# Patient Record
Sex: FEMALE | Race: WHITE | NOT HISPANIC OR LATINO | Employment: FULL TIME | URBAN - METROPOLITAN AREA
[De-identification: names, ages, dates, MRNs, and addresses within clinical notes are randomized per-mention and may not be internally consistent; named-entity substitution may affect disease eponyms.]

---

## 2017-01-03 ENCOUNTER — OFFICE VISIT (OUTPATIENT)
Dept: INTERNAL MEDICINE | Facility: CLINIC | Age: 42
End: 2017-01-03
Payer: COMMERCIAL

## 2017-01-03 VITALS
WEIGHT: 212.6 LBS | OXYGEN SATURATION: 97 % | BODY MASS INDEX: 38.88 KG/M2 | SYSTOLIC BLOOD PRESSURE: 146 MMHG | TEMPERATURE: 98.6 F | DIASTOLIC BLOOD PRESSURE: 98 MMHG | HEART RATE: 97 BPM

## 2017-01-03 DIAGNOSIS — I10 BENIGN ESSENTIAL HYPERTENSION: ICD-10-CM

## 2017-01-03 DIAGNOSIS — L29.9 PRURITIC DISORDER: ICD-10-CM

## 2017-01-03 DIAGNOSIS — G89.29 CHRONIC NECK PAIN: ICD-10-CM

## 2017-01-03 DIAGNOSIS — Z12.31 ENCOUNTER FOR SCREENING MAMMOGRAM FOR BREAST CANCER: ICD-10-CM

## 2017-01-03 DIAGNOSIS — Z00.00 ENCOUNTER FOR ROUTINE ADULT HEALTH EXAMINATION WITHOUT ABNORMAL FINDINGS: Primary | ICD-10-CM

## 2017-01-03 DIAGNOSIS — M54.2 CHRONIC NECK PAIN: ICD-10-CM

## 2017-01-03 DIAGNOSIS — R73.9 HYPERGLYCEMIA: ICD-10-CM

## 2017-01-03 DIAGNOSIS — N91.2 AMENORRHEA: ICD-10-CM

## 2017-01-03 LAB
ALBUMIN SERPL-MCNC: 4.1 G/DL (ref 3.4–5)
ALP SERPL-CCNC: 58 U/L (ref 40–150)
ALT SERPL W P-5'-P-CCNC: 43 U/L (ref 0–50)
ANION GAP SERPL CALCULATED.3IONS-SCNC: 8 MMOL/L (ref 3–14)
AST SERPL W P-5'-P-CCNC: 29 U/L (ref 0–45)
BILIRUB SERPL-MCNC: 0.4 MG/DL (ref 0.2–1.3)
BUN SERPL-MCNC: 8 MG/DL (ref 7–30)
CALCIUM SERPL-MCNC: 9.5 MG/DL (ref 8.5–10.1)
CHLORIDE SERPL-SCNC: 105 MMOL/L (ref 94–109)
CO2 SERPL-SCNC: 28 MMOL/L (ref 20–32)
CREAT SERPL-MCNC: 0.68 MG/DL (ref 0.52–1.04)
GFR SERPL CREATININE-BSD FRML MDRD: ABNORMAL ML/MIN/1.7M2
GLUCOSE SERPL-MCNC: 166 MG/DL (ref 70–99)
POTASSIUM SERPL-SCNC: 3.9 MMOL/L (ref 3.4–5.3)
PROT SERPL-MCNC: 8.1 G/DL (ref 6.8–8.8)
SODIUM SERPL-SCNC: 141 MMOL/L (ref 133–144)
TSH SERPL DL<=0.005 MIU/L-ACNC: 1.9 MU/L (ref 0.4–4)

## 2017-01-03 PROCEDURE — 80053 COMPREHEN METABOLIC PANEL: CPT | Performed by: INTERNAL MEDICINE

## 2017-01-03 PROCEDURE — 99000 SPECIMEN HANDLING OFFICE-LAB: CPT | Performed by: INTERNAL MEDICINE

## 2017-01-03 PROCEDURE — 82306 VITAMIN D 25 HYDROXY: CPT | Mod: 90 | Performed by: INTERNAL MEDICINE

## 2017-01-03 PROCEDURE — 99396 PREV VISIT EST AGE 40-64: CPT | Performed by: INTERNAL MEDICINE

## 2017-01-03 PROCEDURE — 99213 OFFICE O/P EST LOW 20 MIN: CPT | Mod: 25 | Performed by: INTERNAL MEDICINE

## 2017-01-03 PROCEDURE — 84443 ASSAY THYROID STIM HORMONE: CPT | Performed by: INTERNAL MEDICINE

## 2017-01-03 PROCEDURE — 36415 COLL VENOUS BLD VENIPUNCTURE: CPT | Performed by: INTERNAL MEDICINE

## 2017-01-03 RX ORDER — LISINOPRIL/HYDROCHLOROTHIAZIDE 10-12.5 MG
1 TABLET ORAL DAILY
Qty: 30 TABLET | Refills: 2 | Status: SHIPPED | OUTPATIENT
Start: 2017-01-03 | End: 2017-03-06

## 2017-01-03 NOTE — PROGRESS NOTES
SUBJECTIVE:     CC: Kezia Nava is an 41 year old woman who presents for preventive health visit.     Physical  Annual:     Getting at least 3 servings of Calcium per day::  Yes    Bi-annual eye exam::  Yes    Dental care twice a year::  Yes    Sleep apnea or symptoms of sleep apnea::  Sleep apnea    Frequency of exercise::  None    Taking medications regularly::  Yes    Medication side effects::  None    Additional concerns today::  YES      1. C/o pruritus- gets some hive like rash AFTER itching but not before  -she is worried this may be related to some sort of kidney or liver disease.      Today's PHQ-2 Score:   PHQ-2 ( 1999 [employer]) 1/3/2017   Q1: Little interest or pleasure in doing things 0   Q2: Feeling down, depressed or hopeless 0   PHQ-2 Score 0   Little interest or pleasure in doing things Several days   Feeling down, depressed or hopeless Several days   PHQ-2 Score 2       Abuse: Current or Past(Physical, Sexual or Emotional)- No  Do you feel safe in your environment - Yes    Social History   Substance Use Topics     Smoking status: Never Smoker      Smokeless tobacco: Never Used     Alcohol Use: 0.0 oz/week     0 Standard drinks or equivalent per week      Comment: socially     The patient does not drink >3 drinks per day nor >7 drinks per week.    Recent Labs   Lab Test  12/08/10   0711 12/08/10   CHOL  187  187   HDL  41*  41   LDL  117  117   TRIG  145  145   CHOLHDLRATIO  4.6   --        Reviewed orders with patient.  Reviewed health maintenance and updated orders accordingly - Yes    Mammo Decision Support:  Patient under age 50, mutual decision reflected in health maintenance.      Pertinent mammograms are reviewed under the imaging tab.  History of abnormal Pap smear: NO - age 30-65 PAP every 5 years with negative HPV co-testing recommended  All Histories reviewed and updated in Epic.      ROS:  C: NEGATIVE for fever, chills, change in weight  I: NEGATIVE for worrisome rashes, moles or  lesions  E: NEGATIVE for vision changes or irritation  ENT: NEGATIVE for ear, mouth and throat problems  R: NEGATIVE for significant cough or SOB  B: NEGATIVE for masses, tenderness or discharge  CV: NEGATIVE for chest pain, palpitations or peripheral edema  GI: NEGATIVE for nausea, abdominal pain, heartburn, or change in bowel habits   female: menses: amenorrhea since october  M: NEGATIVE for significant arthralgias or myalgia  N: NEGATIVE for weakness, dizziness or paresthesias  P: NEGATIVE for changes in mood or affect    Problem list, Medication list, Allergies, and Medical/Social/Surgical histories reviewed in [facility] and updated as appropriate.  OBJECTIVE:     LMP 10/23/2016 (Exact Date)  EXAM:  GENERAL: alert, no distress and over weight  EYES: Eyes grossly normal to inspection, PERRL and conjunctivae and sclerae normal  HENT: ear canals and TM's normal, nose and mouth without ulcers or lesions  NECK: no adenopathy, no asymmetry, masses, or scars and thyroid normal to palpation  RESP: lungs clear to auscultation - no rales, rhonchi or wheezes  CV: regular rate and rhythm, normal S1 S2, no S3 or S4, no murmur, click or rub, no peripheral edema and peripheral pulses strong  ABDOMEN: soft, nontender, no hepatosplenomegaly, no masses and bowel sounds normal  MS: no gross musculoskeletal defects noted, no edema  SKIN: no suspicious lesions or rashes  NEURO: Normal strength and tone, mentation intact and speech normal  PSYCH: mentation appears normal, affect normal/bright  LYMPH: no cervical, supraclavicular, axillary, or inguinal adenopathy    Results for orders placed or performed in visit on 01/03/17   TSH with free T4 reflex   Result Value Ref Range    TSH 1.90 0.40 - 4.00 mU/L   Comprehensive metabolic panel (BMP + Alb, Alk Phos, ALT, AST, Total. Bili, TP)   Result Value Ref Range    Sodium 141 133 - 144 mmol/L    Potassium 3.9 3.4 - 5.3 mmol/L    Chloride 105 94 - 109 mmol/L    Carbon Dioxide 28 20 - 32 mmol/L  "   Anion Gap 8 3 - 14 mmol/L    Glucose 166 (H) 70 - 99 mg/dL    Urea Nitrogen 8 7 - 30 mg/dL    Creatinine 0.68 0.52 - 1.04 mg/dL    GFR Estimate >90  Non  GFR Calc   >60 mL/min/1.7m2    GFR Estimate If Black >90   GFR Calc   >60 mL/min/1.7m2    Calcium 9.5 8.5 - 10.1 mg/dL    Bilirubin Total 0.4 0.2 - 1.3 mg/dL    Albumin 4.1 3.4 - 5.0 g/dL    Protein Total 8.1 6.8 - 8.8 g/dL    Alkaline Phosphatase 58 40 - 150 U/L    ALT 43 0 - 50 U/L    AST 29 0 - 45 U/L   Vitamin D Deficiency   Result Value Ref Range    Vitamin D Deficiency screening 25 20 - 75 ug/L      ASSESSMENT/PLAN:     1. Encounter for routine adult health examination without abnormal findings  Hyperglycemia noted- i don't think this was fasting. Will repeat when returns in 1 mo for BP checkout  otherwise focus on weight loss, exercise,   BP control    2. Benign essential hypertension  Start rx- f/u 1 mo   - Comprehensive metabolic panel (BMP + Alb, Alk Phos, ALT, AST, Total. Bili, TP)  - lisinopril-hydrochlorothiazide (PRINZIDE/ZESTORETIC) 10-12.5 MG per tablet; Take 1 tablet by mouth daily  Dispense: 30 tablet; Refill: 2  - OFFICE/OUTPT VISIT,EST,LEVL III    3. Pruritic disorder  No evidence of any liver disease.   - TSH with free T4 reflex  - Comprehensive metabolic panel (BMP + Alb, Alk Phos, ALT, AST, Total. Bili, TP)    4. Chronic neck pain  - Vitamin D Deficiency    5. Amenorrhea  rec'd see gyn if menses doesn't return in next 1-2 mo   - TSH with free T4 reflex    6. Encounter for screening mammogram for breast cancer  - MA Screening Digital Bilateral; Future    COUNSELING:  Reviewed preventive health counseling, as reflected in patient instructions         reports that she has never smoked. She has never used smokeless tobacco.    Estimated body mass index is 36.57 kg/(m^2) as calculated from the following:    Height as of 8/10/16: 5' 2.01\" (1.575 m).    Weight as of 8/10/16: 200 lb (90.719 kg).   Weight management " plan: Discussed healthy diet and exercise guidelines and patient will follow up in 1 month in clinic to re-evaluate.    Counseling Resources:  ATP IV Guidelines  Pooled Cohorts Equation Calculator  Breast Cancer Risk Calculator  FRAX Risk Assessment  ICSI Preventive Guidelines  Dietary Guidelines for Americans, 2010  USDA's MyPlate  ASA Prophylaxis  Lung CA Screening    Kan Serna MD  [facility]    Answers for HPI/ROS submitted by the patient on 1/3/2017   PHQ-2 Depressed: Several days, Several days  PHQ-2 Score: 2

## 2017-01-03 NOTE — MR AVS SNAPSHOT
After Visit Summary   1/3/2017    Kezia Nava    MRN: [id number]           Patient Information     Date Of Birth          1975        Visit Information        Provider Department      1/3/2017 11:00 AM Kan Serna MD [facility]        Today's Diagnoses     Encounter for routine adult health examination without abnormal findings    -  1     Benign essential hypertension         Pruritic disorder         Chronic neck pain         Amenorrhea            Follow-ups after your visit        Your next 10 appointments already scheduled     Aug 14, 2017  2:00 PM   Return Sleep Patient with Bennett Ezra Goltz, PA-C   [facility] ([facility])    [address] [address] [phone]              Who to contact     If you have questions or need follow up information about today's clinic visit or your schedule please contact [facility] directly at [phone].  Normal or non-critical lab and imaging results will be communicated to you by MyChart, letter or phone within 4 business days after the clinic has received the results. If you do not hear from us within 7 days, please contact the clinic through [employer] or phone. If you have a critical or abnormal lab result, we will notify you by phone as soon as possible.  Submit refill requests through [employer] or call your pharmacy and they will forward the refill request to us. Please allow 3 business days for your refill to be completed.          Additional Information About Your Visit        MyChart Information     [employer] gives you secure access to your electronic health record. If you see a primary care provider, you can also send messages to your care team and make appointments. If you have questions, please call your primary care clinic.  If you do not have a primary care provider, please call [phone] and they will assist  you.        Care EveryWhere ID     This is your Care EveryWhere ID. This could be used by other organizations to access your [facility] medical records  [id number]        Your Vitals Were     Pulse Temperature Pulse Oximetry Last Period          97 98.6  F (37  C) (Oral) 97% 10/23/2016 (Exact Date)         Blood Pressure from Last 3 Encounters:   01/03/17 146/98   12/13/16 147/101   08/10/16 131/84    Weight from Last 3 Encounters:   01/03/17 212 lb 9.6 oz (96.435 kg)   08/10/16 200 lb (90.719 kg)   07/01/16 204 lb (92.534 kg)              We Performed the Following     Comprehensive metabolic panel (BMP + Alb, Alk Phos, ALT, AST, Total. Bili, TP)     TSH with free T4 reflex     Vitamin D Deficiency          Today's Medication Changes          These changes are accurate as of: 1/3/17 11:59 AM.  If you have any questions, ask your nurse or doctor.               Start taking these medicines.        Dose/Directions    lisinopril-hydrochlorothiazide 10-12.5 MG per tablet   Commonly known as:  PRINZIDE/ZESTORETIC   Used for:  Benign essential hypertension   Started by:  Kan Serna MD        Dose:  1 tablet   Take 1 tablet by mouth daily   Quantity:  30 tablet   Refills:  2            Where to get your medicines      These medications were sent to [facility] [address] [address]     Phone:  [phone]    - lisinopril-hydrochlorothiazide 10-12.5 MG per tablet             Primary Care Provider Office Phone # Fax #    Kan Serna [phone]       [facility] [address] [facility] [address]        Thank you!     [facility] you [facility] your [facility]. Our goal is always to provide you with excellent care. Hearing back from our patients is one way we can continue to improve our services. Please take a few minutes to complete the written survey that you may receive in  the mail after your visit with us. Thank you!             Your Updated Medication List - Protect others around you: Learn how to safely use, store and throw away your medicines at www.disposemymeds.org.          This list is accurate as of: 1/3/17 11:59 AM.  Always use your most recent med list.                   Brand Name Dispense Instructions for use    fluticasone 50 MCG/ACT spray    FLONASE     Spray 2 sprays into both nostrils daily       gabapentin 300 MG capsule    NEURONTIN    540 capsule    Take 2 capsules (600 mg) by mouth 3 times daily       IBU PO      Take 600 mg by mouth as needed       lisinopril-hydrochlorothiazide 10-12.5 MG per tablet    PRINZIDE/ZESTORETIC    30 tablet    Take 1 tablet by mouth daily       order for DME      AIRSENSE 10 10-15 CM/H20 NASAL AIRFIT N10 FOR HER       TYLENOL PO      Take 500 mg by mouth 2 times daily As needed for pain       zolpidem 5 MG tablet    AMBIEN    30 tablet    Take 1 tablet (5 mg) by mouth nightly as needed for sleep

## 2017-01-03 NOTE — NURSING NOTE
"Chief Complaint   Patient presents with     Physical       Initial /98 mmHg  Pulse 97  Temp(Src) 98.6  F (37  C) (Oral)  Wt 212 lb 9.6 oz (96.435 kg)  SpO2 97%  LMP 10/23/2016 (Exact Date) Estimated body mass index is 38.88 kg/(m^2) as calculated from the following:    Height as of 8/10/16: 5' 2.01\" (1.575 m).    Weight as of this encounter: 212 lb 9.6 oz (96.435 kg).  BP completed using cuff size: large    "

## 2017-01-04 ENCOUNTER — DOCUMENTATION ONLY (OUTPATIENT)
Dept: SLEEP MEDICINE | Facility: CLINIC | Age: 42
End: 2017-01-04

## 2017-01-04 PROBLEM — I10 BENIGN ESSENTIAL HYPERTENSION: Status: ACTIVE | Noted: 2017-01-04

## 2017-01-04 LAB — DEPRECATED CALCIDIOL+CALCIFEROL SERPL-MC: 25 UG/L (ref 20–75)

## 2017-01-04 NOTE — PROGRESS NOTES
6 month STM    Message left for patient to return call    Device settings:    EPAP Min Auto CPAP: 10.0 (The minimum allowable pressure in cmH2O)    EPAP Max Auto CPAP: 15.0 (The maximum allowable pressure in cmH2O)    Target(95% of Target) 14 day average (Resmed): 12.3cm H20    Objective measures: 14 day rolling measures      Compliance   (Goal >70%)  --% compliance greater than four hours rolling average 14 days: [id number] %      Leak   (Goal < 24 lpm) --95% OF Leak in litres Rolling Average 14 Days: 1.11 lpm      AHI  (Goal < 5)  --AHI Rolling Average 14 Day: 1.06      Usage  (Goal >240)  --Time mask on face 14 day average: 378 min      Assessment:Pt meeting objective benchmarks.  Patient meeting subjective benchmarks.   Action plan: pt to follow up per provider request (1-2 yrs)

## 2017-02-06 ENCOUNTER — OFFICE VISIT (OUTPATIENT)
Dept: INTERNAL MEDICINE | Facility: CLINIC | Age: 42
End: 2017-02-06
Payer: COMMERCIAL

## 2017-02-06 VITALS
OXYGEN SATURATION: 98 % | WEIGHT: 211.4 LBS | HEIGHT: 62 IN | BODY MASS INDEX: 38.9 KG/M2 | DIASTOLIC BLOOD PRESSURE: 94 MMHG | TEMPERATURE: 98.3 F | SYSTOLIC BLOOD PRESSURE: 130 MMHG | HEART RATE: 89 BPM

## 2017-02-06 DIAGNOSIS — E11.65 TYPE 2 DIABETES MELLITUS WITH HYPERGLYCEMIA, WITHOUT LONG-TERM CURRENT USE OF INSULIN (H): ICD-10-CM

## 2017-02-06 DIAGNOSIS — I10 BENIGN ESSENTIAL HYPERTENSION: Primary | ICD-10-CM

## 2017-02-06 DIAGNOSIS — A08.4 VIRAL GASTROENTERITIS: ICD-10-CM

## 2017-02-06 DIAGNOSIS — R73.9 HYPERGLYCEMIA: ICD-10-CM

## 2017-02-06 LAB
ANION GAP SERPL CALCULATED.3IONS-SCNC: 8 MMOL/L (ref 3–14)
BUN SERPL-MCNC: 9 MG/DL (ref 7–30)
CALCIUM SERPL-MCNC: 9.4 MG/DL (ref 8.5–10.1)
CHLORIDE SERPL-SCNC: 102 MMOL/L (ref 94–109)
CO2 SERPL-SCNC: 28 MMOL/L (ref 20–32)
CREAT SERPL-MCNC: 0.67 MG/DL (ref 0.52–1.04)
GFR SERPL CREATININE-BSD FRML MDRD: ABNORMAL ML/MIN/1.7M2
GLUCOSE SERPL-MCNC: 136 MG/DL (ref 70–99)
HBA1C MFR BLD: 7.8 % (ref 4.3–6)
POTASSIUM SERPL-SCNC: 3.4 MMOL/L (ref 3.4–5.3)
SODIUM SERPL-SCNC: 138 MMOL/L (ref 133–144)

## 2017-02-06 PROCEDURE — 83036 HEMOGLOBIN GLYCOSYLATED A1C: CPT | Performed by: INTERNAL MEDICINE

## 2017-02-06 PROCEDURE — 99214 OFFICE O/P EST MOD 30 MIN: CPT | Performed by: INTERNAL MEDICINE

## 2017-02-06 PROCEDURE — 80048 BASIC METABOLIC PNL TOTAL CA: CPT | Performed by: INTERNAL MEDICINE

## 2017-02-06 PROCEDURE — 36415 COLL VENOUS BLD VENIPUNCTURE: CPT | Performed by: INTERNAL MEDICINE

## 2017-02-06 RX ORDER — ONDANSETRON 4 MG/1
4-8 TABLET, ORALLY DISINTEGRATING ORAL EVERY 8 HOURS PRN
Qty: 12 TABLET | Refills: 1 | Status: SHIPPED | OUTPATIENT
Start: 2017-02-06 | End: 2020-02-18

## 2017-02-06 NOTE — NURSING NOTE
"Chief Complaint   Patient presents with     Hypertension       Initial /98 mmHg  Pulse 89  Temp(Src) 98.3  F (36.8  C) (Oral)  Ht 5' 2\" (1.575 m)  Wt 211 lb 6.4 oz (95.89 kg)  BMI 38.66 kg/m2  SpO2 98%  LMP 02/06/2017 Estimated body mass index is 38.66 kg/(m^2) as calculated from the following:    Height as of this encounter: 5' 2\" (1.575 m).    Weight as of this encounter: 211 lb 6.4 oz (95.89 kg).  Medication Reconciliation: complete    "

## 2017-02-06 NOTE — PROGRESS NOTES
"  SUBJECTIVE:                                                    Kezia Nava is a 41 year old female who presents to clinic today for the following health issues:    Viral \"stomach flu\"  Reports 1-2 d hx of nausea, diarrhea- loose, non-bloody, low grade fever, chills and feeling \"pretty rotten\"    Hypertension Follow-up  - started on meds last month   -most home/work # in the <130 range     Outpatient blood pressures are being checked at home.  Results are 110 - 150 / .    Low Salt Diet: low salt       Amount of exercise or physical activity: None    Problems taking medications regularly: No    Medication side effects: none    Diet: regular (no restrictions)    Problem list and histories reviewed & adjusted, as indicated.  Additional history: as documented    Problem list, Medication list, Allergies, and Medical/Social/Surgical histories reviewed in EPIC and updated as appropriate.    ROS:  Constitutional, HEENT, cardiovascular, pulmonary, gi and gu systems are negative, except as otherwise noted.    OBJECTIVE:                                                    /94 mmHg  Pulse 89  Temp(Src) 98.3  F (36.8  C) (Oral)  Ht 5' 2\" (1.575 m)  Wt 211 lb 6.4 oz (95.89 kg)  BMI 38.66 kg/m2  SpO2 98%  LMP 02/06/2017  Body mass index is 38.66 kg/(m^2).  GENERAL APPEARANCE: alert, no distress, over weight and fatigued  HENT: nose and mouth without ulcers or lesions  NECK: no adenopathy, no asymmetry, masses, or scars and thyroid normal to palpation  RESP: lungs clear to auscultation - no rales, rhonchi or wheezes  CV: regular rates and rhythm, normal S1 S2, no S3 or S4 and no murmur, click or rub  ABDOMEN: soft, nontender, without hepatosplenomegaly or masses and bowel sounds normal    Diagnostic test results:  Results for orders placed or performed in visit on 02/06/17   Hemoglobin A1c   Result Value Ref Range    Hemoglobin A1C 7.8 (H) 4.3 - 6.0 %   Basic metabolic panel   Result Value Ref Range    Sodium " 138 133 - 144 mmol/L    Potassium 3.4 3.4 - 5.3 mmol/L    Chloride 102 94 - 109 mmol/L    Carbon Dioxide 28 20 - 32 mmol/L    Anion Gap 8 3 - 14 mmol/L    Glucose 136 (H) 70 - 99 mg/dL    Urea Nitrogen 9 7 - 30 mg/dL    Creatinine 0.67 0.52 - 1.04 mg/dL    GFR Estimate >90  Non  GFR Calc   >60 mL/min/1.7m2    GFR Estimate If Black >90   GFR Calc   >60 mL/min/1.7m2    Calcium 9.4 8.5 - 10.1 mg/dL         ASSESSMENT/PLAN:                                                    1. Viral gastroenteritis  - ondansetron (ZOFRAN ODT) 4 MG ODT tab; Take 1-2 tablets (4-8 mg) by mouth every 8 hours as needed for nausea  Dispense: 12 tablet; Refill: 1    2.  Benign essential hypertension  Not quite at goal- but not best time to test thi- recheck when feeling better  - Basic metabolic panel    3. Hyperglycemia  Given the a1c -this indicates new onset type 2 diabetes   Will have her return and discuss starting metformin amongst other options  - Hemoglobin A1c  - Basic metabolic panel    Follow up with Provider -2-4 wks     Kan Serna MD  [facility]

## 2017-02-06 NOTE — MR AVS SNAPSHOT
After Visit Summary   2/6/2017    Kezia Nava    MRN: [id number]           Patient Information     Date Of Birth          1975        Visit Information        Provider Department      2/6/2017 10:20 AM Kan Serna MD [facility]        Today's Diagnoses     Benign essential hypertension    -  1     Hyperglycemia         Viral gastroenteritis            Follow-ups after your visit        Your next 10 appointments already scheduled     Aug 14, 2017  2:00 PM   Return Sleep Patient with Bennett Ezra Goltz, PA-C   [facility] ([facility])    [address] [place] [address] [phone]              Who to contact     If you have questions or need follow up information about today's clinic visit or your schedule please contact [facility] directly at [phone].  Normal or non-critical lab and imaging results will be communicated to you by MyChart, letter or phone within 4 business days after the clinic has received the results. If you do not hear from us within 7 days, please contact the clinic through MyChart or phone. If you have a critical or abnormal lab result, we will notify you by phone as soon as possible.  Submit refill requests through [employer] or call your pharmacy and they will forward the refill request to us. Please allow 3 business days for your refill to be completed.          Additional Information About Your Visit        MyChart Information     [employer] gives you secure access to your electronic health record. If you see a primary care provider, you can also send messages to your care team and make appointments. If you have questions, please call your primary care clinic.  If you do not have a primary care provider, please call [phone] and they will assist you.        Care EveryWhere ID     This is your Care EveryWhere ID. This could be used by other  "organizations to access your [facility] medical records  [id number]        Your Vitals Were     Pulse Temperature Height BMI (Body Mass Index) Pulse Oximetry Last Period    89 98.3  F (36.8  C) (Oral) 5' 2\" (1.575 m) 38.66 kg/m2 98% 02/06/2017       Blood Pressure from Last 3 Encounters:   02/06/17 130/98   01/03/17 146/98   12/13/16 147/101    Weight from Last 3 Encounters:   02/06/17 211 lb 6.4 oz (95.89 kg)   01/03/17 212 lb 9.6 oz (96.435 kg)   08/10/16 200 lb (90.719 kg)              We Performed the Following     Basic metabolic panel     Hemoglobin A1c        Primary Care Provider Office Phone # Fax #    Kan Serna [phone]       [facility] [address] [facility] [address]        Thank you!     Thank you for choosing [facility]  for your care. Our goal is always to provide you with excellent care. Hearing back from our patients is one way we can continue to improve our services. Please take a few minutes to complete the written survey that you may receive in the mail after your visit with us. Thank you!             Your Updated Medication List - Protect others around you: Learn how to safely use, store and throw away your medicines at www.disposemymeds.org.          This list is accurate as of: 2/6/17 10:51 AM.  Always use your most recent med list.                   Brand Name Dispense Instructions for use    fluticasone 50 MCG/ACT spray    FLONASE     Spray 2 sprays into both nostrils daily       gabapentin 300 MG capsule    NEURONTIN    540 capsule    Take 2 capsules (600 mg) by mouth 3 times daily       IBU PO      Take 600 mg by mouth as needed       lisinopril-hydrochlorothiazide 10-12.5 MG per tablet    PRINZIDE/ZESTORETIC    30 tablet    Take 1 tablet by mouth daily       order for DME      AIRSENSE 10 10-15 CM/H20 NASAL AIRFIT N10 FOR HER       TYLENOL PO      Take 500 mg by mouth 2 times daily As needed for pain       zolpidem " 5 MG tablet    AMBIEN    30 tablet    Take 1 tablet (5 mg) by mouth nightly as needed for sleep

## 2017-02-13 ENCOUNTER — HOSPITAL ENCOUNTER (OUTPATIENT)
Dept: MAMMOGRAPHY | Facility: CLINIC | Age: 42
Discharge: HOME | End: 2017-02-13
Attending: INTERNAL MEDICINE | Admitting: INTERNAL MEDICINE
Payer: COMMERCIAL

## 2017-02-13 DIAGNOSIS — Z12.31 VISIT FOR SCREENING MAMMOGRAM: ICD-10-CM

## 2017-02-13 PROCEDURE — G0202 SCR MAMMO BI INCL CAD: HCPCS

## 2017-03-06 ENCOUNTER — TELEPHONE (OUTPATIENT)
Dept: INTERNAL MEDICINE | Facility: CLINIC | Age: 42
End: 2017-03-06

## 2017-03-06 ENCOUNTER — OFFICE VISIT (OUTPATIENT)
Dept: INTERNAL MEDICINE | Facility: CLINIC | Age: 42
End: 2017-03-06
Payer: COMMERCIAL

## 2017-03-06 VITALS
SYSTOLIC BLOOD PRESSURE: 116 MMHG | TEMPERATURE: 98.3 F | DIASTOLIC BLOOD PRESSURE: 78 MMHG | WEIGHT: 208 LBS | OXYGEN SATURATION: 98 % | BODY MASS INDEX: 38.04 KG/M2 | HEART RATE: 82 BPM

## 2017-03-06 DIAGNOSIS — I10 BENIGN ESSENTIAL HYPERTENSION: ICD-10-CM

## 2017-03-06 DIAGNOSIS — Z13.6 CARDIOVASCULAR SCREENING; LDL GOAL LESS THAN 100: ICD-10-CM

## 2017-03-06 DIAGNOSIS — E11.65 TYPE 2 DIABETES MELLITUS WITH HYPERGLYCEMIA, WITHOUT LONG-TERM CURRENT USE OF INSULIN (H): Primary | ICD-10-CM

## 2017-03-06 DIAGNOSIS — Z80.3 FAMILY HISTORY OF MALIGNANT NEOPLASM OF BREAST: ICD-10-CM

## 2017-03-06 DIAGNOSIS — Z23 NEED FOR TDAP VACCINATION: ICD-10-CM

## 2017-03-06 LAB
CHOLEST SERPL-MCNC: 184 MG/DL
CREAT UR-MCNC: 184 MG/DL
HDLC SERPL-MCNC: 37 MG/DL
LDLC SERPL CALC-MCNC: 109 MG/DL
MICROALBUMIN UR-MCNC: 60 MG/L
MICROALBUMIN/CREAT UR: 32.55 MG/G CR (ref 0–25)
NONHDLC SERPL-MCNC: 147 MG/DL
TRIGL SERPL-MCNC: 188 MG/DL

## 2017-03-06 PROCEDURE — 36415 COLL VENOUS BLD VENIPUNCTURE: CPT | Performed by: INTERNAL MEDICINE

## 2017-03-06 PROCEDURE — 82043 UR ALBUMIN QUANTITATIVE: CPT | Performed by: INTERNAL MEDICINE

## 2017-03-06 PROCEDURE — 90471 IMMUNIZATION ADMIN: CPT | Performed by: INTERNAL MEDICINE

## 2017-03-06 PROCEDURE — 99214 OFFICE O/P EST MOD 30 MIN: CPT | Mod: 25 | Performed by: INTERNAL MEDICINE

## 2017-03-06 PROCEDURE — 90715 TDAP VACCINE 7 YRS/> IM: CPT | Performed by: INTERNAL MEDICINE

## 2017-03-06 PROCEDURE — 80061 LIPID PANEL: CPT | Performed by: INTERNAL MEDICINE

## 2017-03-06 RX ORDER — LISINOPRIL/HYDROCHLOROTHIAZIDE 10-12.5 MG
1 TABLET ORAL DAILY
Qty: 90 TABLET | Refills: 1 | Status: SHIPPED | OUTPATIENT
Start: 2017-03-06 | End: 2017-06-13 | Stop reason: SINTOL

## 2017-03-06 RX ORDER — METFORMIN HCL 500 MG
500 TABLET, EXTENDED RELEASE 24 HR ORAL
Qty: 30 TABLET | Refills: 2 | Status: SHIPPED | OUTPATIENT
Start: 2017-03-06 | End: 2017-06-05

## 2017-03-06 NOTE — MR AVS SNAPSHOT
After Visit Summary   3/6/2017    Kezia Nava    MRN: [id number]           Patient Information     Date Of Birth          1975        Visit Information        Provider Department      3/6/2017 8:00 AM Kan Serna MD [facility]        Today's Diagnoses     Type 2 diabetes mellitus with hyperglycemia, without long-term current use of insulin (H)    -  1    Benign essential hypertension        Need for Tdap vaccination        Family history of malignant neoplasm of breast           Follow-ups after your visit        Additional Services     DIABETES EDUCATOR REFERRAL       DIABETES SELF MANAGEMENT TRAINING (DSMT)      Your provider has referred you to Diabetes Education: [facility]: Diabetes Education - All [facility] [phone]   [email or web address]    Type of training and number of hours: New Diagnosis: Initial group DSMT - 10 hours.      Medicare covers: 10 hours of initial DSMT in 12 month period from the time of first visit, plus 2 hours of follow-up DSMT annually, and additional hours as requested for insulin training.    Diabetes Type: Type 2 - Diet Control             Diabetes Co-Morbidities: obesity               A1C Goal:  <7.0       A1C is: Lab Results       Component                Value               Date                       A1C                      7.8                 02/06/2017              If an urgent visit is needed or A1C is above 12, Care Team to call the Diabetes Education Team at [phone] or send an In Basket message to the Diabetes Education Pool (P DIAB ED-PATIENT CARE).    Diabetes Education Topics: Comprehensive Knowledge Assessment and Instruction    Special Educational Needs Requiring Individual DSMT: None       MEDICAL NUTRITION THERAPY (MNT) for Diabetes    Medical Nutrition Therapy with a Registered Dietitian can be provided in coordination with Diabetes Self-Management  Training to assist in achieving optimal diabetes management.    MNT Type and Hours: New diagnosis: Initial MNT - 3 hours                       Medicare will cover: 3 hours initial MNT in 12 month period after first visit, plus 2 hours of follow-up MNT annually    Please be aware that coverage of these services is subject to the terms and limitations of your health insurance plan.  Call member services at your health plan to determine Diabetes Self-Management Training benefits and ask which blood glucose monitor brands are covered by your plan.      Please bring the following with you to your appointment:    (1)  List of current medications   (2)  List of Blood Glucose Monitor brands that are covered by your insurance plan  (3)  Blood Glucose Monitor and log book  (4)   Food records for the 3 days prior to your visit    The Certified Diabetes Educator may make diabetes medication adjustments per the CDE Protocol and Collaborative Practice Agreement.            ONC/HEME ADULT REFERRAL       Your provider has referred you to: [facility]: [facility] [phone]   [email or web address]/    Please be aware that coverage of these services is subject to the terms and limitations of your health insurance plan.  Call member services at your health plan with any benefit or coverage questions.      Please bring the following with you to your appointment:    (1) Any X-Rays, CTs or MRIs which have been performed.  Contact the facility where they were done to arrange for  prior to your scheduled appointment.   (2) List of current medications  (3) This referral request   (4) Any documents/labs given to you for this referral                  Your next 10 appointments already scheduled     Aug 14, 2017  2:00 PM CDT   Return Sleep Patient with Bennett Ezra Goltz, PA-C [place]  [facility] [place] [facility])    [address] [id number] [phone]              Future tests that were ordered for you today     Open Future Orders        Priority Expected Expires Ordered    Lipid panel reflex to direct LDL Routine  3/6/2018 3/6/2017    Albumin Random Urine Quantitative Routine  3/6/2018 3/6/2017            Who to contact     If you have questions or need follow up information about today's clinic visit or your schedule please contact [facility] directly at [phone].  Normal or non-critical lab and imaging results will be communicated to you by Roll[employer], letter or phone within 4 business days after the clinic has received the results. If you do not hear from us within 7 days, please contact the clinic through [employer] or phone. If you have a critical or abnormal lab result, we will notify you by phone as soon as possible.  Submit refill requests through [employer] or call your pharmacy and they will forward the refill request to us. Please allow 3 business days for your refill to be completed.          Additional Information About Your Visit        MyChart Information     [employer] gives you secure access to your electronic health record. If you see a primary care provider, you can also send messages to your care team and make appointments. If you have questions, please call your primary care clinic.  If you do not have a primary care provider, please call [phone] and they will assist you.        Care EveryWhere ID     This is your Care EveryWhere ID. This could be used by other organizations to access your [facility] medical records  [id number]        Your Vitals Were     Pulse Temperature Last Period Pulse Oximetry Breastfeeding? BMI (Body Mass Index)    82 98.3  F (36.8  C) (Oral) 02/06/2017 98% No 38.04 kg/m2       Blood Pressure from Last 3 Encounters:   03/06/17 116/78   02/06/17 (!) 130/94   01/03/17 (!)  146/98    Weight from Last 3 Encounters:   03/06/17 208 lb (94.3 kg)   02/06/17 211 lb 6.4 oz (95.9 kg)   01/03/17 212 lb 9.6 oz (96.4 kg)              We Performed the Following     DIABETES EDUCATOR REFERRAL     ONC/HEME ADULT REFERRAL     TDAP (ADACEL AGES 11-64)          Today's Medication Changes          These changes are accurate as of: 3/6/17  9:02 AM.  If you have any questions, ask your nurse or doctor.               Start taking these medicines.        Dose/Directions    metFORMIN 500 MG 24 hr tablet   Commonly known as:  GLUCOPHAGE-XR   Used for:  Type 2 diabetes mellitus with hyperglycemia, without long-term current use of insulin (H)   Started by:  Kan Serna MD        Dose:  500 mg   Take 1 tablet (500 mg) by mouth daily (with dinner)   Quantity:  30 tablet   Refills:  2         These medicines have changed or have updated prescriptions.        Dose/Directions    gabapentin 300 MG capsule   Commonly known as:  NEURONTIN   This may have changed:  how much to take   Used for:  Status post cervical spinal fusion        Dose:  600 mg   Take 2 capsules (600 mg) by mouth 3 times daily   Quantity:  540 capsule   Refills:  2            Where to get your medicines      These medications were sent to [facility] - [place] [address]  [address] [facility] [address]    Hours:  Mon-Fri 8:30am-5:00pm Toll Free [phone] Phone:  [phone]     lisinopril-hydrochlorothiazide 10-12.5 MG per tablet         These medications were sent to [address] [address]     Phone:  [phone]     metFORMIN 500 MG 24 hr tablet                Primary Care Provider Office Phone # Fax #    Kan Serna [phone]       [facility] [address] [facility] [address]        Thank you!     Thank you for choosing [facility]  [facility]  for your care. Our goal is always to provide you with excellent care. Hearing back from our patients is one way we can continue to improve our services. Please take a few minutes to complete the written survey that you may receive in the mail after your visit with us. Thank you!             Your Updated Medication List - Protect others around you: Learn how to safely use, store and throw away your medicines at www.disposemymeds.org.          This list is accurate as of: 3/6/17  9:02 AM.  Always use your most recent med list.                   Brand Name Dispense Instructions for use    fluticasone 50 MCG/ACT spray    FLONASE     Spray 2 sprays into both nostrils daily       gabapentin 300 MG capsule    NEURONTIN    540 capsule    Take 2 capsules (600 mg) by mouth 3 times daily       IBU PO      Take 600 mg by mouth as needed       lisinopril-hydrochlorothiazide 10-12.5 MG per tablet    PRINZIDE/ZESTORETIC    90 tablet    Take 1 tablet by mouth daily       metFORMIN 500 MG 24 hr tablet    GLUCOPHAGE-XR    30 tablet    Take 1 tablet (500 mg) by mouth daily (with dinner)       ondansetron 4 MG ODT tab    ZOFRAN ODT    12 tablet    Take 1-2 tablets (4-8 mg) by mouth every 8 hours as needed for nausea       order for DME      AIRSENSE 10 10-15 CM/H20 NASAL AIRFIT N10 FOR HER       TYLENOL PO      Take 500 mg by mouth 2 times daily As needed for pain       zolpidem 5 MG tablet    AMBIEN    30 tablet    Take 1 tablet (5 mg) by mouth nightly as needed for sleep

## 2017-03-06 NOTE — PROGRESS NOTES
SUBJECTIVE:                                                    Kezia Nava is a 41 year old female who presents to clinic today for the following health issues:    New onset diabetes  Lab Results   Component Value Date    A1C 7.8 02/06/2017    A1C 5.9 12/12/2015    A1C 5.7 01/19/2011        Hypertension Follow-up  BP Readings from Last 3 Encounters:   03/06/17 116/78   02/06/17 (!) 130/94   01/03/17 (!) 146/98        Outpatient blood pressures are being checked at home.  Results are 120/80.    Low Salt Diet: no added salt       Sister with recent dx of breast cancer  She states sister palpated LN ion axilla. This was after normal mammo. she has dense breasts like pt and now she is concerned that  mammo may be inadequate for her.       Amount of exercise or physical activity: active job, works in ER    Problems taking medications regularly: No    Medication side effects: gabapentin was causing fatigue, depression and tingling sensation  Diet: low salt    Problem list and histories reviewed & adjusted, as indicated.  Additional history: as documented    Labs reviewed in EPIC    Reviewed and updated as needed this visit by clinical staff  Tobacco  Allergies  Meds  Soc Hx      Reviewed and updated as needed this visit by Provider  Tobacco  Soc Hx        ROS:  Constitutional, HEENT, cardiovascular, pulmonary, gi and gu systems are negative, except as otherwise noted.    OBJECTIVE:                                                    /78 (BP Location: Right arm, Patient Position: Chair, Cuff Size: Adult Large)  Pulse 82  Temp 98.3  F (36.8  C) (Oral)  Wt 208 lb (94.3 kg)  LMP 02/06/2017  SpO2 98%  Breastfeeding? No  BMI 38.04 kg/m2  Body mass index is 38.04 kg/(m^2).  GENERAL APPEARANCE: alert, no distress and over weight  HENT: nose and mouth without ulcers or lesions  NECK: no adenopathy, no asymmetry, masses, or scars and thyroid normal to palpation  RESP: lungs clear to auscultation - no rales, rhonchi  or wheezes  CV: regular rates and rhythm, normal S1 S2, no S3 or S4 and no murmur, click or rub  MS: extremities normal- no gross deformities noted  SKIN: no suspicious lesions or rashes    Diagnostic test results:  Results for orders placed or performed in visit on 03/06/17 (from the past 24 hour(s))   Lipid panel reflex to direct LDL   Result Value Ref Range    Cholesterol 184 <200 mg/dL    Triglycerides 188 (H) <150 mg/dL    HDL Cholesterol 37 (L) >49 mg/dL    LDL Cholesterol Calculated 109 (H) <100 mg/dL    Non HDL Cholesterol 147 (H) <130 mg/dL   Albumin Random Urine Quantitative   Result Value Ref Range    Creatinine Urine 184 mg/dL    Albumin Urine mg/L 60 mg/L    Albumin Urine mg/g Cr 32.55 (H) 0 - 25 mg/g Cr        ASSESSMENT/PLAN:                                                    1. Type 2 diabetes mellitus with hyperglycemia, without long-term current use of insulin (H)  - start metformin  - given obesity- weight loss should have + impact  - start statin - 10 yr cv risk<7.5%-> mod int statin  - Lipid panel reflex to direct LDL; Future  - Albumin Random Urine Quantitative; Future  - DIABETES EDUCATOR REFERRAL  - metFORMIN (GLUCOPHAGE-XR) 500 MG 24 hr tablet; Take 1 tablet (500 mg) by mouth daily (with dinner)  Dispense: 30 tablet; Refill: 2  - Lipid panel reflex to direct LDL  - Albumin Random Urine Quantitative  - blood glucose monitoring (ONE TOUCH ULTRA) test strip; Use to test blood sugars 2x times daily  Dispense: 100 strip; Refill: 11  - blood glucose monitoring (ONE TOUCH ULTRA 2) meter device kit; Use to test blood sugars 2x times daily  Dispense: 1 kit; Refill: 0  - blood glucose monitoring (ONE TOUCH DELICA) lancets; Use to test blood sugars 2x times daily  Dispense: 1 Box; Refill: 11    2. Benign essential hypertension  Continue meds  - lisinopril-hydrochlorothiazide (PRINZIDE/ZESTORETIC) 10-12.5 MG per tablet; Take 1 tablet by mouth daily  Dispense: 90 tablet; Refill: 1    3. Need for Tdap  vaccination  - TDAP (ADACEL AGES 11-64)    4. Family history of malignant neoplasm of breast  - ONC/HEME ADULT REFERRAL  - CANCER RISK MGMT/CANCER GENETIC COUNSELING REFERRAL      CDE f/u. F/u 3 mo with me    Kan Serna MD  [facility]

## 2017-03-06 NOTE — TELEPHONE ENCOUNTER
Kezia Velasco picked up her metformin and stated she was supposed to have an order for a new glucometer, strips, and lancets sent down as well.  I don't see it in her active med list.  I gave her a Glucocard Vital meter for free, but she will need strips and lancets to go with it.    Can you send a new order for the followin. Blood glucose meter (just in case her insurance doesn't cover the Glucocard strips)  2. Test strips  3. Lancets    Thank you,    Otf Queen, PharmD  [facility]  [phone]

## 2017-03-07 RX ORDER — SIMVASTATIN 40 MG
40 TABLET ORAL AT BEDTIME
Qty: 90 TABLET | Refills: 3 | Status: SHIPPED | OUTPATIENT
Start: 2017-03-07 | End: 2017-08-15

## 2017-03-07 RX ORDER — BLOOD-GLUCOSE METER
EACH MISCELLANEOUS
Qty: 1 KIT | Refills: 0 | Status: SHIPPED | OUTPATIENT
Start: 2017-03-07 | End: 2017-06-13

## 2017-04-06 NOTE — TELEPHONE ENCOUNTER
New orders were filled at [facility] 3/31/17 for the meter, strips, and lancets.    Otf Queen, PharmD  [facility]  [phone]

## 2017-04-13 ENCOUNTER — ALLIED HEALTH/NURSE VISIT (OUTPATIENT)
Dept: EDUCATION SERVICES | Facility: CLINIC | Age: 42
End: 2017-04-13
Payer: COMMERCIAL

## 2017-04-13 DIAGNOSIS — E11.65 TYPE 2 DIABETES MELLITUS WITH HYPERGLYCEMIA, WITHOUT LONG-TERM CURRENT USE OF INSULIN (H): Primary | ICD-10-CM

## 2017-04-13 PROCEDURE — G0108 DIAB MANAGE TRN  PER INDIV: HCPCS

## 2017-04-13 RX ORDER — LANCETS
EACH MISCELLANEOUS
Qty: 1 BOX | Status: SHIPPED | OUTPATIENT
Start: 2017-04-13 | End: 2018-06-18

## 2017-04-13 NOTE — PROGRESS NOTES
Diabetes Self Management Training: Initial Assessment Visit for Newly Diagnosed Patients (Complete AADE Goals Flowsheet)    Kezia Nava presents today for education related to Type 2 diabetes.    She is accompanied by self    Patient's diabetes management related comments/concerns: how to control it    Patient's emotional response to diabetes: expresses readiness to learn and concern for health and well-being    Patient would like this visit to be focused around the following diabetes-related behaviors and goals: Assistance with making lifestyle changes and Healthy Eating    ASSESSMENT:  Patient Problem List and Family Medical History reviewed for relevant medical history, current medical status, and diabetes risk factors.    Kezia's mom was recently diagnosed, so she was somewhat familiar with diabetes since she attended that appointment with her mom. Has been trying to lose weight with eating a very low carb diet.     Current Diabetes Management per Patient:  Taking diabetes medications?   yes:     Diabetes Medication(s)     Biguanides Sig    metFORMIN (GLUCOPHAGE-XR) 500 MG 24 hr tablet Take 1 tablet (500 mg) by mouth daily (with dinner)          *Abbreviated insulin dose documentation key: Insulin Trade Name (mquesfspt-xbqzr-guhric-bedtime) - i.e. Humalog 5-5-5-0 (Humalog 5 units at breakfast, 5 units at lunch, and 5 units at dinner).    Past Diabetes Education: Newly diagnosed    Patient glucose self monitoring as follows: BG meter taught today.     Patient's most recent   Lab Results   Component Value Date    A1C 7.8 02/06/2017    is not meeting goal of <7.0    Nutrition:  Patient eats 2-3 meals per day and has a low intake of carbohydrates  Tries to have something every 3 hours     Wakes at 5pm (works nights)  Breakfast - (1st meal) -- protein bar OR hard boiled eggs -- water  snack - some veggies (tomatoes, cauliflower)  Lunch 2-3am- hit or miss -- chicken breast OR salad with chicken   Dinner - 6-7am  "(3rd meal) - cheese sticks OR hard boiled eggs     Beverages: water, sometimes caffeine free diet soda    Cultural/Anabaptism diet restrictions: No     Biggest Challenge to Healthy Eating: portion control and knowing what to eat    Physical Activity:    Not  Much right now    Diabetes Risk Factors:  family history and overweight/obesity    Diabetes Complications:  Not discussed today.    Vitals:  There were no vitals taken for this visit.  Estimated body mass index is 38.04 kg/(m^2) as calculated from the following:    Height as of 2/6/17: 1.575 m (5' 2\").    Weight as of 3/6/17: 94.3 kg (208 lb).   Last 3 BP:   BP Readings from Last 3 Encounters:   03/06/17 116/78   02/06/17 (!) 130/94   01/03/17 (!) 146/98       History   Smoking Status     Never Smoker   Smokeless Tobacco     Never Used       Labs:  Lab Results   Component Value Date    A1C 7.8 02/06/2017     Lab Results   Component Value Date     02/06/2017     Lab Results   Component Value Date     03/06/2017     HDL Cholesterol   Date Value Ref Range Status   03/06/2017 37 (L) >49 mg/dL Final   ]  GFR Estimate   Date Value Ref Range Status   02/06/2017 >90  Non  GFR Calc   >60 mL/min/1.7m2 Final     GFR Estimate If Black   Date Value Ref Range Status   02/06/2017 >90   GFR Calc   >60 mL/min/1.7m2 Final     Lab Results   Component Value Date    CR 0.67 02/06/2017     No results found for: MICROALBUMIN    Socio/Economic Considerations:    Support system: family and friend(s)    Health Beliefs and Attitudes:   Patient Activation Measure Survey Score:  JOSE Score (Last Two) 10/17/2013 1/3/2017   JOSE Raw Score 49 33   Activation Score 82.8 65.8   JOSE Level 4 3       Stage of Change: PREPARATION (Decided to change - considering how)      Diabetes knowledge and skills assessment:     Patient is knowledgeable in diabetes management concepts related to: Healthy Eating, Being Active, Monitoring and Taking Medication    Patient " needs further education on the following diabetes management concepts: Problem Solving and Reducing Risks    Barriers to Learning Assessment: No Barriers identified    Based on learning assessment above, most appropriate setting for further diabetes education would be: Group class or Individual setting.    INTERVENTION:   Education provided today on:  AADE Self-Care Behaviors:  Healthy Eating: carbohydrate counting, consistency in amount, composition, and timing of food intake, weight reduction, plate planning method and label reading  Being Active: relationship to blood glucose  Monitoring: purpose, proper technique, log and interpret results, individual blood glucose targets, frequency of monitoring, use of glucose control solution and proper sharps disposal  Taking Medication: side effects of prescribed medications and when to take medications  Healthy Coping: benefits of making appropriate lifestyle changes  Patient was instructed on Accu-Chek Karlee Connect meter and was able to provide an accurate return demonstration.     Opportunities for ongoing education and support in diabetes-self management were discussed.    Pt verbalized understanding of concepts discussed and recommendations provided today.       Education Materials Provided:  [facility] Understanding Diabetes Booklet, BG Log Sheet and My Plate Planner    PLAN:  Meal Plan Recommendation: eat 3 meals a day, use plate planning method and Aim for 2-3 carb servings at meals and 0-1 carb servings at snacks  Exercise / activity plan: 30 minutes activity daily  Check blood sugars fasting    FOLLOW-UP:  Education topics to cover at the next diabetes education visit(s): complication prevention, heart health  Follow-up with PCP recommended.  Chart routed to referring provider.    Ongoing plan for education and support: Written resources (magazines, books, etc.) and Follow-up visit with diabetes educator in 3-6 months    Karina lBankenship RD LD CDE  Time Spent: 60  minutes  Encounter Type: Individual    Any diabetes medication dose changes were made via the CDE Protocol and Collaborative Practice Agreement with the patient's referring provider. A copy of this encounter was shared with the provider.

## 2017-04-13 NOTE — MR AVS SNAPSHOT
After Visit Summary   4/13/2017    Kezia Nava    MRN: [id number]           Patient Information     Date Of Birth          1975        Visit Information        Provider Department      4/13/2017 9:30 AM [facility] DIABETIC ED RESOURCE [facility]        Today's Diagnoses     Type 2 diabetes mellitus with hyperglycemia, without long-term current use of insulin (H)    -  1       Follow-ups after your visit        Your next 10 appointments already scheduled     Aug 14, 2017  2:00 PM CDT   Return Sleep Patient with Bennett Ezra Goltz, PA-C   [facility] ([facility])    [address] [address] [phone]              Who to contact     If you have questions or need follow up information about today's clinic visit or your schedule please contact [facility] directly at [phone].  Normal or non-critical lab and imaging results will be communicated to you by MyChart, letter or phone within 4 business days after the clinic has received the results. If you do not hear from us within 7 days, please contact the clinic through [employer] or phone. If you have a critical or abnormal lab result, we will notify you by phone as soon as possible.  Submit refill requests through [employer] or call your pharmacy and they will forward the refill request to us. Please allow 3 business days for your refill to be completed.          Additional Information About Your Visit        MyChart Information     [employer] gives you secure access to your electronic health record. If you see a primary care provider, you can also send messages to your care team and make appointments. If you have questions, please call your primary care clinic.  If you do not have a primary care provider, please call [phone] and they will assist you.        Care EveryWhere ID     This is your Care EveryWhere ID. This could be used by other  organizations to access your [place] medical records  [id number]         Blood Pressure from Last 3 Encounters:   03/06/17 116/78   02/06/17 (!) 130/94   01/03/17 (!) 146/98    Weight from Last 3 Encounters:   03/06/17 94.3 kg (208 lb)   02/06/17 95.9 kg (211 lb 6.4 oz)   01/03/17 96.4 kg (212 lb 9.6 oz)              We Performed the Following     DIABETES EDUCATION - Individual  []          Today's Medication Changes          These changes are accurate as of: 4/13/17 11:10 AM.  If you have any questions, ask your nurse or doctor.               These medicines have changed or have updated prescriptions.        Dose/Directions    * blood glucose monitoring lancets   This may have changed:  Another medication with the same name was added. Make sure you understand how and when to take each.   Used for:  Type 2 diabetes mellitus with hyperglycemia, without long-term current use of insulin (H)        Use to test blood sugars 2x times daily   Quantity:  1 Box   Refills:  11       * blood glucose monitoring lancets   This may have changed:  You were already taking a medication with the same name, and this prescription was added. Make sure you understand how and when to take each.   Used for:  Type 2 diabetes mellitus with hyperglycemia, without long-term current use of insulin (H)        Use to test blood sugar 1 times daily or as directed.   Quantity:  1 Box   Refills:  prn       * blood glucose monitoring test strip   Commonly known as:  ONE TOUCH ULTRA   This may have changed:  Another medication with the same name was added. Make sure you understand how and when to take each.   Used for:  Type 2 diabetes mellitus with hyperglycemia, without long-term current use of insulin (H)        Use to test blood sugars 2x times daily   Quantity:  100 strip   Refills:  11       * blood glucose monitoring test strip   Commonly known as:  ACCU-CHEK ALMA PLUS   This may have changed:  You were already taking a medication  with the same name, and this prescription was added. Make sure you understand how and when to take each.   Used for:  Type 2 diabetes mellitus with hyperglycemia, without long-term current use of insulin (H)        Use to test blood sugar 1 times daily or as directed.   Quantity:  100 each   Refills:  1       * blood glucose monitoring test strip   Commonly known as:  ACCU-CHEK ALMA PLUS   This may have changed:  You were already taking a medication with the same name, and this prescription was added. Make sure you understand how and when to take each.   Used for:  Type 2 diabetes mellitus with hyperglycemia, without long-term current use of insulin (H)        Use to test blood sugar 1 times daily or as directed.   Quantity:  100 strip   Refills:  1       gabapentin 300 MG capsule   Commonly known as:  NEURONTIN   This may have changed:  how much to take   Used for:  Status post cervical spinal fusion        Dose:  600 mg   Take 2 capsules (600 mg) by mouth 3 times daily   Quantity:  540 capsule   Refills:  2       * Notice:  This list has 5 medication(s) that are the same as other medications prescribed for you. Read the directions carefully, and ask your doctor or other care provider to review them with you.         Where to get your medicines      These medications were sent to [facility] - [place], [place] - [address] [place] [address]  [address] [facility], [facility] [address]    Hours:  Mon-Fri 8:30am-5:00pm Toll Free [phone] Phone:  [phone]     blood glucose monitoring lancets    blood glucose monitoring test strip         Some of these will need a paper prescription and others can be bought over the counter.  Ask your nurse if you have questions.     Bring a paper prescription for each of these medications     blood glucose monitoring test strip                Primary Care Provider Office Phone # Fax #    Kan Serna [phone]       [facility]  CLINIC [address] [address]        Thank you!     Thank you for choosing [facility]  for your care. Our goal is always to provide you with excellent care. Hearing back from our patients is one way we can continue to improve our services. Please take a few minutes to complete the written survey that you may receive in the mail after your visit with us. Thank you!             Your Updated Medication List - Protect others around you: Learn how to safely use, store and throw away your medicines at www.disposemymeds.org.          This list is accurate as of: 4/13/17 11:10 AM.  Always use your most recent med list.                   Brand Name Dispense Instructions for use    * blood glucose monitoring lancets     1 Box    Use to test blood sugars 2x times daily       * blood glucose monitoring lancets     1 Box    Use to test blood sugar 1 times daily or as directed.       blood glucose monitoring meter device kit     1 kit    Use to test blood sugars 2x times daily       * blood glucose monitoring test strip    ONE TOUCH ULTRA    100 strip    Use to test blood sugars 2x times daily       * blood glucose monitoring test strip    ACCU-CHEK ALMA PLUS    100 each    Use to test blood sugar 1 times daily or as directed.       * blood glucose monitoring test strip    ACCU-CHEK ALMA PLUS    100 strip    Use to test blood sugar 1 times daily or as directed.       fluticasone 50 MCG/ACT spray    FLONASE     Spray 2 sprays into both nostrils daily       gabapentin 300 MG capsule    NEURONTIN    540 capsule    Take 2 capsules (600 mg) by mouth 3 times daily       IBU PO      Take 600 mg by mouth as needed       lisinopril-hydrochlorothiazide 10-12.5 MG per tablet    PRINZIDE/ZESTORETIC    90 tablet    Take 1 tablet by mouth daily       metFORMIN 500 MG 24 hr tablet    GLUCOPHAGE-XR    30 tablet    Take 1 tablet (500 mg) by mouth daily (with dinner)       ondansetron 4 MG ODT tab    ZOFRAN ODT     12 tablet    Take 1-2 tablets (4-8 mg) by mouth every 8 hours as needed for nausea       order for DME      AIRSENSE 10 10-15 CM/H20 NASAL AIRFIT N10 FOR HER       simvastatin 40 MG tablet    ZOCOR    90 tablet    Take 1 tablet (40 mg) by mouth At Bedtime       TYLENOL PO      Take 500 mg by mouth 2 times daily As needed for pain       zolpidem 5 MG tablet    AMBIEN    30 tablet    Take 1 tablet (5 mg) by mouth nightly as needed for sleep       * Notice:  This list has 5 medication(s) that are the same as other medications prescribed for you. Read the directions carefully, and ask your doctor or other care provider to review them with you.

## 2017-05-04 ENCOUNTER — OFFICE VISIT (OUTPATIENT)
Dept: URGENT CARE | Facility: URGENT CARE | Age: 42
End: 2017-05-04
Payer: COMMERCIAL

## 2017-05-04 ENCOUNTER — RADIANT APPOINTMENT (OUTPATIENT)
Dept: GENERAL RADIOLOGY | Facility: CLINIC | Age: 42
End: 2017-05-04
Attending: FAMILY MEDICINE
Payer: COMMERCIAL

## 2017-05-04 VITALS
DIASTOLIC BLOOD PRESSURE: 82 MMHG | HEART RATE: 98 BPM | BODY MASS INDEX: 35.96 KG/M2 | SYSTOLIC BLOOD PRESSURE: 130 MMHG | WEIGHT: 196.6 LBS | RESPIRATION RATE: 18 BRPM | TEMPERATURE: 99.2 F | OXYGEN SATURATION: 98 %

## 2017-05-04 DIAGNOSIS — R07.9 CHEST PAIN, UNSPECIFIED TYPE: Primary | ICD-10-CM

## 2017-05-04 DIAGNOSIS — R07.9 CHEST PAIN, UNSPECIFIED TYPE: ICD-10-CM

## 2017-05-04 DIAGNOSIS — R05.9 COUGH: ICD-10-CM

## 2017-05-04 DIAGNOSIS — E11.65 TYPE 2 DIABETES MELLITUS WITH HYPERGLYCEMIA, WITHOUT LONG-TERM CURRENT USE OF INSULIN (H): ICD-10-CM

## 2017-05-04 DIAGNOSIS — J20.9 ACUTE BRONCHITIS WITH SYMPTOMS > 10 DAYS: ICD-10-CM

## 2017-05-04 PROCEDURE — 71020 XR CHEST 2 VW: CPT

## 2017-05-04 PROCEDURE — 99215 OFFICE O/P EST HI 40 MIN: CPT | Performed by: FAMILY MEDICINE

## 2017-05-04 PROCEDURE — 93000 ELECTROCARDIOGRAM COMPLETE: CPT | Performed by: FAMILY MEDICINE

## 2017-05-04 RX ORDER — PREDNISONE 20 MG/1
20 TABLET ORAL 2 TIMES DAILY
Qty: 10 TABLET | Refills: 0 | Status: SHIPPED | OUTPATIENT
Start: 2017-05-04 | End: 2017-06-13

## 2017-05-04 RX ORDER — ALBUTEROL SULFATE 90 UG/1
2 AEROSOL, METERED RESPIRATORY (INHALATION) 4 TIMES DAILY PRN
Qty: 1 INHALER | Refills: 0 | Status: SHIPPED | OUTPATIENT
Start: 2017-05-04 | End: 2017-06-13

## 2017-05-04 RX ORDER — MULTIPLE VITAMINS W/ MINERALS TAB 9MG-400MCG
1 TAB ORAL DAILY
COMMUNITY

## 2017-05-04 RX ORDER — AZITHROMYCIN 250 MG/1
TABLET, FILM COATED ORAL
Qty: 6 TABLET | Refills: 0 | Status: SHIPPED | OUTPATIENT
Start: 2017-05-04 | End: 2017-05-09

## 2017-05-04 NOTE — NURSING NOTE
"Chief Complaint   Patient presents with     Cough     x 2months     Chest Pain     x last night        Initial /82 (BP Location: Right arm, Patient Position: Chair, Cuff Size: Adult Regular)  Pulse 98  Temp 99.2  F (37.3  C) (Oral)  Resp 18  Wt 196 lb 9.6 oz (89.2 kg)  SpO2 98%  BMI 35.96 kg/m2 Estimated body mass index is 35.96 kg/(m^2) as calculated from the following:    Height as of 2/6/17: 5' 2\" (1.575 m).    Weight as of this encounter: 196 lb 9.6 oz (89.2 kg).  Medication Reconciliation: complete    "

## 2017-05-04 NOTE — PROGRESS NOTES
SUBJECTIVE: Kezia Nava is a 41 year old female presenting with a chief complaint of cough  and Chest Pain.  Onset of symptoms was 10  day(s) ago.  Course of illness is worsening.    Severity moderate  Current and Associated symptoms: cough   Treatment measures tried include OTC meds.  Predisposing factors include None.    Past Medical History:   Diagnosis Date     Diffuse cystic mastopathy      Elevated BP 12/15/2016     Mesenteric adenitis 4/14     Nephrolithiasis     s/p lithotripsy     Thyroiditis, acute 12/2/2014    transient hypothyroidism- resolved     Type 2 diabetes mellitus with hyperglycemia, without long-term current use of insulin (H) 2/6/2017       Past Surgical History:   Procedure Laterality Date     ARTHROSCOPY ANKLE, OPEN REPAIR TENDON, COMBINED  11/8/2012    Procedure: COMBINED ARTHROSCOPY ANKLE, OPEN REPAIR TENDON;  Ankle Arthroscopy Left Ankle, Open Ligament Repair Left Ankle, Peroneal Tendon Repair Left Ankle ;  Surgeon: Otf Ramos DPM;  Location: [facility] OR     C APPENDECTOMY  1984     COMBINED CYSTOSCOPY, INSERT STENT URETER(S)  8/6/2013    Procedure: COMBINED CYSTOSCOPY, INSERT STENT URETER(S);  cystoscopy, right retrograde,RIGHT STENT PLACEMENT.;  Surgeon: Kan Porter MD;  Location:  OR     CYSTOSCOPY, RETROGRADES, INSERT STENT URETER(S), COMBINED  8/6/2013    Procedure: COMBINED CYSTOSCOPY, RETROGRADES, INSERT STENT URETER(S);  cystoscopy, right retrograde, insert stent right ureter;  Surgeon: Kan Porter MD;  Location:  OR     DENTAL SURGERY      TOOTH#7 - DENTAL IMPLANT     DISCECTOMY, FUSION CERVICAL ANTERIOR ONE LEVEL, COMBINED N/A 12/13/2015    Procedure: COMBINED DISCECTOMY, FUSION CERVICAL ANTERIOR ONE LEVEL;  Surgeon: Seven Umaña MD;  Location:  OR     EXTRACORPOREAL SHOCK WAVE LITHOTRIPSY (ESWL)  8/19/2013    Procedure: EXTRACORPOREAL SHOCK WAVE LITHOTRIPSY (ESWL);   RIGHT EXTRACORPOREAL SHOCK WAVE LITHOTRIPSY;  Surgeon:  Otf Tobias MD;  Location: [facility] OR      REDUCTION OF LARGE BREAST  2001     SALPINGO OOPHORECTOMY,R/L/DERREK  1995    Right       Family History   Problem Relation Age of Onset     C.A.D. Mother      stents     Breast Cancer Maternal Aunt      may have been fibrocystic     Breast Cancer Maternal Aunt      may have been fibrocystic     CEREBROVASCULAR DISEASE Paternal Grandfather      CANCER Paternal Grandmother      stomach     Hypertension Father      Family History Negative Sister      2 bio healthy     Family History Negative Brother      healthy     Hypertension Mother      Cancer - colorectal Maternal Grandmother      KIDNEY DISEASE Mother        Social History   Substance Use Topics     Smoking status: Never Smoker     Smokeless tobacco: Never Used     Alcohol use 0.0 oz/week     0 Standard drinks or equivalent per week      Comment: socially     Review Of Systems  Skin: negative  Eyes: negative  Ears/Nose/Throat: negative  Respiratory: No shortness of breath, dyspnea on exertion, cough, or hemoptysis  Cardiovascular: as above  Gastrointestinal: negative  Genitourinary: negative  Musculoskeletal: negative  Neurologic: negative  Psychiatric: negative  Hematologic/Lymphatic/Immunologic: negative  Endocrine: negative      OBJECTIVE:  /82 (BP Location: Right arm, Patient Position: Chair, Cuff Size: Adult Regular)  Pulse 98  Temp 99.2  F (37.3  C) (Oral)  Resp 18  Wt 196 lb 9.6 oz (89.2 kg)  SpO2 98%  BMI 35.96 kg/m2   GENERAL APPEARANCE: healthy, alert and no distress  EYES: EOMI,  PERRL, conjunctiva clear  HENT: ear canals and TM's normal.  Nose and mouth without ulcers, erythema or lesions  NECK: supple, nontender, no lymphadenopathy  RESP: lungs clear to auscultation - no rales, rhonchi or wheezes  CV: regular rates and rhythm, normal S1 S2, no murmur noted  SKIN: no suspicious lesions or rashes    Xray without acute findings, read by Gino Vasques D.O.         EKG Interpretation:       Interpreted by Gino Vasques    Symptoms at time of EKG: None   Rhythm: Normal sinus   Rate: Normal  Axis: Normal  Ectopy: None  Conduction: Normal  ST Segments/ T Waves: No ST-T wave changes and No acute ischemic changes  Q Waves: None  Comparison to prior: No old EKG available    Clinical Impression: normal EKG        ICD-10-CM    1. Chest pain, unspecified type R07.9 EKG 12-lead complete w/read - Clinics     XR Chest 2 Views   2. Cough R05 XR Chest 2 Views     predniSONE (DELTASONE) 20 MG tablet     albuterol (ALBUTEROL) 108 (90 BASE) MCG/ACT Inhaler   3. Type 2 diabetes mellitus with hyperglycemia, without long-term current use of insulin (H) E11.65 EKG 12-lead complete w/read - Clinics   4. Acute bronchitis with symptoms > 10 days J20.9 azithromycin (ZITHROMAX) 250 MG tablet     Fluids/Rest, f/u if worse/not any better

## 2017-05-04 NOTE — MR AVS SNAPSHOT
After Visit Summary   5/4/2017    Kezia Nava    MRN: [id number]           Patient Information     Date Of Birth          1975        Visit Information        Provider Department      5/4/2017 11:40 AM Gino Vasques DO [facility]        Today's Diagnoses     Chest pain, unspecified type    -  1    Cough        Type 2 diabetes mellitus with hyperglycemia, without long-term current use of insulin (H)        Acute bronchitis with symptoms > 10 days           Follow-ups after your visit        Your next 10 appointments already scheduled     Aug 14, 2017  2:00 PM CDT   Return Sleep Patient with Bennett Ezra Goltz, PA-C   [facility] [place] ([facility])    [address] [place] [address] [phone]              Who to contact     If you have questions or need follow up information about today's clinic visit or your schedule please contact [facility] directly at [phone].  Normal or non-critical lab and imaging results will be communicated to you by [employer], letter or phone within 4 business days after the clinic has received the results. If you do not hear from us within 7 days, please contact the clinic through [employer] or phone. If you have a critical or abnormal lab result, we will notify you by phone as soon as possible.  Submit refill requests through [employer] or call your pharmacy and they will forward the refill request to us. Please allow 3 business days for your refill to be completed.          Additional Information About Your Visit        [employer] Information     [employer] gives you secure access to your electronic health record. If you see a primary care provider, you can also send messages to your care team and make appointments. If you have questions, please call your primary care clinic.  If you do not have a primary care provider, please call [phone] and they  will assist you.        Care EveryWhere ID     This is your Care EveryWhere ID. This could be used by other organizations to access your [place] medical records  [id number]        Your Vitals Were     Pulse Temperature Respirations Pulse Oximetry BMI (Body Mass Index)       98 99.2  F (37.3  C) (Oral) 18 98% 35.96 kg/m2        Blood Pressure from Last 3 Encounters:   05/04/17 130/82   03/06/17 116/78   02/06/17 (!) 130/94    Weight from Last 3 Encounters:   05/04/17 196 lb 9.6 oz (89.2 kg)   03/06/17 208 lb (94.3 kg)   02/06/17 211 lb 6.4 oz (95.9 kg)              We Performed the Following     EKG 12-lead complete w/read - Clinics          Today's Medication Changes          These changes are accurate as of: 5/4/17 12:34 PM.  If you have any questions, ask your nurse or doctor.               Start taking these medicines.        Dose/Directions    albuterol 108 (90 BASE) MCG/ACT Inhaler   Commonly known as:  albuterol   Used for:  Cough   Started by:  Gino Vasques DO        Dose:  2 puff   Inhale 2 puffs into the lungs 4 times daily as needed for shortness of breath / dyspnea or wheezing   Quantity:  1 Inhaler   Refills:  0       azithromycin 250 MG tablet   Commonly known as:  ZITHROMAX   Used for:  Acute bronchitis with symptoms > 10 days   Started by:  Gino Vasques DO        Two tablets first day, then one tablet daily for four days.   Quantity:  6 tablet   Refills:  0       predniSONE 20 MG tablet   Commonly known as:  DELTASONE   Used for:  Cough   Started by:  Gino Vasques DO        Dose:  20 mg   Take 1 tablet (20 mg) by mouth 2 times daily   Quantity:  10 tablet   Refills:  0            Where to get your medicines      These medications were sent to [facility] [address] [address]     Phone:  [phone]     albuterol 108 (90 BASE) MCG/ACT Inhaler    azithromycin 250 MG tablet    predniSONE 20 MG tablet                 Primary Care Provider Office Phone # Fax #    Kan Serna [phone]       [facility] [address] [facility] [address]        Thank you!     Thank you for choosing [facility]  for your care. Our goal is always to provide you with excellent care. Hearing back from our patients is one way we can continue to improve our services. Please take a few minutes to complete the written survey that you may receive in the mail after your visit with us. Thank you!             Your Updated Medication List - Protect others around you: Learn how to safely use, store and throw away your medicines at www.disposemymeds.org.          This list is accurate as of: 5/4/17 12:34 PM.  Always use your most recent med list.                   Brand Name Dispense Instructions for use    albuterol 108 (90 BASE) MCG/ACT Inhaler    albuterol    1 Inhaler    Inhale 2 puffs into the lungs 4 times daily as needed for shortness of breath / dyspnea or wheezing       azithromycin 250 MG tablet    ZITHROMAX    6 tablet    Two tablets first day, then one tablet daily for four days.       * blood glucose monitoring lancets     1 Box    Use to test blood sugars 2x times daily       * blood glucose monitoring lancets     1 Box    Use to test blood sugar 1 times daily or as directed.       blood glucose monitoring meter device kit     1 kit    Use to test blood sugars 2x times daily       * blood glucose monitoring test strip    ONE TOUCH ULTRA    100 strip    Use to test blood sugars 2x times daily       * blood glucose monitoring test strip    ACCU-CHEK ALMA PLUS    100 each    Use to test blood sugar 1 times daily or as directed.       * blood glucose monitoring test strip    ACCU-CHEK ALMA PLUS    100 strip    Use to test blood sugar 1 times daily or as directed.       fluticasone 50 MCG/ACT spray    FLONASE     Spray 2 sprays into both nostrils daily Reported on 5/4/2017        gabapentin 300 MG capsule    NEURONTIN    540 capsule    Take 2 capsules (600 mg) by mouth 3 times daily       IBU PO      Take 600 mg by mouth as needed       lisinopril-hydrochlorothiazide 10-12.5 MG per tablet    PRINZIDE/ZESTORETIC    90 tablet    Take 1 tablet by mouth daily       metFORMIN 500 MG 24 hr tablet    GLUCOPHAGE-XR    30 tablet    Take 1 tablet (500 mg) by mouth daily (with dinner)       Multi-vitamin Tabs tablet      Take 1 tablet by mouth daily       ondansetron 4 MG ODT tab    ZOFRAN ODT    12 tablet    Take 1-2 tablets (4-8 mg) by mouth every 8 hours as needed for nausea       order for DME      AIRSENSE 10 10-15 CM/H20 NASAL AIRFIT N10 FOR HER       predniSONE 20 MG tablet    DELTASONE    10 tablet    Take 1 tablet (20 mg) by mouth 2 times daily       simvastatin 40 MG tablet    ZOCOR    90 tablet    Take 1 tablet (40 mg) by mouth At Bedtime       TYLENOL PO      Take 500 mg by mouth 2 times daily As needed for pain       VITAMIN D (CHOLECALCIFEROL) PO      Take by mouth daily       zolpidem 5 MG tablet    AMBIEN    30 tablet    Take 1 tablet (5 mg) by mouth nightly as needed for sleep       * Notice:  This list has 5 medication(s) that are the same as other medications prescribed for you. Read the directions carefully, and ask your doctor or other care provider to review them with you.

## 2017-05-30 ENCOUNTER — MYC MEDICAL ADVICE (OUTPATIENT)
Dept: OTHER | Age: 42
End: 2017-05-30

## 2017-05-30 DIAGNOSIS — G47.26 SHIFT WORK SLEEP DISORDER: Primary | ICD-10-CM

## 2017-05-31 ENCOUNTER — MYC MEDICAL ADVICE (OUTPATIENT)
Dept: INTERNAL MEDICINE | Facility: CLINIC | Age: 42
End: 2017-05-31

## 2017-05-31 NOTE — TELEPHONE ENCOUNTER
From: Delmi Romero, RRT  To: Kezia Nava  Sent: 5/30/2017 2:40 PM CDT  Subject: refil of rea Mast,    Do you have enough Ambien to get to Thursday? Erik is out of town. If not I can send to another provider to sign. Please let me know.    Thank you,  Delmi

## 2017-06-01 RX ORDER — ZOLPIDEM TARTRATE 5 MG/1
5 TABLET ORAL
Qty: 30 TABLET | Refills: 2 | COMMUNITY
Start: 2017-06-01 | End: 2017-10-05

## 2017-06-05 ENCOUNTER — MYC REFILL (OUTPATIENT)
Dept: INTERNAL MEDICINE | Facility: CLINIC | Age: 42
End: 2017-06-05

## 2017-06-05 DIAGNOSIS — E11.65 TYPE 2 DIABETES MELLITUS WITH HYPERGLYCEMIA, WITHOUT LONG-TERM CURRENT USE OF INSULIN (H): ICD-10-CM

## 2017-06-05 RX ORDER — METFORMIN HCL 500 MG
500 TABLET, EXTENDED RELEASE 24 HR ORAL
Qty: 30 TABLET | Refills: 0 | Status: SHIPPED | OUTPATIENT
Start: 2017-06-05 | End: 2017-06-13

## 2017-06-05 NOTE — TELEPHONE ENCOUNTER
metformin         Last Written Prescription Date: 3/6/17  Last Fill Quantity: 30, # refills: 2  Last Office Visit with [facility], [facility] or  Health prescribing provider:  3/6/17   Next 5 appointments (look out 90 days)     Jul 03, 2017  2:00 PM CDT   [facility] with Britta Mendez MD   [facility] ([facility])    [address] [place] [address] [phone]                   BP Readings from Last 3 Encounters:   05/04/17 130/82   03/06/17 116/78   02/06/17 (!) 130/94     Lab Results   Component Value Date    MICROL 60 03/06/2017     Lab Results   Component Value Date    UMALCR 32.55 03/06/2017     Creatinine   Date Value Ref Range Status   02/06/2017 0.67 0.52 - 1.04 mg/dL Final   ]  GFR Estimate   Date Value Ref Range Status   02/06/2017 >90  Non  GFR Calc   >60 mL/min/1.7m2 Final   01/03/2017 >90  Non  GFR Calc   >60 mL/min/1.7m2 Final   12/13/2015 89 >60 mL/min/1.7m2 Final     Comment:     Non  GFR Calc     GFR Estimate If Black   Date Value Ref Range Status   02/06/2017 >90   GFR Calc   >60 mL/min/1.7m2 Final   01/03/2017 >90   GFR Calc   >60 mL/min/1.7m2 Final   12/13/2015 >90   GFR Calc   >60 mL/min/1.7m2 Final     Lab Results   Component Value Date    CHOL 184 03/06/2017     Lab Results   Component Value Date    HDL 37 03/06/2017     Lab Results   Component Value Date     03/06/2017     Lab Results   Component Value Date    TRIG 188 03/06/2017     Lab Results   Component Value Date    CHOLHDLRATIO 4.6 12/08/2010     Lab Results   Component Value Date    AST 29 01/03/2017     Lab Results   Component Value Date    ALT 43 01/03/2017     Lab Results   Component Value Date    A1C 7.8 02/06/2017    A1C 5.9 12/12/2015    A1C 5.7 01/19/2011     Potassium   Date Value Ref Range Status   02/06/2017 3.4 3.4 - 5.3 mmol/L Final

## 2017-06-05 NOTE — TELEPHONE ENCOUNTER
Message from MyChart:  Original authorizing provider: MD Kezia Camargo would like a refill of the following medications:  metFORMIN (GLUCOPHAGE-XR) 500 MG 24 hr tablet [Kan Serna MD]    Preferred pharmacy: [facility] - [place], [place] - [address]    Comment:  Can you write for a three month supply at a time please?

## 2017-06-13 ENCOUNTER — OFFICE VISIT (OUTPATIENT)
Dept: INTERNAL MEDICINE | Facility: CLINIC | Age: 42
End: 2017-06-13
Payer: COMMERCIAL

## 2017-06-13 VITALS
HEART RATE: 75 BPM | WEIGHT: 197.5 LBS | SYSTOLIC BLOOD PRESSURE: 128 MMHG | HEIGHT: 62 IN | TEMPERATURE: 98.8 F | OXYGEN SATURATION: 99 % | DIASTOLIC BLOOD PRESSURE: 90 MMHG | BODY MASS INDEX: 36.34 KG/M2

## 2017-06-13 DIAGNOSIS — I10 BENIGN ESSENTIAL HYPERTENSION: ICD-10-CM

## 2017-06-13 DIAGNOSIS — R05.8 COUGH DUE TO ACE INHIBITOR: Primary | ICD-10-CM

## 2017-06-13 DIAGNOSIS — T46.4X5A COUGH DUE TO ACE INHIBITOR: Primary | ICD-10-CM

## 2017-06-13 DIAGNOSIS — E11.65 TYPE 2 DIABETES MELLITUS WITH HYPERGLYCEMIA, WITHOUT LONG-TERM CURRENT USE OF INSULIN (H): ICD-10-CM

## 2017-06-13 PROCEDURE — 99214 OFFICE O/P EST MOD 30 MIN: CPT | Performed by: INTERNAL MEDICINE

## 2017-06-13 RX ORDER — LOSARTAN POTASSIUM AND HYDROCHLOROTHIAZIDE 12.5; 5 MG/1; MG/1
1 TABLET ORAL DAILY
Qty: 30 TABLET | Refills: 2 | Status: SHIPPED | OUTPATIENT
Start: 2017-06-13 | End: 2017-09-07

## 2017-06-13 RX ORDER — METFORMIN HCL 500 MG
500 TABLET, EXTENDED RELEASE 24 HR ORAL
Qty: 90 TABLET | Refills: 0 | Status: SHIPPED | OUTPATIENT
Start: 2017-06-13 | End: 2017-09-07

## 2017-06-13 NOTE — PROGRESS NOTES
"  SUBJECTIVE:                                                    Kezia Nava is a 41 year old female who presents to clinic today for the following health issues:    Diabetes Follow-up  Lab Results   Component Value Date    A1C 7.8 02/06/2017    A1C 5.9 12/12/2015    A1C 5.7 01/19/2011     Patient is checking blood sugars: once daily.  Results are as follows:         Once a day-average 109-112    Diabetic concerns: None     Symptoms of hypoglycemia (low blood sugar): none     Paresthesias (numbness or burning in feet) or sores: No     Date of last diabetic eye exam: 02/2017       Amount of exercise or physical activity: None    Problems taking medications regularly: No    Medication side effects: nausea and diarrhea from metformin    Diet: low carbohydrate      HTN  Cough with Lisinopril  BP Readings from Last 3 Encounters:   06/13/17 128/90   05/04/17 130/82   03/06/17 116/78        Taking Medication as prescribed: Yes    Side Effects:  Coughing, itchy throat for about 4 months qd    Medication Helping Symptoms:  yes     Problem list and histories reviewed & adjusted, as indicated.  Additional history: as documented    Labs reviewed in EPIC    Reviewed and updated as needed this visit by clinical staff  Allergies       Reviewed and updated as needed this visit by Provider         ROS:  Constitutional, HEENT, cardiovascular, pulmonary, gi and gu systems are negative, except as otherwise noted.    OBJECTIVE:                                                    /90  Pulse 75  Temp 98.8  F (37.1  C) (Oral)  Ht 5' 2\" (1.575 m)  Wt 197 lb 8 oz (89.6 kg)  LMP 06/06/2017  SpO2 99%  BMI 36.12 kg/m2  Body mass index is 36.12 kg/(m^2).  GENERAL APPEARANCE: alert, no distress and over weight  HENT: nose and mouth without ulcers or lesions  NECK: no adenopathy, no asymmetry, masses, or scars and thyroid normal to palpation  RESP: lungs clear to auscultation - no rales, rhonchi or wheezes  CV: regular rates and " rhythm, normal S1 S2, no S3 or S4 and no murmur, click or rub  SKIN: no suspicious lesions or rashes    Diagnostic test results:  none      ASSESSMENT/PLAN:                                                    1. Cough due to ACE inhibitor  Switch to ARB    2. Benign essential hypertension  Recheck here in 2 mo or so  - losartan-hydrochlorothiazide (HYZAAR) 50-12.5 MG per tablet; Take 1 tablet by mouth daily  Dispense: 30 tablet; Refill: 2    3. Type 2 diabetes mellitus with hyperglycemia, without long-term current use of insulin (H)  Doing very well based on #. Recheck A1c   Not taking statin despite our recommendations- will discuss again at f/u , encouraged to take.   - metFORMIN (GLUCOPHAGE-XR) 500 MG 24 hr tablet; Take 1 tablet (500 mg) by mouth daily (with dinner)  Dispense: 90 tablet; Refill: 0    Follow up with Provider - a1c later this month/early july     Kan Serna MD  [facility]

## 2017-06-13 NOTE — NURSING NOTE
"Chief Complaint   Patient presents with     Recheck Medication     Diabetes       Initial /90  Pulse 75  Temp 98.8  F (37.1  C) (Oral)  Ht 5' 2\" (1.575 m)  Wt 197 lb 8 oz (89.6 kg)  LMP 06/06/2017  SpO2 99%  BMI 36.12 kg/m2 Estimated body mass index is 36.12 kg/(m^2) as calculated from the following:    Height as of this encounter: 5' 2\" (1.575 m).    Weight as of this encounter: 197 lb 8 oz (89.6 kg).  Medication Reconciliation: complete   Chrissy Cummings MA   "

## 2017-06-13 NOTE — MR AVS SNAPSHOT
After Visit Summary   6/13/2017    Kezia Nava    MRN: [id number]           Patient Information     Date Of Birth          1975        Visit Information        Provider Department      6/13/2017 9:20 AM Kan Serna MD [facility]        Today's Diagnoses     Cough due to ACE inhibitor    -  1    Benign essential hypertension        Type 2 diabetes mellitus with hyperglycemia, without long-term current use of insulin (H)           Follow-ups after your visit        Your next 10 appointments already scheduled     Aug 14, 2017  2:00 PM CDT   Return Sleep Patient with Bennett Ezra Goltz, PA-C   [facility] ([facility])    [address] [address] [phone]              Who to contact     If you have questions or need follow up information about today's clinic visit or your schedule please contact [facility] directly at [phone].  Normal or non-critical lab and imaging results will be communicated to you by Soapbox Mobilehart, letter or phone within 4 business days after the clinic has received the results. If you do not hear from us within 7 days, please contact the clinic through Sarsyst or phone. If you have a critical or abnormal lab result, we will notify you by phone as soon as possible.  Submit refill requests through M-Files or call your pharmacy and they will forward the refill request to us. Please allow 3 business days for your refill to be completed.          Additional Information About Your Visit        Soapbox Mobilehart Information     M-Files gives you secure access to your electronic health record. If you see a primary care provider, you can also send messages to your care team and make appointments. If you have questions, please call your primary care clinic.  If you do not have a primary care provider, please call [phone] and they will assist you.        Care  "EveryWhere ID     This is your Care EveryWhere ID. This could be used by other organizations to access your [facility] medical records  [id number]        Your Vitals Were     Pulse Temperature Height Last Period Pulse Oximetry BMI (Body Mass Index)    75 98.8  F (37.1  C) (Oral) 5' 2\" (1.575 m) 06/06/2017 99% 36.12 kg/m2       Blood Pressure from Last 3 Encounters:   06/13/17 128/90   05/04/17 130/82   03/06/17 116/78    Weight from Last 3 Encounters:   06/13/17 197 lb 8 oz (89.6 kg)   05/04/17 196 lb 9.6 oz (89.2 kg)   03/06/17 208 lb (94.3 kg)              Today, you had the following     No orders found for display         Today's Medication Changes          These changes are accurate as of: 6/13/17 10:14 AM.  If you have any questions, ask your nurse or doctor.               Start taking these medicines.        Dose/Directions    losartan-hydrochlorothiazide 50-12.5 MG per tablet   Commonly known as:  HYZAAR   Used for:  Benign essential hypertension   Started by:  Kan Serna MD        Dose:  1 tablet   Take 1 tablet by mouth daily   Quantity:  30 tablet   Refills:  2         These medicines have changed or have updated prescriptions.        Dose/Directions    blood glucose monitoring lancets   This may have changed:  Another medication with the same name was removed. Continue taking this medication, and follow the directions you see here.   Used for:  Type 2 diabetes mellitus with hyperglycemia, without long-term current use of insulin (H)        Use to test blood sugar 1 times daily or as directed.   Quantity:  1 Box   Refills:  prn       * blood glucose monitoring test strip   Commonly known as:  ACCU-CHEK ALMA PLUS   This may have changed:  Another medication with the same name was removed. Continue taking this medication, and follow the directions you see here.   Used for:  Type 2 diabetes mellitus with hyperglycemia, without long-term current use of insulin (H)        Use to test blood sugar 1 " times daily or as directed.   Quantity:  100 each   Refills:  1       * blood glucose monitoring test strip   Commonly known as:  ACCU-CHEK ALMA PLUS   This may have changed:  Another medication with the same name was removed. Continue taking this medication, and follow the directions you see here.   Used for:  Type 2 diabetes mellitus with hyperglycemia, without long-term current use of insulin (H)        Use to test blood sugar 1 times daily or as directed.   Quantity:  100 strip   Refills:  1       * Notice:  This list has 2 medication(s) that are the same as other medications prescribed for you. Read the directions carefully, and ask your doctor or other care provider to review them with you.      Stop taking these medicines if you haven't already. Please contact your care team if you have questions.     blood glucose monitoring meter device kit   Stopped by:  Kan Serna MD           lisinopril-hydrochlorothiazide 10-12.5 MG per tablet   Commonly known as:  PRINZIDE/ZESTORETIC   Stopped by:  Kan Serna MD                Where to get your medicines      These medications were sent to [facility] - [facility] [address] [place] [address]    Hours:  Mon-Fri 8:30am-5:00pm Toll Free [phone] Phone:  [phone]     metFORMIN 500 MG 24 hr tablet         These medications were sent to [address]  [address] [address]     Phone:  [phone]     losartan-hydrochlorothiazide 50-12.5 MG per tablet                Primary Care Provider Office Phone # Fax #    Kan Serna [phone]       [facility] [address]  [facility] [address]        Thank you!     Thank you for choosing [facility]  for your care. Our goal is always to provide you with excellent care. Hearing back from our patients is one  way we can continue to improve our services. Please take a few minutes to complete the written survey that you may receive in the mail after your visit with us. Thank you!             Your Updated Medication List - Protect others around you: Learn how to safely use, store and throw away your medicines at www.disposemymeds.org.          This list is accurate as of: 6/13/17 10:14 AM.  Always use your most recent med list.                   Brand Name Dispense Instructions for use    albuterol 108 (90 BASE) MCG/ACT Inhaler    albuterol    1 Inhaler    Inhale 2 puffs into the lungs 4 times daily as needed for shortness of breath / dyspnea or wheezing       AMBIEN 5 MG tablet   Generic drug:  zolpidem     30 tablet    Take 1 tablet (5 mg) by mouth nightly as needed for sleep       blood glucose monitoring lancets     1 Box    Use to test blood sugar 1 times daily or as directed.       * blood glucose monitoring test strip    ACCU-CHEK ALMA PLUS    100 each    Use to test blood sugar 1 times daily or as directed.       * blood glucose monitoring test strip    ACCU-CHEK ALMA PLUS    100 strip    Use to test blood sugar 1 times daily or as directed.       fluticasone 50 MCG/ACT spray    FLONASE     Spray 2 sprays into both nostrils daily Reported on 5/4/2017       gabapentin 300 MG capsule    NEURONTIN    540 capsule    Take 2 capsules (600 mg) by mouth 3 times daily       IBU PO      Take 600 mg by mouth as needed       losartan-hydrochlorothiazide 50-12.5 MG per tablet    HYZAAR    30 tablet    Take 1 tablet by mouth daily       metFORMIN 500 MG 24 hr tablet    GLUCOPHAGE-XR    90 tablet    Take 1 tablet (500 mg) by mouth daily (with dinner)       Multi-vitamin Tabs tablet      Take 1 tablet by mouth daily       ondansetron 4 MG ODT tab    ZOFRAN ODT    12 tablet    Take 1-2 tablets (4-8 mg) by mouth every 8 hours as needed for nausea       order for DME      AIRSENSE 10 10-15 CM/H20 NASAL AIRFIT N10 FOR HER        predniSONE 20 MG tablet    DELTASONE    10 tablet    Take 1 tablet (20 mg) by mouth 2 times daily       simvastatin 40 MG tablet    ZOCOR    90 tablet    Take 1 tablet (40 mg) by mouth At Bedtime       TYLENOL PO      Take 500 mg by mouth 2 times daily As needed for pain       VITAMIN D (CHOLECALCIFEROL) PO      Take by mouth daily       * Notice:  This list has 2 medication(s) that are the same as other medications prescribed for you. Read the directions carefully, and ask your doctor or other care provider to review them with you.

## 2017-07-11 ENCOUNTER — HOSPITAL ENCOUNTER (EMERGENCY)
Facility: CLINIC | Age: 42
Discharge: HOME | End: 2017-07-11
Attending: EMERGENCY MEDICINE | Admitting: EMERGENCY MEDICINE
Payer: COMMERCIAL

## 2017-07-11 VITALS
OXYGEN SATURATION: 98 % | WEIGHT: 187 LBS | TEMPERATURE: 98.9 F | RESPIRATION RATE: 16 BRPM | SYSTOLIC BLOOD PRESSURE: 140 MMHG | HEART RATE: 94 BPM | HEIGHT: 62 IN | BODY MASS INDEX: 34.41 KG/M2 | DIASTOLIC BLOOD PRESSURE: 91 MMHG

## 2017-07-11 DIAGNOSIS — M54.41 ACUTE BILATERAL LOW BACK PAIN WITH RIGHT-SIDED SCIATICA: ICD-10-CM

## 2017-07-11 PROCEDURE — 99282 EMERGENCY DEPT VISIT SF MDM: CPT

## 2017-07-11 RX ORDER — METHYLPREDNISOLONE 4 MG
TABLET, DOSE PACK ORAL
Qty: 21 TABLET | Refills: 0 | Status: SHIPPED | OUTPATIENT
Start: 2017-07-11 | End: 2017-08-15

## 2017-07-11 RX ORDER — OXYCODONE AND ACETAMINOPHEN 5; 325 MG/1; MG/1
1-2 TABLET ORAL EVERY 4 HOURS PRN
Qty: 15 TABLET | Refills: 0 | Status: SHIPPED | OUTPATIENT
Start: 2017-07-11 | End: 2017-08-15

## 2017-07-11 RX ORDER — DIAZEPAM 2 MG
2 TABLET ORAL EVERY 8 HOURS PRN
Qty: 10 TABLET | Refills: 0 | Status: SHIPPED | OUTPATIENT
Start: 2017-07-11 | End: 2017-08-15

## 2017-07-11 ASSESSMENT — ENCOUNTER SYMPTOMS
BACK PAIN: 1
NUMBNESS: 0
MYALGIAS: 1
WEAKNESS: 0

## 2017-07-11 NOTE — LETTER
EMERGENCY DEPARTMENT  [address] [facility] [address]  [phone]    2017    Kezia Nava  [address] [place] [address]  [phone] (home) [phone] (work)    : 1975      To Whom it may concern:    Kezia Nvaa was seen in our Emergency Department today, 2017.  I expect her condition to improve over the next 3 days.  She may return to work/school when improved.  Please excuse her from work until 2017.    Sincerely,        Norma Meade

## 2017-07-11 NOTE — ED AVS SNAPSHOT
Emergency Department    [address] [place] [address]    Phone:  [phone]    Fax:  [phone]                                       Kezia Nava   MRN: [id number]    Department:   Emergency Department   Date of Visit:  7/11/2017           After Visit Summary Signature Page     I have received my discharge instructions, and my questions have been answered. I have discussed any challenges I see with this plan with the nurse or doctor.    ..........................................................................................................................................  Patient/Patient Representative Signature      ..........................................................................................................................................  Patient Representative Print Name and Relationship to Patient    ..................................................               ................................................  Date                                            Time    ..........................................................................................................................................  Reviewed by Signature/Title    ...................................................              ..............................................  Date                                                            Time

## 2017-07-11 NOTE — ED AVS SNAPSHOT
Emergency Department    [address] [facility] [address]    Phone:  [phone]    Fax:  [phone]                                       Kezia Nava   MRN: [id number]    Department:   Emergency Department   Date of Visit:  7/11/2017           Patient Information     Date Of Birth          1975        Your diagnoses for this visit were:     Acute bilateral low back pain with right-sided sciatica        You were seen by Norma Meade MD.      Follow-up Information     Follow up with Kan Serna MD.    Specialty:  Internal Medicine    Why:  this week or early next week    Contact information:    [facility]  [address] [facility] [address] [phone]          Follow up with  Emergency Department.    Specialty:  EMERGENCY MEDICINE    Why:  As needed for leg weakness or trouble with bowel or bladder function or fever or other problems.    Contact information:    [address] [facility] [address] [phone]        Discharge Instructions       Opioid Medication Information    You have been given a prescription for an opioid (narcotic) pain medicine and/or have received a pain medicine while here in the Emergency Department. These medicines can make you drowsy or impaired. You must not drive, operate dangerous equipment, or engage in any other dangerous activities while taking these medications. If you drive while taking these medications, you could be arrested for DUI, or driving under the influence. Do not drink any alcohol while you are taking these medications.   Opioid pain medications can cause addiction. If you have a history of chemical dependency of any type, you are at a higher risk of becoming addicted to pain medications.  Only take these prescribed medications to treat your pain when all other options have been tried. Take it for as short a time and as few doses as possible. Store your pain pills in a secure place,  as they are frequently stolen and provide a dangerous opportunity for children or visitors in your house to start abusing these powerful medications. We will not replace any lost or stolen medicine.  As soon as your pain is better, you should flush all your remaining medication.   Many prescription pain medications contain Tylenol  (acetaminophen), including Vicodin , Tylenol #3 , Norco , Lortab , and Percocet .  You should not take any extra pills of Tylenol  if you are using these prescription medications or you can get very sick.  Do not ever take more than 4000 mg of acetaminophen in any 24 hour period.  All opioids tend to cause constipation. Drink plenty of water and eat foods that have a lot of fiber, such as fruits, vegetables, prune juice, apple juice and high fiber cereal.  Take a laxative if you don t move your bowels at least every other day. Miralax , Milk of Magnesia, Colace , or Senna  can be used to keep you regular.            Discharge References/Attachments     EXERCISES, BACK, LOWER BACK STRETCH (ENGLISH)    EXERCISES, BACK, SIDE STRETCH (ENGLISH)    BACK PAIN (LOW) OR LEG PAIN: POSSIBLE CAUSES (ENGLISH)      Future Appointments        Provider Department Dept Phone Center    8/15/2017 2:00 PM Bennett Ezra Goltz, PA-C, [facility] [phone] [facility]      24 Hour Appointment Hotline       To make an appointment at any [facility], call [phone] ([phone]). If you don't have a family doctor or clinic, we will help you find one. [facility] are conveniently located to serve the needs of you and your family.             Review of your medicines      START taking        Dose / Directions Last dose taken    diazepam 2 MG tablet   Commonly known as:  VALIUM   Dose:  2 mg   Quantity:  10 tablet        Take 1 tablet (2 mg) by mouth every 8 hours as needed for muscle spasms No driving a car or drinking alcohol for 8 hours after taking this medication.    Refills:  0        methylPREDNISolone 4 MG tablet   Commonly known as:  MEDROL DOSEPAK   Quantity:  21 tablet        Follow package instructions   Refills:  0        oxyCODONE-acetaminophen 5-325 MG per tablet   Commonly known as:  PERCOCET   Dose:  1-2 tablet   Quantity:  15 tablet        Take 1-2 tablets by mouth every 4 hours as needed for moderate to severe pain   Refills:  0          Our records show that you are taking the medicines listed below. If these are incorrect, please call your family doctor or clinic.        Dose / Directions Last dose taken    AMBIEN 5 MG tablet   Dose:  5 mg   Quantity:  30 tablet   Generic drug:  zolpidem        Take 1 tablet (5 mg) by mouth nightly as needed for sleep   Refills:  2        blood glucose monitoring lancets   Quantity:  1 Box        Use to test blood sugar 1 times daily or as directed.   Refills:  prn        * blood glucose monitoring test strip   Commonly known as:  ACCU-CHEK ALMA PLUS   Quantity:  100 each        Use to test blood sugar 1 times daily or as directed.   Refills:  1        * blood glucose monitoring test strip   Commonly known as:  ACCU-CHEK ALMA PLUS   Quantity:  100 strip        Use to test blood sugar 1 times daily or as directed.   Refills:  1        fluticasone 50 MCG/ACT spray   Commonly known as:  FLONASE   Dose:  2 spray        Spray 2 sprays into both nostrils daily Reported on 5/4/2017   Refills:  0        gabapentin 300 MG capsule   Commonly known as:  NEURONTIN   Dose:  600 mg   Quantity:  540 capsule        Take 2 capsules (600 mg) by mouth 3 times daily   Refills:  2        IBU PO   Dose:  600 mg        Take 600 mg by mouth as needed   Refills:  0        losartan-hydrochlorothiazide 50-12.5 MG per tablet   Commonly known as:  HYZAAR   Dose:  1 tablet   Quantity:  30 tablet        Take 1 tablet by mouth daily   Refills:  2        metFORMIN 500 MG 24 hr tablet   Commonly known as:  GLUCOPHAGE-XR   Dose:  500 mg   Quantity:  90 tablet         Take 1 tablet (500 mg) by mouth daily (with dinner)   Refills:  0        Multi-vitamin Tabs tablet   Dose:  1 tablet        Take 1 tablet by mouth daily   Refills:  0        ondansetron 4 MG ODT tab   Commonly known as:  ZOFRAN ODT   Dose:  4-8 mg   Quantity:  12 tablet        Take 1-2 tablets (4-8 mg) by mouth every 8 hours as needed for nausea   Refills:  1        order for DME        AIRSENSE 10 10-15 CM/H20 NASAL AIRFIT N10 FOR HER   Refills:  0        simvastatin 40 MG tablet   Commonly known as:  ZOCOR   Dose:  40 mg   Quantity:  90 tablet        Take 1 tablet (40 mg) by mouth At Bedtime   Refills:  3        TYLENOL PO   Dose:  500 mg        Take 500 mg by mouth 2 times daily As needed for pain   Refills:  0        VITAMIN D (CHOLECALCIFEROL) PO        Take by mouth daily   Refills:  0        * Notice:  This list has 2 medication(s) that are the same as other medications prescribed for you. Read the directions carefully, and ask your doctor or other care provider to review them with you.            Prescriptions were sent or printed at these locations (3 Prescriptions)                   Other Prescriptions                Printed at Department/Unit printer (3 of 3)         oxyCODONE-acetaminophen (PERCOCET) 5-325 MG per tablet               diazepam (VALIUM) 2 MG tablet               methylPREDNISolone (MEDROL DOSEPAK) 4 MG tablet                Orders Needing Specimen Collection     None      Pending Results     No orders found from 7/9/2017 to 7/12/2017.            Pending Culture Results     No orders found from 7/9/2017 to 7/12/2017.            Pending Results Instructions     If you had any lab results that were not finalized at the time of your Discharge, you can call the ED Lab Result RN at [phone]. You will be contacted by this team for any positive Lab results or changes in treatment. The nurses are available 7 days a week from 10A to 6:30P.  You can leave a message 24 hours per day and  they will return your call.        Test Results From Your Hospital Stay               Clinical Quality Measure: Blood Pressure Screening     Your blood pressure was checked while you were in the emergency department today. The last reading we obtained was  BP: (!) 140/91 . Please read the guidelines below about what these numbers mean and what you should do about them.  If your systolic blood pressure (the top number) is less than 120 and your diastolic blood pressure (the bottom number) is less than 80, then your blood pressure is normal. There is nothing more that you need to do about it.  If your systolic blood pressure (the top number) is 120-139 or your diastolic blood pressure (the bottom number) is 80-89, your blood pressure may be higher than it should be. You should have your blood pressure rechecked within a year by a primary care provider.  If your systolic blood pressure (the top number) is 140 or greater or your diastolic blood pressure (the bottom number) is 90 or greater, you may have high blood pressure. High blood pressure is treatable, but if left untreated over time it can put you at risk for heart attack, stroke, or kidney failure. You should have your blood pressure rechecked by a primary care provider within the next 4 weeks.  If your provider in the emergency department today gave you specific instructions to follow-up with your doctor or provider even sooner than that, you should follow that instruction and not wait for up to 4 weeks for your follow-up visit.        Thank you for choosing [facility]       Thank you for choosing [facility] for your care. Our goal is always to provide you with excellent care. Hearing back from our patients is one way we can continue to improve our services. Please take a few minutes to complete the written survey that you may receive in the mail after you visit with us. Thank you!        [employer] Information     [employer] gives you secure access to your electronic  health record. If you see a primary care provider, you can also send messages to your care team and make appointments. If you have questions, please call your primary care clinic.  If you do not have a primary care provider, please call [phone] and they will assist you.        Care EveryWhere ID     This is your Care EveryWhere ID. This could be used by other organizations to access your [facility] medical records  [id number]        Equal Access to Services     MONSERRAT MARCUM : Elyssa Crawford, katelyn muller, madhavi ramirez, helder xiao . So [facility] [phone].    ATENCIÓN: Si habla español, tiene a carter disposición servicios gratuitos de asistencia lingüística. Llame al [phone].    We comply with applicable federal civil rights laws and [place] laws. We do not discriminate on the basis of race, color, national origin, age, disability sex, sexual orientation or gender identity.            After Visit Summary       This is your record. Keep this with you and show to your community pharmacist(s) and doctor(s) at your next visit.

## 2017-07-12 NOTE — ED PROVIDER NOTES
History     Chief Complaint:  Back Pain       HPI   Kezia Nava is a 41 year old female with a history of type II diabetes mellitus who presents for evaluation of back pain. The patient reports a history of intermittent lower back pain with radiation into her left leg. On 7/7/2017, the patient started to develop similar lower back pain with radiation into her right leg, which is abnormal for her. Since then, her pain has been progressively worsening prompting her to seek evaluation in the ED. She has been taking Ibuprofen with limited improvement of her pain. Currently in the ED, the patient rates her pain at a severity of 5/10. She has not had any recent falls or trauma that could explain this severe back pain, though she does occasionally lift at work. She has had some slight tingling in her right buttocks, but no numbness or weakness.     Allergies:  NKDA     Medications:    metFORMIN (GLUCOPHAGE-XR) 500 MG 24 hr tablet  losartan-hydrochlorothiazide (HYZAAR) 50-12.5 MG per tablet  zolpidem (AMBIEN) 5 MG tablet  multivitamin, therapeutic with minerals (MULTI-VITAMIN) TABS tablet  VITAMIN D, CHOLECALCIFEROL, PO  simvastatin (ZOCOR) 40 MG tablet  ondansetron (ZOFRAN ODT) 4 MG ODT tab  gabapentin (NEURONTIN) 300 MG capsule  Ibuprofen (IBU PO)  fluticasone (FLONASE) 50 MCG/ACT nasal spray  Acetaminophen (TYLENOL PO)    Past Medical History:    Diffuse cystic mastopathy  Mesenteric adenitis  Nephrolithiasis  Hypertension   Diabetes mellitus, type II     Past Surgical History:    Arthroscopy ankle, open repair tendon, combined  Appendectomy  Cystoscopy, retrogrades, insert stent ureters, combined   Dental surgery  Discectomy, fusion cervical, anterior one level, combined  ESWL   Breast reduction  Salpingo oophorectomy, right     Family History:    CAD - Mother   Hypertension - Father and mother   Kidney disease - Mother     Social History:  Tobacco use:    Never smoker  Alcohol use:    Positive, socially  Marital  "status:    Single   Accompanied to ED by:  Alone, driven by self      Review of Systems   Musculoskeletal: Positive for back pain and myalgias (right leg).   Neurological: Negative for weakness and numbness.        (+) Tingling, right buttocks    All other systems reviewed and are negative.    Physical Exam   First Vitals:  BP: (!) 140/91  Pulse: 94  Temp: 98.9  F (37.2  C)  Resp: 16  Height: 157.5 cm (5' 2\") (STATED)  Weight: 84.8 kg (187 lb) (STATED)  SpO2: 98 %      Physical Exam   Nursing note and vitals reviewed.  Constitutional:  Appears well-developed and well-nourished.   HENT:   Head:    Atraumatic.   Eyes:    Pupils are equal, round, and reactive to light.   Neck:    Normal range of motion. Neck supple.   Cardiovascular:  Normal rate, regular rhythm, no murmur   Pulmonary/Chest: Breath sounds are clear and equal without wheezes or crackles.  Back:    Minimal tenderness to the right lumbar area at the level of L4-5 in the musculature without any swelling or deformity. No midline tenderness.   Musculoskeletal:  Exhibits no edema.   Lymphadenopathy:  No cervical adenopathy.   Neurological:   Alert and oriented to person, place, and time.   GCS 15.  Proximal upper extremity strength strong and equal.  Bilateral lower extremity strength strong and equal, including strong dorsiflexion and plantarflexion strength.  Sensation intact and equal to the arms and legs.  No facial droop or weakness. Normal speech.  Follows commands and answers questions normally.  Normal saddle area sensation.  Skin:    Skin is warm and dry. No rash noted. No pallor.     Emergency Department Course     Emergency Department Course:  Nursing notes and vitals reviewed.  2310: I performed an exam of the patient as documented above.     Findings and plan explained to the Patient. Patient discharged home with instructions regarding supportive care, medications, and reasons to return. The importance of close follow-up was reviewed. The patient " was prescribed Percocet, Valium, and a Medrol dosepak.     Impression & Plan      Medical Decision Making:  Kezia Nava is a 41 year old female whose pain I feel is musculoskeletal with slight radiculopathy into the buttock. Her pain could be due to lifting at work. She was given a work note for three days off of work and told to avoid any lifting. She does not have any signs of neurologic spinal cord impingement or cauda equina syndrome, and there was no fall or known trauma, so she did not require imaging of her spine at this time, which she understands and agrees. I also did not feel that there was any concern for epidural hematoma or abscess or intraabdominal process. She was prescribed a Medrol dosepak incase there is a small herniated disk and Percocet for pain and Valium for muscle relaxation and instructed not to drive a car or drink alcohol for 6 hours after taking Percocet and 8 hours after taking Valium. She was instructed to follow up with her primary care doctor later this week or early next week for recheck and instructed on signs and symptoms to return for, including leg weakness or incontinence.     Diagnosis:    ICD-10-CM   1. Acute bilateral low back pain with right-sided sciatica M54.41       Disposition:  Discharged to home with Percocet, Valium, and a Medrol dosepak.     Discharge Medications:  New Prescriptions    DIAZEPAM (VALIUM) 2 MG TABLET    Take 1 tablet (2 mg) by mouth every 8 hours as needed for muscle spasms No driving a car or drinking alcohol for 8 hours after taking this medication.    METHYLPREDNISOLONE (MEDROL DOSEPAK) 4 MG TABLET    Follow package instructions    OXYCODONE-ACETAMINOPHEN (PERCOCET) 5-325 MG PER TABLET    Take 1-2 tablets by mouth every 4 hours as needed for moderate to severe pain         I, Cecilio Pope, am serving as a scribe at 11:10 PM on 7/11/2017 to document services personally performed by Dr. Meade, based on my observations and the provider's  statements to me.     EMERGENCY DEPARTMENT       Norma Meade MD  07/12/17 0639

## 2017-08-01 ENCOUNTER — OFFICE VISIT (OUTPATIENT)
Dept: NEUROSURGERY | Facility: CLINIC | Age: 42
End: 2017-08-01
Attending: NURSE PRACTITIONER
Payer: COMMERCIAL

## 2017-08-01 ENCOUNTER — HOSPITAL ENCOUNTER (OUTPATIENT)
Dept: MRI IMAGING | Facility: CLINIC | Age: 42
Discharge: HOME | End: 2017-08-01
Attending: NURSE PRACTITIONER | Admitting: NURSE PRACTITIONER
Payer: COMMERCIAL

## 2017-08-01 VITALS
HEIGHT: 63 IN | WEIGHT: 187 LBS | BODY MASS INDEX: 33.13 KG/M2 | DIASTOLIC BLOOD PRESSURE: 80 MMHG | HEART RATE: 83 BPM | OXYGEN SATURATION: 97 % | SYSTOLIC BLOOD PRESSURE: 116 MMHG | TEMPERATURE: 98.2 F

## 2017-08-01 DIAGNOSIS — M54.16 LUMBAR RADICULAR PAIN: Primary | ICD-10-CM

## 2017-08-01 DIAGNOSIS — M54.16 LUMBAR RADICULAR PAIN: ICD-10-CM

## 2017-08-01 PROCEDURE — 72148 MRI LUMBAR SPINE W/O DYE: CPT

## 2017-08-01 PROCEDURE — 99203 OFFICE O/P NEW LOW 30 MIN: CPT | Performed by: NURSE PRACTITIONER

## 2017-08-01 PROCEDURE — 99211 OFF/OP EST MAY X REQ PHY/QHP: CPT | Performed by: NURSE PRACTITIONER

## 2017-08-01 RX ORDER — METHYLPREDNISOLONE 4 MG
TABLET, DOSE PACK ORAL
Qty: 21 TABLET | Refills: 0 | Status: SHIPPED | OUTPATIENT
Start: 2017-08-01 | End: 2017-08-15

## 2017-08-01 RX ORDER — LORATADINE 10 MG/1
10 TABLET ORAL DAILY
COMMUNITY
End: 2019-05-24

## 2017-08-01 NOTE — MR AVS SNAPSHOT
After Visit Summary   8/1/2017    Kezia Nava    MRN: [id number]           Patient Information     Date Of Birth          1975        Visit Information        Provider Department      8/1/2017 11:00 AM Manda Terry APRN CNP [facility]        Today's Diagnoses     Lumbar radicular pain    -  1      Care Instructions    1.  Please schedule your lumbar MRI.  I will contact you with the results.             Follow-ups after your visit        Your next 10 appointments already scheduled     Aug 15, 2017  2:00 PM CDT   [facility] Bennett Ezra Goltz, PA-C   [facility] [place] ([facility])    [address] [address] [phone]              Future tests that were ordered for you today     Open Future Orders        Priority Expected Expires Ordered    MR Lumbar Spine w/o Contrast After office visit  8/1/2018 8/1/2017            Who to contact     If you have questions or need follow up information about today's clinic visit or your schedule please contact [facility] directly at [phone].  Normal or non-critical lab and imaging results will be communicated to you by [employer], letter or phone within 4 business days after the clinic has received the results. If you do not hear from us within 7 days, please contact the clinic through [employer] or phone. If you have a critical or abnormal lab result, we will notify you by phone as soon as possible.  Submit refill requests through [employer] or call your pharmacy and they will forward the refill request to us. Please allow 3 business days for your refill to be completed.          Additional Information About Your Visit        [employer] Information     [employer] gives you secure access to your electronic health record. If you see a primary care provider, you can also send messages to your care team and make  "appointments. If you have questions, please call your primary care clinic.  If you do not have a primary care provider, please call [phone] and they will assist you.        Care EveryWhere ID     This is your Care EveryWhere ID. This could be used by other organizations to access your [facility] medical records  [id number]        Your Vitals Were     Pulse Temperature Height Pulse Oximetry Breastfeeding? BMI (Body Mass Index)    83 98.2  F (36.8  C) (Oral) 5' 2.5\" (1.588 m) 97% No 33.66 kg/m2       Blood Pressure from Last 3 Encounters:   08/01/17 116/80   07/11/17 (!) 140/91   06/13/17 128/90    Weight from Last 3 Encounters:   08/01/17 187 lb (84.8 kg)   07/11/17 187 lb (84.8 kg)   06/13/17 197 lb 8 oz (89.6 kg)                 Today's Medication Changes          These changes are accurate as of: 8/1/17 11:13 AM.  If you have any questions, ask your nurse or doctor.               These medicines have changed or have updated prescriptions.        Dose/Directions    * methylPREDNISolone 4 MG tablet   Commonly known as:  MEDROL DOSEPAK   This may have changed:  Another medication with the same name was added. Make sure you understand how and when to take each.        Follow package instructions   Quantity:  21 tablet   Refills:  0       * methylPREDNISolone 4 MG tablet   Commonly known as:  MEDROL DOSEPAK   This may have changed:  You were already taking a medication with the same name, and this prescription was added. Make sure you understand how and when to take each.   Used for:  Lumbar radicular pain   Changed by:  Manda Terry APRN CNP        Follow package instructions   Quantity:  21 tablet   Refills:  0       * Notice:  This list has 2 medication(s) that are the same as other medications prescribed for you. Read the directions carefully, and ask your doctor or other care provider to review them with you.         Where to get your medicines      These medications were sent to [facility] " [facility], [place] - [address], [address]  [address], [address], [facility] [address]     Phone:  [phone]     methylPREDNISolone 4 MG tablet                Primary Care Provider Office Phone # Fax #    Kan Serna [phone]       [facility] [address] [facility] [address]        Equal Access to Services     MONSERRAT MARCUM : Hadii aad ku hadasho Soomaali, waaxda luqadaha, qaybta kaalmada adeegyada, waxay idiin hayaan adeeg kharavon la'keisha . So [facility] [phone].    ATENCIÓN: Si habla español, tiene a carter disposición servicios gratuitos de asistencia lingüística. [facility] [phone].    We comply with applicable federal civil rights laws and [place] laws. We do not discriminate on the basis of race, color, national origin, age, disability sex, sexual orientation or gender identity.            Thank you!     Thank you for choosing [facility]  for your care. Our goal is always to provide you with excellent care. Hearing back from our patients is one way we can continue to improve our services. Please take a few minutes to complete the written survey that you may receive in the mail after your visit with us. Thank you!             Your Updated Medication List - Protect others around you: Learn how to safely use, store and throw away your medicines at www.disposemymeds.org.          This list is accurate as of: 8/1/17 11:13 AM.  Always use your most recent med list.                   Brand Name Dispense Instructions for use Diagnosis    AMBIEN 5 MG tablet   Generic drug:  zolpidem     30 tablet    Take 1 tablet (5 mg) by mouth nightly as needed for sleep    Shift work sleep disorder       blood glucose monitoring lancets     1 Box    Use to test blood sugar 1 times daily or as directed.    Type 2 diabetes mellitus with hyperglycemia, without long-term current use of insulin (H)       * blood glucose monitoring test strip     ACCU-CHEK ALMA PLUS    100 each    Use to test blood sugar 1 times daily or as directed.    Type 2 diabetes mellitus with hyperglycemia, without long-term current use of insulin (H)       * blood glucose monitoring test strip    ACCU-CHEK ALMA PLUS    100 strip    Use to test blood sugar 1 times daily or as directed.    Type 2 diabetes mellitus with hyperglycemia, without long-term current use of insulin (H)       diazepam 2 MG tablet    VALIUM    10 tablet    Take 1 tablet (2 mg) by mouth every 8 hours as needed for muscle spasms No driving a car or drinking alcohol for 8 hours after taking this medication.        FISH OIL PO      Take 720 mg by mouth daily        fluticasone 50 MCG/ACT spray    FLONASE     Spray 2 sprays into both nostrils daily Reported on 5/4/2017        gabapentin 300 MG capsule    NEURONTIN    540 capsule    Take 2 capsules (600 mg) by mouth 3 times daily    Status post cervical spinal fusion       IBU PO      Take 600 mg by mouth as needed        loratadine 10 MG tablet    CLARITIN     Take 10 mg by mouth daily        losartan-hydrochlorothiazide 50-12.5 MG per tablet    HYZAAR    30 tablet    Take 1 tablet by mouth daily    Benign essential hypertension       metFORMIN 500 MG 24 hr tablet    GLUCOPHAGE-XR    90 tablet    Take 1 tablet (500 mg) by mouth daily (with dinner)    Type 2 diabetes mellitus with hyperglycemia, without long-term current use of insulin (H)       * methylPREDNISolone 4 MG tablet    MEDROL DOSEPAK    21 tablet    Follow package instructions        * methylPREDNISolone 4 MG tablet    MEDROL DOSEPAK    21 tablet    Follow package instructions    Lumbar radicular pain       Multi-vitamin Tabs tablet      Take 1 tablet by mouth daily        ondansetron 4 MG ODT tab    ZOFRAN ODT    12 tablet    Take 1-2 tablets (4-8 mg) by mouth every 8 hours as needed for nausea    Viral gastroenteritis       order for DME      AIRSENSE 10 10-15 CM/H20 NASAL AIRFIT N10 FOR HER         oxyCODONE-acetaminophen 5-325 MG per tablet    PERCOCET    15 tablet    Take 1-2 tablets by mouth every 4 hours as needed for moderate to severe pain        simvastatin 40 MG tablet    ZOCOR    90 tablet    Take 1 tablet (40 mg) by mouth At Bedtime    Type 2 diabetes mellitus with hyperglycemia, without long-term current use of insulin (H), CARDIOVASCULAR SCREENING; LDL GOAL LESS THAN 100       TYLENOL PO      Take 500 mg by mouth 2 times daily As needed for pain        VITAMIN D (CHOLECALCIFEROL) PO      Take by mouth daily        * Notice:  This list has 4 medication(s) that are the same as other medications prescribed for you. Read the directions carefully, and ask your doctor or other care provider to review them with you.

## 2017-08-01 NOTE — PROGRESS NOTES
Dr. Seven Umaña  [facility]  Neurosurgery Clinic Visit      CC: low back and right leg pain    Primary care Provider: Kan Serna      Reason For Visit:   I was asked by [facility] ER to consult on the patient for lumbar radicular pain.      HPI: Kezia Nava is a 41 year old female with lumbar radicular pain. She notes that this began about 7-. She just woke up with pain and denies any injury. She was seen in the ER and was given medications and released.  She did take a medrol dose pack which helped until it was done. She notes pain across her low back down the back of her leg to the knee and right lateral shin to the ankle. She denies any foot drop or drag. She has not had PT or injections for this pain.  Pt had a previous cervical fusion with Dr. Seven Umaña in 2015 and denies any neck pain.      Pain at its worst 10  Pain right now:  6    Past Medical History:   Diagnosis Date     Diffuse cystic mastopathy      Elevated BP 12/15/2016     Mesenteric adenitis 4/14     Nephrolithiasis     s/p lithotripsy     Thyroiditis, acute 12/2/2014    transient hypothyroidism- resolved     Type 2 diabetes mellitus with hyperglycemia, without long-term current use of insulin (H) 2/6/2017       Past Medical History reviewed with patient during visit.    Past Surgical History:   Procedure Laterality Date     ARTHROSCOPY ANKLE, OPEN REPAIR TENDON, COMBINED  11/8/2012    Procedure: COMBINED ARTHROSCOPY ANKLE, OPEN REPAIR TENDON;  Ankle Arthroscopy Left Ankle, Open Ligament Repair Left Ankle, Peroneal Tendon Repair Left Ankle ;  Surgeon: Otf Ramos DPM;  Location: [facility] OR     C APPENDECTOMY  1984     COMBINED CYSTOSCOPY, INSERT STENT URETER(S)  8/6/2013    Procedure: COMBINED CYSTOSCOPY, INSERT STENT URETER(S);  cystoscopy, right retrograde,RIGHT STENT PLACEMENT.;  Surgeon: aKn Porter MD;  Location: [facility] OR     CYSTOSCOPY, RETROGRADES, INSERT STENT URETER(S), COMBINED   8/6/2013    Procedure: COMBINED CYSTOSCOPY, RETROGRADES, INSERT STENT URETER(S);  cystoscopy, right retrograde, insert stent right ureter;  Surgeon: Kan Porter MD;  Location:  OR     DENTAL SURGERY      TOOTH#7 - DENTAL IMPLANT     DISCECTOMY, FUSION CERVICAL ANTERIOR ONE LEVEL, COMBINED N/A 12/13/2015    Procedure: COMBINED DISCECTOMY, FUSION CERVICAL ANTERIOR ONE LEVEL;  Surgeon: Seven Umaña MD;  Location:  OR     EXTRACORPOREAL SHOCK WAVE LITHOTRIPSY (ESWL)  8/19/2013    Procedure: EXTRACORPOREAL SHOCK WAVE LITHOTRIPSY (ESWL);   RIGHT EXTRACORPOREAL SHOCK WAVE LITHOTRIPSY;  Surgeon: Otf Tobias MD;  Location:  OR     HC REDUCTION OF LARGE BREAST  2001     SALPINGO OOPHORECTOMY,R/L/DERREK  1995    Right     Past Surgical History reviewed with patient during visit.    Current Outpatient Prescriptions   Medication     loratadine (CLARITIN) 10 MG tablet     Omega-3 Fatty Acids (FISH OIL PO)     metFORMIN (GLUCOPHAGE-XR) 500 MG 24 hr tablet     losartan-hydrochlorothiazide (HYZAAR) 50-12.5 MG per tablet     zolpidem (AMBIEN) 5 MG tablet     multivitamin, therapeutic with minerals (MULTI-VITAMIN) TABS tablet     VITAMIN D, CHOLECALCIFEROL, PO     blood glucose monitoring (ACCU-CHEK FASTCLIX) lancets     blood glucose monitoring (ACCU-CHEK ALMA PLUS) test strip     blood glucose monitoring (ACCU-CHEK ALMA PLUS) test strip     ondansetron (ZOFRAN ODT) 4 MG ODT tab     order for DME     Ibuprofen (IBU PO)     fluticasone (FLONASE) 50 MCG/ACT nasal spray     Acetaminophen (TYLENOL PO)     oxyCODONE-acetaminophen (PERCOCET) 5-325 MG per tablet     diazepam (VALIUM) 2 MG tablet     methylPREDNISolone (MEDROL DOSEPAK) 4 MG tablet     simvastatin (ZOCOR) 40 MG tablet     gabapentin (NEURONTIN) 300 MG capsule     No current facility-administered medications for this visit.        Allergies   Allergen Reactions     Lisinopril Cough     No Known Drug Allergies        Social History  "    Social History     Marital status: Single     Spouse name: N/A     Number of children: 0     Years of education: N/A     Occupational History     RN- ER      Social History Main Topics     Smoking status: Never Smoker     Smokeless tobacco: Never Used     Alcohol use 0.0 oz/week     0 Standard drinks or equivalent per week      Comment: socially     Drug use: No     Sexual activity: No     Other Topics Concern     Parent/Sibling W/ Cabg, Mi Or Angioplasty Before 65f 55m? Yes     Social History Narrative       Family History   Problem Relation Age of Onset     C.A.D. Mother      stents     Hypertension Mother      KIDNEY DISEASE Mother      Hypertension Father      Breast Cancer Maternal Aunt      may have been fibrocystic     Breast Cancer Maternal Aunt      may have been fibrocystic     CEREBROVASCULAR DISEASE Paternal Grandfather      CANCER Paternal Grandmother      stomach     Family History Negative Sister      2 bio healthy     Family History Negative Brother      healthy     Cancer - colorectal Maternal Grandmother          Review Of Systems  Skin: negative  Eyes: negative  Ears/Nose/Throat: negative  Respiratory: No shortness of breath, dyspnea on exertion, cough, or hemoptysis  Cardiovascular: HTN  Gastrointestinal: negative  Genitourinary: negative  Musculoskeletal: back pain  Neurologic: right leg pain  Psychiatric: negative  Hematologic/Lymphatic/Immunologic: negative  Endocrine: diabetes     ROS: 10 point ROS neg other than the symptoms noted above in the HPI.    Vital Signs: /80 (BP Location: Right arm, Cuff Size: Adult Large)  Pulse 83  Temp 98.2  F (36.8  C) (Oral)  Ht 5' 2.5\" (1.588 m)  Wt 187 lb (84.8 kg)  SpO2 97%  Breastfeeding? No  BMI 33.66 kg/m2    Examination:  Constitutional:  Alert, well nourished, NAD.  HEENT: Normocephalic, atraumatic.   Pulm:  Without shortness of breath   CV:  No pitting edema of BLE.    Neurological:  Awake  Alert  Oriented x 3  Speech clear  Cranial " nerves II - XII intact  PERRL  EOMI  Face symmetric  Tongue midline  Motor exam   Shoulder Abduction:  Right:  5/5   Left:  5/5  Biceps:                      Right:  5/5   Left:  5/5  Triceps:                     Right:  5/5   Left:  5/5  Wrist Extensors:       Right:  5/5   Left:  5/5  Wrist Flexors:           Right:  5/5   Left:  5/5  Intrinsics:                   Right:  5/5   Left:  5/5   Hip Flexor:                Right: 5/5  Left:  5/5  Hip Adductor:             Right:  5/5  Left:  5/5  Hip Abductor:             Right:  5/5  Left:  5/5  Gastroc Soleus:        Right:  5/5  Left:  5/5  Tib/Ant:                      Right:  5/5  Left:  5/5  EHL:                          Right:  5/5  Left:  5/5   Sensation normal to bilateral upper and lower extremities    Gait: Able to stand from a seated position. Normal non-antalgic, non-myelopathic gait.  Able to heel/toe walk without loss of balance but complains of pain    Lumbar examination reveals no tenderness of the spine or paraspinous muscles. Painful ROM. Hip height is symmetrical. Negative SI joint, sciatic notch or greater trochanteric tenderness to palpation bilaterally.  Straight leg raise is positive for right leg pain     Imaging: NONE    Assessment/Plan:    Kezia Nava is a 41 year old female with lumbar radicular pain. She notes that this began about 7-. She just woke up with pain and denies any injury. She was seen in the ER and was given medications and released.  She did take a medrol dose pack which helped until it was done. She notes pain across her low back down the back of her leg to the knee and right lateral shin to the ankle. She denies any foot drop or drag. She has not had PT or injections for this pain.  Pt had a previous cervical fusion with Dr. Seven Umaña in 2015 and denies any neck pain.  The pt is very tearful today.  She is neurologically intact but visibly uncomfortable.  We will have her get a MRI ASAP and restart her on a  Medrol dose pack.  The pt agreed with the POC.  The pt was offered Valium for her claustrophobia but declined.     Patient Instructions   1.  Please schedule your lumbar MRI.  I will contact you with the results.                Manda Terry [facility]  [facility]  [address] [address]    Tel [phone]  Pager [phone]

## 2017-08-01 NOTE — NURSING NOTE
"Kezia Nava is a 41 year old female who presents for:  Chief Complaint   Patient presents with     Neurologic Problem     LILLY 7-11-17 for bilateral low back pain with right sided sciatica        Initial Vitals:  There were no vitals taken for this visit. Estimated body mass index is 34.2 kg/(m^2) as calculated from the following:    Height as of 7/11/17: 5' 2\" (1.575 m).    Weight as of 7/11/17: 187 lb (84.8 kg).. There is no height or weight on file to calculate BSA. BP completed using cuff size: large  Data Unavailable    Do you feel safe in your environment?  Yes  Do you need any refills today? No    Nursing Comments: LILLY 7-11-17 for bilateral low back pain with right sided sciatica.  Patient rates her pain today as 6 right leg to knee with pain      5 min. nursing intake time  Latosha Wiggins CMA, AAS      Discharge plan:   1.  Please schedule your lumbar MRI.  I will contact you with the results.     2 min. nursing discharge time  Latosha Wiggins CMA, AAS       "

## 2017-08-03 ENCOUNTER — TELEPHONE (OUTPATIENT)
Dept: NEUROSURGERY | Facility: CLINIC | Age: 42
End: 2017-08-03

## 2017-08-15 ENCOUNTER — TELEPHONE (OUTPATIENT)
Dept: NEUROSURGERY | Facility: CLINIC | Age: 42
End: 2017-08-15

## 2017-08-15 ENCOUNTER — OFFICE VISIT (OUTPATIENT)
Dept: SLEEP MEDICINE | Facility: CLINIC | Age: 42
End: 2017-08-15
Payer: COMMERCIAL

## 2017-08-15 VITALS
HEIGHT: 62 IN | BODY MASS INDEX: 35.41 KG/M2 | OXYGEN SATURATION: 96 % | RESPIRATION RATE: 16 BRPM | WEIGHT: 192.4 LBS | DIASTOLIC BLOOD PRESSURE: 78 MMHG | SYSTOLIC BLOOD PRESSURE: 124 MMHG | HEART RATE: 86 BPM

## 2017-08-15 DIAGNOSIS — G47.33 OSA (OBSTRUCTIVE SLEEP APNEA): Primary | ICD-10-CM

## 2017-08-15 DIAGNOSIS — G47.26 SHIFT WORK SLEEP DISORDER: ICD-10-CM

## 2017-08-15 PROCEDURE — 99214 OFFICE O/P EST MOD 30 MIN: CPT | Performed by: PHYSICIAN ASSISTANT

## 2017-08-15 RX ORDER — ZOLPIDEM TARTRATE 6.25 MG/1
TABLET, FILM COATED, EXTENDED RELEASE ORAL
Qty: 90 TABLET | Refills: 0 | COMMUNITY
Start: 2017-08-15 | End: 2020-09-15

## 2017-08-15 NOTE — NURSING NOTE
"Chief Complaint   Patient presents with     Sleep Problem     annual f/u, not using machine       Initial /78  Pulse 86  Resp 16  Ht 1.575 m (5' 2\")  Wt 87.3 kg (192 lb 6.4 oz)  SpO2 96%  BMI 35.19 kg/m2 Estimated body mass index is 35.19 kg/(m^2) as calculated from the following:    Height as of this encounter: 1.575 m (5' 2\").    Weight as of this encounter: 87.3 kg (192 lb 6.4 oz).  Medication Reconciliation: complete     ESS 9  Reba Escalante      "

## 2017-08-15 NOTE — MR AVS SNAPSHOT
After Visit Summary   8/15/2017    Kezia Nava    MRN: [id number]           Patient Information     Date Of Birth          1975        Visit Information        Provider Department      8/15/2017 2:00 PM Goltz, Bennett Ezra, PA-C [facility]        Today's Diagnoses     ANA (obstructive sleep apnea)    -  1    Shift work sleep disorder          Care Instructions      Your BMI is Body mass index is 35.19 kg/(m^2).  Weight management is a personal decision.  If you are interested in exploring weight loss strategies, the following discussion covers the approaches that may be successful. Body mass index (BMI) is one way to tell whether you are at a healthy weight, overweight, or obese. It measures your weight in relation to your height.  A BMI of 18.5 to 24.9 is in the healthy range. A person with a BMI of 25 to 29.9 is considered overweight, and someone with a BMI of 30 or greater is considered obese. More than two-thirds of American adults are considered overweight or obese.  Being overweight or obese increases the risk for further weight gain. Excess weight may lead to heart disease and diabetes.  Creating and following plans for healthy eating and physical activity may help you improve your health.  Weight control is part of healthy lifestyle and includes exercise, emotional health, and healthy eating habits. Careful eating habits lifelong are the mainstay of weight control. Though there are significant health benefits from weight loss, long-term weight loss with diet alone may be very difficult to achieve- studies show long-term success with dietary management in less than 10% of people. Attaining a healthy weight may be especially difficult to achieve in those with severe obesity. In some cases, medications, devices and surgical management might be considered.  What can you do?  If you are overweight or obese and are interested in methods for weight loss, you should discuss  this with your provider.     Consider reducing daily calorie intake by 500 calories.     Keep a food journal.     Avoiding skipping meals, consider cutting portions instead.    Diet combined with exercise helps maintain muscle while optimizing fat loss. Strength training is particularly important for building and maintaining muscle mass. Exercise helps reduce stress, increase energy, and improves fitness. Increasing exercise without diet control, however, may not burn enough calories to loose weight.       Start walking three days a week 10-20 minutes at a time    Work towards walking thirty minutes five days a week     Eventually, increase the speed of your walking for 1-2 minutes at time    In addition, we recommend that you review healthy lifestyles and methods for weight loss available through the National Institutes of Health patient information sites:  http://win.niddk.nih.gov/publications/index.htm    And look into health and wellness programs that may be available through your health insurance provider, employer, local community center, or zayra club.    Weight management plan: Patient was referred to their PCP to discuss a diet and exercise plan.            Follow-ups after your visit        Follow-up notes from your care team     Return in about 2 months (around 10/15/2017) for CPAP compliance recheck, Circadian rhythm management.      Your next 10 appointments already scheduled     Oct 16, 2017  3:30 PM CDT   Return Sleep Patient with Bennett Ezra Goltz, PA-C   [facility] ([facility])    [address] [place] [id number] [phone]              Who to contact     If you have questions or need follow up information about today's clinic visit or your schedule please contact [facility] directly at [phone].  Normal or non-critical lab and imaging results will be communicated to you by MyChart, letter or phone within 4  "business days after the clinic has received the results. If you do not hear from us within 7 days, please contact the clinic through Me-Mover or phone. If you have a critical or abnormal lab result, we will notify you by phone as soon as possible.  Submit refill requests through Me-Mover or call your pharmacy and they will forward the refill request to us. Please allow 3 business days for your refill to be completed.          Additional Information About Your Visit        Me-Mover Information     Me-[employer]over gives you secure access to your electronic health record. If you see a primary care provider, you can also send messages to your care team and make appointments. If you have questions, please call your primary care clinic.  If you do not have a primary care provider, please call [phone] and they will assist you.        Care EveryWhere ID     This is your Care EveryWhere ID. This could be used by other organizations to access your [facility] medical records  [id number]        Your Vitals Were     Pulse Respirations Height Pulse Oximetry BMI (Body Mass Index)       86 16 1.575 m (5' 2\") 96% 35.19 kg/m2        Blood Pressure from Last 3 Encounters:   08/15/17 124/78   08/01/17 116/80   07/11/17 (!) 140/91    Weight from Last 3 Encounters:   08/15/17 87.3 kg (192 lb 6.4 oz)   08/01/17 84.8 kg (187 lb)   07/11/17 84.8 kg (187 lb)              We Performed the Following     Comprehensive DME          Today's Medication Changes          These changes are accurate as of: 8/15/17  2:46 PM.  If you have any questions, ask your nurse or doctor.               Stop taking these medicines if you haven't already. Please contact your care team if you have questions.     gabapentin 300 MG capsule   Commonly known as:  NEURONTIN                    Primary Care Provider Office Phone # Fax #    Kan Serna [phone]       [address] [place] [id number]        Equal Access to Services     MONSERRAT MARCUM " AH: Elyssa tirado Soleathaali, waaxda luqadaha, qaybta kaalcait ramirez, waxnahomy ana lizamagdy liuannevon malone. So [facility] [phone].    ATENCIÓN: Si habla español, tiene a carter disposición servicios gratuitos de asistencia lingüística. Llame al [phone].    We comply with applicable federal civil rights laws and [place] laws. We do not discriminate on the basis of race, color, national origin, age, disability sex, sexual orientation or gender identity.            Thank you!     Thank you for choosing [facility]  for your care. Our goal is always to provide you with excellent care. Hearing back from our patients is one way we can continue to improve our services. Please take a few minutes to complete the written survey that you may receive in the mail after your visit with us. Thank you!             Your Updated Medication List - Protect others around you: Learn how to safely use, store and throw away your medicines at www.disposemymeds.org.          This list is accurate as of: 8/15/17  2:46 PM.  Always use your most recent med list.                   Brand Name Dispense Instructions for use Diagnosis    AMBIEN 5 MG tablet   Generic drug:  zolpidem     30 tablet    Take 1 tablet (5 mg) by mouth nightly as needed for sleep    Shift work sleep disorder       blood glucose monitoring lancets     1 Box    Use to test blood sugar 1 times daily or as directed.    Type 2 diabetes mellitus with hyperglycemia, without long-term current use of insulin (H)       * blood glucose monitoring test strip    ACCU-CHEK ALMA PLUS    100 each    Use to test blood sugar 1 times daily or as directed.    Type 2 diabetes mellitus with hyperglycemia, without long-term current use of insulin (H)       * blood glucose monitoring test strip    ACCU-CHEK ALMA PLUS    100 strip    Use to test blood sugar 1 times daily or as directed.    Type 2 diabetes mellitus with hyperglycemia, without long-term current use  of insulin (H)       FISH OIL PO      Take 720 mg by mouth daily        fluticasone 50 MCG/ACT spray    FLONASE     Spray 2 sprays into both nostrils daily Reported on 5/4/2017        IBU PO      Take 600 mg by mouth as needed        loratadine 10 MG tablet    CLARITIN     Take 10 mg by mouth daily        losartan-hydrochlorothiazide 50-12.5 MG per tablet    HYZAAR    30 tablet    Take 1 tablet by mouth daily    Benign essential hypertension       metFORMIN 500 MG 24 hr tablet    GLUCOPHAGE-XR    90 tablet    Take 1 tablet (500 mg) by mouth daily (with dinner)    Type 2 diabetes mellitus with hyperglycemia, without long-term current use of insulin (H)       Multi-vitamin Tabs tablet      Take 1 tablet by mouth daily        ondansetron 4 MG ODT tab    ZOFRAN ODT    12 tablet    Take 1-2 tablets (4-8 mg) by mouth every 8 hours as needed for nausea    Viral gastroenteritis       order for DME      AIRSENSE 10 10-15 CM/H20 NASAL AIRFIT N10 FOR HER        TYLENOL PO      Take 500 mg by mouth 2 times daily As needed for pain        VITAMIN D (CHOLECALCIFEROL) PO      Take by mouth daily        * Notice:  This list has 2 medication(s) that are the same as other medications prescribed for you. Read the directions carefully, and ask your doctor or other care provider to review them with you.

## 2017-08-15 NOTE — TELEPHONE ENCOUNTER
Pt discussed doing either PT or injections during her appointment and said that she wanted to think about it at that time. Now she would like to go ahead with one or both. Could someone please call her to discuss?

## 2017-08-15 NOTE — PATIENT INSTRUCTIONS

## 2017-08-15 NOTE — PROGRESS NOTES
Sleep Study Follow-Up Visit:    Date on this visit: 8/15/2017    Kezia Nava comes in today for follow-up of her treatment for ANA. She was initially seen at the [facility] for trouble falling and staying asleep in the setting of shift work.     AHI was 24.5/hr, with desaturations down to 78%. She spent 1.6 minutes below 90% SpO2. RDI 24.5/hr.  REM RDI 30.8/hr.  Supine RDI 29.9/hr.  Her non-supine RDI was 7.8/hr. Periodic Limb Movement Index 4/hour, 1.5/hr were associated with arousals.     She was on auto CPAP 10-15 cm. She has not been using it since March. She got an illness and had a cough. The CPAP made her feel like she was choking. That may have been related to lisinopril as well. She never got back into the habit of sleeping with it. She used to remove the CPAP in her sleep sometimes. She gets dryness in her left eye with CPAP, even without noticing leak. She sleeps mostly on her stomach/side.  She gets about 4 hours of sleep and then wakes. She continues to do night shift. She takes 5 mg of zolpidem some nights. Without it, she wakes about every 2 hours.   The compliance data shows that from 1/3/17-2/1/2017, she has used the CPAP for 24/30 nights, 47% of nights for >4 hours.  The 95th% pressure is 12.1 cm.  The 95th% leak is 0.7 lpm.  The average nightly usage is 5:36, median was 5:56.  The average AHI is 1.4/hr.  Bedtime is whenever she gets tired (on days off), ranging from 11 PM to 3 AM. It also depends on if she has something scheduled the next day. If she goes to bed at 3 AM, she wakes on her own around 9-10 AM. If she has an early morning appointment, she takes zolpidem to get to bed around 11 PM and is up at 6 AM. If she worked over night, she goes to bed at 8 AM and may set an alarm at noon or 1 PM. If she did not set an alarm, she may wake between noon and 4-5 PM. She may get a couple hour nap in the afternoon. She wakes 1-2 times in the middle of the night. Sometimes she can't  fall back to sleep when she wakes.     Past medical/surgical history, family history, social history, medications and allergies were reviewed.      Problem List:  Patient Active Problem List    Diagnosis Date Noted     Type 2 diabetes mellitus with hyperglycemia, without long-term current use of insulin (H) 02/06/2017     Priority: Medium     Benign essential hypertension 01/04/2017     Priority: Medium     Cervical radiculopathy 12/12/2015     Priority: Medium     Pain in joint, ankle and foot 06/14/2010     Priority: Medium     CARDIOVASCULAR SCREENING; LDL GOAL LESS THAN 160 02/10/2010     Priority: Medium        Impression/Plan:    (G47.33) ANA (obstructive sleep apnea)  (primary encounter diagnosis)  Comment: She has not been using CPAP. She got out of the habit after being sick.  Plan: Comprehensive DME        Resume auto CPAP 10-15 cm. A prescription was written for new supplies. Try Onyix goggles for the dry eye, with preservative free eye lubricant eye drops. Consider dental appliance if struggling with CPAP use.    (G47.26) Shift work sleep disorder  Comment: She is only able to sleep about 4 hours lately, even with zolpidem.   Plan: Calling in a prescription for zolpidem XR 6.25 mg (#90 tabs for 90 days, no refills). Add 1 mg melatonin. Continue to try to keep a consistent sleep schedule and avoid light exposure between the end of the work shift and getting to sleep. She may take 1 mg melatonin with the zolpidem.       She will follow up with me in about 2 month(s).     Twenty-five minutes spent with patient, all of which were spent face-to-face counseling, consulting, coordinating plan of care.      Bennett Goltz, PA-C    CC: Kan Serna MD

## 2017-08-17 ENCOUNTER — TELEPHONE (OUTPATIENT)
Dept: NEUROSURGERY | Facility: CLINIC | Age: 42
End: 2017-08-17

## 2017-08-17 DIAGNOSIS — M54.16 LUMBAR RADICULOPATHY: Primary | ICD-10-CM

## 2017-08-24 ENCOUNTER — THERAPY VISIT (OUTPATIENT)
Dept: PHYSICAL THERAPY | Facility: CLINIC | Age: 42
End: 2017-08-24
Payer: COMMERCIAL

## 2017-08-24 DIAGNOSIS — M54.41 ACUTE BILATERAL LOW BACK PAIN WITH RIGHT-SIDED SCIATICA: Primary | ICD-10-CM

## 2017-08-24 PROCEDURE — 97110 THERAPEUTIC EXERCISES: CPT | Mod: GP | Performed by: PHYSICAL THERAPIST

## 2017-08-24 PROCEDURE — 97161 PT EVAL LOW COMPLEX 20 MIN: CPT | Mod: GP | Performed by: PHYSICAL THERAPIST

## 2017-08-24 NOTE — PROGRESS NOTES
[facility] Initial Evaluation    Subjective:    Patient is a 41 year old female presenting with rehab back hpi.   Kezia Nava is a 41 year old female with a lumbar condition.  Condition occurred with:  Insidious onset.  Condition occurred: for unknown reasons.  This is a new condition  Patient notes that she has had B LBP on/off for years.  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  However, around 7/4/17, patient noted increased LBP with radiation of symptoms/pain to the R ankle (L5 pattern).  She denies specific incident or cause at that time. .        Pain is described as aching and sharp Episode frequency: constant LBP, intermittent R leg pain. and reported as 4/10.  Associated symptoms:  Loss of motion/stiffness (sometimes feels like her R foot catches the carpet causing her to stumble---she's not sure if related, instance of tingling in the buttock early on, but not as of late). Worse during: worse upon waking/getting out of bed (Works nights0.  Exacerbated by: prolonged sitting >30 minutes, lifting. Relieved by: standing is better than sitting, tylenol, ibuprofen, sleeping on side with pillow between knees.  Since onset symptoms are gradually improving.  Special tests:  MRI (L4-5 circumferential disc bulge, facet arthropathy (in epic)).  Previous treatment: none-will have injection tomorrow.    General health as reported by patient is fair.  Pertinent medical history includes:  Overweight, diabetes, high blood pressure and sleep disorder/apnea.  Medical allergies: no.  Surgical history: L ankle, anterior cervical fusion, appendectomy.  Current medications:  High blood pressure medication and sleep medication (diabetes).  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  Patient is working in normal job without restrictions.  Primary job tasks include:  Prolonged standing and lifting (carrying, push/pull).    Barriers include:  None as reported by the patient.    Red flags:   Pain at rest/night.                        Objective:  LUMBAR:    Posture: fair, mild slouch posture  Posture Correction: no effect  Relevant Lateral Shift: none    Neurological:    Motor Deficit:  Myotomes L R   L1-2 (hip flexion) 5 5   L3 (knee extension) 5 5   L4 (ankle DF) 5 5   L5 (g. toe ext) 5 5   S1 (ankle PF or knee flex) 5 5     Sensory Deficit, Reflexes: intact light touch screen B LE dermatomes    Dural Signs:   L R   Slump negative Positive R buttock pain   SLR negative Positive LB tightness     AROM: (Major, Moderate, Minimal or Nil loss)  Movement Loss Miles Mod Min Nil Pain   Flexion   X  To ankles, no effect on pain   Extension   X  Just stiff   Side Gliding L   X  No effect   Side Gliding R   X  Pain R LB     Repeated movement testing:   (During: produces, abolishes, increases, decreases, no effect, centralizing, peripheralizing; After: better, worse, no better, no worse, no effect, centralized, peripheralized)    Pre-test Symptoms Standing: R buttock pain   Symptoms During Symptoms After ROM increased ROM decreased No Effect   FIS        Rep FIS Pain increased R buttock No worse   X   EIS        Rep EIS Pain decreased R buttock Abolished R buttock   X   Pre-test Symptoms Lying: LBP    Symptoms During Symptoms After ROM increased ROM decreased No Effect   JOHN        Rep JOHN No effect No effect   X   EIL        Rep EIL Decreased LBP better   X       Static Tests: prone lying and AVERY are with no buttock/leg pain, just trace LB discomfort  Other Tests: tolerates P/A ext mobs L4,5 well.     Provisional Classification: derangement  Principle of Management: will initiate repeated extension in lying (press up) if at home and extension in standing if at work.  Will work on posture.  Once directional preference established may work on core strength and body mechanics for lifting.     System    Physical Exam    General     ROS    Assessment/Plan:      Patient is a 41 year old female with lumbar complaints.     Patient has the following significant findings with corresponding treatment plan.                Diagnosis 1:  LBP with R radiculopathy L5 pattern    Pain -  hot/cold therapy, manual therapy and directional preference exercise  Decreased ROM/flexibility - manual therapy and therapeutic exercise  Decreased strength - therapeutic exercise and therapeutic activities  Decreased proprioception - neuro re-education and therapeutic activities  Decreased function - therapeutic activities  Impaired posture - neuro re-education    Therapy Evaluation Codes:   1) History comprised of:   Personal factors that impact the plan of care:      None.    Comorbidity factors that impact the plan of care are:      None.     Medications impacting care: None.  2) Examination of Body Systems comprised of:   Body structures and functions that impact the plan of care:      Lumbar spine.   Activity limitations that impact the plan of care are:      Bending, Dressing, Lifting and Sitting.  3) Clinical presentation characteristics are:   Stable/Uncomplicated.  4) Decision-Making    Low complexity using standardized patient assessment instrument and/or measureable assessment of functional outcome.  Cumulative Therapy Evaluation is: Low complexity.    Previous and current functional limitations:  (See Goal Flow Sheet for this information)    Short term and Long term goals: (See Goal Flow Sheet for this information)     Communication ability:  Patient appears to be able to clearly communicate and understand verbal and written communication and follow directions correctly.  Treatment Explanation - The following has been discussed with the patient:   RX ordered/plan of care  Anticipated outcomes  Possible risks and side effects  This patient would benefit from PT intervention to resume normal activities.   Rehab potential is good.    Frequency:  1 X week, once daily  Duration:  for 6 weeks  Discharge Plan:  Achieve all LTG.  Independent in home  treatment program.  Reach maximal therapeutic benefit.    Please refer to the daily flowsheet for treatment today, total treatment time and time spent performing 1:1 timed codes.

## 2017-08-25 ENCOUNTER — HOSPITAL ENCOUNTER (OUTPATIENT)
Facility: CLINIC | Age: 42
Discharge: HOME | End: 2017-08-25
Attending: INTERNAL MEDICINE | Admitting: INTERNAL MEDICINE
Payer: COMMERCIAL

## 2017-08-25 ENCOUNTER — HOSPITAL ENCOUNTER (OUTPATIENT)
Dept: GENERAL RADIOLOGY | Facility: CLINIC | Age: 42
End: 2017-08-25
Attending: PHYSICIAN ASSISTANT | Admitting: INTERNAL MEDICINE
Payer: COMMERCIAL

## 2017-08-25 VITALS
RESPIRATION RATE: 16 BRPM | DIASTOLIC BLOOD PRESSURE: 90 MMHG | BODY MASS INDEX: 35.51 KG/M2 | HEIGHT: 62 IN | HEART RATE: 78 BPM | TEMPERATURE: 98.2 F | WEIGHT: 193 LBS | SYSTOLIC BLOOD PRESSURE: 145 MMHG

## 2017-08-25 DIAGNOSIS — M54.16 LUMBAR RADICULOPATHY: ICD-10-CM

## 2017-08-25 PROCEDURE — 25000128 H RX IP 250 OP 636: Performed by: PHYSICIAN ASSISTANT

## 2017-08-25 PROCEDURE — 62323 NJX INTERLAMINAR LMBR/SAC: CPT

## 2017-08-25 PROCEDURE — 40000863 ZZH STATISTIC RADIOLOGY XRAY, US, CT, MAR, NM

## 2017-08-25 PROCEDURE — 25500064 ZZH RX 255 OP 636: Performed by: PHYSICIAN ASSISTANT

## 2017-08-25 PROCEDURE — 25000125 ZZHC RX 250: Performed by: PHYSICIAN ASSISTANT

## 2017-08-25 RX ORDER — LIDOCAINE HYDROCHLORIDE 10 MG/ML
30 INJECTION, SOLUTION EPIDURAL; INFILTRATION; INTRACAUDAL; PERINEURAL ONCE
Status: COMPLETED | OUTPATIENT
Start: 2017-08-25 | End: 2017-08-25

## 2017-08-25 RX ORDER — SODIUM PHOSPHATE,MONO-DIBASIC 19G-7G/118
2 ENEMA (ML) RECTAL DAILY
COMMUNITY
End: 2017-10-05

## 2017-08-25 RX ORDER — DEXAMETHASONE SODIUM PHOSPHATE 10 MG/ML
20 INJECTION, SOLUTION INTRAMUSCULAR; INTRAVENOUS ONCE
Status: COMPLETED | OUTPATIENT
Start: 2017-08-25 | End: 2017-08-25

## 2017-08-25 RX ORDER — IOPAMIDOL 408 MG/ML
10 INJECTION, SOLUTION INTRATHECAL ONCE
Status: COMPLETED | OUTPATIENT
Start: 2017-08-25 | End: 2017-08-25

## 2017-08-25 RX ADMIN — LIDOCAINE HYDROCHLORIDE 7 ML: 10 INJECTION, SOLUTION EPIDURAL; INFILTRATION; INTRACAUDAL; PERINEURAL at 15:32

## 2017-08-25 RX ADMIN — IOPAMIDOL 2 ML: 408 INJECTION, SOLUTION INTRATHECAL at 15:29

## 2017-08-25 RX ADMIN — DEXAMETHASONE SODIUM PHOSPHATE 20 MG: 10 INJECTION, SOLUTION INTRAMUSCULAR; INTRAVENOUS at 15:32

## 2017-08-25 NOTE — PROGRESS NOTES
A/Ox4.  C/o right buttocks aching, rates 2/10.  Radiates to right leg.  LS clear.  Denies SOB.  States GI/ WDL.  Ambulates independently without difficulty.  BUE/BLE +CMS except states left 3-5th fingers numbness present, baseline.  VSS.  Pleasant, cooperative.  POC reviewed with pt.  Questions/concerns answered.

## 2017-08-25 NOTE — PROGRESS NOTES
RADIOLOGY PROCEDURE NOTE  Patient name: Kezia Nava  MRN: [id number]  : 1975    Pre-procedure diagnosis: Low back pain  Post-procedure diagnosis: Same    Procedure Date/Time: 2017  3:50 PM  Procedure: Caudal ATUL  Estimated blood loss: None  Specimen(s) collected with description: none  The patient tolerated the procedure well with no immediate complications.  Significant findings:none    See imaging dictation for procedural details.    Provider name: Jhoan Hatfield  Assistant(s):None

## 2017-08-25 NOTE — PROGRESS NOTES
Returned from epidural injection.  A/Ox4.  C/o right butt/right leg pain, rates 1/10.  Lower back injection site with bandaid CDI.  BUE/BLE +CMS except states left 3-5th fingers numbness present, baseline.  VSS except /92, 145/90. Tolerating fluids and sandwich.  Able to ambulate without difficulty.  POC reviewed with pt.  D/C instructions reviewed with pt prior to procedure.  Questions/concerns answered.  D/C'd via w/c accompanied by DEBBIE Charles.  Sister present to transport pt to home.

## 2017-08-25 NOTE — IP AVS SNAPSHOT
MRN:[id number]                      After Visit Summary   8/25/2017    Kezia Nava    MRN: [id number]           Visit Information        Department      8/25/2017  2:06 PM [facility]          Review of your medicines      UNREVIEWED medicines. Ask your doctor about these medicines        Dose / Directions    * AMBIEN 5 MG tablet   Used for:  Shift work sleep disorder   Generic drug:  zolpidem        Dose:  5 mg   Take 1 tablet (5 mg) by mouth nightly as needed for sleep   Quantity:  30 tablet   Refills:  2       * zolpidem 6.25 MG CR tablet   Commonly known as:  AMBIEN CR        Take 1 tab by mouth at bedtime as needed.   Quantity:  90 tablet   Refills:  0       FISH OIL PO        Dose:  720 mg   Take 720 mg by mouth daily   Refills:  0       glucosamine-chondroitin 500-400 MG Caps per capsule        Dose:  2 capsule   Take 2 capsules by mouth daily   Refills:  0       IBU PO        Dose:  600 mg   Take 600 mg by mouth as needed   Refills:  0       loratadine 10 MG tablet   Commonly known as:  CLARITIN        Dose:  10 mg   Take 10 mg by mouth daily   Refills:  0       losartan-hydrochlorothiazide 50-12.5 MG per tablet   Commonly known as:  HYZAAR   Used for:  Benign essential hypertension        Dose:  1 tablet   Take 1 tablet by mouth daily   Quantity:  30 tablet   Refills:  2       metFORMIN 500 MG 24 hr tablet   Commonly known as:  GLUCOPHAGE-XR   Used for:  Type 2 diabetes mellitus with hyperglycemia, without long-term current use of insulin (H)        Dose:  500 mg   Take 1 tablet (500 mg) by mouth daily (with dinner)   Quantity:  90 tablet   Refills:  0       Multi-vitamin Tabs tablet        Dose:  1 tablet   Take 1 tablet by mouth daily   Refills:  0       ondansetron 4 MG ODT tab   Commonly known as:  ZOFRAN ODT   Used for:  Viral gastroenteritis        Dose:  4-8 mg   Take 1-2 tablets (4-8 mg) by mouth every 8 hours as needed for nausea   Quantity:  12 tablet    Refills:  1       TYLENOL PO        Dose:  500 mg   Take 500 mg by mouth 2 times daily As needed for pain   Refills:  0       VITAMIN D (CHOLECALCIFEROL) PO        Take by mouth daily   Refills:  0       * Notice:  This list has 2 medication(s) that are the same as other medications prescribed for you. Read the directions carefully, and ask your doctor or other care provider to review them with you.      CONTINUE these medicines which have NOT CHANGED        Dose / Directions    blood glucose monitoring lancets   Used for:  Type 2 diabetes mellitus with hyperglycemia, without long-term current use of insulin (H)        Use to test blood sugar 1 times daily or as directed.   Quantity:  1 Box   Refills:  prn       * blood glucose monitoring test strip   Commonly known as:  ACCU-CHEK ALMA PLUS   Used for:  Type 2 diabetes mellitus with hyperglycemia, without long-term current use of insulin (H)        Use to test blood sugar 1 times daily or as directed.   Quantity:  100 each   Refills:  1       * blood glucose monitoring test strip   Commonly known as:  ACCU-CHEK ALMA PLUS   Used for:  Type 2 diabetes mellitus with hyperglycemia, without long-term current use of insulin (H)        Use to test blood sugar 1 times daily or as directed.   Quantity:  100 strip   Refills:  1       order for DME        AIRSENSE 10 10-15 CM/H20 NASAL AIRFIT N10 FOR HER   Refills:  0       * Notice:  This list has 2 medication(s) that are the same as other medications prescribed for you. Read the directions carefully, and ask your doctor or other care provider to review them with you.             Protect others around you: Learn how to safely use, store and throw away your medicines at www.disposemymeds.org.         Follow-ups after your visit        Your next 10 appointments already scheduled     Aug 25, 2017  3:00 PM CDT   XR LUMBAR EPIDURAL INJECTION with PATRICE CHARLTON IMAGING NURSE, PAT[facility]ICE MS[facility] RAD   [facility] ([place]  [facility])    [address] [id number] [phone]           For nerve root injection, please send or bring copies of any MRIs or other scans you have had.  Bring a list of your current medicines to your exam. (Include vitamins, minerals and over-the-counter medicines.) Leave your valuables at home.  Plan to have someone drive you home afterward.  Stop taking the following medicines (but talk to your doctor first):   If you take blood thinners, you may need to stop taking them a few days before treatment. Talk to your doctor before stopping these medicines.Stop taking Coumadin (warfarin) 3 days before treatment. Restart the day after treatment.   If you take Plavix, Ticlid, Pletal or Persantine, please ask your doctor if you should stop these medicines. You may need extra tests on the morning of your scan.   If you take Xarelto (Rivaroxaban), you may need to stop taking it 24 hours before treatment. Talk to your doctor before stopping this medicine. Restart the day after treatment.  You may take your other medicines as normal.  Stop all food and drink (including water) 3 hours before your test or treatment.  Please tell the doctor:   If you are allergic to X-ray dye (contrast fluid).   If you may be pregnant.  Injections take about 30 to 45 minutes. Most people spend up to 2 hours in the clinic or hospital.  Please call the Imaging Department at your exam site with any questions.            Sep 01, 2017  1:30 PM CDT   LISS Spine with Liban Cummins PT   [facility] [facility] ([facility]  )    [address]  [facility] [id number] [phone]            Oct 16, 2017  3:30 PM CDT   Return Sleep Patient with Bennett Ezra Goltz, PA-C   [facility] ([facility])    [address]  [address]  [place] [id number] [phone]               Care Instructions        Further instructions from your care  team       Steroid Injection Discharge Instructions     After you go home:      You may resume your normal diet.    Care of Puncture Site:      If you have a bandaid on your puncture site, you may remove it the next morning    You may shower tomorrow    No bath tubs, whirlpools or swimming for at least 3 days     Activity:      You may go back to normal activity in 24 hours    You should let pain be your guide as to the extent of your activities    Maintain any activity limitations as ordered by your provider    Do NOT drive a vehicle until tomorrow    Medicines:      You may resume all medications    Resume your Warfarin/Coumadin at your regular dose today. Follow up with your provider to have your INR rechecked    Resume your Platelet Inhibitors and Aspirin tomorrow at your regular dose    For minor pain, you may take Acetaminophen (Tylenol) or Ibuprofen (Advil)    Pain:       You may experience increased or different pain over the next 24-48 hours    For the next 48 hrs - you may use ice packs for discomfort     Call your primary care doctor if:      You have severe pain that does not improve with pain medication    You have chills or a fever greater than 101 F (38 C)    The site is red, swollen, hot or tender    New problems with your bowel or bladder    Any questions or concerns    Other Instructions:      New numbness down your leg post injection is temporary and may last for up to 6 hours. You may need assistance with activity until your leg has normal sensation.    If you are diabetic, monitor your blood sugar closely. Contact the provider who manages your diabetes to help you control your blood sugar if needed.    For Your Information:      A steroid was injected to help decrease swelling and may help to reduce pain. It may take up to 7-10 days to obtain full results.    Some patients will get lasting relief from a single injection. Others may require up to 3 injections to get results. If you have more than  one steroid injection, they should be given 2 weeks apart.    Side effects of your steroid injection are mild and will go away in 2-3 days  - Insomnia  - Heartburn  - Flushed face  - Water retention  - Increased appetite  - Increased blood sugar      If you have questions call:        [facility] Radiology Dept @ [phone]      The provider who performed your procedure was _________________.         Additional Information About Your Visit        MyChart Information     [employer] gives you secure access to your electronic health record. If you see a primary care provider, you can also send messages to your care team and make appointments. If you have questions, please call your primary care clinic.  If you do not have a primary care provider, please call [phone] and they will assist you.        Care EveryWhere ID     This is your Care EveryWhere ID. This could be used by other organizations to access your [facility] medical records  [id number]        Your Vitals Were     Blood Pressure Pulse Temperature Respirations Weight BMI (Body Mass Index)    134/89 (BP Location: Right arm) 71 98.2  F (36.8  C) (Oral) 16 87.5 kg (193 lb) 35.3 kg/m2       Primary Care Provider Office Phone # Fax #    Kan Serna [phone]      Equal Access to Services     MONSERRAT MARCUM AH: Hadii petar ku hadasho Soomaali, waaxda luqadaha, qaybta kaalmada adeegyada, waxay ana malone. So [facility] [phone].    ATENCIÓN: Si habla español, tiene a carter disposición servicios gratuitos de asistencia lingüística. Llame al [phone].    We comply with applicable federal civil rights laws and [place] laws. We do not discriminate on the basis of race, color, national origin, age, disability sex, sexual orientation or gender identity.            Thank you!     Thank you for choosing [facility] for your care. Our goal is always to provide you with excellent care. Hearing back from our patients is one  way we can continue to improve our services. Please take a few minutes to complete the written survey that you may receive in the mail after you visit with us. Thank you!             Medication List: This is a list of all your medications and when to take them. Check marks below indicate your daily home schedule. Keep this list as a reference.      Medications           Morning Afternoon Evening Bedtime As Needed    * AMBIEN 5 MG tablet   Take 1 tablet (5 mg) by mouth nightly as needed for sleep   Generic drug:  zolpidem                                * zolpidem 6.25 MG CR tablet   Commonly known as:  AMBIEN CR   Take 1 tab by mouth at bedtime as needed.                                blood glucose monitoring lancets   Use to test blood sugar 1 times daily or as directed.                                * blood glucose monitoring test strip   Commonly known as:  ACCU-CHEK ALMA PLUS   Use to test blood sugar 1 times daily or as directed.                                * blood glucose monitoring test strip   Commonly known as:  ACCU-CHEK ALMA PLUS   Use to test blood sugar 1 times daily or as directed.                                FISH OIL PO   Take 720 mg by mouth daily                                glucosamine-chondroitin 500-400 MG Caps per capsule   Take 2 capsules by mouth daily                                IBU PO   Take 600 mg by mouth as needed                                loratadine 10 MG tablet   Commonly known as:  CLARITIN   Take 10 mg by mouth daily                                losartan-hydrochlorothiazide 50-12.5 MG per tablet   Commonly known as:  HYZAAR   Take 1 tablet by mouth daily                                metFORMIN 500 MG 24 hr tablet   Commonly known as:  GLUCOPHAGE-XR   Take 1 tablet (500 mg) by mouth daily (with dinner)                                Multi-vitamin Tabs tablet   Take 1 tablet by mouth daily                                ondansetron 4 MG ODT tab   Commonly known as:   ZOFRAN ODT   Take 1-2 tablets (4-8 mg) by mouth every 8 hours as needed for nausea                                order for DME   AIRSENSE 10 10-15 CM/H20 NASAL AIRFIT N10 FOR HER                                TYLENOL PO   Take 500 mg by mouth 2 times daily As needed for pain                                VITAMIN D (CHOLECALCIFEROL) PO   Take by mouth daily                                * Notice:  This list has 4 medication(s) that are the same as other medications prescribed for you. Read the directions carefully, and ask your doctor or other care provider to review them with you.

## 2017-08-25 NOTE — DISCHARGE INSTRUCTIONS
Steroid Injection Discharge Instructions     After you go home:      You may resume your normal diet.    Care of Puncture Site:      If you have a bandaid on your puncture site, you may remove it the next morning    You may shower tomorrow    No bath tubs, whirlpools or swimming for at least 3 days     Activity:      You may go back to normal activity in 24 hours    You should let pain be your guide as to the extent of your activities    Maintain any activity limitations as ordered by your provider    Do NOT drive a vehicle until tomorrow    Medicines:      You may resume all medications    Resume your Warfarin/Coumadin at your regular dose today. Follow up with your provider to have your INR rechecked    Resume your Platelet Inhibitors and Aspirin tomorrow at your regular dose    For minor pain, you may take Acetaminophen (Tylenol) or Ibuprofen (Advil)    Pain:       You may experience increased or different pain over the next 24-48 hours    For the next 48 hrs - you may use ice packs for discomfort     Call your primary care doctor if:      You have severe pain that does not improve with pain medication    You have chills or a fever greater than 101 F (38 C)    The site is red, swollen, hot or tender    New problems with your bowel or bladder    Any questions or concerns    Other Instructions:      New numbness down your leg post injection is temporary and may last for up to 6 hours. You may need assistance with activity until your leg has normal sensation.    If you are diabetic, monitor your blood sugar closely. Contact the provider who manages your diabetes to help you control your blood sugar if needed.    For Your Information:      A steroid was injected to help decrease swelling and may help to reduce pain. It may take up to 7-10 days to obtain full results.    Some patients will get lasting relief from a single injection. Others may require up to 3 injections to get results. If you have more than one  steroid injection, they should be given 2 weeks apart.    Side effects of your steroid injection are mild and will go away in 2-3 days  - Insomnia  - Heartburn  - Flushed face  - Water retention  - Increased appetite  - Increased blood sugar      If you have questions call:        [facility] Radiology Dept @ [phone]      The provider who performed your procedure was _________________.

## 2017-08-25 NOTE — IP AVS SNAPSHOT
[place] [address]    [place] [address]    Phone:  [phone]                                       After Visit Summary   8/25/2017    Kezia Nava    MRN: [id number]           After Visit Summary Signature Page     I have received my discharge instructions, and my questions have been answered. I have discussed any challenges I see with this plan with the nurse or doctor.    ..........................................................................................................................................  Patient/Patient Representative Signature      ..........................................................................................................................................  Patient Representative Print Name and Relationship to Patient    ..................................................               ................................................  Date                                            Time    ..........................................................................................................................................  Reviewed by Signature/Title    ...................................................              ..............................................  Date                                                            Time

## 2017-09-01 ENCOUNTER — THERAPY VISIT (OUTPATIENT)
Dept: PHYSICAL THERAPY | Facility: CLINIC | Age: 42
End: 2017-09-01
Payer: COMMERCIAL

## 2017-09-01 DIAGNOSIS — M54.41 ACUTE BILATERAL LOW BACK PAIN WITH RIGHT-SIDED SCIATICA: Primary | ICD-10-CM

## 2017-09-01 PROCEDURE — 97110 THERAPEUTIC EXERCISES: CPT | Mod: GP | Performed by: PHYSICAL THERAPIST

## 2017-09-01 PROCEDURE — 97112 NEUROMUSCULAR REEDUCATION: CPT | Mod: GP | Performed by: PHYSICAL THERAPIST

## 2017-09-06 DIAGNOSIS — E11.65 TYPE 2 DIABETES MELLITUS WITH HYPERGLYCEMIA, WITHOUT LONG-TERM CURRENT USE OF INSULIN (H): ICD-10-CM

## 2017-09-06 LAB — HBA1C MFR BLD: 6 % (ref 4.3–6)

## 2017-09-06 PROCEDURE — 83036 HEMOGLOBIN GLYCOSYLATED A1C: CPT | Performed by: INTERNAL MEDICINE

## 2017-09-06 PROCEDURE — 36415 COLL VENOUS BLD VENIPUNCTURE: CPT | Performed by: INTERNAL MEDICINE

## 2017-09-07 ENCOUNTER — OFFICE VISIT (OUTPATIENT)
Dept: INTERNAL MEDICINE | Facility: CLINIC | Age: 42
End: 2017-09-07
Payer: COMMERCIAL

## 2017-09-07 VITALS
BODY MASS INDEX: 35.45 KG/M2 | DIASTOLIC BLOOD PRESSURE: 84 MMHG | TEMPERATURE: 99.1 F | OXYGEN SATURATION: 97 % | SYSTOLIC BLOOD PRESSURE: 130 MMHG | WEIGHT: 193.8 LBS | HEART RATE: 87 BPM

## 2017-09-07 DIAGNOSIS — I10 BENIGN ESSENTIAL HYPERTENSION: ICD-10-CM

## 2017-09-07 DIAGNOSIS — E11.65 TYPE 2 DIABETES MELLITUS WITH HYPERGLYCEMIA, WITHOUT LONG-TERM CURRENT USE OF INSULIN (H): Primary | ICD-10-CM

## 2017-09-07 PROCEDURE — 99214 OFFICE O/P EST MOD 30 MIN: CPT | Performed by: INTERNAL MEDICINE

## 2017-09-07 RX ORDER — LOSARTAN POTASSIUM AND HYDROCHLOROTHIAZIDE 12.5; 5 MG/1; MG/1
1 TABLET ORAL DAILY
Qty: 90 TABLET | Refills: 1 | Status: SHIPPED | OUTPATIENT
Start: 2017-09-07 | End: 2018-06-18

## 2017-09-07 RX ORDER — METFORMIN HCL 500 MG
500 TABLET, EXTENDED RELEASE 24 HR ORAL
Qty: 90 TABLET | Refills: 1 | Status: SHIPPED | OUTPATIENT
Start: 2017-09-07 | End: 2018-06-18

## 2017-09-07 NOTE — MR AVS SNAPSHOT
After Visit Summary   9/7/2017    Kezia Nava    MRN: [id number]           Patient Information     Date Of Birth          1975        Visit Information        Provider Department      9/7/2017 9:20 AM Kan Serna MD [facility]        Today's Diagnoses     Type 2 diabetes mellitus with hyperglycemia, without long-term current use of insulin (H)    -  1    Benign essential hypertension           Follow-ups after your visit        Follow-up notes from your care team     Return in about 6 months (around 3/7/2018) for Diabetes recheck.      Your next 10 appointments already scheduled     Sep 12, 2017  2:50 PM CDT   LISS Spine with Liban Cummins PT   [facility] [place] [facility] ([facility] [place]  )    [address]  [place] [address] [phone]            Oct 16, 2017  3:30 PM CDT   Return Sleep Patient with Bennett Ezra Goltz, PA-C   [facility] [place] ([facility] [place])    [address]  [address]  [place] [address] [phone]              Who to contact     If you have questions or need follow up information about today's clinic visit or your schedule please contact [facility] directly at [phone].  Normal or non-critical lab and imaging results will be communicated to you by MyChart, letter or phone within 4 business days after the clinic has received the results. If you do not hear from us within 7 days, please contact the clinic through MyChart or phone. If you have a critical or abnormal lab result, we will notify you by phone as soon as possible.  Submit refill requests through [employer] or call your pharmacy and they will forward the refill request to us. Please allow 3 business days for your refill to be completed.          Additional Information About Your Visit        [id number] Information     [employer] gives you secure access to your electronic  health record. If you see a primary care provider, you can also send messages to your care team and make appointments. If you have questions, please call your primary care clinic.  If you do not have a primary care provider, please call [phone] and they will assist you.        Care EveryWhere ID     This is your Care EveryWhere ID. This could be used by other organizations to access your [facility] medical records  [id number]        Your Vitals Were     Pulse Temperature Last Period Pulse Oximetry BMI (Body Mass Index)       87 99.1  F (37.3  C) (Oral) 08/21/2017 97% 35.45 kg/m2        Blood Pressure from Last 3 Encounters:   09/07/17 130/84   08/25/17 145/90   08/15/17 124/78    Weight from Last 3 Encounters:   09/07/17 193 lb 12.8 oz (87.9 kg)   08/25/17 193 lb (87.5 kg)   08/15/17 192 lb 6.4 oz (87.3 kg)              Today, you had the following     No orders found for display       Primary Care Provider Office Phone # Fax #    Kan Serna [phone]       [address] [place] [address]        Equal Access to Services     MONSERRAT MARCUM : Hadii petar ku hadasho Soomaali, waaxda luqadaha, qaybta kaalmada adeegyada, helder malone. So [facility] [phone].    ATENCIÓN: Si habla español, tiene a carter disposición servicios gratuitos de asistencia lingüística. Llame al [phone].    We comply with applicable federal civil rights laws and [place] laws. We do not discriminate on the basis of race, color, national origin, age, disability sex, sexual orientation or gender identity.            Thank you!     Thank you for choosing [facility]  for your care. Our goal is always to provide you with excellent care. Hearing back from our patients is one way we can continue to improve our services. Please take a few minutes to complete the written survey that you may receive in the mail after your visit with us. Thank you!             Your  Updated Medication List - Protect others around you: Learn how to safely use, store and throw away your medicines at www.disposemymeds.org.          This list is accurate as of: 9/7/17 10:10 AM.  Always use your most recent med list.                   Brand Name Dispense Instructions for use Diagnosis    * AMBIEN 5 MG tablet   Generic drug:  zolpidem     30 tablet    Take 1 tablet (5 mg) by mouth nightly as needed for sleep    Shift work sleep disorder       * zolpidem 6.25 MG CR tablet    AMBIEN CR    90 tablet    Take 1 tab by mouth at bedtime as needed.        blood glucose monitoring lancets     1 Box    Use to test blood sugar 1 times daily or as directed.    Type 2 diabetes mellitus with hyperglycemia, without long-term current use of insulin (H)       blood glucose monitoring test strip    ACCU-CHEK ALMA PLUS    100 strip    Use to test blood sugar 1 times daily or as directed.    Type 2 diabetes mellitus with hyperglycemia, without long-term current use of insulin (H)       FISH OIL PO      Take 720 mg by mouth daily        glucosamine-chondroitin 500-400 MG Caps per capsule      Take 2 capsules by mouth daily        IBU PO      Take 600 mg by mouth as needed        loratadine 10 MG tablet    CLARITIN     Take 10 mg by mouth daily        losartan-hydrochlorothiazide 50-12.5 MG per tablet    HYZAAR    30 tablet    Take 1 tablet by mouth daily    Benign essential hypertension       metFORMIN 500 MG 24 hr tablet    GLUCOPHAGE-XR    90 tablet    Take 1 tablet (500 mg) by mouth daily (with dinner)    Type 2 diabetes mellitus with hyperglycemia, without long-term current use of insulin (H)       Multi-vitamin Tabs tablet      Take 1 tablet by mouth daily        ondansetron 4 MG ODT tab    ZOFRAN ODT    12 tablet    Take 1-2 tablets (4-8 mg) by mouth every 8 hours as needed for nausea    Viral gastroenteritis       order for DME      AIRSENSE 10 10-15 CM/H20 NASAL AIRFIT N10 FOR HER        TYLENOL PO      Take 500  mg by mouth 2 times daily As needed for pain        VITAMIN D (CHOLECALCIFEROL) PO      Take by mouth daily        * Notice:  This list has 2 medication(s) that are the same as other medications prescribed for you. Read the directions carefully, and ask your doctor or other care provider to review them with you.

## 2017-09-07 NOTE — NURSING NOTE
"Chief Complaint   Patient presents with     Hypertension       Initial BP (!) 138/94  Pulse 87  Temp 99.1  F (37.3  C) (Oral)  Wt 193 lb 12.8 oz (87.9 kg)  LMP 08/21/2017  SpO2 97%  BMI 35.45 kg/m2 Estimated body mass index is 35.45 kg/(m^2) as calculated from the following:    Height as of 8/25/17: 5' 2\" (1.575 m).    Weight as of this encounter: 193 lb 12.8 oz (87.9 kg).  Medication Reconciliation: complete    "

## 2017-09-07 NOTE — LETTER
[address] [address]  [phone]        April 11, 2018    Kezia Nava  [address] [place] [address]              Dear Kezia Nava    This is to remind you that your Urinalysis is due.    You may call our office at [phone] to schedule an appointment.    Please disregard this notice if you have already had your labs drawn or made an appointment.        Sincerely,        Kan Serna MD

## 2017-09-07 NOTE — PROGRESS NOTES
SUBJECTIVE:   Kezia Nava is a 41 year old female who presents to clinic today for the following health issues:      Hypertension Follow-up  BP Readings from Last 3 Encounters:   09/07/17 130/84   08/25/17 145/90   08/15/17 124/78        Outpatient blood pressures are being checked at home.  Results are 120's/80's.    Low Salt Diet: low salt        Amount of exercise or physical activity: None    Problems taking medications regularly: No    Medication side effects: none  Diet: regular (no restrictions)      Diabetes Follow-up  Lab Results   Component Value Date    A1C 6.0 09/06/2017    A1C 7.8 02/06/2017    A1C 5.9 12/12/2015    A1C 5.7 01/19/2011          Patient is checking blood sugars: rarely.  Results range from 100 to 120    Diabetic concerns: None     Symptoms of hypoglycemia (low blood sugar): none     Paresthesias (numbness or burning in feet) or sores: No     Date of last diabetic eye exam: 2016    Problem list and histories reviewed & adjusted, as indicated.  Additional history: as documented    Labs reviewed in EPIC    Reviewed and updated as needed this visit by clinical staffAllergies       Reviewed and updated as needed this visit by Provider         ROS:  Constitutional, HEENT, cardiovascular, pulmonary, gi and gu systems are negative, except as otherwise noted.      OBJECTIVE:                                                    /84  Pulse 87  Temp 99.1  F (37.3  C) (Oral)  Wt 193 lb 12.8 oz (87.9 kg)  LMP 08/21/2017  SpO2 97%  BMI 35.45 kg/m2  Body mass index is 35.45 kg/(m^2).  GENERAL APPEARANCE: alert, no distress and over weight  RESP: lungs clear to auscultation - no rales, rhonchi or wheezes  CV: regular rates and rhythm, normal S1 S2, no S3 or S4 and no murmur, click or rub    Diagnostic test results:  Results for orders placed or performed in visit on 09/06/17 (from the past 24 hour(s))   Hemoglobin A1c   Result Value Ref Range    Hemoglobin A1C 6.0 4.3 - 6.0 %           ASSESSMENT/PLAN:                                                    1. Type 2 diabetes mellitus with hyperglycemia, without long-term current use of insulin (H)  - slight microalbuminuria on initial labs earlier this spring  a1c much better w/diet changes and metformin- continue this  Labs in 6 mo    2. Benign essential hypertension  # better at home- continue meds      Kan Serna MD  [facility]

## 2017-10-05 ENCOUNTER — APPOINTMENT (OUTPATIENT)
Dept: CT IMAGING | Facility: CLINIC | Age: 42
DRG: 063 | End: 2017-10-05
Attending: EMERGENCY MEDICINE
Payer: COMMERCIAL

## 2017-10-05 ENCOUNTER — HOSPITAL ENCOUNTER (INPATIENT)
Facility: CLINIC | Age: 42
LOS: 2 days | Discharge: HOME | DRG: 063 | End: 2017-10-07
Attending: EMERGENCY MEDICINE | Admitting: INTERNAL MEDICINE
Payer: COMMERCIAL

## 2017-10-05 DIAGNOSIS — E78.2 MIXED HYPERLIPIDEMIA: Primary | ICD-10-CM

## 2017-10-05 DIAGNOSIS — I63.9 CEREBROVASCULAR ACCIDENT (CVA), UNSPECIFIED MECHANISM (H): ICD-10-CM

## 2017-10-05 PROBLEM — E66.01 SEVERE OBESITY (BMI 35.0-35.9 WITH COMORBIDITY) (H): Status: ACTIVE | Noted: 2017-10-05

## 2017-10-05 PROBLEM — I10 BENIGN ESSENTIAL HYPERTENSION: Chronic | Status: ACTIVE | Noted: 2017-01-04

## 2017-10-05 PROBLEM — G47.33 OSA (OBSTRUCTIVE SLEEP APNEA): Chronic | Status: ACTIVE | Noted: 2017-08-15

## 2017-10-05 PROBLEM — R20.2 PARESTHESIAS: Status: ACTIVE | Noted: 2017-10-05

## 2017-10-05 LAB
ANION GAP SERPL CALCULATED.3IONS-SCNC: 9 MMOL/L (ref 3–14)
APTT PPP: 33 SEC (ref 22–37)
BUN SERPL-MCNC: 7 MG/DL (ref 7–30)
CALCIUM SERPL-MCNC: 9.7 MG/DL (ref 8.5–10.1)
CHLORIDE SERPL-SCNC: 104 MMOL/L (ref 94–109)
CHOLEST SERPL-MCNC: 206 MG/DL
CO2 SERPL-SCNC: 26 MMOL/L (ref 20–32)
CREAT SERPL-MCNC: 0.74 MG/DL (ref 0.52–1.04)
CRP SERPL-MCNC: <2.9 MG/L (ref 0–8)
ERYTHROCYTE [DISTWIDTH] IN BLOOD BY AUTOMATED COUNT: 13.5 % (ref 10–15)
ERYTHROCYTE [SEDIMENTATION RATE] IN BLOOD BY WESTERGREN METHOD: 22 MM/H (ref 0–20)
GFR SERPL CREATININE-BSD FRML MDRD: 86 ML/MIN/1.7M2
GLUCOSE SERPL-MCNC: 138 MG/DL (ref 70–99)
HBA1C MFR BLD: 6.1 % (ref 4.3–6)
HCT VFR BLD AUTO: 39.2 % (ref 35–47)
HDLC SERPL-MCNC: 45 MG/DL
HGB BLD-MCNC: 13.8 G/DL (ref 11.7–15.7)
INR PPP: 0.91 (ref 0.86–1.14)
INTERPRETATION ECG - MUSE: NORMAL
LDLC SERPL CALC-MCNC: 97 MG/DL
MCH RBC QN AUTO: 30.6 PG (ref 26.5–33)
MCHC RBC AUTO-ENTMCNC: 35.2 G/DL (ref 31.5–36.5)
MCV RBC AUTO: 87 FL (ref 78–100)
NONHDLC SERPL-MCNC: 161 MG/DL
PLATELET # BLD AUTO: 253 10E9/L (ref 150–450)
POTASSIUM SERPL-SCNC: 3.5 MMOL/L (ref 3.4–5.3)
RBC # BLD AUTO: 4.51 10E12/L (ref 3.8–5.2)
SODIUM SERPL-SCNC: 139 MMOL/L (ref 133–144)
TRIGL SERPL-MCNC: 319 MG/DL
TSH SERPL DL<=0.005 MIU/L-ACNC: 2.67 MU/L (ref 0.4–4)
WBC # BLD AUTO: 7.2 10E9/L (ref 4–11)

## 2017-10-05 PROCEDURE — 85027 COMPLETE CBC AUTOMATED: CPT | Performed by: EMERGENCY MEDICINE

## 2017-10-05 PROCEDURE — 83036 HEMOGLOBIN GLYCOSYLATED A1C: CPT | Performed by: EMERGENCY MEDICINE

## 2017-10-05 PROCEDURE — 99285 EMERGENCY DEPT VISIT HI MDM: CPT | Mod: 25

## 2017-10-05 PROCEDURE — 85730 THROMBOPLASTIN TIME PARTIAL: CPT | Performed by: EMERGENCY MEDICINE

## 2017-10-05 PROCEDURE — 80048 BASIC METABOLIC PNL TOTAL CA: CPT | Performed by: EMERGENCY MEDICINE

## 2017-10-05 PROCEDURE — 82607 VITAMIN B-12: CPT | Performed by: EMERGENCY MEDICINE

## 2017-10-05 PROCEDURE — 96374 THER/PROPH/DIAG INJ IV PUSH: CPT

## 2017-10-05 PROCEDURE — 86140 C-REACTIVE PROTEIN: CPT | Performed by: EMERGENCY MEDICINE

## 2017-10-05 PROCEDURE — 85652 RBC SED RATE AUTOMATED: CPT | Performed by: EMERGENCY MEDICINE

## 2017-10-05 PROCEDURE — 25000128 H RX IP 250 OP 636: Performed by: EMERGENCY MEDICINE

## 2017-10-05 PROCEDURE — 82306 VITAMIN D 25 HYDROXY: CPT | Performed by: EMERGENCY MEDICINE

## 2017-10-05 PROCEDURE — 70460 CT HEAD/BRAIN W/DYE: CPT

## 2017-10-05 PROCEDURE — 70498 CT ANGIOGRAPHY NECK: CPT

## 2017-10-05 PROCEDURE — 85610 PROTHROMBIN TIME: CPT | Performed by: EMERGENCY MEDICINE

## 2017-10-05 PROCEDURE — 99223 1ST HOSP IP/OBS HIGH 75: CPT | Mod: AI | Performed by: INTERNAL MEDICINE

## 2017-10-05 PROCEDURE — 20000003 ZZH R&B ICU

## 2017-10-05 PROCEDURE — 96361 HYDRATE IV INFUSION ADD-ON: CPT

## 2017-10-05 PROCEDURE — 93005 ELECTROCARDIOGRAM TRACING: CPT

## 2017-10-05 PROCEDURE — 84443 ASSAY THYROID STIM HORMONE: CPT | Performed by: EMERGENCY MEDICINE

## 2017-10-05 PROCEDURE — 80061 LIPID PANEL: CPT | Performed by: EMERGENCY MEDICINE

## 2017-10-05 PROCEDURE — 3E03317 INTRODUCTION OF OTHER THROMBOLYTIC INTO PERIPHERAL VEIN, PERCUTANEOUS APPROACH: ICD-10-PCS | Performed by: EMERGENCY MEDICINE

## 2017-10-05 PROCEDURE — 25000128 H RX IP 250 OP 636: Performed by: PSYCHIATRY & NEUROLOGY

## 2017-10-05 PROCEDURE — 70470 CT HEAD/BRAIN W/O & W/DYE: CPT | Mod: XS

## 2017-10-05 PROCEDURE — 25000125 ZZHC RX 250: Performed by: EMERGENCY MEDICINE

## 2017-10-05 RX ORDER — DEXTROSE MONOHYDRATE 25 G/50ML
25-50 INJECTION, SOLUTION INTRAVENOUS
Status: DISCONTINUED | OUTPATIENT
Start: 2017-10-05 | End: 2017-10-07 | Stop reason: HOSPADM

## 2017-10-05 RX ORDER — LABETALOL HYDROCHLORIDE 5 MG/ML
10-20 INJECTION, SOLUTION INTRAVENOUS EVERY 10 MIN PRN
Status: DISCONTINUED | OUTPATIENT
Start: 2017-10-05 | End: 2017-10-05

## 2017-10-05 RX ORDER — IOPAMIDOL 755 MG/ML
120 INJECTION, SOLUTION INTRAVASCULAR ONCE
Status: COMPLETED | OUTPATIENT
Start: 2017-10-05 | End: 2017-10-05

## 2017-10-05 RX ORDER — IOPAMIDOL 755 MG/ML
70 INJECTION, SOLUTION INTRAVASCULAR ONCE
Status: DISCONTINUED | OUTPATIENT
Start: 2017-10-05 | End: 2017-10-05

## 2017-10-05 RX ORDER — SODIUM CHLORIDE 9 MG/ML
INJECTION, SOLUTION INTRAVENOUS ONCE
Status: COMPLETED | OUTPATIENT
Start: 2017-10-05 | End: 2017-10-05

## 2017-10-05 RX ORDER — LABETALOL HYDROCHLORIDE 5 MG/ML
10-20 INJECTION, SOLUTION INTRAVENOUS EVERY 10 MIN PRN
Status: DISCONTINUED | OUTPATIENT
Start: 2017-10-05 | End: 2017-10-07 | Stop reason: HOSPADM

## 2017-10-05 RX ORDER — NICOTINE POLACRILEX 4 MG
15-30 LOZENGE BUCCAL
Status: DISCONTINUED | OUTPATIENT
Start: 2017-10-05 | End: 2017-10-07 | Stop reason: HOSPADM

## 2017-10-05 RX ORDER — NALOXONE HYDROCHLORIDE 0.4 MG/ML
.1-.4 INJECTION, SOLUTION INTRAMUSCULAR; INTRAVENOUS; SUBCUTANEOUS
Status: DISCONTINUED | OUTPATIENT
Start: 2017-10-05 | End: 2017-10-07 | Stop reason: HOSPADM

## 2017-10-05 RX ORDER — LOSARTAN POTASSIUM AND HYDROCHLOROTHIAZIDE 12.5; 5 MG/1; MG/1
1 TABLET ORAL DAILY
Status: DISCONTINUED | OUTPATIENT
Start: 2017-10-06 | End: 2017-10-06

## 2017-10-05 RX ORDER — ACETAMINOPHEN 325 MG/1
650 TABLET ORAL EVERY 4 HOURS PRN
Status: DISCONTINUED | OUTPATIENT
Start: 2017-10-05 | End: 2017-10-07 | Stop reason: HOSPADM

## 2017-10-05 RX ORDER — POTASSIUM CHLORIDE 1500 MG/1
40 TABLET, EXTENDED RELEASE ORAL ONCE
Status: COMPLETED | OUTPATIENT
Start: 2017-10-05 | End: 2017-10-06

## 2017-10-05 RX ORDER — SODIUM CHLORIDE 9 MG/ML
INJECTION, SOLUTION INTRAVENOUS CONTINUOUS
Status: DISCONTINUED | OUTPATIENT
Start: 2017-10-05 | End: 2017-10-06

## 2017-10-05 RX ADMIN — SODIUM CHLORIDE 1000 ML: 9 INJECTION, SOLUTION INTRAVENOUS at 21:46

## 2017-10-05 RX ADMIN — ALTEPLASE 69.82 MG: KIT at 21:30

## 2017-10-05 RX ADMIN — ALTEPLASE 7.76 MG: KIT at 21:28

## 2017-10-05 RX ADMIN — IOPAMIDOL 120 ML: 755 INJECTION, SOLUTION INTRAVENOUS at 20:36

## 2017-10-05 RX ADMIN — SODIUM CHLORIDE 1000 ML: 9 INJECTION, SOLUTION INTRAVENOUS at 20:40

## 2017-10-05 RX ADMIN — SODIUM CHLORIDE, PRESERVATIVE FREE 100 ML: 5 INJECTION INTRAVENOUS at 20:36

## 2017-10-05 ASSESSMENT — ENCOUNTER SYMPTOMS
HEADACHES: 0
NUMBNESS: 1
NECK PAIN: 0
BACK PAIN: 0
WEAKNESS: 0

## 2017-10-05 NOTE — IP AVS SNAPSHOT
[address] [facility]    [address]    [place] [address]    Phone:  [phone]                                       After Visit Summary   10/5/2017    Kezia Nava    MRN: [id number]           After Visit Summary Signature Page     I have received my discharge instructions, and my questions have been answered. I have discussed any challenges I see with this plan with the nurse or doctor.    ..........................................................................................................................................  Patient/Patient Representative Signature      ..........................................................................................................................................  Patient Representative Print Name and Relationship to Patient    ..................................................               ................................................  Date                                            Time    ..........................................................................................................................................  Reviewed by Signature/Title    ...................................................              ..............................................  Date                                                            Time

## 2017-10-05 NOTE — IP AVS SNAPSHOT
MRN:[id number]                      After Visit Summary   10/5/2017    Kezia Nava    MRN: [id number]           Thank you!     Thank you for choosing [facility] for your care. Our goal is always to provide you with excellent care. Hearing back from our patients is one way we can continue to improve our services. Please take a few minutes to complete the written survey that you may receive in the mail after you visit with us. Thank you!        Patient Information     Date Of Birth          1975        Designated Caregiver       Most Recent Value    Caregiver    Will someone help with your care after discharge? no      About your hospital stay     You were admitted on:  October 5, 2017 You last received care in the:  [address] [facility]    You were discharged on:  October 7, 2017        Reason for your hospital stay       You were admitted with signs/symptoms of an acute cerebrovascular incident. You were seen by a Neurologist and tPA was administered. You had resolution of your symptoms. Fortunately, subsequent MRI of your brain was negative for infarct.                  Who to Call     For medical emergencies, please call 911.  For non-urgent questions about your medical care, please call your primary care provider or clinic, [phone]          Attending Provider     Provider Specialty    Regina Wade MD Emergency Medicine    [facility] [place]oderick Little MD Internal Medicine       Primary Care Provider Office Phone # Fax #    Kan Serna [phone]      After Care Instructions     Activity       Your activity upon discharge: activity as tolerated            Diet       Follow this diet upon discharge: Orders Placed This Encounter      Regular Diet Adult                    Follow-up Appointments     Follow Up and recommended labs and tests       Follow up with primary care provider, Kan Serna, within 14 days for hospital follow- up.   The following labs/tests are recommended: LFT's.                  Your next 10 appointments already scheduled     Oct 16, 2017  3:30 PM CDT   Return Sleep Patient with Bennett Ezra Goltz, PA-C   [facility] ([facility])    [address] [id number] [phone]              Further instructions from your care team       Your risk factors for stroke or TIA (transient ischemic attack):    Your Risk Factors Your Results Normal Ranges   High blood pressure BP Readings from Last 1 Encounters:   10/07/17 111/73    Less than 120/80   Cholesterol              Total Lab Results   Component Value Date    CHOL 183 10/06/2017      Less than 150    Triglycerides   Lab Results   Component Value Date    TRIG 153 10/06/2017    Less than 150   LDL Lab Results   Component Value Date     10/06/2017       Less than 70   HDL Lab Results   Component Value Date    HDL 43 10/06/2017            Greater than 40 (men)  Greater than 50 (women)   Diabetes   Recent Labs  Lab 10/07/17  0725   GLC 79    Fasting blood glucose    Smoking/tobacco use  Quit smoking and tobacco   Overweight  Lose 1-2 pounds a week   Lack of exercise  30 minutes moderate activity each day   Other risk factors include carotid (neck) artery disease, atrial fibrillation and stress. You may be on new medicine to treat high blood pressure, cholesterol, diabetes or atrial fibrillation.    Understanding Stroke Booklet given to patient. Please refer to booklet for further information.    Stroke warning signs and symptoms - CALL 911 right away for:  - Sudden numbness or weakness in the face, arm or leg (often on one side of the body).  - Sudden confusion or trouble understanding what is going on.  - Sudden blurred or decreased vision in one or both eyes.  - Sudden trouble speaking, loss of balance, dizziness or problems with coordination.  - Sudden, severe headache for no reason.  - Fainting or  "seizures.  - Symptoms may go away then come back suddenly.    Pending Results     No orders found from 10/3/2017 to 10/6/2017.            Statement of Approval     Ordered          10/07/17 1133  I have reviewed and agree with all the recommendations and orders detailed in this document.  EFFECTIVE NOW     Approved and electronically signed by:  Cl Paige MD             Admission Information     Date & Time Provider Department Dept. Phone    10/5/2017 Roderick Melissa MD [address] [facility] [phone]      Your Vitals Were     Blood Pressure Pulse Temperature Respirations Height Weight    111/73 (BP Location: Right arm) 73 98.7  F (37.1  C) (Oral) 18 1.575 m (5' 2\") 89 kg (196 lb 3.4 oz)    Last Period Pulse Oximetry BMI (Body Mass Index)             08/21/2017 96% 35.89 kg/m2         MyChart Information     [email or web address] gives you secure access to your electronic health record. If you see a primary care provider, you can also send messages to your care team and make appointments. If you have questions, please call your primary care clinic.  If you do not have a primary care provider, please call [phone] and they will assist you.        Care EveryWhere ID     This is your Care EveryWhere ID. This could be used by other organizations to access your [facility] medical records  [id number]        Equal Access to Services     MONSERRAT MARCUM : Hadii petar hartleyo Sogbariel, waaxda luqadaha, qaybta kaalmada adeenrique, helder malone. So [facility] [phone].    ATENCIÓN: Si habla español, tiene a carter disposición servicios gratuitos de asistencia lingüística. Llame al [phone].    We comply with applicable federal civil rights laws and [place] laws. We do not discriminate on the basis of race, color, national origin, age, disability, sex, sexual orientation, or gender identity.               Review of your medicines      START taking        Dose / Directions    " aspirin 81 MG EC tablet   Commonly known as:  ASPIRIN ADULT LOW DOSE        Dose:  81 mg   Take 1 tablet (81 mg) by mouth daily   Quantity:  30 tablet   Refills:  3         CONTINUE these medicines which have NOT CHANGED        Dose / Directions    blood glucose monitoring lancets   Used for:  Type 2 diabetes mellitus with hyperglycemia, without long-term current use of insulin (H)        Use to test blood sugar 1 times daily or as directed.   Quantity:  1 Box   Refills:  prn       blood glucose monitoring test strip   Commonly known as:  ACCU-CHEK ALMA PLUS   Used for:  Type 2 diabetes mellitus with hyperglycemia, without long-term current use of insulin (H)        Use to test blood sugar 1 times daily or as directed.   Quantity:  100 strip   Refills:  1       FISH OIL PO        Dose:  720 mg   Take 720 mg by mouth daily   Refills:  0       GARLIQUE PO        Dose:  1 tablet   Take 1 tablet by mouth daily   Refills:  0       IBU PO        Dose:  600 mg   Take 600 mg by mouth as needed   Refills:  0       loratadine 10 MG tablet   Commonly known as:  CLARITIN        Dose:  10 mg   Take 10 mg by mouth daily   Refills:  0       losartan-hydrochlorothiazide 50-12.5 MG per tablet   Commonly known as:  HYZAAR   Used for:  Benign essential hypertension        Dose:  1 tablet   Take 1 tablet by mouth daily   Quantity:  90 tablet   Refills:  1       metFORMIN 500 MG 24 hr tablet   Commonly known as:  GLUCOPHAGE-XR   Used for:  Type 2 diabetes mellitus with hyperglycemia, without long-term current use of insulin (H)        Dose:  500 mg   Take 1 tablet (500 mg) by mouth daily (with dinner)   Quantity:  90 tablet   Refills:  1       Multi-vitamin Tabs tablet        Dose:  1 tablet   Take 1 tablet by mouth daily   Refills:  0       ondansetron 4 MG ODT tab   Commonly known as:  ZOFRAN ODT   Used for:  Viral gastroenteritis        Dose:  4-8 mg   Take 1-2 tablets (4-8 mg) by mouth every 8 hours as needed for nausea   Quantity:   12 tablet   Refills:  1       order for DME        AIRSENSE 10 10-15 CM/H20 NASAL AIRFIT N10 FOR HER   Refills:  0       TYLENOL PO        Dose:  500 mg   Take 500 mg by mouth 2 times daily As needed for pain   Refills:  0       VITAMIN D (CHOLECALCIFEROL) PO        Take by mouth daily   Refills:  0       zolpidem 6.25 MG CR tablet   Commonly known as:  AMBIEN CR        Take 1 tab by mouth at bedtime as needed.   Quantity:  90 tablet   Refills:  0            Where to get your medicines      These medications were sent to [facility] [place], [place] - [address]  [address], [place] [address]     Phone:  [phone]     aspirin 81 MG EC tablet                Protect others around you: Learn how to safely use, store and throw away your medicines at www.disposemymeds.org.             Medication List: This is a list of all your medications and when to take them. Check marks below indicate your daily home schedule. Keep this list as a reference.      Medications           Morning Afternoon Evening Bedtime As Needed    aspirin 81 MG EC tablet   Commonly known as:  ASPIRIN ADULT LOW DOSE   Take 1 tablet (81 mg) by mouth daily                                   blood glucose monitoring lancets   Use to test blood sugar 1 times daily or as directed.                         As directed         blood glucose monitoring test strip   Commonly known as:  ACCU-CHEK ALMA PLUS   Use to test blood sugar 1 times daily or as directed.                         As directed       FISH OIL PO   Take 720 mg by mouth daily                                   GARLIQUE PO   Take 1 tablet by mouth daily                                   IBU PO   Take 600 mg by mouth as needed                                   loratadine 10 MG tablet   Commonly known as:  CLARITIN   Take 10 mg by mouth daily                                   losartan-hydrochlorothiazide 50-12.5 MG per tablet   Commonly known as:  HYZAAR   Take 1  tablet by mouth daily                                   metFORMIN 500 MG 24 hr tablet   Commonly known as:  GLUCOPHAGE-XR   Take 1 tablet (500 mg) by mouth daily (with dinner)                                   Multi-vitamin Tabs tablet   Take 1 tablet by mouth daily                                   ondansetron 4 MG ODT tab   Commonly known as:  ZOFRAN ODT   Take 1-2 tablets (4-8 mg) by mouth every 8 hours as needed for nausea                                   order for DME   AIRSENSE 10 10-15 CM/H20 NASAL AIRFIT N10 FOR HER                                TYLENOL PO   Take 500 mg by mouth 2 times daily As needed for pain                                   VITAMIN D (CHOLECALCIFEROL) PO   Take by mouth daily                                   zolpidem 6.25 MG CR tablet   Commonly known as:  AMBIEN CR   Take 1 tab by mouth at bedtime as needed.

## 2017-10-06 ENCOUNTER — APPOINTMENT (OUTPATIENT)
Dept: CARDIOLOGY | Facility: CLINIC | Age: 42
DRG: 063 | End: 2017-10-06
Attending: INTERNAL MEDICINE
Payer: COMMERCIAL

## 2017-10-06 ENCOUNTER — APPOINTMENT (OUTPATIENT)
Dept: MRI IMAGING | Facility: CLINIC | Age: 42
DRG: 063 | End: 2017-10-06
Attending: INTERNAL MEDICINE
Payer: COMMERCIAL

## 2017-10-06 LAB
ANION GAP SERPL CALCULATED.3IONS-SCNC: 6 MMOL/L (ref 3–14)
BUN SERPL-MCNC: 5 MG/DL (ref 7–30)
CALCIUM SERPL-MCNC: 8.9 MG/DL (ref 8.5–10.1)
CHLORIDE SERPL-SCNC: 109 MMOL/L (ref 94–109)
CHOLEST SERPL-MCNC: 183 MG/DL
CO2 SERPL-SCNC: 26 MMOL/L (ref 20–32)
CREAT SERPL-MCNC: 0.66 MG/DL (ref 0.52–1.04)
CRP SERPL-MCNC: <2.9 MG/L (ref 0–8)
DEPRECATED CALCIDIOL+CALCIFEROL SERPL-MC: 25 UG/L (ref 20–75)
DEPRECATED CALCIDIOL+CALCIFEROL SERPL-MC: 39 UG/L (ref 20–75)
ERYTHROCYTE [DISTWIDTH] IN BLOOD BY AUTOMATED COUNT: 13.5 % (ref 10–15)
ERYTHROCYTE [SEDIMENTATION RATE] IN BLOOD BY WESTERGREN METHOD: 24 MM/H (ref 0–20)
FERRITIN SERPL-MCNC: 38 NG/ML (ref 12–150)
GFR SERPL CREATININE-BSD FRML MDRD: >90 ML/MIN/1.7M2
GLUCOSE BLDC GLUCOMTR-MCNC: 108 MG/DL (ref 70–99)
GLUCOSE BLDC GLUCOMTR-MCNC: 138 MG/DL (ref 70–99)
GLUCOSE BLDC GLUCOMTR-MCNC: 89 MG/DL (ref 70–99)
GLUCOSE BLDC GLUCOMTR-MCNC: 94 MG/DL (ref 70–99)
GLUCOSE SERPL-MCNC: 89 MG/DL (ref 70–99)
HBA1C MFR BLD: 6.1 % (ref 4.3–6)
HCT VFR BLD AUTO: 37.1 % (ref 35–47)
HDLC SERPL-MCNC: 43 MG/DL
HGB BLD-MCNC: 12.9 G/DL (ref 11.7–15.7)
LDLC SERPL CALC-MCNC: 109 MG/DL
MAGNESIUM SERPL-MCNC: 1.8 MG/DL (ref 1.6–2.3)
MCH RBC QN AUTO: 30.2 PG (ref 26.5–33)
MCHC RBC AUTO-ENTMCNC: 34.8 G/DL (ref 31.5–36.5)
MCV RBC AUTO: 87 FL (ref 78–100)
NONHDLC SERPL-MCNC: 140 MG/DL
PHOSPHATE SERPL-MCNC: 2.9 MG/DL (ref 2.5–4.5)
PLATELET # BLD AUTO: 213 10E9/L (ref 150–450)
POTASSIUM SERPL-SCNC: 4 MMOL/L (ref 3.4–5.3)
RBC # BLD AUTO: 4.27 10E12/L (ref 3.8–5.2)
SODIUM SERPL-SCNC: 141 MMOL/L (ref 133–144)
TRIGL SERPL-MCNC: 153 MG/DL
TROPONIN I SERPL-MCNC: <0.015 UG/L (ref 0–0.04)
TROPONIN I SERPL-MCNC: <0.015 UG/L (ref 0–0.04)
TSH SERPL DL<=0.005 MIU/L-ACNC: 2.92 MU/L (ref 0.4–4)
VIT B12 SERPL-MCNC: 382 PG/ML (ref 193–986)
WBC # BLD AUTO: 7.5 10E9/L (ref 4–11)

## 2017-10-06 PROCEDURE — 25000132 ZZH RX MED GY IP 250 OP 250 PS 637: Performed by: INTERNAL MEDICINE

## 2017-10-06 PROCEDURE — 84484 ASSAY OF TROPONIN QUANT: CPT | Performed by: PSYCHIATRY & NEUROLOGY

## 2017-10-06 PROCEDURE — 25000128 H RX IP 250 OP 636: Performed by: INTERNAL MEDICINE

## 2017-10-06 PROCEDURE — 82607 VITAMIN B-12: CPT | Performed by: PSYCHIATRY & NEUROLOGY

## 2017-10-06 PROCEDURE — 85027 COMPLETE CBC AUTOMATED: CPT | Performed by: PSYCHIATRY & NEUROLOGY

## 2017-10-06 PROCEDURE — 25000128 H RX IP 250 OP 636: Performed by: PSYCHIATRY & NEUROLOGY

## 2017-10-06 PROCEDURE — 84100 ASSAY OF PHOSPHORUS: CPT | Performed by: PSYCHIATRY & NEUROLOGY

## 2017-10-06 PROCEDURE — 40000264 ECHO BUBBLE STUDY W/LUMASON

## 2017-10-06 PROCEDURE — A9585 GADOBUTROL INJECTION: HCPCS | Performed by: INTERNAL MEDICINE

## 2017-10-06 PROCEDURE — 82728 ASSAY OF FERRITIN: CPT | Performed by: PSYCHIATRY & NEUROLOGY

## 2017-10-06 PROCEDURE — 12000000 ZZH R&B MED SURG/OB

## 2017-10-06 PROCEDURE — 99232 SBSQ HOSP IP/OBS MODERATE 35: CPT | Performed by: INTERNAL MEDICINE

## 2017-10-06 PROCEDURE — 93306 TTE W/DOPPLER COMPLETE: CPT | Mod: 26 | Performed by: INTERNAL MEDICINE

## 2017-10-06 PROCEDURE — 80048 BASIC METABOLIC PNL TOTAL CA: CPT | Performed by: PSYCHIATRY & NEUROLOGY

## 2017-10-06 PROCEDURE — 25500064 ZZH RX 255 OP 636: Performed by: INTERNAL MEDICINE

## 2017-10-06 PROCEDURE — 85652 RBC SED RATE AUTOMATED: CPT | Performed by: PSYCHIATRY & NEUROLOGY

## 2017-10-06 PROCEDURE — 80061 LIPID PANEL: CPT | Performed by: PSYCHIATRY & NEUROLOGY

## 2017-10-06 PROCEDURE — 70553 MRI BRAIN STEM W/O & W/DYE: CPT

## 2017-10-06 PROCEDURE — 83036 HEMOGLOBIN GLYCOSYLATED A1C: CPT | Performed by: PSYCHIATRY & NEUROLOGY

## 2017-10-06 PROCEDURE — 36415 COLL VENOUS BLD VENIPUNCTURE: CPT | Performed by: PSYCHIATRY & NEUROLOGY

## 2017-10-06 PROCEDURE — 00000146 ZZHCL STATISTIC GLUCOSE BY METER IP

## 2017-10-06 PROCEDURE — 83735 ASSAY OF MAGNESIUM: CPT | Performed by: PSYCHIATRY & NEUROLOGY

## 2017-10-06 PROCEDURE — 86140 C-REACTIVE PROTEIN: CPT | Performed by: PSYCHIATRY & NEUROLOGY

## 2017-10-06 PROCEDURE — 82306 VITAMIN D 25 HYDROXY: CPT | Performed by: PSYCHIATRY & NEUROLOGY

## 2017-10-06 PROCEDURE — 84443 ASSAY THYROID STIM HORMONE: CPT | Performed by: PSYCHIATRY & NEUROLOGY

## 2017-10-06 RX ORDER — GADOBUTROL 604.72 MG/ML
9 INJECTION INTRAVENOUS ONCE
Status: COMPLETED | OUTPATIENT
Start: 2017-10-06 | End: 2017-10-06

## 2017-10-06 RX ORDER — ATORVASTATIN CALCIUM 10 MG/1
10 TABLET, FILM COATED ORAL
Status: DISCONTINUED | OUTPATIENT
Start: 2017-10-06 | End: 2017-10-07 | Stop reason: HOSPADM

## 2017-10-06 RX ORDER — LORAZEPAM 2 MG/ML
0.5 INJECTION INTRAMUSCULAR
Status: DISPENSED | OUTPATIENT
Start: 2017-10-06 | End: 2017-10-07

## 2017-10-06 RX ORDER — LOSARTAN POTASSIUM 50 MG/1
50 TABLET ORAL DAILY
Status: DISCONTINUED | OUTPATIENT
Start: 2017-10-06 | End: 2017-10-07 | Stop reason: HOSPADM

## 2017-10-06 RX ORDER — SODIUM CHLORIDE 9 MG/ML
INJECTION, SOLUTION INTRAVENOUS CONTINUOUS
Status: DISCONTINUED | OUTPATIENT
Start: 2017-10-06 | End: 2017-10-07 | Stop reason: HOSPADM

## 2017-10-06 RX ADMIN — GADOBUTROL 9 ML: 604.72 INJECTION INTRAVENOUS at 10:59

## 2017-10-06 RX ADMIN — SULFUR HEXAFLUORIDE 5 ML: KIT at 11:32

## 2017-10-06 RX ADMIN — SODIUM CHLORIDE: 9 INJECTION, SOLUTION INTRAVENOUS at 06:08

## 2017-10-06 RX ADMIN — LORAZEPAM 0.5 MG: 2 INJECTION INTRAMUSCULAR; INTRAVENOUS at 10:10

## 2017-10-06 RX ADMIN — SODIUM CHLORIDE: 9 INJECTION, SOLUTION INTRAVENOUS at 17:19

## 2017-10-06 RX ADMIN — SODIUM CHLORIDE: 9 INJECTION, SOLUTION INTRAVENOUS at 22:21

## 2017-10-06 RX ADMIN — POTASSIUM CHLORIDE 40 MEQ: 1500 TABLET, EXTENDED RELEASE ORAL at 00:10

## 2017-10-06 RX ADMIN — LOSARTAN POTASSIUM 50 MG: 50 TABLET ORAL at 12:25

## 2017-10-06 ASSESSMENT — ACTIVITIES OF DAILY LIVING (ADL)
DRESS: 0-->INDEPENDENT
AMBULATION: 0-->INDEPENDENT
RETIRED_COMMUNICATION: 0-->UNDERSTANDS/COMMUNICATES WITHOUT DIFFICULTY
TRANSFERRING: 0-->INDEPENDENT
RETIRED_EATING: 0-->INDEPENDENT
TOILETING: 0-->INDEPENDENT
SWALLOWING: 0-->SWALLOWS FOODS/LIQUIDS WITHOUT DIFFICULTY
FALL_HISTORY_WITHIN_LAST_SIX_MONTHS: NO
BATHING: 0-->INDEPENDENT
COGNITION: 0 - NO COGNITION ISSUES REPORTED

## 2017-10-06 NOTE — PROGRESS NOTES
[facility]  Neurology Progress Note          Assessment and Plan:   1. Stroke  Patient had presented with left face, arm and leg numbness unclear if stroke vs atypical migraine  Received TPA as she was in window with no contraindiciations  Doing well this am, symptoms resolved after 5.5 hours    Check MRI brain today  LDL at 109 , discussed statin she would like to think about it  Start ASA 81 mg tomorrow  Echo  HbA1C 6.1 pt taking metformin seems to be working  OK to restart home BP meds    Ok to go to floor this evening possibly home tomorrow    ADDENDUM: MRI negative. Not sure if pt had TIA or complicated migraine  I recommended she stick with ASA 81mg daily  If symptoms start again she could try Excedrin migraine  Echo still pending Dr Silva to see tomorrow       Attestation:  I have reviewed today's vital signs, medications, labs and imaging.          Interval History:   Patient feels well, all symptoms resolved, no headache, just tired             Review of Systems:   The 10 point Review of Systems is negative other than noted in the HPI          Medications:     Current Facility-Administered Medications Ordered in Epic   Medication Dose Route Frequency Last Rate Last Dose     losartan (COZAAR) tablet 50 mg  50 mg Oral Daily         LORazepam (ATIVAN) injection 0.5 mg  0.5 mg Intravenous Once May Repeat x1         niCARdipine 40 mg in 200 mL 0.9% NaCl (CARDENE) infusion  2.5-15 mg/hr Intravenous Continuous         Stop alteplase (ACTIVASE) infusion IF any signs of major systemic bleeding, any neurological deterioration, development of severe headache, sudden severe elevation of blood pressure, or new nausea or vomiting and Call provider   Does not apply Continuous PRN         labetalol (NORMODYNE/TRANDATE) injection 10-20 mg  10-20 mg Intravenous Q10 Min PRN         Medication Instructions: No D5W IV solutions unless patient hypoglycemic.   Does not apply Continuous PRN         0.9% sodium  "chloride BOLUS  250 mL Intravenous Once PRN         naloxone (NARCAN) injection 0.1-0.4 mg  0.1-0.4 mg Intravenous Q2 Min PRN         0.9% sodium chloride infusion   Intravenous Continuous 100 mL/hr at 10/06/17 0608       acetaminophen (TYLENOL) tablet 650 mg  650 mg Oral Q4H PRN         NO anticoagulation/antiplatelet medications or NSAID in first 24 hours after alteplase (ACTIVASE) administration or after thrombectomy   Does not apply Continuous PRN         glucose 40 % gel 15-30 g  15-30 g Oral Q15 Min PRN        Or     dextrose 50 % injection 25-50 mL  25-50 mL Intravenous Q15 Min PRN        Or     glucagon injection 1 mg  1 mg Subcutaneous Q15 Min PRN         insulin aspart (NovoLOG) inj (RAPID ACTING)  1-7 Units Subcutaneous TID AC         insulin aspart (NovoLOG) inj (RAPID ACTING)  1-5 Units Subcutaneous At Bedtime         melatonin sublingual tablet 5 mg  5 mg Sublingual At Bedtime PRN         No current Epic-ordered outpatient prescriptions on file.      PE  /86  Temp 98.5  F (36.9  C) (Oral)  Resp 19  Ht 1.575 m (5' 2\")  Wt 88.4 kg (194 lb 14.2 oz)  LMP 08/21/2017  SpO2 95%  BMI 35.65 kg/m2  Gen: awake, alert, AD  CV: RRR  Chest: CTA B  Neuro: no aphasia or dysarthria, CN 2-12 intact. Normal sensation in all four, no neglect. No drift, normal muscle bulk, tone and strength, reflexes normal and bilat symmetric  Ext: No edema, SCDs on             Data:     Results for orders placed or performed during the hospital encounter of 10/05/17 (from the past 24 hour(s))   Basic metabolic panel   Result Value Ref Range    Sodium 139 133 - 144 mmol/L    Potassium 3.5 3.4 - 5.3 mmol/L    Chloride 104 94 - 109 mmol/L    Carbon Dioxide 26 20 - 32 mmol/L    Anion Gap 9 3 - 14 mmol/L    Glucose 138 (H) 70 - 99 mg/dL    Urea Nitrogen 7 7 - 30 mg/dL    Creatinine 0.74 0.52 - 1.04 mg/dL    GFR Estimate 86 >60 mL/min/1.7m2    GFR Estimate If Black >90 >60 mL/min/1.7m2    Calcium 9.7 8.5 - 10.1 mg/dL   CBC (+ " platelets, no diff)   Result Value Ref Range    WBC 7.2 4.0 - 11.0 10e9/L    RBC Count 4.51 3.8 - 5.2 10e12/L    Hemoglobin 13.8 11.7 - 15.7 g/dL    Hematocrit 39.2 35.0 - 47.0 %    MCV 87 78 - 100 fl    MCH 30.6 26.5 - 33.0 pg    MCHC 35.2 31.5 - 36.5 g/dL    RDW 13.5 10.0 - 15.0 %    Platelet Count 253 150 - 450 10e9/L   INR   Result Value Ref Range    INR 0.91 0.86 - 1.14   Partial thromboplastin time   Result Value Ref Range    PTT 33 22 - 37 sec   Lipid panel reflex to direct LDL   Result Value Ref Range    Cholesterol 206 (H) <200 mg/dL    Triglycerides 319 (H) <150 mg/dL    HDL Cholesterol 45 (L) >49 mg/dL    LDL Cholesterol Calculated 97 <100 mg/dL    Non HDL Cholesterol 161 (H) <130 mg/dL   CRP inflammation   Result Value Ref Range    CRP Inflammation <2.9 0.0 - 8.0 mg/L   Hemoglobin A1c   Result Value Ref Range    Hemoglobin A1C 6.1 (H) 4.3 - 6.0 %   TSH   Result Value Ref Range    TSH 2.67 0.40 - 4.00 mU/L   Erythrocyte sedimentation rate auto   Result Value Ref Range    Sed Rate 22 (H) 0 - 20 mm/h   EKG 12 lead   Result Value Ref Range    Interpretation ECG Click View Image link to view waveform and result    Head CT w/o contrast    Narrative    CT HEAD WITHOUT CONTRAST  10/5/2017 8:27 PM    HISTORY: Left side numbness on face, arm, leg.    TECHNIQUE: Scans were obtained through the head without IV contrast.   Radiation dose for this scan was reduced using automated exposure  control, adjustment of the mA and/or kV according to patient size, or  iterative reconstruction technique.    COMPARISON: None.    FINDINGS: No hemorrhage, mass lesion, or focal area of acute  infarction identified. Paranasal sinuses are normal. No bony  abnormality.      Impression    IMPRESSION: Negative CT scan of the head. CT angiogram to follow.    ORACIO LIZARRAGA MD   Head/neck angiogram CT  w & w/o contrast    Narrative    CT ANGIOGRAM OF THE HEAD AND NECK WITHOUT AND WITH CONTRAST  10/5/2017  8:35 PM     HISTORY: Left side  face, arm, leg numbness.    TECHNIQUE: Precontrast localizing scans were followed by CT  angiography with an injection of 70 mL nonionic contrast material IV  with scans through the head and neck.  Images were transferred to a  separate 3-D workstation where multiplanar reformations and 3-D images  were created. Estimates of carotid stenoses are made relative to the  distal internal carotid artery diameters except as noted. Radiation  dose for this scan was reduced using automated exposure control,  adjustment of the mA and/or kV according to patient size, or iterative  reconstruction technique.    COMPARISON: Head CT today.    FINDINGS: [place] of [place]: Normal. Large-caliber vessels are patent.  No evidence for aneurysm.    Neck arteries: Carotid and vertebral arteries are normal. No evidence  for dissection or stenosis.    The origin the great vessels off the aortic arch are intact.      Impression    IMPRESSION: Normal CT angiogram of the head and neck.    ORACIO LIZARRAGA MD   CT Head w Contrast    Narrative    CT BRAIN PERFUSION 10/5/2017 8:42 PM    HISTORY: Code stroke with facial numbness.    TECHNIQUE: Time sequential axial CT images of the head were acquired  during the administration of intravenous contrast 50 mL [id number]-[id number].  CTA images of the [place] as well as color perfusion maps of  the brain were created from this time sequential axial source data.  Radiation dose for this scan was reduced using automated exposure  control, adjustment of the mA and/or kV according to patient size, or  iterative reconstruction technique.    COMPARISON: Head CT today.    FINDINGS: There are no focal or regional perfusion defects in the  brain.      Impression    IMPRESSION: Normal CT perfusion of the brain.    ORACIO LIZARRAGA MD   Troponin I   Result Value Ref Range    Troponin I ES <0.015 0.000 - 0.045 ug/L   CBC with platelets   Result Value Ref Range    WBC 7.5 4.0 - 11.0 10e9/L    RBC Count 4.27 3.8 - 5.2  10e12/L    Hemoglobin 12.9 11.7 - 15.7 g/dL    Hematocrit 37.1 35.0 - 47.0 %    MCV 87 78 - 100 fl    MCH 30.2 26.5 - 33.0 pg    MCHC 34.8 31.5 - 36.5 g/dL    RDW 13.5 10.0 - 15.0 %    Platelet Count 213 150 - 450 10e9/L   Basic metabolic panel   Result Value Ref Range    Sodium 141 133 - 144 mmol/L    Potassium 4.0 3.4 - 5.3 mmol/L    Chloride 109 94 - 109 mmol/L    Carbon Dioxide 26 20 - 32 mmol/L    Anion Gap 6 3 - 14 mmol/L    Glucose 89 70 - 99 mg/dL    Urea Nitrogen 5 (L) 7 - 30 mg/dL    Creatinine 0.66 0.52 - 1.04 mg/dL    GFR Estimate >90 >60 mL/min/1.7m2    GFR Estimate If Black >90 >60 mL/min/1.7m2    Calcium 8.9 8.5 - 10.1 mg/dL   Magnesium   Result Value Ref Range    Magnesium 1.8 1.6 - 2.3 mg/dL   Phosphorus   Result Value Ref Range    Phosphorus 2.9 2.5 - 4.5 mg/dL   CRP inflammation   Result Value Ref Range    CRP Inflammation <2.9 0.0 - 8.0 mg/L   Hemoglobin A1c   Result Value Ref Range    Hemoglobin A1C 6.1 (H) 4.3 - 6.0 %   Lipid panel reflex to direct LDL   Result Value Ref Range    Cholesterol 183 <200 mg/dL    Triglycerides 153 (H) <150 mg/dL    HDL Cholesterol 43 (L) >49 mg/dL    LDL Cholesterol Calculated 109 (H) <100 mg/dL    Non HDL Cholesterol 140 (H) <130 mg/dL   TSH   Result Value Ref Range    TSH 2.92 0.40 - 4.00 mU/L   Erythrocyte sedimentation rate auto   Result Value Ref Range    Sed Rate 24 (H) 0 - 20 mm/h   Ferritin   Result Value Ref Range    Ferritin 38 12 - 150 ng/mL   Glucose by meter   Result Value Ref Range    Glucose 89 70 - 99 mg/dL     -  -Marielena Mancini MD  [facility]  10/6/2017 8:14 AM

## 2017-10-06 NOTE — PLAN OF CARE
Problem: Patient Care Overview  Goal: Plan of Care/Patient Progress Review  Outcome: Improving  A&Ox4, bedrest overnight. VSS on RA, denies pain. Neuros intact. Will continue to monitor.        2

## 2017-10-06 NOTE — H&P
[facility]    History and Physical  Hospitalist       Date of Admission:  10/5/2017    Assessment & Plan     Summary: Kezia Nava is a 41 year old female with a history of dm2, hypertension, and ANA who was admitted on 10/5/2017 with left sided paresthesias s/p tPA.    Paresthesias  Left face and arm/leg numbness. No other deficits. NIHSS only 1. Last known normal 19:15 today. She has risk factors for stroke. Not on aspirin or other anticoagulant at home. Neurology recommended tPA which was given in ED.  - admit to ICU on post tPA protocol  - stroke workup  - tele for atrial fibrillation monitoring  - repeat head CT in 24 hours after tPA  - no antiplatelets for now  - neuro following  - swallow screen  - rehab evaluation    Benign essential hypertension  On hyzaar at home. Takes in the morning.  - continue home hyzaar  - IV prn to keep SBP < 180 after tPA    Type 2 diabetes mellitus with hyperglycemia, without long-term current use of insulin (H)  a1c well controlled - 6 on 9/6/17.  - hold metformin given contrast administration today  - ISS for now    ANA (obstructive sleep apnea)  - continue home cpap      FEN: diabetic cardiac pending swallow eval  GI Prophylaxis: no  Isolation: no  Central Lines: no  Patrick: no  Restraints: no  DVT Prophylaxis: Pneumatic Compression Devices  Code Status: FULL    Disposition: Expected discharge in 2-3 days after stroke workup.    Roderick Melissa MD    Primary Care Physician   Kan Serna    Chief Complaint   Numbness    History is obtained from the patient    History of Present Illness   Kezia Nava is a 41 year old female with a history of dm2, hypertension, and ANA who presented to the ED on 10/5/2017 with acute onset facial and left sided numbness. Patient works as an ER nurse. Today at work, developed abruptly at 19:15 left facial and left arm/leg numbness. No focal weakness, speech, or visual changes. Had had upset stomach, nausea, and  intermittent loose though not liquid stools over the past week. This morning nausea was severe enough that she took 2 zofran tablets throughout the day. Never had this sensation/deficit before. No recent medication changes or new herbals. She is not on aspirin or other anticoagulant.    In the ED, CT head, CTA head and neck, and CT perfusion scan of brain all negative. Neurology was consulted and recommended tPA despite low NIHSS of 1.      Past Medical History    I have reviewed this patient's medical history and updated it with pertinent information if needed.   Past Medical History:   Diagnosis Date     Diffuse cystic mastopathy      Elevated BP 12/15/2016     Mesenteric adenitis 4/14     Nephrolithiasis     s/p lithotripsy     Thyroiditis, acute 12/2/2014    transient hypothyroidism- resolved     Type 2 diabetes mellitus with hyperglycemia, without long-term current use of insulin (H) 2/6/2017       Past Surgical History   I have reviewed this patient's surgical history and updated it with pertinent information if needed.  Past Surgical History:   Procedure Laterality Date     ARTHROSCOPY ANKLE, OPEN REPAIR TENDON, COMBINED  11/8/2012    Procedure: COMBINED ARTHROSCOPY ANKLE, OPEN REPAIR TENDON;  Ankle Arthroscopy Left Ankle, Open Ligament Repair Left Ankle, Peroneal Tendon Repair Left Ankle ;  Surgeon: Otf Ramos DPM;  Location: [facility] OR     C APPENDECTOMY  1984     COMBINED CYSTOSCOPY, INSERT STENT URETER(S)  8/6/2013    Procedure: COMBINED CYSTOSCOPY, INSERT STENT URETER(S);  cystoscopy, right retrograde,RIGHT STENT PLACEMENT.;  Surgeon: Kan Porter MD;  Location:  OR     CYSTOSCOPY, RETROGRADES, INSERT STENT URETER(S), COMBINED  8/6/2013    Procedure: COMBINED CYSTOSCOPY, RETROGRADES, INSERT STENT URETER(S);  cystoscopy, right retrograde, insert stent right ureter;  Surgeon: Kan Porter MD;  Location:  OR     DENTAL SURGERY      TOOTH#7 - DENTAL IMPLANT     DISCECTOMY,  FUSION CERVICAL ANTERIOR ONE LEVEL, COMBINED N/A 12/13/2015    Procedure: COMBINED DISCECTOMY, FUSION CERVICAL ANTERIOR ONE LEVEL;  Surgeon: Seven Umaña MD;  Location: [facility] OR     EXTRACORPOREAL SHOCK WAVE LITHOTRIPSY (ESWL)  8/19/2013    Procedure: EXTRACORPOREAL SHOCK WAVE LITHOTRIPSY (ESWL);   RIGHT EXTRACORPOREAL SHOCK WAVE LITHOTRIPSY;  Surgeon: Otf Tobias MD;  Location: [facility] OR     HC REDUCTION OF LARGE BREAST  2001     SALPINGO OOPHORECTOMY,R/L/DERREK  1995    Right       Prior to Admission Medications   Prior to Admission Medications   Prescriptions Last Dose Informant Patient Reported? Taking?   Acetaminophen (TYLENOL PO)   Yes No   Sig: Take 500 mg by mouth 2 times daily As needed for pain   Ibuprofen (IBU PO)   Yes No   Sig: Take 600 mg by mouth as needed   Omega-3 Fatty Acids (FISH OIL PO)   Yes No   Sig: Take 720 mg by mouth daily   VITAMIN D, CHOLECALCIFEROL, PO  Self Yes No   Sig: Take by mouth daily   blood glucose monitoring (ACCU-CHEK ALMA PLUS) test strip   No No   Sig: Use to test blood sugar 1 times daily or as directed.   blood glucose monitoring (ACCU-CHEK FASTCLIX) lancets   No No   Sig: Use to test blood sugar 1 times daily or as directed.   glucosamine-chondroitin 500-400 MG CAPS per capsule   Yes No   Sig: Take 2 capsules by mouth daily   loratadine (CLARITIN) 10 MG tablet   Yes No   Sig: Take 10 mg by mouth daily   losartan-hydrochlorothiazide (HYZAAR) 50-12.5 MG per tablet   No No   Sig: Take 1 tablet by mouth daily   metFORMIN (GLUCOPHAGE-XR) 500 MG 24 hr tablet   No No   Sig: Take 1 tablet (500 mg) by mouth daily (with dinner)   multivitamin, therapeutic with minerals (MULTI-VITAMIN) TABS tablet  Self Yes No   Sig: Take 1 tablet by mouth daily   ondansetron (ZOFRAN ODT) 4 MG ODT tab   No No   Sig: Take 1-2 tablets (4-8 mg) by mouth every 8 hours as needed for nausea   order for DME   Yes No   Sig: AIRSENSE 10  10-15 CM/H20  NASAL AIRFIT N10 FOR HER   zolpidem  (AMBIEN CR) 6.25 MG CR tablet   Yes No   Sig: Take 1 tab by mouth at bedtime as needed.   zolpidem (AMBIEN) 5 MG tablet   Yes No   Sig: Take 1 tablet (5 mg) by mouth nightly as needed for sleep      Facility-Administered Medications: None     Allergies   Allergies   Allergen Reactions     Lisinopril Cough     No Known Drug Allergies        Social History   I have reviewed this patient's social history and updated it with pertinent information if needed. Kezia Nava  reports that she has never smoked. She has never used smokeless tobacco. She reports that she drinks alcohol. She reports that she does not use illicit drugs.    Family History   I have reviewed this patient's family history and updated it with pertinent information if needed.   Family History   Problem Relation Age of Onset     C.A.D. Mother      stents     Hypertension Mother      KIDNEY DISEASE Mother      Hypertension Father      Breast Cancer Maternal Aunt      may have been fibrocystic     Breast Cancer Maternal Aunt      may have been fibrocystic     CEREBROVASCULAR DISEASE Paternal Grandfather      CANCER Paternal Grandmother      stomach     Family History Negative Sister      2 bio healthy     Family History Negative Brother      healthy     Cancer - colorectal Maternal Grandmother        Review of Systems   The 10 point Review of Systems is negative other than noted in the HPI or here.    Physical Exam   Temp: 99.3  F (37.4  C) Temp src: Oral BP: (!) 138/96   Heart Rate: 85 Resp: 14 SpO2: 96 % O2 Device: None (Room air)    Vital Signs with Ranges  Temp:  [99.3  F (37.4  C)] 99.3  F (37.4  C)  Heart Rate:  [] 85  Resp:  [14] 14  BP: (138)/(96) 138/96  SpO2:  [96 %-98 %] 96 %  0 lbs 0 oz    Constitutional: awake, alert, no acute distress. Obese.  Head: Normocephalic, atraumatic  Eye: Anicteric, noninjected. Extraocular movements normal. Pupils equally round and reactive.  Mouth: Normal appearing teeth.  ENT: External ear normal. Neck  soft and range of motion normal.  Cardiovascular: Normal rate. Regular rhythm. Normal heart sounds. No rubs or gallops. No murmurs.  Respiratory: Clear to auscultation bilaterally. No wheezing, rhonchi, or crackles.  Gastrointestinal: Soft. Nondistended. Nontender. No guarding or rebound. Normoactive bowel sounds.  MSK: Strength grossly normal bilaterally and able to sit up without assistance.  Peripheral: No pedal edema bilaterally.  Skin: No rash.  Neuro: awake, alert, oriented x3. Decreased sensation to light touch over left face, left arm/forearm, and left leg/thigh. Strength 5/5 bilateral upper and lower extremities. Speech normal. Following commands. Moving all extremities spontaneously. Grossly intact.      National Institutes of Health Stroke Scale  Exam Interval: 2 hours post treatment   Score    Level of consciousness: (0)   Alert, keenly responsive    LOC questions: (0)   Answers both questions correctly    LOC commands: (0)   Performs both tasks correctly    Best gaze: (0)   Normal    Visual: (0)   No visual loss    Facial palsy: (0)   Normal symmetrical movements    Motor arm (left): (0)   No drift    Motor arm (right): (0)   No drift    Motor leg (left): (0)   No drift    Motor leg (right): (0)   No drift    Limb ataxia: (0)   Absent    Sensory: (1)   Mild to moderate sensory loss    Best language: (0)   Normal- no aphasia    Dysarthria: (0)   Normal    Extinction and inattention: (0)   No abnormality        Total Score:  1         Data   Data reviewed today:     EKG (10/5/2017): my read - NSR with HR 84, no acute ischemic changes        Recent Labs  Lab 10/05/17  2002   WBC 7.2   HGB 13.8   MCV 87      INR 0.91      POTASSIUM 3.5   CHLORIDE 104   CO2 26   BUN 7   CR 0.74   ANIONGAP 9   JAMIE 9.7   *       Imaging:  Recent Results (from the past 24 hour(s))   Head CT w/o contrast    Narrative    CT HEAD WITHOUT CONTRAST  10/5/2017 8:27 PM    HISTORY: Left side numbness on face, arm,  leg.    TECHNIQUE: Scans were obtained through the head without IV contrast.   Radiation dose for this scan was reduced using automated exposure  control, adjustment of the mA and/or kV according to patient size, or  iterative reconstruction technique.    COMPARISON: None.    FINDINGS: No hemorrhage, mass lesion, or focal area of acute  infarction identified. Paranasal sinuses are normal. No bony  abnormality.      Impression    IMPRESSION: Negative CT scan of the head. CT angiogram to follow.    ORACIO LIZARRAGA MD   CT Head w Contrast    Narrative    CT BRAIN PERFUSION 10/5/2017 8:42 PM    HISTORY: Code stroke with facial numbness.    TECHNIQUE: Time sequential axial CT images of the head were acquired  during the administration of intravenous contrast 50 mL Isovue-370.  CTA images of the [place] as well as color perfusion maps of  the brain were created from this time sequential axial source data.  Radiation dose for this scan was reduced using automated exposure  control, adjustment of the mA and/or kV according to patient size, or  iterative reconstruction technique.    COMPARISON: Head CT today.    FINDINGS: There are no focal or regional perfusion defects in the  brain.      Impression    IMPRESSION: Normal CT perfusion of the brain.    ORACIO LIZARRAGA MD

## 2017-10-06 NOTE — PLAN OF CARE
Problem: Patient Care Overview  Goal: Plan of Care/Patient Progress Review  SLP: Chart reviewed and discussed with RN. Pt passed nursing screen and no reported speech/language/cognitive deficits. ST will sign off. Please re-consult if changes or difficulty noted.

## 2017-10-06 NOTE — ED PROVIDER NOTES
History     Chief Complaint:  Left Sided Numbness    HPI   Kezia Nava is a 41 year old female who presents with left sided numbness. Today while working, the patient began to experience left sided numbness in her face, arm, and leg about 45 minutes prior to evaluation. Denies weakness. She states that she has not had numbness like this previously and denies history of stroke. The patient reports that she has occasional headaches though no headaches currently. She denies neck pain, unusual back pain and numbness elsewhere. She has no history of a-fib. She takes metformin, losartan, hydrochlorothiazide, and Zofran.     Allergies:  Lisinopril    Medications:    Glucophage   Hyzaar  Glucosamine chondroitin  Ambien CR  Fish oil PO  Vitamin D  Zofran ODT  Ibuprofen   tylenol    Past Medical History:    Diffuse cystic mastopathy  Elevated BP  Mesenteric adenitis  Nephrolithiasis  Thyroiditis, acute  Type 2 diabetes mellitus with hyperglycemia    Past Surgical History:    Arthroscopy ankle  C appendectomy  Combined cystoscopy, insert stent ureter  Cystoscopy, retrogrades, insert stent  Dental implant  Discectomy, fusion cervical anterior one level  extracorporeal shock wave lithotripsy  HC reduction of large breast  Salpingo oophorectomy     Family History:    CAD  Hypertension  Kidney disease  Breast cancer  Cerebrovascular disease  Cancer    Social History:  Smoking Status: never  Smokeless Tobacco: never  Alcohol Use: socially     Marital Status:  Single [1]    Review of Systems   Musculoskeletal: Negative for back pain and neck pain.   Neurological: Positive for numbness. Negative for weakness and headaches.   All other systems reviewed and are negative.    Physical Exam   First Vitals:  Patient Vitals for the past 24 hrs:   BP Temp Temp src Heart Rate Resp SpO2 Height   10/05/17 2330 125/79 - - 77 17 97 % -   10/05/17 2315 119/77 - - 74 9 96 % -   10/05/17 2300 115/83 - - 76 12 99 % -   10/05/17 2245 119/81 - -  "71 15 96 % -   10/05/17 2230 123/84 - - 80 15 95 % -   10/05/17 2215 (!) 139/92 99.5  F (37.5  C) Oral 85 26 96 % -   10/05/17 2201 - - - - - 96 % -   10/05/17 2200 (!) 126/94 - - - - - -   10/05/17 2156 - - - 82 12 98 % -   10/05/17 2148 130/89 - - 74 15 98 % -   10/05/17 2132 - - - 83 22 98 % -   10/05/17 2131 (!) 136/95 - - - - - -   10/05/17 2115 138/81 - - 85 (!) 34 99 % -   10/05/17 2100 (!) 137/91 - - 82 18 99 % -   10/05/17 2045 - - - 85 14 96 % -   10/05/17 2000 - - - - - 98 % -   10/05/17 1957 (!) 138/96 99.3  F (37.4  C) Oral 102 - 98 % 1.575 m (5' 2\")     Physical Exam  General: Resting comfortably on the rney  Eyes:  The pupils are equal and round  ENT:    Moist mucous membranes  Neck:  Normal range of motion    Trachea is in the midline  CV:  Regular rate and rhythm    Skin warm and well perfused   Resp:  Lungs are clear    Non-labored    No rales    No wheezing   GI:  Abdomen is soft, there is no rigidity    No distension    No rebound tenderness   MS:  Normal muscular tone    No asymmetric leg swelling    No edema  Skin:  No rash or acute skin lesions noted  Neuro:   Awake, alert.      Speech is normal and fluent.    Face is symmetric.     Decreased sensation on left arm, face and leg    Moves all extremities equally    No arm or leg drift    Finger to nose intact  Psych: Appears anxious. Appropriate interactions.    Emergency Department Course   ECG:  Indication: left sided numbness  Completed at 2009.  Read at 2010.   Rate 84 bpm. WA interval 174. QRS duration 76. QT/QTc 332/392. P-R-T axes 29 12 23.    Normal sinus rhythm. Anteroseptal infarct, age undetermined. Abnormal ECG    No significant change compared to EKG dated 12/08/10  Imaging:  Radiographic findings were communicated with the patient who voiced understanding of the findings.  CT Head without contrast:   Negative CT scan of the head. CT angiogram to follow. As per radiology.    CT Head with contrast:   Normal CT perfusion of the " brain. As per radiology.    CT Head/neck angiogram w & w/o contrast:   Normal CT angiogram of the head and neck. As per radiology.    Laboratory:  BMP: Glucose 138 (H), o/w WNL (Creatinine: 0.74)  CBC: o/w WNL. (WBC 7.2, HGB 13.8, )   INR: 0.91  Partial thromboplastin time: collected 2002, PTT: 33  Lipid panel reflex to direct LDL: Cholesterol 206 (H), Triglycerides 319 (H), HDL cholesterol 45 (L), Non HDL cholesterol 161 (H)  CRP inflammation: <2.9  TSH: 2.67  Erthrocyte sedimentation rate auto: 22 (H)    Interventions:  2040 NS, 1 L, IV  2128 Activase, 7.76 mg, IV  2146 NS, 1 L, IV  0010 potassium chloride, 40 mEq, tablet Oral    Emergency Department Course:  Nursing notes and vitals reviewed. I performed an exam of the patient as documented above.     IV inserted. Medicine administered as documented above. Blood drawn. This was sent to the lab for further testing, results above.    EKG obtained in the ED, see results above.     The patient was sent for a CT of the head and CT angiogram while in the emergency department, findings above.    2015 I consulted with Dr. Mancini, neurologist, regarding the patient's history and presentation here in the emergency department.    2040 I consulted with Dr. Scott, radiologist, regarding the patient's history and presentation here in the emergency department. CT angiogram confirmed negative    2050  Dr. Mancini rechecked the patient and discussed the results of their workup thus far.     2110 I rechecked the patient and discussed the results of their workup thus far. TPA to be administered.    2123  I consulted with Dr. Melissa of the hospitalist services. They are in agreement to accept the patient for admission.    Findings and plan explained to the Patient who consents to admission. Discussed the patient with Dr. Melissa, who will admit the patient to an ICU bed for further monitoring, evaluation, and treatment.    Impression & Plan    Medical Decision Making:  Kezia  Elijah is a 42 y/o female who presents to the ED with numbness. Appears mildly anxious, mildly tachycardic and a stroke scale of 1 due to numbness on the left side of her body. There is no weakness. Symptoms started in the last hour. No neck pain to suggest this as cause. No headache to suggest migraine as cause. Put patient in line for CT and talked to neurology, who recommended calling code stroke. The patient was already in CT scanner when this was done. Imaging studies were unremarkable. Neurology evaluated the patient in the ED and discussed with the patient option of TPA and the patient would like to do TPA after discussion with neurologist. TPA was ordered and given to the patient. No contra-indication to TPA. She had no change in her symptoms in the emergency department. The patient was discussed with attending Dr. Melissa for ICU admission.    Critical Care time was 30 minutes for this patient excluding procedures.    CMS Diagnoses: The patient has stroke symptoms:           ED Stroke specific documentation           NIHSS PDF          Protocol PDF     Patient last known well time: 1915  ED Provider first to bedside at: 2000  CT Results received at: 2036  Patient was treated with TPA.  The risks and benefits of TPA were reviewed using the risk information document.  These risks included bleeding complications such as intracranial bleeding and death. The patient or patient s surrogate s decisional capacity was assessed to be intact. TPA decision was made after this informed consent.    National Institutes of Health Stroke Scale (Baseline)  Time Performed: 2000      Score    Level of consciousness: (0)   Alert, keenly responsive    LOC questions: (0)   Answers both questions correctly    LOC commands: (0)   Performs both tasks correctly    Best gaze: (0)   Normal    Visual: (0)   No visual loss    Facial palsy: (0)   Normal symmetrical movements    Motor arm (left): (0)   No drift    Motor arm (right): (0)   No  drift    Motor leg (left): (0)   No drift    Motor leg (right): (0)   No drift    Limb ataxia: (0)   Absent    Sensory: (1)   Mild to moderate sensory loss    Best language: (0)   Normal- no aphasia    Dysarthria: (0)   Normal    Extinction and inattention: (0)   No abnormality        Total Score:  1        Stroke Mimics were considered (including migraine headache, seizure disorder, hypoglycemia (or hyperglycemia), head or spinal trauma, CNS infection, Toxin ingestion and shock state (e.g. sepsis) .      National Institutes of Health Stroke Scale  Time Performed: 2110  Total Score: 1  (unchanged from last stroke score)     Diagnosis:    ICD-10-CM    1. Cerebrovascular accident (CVA), unspecified mechanism (H) I63.9 Lipid panel reflex to direct LDL     Lipid panel reflex to direct LDL     CRP inflammation     CRP inflammation     Hemoglobin A1c     Hemoglobin A1c     TSH     TSH     Vitamin B12     Vitamin B12     Vitamin D Deficiency     Vitamin D Deficiency     Erythrocyte sedimentation rate auto     Erythrocyte sedimentation rate auto     CANCELED: CRP inflammation     CANCELED: Erythrocyte sedimentation rate auto     CANCELED: Hemoglobin A1c     CANCELED: Lipid panel reflex to direct LDL     CANCELED: TSH     CANCELED: Vitamin B12     CANCELED: Vitamin D Deficiency       Disposition:  Admitted to ICU    I, Franco Smith, am serving as a scribe on 10/5/2017 at 8:41 PM to personally document services performed by Regina Wade MD based on my observations and the provider's statements to me.     Franco Smith  10/5/2017    EMERGENCY DEPARTMENT       Regina Wade MD  10/06/17 0129       Regina Wade MD  10/06/17 0321

## 2017-10-06 NOTE — PROGRESS NOTES
[facility]    Internal Medicine Hospitalist Progress Note  10/06/2017  I evaluated patient on the above date.    Cl Paige Jr., MD  [phone] (p)  Text Page (7 am to 6 pm)      Assessment & Plan   Kezia Nava is a 41 year old female with a history including DM2, HTN, DLD and ANA, who presented 10/5 with L sided paresthesias consistent with acute CVI and was given tPA 10/5.     Acute CVI s/p peripheral tPA 10/5/2017.  Presented 10/5 with left face and arm/leg numbness. Head CT with and w/o contrast as well as CTA on 10/5 negative. Seen by Neurology and administered tPA 10/5.  - Continue telemetry monitoring.  - Antiplatelets when OK with Neurology per protocol.  - MRI pending.  - Echo pending.  - Therapies when able post tPA.  - Appreciate Neuro help from Dr. Mancini.    Benign essential hypertension.  [PTA: losartan-HCTZ 50-12.5 mg daily.]  - Continue losartan.  - Hold HCTZ for now.  - Continue PRN IV labetalol.  - Goal < 180 systolic post tPA.     DM2 w/o complications.  [PTA: metformin  mg daily.]  Hgb A1C 6 9/2017.  - Continue ISS.  - Hold metformin for now.    DLD.  Has been reluctant to start statins in the past, but now agreeable.  Recent Labs   Lab Test  10/06/17   0555  10/05/17   2002   12/08/10   0711   CHOL  183  206*   < >  187   HDL  43*  45*   < >  41*   LDL  109*  97   < >  117   TRIG  153*  319*   < >  145   CHOLHDLRATIO   --    --    --   4.6    < > = values in this interval not displayed.   - Start atorvastatin 10 mg qpm.    ANA.  - Continue CPAP.     Prophylaxis.  - PCD's.  - Ambulation.    CODE STATUS: FULL    Dispo.  - Pending above.    Interval History   Doing well. Symptoms resolved a few hours after tPA. No chest pain or SOB.    -Data reviewed today: I reviewed all new labs and imaging over the last 24 hours. I personally reviewed no images or EKG's today.    Physical Exam   Heart Rate: 67, Blood pressure 117/86, temperature 98.5  F (36.9  C), temperature source  "Oral, resp. rate 19, height 1.575 m (5' 2\"), weight 88.4 kg (194 lb 14.2 oz), last menstrual period 08/21/2017, SpO2 95 %, not currently breastfeeding.  Vitals:    10/06/17 0000   Weight: 88.4 kg (194 lb 14.2 oz)     Vital Signs with Ranges  Temp:  [98.5  F (36.9  C)-99.5  F (37.5  C)] 98.5  F (36.9  C)  Heart Rate:  [] 67  Resp:  [9-34] 19  BP: (115-139)/(74-96) 117/86  SpO2:  [94 %-99 %] 95 %  Patient Vitals for the past 24 hrs:   BP Temp Temp src Heart Rate Resp SpO2 Height Weight   10/06/17 0700 117/86 - - 67 19 95 % - -   10/06/17 0600 128/88 - - 66 20 97 % - -   10/06/17 0530 119/82 - - 75 30 96 % - -   10/06/17 0500 118/76 - - 69 15 94 % - -   10/06/17 0430 121/78 - - 68 15 95 % - -   10/06/17 0400 121/82 98.5  F (36.9  C) Oral 71 20 95 % - -   10/06/17 0330 119/85 - - 67 19 97 % - -   10/06/17 0300 127/82 - - 77 25 98 % - -   10/06/17 0230 116/74 - - 80 26 95 % - -   10/06/17 0200 126/75 - - 75 26 95 % - -   10/06/17 0130 127/79 - - 72 21 96 % - -   10/06/17 0100 119/76 - - 76 21 96 % - -   10/06/17 0030 122/86 - - 76 13 97 % - -   10/06/17 0000 122/75 99.1  F (37.3  C) Oral 77 10 96 % - 88.4 kg (194 lb 14.2 oz)   10/05/17 2330 125/79 - - 77 17 97 % - -   10/05/17 2315 119/77 - - 74 9 96 % - -   10/05/17 2300 115/83 - - 76 12 99 % - -   10/05/17 2245 119/81 - - 71 15 96 % - -   10/05/17 2230 123/84 - - 80 15 95 % - -   10/05/17 2215 (!) 139/92 99.5  F (37.5  C) Oral 85 26 96 % - -   10/05/17 2201 - - - - - 96 % - -   10/05/17 2200 (!) 126/94 - - - - - - -   10/05/17 2156 - - - 82 12 98 % - -   10/05/17 2148 130/89 - - 74 15 98 % - -   10/05/17 2132 - - - 83 22 98 % - -   10/05/17 2131 (!) 136/95 - - - - - - -   10/05/17 2115 138/81 - - 85 (!) 34 99 % - -   10/05/17 2100 (!) 137/91 - - 82 18 99 % - -   10/05/17 2045 - - - 85 14 96 % - -   10/05/17 2000 - - - - - 98 % - -   10/05/17 1957 (!) 138/96 99.3  F (37.4  C) Oral 102 - 98 % 1.575 m (5' 2\") -     I/O's Last 24 hours  I/O last 3 completed " shifts:  In: 710 [I.V.:710]  Out: 1650 [Urine:1650]    Constitutional: Alert, oriented, pleasant.  Respiratory: Clear, no crackles/wheezes.  Cardiovascular: RRR no m/r/g.  GI: Soft, nt, nd, +BS.  Skin/Integumen: No lower extremity edema.  Other:  Neuro - no facial droop, no paresthesias or grossly focal motor deficits.      Data     Recent Labs  Lab 10/06/17  0555 10/05/17  2002   WBC 7.5 7.2   HGB 12.9 13.8   MCV 87 87    253   INR  --  0.91    139   POTASSIUM 4.0 3.5   CHLORIDE 109 104   CO2 26 26   BUN 5* 7   CR 0.66 0.74   ANIONGAP 6 9   JAMIE 8.9 9.7   GLC 89 138*   TROPI <0.015  --      Recent Labs   Lab Test  10/06/17   0720  10/06/17   0555  10/05/17   2002  02/06/17   1052  01/03/17   1206  12/13/15   0805   12/08/10   0816   GLC   --   89  138*  136*  166*  96   < >   --    BGM  89   --    --    --    --    --    --   109*    < > = values in this interval not displayed.         Recent Results (from the past 24 hour(s))   Head CT w/o contrast    Narrative    CT HEAD WITHOUT CONTRAST  10/5/2017 8:27 PM    HISTORY: Left side numbness on face, arm, leg.    TECHNIQUE: Scans were obtained through the head without IV contrast.   Radiation dose for this scan was reduced using automated exposure  control, adjustment of the mA and/or kV according to patient size, or  iterative reconstruction technique.    COMPARISON: None.    FINDINGS: No hemorrhage, mass lesion, or focal area of acute  infarction identified. Paranasal sinuses are normal. No bony  abnormality.      Impression    IMPRESSION: Negative CT scan of the head. CT angiogram to follow.    ORACIO LIZARRAGA MD   Head/neck angiogram CT  w & w/o contrast    Narrative    CT ANGIOGRAM OF THE HEAD AND NECK WITHOUT AND WITH CONTRAST  10/5/2017  8:35 PM     HISTORY: Left side face, arm, leg numbness.    TECHNIQUE: Precontrast localizing scans were followed by CT  angiography with an injection of 70 mL nonionic contrast material IV  with scans through the  head and neck.  Images were transferred to a  separate 3-D workstation where multiplanar reformations and 3-D images  were created. Estimates of carotid stenoses are made relative to the  distal internal carotid artery diameters except as noted. Radiation  dose for this scan was reduced using automated exposure control,  adjustment of the mA and/or kV according to patient size, or iterative  reconstruction technique.    COMPARISON: Head CT today.    FINDINGS: [place]: Normal. Large-caliber vessels are patent.  No evidence for aneurysm.    Neck arteries: Carotid and vertebral arteries are normal. No evidence  for dissection or stenosis.    The origin the great vessels off the aortic arch are intact.      Impression    IMPRESSION: Normal CT angiogram of the head and neck.    ORACIO LIZARRAGA MD   CT Head w Contrast    Narrative    CT BRAIN PERFUSION 10/5/2017 8:42 PM    HISTORY: Code stroke with facial numbness.    TECHNIQUE: Time sequential axial CT images of the head were acquired  during the administration of intravenous contrast 50 mL Isovue-370.  CTA images of the [place] as well as color perfusion maps of  the brain were created from this time sequential axial source data.  Radiation dose for this scan was reduced using automated exposure  control, adjustment of the mA and/or kV according to patient size, or  iterative reconstruction technique.    COMPARISON: Head CT today.    FINDINGS: There are no focal or regional perfusion defects in the  brain.      Impression    IMPRESSION: Normal CT perfusion of the brain.    ORACIO LIZARRAGA MD       Medications   All medications were reviewed.    niCARdipine 40 mg in 200 mL 0.9% NaCl       - MEDICATION INSTRUCTIONS -       - MEDICATION INSTRUCTIONS -       NaCl 100 mL/hr at 10/06/17 0608     - MEDICATION INSTRUCTIONS -         losartan-hydrochlorothiazide  1 tablet Oral Daily     insulin aspart  1-7 Units Subcutaneous TID AC     insulin aspart  1-5  Units Subcutaneous At Bedtime

## 2017-10-06 NOTE — PROGRESS NOTES
SPIRITUAL HEALTH SERVICES  Spiritual Assessment Progress Note  [facility] ICU   introduced self to pt's sister in ICU cordelia.  She stated that she was waiting for her test to be over and to get a report.   explained [facility] should pt/family want support.     [facility] continues to be available for support as desired.                                                                                                                                             Johanny Mandujano M.Div., [facility]  Staff    Pager [phone]

## 2017-10-06 NOTE — PLAN OF CARE
Problem: Patient Care Overview  Goal: Plan of Care/Patient Progress Review  Outcome: Improving  Pt without any neuro deficits post tPA infusion.  Assessment negative, plan to transfer to next level of care if remains stable at end of 24 hour /q1 hour neuro checks at 2130.  Will perform post intervention NIHSS at 1900.

## 2017-10-06 NOTE — CONSULTS
REQUESTING PHYSICIAN:  Regina Wade MD      PRIMARY CARE PHYSICIAN:  Kan Serna MD      REASON FOR CONSULTATION:  Code stroke.      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  She was at work today at 1915 when she had sudden onset of numbness of the face, arm and leg with no weakness and no other side effects.  When the symptoms continued, she presented as a patient and a code stroke was called.  CT scan of the head was negative.  CT angio and CT perfusion also negative.  Symptoms continue and NIH stroke score is 1.      The patient's stroke risk factors include diabetes type 2 which is currently under good control, sleep apnea and hypertension.  The patient is taking her medications but is not on an aspirin.  There is no chance that she is pregnant and she is not taking any hormonal supplements that would increase likelihood of blood clots.      The patient is not a smoker.      The patient's blood pressure prior to coming to work today was under good control.  In the emergency department, her reading is 138/96.      ALLERGIES:  Lisinopril which causes cough.      PAST MEDICAL HISTORY:     1.  ANA.     2.  Type 2 diabetes.     3.  Cervical radiculopathy, status post fusion.     4.  Hypertension.      MEDICATIONS:  Losartan/hydrochlorothiazide, metformin, loratadine, multivitamin.      REVIEW OF SYSTEMS:  Please see HPI.  All other systems reviewed and negative.      PHYSICAL EXAMINATION:   VITAL SIGNS:  Temperature 99.3, heart rate 85, respiratory rate 14, blood pressure 138/96, pulse oximetry 96% on room air.   GENERAL APPEARANCE:  The patient is sitting on the hospital bed in no acute distress.   NEUROLOGIC:  The patient is awake and alert.  She is oriented, no aphasia or dysarthria.  Cranial nerves 2-12 are intact except for some subjective decreased pinprick on the left side of her face.  No evidence of any  pronator drift.  Normal muscle bulk, tone and strength in all 4 extremities.  Reflexes are normal and bilaterally symmetric.  Sensation to pinprick is subjectively decreased in the right arm but not the right leg.  Coordination testing with heel-knee-shin, finger-to-nose is normal, finger tap is normal and there is specifically no aphasia.  No sensory neglect.  No visual neglect.  Gait examination deferred.   CARDIOVASCULAR:  Regular rate and rhythm, no significant murmurs, no carotid bruits, no edema.      IMPRESSION:  This patient is evaluated in the setting of a code stroke.  I evaluated her at  after code stroke was called.  Other than the sensory loss, no abnormalities.  Whether this is a stroke or complicated migraine is unclear, but in the setting of it being unclear in someone who is of such young age and otherwise healthy, I think the prudent thing to do would be to treat with TPA to try and ensure minimal risk as these are permanent deficits.  Certainly if the patient were to get worse outside of the window later this evening, we would not be able to offer any treatment.  I reviewed this with the patient.  I reviewed the risks which include 3% to 4% of possible worsening and the patient consented to proceed.  Laboratory values were checked and were within limits to proceed.      The patient will be admitted to the Intensive Care Unit post TPA protocol.  Will ask hospitalist to do the admission.  I have done the orders for post TPA protocol.  I will see the patient tomorrow.    I spent 60 minutes with patient. Critical care time. Pt treated for acute stroke in emergent fashion.         RENATE DE LEON MD             D: 10/05/2017 21:16   T: 10/05/2017 21:47   MT:       Name:     JOSE FENG   MRN:      [id number]        Account:       [id number]   :      1975           Consult Date:  10/05/2017      Document: [id number]       cc: Kan Serna MD

## 2017-10-06 NOTE — PROGRESS NOTES
ICU Multi-Disciplinary Note  41 year old female with a history including DM2, HTN, DLD and ANA, who presented 10/5 with L sided paresthesias consistent with acute CVI and was given tPA 10/5. Will remain in the ICU for neurologic monitoring.   Patient condition reviewed and discussed while on multidisciplinary rounds today.     The Critical Care service will continue to follow peripherally while patient is within the ICU. We are readily available should issues arise.  Please feel free to contact us for anything with which we may be of assistance.    Rani Ramírez

## 2017-10-06 NOTE — PHARMACY-ADMISSION MEDICATION HISTORY
Admission medication history interview status for the 10/5/2017  admission is complete. See EPIC admission navigator for prior to admission medications     Medication history source reliability:Good    Actions taken by pharmacist (provider contacted, etc):None     Additional medication history information not noted on PTA med list :None    Medication reconciliation/reorder completed by provider prior to medication history? Yes    Time spent in this activity: 10 min    Prior to Admission medications    Medication Sig Last Dose Taking? Auth Provider   Garlic (GARLIQUE PO) Take 1 tablet by mouth daily 10/5/2017 at Unknown time Yes Unknown, Entered By History   metFORMIN (GLUCOPHAGE-XR) 500 MG 24 hr tablet Take 1 tablet (500 mg) by mouth daily (with dinner) 10/5/2017 at 8am Yes Kan Serna MD   losartan-hydrochlorothiazide (HYZAAR) 50-12.5 MG per tablet Take 1 tablet by mouth daily 10/5/2017 at am Yes Kan Serna MD   zolpidem (AMBIEN CR) 6.25 MG CR tablet Take 1 tab by mouth at bedtime as needed. Past Week at Unknown time Yes Goltz, Bennett Ezra, PA-C   loratadine (CLARITIN) 10 MG tablet Take 10 mg by mouth daily 10/5/2017 at am Yes Reported, Patient   Omega-3 Fatty Acids (FISH OIL PO) Take 720 mg by mouth daily 10/5/2017 at Unknown time Yes Reported, Patient   multivitamin, therapeutic with minerals (MULTI-VITAMIN) TABS tablet Take 1 tablet by mouth daily 10/5/2017 at Unknown time Yes Reported, Patient   VITAMIN D, CHOLECALCIFEROL, PO Take by mouth daily 10/5/2017 at Unknown time Yes Reported, Patient   ondansetron (ZOFRAN ODT) 4 MG ODT tab Take 1-2 tablets (4-8 mg) by mouth every 8 hours as needed for nausea 10/5/2017 at Unknown time Yes Kan Serna MD   Ibuprofen (IBU PO) Take 600 mg by mouth as needed Past Month at Unknown time Yes Reported, Patient   Acetaminophen (TYLENOL PO) Take 500 mg by mouth 2 times daily As needed for pain prn Yes Reported, Patient   blood glucose monitoring  (ACCU-CHEK FASTCLIX) lancets Use to test blood sugar 1 times daily or as directed.   Kan Serna MD   blood glucose monitoring (ACCU-CHEK ALMA PLUS) test strip Use to test blood sugar 1 times daily or as directed.   Kan Serna MD   order for DME AIRSENSE 10  10-15 CM/H20  NASAL AIRFIT N10 FOR HER   Reported, Patient

## 2017-10-06 NOTE — PLAN OF CARE
Problem: Patient Care Overview  Goal: Plan of Care/Patient Progress Review  OT/PT: Per chart review, pt given TPA around approximately 9PM on 10/5. Per therapy protocol will hold eval until 24 hours after administration of TPA. Will reschedule OT/PT evals for 10/7.

## 2017-10-07 ENCOUNTER — APPOINTMENT (OUTPATIENT)
Dept: OCCUPATIONAL THERAPY | Facility: CLINIC | Age: 42
DRG: 063 | End: 2017-10-07
Attending: INTERNAL MEDICINE
Payer: COMMERCIAL

## 2017-10-07 VITALS
SYSTOLIC BLOOD PRESSURE: 111 MMHG | DIASTOLIC BLOOD PRESSURE: 73 MMHG | OXYGEN SATURATION: 96 % | HEART RATE: 73 BPM | HEIGHT: 62 IN | TEMPERATURE: 98.7 F | BODY MASS INDEX: 36.11 KG/M2 | WEIGHT: 196.21 LBS | RESPIRATION RATE: 18 BRPM

## 2017-10-07 LAB
ANION GAP SERPL CALCULATED.3IONS-SCNC: 10 MMOL/L (ref 3–14)
BASOPHILS # BLD AUTO: 0 10E9/L (ref 0–0.2)
BASOPHILS NFR BLD AUTO: 0.3 %
BUN SERPL-MCNC: 9 MG/DL (ref 7–30)
CALCIUM SERPL-MCNC: 7.9 MG/DL (ref 8.5–10.1)
CHLORIDE SERPL-SCNC: 109 MMOL/L (ref 94–109)
CO2 SERPL-SCNC: 21 MMOL/L (ref 20–32)
CREAT SERPL-MCNC: 0.62 MG/DL (ref 0.52–1.04)
DIFFERENTIAL METHOD BLD: NORMAL
EOSINOPHIL # BLD AUTO: 0.1 10E9/L (ref 0–0.7)
EOSINOPHIL NFR BLD AUTO: 1.6 %
ERYTHROCYTE [DISTWIDTH] IN BLOOD BY AUTOMATED COUNT: 13.3 % (ref 10–15)
GFR SERPL CREATININE-BSD FRML MDRD: >90 ML/MIN/1.7M2
GLUCOSE BLDC GLUCOMTR-MCNC: 85 MG/DL (ref 70–99)
GLUCOSE SERPL-MCNC: 79 MG/DL (ref 70–99)
HCT VFR BLD AUTO: 35.9 % (ref 35–47)
HGB BLD-MCNC: 12.5 G/DL (ref 11.7–15.7)
IMM GRANULOCYTES # BLD: 0 10E9/L (ref 0–0.4)
IMM GRANULOCYTES NFR BLD: 0.1 %
LYMPHOCYTES # BLD AUTO: 2.1 10E9/L (ref 0.8–5.3)
LYMPHOCYTES NFR BLD AUTO: 28.6 %
MAGNESIUM SERPL-MCNC: 1.8 MG/DL (ref 1.6–2.3)
MCH RBC QN AUTO: 30 PG (ref 26.5–33)
MCHC RBC AUTO-ENTMCNC: 34.8 G/DL (ref 31.5–36.5)
MCV RBC AUTO: 86 FL (ref 78–100)
MONOCYTES # BLD AUTO: 0.5 10E9/L (ref 0–1.3)
MONOCYTES NFR BLD AUTO: 7.1 %
NEUTROPHILS # BLD AUTO: 4.5 10E9/L (ref 1.6–8.3)
NEUTROPHILS NFR BLD AUTO: 62.3 %
NRBC # BLD AUTO: 0 10*3/UL
NRBC BLD AUTO-RTO: 0 /100
PHOSPHATE SERPL-MCNC: 2.6 MG/DL (ref 2.5–4.5)
PLATELET # BLD AUTO: 210 10E9/L (ref 150–450)
POTASSIUM SERPL-SCNC: 3.5 MMOL/L (ref 3.4–5.3)
RBC # BLD AUTO: 4.16 10E12/L (ref 3.8–5.2)
SODIUM SERPL-SCNC: 140 MMOL/L (ref 133–144)
VIT B12 SERPL-MCNC: 370 PG/ML (ref 193–986)
WBC # BLD AUTO: 7.3 10E9/L (ref 4–11)

## 2017-10-07 PROCEDURE — 84100 ASSAY OF PHOSPHORUS: CPT | Performed by: INTERNAL MEDICINE

## 2017-10-07 PROCEDURE — 36415 COLL VENOUS BLD VENIPUNCTURE: CPT | Performed by: INTERNAL MEDICINE

## 2017-10-07 PROCEDURE — 85025 COMPLETE CBC W/AUTO DIFF WBC: CPT | Performed by: INTERNAL MEDICINE

## 2017-10-07 PROCEDURE — 97165 OT EVAL LOW COMPLEX 30 MIN: CPT | Mod: GO | Performed by: OCCUPATIONAL THERAPIST

## 2017-10-07 PROCEDURE — 25000132 ZZH RX MED GY IP 250 OP 250 PS 637: Performed by: INTERNAL MEDICINE

## 2017-10-07 PROCEDURE — 40000133 ZZH STATISTIC OT WARD VISIT: Performed by: OCCUPATIONAL THERAPIST

## 2017-10-07 PROCEDURE — 80048 BASIC METABOLIC PNL TOTAL CA: CPT | Performed by: INTERNAL MEDICINE

## 2017-10-07 PROCEDURE — 99207 ZZC NON-BILLABLE SERV PER CHARTING: CPT | Performed by: INTERNAL MEDICINE

## 2017-10-07 PROCEDURE — 83735 ASSAY OF MAGNESIUM: CPT | Performed by: INTERNAL MEDICINE

## 2017-10-07 PROCEDURE — 00000146 ZZHCL STATISTIC GLUCOSE BY METER IP

## 2017-10-07 RX ORDER — ATORVASTATIN CALCIUM 10 MG/1
10 TABLET, FILM COATED ORAL DAILY
Qty: 30 TABLET | Refills: 3 | Status: SHIPPED | OUTPATIENT
Start: 2017-10-07 | End: 2017-10-07

## 2017-10-07 RX ADMIN — LOSARTAN POTASSIUM 50 MG: 50 TABLET ORAL at 10:35

## 2017-10-07 ASSESSMENT — ACTIVITIES OF DAILY LIVING (ADL): PREVIOUS_RESPONSIBILITIES: MEAL PREP;HOUSEKEEPING;LAUNDRY;SHOPPING;MEDICATION MANAGEMENT;FINANCES;DRIVING;WORK

## 2017-10-07 NOTE — PLAN OF CARE
Problem: Patient Care Overview  Goal: Plan of Care/Patient Progress Review  Outcome: Improving  A&Ox4. Neuros intact. VSS. Tele NSR. Regular diet. Up with SBA. Denies pain. Voiding adequately. Possible discharge home today.

## 2017-10-07 NOTE — PLAN OF CARE
Problem: Patient Care Overview  Goal: Plan of Care/Patient Progress Review  PT: Orders received, chart reviewed. Per discussion with pt, pt states she is at baseline for mobility and has no concerns returning home. Per discussion with OT, no balance or mobility concerns per OT evaluation. Skilled IPPT not indicated at this time, will complete orders.

## 2017-10-07 NOTE — PROGRESS NOTES
"Neurology Progress Note          Assessment and Plan:     Paresthesias    Benign essential hypertension    Type 2 diabetes mellitus with hyperglycemia, without long-term current use of insulin (H)    ANA (obstructive sleep apnea)    Severe obesity (BMI 35.0-35.9 with comorbidity) (H)        No stroke but she lori hav multiple risk factors, so aspirin 81 mg daily should be appropriate. Migraine a distinct possibility. Given the symptoms and co-morbidities would advise against oral anticoagulants. Neurology will sign off.               Interval History:   Reports full resolution of all symptoms.              Review of Systems:   Review of systems is not applicable to this patient             Medications:       losartan  50 mg Oral Daily     atorvastatin  10 mg Oral Daily at 8 pm     insulin aspart  1-7 Units Subcutaneous TID AC     insulin aspart  1-5 Units Subcutaneous At Bedtime     - MEDICATION INSTRUCTIONS -, labetalol, - MEDICATION INSTRUCTIONS -, sodium chloride 0.9%, naloxone, acetaminophen, - MEDICATION INSTRUCTIONS -, glucose **OR** dextrose **OR** glucagon, melatonin               Physical Exam:   Vitals were reviewed  Blood pressure 126/79, pulse 65, temperature 97.9  F (36.6  C), temperature source Oral, resp. rate 16, height 1.575 m (5' 2\"), weight 89 kg (196 lb 3.4 oz), last menstrual period 2017, SpO2 98 %, not currently breastfeeding.  Wt Readings from Last 4 Encounters:   10/07/17 89 kg (196 lb 3.4 oz)   17 87.9 kg (193 lb 12.8 oz)   17 87.5 kg (193 lb)   08/15/17 87.3 kg (192 lb 6.4 oz)     Temperatures:  Current - Temp: 97.9  F (36.6  C); Max - Temp  Av.7  F (37.1  C)  Min: 97.9  F (36.6  C)  Max: 99.2  F (37.3  C)  Blood pressure range: Systolic (24hrs), Av , Min:91 , Max:126   ; Diastolic (24hrs), Av, Min:58, Max:86    I/O last 3 completed shifts:  In: 2749.17 [P.O.:800; I.V.:1949.17]  Out: 3230 [Urine:3230]  Neurologic:   Mental Status Exam:   Level of Alertness:   " awake  Orientation:   normal to person, place, time  Memory:   normal for age  Fund of Knowledge:  normal  Attention/Concentration:  normal  Language:  normal  Cranial Nerves: cranial nerves II-XII are grossly intact  Motor Exam:  moves all extremities well and symmetrically  Sensory:  sensory intact to light touch/vibration  Coordination:  Finger/Nose:  right:  normal; left:  normal  Toe-finger:  right:  normal; left:  normal  Gait: independent  Deep Tendon Reflexes:  reflexes are hypoactive and symmetrical; plantar response:  Right: flexor; left: flexor              Data:   All laboratory and imaging data reviewed by me:  Results for orders placed or performed during the hospital encounter of 10/05/17 (from the past 24 hour(s))   MR Brain w/o & w Contrast    Narrative    MRI BRAIN WITHOUT AND WITH CONTRAST  10/6/2017 10:58 AM    HISTORY:  Acute CVI.     TECHNIQUE:  Multiplanar, multisequence MRI of the brain without and  with 9 mL Gadavist.    COMPARISON: None.    FINDINGS: Diffusion-weighted images are normal. There is no evidence  for intracranial hemorrhage or acute infarct. The brain parenchyma,  brainstem, ventricular system, subarachnoid spaces, and vascular  structures are normal in appearance. Postcontrast images do not show  any abnormal areas of enhancement or any focal mass lesions.      Impression    IMPRESSION: Negative brain and brainstem MRI examination without and  with contrast.    SPENCER RAMIREZ MD   Echo Bubble Study with Lumason    Narrative    [id number]  [facility]  [id number]  [id number]^ALEX^ANA LILIA^SHEILA           [facility]  Echocardiography Laboratory  [address] [place] [address]        Name: JOSE FENG  MRN: [id number]  : 1975  Study Date: 10/06/2017 11:13 AM  Age: 41 yrs  Gender: Female  Patient Location: [facility]  Reason For Study: CVI  Ordering Physician: ANA LILIA JOHNSON  Referring Physician: Kan Serna  Performed By: Otf Mccollum RDCS     BSA: 1.9  m2  Height: 62 in  Weight: 195 lb  HR: 80  BP: 101/63 mmHg  _____________________________________________________________________________  __        Procedure  Complete Portable Bubble Echo Adult. Contrast Lumason.  _____________________________________________________________________________  __        Interpretation Summary     A contrast injection (Bubble Study) was performed that was negative for flow  across the interatrial septum.  There is no color Doppler evidence of an atrial shunt.  The visual ejection fraction is estimated at 60-65%.  Left ventricular systolic function is normal.  The ascending aorta is Mildly dilated.  The study was technically difficult.  _____________________________________________________________________________  __        Left Ventricle  The left ventricle is normal in size. There is normal left ventricular wall  thickness. The visual ejection fraction is estimated at 60-65%. Left  ventricular systolic function is normal. E by E prime ratio is between 8 and  15, which is indeterminate for assessment of left ventricular filling  pressures. The transmitral E and A wave are fused.     Right Ventricle  The right ventricle is normal in size and function.     Atria  Normal left atrial size. Right atrial size is normal. There is no color  Doppler evidence of an atrial shunt. A contrast injection (Bubble Study) was  performed that was negative for flow across the interatrial septum.     Mitral Valve  There is trace mitral regurgitation.        Tricuspid Valve  There is trace tricuspid regurgitation. Right ventricular systolic pressure  could not be approximated due to inadequate tricuspid regurgitation.     Aortic Valve  The aortic valve is trileaflet. No aortic regurgitation is present. No  hemodynamically significant valvular aortic stenosis.     Pulmonic Valve  There is trace pulmonic valvular regurgitation. Normal pulmonic valve  velocity.     Vessels  The ascending aorta is Mildly  dilated. The inferior vena cava is not dilated.     Pericardium  There is no pericardial effusion.        Rhythm  Sinus rhythm was noted.  _____________________________________________________________________________  __  MMode/2D Measurements & Calculations  IVSd: 0.95 cm     LVIDd: 4.7 cm  LVIDs: 3.0 cm  LVPWd: 0.89 cm  FS: 35.9 %  EDV(Teich): 103.0 ml  ESV(Teich): 35.6 ml  LV mass(C)d: 147.1 grams  LV mass(C)dI: 77.8 grams/m2  Ao root diam: 3.1 cm  LA dimension: 3.7 cm  asc Aorta Diam: 3.6 cm  LA/Ao: 1.2  LA Volume (BP): 48.1 ml  LA Volume Index (BP): 25.4 ml/m2           Doppler Measurements & Calculations  MV E max kevin: 105.3 cm/sec  MV A max kevin: 127.1 cm/sec  MV E/A: 0.83  MV dec time: 0.19 sec  Lateral E/e': 10.9  Med E to E': 10.4  Medial E/e': 10.4           _____________________________________________________________________________  __           Report approved by: Bhumi Artis 10/06/2017 01:53 PM      Glucose by meter   Result Value Ref Range    Glucose 108 (H) 70 - 99 mg/dL   Troponin I   Result Value Ref Range    Troponin I ES <0.015 0.000 - 0.045 ug/L   Glucose by meter   Result Value Ref Range    Glucose 94 70 - 99 mg/dL   Glucose by meter   Result Value Ref Range    Glucose 138 (H) 70 - 99 mg/dL   Magnesium   Result Value Ref Range    Magnesium 1.8 1.6 - 2.3 mg/dL   Phosphorus   Result Value Ref Range    Phosphorus 2.6 2.5 - 4.5 mg/dL   CBC with platelets differential   Result Value Ref Range    WBC 7.3 4.0 - 11.0 10e9/L    RBC Count 4.16 3.8 - 5.2 10e12/L    Hemoglobin 12.5 11.7 - 15.7 g/dL    Hematocrit 35.9 35.0 - 47.0 %    MCV 86 78 - 100 fl    MCH 30.0 26.5 - 33.0 pg    MCHC 34.8 31.5 - 36.5 g/dL    RDW 13.3 10.0 - 15.0 %    Platelet Count 210 150 - 450 10e9/L    Diff Method Automated Method     % Neutrophils 62.3 %    % Lymphocytes 28.6 %    % Monocytes 7.1 %    % Eosinophils 1.6 %    % Basophils 0.3 %    % Immature Granulocytes 0.1 %    Nucleated RBCs 0 0 /100    Absolute  Neutrophil 4.5 1.6 - 8.3 10e9/L    Absolute Lymphocytes 2.1 0.8 - 5.3 10e9/L    Absolute Monocytes 0.5 0.0 - 1.3 10e9/L    Absolute Eosinophils 0.1 0.0 - 0.7 10e9/L    Absolute Basophils 0.0 0.0 - 0.2 10e9/L    Abs Immature Granulocytes 0.0 0 - 0.4 10e9/L    Absolute Nucleated RBC 0.0    Basic metabolic panel   Result Value Ref Range    Sodium 140 133 - 144 mmol/L    Potassium 3.5 3.4 - 5.3 mmol/L    Chloride 109 94 - 109 mmol/L    Carbon Dioxide 21 20 - 32 mmol/L    Anion Gap 10 3 - 14 mmol/L    Glucose 79 70 - 99 mg/dL    Urea Nitrogen 9 7 - 30 mg/dL    Creatinine 0.62 0.52 - 1.04 mg/dL    GFR Estimate >90 >60 mL/min/1.7m2    GFR Estimate If Black >90 >60 mL/min/1.7m2    Calcium 7.9 (L) 8.5 - 10.1 mg/dL   Glucose by meter   Result Value Ref Range    Glucose 85 70 - 99 mg/dL

## 2017-10-07 NOTE — DISCHARGE INSTRUCTIONS
Your risk factors for stroke or TIA (transient ischemic attack):    Your Risk Factors Your Results Normal Ranges   High blood pressure BP Readings from Last 1 Encounters:   10/07/17 111/73    Less than 120/80   Cholesterol              Total Lab Results   Component Value Date    CHOL 183 10/06/2017      Less than 150    Triglycerides   Lab Results   Component Value Date    TRIG 153 10/06/2017    Less than 150   LDL Lab Results   Component Value Date     10/06/2017       Less than 70   HDL Lab Results   Component Value Date    HDL 43 10/06/2017            Greater than 40 (men)  Greater than 50 (women)   Diabetes   Recent Labs  Lab 10/07/17  0725   GLC 79    Fasting blood glucose    Smoking/tobacco use  Quit smoking and tobacco   Overweight  Lose 1-2 pounds a week   Lack of exercise  30 minutes moderate activity each day   Other risk factors include carotid (neck) artery disease, atrial fibrillation and stress. You may be on new medicine to treat high blood pressure, cholesterol, diabetes or atrial fibrillation.    Understanding Stroke Booklet given to patient. Please refer to booklet for further information.    Stroke warning signs and symptoms - CALL 911 right away for:  - Sudden numbness or weakness in the face, arm or leg (often on one side of the body).  - Sudden confusion or trouble understanding what is going on.  - Sudden blurred or decreased vision in one or both eyes.  - Sudden trouble speaking, loss of balance, dizziness or problems with coordination.  - Sudden, severe headache for no reason.  - Fainting or seizures.  - Symptoms may go away then come back suddenly.

## 2017-10-07 NOTE — PLAN OF CARE
Problem: Patient Care Overview  Goal: Plan of Care/Patient Progress Review  Outcome: Improving  ICU transfer at 2200. A&O x4. Neuros intact. VSS. Tele. Regular diet. Patrick removed at 2230- DTV. Up A1/gb. Denies pain. SZ precautions. No activity noted. Dr. Silva to see in AM.  Nursing will continue to monitor.

## 2017-10-07 NOTE — PROGRESS NOTES
A&O x4.. Neuros - denying any decreased sensation, moving all extremities, steady gait, no seizure activity noted, communicating her needs well. VSS. Tele NSR with first degree AV Block.  Regular diet/good appetite. Up with standby assist/gait belt to bathroom. Denies pain. Discharging to home this afternoon.  Patient agrees with discharge plan, no unmet needs.

## 2017-10-07 NOTE — PROVIDER NOTIFICATION
"Text page sent to hospitalist, \"Pt transfer from ICU. Can we get rosenberg out and get activity orders?\"    Orders placed by physician  "

## 2017-10-07 NOTE — PROGRESS NOTES
10/07/17 0900   Quick Adds   Type of Visit Initial Occupational Therapy Evaluation   Living Environment   Lives With alone   Living Arrangements other (see comments)  ([facility])   Home Accessibility bed and bath are not on the first floor   Number of Stairs to Enter Home 0   Number of Stairs Within Home 13   Stair Railings at Home inside, present on right side   Transportation Available car;family or friend will provide   Living Environment Comment Pt has 3 siblings close by who can assist as needed   Self-Care   Dominant Hand right   Usual Activity Tolerance good   Current Activity Tolerance good   Regular Exercise no   Activity/Exercise/Self-Care Comment Pt could walk miles prior to incident   Functional Level Prior   Ambulation 0-->independent   Transferring 0-->independent   Toileting 0-->independent   Bathing 0-->independent   Dressing 0-->independent   Eating 0-->independent   Communication 0-->understands/communicates without difficulty   Swallowing 0-->swallows foods/liquids without difficulty   Cognition 0 - no cognition issues reported   Fall history within last six months no   Which of the above functional risks had a recent onset or change? none   Prior Functional Level Comment Pt reports being indep with all ADLs   General Information   Onset of Illness/Injury or Date of Surgery - Date 10/06/17   Referring Physician Roderick Larson   Patient/Family Goals Statement Pt hopes to DC to home today   Additional Occupational Profile Info/Pertinent History of Current Problem 41 year old female with a history including DM2, HTN, DLD and ANA, who presented 10/5 with L sided paresthesias consistent with acute CVI and was given tPA 10/5.   Precautions/Limitations fall precautions   Weight-Bearing Status - LUE full weight-bearing   Weight-Bearing Status - RUE full weight-bearing   Weight-Bearing Status - LLE full weight-bearing   Weight-Bearing Status - RLE full weight-bearing   Heart Disease Risk Factors High  blood pressure;Medical history   General Observations Pt sitting in bed upon arrival; pt agreeable to OT session   General Info Comments Activity order: up with assist   Cognitive Status Examination   Cognitive Comment No cognitive concerns   Visual Perception   Visual Perception No deficits were identified   Sensory Examination   Sensory Comments Denies N/T   Pain Assessment   Patient Currently in Pain No   Integumentary/Edema   Integumentary/Edema no deficits were identifed   Posture   Posture protracted shoulders   Range of Motion (ROM)   ROM Comment BUE WNL   Strength   Strength Comments BUE 5/5   Hand Strength   Hand Strength Comments Hand  5/5   Coordination   Upper Extremity Coordination No deficits were identified   Mobility   Bed Mobility Bed mobility skill: Sit to supine;Bed mobility skill: Supine to sit   Bed Mobility Skill: Sit to Supine   Level of [place]: Sit/Supine independent   Bed Mobility Skill: Supine to Sit   Level of [place]: Supine/Sit independent   Transfer Skill: Sit to Stand   Level of [place]: Sit/Stand contact guard   Physical Assist/Nonphysical Assist: Sit/Stand supervision   Transfer Skill: Sit to Stand full weight-bearing   Transfer Skill: Toilet Transfer   Level of [place]: Toilet independent   Weight-Bearing Restrictions: Toilet full weight-bearing   Toilet Transfer Skill Comments Toilet height similar to pt's toilet at home   Tub/Shower Transfer   Tub/Shower Transfer Comments Pt has walk-in shower and tub, normally uses shower   Balance   Balance Comments Good seated, good standing, good ambulation within room   Upper Body Dressing   Level of [place]hler: Dress Upper Body independent   Lower Body Dressing   Level of [place]: Dress Lower Body independent   Instrumental Activities of Daily Living (IADL)   Previous Responsibilities meal prep;housekeeping;laundry;shopping;medication management;finances;driving;work   IADL Comments siblings can assist prn  "  General Therapy Interventions   Intervention Comments pt at baseline   Clinical Impression   Criteria for Skilled Therapeutic Interventions Met evaluation only   OT Diagnosis pt at baseline   Assessment of Occupational Performance 1-3 Performance Deficits   Clinical Decision Making (Complexity) Low complexity   Predicted Duration of Therapy Intervention (days/wks) 1 day   Anticipated Discharge Disposition Home   Risks and Benefits of Treatment have been explained. Yes   Patient, Family & other staff in agreement with plan of care Yes   [facility] AM-PAC  \"6 Clicks\" Daily Activity Inpatient Short Form   1. Putting on and taking off regular lower body clothing? 4 - None   2. Bathing (including washing, rinsing, drying)? 4 - None   3. Toileting, which includes using toilet, bedpan or urinal? 4 - None   4. Putting on and taking off regular upper body clothing? 4 - None   5. Taking care of personal grooming such as brushing teeth? 4 - None   6. Eating meals? 4 - None   Daily Activity Raw Score (Score out of 24.Lower scores equate to lower levels of function) 24   Total Evaluation Time   Total Evaluation Time (Minutes) 10     "

## 2017-10-07 NOTE — DISCHARGE SUMMARY
[facility]  Discharge Summary        Kezia Nava MRN# [id number]   YOB: 1975 Age: 41 year old     Date of Admission: 10/5/2017  Date of Discharge: 10/7/2017  Admitting Physician: Roderick Melissa MD  Discharge Physician: Cl Paige MD     Primary Provider: Kan Serna  Primary Care Physician Phone Number: [phone]         Discharge Diagnoses:   L sided paresthesias, suspected acute CVI, s/p peripheral tPA 10/5/2017.        Other Chronic Medical Problems:      1. Benign essential hypertension.  2. DM2 w/o complications.  3. DLD.  4. ANA.       Allergies:         Allergies   Allergen Reactions     Lisinopril Cough     No Known Drug Allergies            Discharge Medications:        Current Discharge Medication List      START taking these medications    Details   aspirin (ASPIRIN ADULT LOW DOSE) 81 MG EC tablet Take 1 tablet (81 mg) by mouth daily  Qty: 30 tablet, Refills: 3    Associated Diagnoses: Cerebrovascular accident (CVA), unspecified mechanism (H)         CONTINUE these medications which have NOT CHANGED    Details   Garlic (GARLIQUE PO) Take 1 tablet by mouth daily      metFORMIN (GLUCOPHAGE-XR) 500 MG 24 hr tablet Take 1 tablet (500 mg) by mouth daily (with dinner)  Qty: 90 tablet, Refills: 1    Associated Diagnoses: Type 2 diabetes mellitus with hyperglycemia, without long-term current use of insulin (H)      losartan-hydrochlorothiazide (HYZAAR) 50-12.5 MG per tablet Take 1 tablet by mouth daily  Qty: 90 tablet, Refills: 1    Associated Diagnoses: Benign essential hypertension      zolpidem (AMBIEN CR) 6.25 MG CR tablet Take 1 tab by mouth at bedtime as needed.  Qty: 90 tablet, Refills: 0    Comments: Called in to her mail order pharmacy. 90 tabs for 90 days.      loratadine (CLARITIN) 10 MG tablet Take 10 mg by mouth daily      Omega-3 Fatty Acids (FISH OIL PO) Take 720 mg by mouth daily      multivitamin, therapeutic with minerals (MULTI-VITAMIN)  TABS tablet Take 1 tablet by mouth daily      VITAMIN D, CHOLECALCIFEROL, PO Take by mouth daily      ondansetron (ZOFRAN ODT) 4 MG ODT tab Take 1-2 tablets (4-8 mg) by mouth every 8 hours as needed for nausea  Qty: 12 tablet, Refills: 1    Associated Diagnoses: Viral gastroenteritis      Ibuprofen (IBU PO) Take 600 mg by mouth as needed      Acetaminophen (TYLENOL PO) Take 500 mg by mouth 2 times daily As needed for pain      blood glucose monitoring (ACCU-CHEK FASTCLIX) lancets Use to test blood sugar 1 times daily or as directed.  Qty: 1 Box, Refills: prn    Associated Diagnoses: Type 2 diabetes mellitus with hyperglycemia, without long-term current use of insulin (H)      blood glucose monitoring (ACCU-CHEK ALMA PLUS) test strip Use to test blood sugar 1 times daily or as directed.  Qty: 100 strip, Refills: 1    Associated Diagnoses: Type 2 diabetes mellitus with hyperglycemia, without long-term current use of insulin (H)      order for DME AIRSENSE 10  10-15 CM/H20  NASAL AIRFIT N10 FOR HER                 Discharge Instructions and Follow-Up:      Follow-up Appointments     Follow Up and recommended labs and tests       Follow up with primary care provider, Kan Serna, within 14 days   for hospital follow- up.  The following labs/tests are recommended: LFT's.                            Consultations This Hospital Stay:      Neurology.        Admission History:      Please see the H&P by Roderick Melissa MD on 10/5/2017 for complete details. Briefly, Ms. Kezia Nava is a delightful 41 year old female with a history including DM2, HTN, DLD and ANA, who presented 10/5 with L sided paresthesias concerning for acute CVI and was given tPA 10/5.          Problem Oriented Hospital Course:      L sided paresthesias, suspect acute CVI, s/p peripheral tPA 10/5/2017.  Presented 10/5 with left face and arm/leg numbness. Head CT with and w/o contrast as well as CTA on 10/5 negative. Seen by Neurology and  administered tPA 10/5. MRI 10/6 negative. Echo 10/6 showed LVEF 60-65%, negative Bubble.  - Pt will continue daily ASA 81 mg.     DLD.  Pt had been reluctant to start statins in the past, and wanted to continue dietary/lifestyle modifications.        Code Status:      Full Code        Pending Results:        Unresulted Labs Ordered in the Past 30 Days of this Admission     No orders found from 8/6/2017 to 10/6/2017.                Discharge Disposition:      Discharged to home.        Discharge Time:      Greater than 30 minutes.        Key Imaging Studies, Lab Findings and Procedures/Surgeries:        Results for orders placed or performed during the hospital encounter of 10/05/17   Head CT w/o contrast    Narrative    CT HEAD WITHOUT CONTRAST  10/5/2017 8:27 PM    HISTORY: Left side numbness on face, arm, leg.    TECHNIQUE: Scans were obtained through the head without IV contrast.   Radiation dose for this scan was reduced using automated exposure  control, adjustment of the mA and/or kV according to patient size, or  iterative reconstruction technique.    COMPARISON: None.    FINDINGS: No hemorrhage, mass lesion, or focal area of acute  infarction identified. Paranasal sinuses are normal. No bony  abnormality.      Impression    IMPRESSION: Negative CT scan of the head. CT angiogram to follow.    ORACIO LIZARRAGA MD   Head/neck angiogram CT  w & w/o contrast    Narrative    CT ANGIOGRAM OF THE HEAD AND NECK WITHOUT AND WITH CONTRAST  10/5/2017  8:35 PM     HISTORY: Left side face, arm, leg numbness.    TECHNIQUE: Precontrast localizing scans were followed by CT  angiography with an injection of 70 mL nonionic contrast material IV  with scans through the head and neck.  Images were transferred to a  separate 3-D workstation where multiplanar reformations and 3-D images  were created. Estimates of carotid stenoses are made relative to the  distal internal carotid artery diameters except as noted. Radiation  dose  for this scan was reduced using automated exposure control,  adjustment of the mA and/or kV according to patient size, or iterative  reconstruction technique.    COMPARISON: Head CT today.    FINDINGS: [place]: Normal. Large-caliber vessels are patent.  No evidence for aneurysm.    Neck arteries: Carotid and vertebral arteries are normal. No evidence  for dissection or stenosis.    The origin the great vessels off the aortic arch are intact.      Impression    IMPRESSION: Normal CT angiogram of the head and neck.    ORACIO LIZARRAGA MD   CT Head w Contrast    Narrative    CT BRAIN PERFUSION 10/5/2017 8:42 PM    HISTORY: Code stroke with facial numbness.    TECHNIQUE: Time sequential axial CT images of the head were acquired  during the administration of intravenous contrast 50 mL Isovue-370.  CTA images of the [place] as well as color perfusion maps of  the brain were created from this time sequential axial source data.  Radiation dose for this scan was reduced using automated exposure  control, adjustment of the mA and/or kV according to patient size, or  iterative reconstruction technique.    COMPARISON: Head CT today.    FINDINGS: There are no focal or regional perfusion defects in the  brain.      Impression    IMPRESSION: Normal CT perfusion of the brain.    ORACIO LIZARRAGA MD   MR Brain w/o & w Contrast    Narrative    MRI BRAIN WITHOUT AND WITH CONTRAST  10/6/2017 10:58 AM    HISTORY:  Acute CVI.     TECHNIQUE:  Multiplanar, multisequence MRI of the brain without and  with 9 mL Gadavist.    COMPARISON: None.    FINDINGS: Diffusion-weighted images are normal. There is no evidence  for intracranial hemorrhage or acute infarct. The brain parenchyma,  brainstem, ventricular system, subarachnoid spaces, and vascular  structures are normal in appearance. Postcontrast images do not show  any abnormal areas of enhancement or any focal mass lesions.      Impression    IMPRESSION: Negative brain and  brainstem MRI examination without and  with contrast.    SPENCER RAMIREZ MD     Echocardiogram 10/6/2017:    Interpretation Summary     A contrast injection (Bubble Study) was performed that was negative for flow  across the interatrial septum.  There is no color Doppler evidence of an atrial shunt.  The visual ejection fraction is estimated at 60-65%.  Left ventricular systolic function is normal.  The ascending aorta is Mildly dilated.  The study was technically difficult.

## 2017-10-07 NOTE — PLAN OF CARE
Problem: Patient Care Overview  Goal: Plan of Care/Patient Progress Review  OT order received.  Evaluation completed.  Patient is a 41 year old female who was admitted with L sided parasthesias consisted with CVI, given tpn.  [facility] and full bath on upper level.  She reports being independent with all ADLs prior to admit, and works as an ER nurse.  She has 3 siblings in the area who can assist if needed.       Discharge Planner OT   Patient plan for discharge: home  Current status: independent with LE dressing, toilet transfer, transfers supine <> sit; good balance with ambulation in room; pt reports being back to baseline with numbness and activity; denies numbness  Barriers to return to prior living situation: none  Recommendations for discharge: home  Rationale for recommendations: pt returned to baseline       Entered by: NANDO JIMENEZ 10/07/2017 9:48 AM

## 2017-10-07 NOTE — PLAN OF CARE
Problem: Patient Care Overview  Goal: Plan of Care/Patient Progress Review  Outcome: Improving  Pt A&Ox4, denies pain. No neurological deficits. Report given to Melissa RN and patient transferred to room 701 via wheelchair. Pt and family updated on POC, they have no questions at this time.

## 2017-10-09 ENCOUNTER — TELEPHONE (OUTPATIENT)
Dept: INTERNAL MEDICINE | Facility: CLINIC | Age: 42
End: 2017-10-09

## 2017-10-10 NOTE — TELEPHONE ENCOUNTER
"Hospital/TCU/ED for chronic condition Discharge Protocol    \"Hi, my name is Hedy Lara, a registered nurse, and I am calling from [facility].  I am calling to follow up and see how things are going for you after your recent emergency visit/hospital/TCU stay.\"    Tell me how you are doing now that you are home?\" fine      Discharge Instructions    \"Let's review your discharge instructions.  What is/are the follow-up recommendations?  Pt. Response: appt in 2 weeks, labs    \"Has an appointment with your primary care provider been scheduled?\"   No (schedule appointment)    \"When you see the provider, I would recommend that you bring your medications with you.\"    Medications    \"Tell me what changed about your medicines when you discharged?\"    Changes to chronic meds?    0-1    \"What questions do you have about your medications?\"    None     New diagnoses of heart failure, COPD, diabetes, or MI?    No                  Medication reconciliation completed? Yes  Was MTM referral placed (*Make sure to put transitions as reason for referral)?   No    Call Summary    \"What questions or concerns do you have about your recent visit and your follow-up care?\"     none    \"If you have questions or things don't continue to improve, we encourage you contact us through the main clinic number (give number).  Even if the clinic is not open, triage nurses are available 24/7 to help you.     We would like you to know that our clinic has extended hours (provide information).  We also have urgent care (provide details on closest location and hours/contact info)\"      \"Thank you for your time and take care!\"         "

## 2017-10-16 ENCOUNTER — OFFICE VISIT (OUTPATIENT)
Dept: SLEEP MEDICINE | Facility: CLINIC | Age: 42
End: 2017-10-16
Payer: COMMERCIAL

## 2017-10-16 VITALS
RESPIRATION RATE: 16 BRPM | DIASTOLIC BLOOD PRESSURE: 81 MMHG | WEIGHT: 194.4 LBS | HEIGHT: 62 IN | SYSTOLIC BLOOD PRESSURE: 125 MMHG | BODY MASS INDEX: 35.77 KG/M2 | OXYGEN SATURATION: 100 % | HEART RATE: 80 BPM

## 2017-10-16 DIAGNOSIS — G47.26 SHIFT WORK SLEEP DISORDER: ICD-10-CM

## 2017-10-16 DIAGNOSIS — G47.33 OSA (OBSTRUCTIVE SLEEP APNEA): Primary | ICD-10-CM

## 2017-10-16 PROCEDURE — 99214 OFFICE O/P EST MOD 30 MIN: CPT | Performed by: PHYSICIAN ASSISTANT

## 2017-10-16 NOTE — PROGRESS NOTES
Sleep Study Follow-Up Visit:    Date on this visit: 10/16/2017    Kezia Nava comes in today for follow-up of her treatment for ANA. She was initially seen at the [facility] for trouble falling and staying asleep in the setting of shift work.      AHI was 24.5/hr, with desaturations down to 78%. She spent 1.6 minutes below 90% SpO2. RDI 24.5/hr.  REM RDI 30.8/hr.  Supine RDI 29.9/hr.  Her non-supine RDI was 7.8/hr. Periodic Limb Movement Index 4/hour, 1.5/hr were associated with arousals.     She was on auto CPAP 10-15 cm. She does still get dry eyes, on the left. She did get Onyix goggles and she did not notice a difference. She gets dry eyes whether she uses CPAP or not. She got a new mask and she feels there is water pooling in the cushion. She uses a nasal mask. She has the humidity set at 3 and the tube temp at 68 degrees. She does not like warm air.   The compliance data shows that she has used the CPAP for 11/30 nights, 20% of nights for >4 hours.  The 95th% pressure is 10.9 cm.  The 95th% leak is 2.9 lpm.  The average nightly usage is 5:36.  The average AHI is 0.9/hr.  She was on vacation for part of the time and in the hospital for 2 nights. She did not bring it with her. She did not use the CPAP for about a week after her vacation.   She is off this week. She was trying to go to bed at 10 PM. She slept 2 hours and couldn't get back to sleep until about 6 AM. She took zolpidem and then slept fitfully until about noon.   She does not think she sleeps on her back. She has not noted any benefit from CPAP.  Her weight is about 6 pounds less than last year.    Past medical/surgical history, family history, social history, medications and allergies were reviewed.      Problem List:  Patient Active Problem List    Diagnosis Date Noted     Paresthesias 10/05/2017     Priority: Medium     Severe obesity (BMI 35.0-35.9 with comorbidity) (H) 10/05/2017     Priority: Medium     Stroke (H)  10/05/2017     Priority: Medium     Acute bilateral low back pain with right-sided sciatica 09/01/2017     Priority: Medium     ANA (obstructive sleep apnea) 08/15/2017     Priority: Medium     Type 2 diabetes mellitus with hyperglycemia, without long-term current use of insulin (H) 02/06/2017     Priority: Medium     Benign essential hypertension 01/04/2017     Priority: Medium     Cervical radiculopathy 12/12/2015     Priority: Medium     Pain in joint, ankle and foot 06/14/2010     Priority: Medium     CARDIOVASCULAR SCREENING; LDL GOAL LESS THAN 160 02/10/2010     Priority: Medium        Impression/Plan:    (G47.33) ANA (obstructive sleep apnea)  (primary encounter diagnosis)  Comment: Kezia is struggling with CPAP. She has dry eyes, but it is not clearly a result of using CPAP. She also notices condensation pooling in the mask. She cannot identify other barriers to use. She does not notice improved sleep with use.  Plan: I encouraged her to continue to try CPAP as much as she can. I asked her to let me know if she can identify any other barriers to using it more regularly. We discussed other options such as dental appliances, ENT surgery and weight loss. She was not interested in surgery or dental appliance. She is working on losing weight.    (G47.26) Shift work sleep disorder  Comment: She still requires zolpidem to sleep, regardless of when she tries to sleep. She does seem to sleep a little better from 6 AM to noon than when she tries to go to bed at 10 PM. Zolpidem works fairly well when she takes it, but she would prefer to not have to use a sleep aid.  Plan: I recommended that she try to stay up until about 2-3 AM on non-work nights, get bright light in the first half of the night, avoid light as much as possible in the morning. The best option would be to get off of night shift, but that is not feasible at this time. Continue zolpidem as needed.      She will follow up with me in about 1 year(s).      Twenty-five minutes spent with patient, all of which were spent face-to-face counseling, consulting, coordinating plan of care.      Bennett Goltz, PA-C    CC: Kan Serna MD

## 2017-10-16 NOTE — PATIENT INSTRUCTIONS
Each of us has a built in tendency to find it easier to stay up late at night or get up early in the morning.  Being a  night owl  or  early bird  is a function of our internal body clock.  When you are forced to keep a sleep schedule that goes against your typical habits or if you change your sleep schedule on different days of the week, insomnia can be the result.  Try the following changes if you work the night shift to see if you can improve your sleep patterns:    Nap before work    Drink caffeine before driving to work    Brighten the lights during the first half of your shift if you can. Consider using a SAD lamp on days off as well. Get exposure during the first half of the night. Try to stay up until at least 1-3 AM so that you have as much overlapped sleep time on days off as days on.  The lamp should be 1-2 arm lengths from you in your peripheral vision. You don't need to (nor should you) stare right at it.     Dim light in work areas for the last few hours of the night if you can    If the sun is up for your trip home wear dark UV blocking sunglasses    Make getting your sleep a priority .you ll feel better during the time you re awake    Schedule things in your personal life around your sleep schedule and talk about it with your family. This will help your family understand your need to sleep seven to eight hours a day.    May sure your sleeping area is right to get good sleep by turning off your phone, darken the room, use a fan to drown out excessive house noise and clear your schedule until you ve gotten your sleep.      If you are too tired or sleepy to drive after a night shift, nap for 15   30 minutes before leaving work or call for a ride.     Remember that you will be affected by lack of sleep if you have been awake for more than 16 hours so be extra careful.      Your BMI is Body mass index is 35.56 kg/(m^2).  Weight management is a personal decision.  If you are interested in exploring weight  loss strategies, the following discussion covers the approaches that may be successful. Body mass index (BMI) is one way to tell whether you are at a healthy weight, overweight, or obese. It measures your weight in relation to your height.  A BMI of 18.5 to 24.9 is in the healthy range. A person with a BMI of 25 to 29.9 is considered overweight, and someone with a BMI of 30 or greater is considered obese. More than two-thirds of American adults are considered overweight or obese.  Being overweight or obese increases the risk for further weight gain. Excess weight may lead to heart disease and diabetes.  Creating and following plans for healthy eating and physical activity may help you improve your health.  Weight control is part of healthy lifestyle and includes exercise, emotional health, and healthy eating habits. Careful eating habits lifelong are the mainstay of weight control. Though there are significant health benefits from weight loss, long-term weight loss with diet alone may be very difficult to achieve- studies show long-term success with dietary management in less than 10% of people. Attaining a healthy weight may be especially difficult to achieve in those with severe obesity. In some cases, medications, devices and surgical management might be considered.  What can you do?  If you are overweight or obese and are interested in methods for weight loss, you should discuss this with your provider.     Consider reducing daily calorie intake by 500 calories.     Keep a food journal.     Avoiding skipping meals, consider cutting portions instead.    Diet combined with exercise helps maintain muscle while optimizing fat loss. Strength training is particularly important for building and maintaining muscle mass. Exercise helps reduce stress, increase energy, and improves fitness. Increasing exercise without diet control, however, may not burn enough calories to loose weight.       Start walking three days a week  10-20 minutes at a time    Work towards walking thirty minutes five days a week     Eventually, increase the speed of your walking for 1-2 minutes at time    In addition, we recommend that you review healthy lifestyles and methods for weight loss available through the National Institutes of Health patient information sites:  http://win.niddk.nih.gov/publications/index.htm    And look into health and wellness programs that may be available through your health insurance provider, employer, local community center, or zayra club.    Weight management plan: Patient was referred to their PCP to discuss a diet and exercise plan.

## 2017-10-16 NOTE — NURSING NOTE
"Chief Complaint   Patient presents with     CPAP Follow Up       Initial /81  Pulse 80  Resp 16  Ht 1.575 m (5' 2\")  Wt 88.2 kg (194 lb 6.4 oz)  SpO2 100%  BMI 35.56 kg/m2 Estimated body mass index is 35.56 kg/(m^2) as calculated from the following:    Height as of this encounter: 1.575 m (5' 2\").    Weight as of this encounter: 88.2 kg (194 lb 6.4 oz).  Medication Reconciliation: complete     ESS 9    Whitney Epworth  [facility] - Specialist      "

## 2017-10-16 NOTE — MR AVS SNAPSHOT
After Visit Summary   10/16/2017    Kezia Nava    MRN: [id number]           Patient Information     Date Of Birth          1975        Visit Information        Provider Department      10/16/2017 3:30 PM Goltz, Bennett Ezra, PA-C [facility] Instructions    Each of us has a built in tendency to find it easier to stay up late at night or get up early in the morning.  Being a  night owl  or  early bird  is a function of our internal body clock.  When you are forced to keep a sleep schedule that goes against your typical habits or if you change your sleep schedule on different days of the week, insomnia can be the result.  Try the following changes if you work the night shift to see if you can improve your sleep patterns:    Nap before work    Drink caffeine before driving to work    Brighten the lights during the first half of your shift if you can. Consider using a SAD lamp on days off as well. Get exposure during the first half of the night. Try to stay up until at least 1-3 AM so that you have as much overlapped sleep time on days off as days on.  The lamp should be 1-2 arm lengths from you in your peripheral vision. You don't need to (nor should you) stare right at it.     Dim light in work areas for the last few hours of the night if you can    If the sun is up for your trip home wear dark UV blocking sunglasses    Make getting your sleep a priority .you ll feel better during the time you re awake    Schedule things in your personal life around your sleep schedule and talk about it with your family. This will help your family understand your need to sleep seven to eight hours a day.    May sure your sleeping area is right to get good sleep by turning off your phone, darken the room, use a fan to drown out excessive house noise and clear your schedule until you ve gotten your sleep.      If you are too tired or sleepy to drive after a night shift, nap for 15 -  30 minutes before leaving work or call for a ride.     Remember that you will be affected by lack of sleep if you have been awake for more than 16 hours so be extra careful.      Your BMI is Body mass index is 35.56 kg/(m^2).  Weight management is a personal decision.  If you are interested in exploring weight loss strategies, the following discussion covers the approaches that may be successful. Body mass index (BMI) is one way to tell whether you are at a healthy weight, overweight, or obese. It measures your weight in relation to your height.  A BMI of 18.5 to 24.9 is in the healthy range. A person with a BMI of 25 to 29.9 is considered overweight, and someone with a BMI of 30 or greater is considered obese. More than two-thirds of American adults are considered overweight or obese.  Being overweight or obese increases the risk for further weight gain. Excess weight may lead to heart disease and diabetes.  Creating and following plans for healthy eating and physical activity may help you improve your health.  Weight control is part of healthy lifestyle and includes exercise, emotional health, and healthy eating habits. Careful eating habits lifelong are the mainstay of weight control. Though there are significant health benefits from weight loss, long-term weight loss with diet alone may be very difficult to achieve- studies show long-term success with dietary management in less than 10% of people. Attaining a healthy weight may be especially difficult to achieve in those with severe obesity. In some cases, medications, devices and surgical management might be considered.  What can you do?  If you are overweight or obese and are interested in methods for weight loss, you should discuss this with your provider.     Consider reducing daily calorie intake by 500 calories.     Keep a food journal.     Avoiding skipping meals, consider cutting portions instead.    Diet combined with exercise helps maintain muscle  while optimizing fat loss. Strength training is particularly important for building and maintaining muscle mass. Exercise helps reduce stress, increase energy, and improves fitness. Increasing exercise without diet control, however, may not burn enough calories to loose weight.       Start walking three days a week 10-20 minutes at a time    Work towards walking thirty minutes five days a week     Eventually, increase the speed of your walking for 1-2 minutes at time    In addition, we recommend that you review healthy lifestyles and methods for weight loss available through the National Institutes of Health patient information sites:  http://win.niddk.nih.gov/publications/index.htm    And look into health and wellness programs that may be available through your health insurance provider, employer, local community center, or zayra club.    Weight management plan: Patient was referred to their PCP to discuss a diet and exercise plan.              Follow-ups after your visit        Your next 10 appointments already scheduled     Oct 17, 2017  7:20 AM CDT   MyChart Long with Kan Serna MD   [facility] ([facility]    [address] [place] [id number] [phone]              Who to contact     If you have questions or need follow up information about today's clinic visit or your schedule please contact [facility] directly at [phone].  Normal or non-critical lab and imaging results will be communicated to you by MyChart, letter or phone within 4 business days after the clinic has received the results. If you do not hear from us within 7 days, please contact the clinic through MyChart or phone. If you have a critical or abnormal lab result, we will notify you by phone as soon as possible.  Submit refill requests through [employer] or call your pharmacy and they will forward the refill request to us. Please allow 3 business days  "for your refill to be completed.          Additional Information About Your Visit        MyChart Information     [employer] gives you secure access to your electronic health record. If you see a primary care provider, you can also send messages to your care team and make appointments. If you have questions, please call your primary care clinic.  If you do not have a primary care provider, please call [phone] and they will assist you.        Care EveryWhere ID     This is your Care EveryWhere ID. This could be used by other organizations to access your [facility] medical records  [id number]        Your Vitals Were     Pulse Respirations Height Pulse Oximetry BMI (Body Mass Index)       80 16 1.575 m (5' 2\") 100% 35.56 kg/m2        Blood Pressure from Last 3 Encounters:   10/16/17 125/81   10/07/17 111/73   09/07/17 130/84    Weight from Last 3 Encounters:   10/16/17 88.2 kg (194 lb 6.4 oz)   10/07/17 89 kg (196 lb 3.4 oz)   09/07/17 87.9 kg (193 lb 12.8 oz)              Today, you had the following     No orders found for display       Primary Care Provider Office Phone # Fax #    Kan Serna [phone]       [address] [place] [id number]        Equal Access to Services     MONSERRAT MARCUM : Hadii aad ku hadasho Soomaali, waaxda luqadaha, qaybta kaalmada adeegyada, waxay ana haykeisha xiao . So [facility] [phone].    ATENCIÓN: Si habla español, tiene a carter disposición servicios gratuitos de asistencia lingüística. Llame al [phone].    We comply with applicable federal civil rights laws and [place] laws. We do not discriminate on the basis of race, color, national origin, age, disability, sex, sexual orientation, or gender identity.            Thank you!     Thank you for choosing [facility]  for your care. Our goal is always to provide you with excellent care. Hearing back from our patients is one way we can continue to improve our services. " Please take a few minutes to complete the written survey that you may receive in the mail after your visit with us. Thank you!             Your Updated Medication List - Protect others around you: Learn how to safely use, store and throw away your medicines at www.disposemymeds.org.          This list is accurate as of: 10/16/17  4:14 PM.  Always use your most recent med list.                   Brand Name Dispense Instructions for use Diagnosis    aspirin 81 MG EC tablet    ASPIRIN ADULT LOW DOSE    30 tablet    Take 1 tablet (81 mg) by mouth daily    Cerebrovascular accident (CVA), unspecified mechanism (H)       blood glucose monitoring lancets     1 Box    Use to test blood sugar 1 times daily or as directed.    Type 2 diabetes mellitus with hyperglycemia, without long-term current use of insulin (H)       blood glucose monitoring test strip    ACCU-CHEK ALMA PLUS    100 strip    Use to test blood sugar 1 times daily or as directed.    Type 2 diabetes mellitus with hyperglycemia, without long-term current use of insulin (H)       FISH OIL PO      Take 720 mg by mouth daily        GARLIQUE PO      Take 1 tablet by mouth daily        IBU PO      Take 600 mg by mouth as needed        loratadine 10 MG tablet    CLARITIN     Take 10 mg by mouth daily        losartan-hydrochlorothiazide 50-12.5 MG per tablet    HYZAAR    90 tablet    Take 1 tablet by mouth daily    Benign essential hypertension       metFORMIN 500 MG 24 hr tablet    GLUCOPHAGE-XR    90 tablet    Take 1 tablet (500 mg) by mouth daily (with dinner)    Type 2 diabetes mellitus with hyperglycemia, without long-term current use of insulin (H)       Multi-vitamin Tabs tablet      Take 1 tablet by mouth daily        ondansetron 4 MG ODT tab    ZOFRAN ODT    12 tablet    Take 1-2 tablets (4-8 mg) by mouth every 8 hours as needed for nausea    Viral gastroenteritis       order for DME      AIRSENSE 10 10-15 CM/H20 NASAL AIRFIT N10 FOR HER        TYLENOL PO       Take 500 mg by mouth 2 times daily As needed for pain        VITAMIN D (CHOLECALCIFEROL) PO      Take by mouth daily        zolpidem 6.25 MG CR tablet    AMBIEN CR    90 tablet    Take 1 tab by mouth at bedtime as needed.

## 2017-10-17 ENCOUNTER — OFFICE VISIT (OUTPATIENT)
Dept: INTERNAL MEDICINE | Facility: CLINIC | Age: 42
End: 2017-10-17
Payer: COMMERCIAL

## 2017-10-17 VITALS
DIASTOLIC BLOOD PRESSURE: 86 MMHG | OXYGEN SATURATION: 98 % | TEMPERATURE: 98.1 F | BODY MASS INDEX: 35.48 KG/M2 | SYSTOLIC BLOOD PRESSURE: 136 MMHG | WEIGHT: 194 LBS | HEART RATE: 65 BPM

## 2017-10-17 DIAGNOSIS — E11.65 TYPE 2 DIABETES MELLITUS WITH HYPERGLYCEMIA, WITHOUT LONG-TERM CURRENT USE OF INSULIN (H): Chronic | ICD-10-CM

## 2017-10-17 DIAGNOSIS — Z23 NEED FOR PROPHYLACTIC VACCINATION AND INOCULATION AGAINST INFLUENZA: Primary | ICD-10-CM

## 2017-10-17 DIAGNOSIS — I10 BENIGN ESSENTIAL HYPERTENSION: Chronic | ICD-10-CM

## 2017-10-17 DIAGNOSIS — R20.2 PARESTHESIAS: ICD-10-CM

## 2017-10-17 PROCEDURE — 99495 TRANSJ CARE MGMT MOD F2F 14D: CPT | Performed by: INTERNAL MEDICINE

## 2017-10-17 NOTE — PROGRESS NOTES
SUBJECTIVE:   Kezia Nava is a 41 year old female who presents to clinic today for the following health issues:      Hospital Follow-up Visit:    Hospital/Nursing Home/IP Rehab Facility: [facility]  Date of Admission: 10-5-17  Date of Discharge: 10-7-17  Reason(s) for Admission: Paresthesias             Problems taking medications regularly:  None       Medication changes since discharge: None       Problems adhering to non-medication therapy:  None    Summary of hospitalization:  [facility] discharge summary reviewed  Diagnostic Tests/Treatments reviewed.  Follow up needed: none  Other Healthcare Providers Involved in Patient s Care:         None  Update since discharge: improved.     Post Discharge Medication Reconciliation: discharge medications reconciled, continue medications without change.  Plan of care communicated with patient          Problem Oriented Hospital Course:      L sided paresthesias, suspect acute CVI, s/p peripheral tPA 10/5/2017.  Presented 10/5 with left face and arm/leg numbness. Head CT with and w/o contrast as well as CTA on 10/5 negative. Seen by Neurology and administered tPA 10/5. MRI 10/6 negative. Echo 10/6 showed LVEF 60-65%, negative Bubble.  - Pt will continue daily ASA 81 mg.     DLD.  Pt had been reluctant to start statins in the past, and wanted to continue dietary/lifestyle modifications.    Per neuro:  MRI negative. Not sure if pt had TIA or complicated migraine  I recommended she stick with ASA 81mg daily  If symptoms start again she could try Excedrin migraine    She states in her discussion with the neurologist that she was told this most likely was a complex migraine> TIA. No evidence of a CVA.  She also states that she is not willing to take a statin at the present time. We have previously rx these for her > 6 mo ago and she has not continued the use of it.      Problem list and histories reviewed & adjusted, as indicated.  Additional  history: as documented    Labs reviewed in EPIC    ROS:  Constitutional, HEENT, cardiovascular, pulmonary, gi and gu systems are negative, except as otherwise noted.      OBJECTIVE:                                                    /86  Pulse 65  Temp 98.1  F (36.7  C) (Oral)  Wt 194 lb (88 kg)  SpO2 98%  BMI 35.48 kg/m2  Body mass index is 35.48 kg/(m^2).  GENERAL APPEARANCE: alert, no distress and over weight  CV: regular rates and rhythm, normal S1 S2, no S3 or S4 and no murmur, click or rub  NEURO: Normal strength and tone, mentation intact and speech normal    Diagnostic test results:  none        ASSESSMENT/PLAN:                                                    (R20.2) Paresthesias- ? TIA vs complex migraine (preferred per neuro)  Plan: sx resolved.  Would recommend she highly consider using a statin for primary prevention of cardiovascular disease. Despite the questionable recent events and LDL levels > goal she defers.      (E11.65) Type 2 diabetes mellitus with hyperglycemia, without long-term current use of insulin (H)  Overall well controlled  Plan: focus on weight loss, cont meds    (I10) Benign essential hypertension  Cont monitor- recently # better than what was recorded in the office today. Cont meds  Plan: con't monitor. Cont meds       Follow up with Provider - maria luz Serna MD  [facility]  Injectable Influenza Immunization Documentation    1.  Is the person to be vaccinated sick today?   No    2. Does the person to be vaccinated have an allergy to a component   of the vaccine?   No    3. Has the person to be vaccinated ever had a serious reaction   to influenza vaccine in the past?   No    4. Has the person to be vaccinated ever had Guillain-Barré syndrome?   No    Form completed by Brooke Fernandez CMA

## 2017-10-17 NOTE — NURSING NOTE
"Chief Complaint   Patient presents with     Hospital F/U       Initial BP (!) 134/98  Pulse 65  Temp 98.1  F (36.7  C) (Oral)  Wt 194 lb (88 kg)  SpO2 98%  BMI 35.48 kg/m2 Estimated body mass index is 35.48 kg/(m^2) as calculated from the following:    Height as of 10/16/17: 5' 2\" (1.575 m).    Weight as of this encounter: 194 lb (88 kg).  Medication Reconciliation: complete    "

## 2017-10-17 NOTE — MR AVS SNAPSHOT
After Visit Summary   10/17/2017    Kezia Nava    MRN: [id number]           Patient Information     Date Of Birth          1975        Visit Information        Provider Department      10/17/2017 7:20 AM Kan Serna MD [facility]        Today's Diagnoses     Need for prophylactic vaccination and inoculation against influenza    -  1       Follow-ups after your visit        Who to contact     If you have questions or need follow up information about today's clinic visit or your schedule please contact [facility] directly at [phone].  Normal or non-critical lab and imaging results will be communicated to you by [employer], letter or phone within 4 business days after the clinic has received the results. If you do not hear from us within 7 days, please contact the clinic through [employer] or phone. If you have a critical or abnormal lab result, we will notify you by phone as soon as possible.  Submit refill requests through [employer] or call your pharmacy and they will forward the refill request to us. Please allow 3 business days for your refill to be completed.          Additional Information About Your Visit        MyChart Information     [employer] gives you secure access to your electronic health record. If you see a primary care provider, you can also send messages to your care team and make appointments. If you have questions, please call your primary care clinic.  If you do not have a primary care provider, please call [phone] and they will assist you.        Care EveryWhere ID     This is your Care EveryWhere ID. This could be used by other organizations to access your [facility] medical records  [id number]        Your Vitals Were     Pulse Temperature Pulse Oximetry BMI (Body Mass Index)          65 98.1  F (36.7  C) (Oral) 98% 35.48 kg/m2         Blood Pressure from Last 3 Encounters:   10/17/17 (!) 134/98   10/16/17 125/81    10/07/17 111/73    Weight from Last 3 Encounters:   10/17/17 194 lb (88 kg)   10/16/17 194 lb 6.4 oz (88.2 kg)   10/07/17 196 lb 3.4 oz (89 kg)              We Performed the Following     FLU VAC, SPLIT VIRUS IM > 3 YO (QUADRIVALENT) [15638]     Vaccine Administration, Initial [51641]        Primary Care Provider Office Phone # Fax #    Kan Serna [phone]       [address] [facility] [address]        Equal Access to Services     [facility]: Hadii aad ku hadasho Soomaali, waaxda luqadaha, qaybta kaalmada adeegyada, helder xiao . So [facility] [phone].    ATENCIÓN: Si habla español, tiene a carter disposición servicios gratuitos de asistencia lingüística. [facility] [phone].    We comply with applicable federal civil rights laws and [place] laws. We do not discriminate on the basis of race, color, national origin, age, disability, sex, sexual orientation, or gender identity.            Thank you!     Thank you for choosing [facility]  for your care. Our goal is always to provide you with excellent care. Hearing back from our patients is one way we can continue to improve our services. Please take a few minutes to complete the written survey that you may receive in the mail after your visit with us. Thank you!             Your Updated Medication List - Protect others around you: Learn how to safely use, store and throw away your medicines at www.disposemymeds.org.          This list is accurate as of: 10/17/17  8:04 AM.  Always use your most recent med list.                   Brand Name Dispense Instructions for use Diagnosis    aspirin 81 MG EC tablet    ASPIRIN ADULT LOW DOSE    30 tablet    Take 1 tablet (81 mg) by mouth daily    Cerebrovascular accident (CVA), unspecified mechanism (H)       blood glucose monitoring lancets     1 Box    Use to test blood sugar 1 times daily or as directed.    Type 2 diabetes mellitus  with hyperglycemia, without long-term current use of insulin (H)       blood glucose monitoring test strip    ACCU-CHEK ALMA PLUS    100 strip    Use to test blood sugar 1 times daily or as directed.    Type 2 diabetes mellitus with hyperglycemia, without long-term current use of insulin (H)       FISH OIL PO      Take 720 mg by mouth daily        GARLIQUE PO      Take 1 tablet by mouth daily        IBU PO      Take 600 mg by mouth as needed        loratadine 10 MG tablet    CLARITIN     Take 10 mg by mouth daily        losartan-hydrochlorothiazide 50-12.5 MG per tablet    HYZAAR    90 tablet    Take 1 tablet by mouth daily    Benign essential hypertension       metFORMIN 500 MG 24 hr tablet    GLUCOPHAGE-XR    90 tablet    Take 1 tablet (500 mg) by mouth daily (with dinner)    Type 2 diabetes mellitus with hyperglycemia, without long-term current use of insulin (H)       Multi-vitamin Tabs tablet      Take 1 tablet by mouth daily        ondansetron 4 MG ODT tab    ZOFRAN ODT    12 tablet    Take 1-2 tablets (4-8 mg) by mouth every 8 hours as needed for nausea    Viral gastroenteritis       order for DME      AIRSENSE 10 10-15 CM/H20 NASAL AIRFIT N10 FOR HER        TYLENOL PO      Take 500 mg by mouth 2 times daily As needed for pain        VITAMIN D (CHOLECALCIFEROL) PO      Take by mouth daily        zolpidem 6.25 MG CR tablet    AMBIEN CR    90 tablet    Take 1 tab by mouth at bedtime as needed.

## 2018-01-04 ENCOUNTER — OFFICE VISIT (OUTPATIENT)
Dept: INTERNAL MEDICINE | Facility: CLINIC | Age: 43
End: 2018-01-04
Payer: COMMERCIAL

## 2018-01-04 VITALS
DIASTOLIC BLOOD PRESSURE: 84 MMHG | RESPIRATION RATE: 20 BRPM | SYSTOLIC BLOOD PRESSURE: 130 MMHG | HEART RATE: 108 BPM | TEMPERATURE: 98.1 F | BODY MASS INDEX: 36.27 KG/M2 | HEIGHT: 62 IN | WEIGHT: 197.1 LBS | OXYGEN SATURATION: 100 %

## 2018-01-04 DIAGNOSIS — R05.9 COUGH: Primary | ICD-10-CM

## 2018-01-04 DIAGNOSIS — J06.9 VIRAL URI: ICD-10-CM

## 2018-01-04 PROCEDURE — 99214 OFFICE O/P EST MOD 30 MIN: CPT | Performed by: PHYSICIAN ASSISTANT

## 2018-01-04 RX ORDER — PREDNISONE 20 MG/1
40 TABLET ORAL DAILY
Qty: 10 TABLET | Refills: 0 | Status: SHIPPED | OUTPATIENT
Start: 2018-01-04 | End: 2018-01-09

## 2018-01-04 ASSESSMENT — PAIN SCALES - GENERAL: PAINLEVEL: NO PAIN (1)

## 2018-01-04 NOTE — PATIENT INSTRUCTIONS
Start prednisone 40 mg for 5 days     Follow up if fever, chest pain or worsening shortness of breath

## 2018-01-04 NOTE — NURSING NOTE
"Chief Complaint   Patient presents with     URI     x 7 days, SOB, wheezing, sore throat       Initial /84 (BP Location: Left arm, Patient Position: Sitting, Cuff Size: Adult Large)  Pulse 108  Temp 98.1  F (36.7  C) (Oral)  Resp 20  Ht 5' 2\" (1.575 m)  Wt 197 lb 1.6 oz (89.4 kg)  SpO2 100%  Breastfeeding? No  BMI 36.05 kg/m2 Estimated body mass index is 36.05 kg/(m^2) as calculated from the following:    Height as of this encounter: 5' 2\" (1.575 m).    Weight as of this encounter: 197 lb 1.6 oz (89.4 kg).  Medication Reconciliation: complete   Fide Christensen, Medical Assistant      "

## 2018-01-04 NOTE — PROGRESS NOTES
"  SUBJECTIVE:   Kezia Nava is a 42 year old female who presents to clinic today for the following health issues:    RESPIRATORY SYMPTOMS      Duration: x 7 days    Description  Pt notes illness started with fever, headache and body aches. She then started to have cough. Her fever, headache and body aches have resolved. However, her coughing has worsened. She notes she can't catch her breath with coughing         She has tried an albuterol inhaler without relief.    Severity: severe    Accompanying signs and symptoms: pt denies chest pain     History (predisposing factors):  Usually if I get a cold, then she gets a long lasting cough     Precipitating or alleviating factors: None    Therapies tried and outcome:  Albuterol with no relief     Pt reports no history of asthma. She does note that she has get recurrent bronchitis and pneumonia. She notes in the past she has had steroids with relief.     Problem list and histories reviewed & adjusted, as indicated.  Additional history: as documented    Reviewed and updated as needed this visit by clinical staffTobacco  Allergies  Meds  Problems       Reviewed and updated as needed this visit by Provider  Meds  Problems         OBJECTIVE:     /84 (BP Location: Left arm, Patient Position: Sitting, Cuff Size: Adult Large)  Pulse 108  Temp 98.1  F (36.7  C) (Oral)  Resp 20  Ht 5' 2\" (1.575 m)  Wt 197 lb 1.6 oz (89.4 kg)  SpO2 100%  Breastfeeding? No  BMI 36.05 kg/m2  Body mass index is 36.05 kg/(m^2).  GENERAL: healthy, alert and no distress  EYES: Eyes grossly normal to inspection, PERRL and conjunctivae and sclerae normal  HENT: ear canals and TM's normal, nose and mouth without ulcers or lesions  NECK: no adenopathy, no asymmetry, masses, or scars and thyroid normal to palpation  RESP: lungs clear to auscultation - no rales, rhonchi or wheezes  CV: regular rates and rhythm, normal S1 S2, no S3 or S4 and no murmur, click or rub    Diagnostic Test " Results:  none     ASSESSMENT/PLAN:     1. Cough  2. Viral URI  - history fits with influenza type illness followed by bronchitis, at this time without fever or chest pain, I do not think a work up for pneumonia is needed  - trial of prednisone, follow up if symptoms worsen or fail to improve   - predniSONE (DELTASONE) 20 MG tablet; Take 2 tablets (40 mg) by mouth daily for 5 days  Dispense: 10 tablet; Refill: 0  - pt agrees to above plan and all questions are answered     Cassandra Bourgeois PA-C  [facility]

## 2018-01-04 NOTE — MR AVS SNAPSHOT
"              After Visit Summary   1/4/2018    Kezia Nava    MRN: [id number]           Patient Information     Date Of Birth          1975        Visit Information        Provider Department      1/4/2018 11:00 AM Cassandra Bourgeois PA-C [facility]        Today's Diagnoses     Cough    -  1    Viral URI          Care Instructions    Start prednisone 40 mg for 5 days     Follow up if fever, chest pain or worsening shortness of breath           Follow-ups after your visit        Who to contact     If you have questions or need follow up information about today's clinic visit or your schedule please contact [facility] directly at [phone].  Normal or non-critical lab and imaging results will be communicated to you by MyChart, letter or phone within 4 business days after the clinic has received the results. If you do not hear from us within 7 days, please contact the clinic through [employer] or phone. If you have a critical or abnormal lab result, we will notify you by phone as soon as possible.  Submit refill requests through [employer] or call your pharmacy and they will forward the refill request to us. Please allow 3 business days for your refill to be completed.          Additional Information About Your Visit        MyChart Information     [employer] gives you secure access to your electronic health record. If you see a primary care provider, you can also send messages to your care team and make appointments. If you have questions, please call your primary care clinic.  If you do not have a primary care provider, please call [phone] and they will assist you.        Care EveryWhere ID     This is your Care EveryWhere ID. This could be used by other organizations to access your [facility] medical records  [id number]        Your Vitals Were     Pulse Temperature Respirations Height Pulse Oximetry Breastfeeding?    108 98.1  F (36.7  C) (Oral) 20 5' 2\" " (1.575 m) 100% No    BMI (Body Mass Index)                   36.05 kg/m2            Blood Pressure from Last 3 Encounters:   01/04/18 130/84   10/17/17 136/86   10/16/17 125/81    Weight from Last 3 Encounters:   01/04/18 197 lb 1.6 oz (89.4 kg)   10/17/17 194 lb (88 kg)   10/16/17 194 lb 6.4 oz (88.2 kg)              Today, you had the following     No orders found for display         Today's Medication Changes          These changes are accurate as of: 1/4/18 11:32 AM.  If you have any questions, ask your nurse or doctor.               Start taking these medicines.        Dose/Directions    predniSONE 20 MG tablet   Commonly known as:  DELTASONE   Used for:  Cough, Viral URI   Started by:  Cassandra Bourgeois PA-C        Dose:  40 mg   Take 2 tablets (40 mg) by mouth daily for 5 days   Quantity:  10 tablet   Refills:  0            Where to get your medicines      These medications were sent to [address] [address]     Phone:  [phone]     predniSONE 20 MG tablet                Primary Care Provider Office Phone # Fax #    Kan Serna [phone]       [address] [place] [phone]        Equal Access to Services     MONSERRAT MARCUM AH: Hadii petar ku hadasho Soomaali, waaxda luqadaha, qaybta kaalmada adeegyada, helder malone. So [facility] [phone].    ATENCIÓN: Si habla español, tiene a carter disposición servicios gratuitos de asistencia lingüística. Llame al [phone].    We comply with applicable federal civil rights laws and [place] laws. We do not discriminate on the basis of race, color, national origin, age, disability, sex, sexual orientation, or gender identity.            Thank you!     Thank you for choosing [facility]  for your care. Our goal is always to provide you with excellent care. Hearing back from our patients is one way we can  continue to improve our services. Please take a few minutes to complete the written survey that you may receive in the mail after your visit with us. Thank you!             Your Updated Medication List - Protect others around you: Learn how to safely use, store and throw away your medicines at www.disposemymeds.org.          This list is accurate as of: 1/4/18 11:32 AM.  Always use your most recent med list.                   Brand Name Dispense Instructions for use Diagnosis    aspirin 81 MG EC tablet    ASPIRIN ADULT LOW DOSE    30 tablet    Take 1 tablet (81 mg) by mouth daily    Cerebrovascular accident (CVA), unspecified mechanism (H)       blood glucose monitoring lancets     1 Box    Use to test blood sugar 1 times daily or as directed.    Type 2 diabetes mellitus with hyperglycemia, without long-term current use of insulin (H)       blood glucose monitoring test strip    ACCU-CHEK ALMA PLUS    100 strip    Use to test blood sugar 1 times daily or as directed.    Type 2 diabetes mellitus with hyperglycemia, without long-term current use of insulin (H)       FISH OIL PO      Take 720 mg by mouth daily        GARLIQUE PO      Take 1 tablet by mouth daily        IBU PO      Take 600 mg by mouth as needed        loratadine 10 MG tablet    CLARITIN     Take 10 mg by mouth daily        losartan-hydrochlorothiazide 50-12.5 MG per tablet    HYZAAR    90 tablet    Take 1 tablet by mouth daily    Benign essential hypertension       metFORMIN 500 MG 24 hr tablet    GLUCOPHAGE-XR    90 tablet    Take 1 tablet (500 mg) by mouth daily (with dinner)    Type 2 diabetes mellitus with hyperglycemia, without long-term current use of insulin (H)       Multi-vitamin Tabs tablet      Take 1 tablet by mouth daily        ondansetron 4 MG ODT tab    ZOFRAN ODT    12 tablet    Take 1-2 tablets (4-8 mg) by mouth every 8 hours as needed for nausea    Viral gastroenteritis       order for DME      AIRSENSE 10 10-15 CM/H20 NASAL AIRFIT  N10 FOR HER        predniSONE 20 MG tablet    DELTASONE    10 tablet    Take 2 tablets (40 mg) by mouth daily for 5 days    Cough, Viral URI       STATIN NOT PRESCRIBED (INTENTIONAL)      Please choose reason not prescribed, below    Type 2 diabetes mellitus with hyperglycemia, without long-term current use of insulin (H)       TYLENOL PO      Take 500 mg by mouth 2 times daily As needed for pain        VITAMIN D (CHOLECALCIFEROL) PO      Take by mouth daily        zolpidem 6.25 MG CR tablet    AMBIEN CR    90 tablet    Take 1 tab by mouth at bedtime as needed.

## 2018-01-31 ENCOUNTER — RADIANT APPOINTMENT (OUTPATIENT)
Dept: GENERAL RADIOLOGY | Facility: CLINIC | Age: 43
End: 2018-01-31
Attending: PHYSICIAN ASSISTANT
Payer: COMMERCIAL

## 2018-01-31 ENCOUNTER — OFFICE VISIT (OUTPATIENT)
Dept: INTERNAL MEDICINE | Facility: CLINIC | Age: 43
End: 2018-01-31
Payer: COMMERCIAL

## 2018-01-31 VITALS
SYSTOLIC BLOOD PRESSURE: 136 MMHG | RESPIRATION RATE: 24 BRPM | TEMPERATURE: 98.7 F | DIASTOLIC BLOOD PRESSURE: 94 MMHG | HEART RATE: 70 BPM

## 2018-01-31 DIAGNOSIS — E11.65 TYPE 2 DIABETES MELLITUS WITH HYPERGLYCEMIA, WITHOUT LONG-TERM CURRENT USE OF INSULIN (H): Chronic | ICD-10-CM

## 2018-01-31 DIAGNOSIS — R05.9 COUGH: Primary | ICD-10-CM

## 2018-01-31 LAB
ERYTHROCYTE [DISTWIDTH] IN BLOOD BY AUTOMATED COUNT: 13.1 % (ref 10–15)
HCT VFR BLD AUTO: 38.2 % (ref 35–47)
HGB BLD-MCNC: 12.8 G/DL (ref 11.7–15.7)
MCH RBC QN AUTO: 29.6 PG (ref 26.5–33)
MCHC RBC AUTO-ENTMCNC: 33.5 G/DL (ref 31.5–36.5)
MCV RBC AUTO: 88 FL (ref 78–100)
PLATELET # BLD AUTO: 259 10E9/L (ref 150–450)
RBC # BLD AUTO: 4.32 10E12/L (ref 3.8–5.2)
WBC # BLD AUTO: 7.4 10E9/L (ref 4–11)

## 2018-01-31 PROCEDURE — 85027 COMPLETE CBC AUTOMATED: CPT | Performed by: PHYSICIAN ASSISTANT

## 2018-01-31 PROCEDURE — 99214 OFFICE O/P EST MOD 30 MIN: CPT | Performed by: PHYSICIAN ASSISTANT

## 2018-01-31 PROCEDURE — 36415 COLL VENOUS BLD VENIPUNCTURE: CPT | Performed by: PHYSICIAN ASSISTANT

## 2018-01-31 PROCEDURE — 71046 X-RAY EXAM CHEST 2 VIEWS: CPT | Mod: FY

## 2018-01-31 RX ORDER — PREDNISONE 20 MG/1
TABLET ORAL
Qty: 20 TABLET | Refills: 0 | Status: SHIPPED | OUTPATIENT
Start: 2018-01-31 | End: 2018-06-18

## 2018-01-31 NOTE — PATIENT INSTRUCTIONS
Add in Flonase (fluticasone) 2 sprays each nostril daily     Will call with lab and xray results

## 2018-01-31 NOTE — PROGRESS NOTES
SUBJECTIVE:   Kezia Nava is a 42 year old female who presents to clinic today for the following health issues:      RESPIRATORY SYMPTOMS      Duration:  X 6 weeks    Description  cough, wheezing and SOB, tightness in chest    Severity: mild to Severe at times    Accompanying signs and symptoms: None    History (predisposing factors):  none    Precipitating or alleviating factors: None    Therapies tried and outcome:  Uses inhaler and recola    Pt was previously seen on 01/04/18 and given a short burst of prednisone.   She notes this helped for a few days and then her symptoms resolved.   Pt has albuterol at home and used it 3x yesterday with minimal relief.   Pt reports her cough is dry with tightness in her chest and occasional shortness of breath.  She has accompanying runny nose, body aches and sore throat. She felt warm at work without a fever.   She has no known history of asthma, but reports episodes of bronchitis that resolve with prednisone.     Problem list and histories reviewed & adjusted, as indicated.  Additional history: as documented    Reviewed and updated as needed this visit by clinical staff  Tobacco  Allergies  Meds  Problems       Reviewed and updated as needed this visit by Provider  Meds  Problems         OBJECTIVE:     BP (!) 136/94  Pulse 70  Temp 98.7  F (37.1  C) (Oral)  Resp 24  LMP 01/31/2018  Breastfeeding? No  There is no height or weight on file to calculate BMI.  GENERAL: healthy, alert and no distress  EYES: Eyes grossly normal to inspection, PERRL and conjunctivae and sclerae normal  HENT: ear canals and TM's normal, nose  Edematous and mouth without ulcers or lesions- cobblestoning noted.  NECK: no adenopathy, no asymmetry, masses, or scars and thyroid normal to palpation  RESP: lungs clear to auscultation - no rales, rhonchi or wheezes  CV: regular rates and rhythm, normal S1 S2, no S3 or S4 and no murmur, click or rub    Diagnostic Test Results:  Results for  orders placed or performed in visit on 01/31/18 (from the past 24 hour(s))   XR Chest 2 Views    Narrative    XR CHEST 2 VW   1/31/2018 10:50 AM     HISTORY: ; Cough    COMPARISON: Known dated 5/4/2017    FINDINGS: The heart is negative.  The lungs are clear. The pulmonary  vasculature is normal.  Instrumentation is seen in the lower cervical  region..      Impression    IMPRESSION: No active infiltrate. No change.        LIBERTAD DEE MD   CBC with platelets   Result Value Ref Range    WBC 7.4 4.0 - 11.0 10e9/L    RBC Count 4.32 3.8 - 5.2 10e12/L    Hemoglobin 12.8 11.7 - 15.7 g/dL    Hematocrit 38.2 35.0 - 47.0 %    MCV 88 78 - 100 fl    MCH 29.6 26.5 - 33.0 pg    MCHC 33.5 31.5 - 36.5 g/dL    RDW 13.1 10.0 - 15.0 %    Platelet Count 259 150 - 450 10e9/L       ASSESSMENT/PLAN:     1. Cough  - cough for 6 weeks: reviewed differential diagnosis for chronic cough  - most likely etiology based on her symptoms are post-nasal drip vs asthma   - cbc and cxr to rule out infection which are normal   - add in Flonase is recommended   - longer prednisone taper   - XR Chest 2 Views  - CBC with platelets  - predniSONE (DELTASONE) 20 MG tablet; Take 3 tabs (60 mg) by mouth daily x 3 days, 2 tabs (40 mg) daily x 3 days, 1 tab (20 mg) daily x 3 days, then 1/2 tab (10 mg) x 3 days.  Dispense: 20 tablet; Refill: 0    2. Type 2 diabetes mellitus with hyperglycemia, without long-term current use of insulin (H)  - monitor blood sugars while on prednisone     Follow up if symptoms persist or worsen.   Pt agrees to the above plan and all questions are answered.     Cassandra Bourgeois PA-C  [facility]

## 2018-01-31 NOTE — MR AVS SNAPSHOT
After Visit Summary   1/31/2018    Kezia Nava    MRN: [id number]           Patient Information     Date Of Birth          1975        Visit Information        Provider Department      1/31/2018 10:00 AM Cassandra Bourgeois PA-C [facility]        Today's Diagnoses     Cough    -  1      Care Instructions    Add in Flonase (fluticasone) 2 sprays each nostril daily     Will call with lab and xray results               Follow-ups after your visit        Who to contact     If you have questions or need follow up information about today's clinic visit or your schedule please contact [facility] directly at [phone].  Normal or non-critical lab and imaging results will be communicated to you by MyChart, letter or phone within 4 business days after the clinic has received the results. If you do not hear from us within 7 days, please contact the clinic through [employer] or phone. If you have a critical or abnormal lab result, we will notify you by phone as soon as possible.  Submit refill requests through [employer] or call your pharmacy and they will forward the refill request to us. Please allow 3 business days for your refill to be completed.          Additional Information About Your Visit        MyChart Information     [employer] gives you secure access to your electronic health record. If you see a primary care provider, you can also send messages to your care team and make appointments. If you have questions, please call your primary care clinic.  If you do not have a primary care provider, please call [phone] and they will assist you.        Care EveryWhere ID     This is your Care EveryWhere ID. This could be used by other organizations to access your [facility] medical records  [id number]        Your Vitals Were     Pulse Temperature Respirations Last Period Breastfeeding?       70 98.7  F (37.1  C) (Oral) 24 01/31/2018 No        Blood  Pressure from Last 3 Encounters:   01/31/18 (!) 136/94   01/04/18 130/84   10/17/17 136/86    Weight from Last 3 Encounters:   01/04/18 197 lb 1.6 oz (89.4 kg)   10/17/17 194 lb (88 kg)   10/16/17 194 lb 6.4 oz (88.2 kg)              We Performed the Following     CBC with platelets     XR Chest 2 Views          Today's Medication Changes          These changes are accurate as of 1/31/18 10:36 AM.  If you have any questions, ask your nurse or doctor.               Start taking these medicines.        Dose/Directions    predniSONE 20 MG tablet   Commonly known as:  DELTASONE   Used for:  Cough   Started by:  Cassandra Bourgeois PA-C        Take 3 tabs (60 mg) by mouth daily x 3 days, 2 tabs (40 mg) daily x 3 days, 1 tab (20 mg) daily x 3 days, then 1/2 tab (10 mg) x 3 days.   Quantity:  20 tablet   Refills:  0            Where to get your medicines      These medications were sent to [facility] [address] [address]     Phone:  [phone]     predniSONE 20 MG tablet                Primary Care Provider Office Phone # Fax #    Kan Serna [phone]       [address] [place] [phone]        Equal Access to Services     MONSERRAT MARCUM AH: Hadii petar hartleyo Sogabriel, waaxda luqadaha, qaybta kaalmada helder ramirez . So [facility] [phone].    ATENCIÓN: Si habla español, tiene a carter disposición servicios gratuitos de asistencia lingüística. Llame al [phone].    We comply with applicable federal civil rights laws and [place] laws. We do not discriminate on the basis of race, color, national origin, age, disability, sex, sexual orientation, or gender identity.            Thank you!     Thank you for choosing [facility]  for your care. Our goal is always to provide you with excellent care. Hearing back from our patients is one way we can continue to  improve our services. Please take a few minutes to complete the written survey that you may receive in the mail after your visit with us. Thank you!             Your Updated Medication List - Protect others around you: Learn how to safely use, store and throw away your medicines at www.disposemymeds.org.          This list is accurate as of 1/31/18 10:36 AM.  Always use your most recent med list.                   Brand Name Dispense Instructions for use Diagnosis    aspirin 81 MG EC tablet    ASPIRIN ADULT LOW DOSE    30 tablet    Take 1 tablet (81 mg) by mouth daily    Cerebrovascular accident (CVA), unspecified mechanism (H)       blood glucose monitoring lancets     1 Box    Use to test blood sugar 1 times daily or as directed.    Type 2 diabetes mellitus with hyperglycemia, without long-term current use of insulin (H)       blood glucose monitoring test strip    ACCU-CHEK ALMA PLUS    100 strip    Use to test blood sugar 1 times daily or as directed.    Type 2 diabetes mellitus with hyperglycemia, without long-term current use of insulin (H)       FISH OIL PO      Take 720 mg by mouth daily        GARLIQUE PO      Take 1 tablet by mouth daily        IBU PO      Take 600 mg by mouth as needed        loratadine 10 MG tablet    CLARITIN     Take 10 mg by mouth daily        losartan-hydrochlorothiazide 50-12.5 MG per tablet    HYZAAR    90 tablet    Take 1 tablet by mouth daily    Benign essential hypertension       metFORMIN 500 MG 24 hr tablet    GLUCOPHAGE-XR    90 tablet    Take 1 tablet (500 mg) by mouth daily (with dinner)    Type 2 diabetes mellitus with hyperglycemia, without long-term current use of insulin (H)       Multi-vitamin Tabs tablet      Take 1 tablet by mouth daily        ondansetron 4 MG ODT tab    ZOFRAN ODT    12 tablet    Take 1-2 tablets (4-8 mg) by mouth every 8 hours as needed for nausea    Viral gastroenteritis       order for DME      AIRSENSE 10 10-15 CM/H20 NASAL AIRFIT N10 FOR HER         predniSONE 20 MG tablet    DELTASONE    20 tablet    Take 3 tabs (60 mg) by mouth daily x 3 days, 2 tabs (40 mg) daily x 3 days, 1 tab (20 mg) daily x 3 days, then 1/2 tab (10 mg) x 3 days.    Cough       STATIN NOT PRESCRIBED (INTENTIONAL)      Please choose reason not prescribed, below    Type 2 diabetes mellitus with hyperglycemia, without long-term current use of insulin (H)       TYLENOL PO      Take 500 mg by mouth 2 times daily As needed for pain        VITAMIN D (CHOLECALCIFEROL) PO      Take by mouth daily        zolpidem 6.25 MG CR tablet    AMBIEN CR    90 tablet    Take 1 tab by mouth at bedtime as needed.

## 2018-04-24 ENCOUNTER — TELEPHONE (OUTPATIENT)
Dept: FAMILY MEDICINE | Facility: CLINIC | Age: 43
End: 2018-04-24

## 2018-04-24 NOTE — TELEPHONE ENCOUNTER
Called pt notified of [facility] message and she said that her sister Stella Lofton was told by [facility] to say that [facility]idget[facility] would accept her as she had talked about it before.  Dr Angeles, not sure if you knew this???

## 2018-04-24 NOTE — TELEPHONE ENCOUNTER
Patient is calling because she would like to know if Dr. Angeles will accept her as new patient. Please follow up with patient.

## 2018-05-16 ENCOUNTER — TELEPHONE (OUTPATIENT)
Dept: LAB | Facility: CLINIC | Age: 43
End: 2018-05-16

## 2018-05-16 NOTE — LETTER
May 21, 2018      Kezia Nava  [address] [place] [address]        Dear Kezia,     We see that you haven't scheduled your lab tests that are overdue to be preformed. They include fasting blood work and a urine test. Please call the clinic to schedule this lab appointment or I see that you have an appointment with Dr Angeles on 06/18/18 to establish care. Please speak to her about your labs that are due if you choose to have her order your tests in going forward.       Sincerely,        Kan Serna MD

## 2018-05-21 NOTE — TELEPHONE ENCOUNTER
Letter sent to pt per protocol. She has not returned our calls. Pt does have an appt scheduled with Dr Angeles on 06/18/18 to establish care.  BHARATI Escamilla LPN

## 2018-06-18 ENCOUNTER — HOSPITAL ENCOUNTER (OUTPATIENT)
Dept: MAMMOGRAPHY | Facility: CLINIC | Age: 43
Discharge: HOME | End: 2018-06-18
Attending: INTERNAL MEDICINE | Admitting: INTERNAL MEDICINE
Payer: COMMERCIAL

## 2018-06-18 ENCOUNTER — OFFICE VISIT (OUTPATIENT)
Dept: FAMILY MEDICINE | Facility: CLINIC | Age: 43
End: 2018-06-18
Payer: COMMERCIAL

## 2018-06-18 VITALS
DIASTOLIC BLOOD PRESSURE: 88 MMHG | HEART RATE: 88 BPM | WEIGHT: 193 LBS | TEMPERATURE: 98.7 F | BODY MASS INDEX: 35.3 KG/M2 | SYSTOLIC BLOOD PRESSURE: 134 MMHG | OXYGEN SATURATION: 98 %

## 2018-06-18 DIAGNOSIS — I10 BENIGN ESSENTIAL HYPERTENSION: ICD-10-CM

## 2018-06-18 DIAGNOSIS — G47.33 OSA (OBSTRUCTIVE SLEEP APNEA): ICD-10-CM

## 2018-06-18 DIAGNOSIS — N92.6 IRREGULAR MENSTRUAL CYCLE: ICD-10-CM

## 2018-06-18 DIAGNOSIS — Z80.3 FAMILY HISTORY OF MALIGNANT NEOPLASM OF BREAST: ICD-10-CM

## 2018-06-18 DIAGNOSIS — E11.65 TYPE 2 DIABETES MELLITUS WITH HYPERGLYCEMIA, WITHOUT LONG-TERM CURRENT USE OF INSULIN (H): Primary | ICD-10-CM

## 2018-06-18 DIAGNOSIS — Z23 NEED FOR VACCINATION: ICD-10-CM

## 2018-06-18 DIAGNOSIS — E66.01 SEVERE OBESITY (BMI 35.0-35.9 WITH COMORBIDITY) (H): ICD-10-CM

## 2018-06-18 DIAGNOSIS — Z80.0 FAMILY HISTORY OF COLON CANCER: ICD-10-CM

## 2018-06-18 LAB — HBA1C MFR BLD: 5.9 % (ref 0–5.6)

## 2018-06-18 PROCEDURE — 99207 C FOOT EXAM  NO CHARGE: CPT | Mod: 25 | Performed by: INTERNAL MEDICINE

## 2018-06-18 PROCEDURE — 77067 SCR MAMMO BI INCL CAD: CPT

## 2018-06-18 PROCEDURE — 90471 IMMUNIZATION ADMIN: CPT | Performed by: INTERNAL MEDICINE

## 2018-06-18 PROCEDURE — 99214 OFFICE O/P EST MOD 30 MIN: CPT | Mod: 25 | Performed by: INTERNAL MEDICINE

## 2018-06-18 PROCEDURE — 83036 HEMOGLOBIN GLYCOSYLATED A1C: CPT | Performed by: INTERNAL MEDICINE

## 2018-06-18 PROCEDURE — 90732 PPSV23 VACC 2 YRS+ SUBQ/IM: CPT | Performed by: INTERNAL MEDICINE

## 2018-06-18 PROCEDURE — 36415 COLL VENOUS BLD VENIPUNCTURE: CPT | Performed by: INTERNAL MEDICINE

## 2018-06-18 PROCEDURE — 82043 UR ALBUMIN QUANTITATIVE: CPT | Performed by: INTERNAL MEDICINE

## 2018-06-18 RX ORDER — METFORMIN HCL 500 MG
500 TABLET, EXTENDED RELEASE 24 HR ORAL
Qty: 90 TABLET | Refills: 1 | Status: SHIPPED | OUTPATIENT
Start: 2018-06-18 | End: 2018-11-26

## 2018-06-18 RX ORDER — LOSARTAN POTASSIUM AND HYDROCHLOROTHIAZIDE 12.5; 5 MG/1; MG/1
1 TABLET ORAL DAILY
Qty: 90 TABLET | Refills: 1 | Status: SHIPPED | OUTPATIENT
Start: 2018-06-18 | End: 2018-11-26

## 2018-06-18 RX ORDER — LANCETS
EACH MISCELLANEOUS
Qty: 3 EACH | Refills: 3 | Status: SHIPPED | OUTPATIENT
Start: 2018-06-18 | End: 2021-02-08

## 2018-06-18 RX ORDER — PREDNISONE 20 MG/1
TABLET ORAL
Qty: 20 TABLET | Refills: 0 | Status: CANCELLED | OUTPATIENT
Start: 2018-06-18

## 2018-06-18 NOTE — PATIENT INSTRUCTIONS
Using Your Victoza Pen  Medicine: Liraglutide (Kck-s-KDDP-tide)  Storing your pens   Store your pens in the refrigerator until you use them. You can keep a pen at room temperature for 30 days. After that, throw it away.  Get your pen ready (before first use only):  1. Wash and dry your hands well.  2. Remove the pen cap.  3. Look at the window in the pen to make sure the liquid is clear and has no color or specks.  ? Do not use the pen if it is not clear, has a color or you can see specks in it.  ? It is normal to see air bubbles.  4. Remove the seal from the new pen needle and carefully screw it onto the end of the pen.  5. Remove the outer needle cap and set it aside. Remove the inner needle cap and throw it away.  6. Turn the knob on the pen until you see -- in the dose window.  7. Hold the pen with the needle pointing straight up. Gently tap the side of the pen to get rid of any air bubbles.  8. Push the injection button until you see 0mg in the dose window. You should see a drop of liquid at the end of the needle. This means your pen is ready to use.  9. Repeat steps 7 and 8 one or two more times if you need to, until you see a drop of medicine.  Inject your dose:  1. Wash and dry your hands well.  2. Remove the pen cap.  3. Look at the window to make sure the medicine is clear and has no color or specks.  ? Do not use the pen if it is not clear or has a color or you can see specks.  ? It is normal to see air bubbles.  4. Remove the seal from the new pen needle and screw it onto the end of the pen.  5. Remove the outer needle cap and set it aside. Remove the inner needle cap and throw it away.  6. Turn the knob away from you until you see the number for your dose in the window.  7. Use an alcohol swab to clean your skin (remember to change injection sites).  8. Insert the needle straight into your skin so that it reaches the fatty layer.  9. Use your thumb to slowly press the button on the end of the pen until  you see 0mg. Hold it for 6 seconds to allow time for the medicine to get into your body.  10. Release the button and pull the needle out.  11. Put the needle cap on the end of the pen and unscrew the needle from the pen. Put the used needle into a SHARPS container.  12. Put the cap on the pen and store at room temperature, away from sunlight.  If you have questions about using your pen, please ask your pharmacist or doctor.    For informational purposes only. Not to replace the advice of your health care provider.   Copyright   2016 [facility]. All rights reserved. [employer] [id number] - 04/16.    Liraglutide Solution for injection  What is this medicine?  LIRAGLUTIDE (LIR a GLOO tide) is used to improve blood sugar control in adults with type 2 diabetes. This medicine may be used with other oral diabetes medicines.  This medicine may be used for other purposes; ask your health care provider or pharmacist if you have questions.  What should I tell my health care provider before I take this medicine?  They need to know if you have any of these conditions:    endocrine tumors (MEN 2) or if someone in your family had these tumors    gallstones    high cholesterol    history of alcohol abuse problem    history of pancreatitis    kidney disease or if you are on dialysis    liver disease    previous swelling of the tongue, face, or lips with difficulty breathing, difficulty swallowing, hoarseness, or tightening of the throat    stomach problems    thyroid cancer or if someone in your family had thyroid cancer    an unusual or allergic reaction to liraglutide, medicines, foods, dyes, or preservatives    pregnant or trying to get pregnant    breast-feeding  How should I use this medicine?  This medicine is for injection under the skin of your upper leg, stomach area, or upper arm. You will be taught how to prepare and give this medicine. Use exactly as directed. Take your medicine at regular intervals. Do not  take it more often than directed.  It is important that you put your used needles and syringes in a special sharps container. Do not put them in a trash can. If you do not have a sharps container, call your pharmacist or healthcare provider to get one.  A special MedGuide will be given to you by the pharmacist with each prescription and refill. Be sure to read this information carefully each time.  Talk to your pediatrician regarding the use of this medicine in children. Special care may be needed.  Overdosage: If you think you've taken too much of this medicine contact a poison control center or emergency room at once.  NOTE: This medicine is only for you. Do not share this medicine with others.  What if I miss a dose?  If you miss a dose, take it as soon as you can. If it is almost time for your next dose, take only that dose. Do not take double or extra doses.  What may interact with this medicine?    acetaminophen    atorvastatin    birth control pills    digoxin    griseofulvin    lisinopril  Many medications may cause changes in blood sugar, these include:    alcohol containing beverages    aspirin and aspirin-like drugs    chloramphenicol    chromium    diuretics    female hormones, such as estrogens or progestins, birth control pills    heart medicines    isoniazid    male hormones or anabolic steroids    medications for weight loss    medicines for allergies, asthma, cold, or cough    medicines for mental problems    medicines called MAO inhibitors - Nardil, Parnate, Marplan, Eldepryl    niacin    NSAIDS, such as ibuprofen    pentamidine    phenytoin    probenecid    quinolone antibiotics such as ciprofloxacin, levofloxacin, ofloxacin    some herbal dietary supplements    steroid medicines such as prednisone or cortisone    thyroid hormones  Some medications can hide the warning symptoms of low blood sugar (hypoglycemia). You may need to monitor your blood sugar more closely if you are taking one of these  medications. These include:    beta-blockers, often used for high blood pressure or heart problems (examples include atenolol, metoprolol, propranolol)    clonidine    guanethidine    reserpine  This list may not describe all possible interactions. Give your health care provider a list of all the medicines, herbs, non-prescription drugs, or dietary supplements you use. Also tell them if you smoke, drink alcohol, or use illegal drugs. Some items may interact with your medicine.  What should I watch for while using this medicine?  Visit your doctor or health care professional for regular checks on your progress.  A test called the HbA1C (A1C) will be monitored. This is a simple blood test. It measures your blood sugar control over the last 2 to 3 months. You will receive this test every 3 to 6 months.  Learn how to check your blood sugar. Learn the symptoms of low and high blood sugar and how to manage them.  Always carry a quick-source of sugar with you in case you have symptoms of low blood sugar. Examples include hard sugar candy or glucose tablets. Make sure others know that you can choke if you eat or drink when you develop serious symptoms of low blood sugar, such as seizures or unconsciousness. They must get medical help at once.  Tell your doctor or health care professional if you have high blood sugar. You might need to change the dose of your medicine. If you are sick or exercising more than usual, you might need to change the dose of your medicine.  Do not skip meals. Ask your doctor or health care professional if you should avoid alcohol. Many nonprescription cough and cold products contain sugar or alcohol. These can affect blood sugar.  Wear a medical ID bracelet or chain, and carry a card that describes your disease and details of your medicine and dosage times.  What side effects may I notice from receiving this medicine?  Side effects that you should report to your doctor or health care professional  as soon as possible:    allergic reactions like skin rash, itching or hives, swelling of the face, lips, or tongue    breathing problems    fever, chills    loss of appetite    signs and symptoms of low blood sugar such as feeling anxious, confusion, dizziness, increased hunger, unusually weak or tired, sweating, shakiness, cold, irritable, headache, blurred vision, fast heartbeat, loss of consciousness    trouble passing urine or change in the amount of urine    unusual stomach pain or upset    vomiting  Side effects that usually do not require medical attention (Report these to your doctor or health care professional if they continue or are bothersome.):    diarrhea    headache    nausea  This list may not describe all possible side effects. Call your doctor for medical advice about side effects. You may report side effects to FDA at 6-509-FDA-9980.  Where should I keep my medicine?  Keep out of the reach of children.  Store unopened pen in a refrigerator between 2 and 8 degrees C (36 and 46 degrees F). Do not freeze or use if the medicine has been frozen. Protect from light and excessive heat. After you first use the pen, it can be stored at room temperature between 15 and 30 degrees C (59 and 86 degrees F) or in a refrigerator. Throw away your used pen after 30 days or after the expiration date, whichever comes first.  Do not store your pen with the needle attached. If the needle is left on, medicine may leak from the pen.  NOTE: This sheet is a summary. It may not cover all possible information. If you have questions about this medicine, talk to your doctor, pharmacist, or health care provider.  NOTE:This sheet is a summary. It may not cover all possible information. If you have questions about this medicine, talk to your doctor, pharmacist, or health care provider. Copyright  2014 [employer]

## 2018-06-18 NOTE — NURSING NOTE
Prior to injection verified patient identity using patient's name and date of birth.  Due to injection administration, patient instructed to remain in clinic for 15 minutes  afterwards, and to report any adverse reaction to me immediately.    Screening Questionnaire for Adult Immunization    Are you sick today?   No   Do you have allergies to medications, food, a vaccine component or latex?   No   Have you ever had a serious reaction after receiving a vaccination?   No   Do you have a long-term health problem with heart disease, lung disease, asthma, kidney disease, metabolic disease (e.g. diabetes), anemia, or other blood disorder?   Yes   Do you have cancer, leukemia, HIV/AIDS, or any other immune system problem?   No   In the past 3 months, have you taken medications that affect  your immune system, such as prednisone, other steroids, or anticancer drugs; drugs for the treatment of rheumatoid arthritis, Crohn s disease, or psoriasis; or have you had radiation treatments?   No   Have you had a seizure, or a brain or other nervous system problem?   No   During the past year, have you received a transfusion of blood or blood     products, or been given immune (gamma) globulin or antiviral drug?   No   For women: Are you pregnant or is there a chance you could become        pregnant during the next month?   No   Have you received any vaccinations in the past 4 weeks?   No     Immunization questionnaire was positive for at least one answer.  Notified .        Per orders of Dr. Angeles, injection of Pneumovax given by Dari Calderon. Patient instructed to remain in clinic for 15 minutes afterwards, and to report any adverse reaction to me immediately.       Screening performed by Dari Calderon on 6/18/2018 at 1:46 PM.

## 2018-06-18 NOTE — MR AVS SNAPSHOT
After Visit Summary   6/18/2018    Kezia Nava    MRN: [id number]           Patient Information     Date Of Birth          1975        Visit Information        Provider Department      6/18/2018 12:30 PM Shamir Angeles MD [facility]        Today's Diagnoses     Type 2 diabetes mellitus with hyperglycemia, without long-term current use of insulin (H)    -  1    Benign essential hypertension        Severe obesity (BMI 35.0-35.9 with comorbidity) (H)        Family history of colon cancer        Family history of malignant neoplasm of breast        Irregular menstrual cycle        ANA (obstructive sleep apnea)          Care Instructions    Using Your Victoza Pen  Medicine: Liraglutide (Raq-k-RZEA-tide)  Storing your pens   Store your pens in the refrigerator until you use them. You can keep a pen at room temperature for 30 days. After that, throw it away.  Get your pen ready (before first use only):  1. Wash and dry your hands well.  2. Remove the pen cap.  3. Look at the window in the pen to make sure the liquid is clear and has no color or specks.  ? Do not use the pen if it is not clear, has a color or you can see specks in it.  ? It is normal to see air bubbles.  4. Remove the seal from the new pen needle and carefully screw it onto the end of the pen.  5. Remove the outer needle cap and set it aside. Remove the inner needle cap and throw it away.  6. Turn the knob on the pen until you see -- in the dose window.  7. Hold the pen with the needle pointing straight up. Gently tap the side of the pen to get rid of any air bubbles.  8. Push the injection button until you see 0mg in the dose window. You should see a drop of liquid at the end of the needle. This means your pen is ready to use.  9. Repeat steps 7 and 8 one or two more times if you need to, until you see a drop of medicine.  Inject your dose:  1. Wash and dry your hands well.  2. Remove the pen cap.  3. Look at the window  to make sure the medicine is clear and has no color or specks.  ? Do not use the pen if it is not clear or has a color or you can see specks.  ? It is normal to see air bubbles.  4. Remove the seal from the new pen needle and screw it onto the end of the pen.  5. Remove the outer needle cap and set it aside. Remove the inner needle cap and throw it away.  6. Turn the knob away from you until you see the number for your dose in the window.  7. Use an alcohol swab to clean your skin (remember to change injection sites).  8. Insert the needle straight into your skin so that it reaches the fatty layer.  9. Use your thumb to slowly press the button on the end of the pen until you see 0mg. Hold it for 6 seconds to allow time for the medicine to get into your body.  10. Release the button and pull the needle out.  11. Put the needle cap on the end of the pen and unscrew the needle from the pen. Put the used needle into a SHARPS container.  12. Put the cap on the pen and store at room temperature, away from sunlight.  If you have questions about using your pen, please ask your pharmacist or doctor.    For informational purposes only. Not to replace the advice of your health care provider.   Copyright   2016 [employer]. All rights reserved. PocketGuide 186923 - 04/16.    Liraglutide Solution for injection  What is this medicine?  LIRAGLUTIDE (LIR a GLOO tide) is used to improve blood sugar control in adults with type 2 diabetes. This medicine may be used with other oral diabetes medicines.  This medicine may be used for other purposes; ask your health care provider or pharmacist if you have questions.  What should I tell my health care provider before I take this medicine?  They need to know if you have any of these conditions:    endocrine tumors (MEN 2) or if someone in your family had these tumors    gallstones    high cholesterol    history of alcohol abuse problem    history of pancreatitis    kidney  disease or if you are on dialysis    liver disease    previous swelling of the tongue, face, or lips with difficulty breathing, difficulty swallowing, hoarseness, or tightening of the throat    stomach problems    thyroid cancer or if someone in your family had thyroid cancer    an unusual or allergic reaction to liraglutide, medicines, foods, dyes, or preservatives    pregnant or trying to get pregnant    breast-feeding  How should I use this medicine?  This medicine is for injection under the skin of your upper leg, stomach area, or upper arm. You will be taught how to prepare and give this medicine. Use exactly as directed. Take your medicine at regular intervals. Do not take it more often than directed.  It is important that you put your used needles and syringes in a special sharps container. Do not put them in a trash can. If you do not have a sharps container, call your pharmacist or healthcare provider to get one.  A special MedGuide will be given to you by the pharmacist with each prescription and refill. Be sure to read this information carefully each time.  Talk to your pediatrician regarding the use of this medicine in children. Special care may be needed.  Overdosage: If you think you've taken too much of this medicine contact a poison control center or emergency room at once.  NOTE: This medicine is only for you. Do not share this medicine with others.  What if I miss a dose?  If you miss a dose, take it as soon as you can. If it is almost time for your next dose, take only that dose. Do not take double or extra doses.  What may interact with this medicine?    acetaminophen    atorvastatin    birth control pills    digoxin    griseofulvin    lisinopril  Many medications may cause changes in blood sugar, these include:    alcohol containing beverages    aspirin and aspirin-like drugs    chloramphenicol    chromium    diuretics    female hormones, such as estrogens or progestins, birth control  pills    heart medicines    isoniazid    male hormones or anabolic steroids    medications for weight loss    medicines for allergies, asthma, cold, or cough    medicines for mental problems    medicines called MAO inhibitors - Nardil, Parnate, Marplan, Eldepryl    niacin    NSAIDS, such as ibuprofen    pentamidine    phenytoin    probenecid    quinolone antibiotics such as ciprofloxacin, levofloxacin, ofloxacin    some herbal dietary supplements    steroid medicines such as prednisone or cortisone    thyroid hormones  Some medications can hide the warning symptoms of low blood sugar (hypoglycemia). You may need to monitor your blood sugar more closely if you are taking one of these medications. These include:    beta-blockers, often used for high blood pressure or heart problems (examples include atenolol, metoprolol, propranolol)    clonidine    guanethidine    reserpine  This list may not describe all possible interactions. Give your health care provider a list of all the medicines, herbs, non-prescription drugs, or dietary supplements you use. Also tell them if you smoke, drink alcohol, or use illegal drugs. Some items may interact with your medicine.  What should I watch for while using this medicine?  Visit your doctor or health care professional for regular checks on your progress.  A test called the HbA1C (A1C) will be monitored. This is a simple blood test. It measures your blood sugar control over the last 2 to 3 months. You will receive this test every 3 to 6 months.  Learn how to check your blood sugar. Learn the symptoms of low and high blood sugar and how to manage them.  Always carry a quick-source of sugar with you in case you have symptoms of low blood sugar. Examples include hard sugar candy or glucose tablets. Make sure others know that you can choke if you eat or drink when you develop serious symptoms of low blood sugar, such as seizures or unconsciousness. They must get medical help at  once.  Tell your doctor or health care professional if you have high blood sugar. You might need to change the dose of your medicine. If you are sick or exercising more than usual, you might need to change the dose of your medicine.  Do not skip meals. Ask your doctor or health care professional if you should avoid alcohol. Many nonprescription cough and cold products contain sugar or alcohol. These can affect blood sugar.  Wear a medical ID bracelet or chain, and carry a card that describes your disease and details of your medicine and dosage times.  What side effects may I notice from receiving this medicine?  Side effects that you should report to your doctor or health care professional as soon as possible:    allergic reactions like skin rash, itching or hives, swelling of the face, lips, or tongue    breathing problems    fever, chills    loss of appetite    signs and symptoms of low blood sugar such as feeling anxious, confusion, dizziness, increased hunger, unusually weak or tired, sweating, shakiness, cold, irritable, headache, blurred vision, fast heartbeat, loss of consciousness    trouble passing urine or change in the amount of urine    unusual stomach pain or upset    vomiting  Side effects that usually do not require medical attention (Report these to your doctor or health care professional if they continue or are bothersome.):    diarrhea    headache    nausea  This list may not describe all possible side effects. Call your doctor for medical advice about side effects. You may report side effects to FDA at 1-075-FDA-1143.  Where should I keep my medicine?  Keep out of the reach of children.  Store unopened pen in a refrigerator between 2 and 8 degrees C (36 and 46 degrees F). Do not freeze or use if the medicine has been frozen. Protect from light and excessive heat. After you first use the pen, it can be stored at room temperature between 15 and 30 degrees C (59 and 86 degrees F) or in a  refrigerator. Throw away your used pen after 30 days or after the expiration date, whichever comes first.  Do not store your pen with the needle attached. If the needle is left on, medicine may leak from the pen.  NOTE: This sheet is a summary. It may not cover all possible information. If you have questions about this medicine, talk to your doctor, pharmacist, or health care provider.  NOTE:This sheet is a summary. It may not cover all possible information. If you have questions about this medicine, talk to your doctor, pharmacist, or health care provider. Copyright  2014 [employer]                Follow-ups after your visit        Additional Services     CANCER RISK MGMT/CANCER GENETIC COUNSELING REFERRAL       Your provider has referred you to the Cancer Risk Management Program - Cancer Genetic Counseling.    Reason for Referral: cancer in family    We have a sent a notice to a staff member of the Cancer Risk Management Program to give you a call to assist with scheduling your appointment.  You may also call  [phone] ([phone]) to initiate scheduling.    Please be aware that coverage of these services is subject to the terms and limitations of your health insurance plan.  Call member services at your health plan with any benefit or coverage questions.      Please bring the completed family history sheet to your appointment in addition to any available outside medical records documenting your cancer diagnosis.                  Follow-up notes from your care team     Return in about 3 months (around 9/18/2018).      Future tests that were ordered for you today     Open Future Orders        Priority Expected Expires Ordered    *MA Screening Digital Bilateral Routine  6/18/2019 6/18/2018            Who to contact     If you have questions or need follow up information about today's clinic visit or your schedule please contact [facility] directly at [phone].  Normal or  non-critical lab and imaging results will be communicated to you by MyChart, letter or phone within 4 business days after the clinic has received the results. If you do not hear from us within 7 days, please contact the clinic through Xiantt or phone. If you have a critical or abnormal lab result, we will notify you by phone as soon as possible.  Submit refill requests through SlickLogin or call your pharmacy and they will forward the refill request to us. Please allow 3 business days for your refill to be completed.          Additional Information About Your Visit        SlickLogin Information     SlickLogin gives you secure access to your electronic health record. If you see a primary care provider, you can also send messages to your care team and make appointments. If you have questions, please call your primary care clinic.  If you do not have a primary care provider, please call [phone] and they will assist you.        Care EveryWhere ID     This is your Care EveryWhere ID. This could be used by other organizations to access your [facility] medical records  [id number]        Your Vitals Were     Pulse Temperature Last Period Pulse Oximetry BMI (Body Mass Index)       88 98.7  F (37.1  C) (Oral) 05/23/2018 98% 35.3 kg/m2        Blood Pressure from Last 3 Encounters:   06/18/18 (!) 134/92   01/31/18 (!) 136/94   01/04/18 130/84    Weight from Last 3 Encounters:   06/18/18 193 lb (87.5 kg)   01/04/18 197 lb 1.6 oz (89.4 kg)   10/17/17 194 lb (88 kg)              We Performed the Following     Albumin Random Urine Quantitative with Creat Ratio     Albumin Random Urine Quantitative with Creat Ratio     CANCER RISK MGMT/CANCER GENETIC COUNSELING REFERRAL     FOOT EXAM  NO CHARGE [[id number]]     HEMOGLOBIN A1C          Where to get your medicines      These medications were sent to [facility] [place], [place] - [address], [address]  [address], [address], [place] [address]     Phone:   [phone]     blood glucose monitoring lancets    blood glucose monitoring test strip    losartan-hydrochlorothiazide 50-12.5 MG per tablet    metFORMIN 500 MG 24 hr tablet          Primary Care Provider Office Phone # Fax #    Shamir Angeles [phone] [address] [address]        Equal Access to Services     : Hadii aad ku hadasho Soomaali, waaxda luqadaha, qaybta kaalmada adeegyada, waxay idiin hayaan adeeg kharash la'aan ah. So [facility] [phone].    ATENCIÓN: Si habla español, tiene a carter disposición servicios gratuitos de asistencia lingüística. Tyree al [phone].    We comply with applicable federal civil rights laws and [place] laws. We do not discriminate on the basis of race, color, national origin, age, disability, sex, sexual orientation, or gender identity.            Thank you!     Thank you for choosing [facility]  for your care. Our goal is always to provide you with excellent care. Hearing back from our patients is one way we can continue to improve our services. Please take a few minutes to complete the written survey that you may receive in the mail after your visit with us. Thank you!             Your Updated Medication List - Protect others around you: Learn how to safely use, store and throw away your medicines at www.disposemymeds.org.          This list is accurate as of 6/18/18  1:10 PM.  Always use your most recent med list.                   Brand Name Dispense Instructions for use Diagnosis    aspirin 81 MG EC tablet    ASPIRIN ADULT LOW DOSE    30 tablet    Take 1 tablet (81 mg) by mouth daily    Cerebrovascular accident (CVA), unspecified mechanism (H)       blood glucose monitoring lancets     3 each    Use to test blood sugar 1 times daily or as directed.    Type 2 diabetes mellitus with hyperglycemia, without long-term current use of insulin (H)       blood glucose monitoring test strip    ACCU-CHEK ALMA PLUS     100 strip    Use to test blood sugar 1 times daily or as directed.    Type 2 diabetes mellitus with hyperglycemia, without long-term current use of insulin (H)       FISH OIL PO      Take 720 mg by mouth daily        GARLIQUE PO      Take 1 tablet by mouth daily        IBU PO      Take 600 mg by mouth as needed        loratadine 10 MG tablet    CLARITIN     Take 10 mg by mouth daily        losartan-hydrochlorothiazide 50-12.5 MG per tablet    HYZAAR    90 tablet    Take 1 tablet by mouth daily    Benign essential hypertension       metFORMIN 500 MG 24 hr tablet    GLUCOPHAGE-XR    90 tablet    Take 1 tablet (500 mg) by mouth daily (with dinner)    Type 2 diabetes mellitus with hyperglycemia, without long-term current use of insulin (H)       Multi-vitamin Tabs tablet      Take 1 tablet by mouth daily        ondansetron 4 MG ODT tab    ZOFRAN ODT    12 tablet    Take 1-2 tablets (4-8 mg) by mouth every 8 hours as needed for nausea    Viral gastroenteritis       order for DME      AIRSENSE 10 10-15 CM/H20 NASAL AIRFIT N10 FOR HER        STATIN NOT PRESCRIBED (INTENTIONAL)      Please choose reason not prescribed, below    Type 2 diabetes mellitus with hyperglycemia, without long-term current use of insulin (H)       TYLENOL PO      Take 500 mg by mouth 2 times daily As needed for pain        VITAMIN D (CHOLECALCIFEROL) PO      Take by mouth daily        zolpidem 6.25 MG CR tablet    AMBIEN CR    90 tablet    Take 1 tab by mouth at bedtime as needed.

## 2018-06-18 NOTE — PROGRESS NOTES
SUBJECTIVE:   Kezia Nava is a 42 year old female who presents to clinic today for the following health issues:      New Patient/Transfer of Care    Family history is positive for breast and colon cancer and I take care of her sister  Patient is a RN in the emergency room  Diabetes Follow-up    Patient is checking blood sugars: once daily.  Results are as follows:         am - 113    Diabetic concerns: None     Symptoms of hypoglycemia (low blood sugar): none     Paresthesias (numbness or burning in feet) or sores: No     Date of last diabetic eye exam: annual June     Hyperlipidemia Follow-Up      Rate your low fat/cholesterol diet?: good    Taking statin?  Yes, no muscle aches from statin    Other lipid medications/supplements?:  none    Hypertension Follow-up      Outpatient blood pressures are not being checked.    Low Salt Diet: no added salt    BP Readings from Last 2 Encounters:   06/18/18 134/88   01/31/18 (!) 136/94     Hemoglobin A1C (%)   Date Value   10/06/2017 6.1 (H)   10/05/2017 6.1 (H)     LDL Cholesterol Calculated (mg/dL)   Date Value   10/06/2017 109 (H)   10/05/2017 97       Problem list and histories reviewed & adjusted, as indicated.  Additional history: as documented  Lab Results   Component Value Date    A1C 6.1 10/06/2017    A1C 6.1 10/05/2017    A1C 6.0 09/06/2017    A1C 7.8 02/06/2017    A1C 5.9 12/12/2015     TSH   Date Value Ref Range Status   10/06/2017 2.92 0.40 - 4.00 mU/L Final   ]      Patient Active Problem List   Diagnosis     Pain in joint, ankle and foot     Cervical radiculopathy     Benign essential hypertension     Type 2 diabetes mellitus with hyperglycemia, without long-term current use of insulin (H)     ANA (obstructive sleep apnea)     Acute bilateral low back pain with right-sided sciatica     Paresthesias- ? TIA vs complex migraine (preferred per neuro)     Severe obesity (BMI 35.0-35.9 with comorbidity) (H)     Past Surgical History:   Procedure Laterality Date      ARTHROSCOPY ANKLE, OPEN REPAIR TENDON, COMBINED  11/8/2012    Procedure: COMBINED ARTHROSCOPY ANKLE, OPEN REPAIR TENDON;  Ankle Arthroscopy Left Ankle, Open Ligament Repair Left Ankle, Peroneal Tendon Repair Left Ankle ;  Surgeon: Otf Ramos DPM;  Location: [facility] OR     C APPENDECTOMY  1984     COMBINED CYSTOSCOPY, INSERT STENT URETER(S)  8/6/2013    Procedure: COMBINED CYSTOSCOPY, INSERT STENT URETER(S);  cystoscopy, right retrograde,RIGHT STENT PLACEMENT.;  Surgeon: Kan Porter MD;  Location:  OR     CYSTOSCOPY, RETROGRADES, INSERT STENT URETER(S), COMBINED  8/6/2013    Procedure: COMBINED CYSTOSCOPY, RETROGRADES, INSERT STENT URETER(S);  cystoscopy, right retrograde, insert stent right ureter;  Surgeon: Kan Porter MD;  Location:  OR     DENTAL SURGERY      TOOTH#7 - DENTAL IMPLANT     DISCECTOMY, FUSION CERVICAL ANTERIOR ONE LEVEL, COMBINED N/A 12/13/2015    Procedure: COMBINED DISCECTOMY, FUSION CERVICAL ANTERIOR ONE LEVEL;  Surgeon: Seven Umaña MD;  Location:  OR     EXTRACORPOREAL SHOCK WAVE LITHOTRIPSY (ESWL)  8/19/2013    Procedure: EXTRACORPOREAL SHOCK WAVE LITHOTRIPSY (ESWL);   RIGHT EXTRACORPOREAL SHOCK WAVE LITHOTRIPSY;  Surgeon: Otf Tobias MD;  Location:  OR      REDUCTION OF LARGE BREAST  2001     LASIK BILATERAL       SALPINGO OOPHORECTOMY,R/L/DERREK  1995    Right       Social History   Substance Use Topics     Smoking status: Never Smoker     Smokeless tobacco: Never Used     Alcohol use 0.0 oz/week     0 Standard drinks or equivalent per week      Comment: socially     Family History   Problem Relation Age of Onset     C.A.D. Mother      stents     Hypertension Mother      KIDNEY DISEASE Mother      transplant     Hypertension Father      Breast Cancer Maternal Aunt      may have been fibrocystic     Breast Cancer Maternal Aunt      may have been fibrocystic     CEREBROVASCULAR DISEASE Paternal Grandfather      CANCER Paternal  Grandmother      stomach     Breast Cancer Sister      Nephrolithiasis Brother      Cancer - colorectal Maternal Grandmother          Current Outpatient Prescriptions   Medication Sig Dispense Refill     Acetaminophen (TYLENOL PO) Take 500 mg by mouth 2 times daily As needed for pain       aspirin (ASPIRIN ADULT LOW DOSE) 81 MG EC tablet Take 1 tablet (81 mg) by mouth daily 30 tablet 3     blood glucose monitoring (ACCU-CHEK ALMA PLUS) test strip Use to test blood sugar 1 times daily or as directed. 100 strip 1     blood glucose monitoring (ACCU-CHEK FASTCLIX) lancets Use to test blood sugar 1 times daily or as directed. 3 each 3     Garlic (GARLIQUE PO) Take 1 tablet by mouth daily       Ibuprofen (IBU PO) Take 600 mg by mouth as needed       loratadine (CLARITIN) 10 MG tablet Take 10 mg by mouth daily       losartan-hydrochlorothiazide (HYZAAR) 50-12.5 MG per tablet Take 1 tablet by mouth daily 90 tablet 1     metFORMIN (GLUCOPHAGE-XR) 500 MG 24 hr tablet Take 1 tablet (500 mg) by mouth daily (with dinner) 90 tablet 1     multivitamin, therapeutic with minerals (MULTI-VITAMIN) TABS tablet Take 1 tablet by mouth daily       Omega-3 Fatty Acids (FISH OIL PO) Take 720 mg by mouth daily       ondansetron (ZOFRAN ODT) 4 MG ODT tab Take 1-2 tablets (4-8 mg) by mouth every 8 hours as needed for nausea 12 tablet 1     order for DME AIRSENSE 10  10-15 CM/H20  NASAL AIRFIT N10 FOR HER       STATIN NOT PRESCRIBED, INTENTIONAL, Please choose reason not prescribed, below       VITAMIN D, CHOLECALCIFEROL, PO Take by mouth daily       zolpidem (AMBIEN CR) 6.25 MG CR tablet Take 1 tab by mouth at bedtime as needed. 90 tablet 0     [DISCONTINUED] losartan-hydrochlorothiazide (HYZAAR) 50-12.5 MG per tablet Take 1 tablet by mouth daily 90 tablet 1     [DISCONTINUED] metFORMIN (GLUCOPHAGE-XR) 500 MG 24 hr tablet Take 1 tablet (500 mg) by mouth daily (with dinner) 90 tablet 1       Reviewed and updated as needed this visit by  clinical staff  Tobacco  Allergies  Meds  Problems  Surg Hx  Fam Hx       Reviewed and updated as needed this visit by Provider  Tobacco  Allergies  Meds  Problems  Surg Hx  Fam Hx         ROS:  Constitutional, HEENT, cardiovascular, pulmonary, GI, , musculoskeletal, neuro, skin, endocrine and psych systems are negative, except as otherwise noted.    OBJECTIVE:     /88  Pulse 88  Temp 98.7  F (37.1  C) (Oral)  Wt 193 lb (87.5 kg)  LMP 05/23/2018  SpO2 98%  BMI 35.3 kg/m2  Body mass index is 35.3 kg/(m^2).  GENERAL: healthy, alert and no distress  NECK: no adenopathy, no asymmetry, masses, or scars and thyroid normal to palpation  RESP: lungs clear to auscultation - no rales, rhonchi or wheezes  CV: regular rate and rhythm, normal S1 S2, no S3 or S4, no murmur, click or rub, no peripheral edema and peripheral pulses strong  ABDOMEN: soft, nontender, no hepatosplenomegaly, no masses and bowel sounds normal  MS: no gross musculoskeletal defects noted, no edema    Foot exam shows no ulceration, no neuropathy, microfilament well felt. Skin on the feet not dry.  Pulses in the feet well felt.      ASSESSMENT/PLAN:             Kezia was seen today for establish care.    Diagnoses and all orders for this visit:    Type 2 diabetes mellitus with hyperglycemia, without long-term current use of insulin (H)  -     FOOT EXAM  NO CHARGE [34534.114]  -     HEMOGLOBIN A1C  -     Albumin Random Urine Quantitative with Creat Ratio  -     metFORMIN (GLUCOPHAGE-XR) 500 MG 24 hr tablet; Take 1 tablet (500 mg) by mouth daily (with dinner)  -     blood glucose monitoring (ACCU-CHEK FASTCLIX) lancets; Use to test blood sugar 1 times daily or as directed.  -     blood glucose monitoring (ACCU-CHEK ALMA PLUS) test strip; Use to test blood sugar 1 times daily or as directed.  -     Albumin Random Urine Quantitative with Creat Ratio  We discussed about GLP agonist  Benign essential hypertension  -      losartan-hydrochlorothiazide (HYZAAR) 50-12.5 MG per tablet; Take 1 tablet by mouth daily  We will take another blood pressure upon return visit and dose of losartan may have to be increasedSevere obesity (BMI 35.0-35.9 with comorbidity) (H)    Family history of colon cancer  -     CANCER RISK MGMT/CANCER GENETIC COUNSELING REFERRAL    Family history of malignant neoplasm of breast  -     CANCER RISK MGMT/CANCER GENETIC COUNSELING REFERRAL  -     *MA Screening Digital Bilateral; Future  We might need MRI of the breast her sister has negative BRCA  Irregular menstrual cycle  Patient has had workup done in gynecology office and I will obtain those  ANA (obstructive sleep apnea)  Weight loss will help the patient with morbid obesity  We discussed about the possibilities and especially GLP agonist may be a good choice with diabetes  I will see her back in 3 months    Shamir Angeles MD  [facility]

## 2018-06-19 LAB
CREAT UR-MCNC: 22 MG/DL
MICROALBUMIN UR-MCNC: 11 MG/L
MICROALBUMIN/CREAT UR: 50.46 MG/G CR (ref 0–25)

## 2018-06-22 ENCOUNTER — TELEPHONE (OUTPATIENT)
Dept: PHARMACY | Facility: OTHER | Age: 43
End: 2018-06-22

## 2018-06-22 NOTE — TELEPHONE ENCOUNTER
MTM referral from: Provider request    MTM referral outreach attempt #1 on June 22, 2018 at 10:53 AM      Outcome: Left Message    Alva Lara MTM Coordinator

## 2018-07-13 ENCOUNTER — OFFICE VISIT (OUTPATIENT)
Dept: PHARMACY | Facility: CLINIC | Age: 43
End: 2018-07-13
Payer: COMMERCIAL

## 2018-07-13 VITALS — DIASTOLIC BLOOD PRESSURE: 70 MMHG | WEIGHT: 196 LBS | SYSTOLIC BLOOD PRESSURE: 118 MMHG | BODY MASS INDEX: 35.85 KG/M2

## 2018-07-13 DIAGNOSIS — E78.2 MIXED HYPERLIPIDEMIA: ICD-10-CM

## 2018-07-13 DIAGNOSIS — G47.00 INSOMNIA: ICD-10-CM

## 2018-07-13 DIAGNOSIS — E63.9 NUTRITIONAL DISORDER: ICD-10-CM

## 2018-07-13 DIAGNOSIS — G47.00 INSOMNIA, UNSPECIFIED TYPE: ICD-10-CM

## 2018-07-13 DIAGNOSIS — E11.65 TYPE 2 DIABETES MELLITUS WITH HYPERGLYCEMIA, WITHOUT LONG-TERM CURRENT USE OF INSULIN (H): Primary | Chronic | ICD-10-CM

## 2018-07-13 DIAGNOSIS — R11.0 NAUSEA: ICD-10-CM

## 2018-07-13 DIAGNOSIS — I10 BENIGN ESSENTIAL HYPERTENSION: ICD-10-CM

## 2018-07-13 DIAGNOSIS — R52 INTERMITTENT PAIN: ICD-10-CM

## 2018-07-13 DIAGNOSIS — E78.5 HYPERLIPEMIA: ICD-10-CM

## 2018-07-13 DIAGNOSIS — L50.9 URTICARIA: ICD-10-CM

## 2018-07-13 PROCEDURE — 99607 MTMS BY PHARM ADDL 15 MIN: CPT | Performed by: PHARMACIST

## 2018-07-13 PROCEDURE — 99605 MTMS BY PHARM NP 15 MIN: CPT | Performed by: PHARMACIST

## 2018-07-13 RX ORDER — LIRAGLUTIDE 6 MG/ML
INJECTION SUBCUTANEOUS
Qty: 6 ML | Refills: 0 | Status: SHIPPED | OUTPATIENT
Start: 2018-07-13 | End: 2018-07-31

## 2018-07-13 NOTE — PROGRESS NOTES
SUBJECTIVE/OBJECTIVE:                           Kezia Nava is a 42 year old female coming in for an initial visit for Medication Therapy Management.  She was referred to me from Dr. Angeles.     Chief Complaint: Victoza start.    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  Patient takes medications 1 time(s) per day.  Per patient, misses medication 0 times per week - she has an angel luis on her phone which texts her a reminder to take her medications each day.    Diabetes:  Pt currently taking metformin ER 500mg daily.  She's interested in starting Victoza.  Pt is not experiencing side effects.  SMBG: A few times a week.   Ranges (patient reported): Fasting 100-130mg/dL; after meals <160mg/dL  Patient is not experiencing hypoglycemia  Recent symptoms of high blood sugar? none  Eye exam: up to date  Foot exam: up to date  Microalbumin is not < 30 mg/g. Pt is taking an ACEi/ARB.  Aspirin: Taking 81mg daily and denies side effects  Diet/Exercise: She tries to stick to a low carb diet - she recently switched from working nights to days so her schedule has been off; she does not by any processed carbs, eats a lot of fruits and vegetables.    Hypertension: Current medications include losartan/HCTZ 50/12.5mg daily.  Patient does occasionally self-monitor BP - yesterday it was 120/86mmHg after work.  Patient reports no current medication side effects.    Hyperlipidemia: She's currently taking fish oil 1000mg daily - she wants to avoid statins if at all possible.  She did have some numbness in her face and arm - her CT scan and MRI were normal.  She has seen neurology and they felt it may have been a complex migraine.   She has taken Garlique in the past for cholesterol, but is not currently taking.      Nausea:  She has an Rx for Zofran available, but is not currently using.  She has found this effective in the past when needed.    Insomnia:  She also has zolpidem available for use, but hasn't used since she switched to working days (since March).  She has found it effective in the past when needed.    Allergies:  She takes loratadine 10mg daily for allergies, which she finds effective.  She had idiopathic hives, which have been controlled since starting loratadine.    Pain:  She uses APAP or ibuprofen as needed for general aches and pains, which she feels is effective.  She denies side effects of therapy.    Supplements: Currently taking Flintstones MVI daily, Fish Oil 1000mg daily, Vitamin D 10,000 IU most days. Pt has no current concerns or reported side effects to therapy.    Current labs include:  Today's Vitals: /70  Wt 196 lb (88.9 kg)  BMI 35.85 kg/m2     BP Readings from Last 3 Encounters:   06/18/18 134/88   01/31/18 (!) 136/94   01/04/18 130/84     Lab Results   Component Value Date    A1C 5.9 06/18/2018   .  Lab Results   Component Value Date    CHOL 183 10/06/2017     Lab Results   Component Value Date    TRIG 153 10/06/2017     Lab Results   Component Value Date    HDL 43 10/06/2017     Lab Results   Component Value Date     10/06/2017       Liver Function Studies -   Recent Labs   Lab Test  01/03/17   1206   PROTTOTAL  8.1   ALBUMIN  4.1   BILITOTAL  0.4   ALKPHOS  58   AST  29   ALT  43       Lab Results   Component Value Date    UCRR 22 06/18/2018    MICROL 11 06/18/2018    UMALCR 50.46 (H) 06/18/2018       Last Basic Metabolic Panel:  Lab Results   Component Value Date     10/07/2017      Lab Results   Component Value Date    POTASSIUM 3.5 10/07/2017     Lab Results   Component Value Date    CHLORIDE 109 10/07/2017     Lab Results   Component Value Date    BUN 9 10/07/2017     Lab Results    Component Value Date    CR 0.62 10/07/2017     GFR Estimate   Date Value Ref Range Status   10/07/2017 >90 >60 mL/min/1.7m2 Final     Comment:     Non  GFR Calc   10/06/2017 >90 >60 mL/min/1.7m2 Final     Comment:     Non  GFR Calc   10/05/2017 86 >60 mL/min/1.7m2 Final     Comment:     Non  GFR Calc       Most Recent Immunizations   Administered Date(s) Administered     HEPA 01/28/2009     Influenza (IIV3) PF 10/15/2016     Influenza Vaccine IM 3yrs+ 4 Valent IIV4 11/08/2017     Pneumococcal 23 valent 06/18/2018     TD (ADULT, 7+) 09/10/2005     TDAP Vaccine (Adacel) 03/06/2017       ASSESSMENT:                             Current medications were reviewed today.     Medication Adherence: good, no issues identified    Diabetes:  Needs improvement. Pt is meeting A1c goal of <7%, fasting BG of  mg/dL, and post-prandial BG of <180 mg/dL. Pt may benefit from starting Victoza. Educated patient on common and rare side effects of Victoza, as well as how to use the device. It is unlikely that using Victoza and metformin will cause hypoglycemia, however, may consider discontinuing metformin in the future as dose of Victoza can be increased.     Hypertension: Stable. Pt is meeting blood pressure goal of <140/90 mmHg. Pt's urine albumin is not meeting goal of <30. May consider discontinuing HCTZ and increasing losartan in the future for improved renal protection as blood pressure tolerates.     Hyperlipidemia: Needs improvement. Pt is a candidate for moderate intensity statin therapy per 2013 ACC/AHA lipid guidelines, however pt refuses. Pt may benefit from discontinuing fish oil, as the patient's triglycerides are not high, it is unclear how much benefit she is getting from this. Additionally, fish oil supplements may increase LDL cholesterol.     Nausea:  Stable.    Insomnia:  Stable.    Allergies:  Stable.    Pain:  Stable.    Supplements: Needs improvement. See fish  oil discussion above.    PLAN:                            1.  Pt to start Victoza 0.6mg daily x1 week, then increase to 1.2mg daily. Prescription for pens and needles sent to pharmacy.  2.  Pt to continue metformin  4.  Pt to discontinue fish oil    I spent 45 minutes with this patient today. All changes were made via collaborative practice agreement with Shamir Angeles. A copy of the visit note was provided to the patient's primary care provider.    Will follow up on diabetes in 2-3 weeks via [facility].    The patient was given a summary of these recommendations as an after visit summary.     Bijal Ortega, PharmD IV Student    Irene Ramsey, PharmD, [facility]  Medication Therapy Management Provider  Pager: [phone]

## 2018-07-13 NOTE — PATIENT INSTRUCTIONS
Recommendations from today's MTM visit:                                                    MTM (medication therapy management) is a service provided by a clinical pharmacist designed to help you get the most of out of your medicines.   Today we reviewed what your medicines are for, how to know if they are working, that your medicines are safe and how to make your medicine regimen as easy as possible.     1.  Start Victoza 0.6mg daily x1 week, then increase to 1.2mg daily.  We have the option of increasing to 1.8mg daily down the road if needed.    2.  There is a coupon card available at [email or web address]    3.  Continue metformin for now - if you start to get low blood sugars, let us know.    4.  You can stop taking the fish oil - it's questionable how much benefit you're getting from this and it may be increasing your LDL (bad cholesterol).    Next MTM visit:  I'll check in via [employer] in a few weeks    To schedule another MTM appointment, please call the clinic directly or you may call the MTM scheduling line at [phone] or toll-free at [phone].     My Clinical Pharmacist's contact information:                                                      It was a pleasure seeing you today!  Please feel free to contact me with any questions or concerns you have.      Irene Ramsey, Lina, [facility]  Medication Therapy Management Provider  Pager: [phone]     You may receive a survey about the MTM services you received.  I would appreciate your feedback to help me serve you better in the future. Please fill it out and return it when you can. Your comments will be anonymous.

## 2018-07-13 NOTE — MR AVS SNAPSHOT
After Visit Summary   7/13/2018    Kezia Nava    MRN: [id number]           Patient Information     Date Of Birth          1975        Visit Information        Provider Department      7/13/2018 10:30 AM Irene Ramsey, [facility] MTM        Today's Diagnoses     Type 2 diabetes mellitus with hyperglycemia, without long-term current use of insulin (H)    -  1      Care Instructions    Recommendations from today's MTM visit:                                                    MTM (medication therapy management) is a service provided by a clinical pharmacist designed to help you get the most of out of your medicines.   Today we reviewed what your medicines are for, how to know if they are working, that your medicines are safe and how to make your medicine regimen as easy as possible.     1.  Start Victoza 0.6mg daily x1 week, then increase to 1.2mg daily.  We have the option of increasing to 1.8mg daily down the road if needed.    2.  There is a coupon card available at https://www.BAROnova/victoza-support-and-savings/save-on-your-prescription.html    3.  Continue metformin for now - if you start to get low blood sugars, let us know.    4.  You can stop taking the fish oil - it's questionable how much benefit you're getting from this and it may be increasing your LDL (bad cholesterol).    Next MTM visit:  I'll check in via [employer] in a few weeks    To schedule another MTM appointment, please call the clinic directly or you may call the MTM scheduling line at [phone] or toll-free at [phone].     My Clinical Pharmacist's contact information:                                                      It was a pleasure seeing you today!  Please feel free to contact me with any questions or concerns you have.      Irene Ramsey, PharmD, BCACP  Medication Therapy Management Provider  Pager: [phone]     You may receive a survey about the MTM services you received.  I  would appreciate your feedback to help me serve you better in the future. Please fill it out and return it when you can. Your comments will be anonymous.                     Follow-ups after your visit        Your next 10 appointments already scheduled     Aug 16, 2018  9:00 AM CDT   New Visit with Tiffany Albert GC   Cancer Risk Management Program ([facility] [place])    [facility]  [address] [id number] [phone]            Sep 24, 2018  9:00 AM CDT   Office Visit with Shamir Angeles MD   [facility]    [address] [facility] [address] [phone]           Bring a current list of meds and any records pertaining to this visit. For Physicals, please bring immunization records and any forms needing to be filled out. Please arrive 10 minutes early to complete paperwork.              Who to contact     If you have questions or need follow up information about today's clinic visit or your schedule please contact [facility] MTM directly at [phone].  Normal or non-critical lab and imaging results will be communicated to you by [employer], letter or phone within 4 business days after the clinic has received the results. If you do not hear from us within 7 days, please contact the clinic through [employer] or phone. If you have a critical or abnormal lab result, we will notify you by phone as soon as possible.  Submit refill requests through [employer] or call your pharmacy and they will forward the refill request to us. Please allow 3 business days for your refill to be completed.          Additional Information About Your Visit        [email or web address] Information     [employer] gives you secure access to your electronic health record. If you see a primary care provider, you can also send messages to your care team and make appointments. If you have questions, please call your primary care clinic.  If you do not have a  primary care provider, please call [phone] and they will assist you.        Care EveryWhere ID     This is your Care EveryWhere ID. This could be used by other organizations to access your [facility] medical records  [id number]        Your Vitals Were     BMI (Body Mass Index)                   35.85 kg/m2            Blood Pressure from Last 3 Encounters:   07/13/18 118/70   06/18/18 134/88   01/31/18 (!) 136/94    Weight from Last 3 Encounters:   07/13/18 196 lb (88.9 kg)   06/18/18 193 lb (87.5 kg)   01/04/18 197 lb 1.6 oz (89.4 kg)              Today, you had the following     No orders found for display         Today's Medication Changes          These changes are accurate as of 7/13/18 10:58 AM.  If you have any questions, ask your nurse or doctor.               Start taking these medicines.        Dose/Directions    insulin pen needle 32G X 4 MM   Commonly known as:  BD VALENTINA U/F   Used for:  Type 2 diabetes mellitus with hyperglycemia, without long-term current use of insulin (H)   Started by:  Irene Ramsey RPH        Use once daily as directed.   Quantity:  100 each   Refills:  PRN       liraglutide 18 MG/3ML soln   Commonly known as:  VICTOZA PEN   Used for:  Type 2 diabetes mellitus with hyperglycemia, without long-term current use of insulin (H)   Started by:  Irene Ramsey RPH        Inject 0.6mg under the skin daily for 7 days, then increase to 1.2mg under the skin daily.   Quantity:  6 mL   Refills:  0            Where to get your medicines      These medications were sent to [facility] [place], [place] - [address], [address]  [address], [address], [place] [address]     Phone:  [phone]     insulin pen needle 32G X 4 MM    liraglutide 18 MG/3ML soln                Primary Care Provider Office Phone # Fax #    Shamir Angeles [phone] [address]  [facility] [address]        Equal Access to Services     MONSERRAT MARCUM : Elyssa  petar Crawford, wadimada luyungadaha, qaybta kaalmada sotoenrique, waxnahomy ana liuannevon xiao faisal. So [facility] [phone].    ATENCIÓN: Si habla español, tiene a carter disposición servicios gratuitos de asistencia lingüística. Llame al [phone].    We comply with applicable federal civil rights laws and [place] laws. We do not discriminate on the basis of race, color, national origin, age, disability, sex, sexual orientation, or gender identity.            Thank you!     Thank you for choosing [facility]  for your care. Our goal is always to provide you with excellent care. Hearing back from our patients is one way we can continue to improve our services. Please take a few minutes to complete the written survey that you may receive in the mail after your visit with us. Thank you!             Your Updated Medication List - Protect others around you: Learn how to safely use, store and throw away your medicines at www.disposemymeds.org.          This list is accurate as of 7/13/18 10:58 AM.  Always use your most recent med list.                   Brand Name Dispense Instructions for use Diagnosis    aspirin 81 MG EC tablet    ASPIRIN ADULT LOW DOSE    30 tablet    Take 1 tablet (81 mg) by mouth daily    Cerebrovascular accident (CVA), unspecified mechanism (H)       blood glucose monitoring lancets     3 each    Use to test blood sugar 1 times daily or as directed.    Type 2 diabetes mellitus with hyperglycemia, without long-term current use of insulin (H)       blood glucose monitoring test strip    ACCU-CHEK ALMA PLUS    100 strip    Use to test blood sugar 1 times daily or as directed.    Type 2 diabetes mellitus with hyperglycemia, without long-term current use of insulin (H)       IBU PO      Take 600 mg by mouth every 6 hours as needed        insulin pen needle 32G X 4 MM    BD VALENTINA U/F    100 each    Use once daily as directed.    Type 2 diabetes mellitus with hyperglycemia,  without long-term current use of insulin (H)       liraglutide 18 MG/3ML soln    VICTOZA PEN    6 mL    Inject 0.6mg under the skin daily for 7 days, then increase to 1.2mg under the skin daily.    Type 2 diabetes mellitus with hyperglycemia, without long-term current use of insulin (H)       loratadine 10 MG tablet    CLARITIN     Take 10 mg by mouth daily        losartan-hydrochlorothiazide 50-12.5 MG per tablet    HYZAAR    90 tablet    Take 1 tablet by mouth daily    Benign essential hypertension       metFORMIN 500 MG 24 hr tablet    GLUCOPHAGE-XR    90 tablet    Take 1 tablet (500 mg) by mouth daily (with dinner)    Type 2 diabetes mellitus with hyperglycemia, without long-term current use of insulin (H)       Multi-vitamin Tabs tablet      Take 1 tablet by mouth daily        ondansetron 4 MG ODT tab    ZOFRAN ODT    12 tablet    Take 1-2 tablets (4-8 mg) by mouth every 8 hours as needed for nausea    Viral gastroenteritis       order for DME      AIRSENSE 10 10-15 CM/H20 NASAL AIRFIT N10 FOR HER        STATIN NOT PRESCRIBED (INTENTIONAL)      Please choose reason not prescribed, below    Type 2 diabetes mellitus with hyperglycemia, without long-term current use of insulin (H)       TYLENOL PO      Take 500-1,000 mg by mouth every 6 hours as needed As needed for pain        VITAMIN D (CHOLECALCIFEROL) PO      Take 10,000 Units by mouth daily        zolpidem 6.25 MG CR tablet    AMBIEN CR    90 tablet    Take 1 tab by mouth at bedtime as needed.

## 2018-08-16 ENCOUNTER — ONCOLOGY VISIT (OUTPATIENT)
Dept: ONCOLOGY | Facility: CLINIC | Age: 43
End: 2018-08-16
Attending: GENETIC COUNSELOR, MS
Payer: COMMERCIAL

## 2018-08-16 DIAGNOSIS — Z80.0 FAMILY HISTORY OF COLON CANCER: ICD-10-CM

## 2018-08-16 DIAGNOSIS — Z80.42 FAMILY HISTORY OF PROSTATE CANCER: ICD-10-CM

## 2018-08-16 DIAGNOSIS — Z80.3 FAMILY HISTORY OF MALIGNANT NEOPLASM OF BREAST: Primary | ICD-10-CM

## 2018-08-16 DIAGNOSIS — Z80.49 FAMILY HISTORY OF UTERINE CANCER: ICD-10-CM

## 2018-08-16 LAB — MISCELLANEOUS TEST: NORMAL

## 2018-08-16 PROCEDURE — 96040 ZZH GENETIC COUNSELING, EACH 30 MINUTES: CPT | Performed by: GENETIC COUNSELOR, MS

## 2018-08-16 NOTE — PATIENT INSTRUCTIONS
Assessing Cancer Risk  Only about 5-10% of cancers are thought to be due to an inherited cancer susceptibility gene.    These families often have:    Several people with the same or related types of cancer    Cancers diagnosed at a young age (before age 50)    Individuals with more than one primary cancer    Multiple generations of the family affected with cancer    Some people may be candidates for genetic testing of more than one gene.  For these families, genetic testing using a cancer panel may be offered.  These panels will test different genes known to increase the risk for breast, ovarian, uterine, and/or other cancers. All of the genes discussed below have published clinical management guidelines for individuals who are found to carry a mutation. The purpose of this handout is to serve as a brief summary of the genes analyzed by the panels used to inquire about hereditary breast and gynecologic cancer:  JOSUE, BRCA1, BRCA2, BRIP1, CDH1, CHEK2, MLH1, MSH2, MSH6, PMS2, EPCAM, PTEN, PALB2, RAD51C, RAD51D, and TP53.  ______________________________________________________________________________  Hereditary Breast and Ovarian Cancer Syndrome   (BRCA1 and BRCA2)  A single mutation in one of the copies of BRCA1 or BRCA2 increases the risk for breast and ovarian cancer, among others.  The risk for pancreatic cancer and melanoma may also be slightly increased in some families.  The chart below shows the chance that someone with a BRCA mutation would develop cancer in his or her lifetime1,2,3,4.        A person s ethnic background is also important to consider, as individuals of Ashkenazi Rastafarian ancestry have a higher chance of having a BRCA gene mutation.  There are three BRCA mutations that occur more frequently in this population.    Martel Syndrome   (MLH1, MSH2, MSH6, PMS2, and EPCAM)  Currently five genes are known to cause Martel Syndrome: MLH1, MSH2, MSH6, PMS2, and EPCAM.  A single mutation in one of the  Martel Syndrome genes increases the risk for colon, endometrial, ovarian, and stomach cancers.  Other cancers that occur less commonly in Martel Syndrome include urinary tract, skin, and brain cancers.  The chart below shows the chance that a person with Martel syndrome would develop cancer in his or her lifetime5.      *Cancer risk varies depending on Martel syndrome gene found    Cowden Syndrome   (PTEN)  Cowden syndrome is a hereditary condition that increases the risk for breast, thyroid, endometrial, colon, and kidney cancer.  Cowden syndrome is caused by a mutation in the PTEN gene.  A single mutation in one of the copies of PTEN causes Cowden syndrome and increases cancer risk.  The chart below shows the chance that someone with a PTEN mutation would develop cancer in their lifetime6,7.  Other benign features seen in some individuals with Cowden syndrome include benign skin lesions (facial papules, keratoses, lipomas), learning disability, autism, thyroid nodules, colon polyps, and larger head size.      *One recent study found breast cancer risk to be increased to 85%    Li-Fraumeni Syndrome   (TP53)  Li-Fraumeni Syndrome (LFS) is a cancer predisposition syndrome caused by a mutation in the TP53 gene. A single mutation in one of the copies of TP53 increases the risk for multiple cancers. Individuals with LFS are at an increased risk for developing cancer at a young age. The lifetime risk for development of a LFS-associated cancer is 50% by age 30 and 90% by age 60.     Core Cancers: Sarcomas, Breast, Brain, Lung, Leukemias/Lymphomas, Adrenocortical carcinomas  Other Cancers: Gastrointestinal, Thyroid, Skin, Genitourinary    Hereditary Diffuse Gastric Cancer   (CDH1)  Currently, one gene is known to cause hereditary diffuse gastric cancer (HDGC): CDH1.  Individuals with HDGC are at increased risk for diffuse gastric cancer and lobular breast cancer. Of people diagnosed with HDGC, 30-50% have a mutation in the  CDH1 gene.  This suggests there are likely other genes that may cause HDGC that have not been identified yet.      Lifetime Cancer Risks    General Population HDGC    Diffuse Gastric  <1% ~80%   Breast 12% 39-52%         Additional Genes  JOSUE  JOSUE is a moderate-risk breast cancer gene. Women who have a mutation in JOSUE can have between a 2-4 fold increased risk for breast cancer compared to the general population8. JOSUE mutations have also been associated with increased risk for pancreatic cancer, however an estimate of this cancer risk is not well understood9. Individuals who inherit two JOSUE mutations have a condition called ataxia-telangiectasia (AT).  This rare autosomal recessive condition affects the nervous system and immune system, and is associated with progressive cerebellar ataxia beginning in childhood.  Individuals with ataxia-telangiectasia often have a weakened immune system and have an increased risk for childhood cancers.    PALB2  Mutations in PALB2 have been shown to increase the risk of breast cancer up to 33-58% in some families; where individuals fall within this risk range is dependent upon family [id number]. PALB2 mutations have also been associated with increased risk for pancreatic cancer, although this risk has not been quantified yet.  Individuals who inherit two PALB2 mutations--one from their mother and one from their father--have a condition called Fanconi Anemia.  This rare autosomal recessive condition is associated with short stature, developmental delay, bone marrow failure, and increased risk for childhood cancers.    CHEK2   CHEK2 is a moderate-risk breast cancer gene.  Women who have a mutation in CHEK2 have around a 2-fold increased risk for breast cancer compared to the general population, and this risk may be higher depending upon family history.11,12,13 Mutations in CHEK2 have also been shown to increase the risk of a number of other cancers, including colon and prostate,  however these cancer risks are currently not well understood.    BRIP1, RAD51C and RAD51D  Mutations in BRIP1, RAD51C, and RAD51D have been shown to increase the risk of ovarian cancer and possibly female breast cancer as well14,15 .       Lifetime Cancer Risk    General Population BRIP1 RAD51C RAD51D   Ovarian 1-2% ~5-8% ~5-9% ~7-15%               Inheritance  All of the cancer syndromes reviewed above are inherited in an autosomal dominant pattern.  This means that if a parent has a mutation, each of his or her children will have a 50% chance of inheriting that same mutation.  Therefore, each child--male or female--would have a 50% chance of being at increased risk for developing cancer.      Image obtained from [employer] Home Reference, 2013     Mutations in some genes can occur de rhett, which means that a person s mutation occurred for the first time in them and was not inherited from a parent.  Now that they have the mutation, however, it can be passed on to future generations.    Genetic Testing  Genetic testing involves a blood test and will look at the genetic information in the JOSUE, BRCA1, BRCA2, BRIP1, CDH1, CHEK2, MLH1, MSH2, MSH6, PMS2, EPCAM, PTEN, PALB2, RAD51C, RAD51D, and TP53 genes for any harmful mutations that are associated with increased cancer risk.  If possible, it is recommended that the person(s) who has had cancer be tested before other family members.  That person will give us the most useful information about whether or not a specific gene is associated with the cancer in the family.    Results  There are three possible results of genetic testing:    Positive--a harmful mutation was identified in one or more of the genes    Negative--no mutation was identified in any of the genes on this panel    Variant of unknown significance--a variation in one of the genes was identified, but it is unclear how this impacts cancer risk in the family    Advantages and Disadvantages   There are advantages  and disadvantages to genetic testing.    Advantages    May clarify your cancer risk    Can help you make medical decisions    May explain the cancers in your family    May give useful information to your family members (if you share your results)    Disadvantages    Possible negative emotional impact of learning about inherited cancer risk    Uncertainty in interpreting a negative test result in some situations    Possible genetic discrimination concerns (see below)    Genetic Information Nondiscrimination Act (JOJO)  JOJO is a federal law that protects individuals from health insurance or employment discrimination based on a genetic test result alone.  Although rare, there are currently no legal discrimination protections in terms of life insurance, long term care, or disability insurances.  Visit the [employer] [place] website to learn more.    Reducing Cancer Risk  All of the genes described above have nationally recognized cancer screening guidelines that would be recommended for individuals who test positive.  In addition to increased cancer screening, surgeries may be offered or recommended to reduce cancer risk.  Recommendations are based upon an individual s genetic test result as well as their personal and family history of cancer.    Questions to Think About Regarding Genetic Testing:    What effect will the test result have on me and my relationship with my family members if I have an inherited gene mutation?  If I don t have a gene mutation?    Should I share my test results, and how will my family react to this news, which may also affect them?    Are my children ready to learn new information that may one day affect their own health?    Hereditary Cancer Resources    FORCE: Facing Our Risk of Cancer Empowered facingourrisk.org   Bright Pink bebrightpink.org   Li-Fraumeni Syndrome Association lfsassociation.org   PTEN World PTENworld.com   No stomach for cancer, Inc.  nostomachforcancer.org   Stomach cancer relief network Scrnet.org   Collaborative Group of the Americas on Inherited Colorectal Cancer (CGA) cgaicc.com    Cancer Care cancercare.org   American Cancer Society (ACS) cancer.org   National Cancer [place] (NCI) cancer.gov     Cancer Risk Management Program [phone]-CANCER ([phone])  ? Marielena Joe, MS, [facility]  [phone]  ? Renata Campbell, MS, [facility]  [phone]  ? Tiffany Albert, MS, [facility]  [phone]  ? John Soto, MS, [facility]  [phone]    References  1. Raina A, Isabelle PDP, Francisco S, Shagufta SUNG, Ashleigh JE, Pierce JL, Dianna N, Codie H, Omari O, Mary A, Amanini B, Radimatthew P, Manjoanne S, Daquan DM, Romaine N, Patti E, Delmar H, Shawn E, Andreas J, Grokiki J, Josee B, Tulinius H, Thorlacius S, Eerola H, Nevkevinlinna H, Donny K, Karl OP. Average risks of breast and ovarian cancer associated with BRCA1 or BRCA2 mutations detected in case series unselected for family history: a combined analysis of 222 studies. Am J Hum Martha. 2003;72:1117-30.  2. Corbin N, Nilam M, Santizo G.  BRCA1 and BRCA2 Hereditary Breast and Ovarian Cancer. Gene Reviews online. 2013.  3. Mehran YC, Vivi S, Jennifer G, Paige S. Breast cancer risk among male BRCA1 and BRCA2 mutation carriers. J Natl Cancer Inst. 2007;99:1811-4.  4. Vitaliy DG, Dionicio I, Rock J, Ernesto E, Laura ER, Trinity F. Risk of breast cancer in male BRCA2 carriers. J Med Martha. 2010;47:710-1.  5. National Comprehensive Cancer Network. Clinical practice guidelines in oncology, colorectal cancer screening. Available online (registration required). 2015.  6. Efrain MH, Ekaterina J, Philly J, Bernadette HERNÁNDEZ, Guille GARCIA, Eng C. Lifetime cancer risks in individuals with germline PTEN mutations. Clin Cancer Res. 2012;18:400-7.  7. Aristeo DUNN. Cowden Syndrome: A Critical Review of the Clinical Literature. J Martha . 2009:18:13-27.  8. Frankie HORTON, Nile D, Sharron S, Zoila P, Piotr T, Harjinder M, Berry CHING,  Nivia H, Jett R, Kate K, Wang L, Vitaliy DG, Daquan D, Mikey DF, Navin MR, The Breast Cancer Susceptibility Collaboration () & Orquidea PEPPER. JOSUE mutations that cause ataxia-telangiectasia are breast cancer susceptibility alleles. Nature Genetics. 2006;38:873-875  9. Sonido N , Yani Y, Mary J, Jerome L, Manolo GM , Abner ML, Gallinger S, Sahu AG, Syngal S, Trinity ML, Benoit J , Dionisio R, Jes SZ, Oscar JR, Edu VE, Nina M, Vogelstein B, Ivan N, Tana RH, Eliu KW, and Walt AP. JOSUE mutations in patients with hereditary pancreatic cancer. Cancer Discover. 2012;2:41-46  10. Raina CARR et al. Breast-Cancer Risk in Families with Mutations in PALB2. NEJM. 2014; 371(6):497-506.  11. CHEK2 Breast Cancer Case-Control Consortium. CHEK2*1100delC and susceptibility to breast cancer: A collaborative analysis involving 10,860 breast cancer cases and 9,065 controls from 10 studies. Am J Hum Martha, 74 (2004), pp. 2494-1595  12. Kaley T, Nelson S, Hubert K, et al. Spectrum of Mutations in BRCA1, BRCA2, CHEK2, and TP53 in Families at High Risk of Breast Cancer. BIJAL. 2006;295(12):6708-9366.   13. Jo C, Isael D, Esther A, et al. Risk of breast cancer in women with a CHEK2 mutation with and without a family history of breast cancer. J Clin Oncol. 2011;29:4442-6913.  14. Adrian H, Ishan E, Ram SJ, et al. Contribution of germline mutations in the RAD51B, RAD51C, and RAD51D genes to ovarian cancer in the population. J Clin Oncol. 2015;33(26):7222-5459. Doi:10.1200/JCO.2015.61.2408.  15. Yu T, Cherie CHING, Melida P, et al. Mutations in BRIP1 confer high risk of ovarian cancer. Darlene Martha. 2011;43(11):1575-0926. doi:10.1038/ng.955.

## 2018-08-16 NOTE — MR AVS SNAPSHOT
After Visit Summary   8/16/2018    Kezia Nava    MRN: [id number]           Patient Information     Date Of Birth          1975        Visit Information        Provider Department      8/16/2018 9:00 AM Tiffany [facility]        Today's Diagnoses     Family history of malignant neoplasm of breast    -  1    Family history of uterine cancer        Family history of colon cancer        Family history of prostate cancer          Care Instructions        Assessing Cancer Risk  Only about 5-10% of cancers are thought to be due to an inherited cancer susceptibility gene.    These families often have:    Several people with the same or related types of cancer    Cancers diagnosed at a young age (before age 50)    Individuals with more than one primary cancer    Multiple generations of the family affected with cancer    Some people may be candidates for genetic testing of more than one gene.  For these families, genetic testing using a cancer panel may be offered.  These panels will test different genes known to increase the risk for breast, ovarian, uterine, and/or other cancers. All of the genes discussed below have published clinical management guidelines for individuals who are found to carry a mutation. The purpose of this handout is to serve as a brief summary of the genes analyzed by the panels used to inquire about hereditary breast and gynecologic cancer:  JOSUE, BRCA1, BRCA2, BRIP1, CDH1, CHEK2, MLH1, MSH2, MSH6, PMS2, EPCAM, PTEN, PALB2, RAD51C, RAD51D, and TP53.  ______________________________________________________________________________  Hereditary Breast and Ovarian Cancer Syndrome   (BRCA1 and BRCA2)  A single mutation in one of the copies of BRCA1 or BRCA2 increases the risk for breast and ovarian cancer, among others.  The risk for pancreatic cancer and melanoma may also be slightly increased in some families.  The chart below shows the chance that  someone with a BRCA mutation would develop cancer in his or her lifetime1,2,3,4.        A person s ethnic background is also important to consider, as individuals of Ashkenazi Restorationism ancestry have a higher chance of having a BRCA gene mutation.  There are three BRCA mutations that occur more frequently in this population.    Martel Syndrome   (MLH1, MSH2, MSH6, PMS2, and EPCAM)  Currently five genes are known to cause Martel Syndrome: MLH1, MSH2, MSH6, PMS2, and EPCAM.  A single mutation in one of the Martel Syndrome genes increases the risk for colon, endometrial, ovarian, and stomach cancers.  Other cancers that occur less commonly in Martel Syndrome include urinary tract, skin, and brain cancers.  The chart below shows the chance that a person with Martel syndrome would develop cancer in his or her lifetime5.      *Cancer risk varies depending on Martel syndrome gene found    Cowden Syndrome   (PTEN)  Cowden syndrome is a hereditary condition that increases the risk for breast, thyroid, endometrial, colon, and kidney cancer.  Cowden syndrome is caused by a mutation in the PTEN gene.  A single mutation in one of the copies of PTEN causes Cowden syndrome and increases cancer risk.  The chart below shows the chance that someone with a PTEN mutation would develop cancer in their lifetime6,7.  Other benign features seen in some individuals with Cowden syndrome include benign skin lesions (facial papules, keratoses, lipomas), learning disability, autism, thyroid nodules, colon polyps, and larger head size.      *One recent study found breast cancer risk to be increased to 85%    Li-Fraumeni Syndrome   (TP53)  Li-Fraumeni Syndrome (LFS) is a cancer predisposition syndrome caused by a mutation in the TP53 gene. A single mutation in one of the copies of TP53 increases the risk for multiple cancers. Individuals with LFS are at an increased risk for developing cancer at a young age. The lifetime risk for development of a  LFS-associated cancer is 50% by age 30 and 90% by age 60.     Core Cancers: Sarcomas, Breast, Brain, Lung, Leukemias/Lymphomas, Adrenocortical carcinomas  Other Cancers: Gastrointestinal, Thyroid, Skin, Genitourinary    Hereditary Diffuse Gastric Cancer   (CDH1)  Currently, one gene is known to cause hereditary diffuse gastric cancer (HDGC): CDH1.  Individuals with HDGC are at increased risk for diffuse gastric cancer and lobular breast cancer. Of people diagnosed with HDGC, 30-50% have a mutation in the CDH1 gene.  This suggests there are likely other genes that may cause HDGC that have not been identified yet.      Lifetime Cancer Risks    General Population HDGC    Diffuse Gastric  <1% ~80%   Breast 12% 39-52%         Additional Genes  JOSUE  JOSUE is a moderate-risk breast cancer gene. Women who have a mutation in JOSUE can have between a 2-4 fold increased risk for breast cancer compared to the general population8. JOSUE mutations have also been associated with increased risk for pancreatic cancer, however an estimate of this cancer risk is not well understood9. Individuals who inherit two JOSUE mutations have a condition called ataxia-telangiectasia (AT).  This rare autosomal recessive condition affects the nervous system and immune system, and is associated with progressive cerebellar ataxia beginning in childhood.  Individuals with ataxia-telangiectasia often have a weakened immune system and have an increased risk for childhood cancers.    PALB2  Mutations in PALB2 have been shown to increase the risk of breast cancer up to 33-58% in some families; where individuals fall within this risk range is dependent upon family [id number]. PALB2 mutations have also been associated with increased risk for pancreatic cancer, although this risk has not been quantified yet.  Individuals who inherit two PALB2 mutations--one from their mother and one from their father--have a condition called Fanconi Anemia.  This rare autosomal  recessive condition is associated with short stature, developmental delay, bone marrow failure, and increased risk for childhood cancers.    CHEK2   CHEK2 is a moderate-risk breast cancer gene.  Women who have a mutation in CHEK2 have around a 2-fold increased risk for breast cancer compared to the general population, and this risk may be higher depending upon family history.11,12,13 Mutations in CHEK2 have also been shown to increase the risk of a number of other cancers, including colon and prostate, however these cancer risks are currently not well understood.    BRIP1, RAD51C and RAD51D  Mutations in BRIP1, RAD51C, and RAD51D have been shown to increase the risk of ovarian cancer and possibly female breast cancer as well14,15 .       Lifetime Cancer Risk    General Population BRIP1 RAD51C RAD51D   Ovarian 1-2% ~5-8% ~5-9% ~7-15%               Inheritance  All of the cancer syndromes reviewed above are inherited in an autosomal dominant pattern.  This means that if a parent has a mutation, each of his or her children will have a 50% chance of inheriting that same mutation.  Therefore, each child--male or female--would have a 50% chance of being at increased risk for developing cancer.      Image obtained from Genetics Home Reference, 2013     Mutations in some genes can occur de rhett, which means that a person s mutation occurred for the first time in them and was not inherited from a parent.  Now that they have the mutation, however, it can be passed on to future generations.    Genetic Testing  Genetic testing involves a blood test and will look at the genetic information in the JOSUE, BRCA1, BRCA2, BRIP1, CDH1, CHEK2, MLH1, MSH2, MSH6, PMS2, EPCAM, PTEN, PALB2, RAD51C, RAD51D, and TP53 genes for any harmful mutations that are associated with increased cancer risk.  If possible, it is recommended that the person(s) who has had cancer be tested before other family members.  That person will give us the most useful  information about whether or not a specific gene is associated with the cancer in the family.    Results  There are three possible results of genetic testing:    Positive--a harmful mutation was identified in one or more of the genes    Negative--no mutation was identified in any of the genes on this panel    Variant of unknown significance--a variation in one of the genes was identified, but it is unclear how this impacts cancer risk in the family    Advantages and Disadvantages   There are advantages and disadvantages to genetic testing.    Advantages    May clarify your cancer risk    Can help you make medical decisions    May explain the cancers in your family    May give useful information to your family members (if you share your results)    Disadvantages    Possible negative emotional impact of learning about inherited cancer risk    Uncertainty in interpreting a negative test result in some situations    Possible genetic discrimination concerns (see below)    Genetic Information Nondiscrimination Act (JOJO)  JOJO is a federal law that protects individuals from health insurance or employment discrimination based on a genetic test result alone.  Although rare, there are currently no legal discrimination protections in terms of life insurance, long term care, or disability insurances.  Visit the National Human Genome Research Clark website to learn more.    Reducing Cancer Risk  All of the genes described above have nationally recognized cancer screening guidelines that would be recommended for individuals who test positive.  In addition to increased cancer screening, surgeries may be offered or recommended to reduce cancer risk.  Recommendations are based upon an individual s genetic test result as well as their personal and family history of cancer.    Questions to Think About Regarding Genetic Testing:    What effect will the test result have on me and my relationship with my family members if I have an  inherited gene mutation?  If I don t have a gene mutation?    Should I share my test results, and how will my family react to this news, which may also affect them?    Are my children ready to learn new information that may one day affect their own health?    Hereditary Cancer Resources    FORCE: Facing Our Risk of Cancer Empowered facingourrisk.org   Bright Pink bebrightpink.Bastille Networks   Li-Fraumeni Syndrome Association lfsassociation.org   PTEN World PTENworld.[email or web address]   No stomach for cancer, Inc. nostomachforcancer.org   Stomach cancer relief network Scrnet.org   Collaborative Group of the Americas on Inherited Colorectal Cancer (CGA) cgaicc.com    Cancer Care cancercare.org   American Cancer Society (ACS) cancer.org   National Cancer [place] (NCI) cancer.gov     Cancer Risk Management Program [phone]-CANCER ([phone])  ? Marielena Joe, MS, [facility]  [phone]  ? Renata Campbell, MS, [facility]  [phone]  ? Tiffany Albert, MS, [facility]  [phone]  ? John Soto, MS, [facility]  [phone]    References  1. Raina A, Isabelle PDP, Francisco S, Shagufta SUNG, Ashleigh JE, Pierce JL, Dianna N, Codie H, Omari O, Mary A, Ely B, Clarissa P, Bree S, Daquan DM, Gavin N, Patti E, Delmar H, Shawn E, Lubinski J, Gronwald J, Josee B, Don H, Mansoorlaci S, Eerola H, Daisy H, Donny K, Karl OP. Average risks of breast and ovarian cancer associated with BRCA1 or BRCA2 mutations detected in case series unselected for family history: a combined analysis of 222 studies. Am J Hum Martha. 2003;72:1117-30.  2. Corbin PEPPER, Nilam SHERWOOD, Sukhdev RASCON.  BRCA1 and BRCA2 Hereditary Breast and Ovarian Cancer. Gene Reviews online. 2013.  3. Mehran YC, Vivi S, Jennifer G, Paige S. Breast cancer risk among male BRCA1 and BRCA2 mutation carriers. J Natl Cancer Inst. 2007;99:1811-4.  4. Vitaliy PENALOZA, Dionicio I, Rock J, Ernesto E, Laura POON, Trinity F. Risk of breast cancer in male BRCA2 carriers. J Med Martha. 2010;47:710-1.  5. National  Comprehensive Cancer Network. Clinical practice guidelines in oncology, colorectal cancer screening. Available online (registration required). 2015.  6. Zuniga MH, Ekaterina J, Philly J, Bernadette LA, Guille MS, Brijesh C. Lifetime cancer risks in individuals with germline PTEN mutations. Clin Cancer Res. 2012;18:400-7.  7. Aristeo DUNN. Cowden Syndrome: A Critical Review of the Clinical Literature. J Martha . 2009:18:13-27.  8. Frankie HORTON, Nile D, Sharron S, Zoila P, Piotr T, Harjinder M, Berry B, Nivia H, Jett R, Kate K, Wang L, Vitaliy DG, Daquan D, Mikey DF, Navin MR, The Breast Cancer Susceptibility Collaboration (UK) & Orquidea N. JOSUE mutations that cause ataxia-telangiectasia are breast cancer susceptibility alleles. Nature Genetics. 2006;38:873-875  9. Sonido N , Yani Y, Mary J, Jerome L, Manolo GM , Abner ML, Gallinger S, Sahu AG, Syngal S, Trinity ML, Benoit J , Dionisio R, Jes SZ, Oscar JR, Edu VE, Nina M, Vogelstein B, Ivan N, Tana RH, Eliu KW, and Walt AP. JOSUE mutations in patients with hereditary pancreatic cancer. Cancer Discover. 2012;2:41-46  10. Raina FREY., et al. Breast-Cancer Risk in Families with Mutations in PALB2. NEJM. 2014; 371(6):497-506.  11. CHEK2 Breast Cancer Case-Control Consortium. CHEK2*1100delC and susceptibility to breast cancer: A collaborative analysis involving 10,860 breast cancer cases and 9,065 controls from 10 studies. Am J Hum Martha, 74 (2004), pp. 2534-5602  12. Kaley T, Nelson S, Hubert K, et al. Spectrum of Mutations in BRCA1, BRCA2, CHEK2, and TP53 in Families at High Risk of Breast Cancer. BIJAL. 2006;295(12):0794-4307.   13. Jo PEACOCK, Isael MOSLEY, Esther HORTON, et al. Risk of breast cancer in women with a CHEK2 mutation with and without a family history of breast cancer. J Clin Oncol. 2011;29:9866-8055.  14. Adrian H, Ishan E, Fernando GOMES, et al. Contribution of germline mutations in the RAD51B, RAD51C, and RAD51D genes to  ovarian cancer in the population. J Clin Oncol. 2015;33(26):7860-8239. Doi:10.1200/JCO.2015.61.2408.  15. Yu T, Cherie CHING, Melida P, et al. Mutations in BRIP1 confer high risk of ovarian cancer. Darlene Martha. 2011;43(11):8679-8182. doi:10.1038/ng.955.            Follow-ups after your visit        Your next 10 appointments already scheduled     Sep 24, 2018  9:00 AM CDT   [facility]    [address] [facility] [address] [phone]           Bring a current list of meds and any records pertaining to this visit. For Physicals, please bring immunization records and any forms needing to be filled out. Please arrive 10 minutes early to complete paperwork.              Who to contact     If you have questions or need follow up information about today's clinic visit or your schedule please contact [facility]K [facility]NAGEMENT [facility] directly at [phone].  Normal or non-critical lab and imaging results will be communicated to you by [employer], letter or phone within 4 business days after the clinic has received the results. If you do not hear from us within 7 days, please contact the clinic through Snowman or phone. If you have a critical or abnormal lab result, we will notify you by phone as soon as possible.  Submit refill requests through Snowman or call your pharmacy and they will forward the refill request to us. Please allow 3 business days for your refill to be completed.          Additional Information About Your Visit        [employer] Information     Snowman gives you secure access to your electronic health record. If you see a primary care provider, you can also send messages to your care team and make appointments. If you have questions, please call your primary care clinic.  If you do not have a primary care provider, please call [phone] and they will assist you.        Care EveryWhere ID     This is your Care EveryWhere  ID. This could be used by other organizations to access your [facility] medical records  [id number]         Blood Pressure from Last 3 Encounters:   07/13/18 118/70   06/18/18 134/88   01/31/18 (!) 136/94    Weight from Last 3 Encounters:   07/13/18 88.9 kg (196 lb)   06/18/18 87.5 kg (193 lb)   01/04/18 89.4 kg (197 lb 1.6 oz)              We Performed the Following     [facility]; [facility]: Laboratory Miscellaneous Order        Primary Care Provider Office Phone # Fax #    Shamir MD Bridgette [phone] [address]  [place] [address]        Equal Access to Services     CYNTHIA MARCUM : Hadii aad ku hadasho Soleathaali, waaxda luqadaha, qaybta kaalmada adeegyada, helder xiao . So [facility] [phone].    ATENCIÓN: Si habla español, tiene a carter disposición servicios gratuitos de asistencia lingüística. [facility] [phone].    We comply with applicable federal civil rights laws and [place] laws. We do not discriminate on the basis of race, color, national origin, age, disability, sex, sexual orientation, or gender identity.            Thank you!     Thank you for choosing [facility]  for your care. Our goal is always to provide you with excellent care. Hearing back from our patients is one way we can continue to improve our services. Please take a few minutes to complete the written survey that you may receive in the mail after your visit with us. Thank you!             Your Updated Medication List - Protect others around you: Learn how to safely use, store and throw away your medicines at www.disposemymeds.org.          This list is accurate as of 8/16/18  9:51 AM.  Always use your most recent med list.                   Brand Name Dispense Instructions for use Diagnosis    aspirin 81 MG EC tablet    ASPIRIN ADULT LOW DOSE    30 tablet    Take 1 tablet (81 mg) by mouth daily    Cerebrovascular accident (CVA), unspecified mechanism (H)        blood glucose monitoring lancets     3 each    Use to test blood sugar 1 times daily or as directed.    Type 2 diabetes mellitus with hyperglycemia, without long-term current use of insulin (H)       blood glucose monitoring test strip    ACCU-CHEK ALMA PLUS    100 strip    Use to test blood sugar 1 times daily or as directed.    Type 2 diabetes mellitus with hyperglycemia, without long-term current use of insulin (H)       IBU PO      Take 600 mg by mouth every 6 hours as needed        insulin pen needle 32G X 4 MM    BD VALENTINA U/F    100 each    Use once daily as directed.    Type 2 diabetes mellitus with hyperglycemia, without long-term current use of insulin (H)       liraglutide 18 MG/3ML soln    VICTOZA PEN    9 mL    Inject 1.8 mg Subcutaneous daily    Type 2 diabetes mellitus with hyperglycemia, without long-term current use of insulin (H)       loratadine 10 MG tablet    CLARITIN     Take 10 mg by mouth daily        losartan-hydrochlorothiazide 50-12.5 MG per tablet    HYZAAR    90 tablet    Take 1 tablet by mouth daily    Benign essential hypertension       metFORMIN 500 MG 24 hr tablet    GLUCOPHAGE-XR    90 tablet    Take 1 tablet (500 mg) by mouth daily (with dinner)    Type 2 diabetes mellitus with hyperglycemia, without long-term current use of insulin (H)       Multi-vitamin Tabs tablet      Take 1 tablet by mouth daily        ondansetron 4 MG ODT tab    ZOFRAN ODT    12 tablet    Take 1-2 tablets (4-8 mg) by mouth every 8 hours as needed for nausea    Viral gastroenteritis       order for DME      AIRSENSE 10 10-15 CM/H20 NASAL AIRFIT N10 FOR HER        STATIN NOT PRESCRIBED (INTENTIONAL)      Please choose reason not prescribed, below    Type 2 diabetes mellitus with hyperglycemia, without long-term current use of insulin (H)       TYLENOL PO      Take 500-1,000 mg by mouth every 6 hours as needed As needed for pain        VITAMIN D (CHOLECALCIFEROL) PO      Take 10,000 Units by mouth daily         zolpidem 6.25 MG CR tablet    AMBIEN CR    90 tablet    Take 1 tab by mouth at bedtime as needed.

## 2018-08-16 NOTE — LETTER
8/16/2018         RE: Kezia Nava  [address] [place] [address]        Dear Colleague,    Thank you for referring your patient, Kezia Nava, to the CANCER RISK MANAGEMENT PROGRAM. Please see a copy of my visit note below.    8/16/2018    Referring Provider: Shamir Angeles MD    Presenting Information:   I met with Kezia Nava today for genetic counseling at the Cancer Risk Management Program at the [facility] to discuss her family history of breast, prostate, uterine and colon cancers.  She is here today to review this history, cancer screening recommendations, and available genetic testing options.    Personal History:  Kezia is a 42 year old female.  She does not have any personal history of cancer.  She had her first menstrual period at age 12, and does not have biological children.  Kezia has her uterus and left ovary and fallopian tube in place.  She reports having her right ovary and fallopian tube removed due to a dermoid cyst.  She reports no history of oral contraceptive use.  Her most recent mammogram from this year was normal and she plans to follow up annually.  Her breast density was noted as heterogeneously dense.  She does not regularly do any other cancer screening at this time.  Kezia reported no tobacco use, and no concerns regarding alcohol use or environmental exposures.    Family History: (Please see scanned pedigree for detailed family history information)    Her sister was diagnosed with breast cancer at age 43 and is now 45.  She reportedly completed genetic testing in 2016 which was negative.     Her mother is 75 and does not have any history of cancer.  She reportedly had a total hysterectomy due to precancerous uterine cells in her 30's    One maternal uncle was diagnosed with prostate cancer in his 50's    Her maternal grandmother was diagnosed with uterine cancer at age 44 and colon cancer at age 64.  She passed away at age 64.    One maternal first  cousin was recently diagnosed with triple negative breast cancer at age 41.  She has not completed genetic testing    One paternal aunt is 80 and has a history of breast cancer (age at diagnosis not reported)    One paternal aunt passed away at age 80 and had a history of lung cancer.  One of her children was diagnosed with lymphoma in their 40's-50's    Her paternal grandmother passed away at age 92 and had a history of stomach cancer at age 80.     Her maternal ethnicity is Faroese/French/. Her paternal ethnicity is Mani.  There is no known Ashkenazi Mandaeism ancestry on either side of her family.    Discussion:    Kezia's family history of breast, uterine, colon and prostate cancer is suggestive of a possible hereditary cancer syndrome.    We reviewed the features of sporadic, familial, and hereditary cancers.  We discussed that Kezia's family history presents with several features suggestive of a hereditary cancer syndrome, including relatives in three generations diagnosed with cancer, with several of these diagnoses being at young ages (prior to age 50).  Based on her family history, Kezia meets current National Comprehensive Cancer Network (NCCN) criteria for genetic testing of BRCA1 and BRCA2.      We discussed the natural history and genetics of Hereditary Breast and Ovarian Cancer syndrome, caused by mutations in the BRCA1/2 genes, due to her family history of prostate cancer and early onset breast cancer.  We also discussed the natural history and genetics of Martel syndrome, caused by mismatch repair gene mutations (MLH1, MSH2, MSH6, PMS2, EPCAM), as a possible genetic explanation for her family history of uterine, colon and prostate cancer.   A detailed handout regarding these hereditary cancer syndromes and the information we discussed was provided to Kezia at the end of our appointment today and can be found in the after visit summary.  Topics included: inheritance pattern, cancer  risks, cancer screening recommendations, and also risks, benefits and limitations of testing.    We discussed that genetic testing for cancer susceptibility genes is typically most informative when it is first performed on a family member with a personal history of cancer. Kezia reported that her sister, who was diagnosed with breast cancer, did complete genetic testing which was negative approximately 2 years ago.  Otherwise she is not aware of any other relatives pursuing genetic testing. Testing is available to Kezia, but with limitations. If Kezia pursues testing at this time and receives a negative result, this does not rule out the possibility of a hereditary cancer syndrome in her and/or her family. Despite these limitations, Kezia expressed interest in proceeding with testing.    We reviewed genetic testing options due to her family history of several cancers, including actionable high/moderate and expanded panel options.  Kezia expressed an interest in learning as much information as possible from the testing. Therefore, she opted for the CancerNext panel due to her family history of uterine, colon, breast and prostate cancer.  Genetic testing is available for 34 genes associated with hereditary cancer: CancerNext (APC, JOSUE, BARD1, BRCA1, BRCA2, BRIP1, BMPR1A, CDH1, CDK4, CDKN2A, CHEK2, DICER1, EPCAM, GREM1, HOXB13, MLH1, MRE11A, MSH2, MSH6, MUTYH, NBN, NF1, PALB2, PMS2, POLD1, POLE, PTEN, RAD50, RAD51C, RAD51D, SMAD4, SMARCA4, STK11, and TP53).  We discussed that many of the genes in the CancerNext panel have known risks and published management guidelines.  Some genes are associated with specific hereditary cancer syndromes: Hereditary Breast and Ovarian Cancer syndrome (BRCA1, BRCA2), Martel syndrome (MLH1, MSH2, MSH6, PMS2, EPCAM), Familial Adenomatous Polyposis syndrome (APC), Hereditary Gastric Cancer syndrome (CDH1), Familial Atypical Multiple Mole Melanoma syndrome (CDK4, CDKN2A), Juvenile  Polyposis syndrome (BMPR1A, SMAD4), Cowden syndrome (PTEN), Li Fraumeni syndrome (TP53), Peutz-Jeghers syndrome (STK11), MUTYH Associated Polyposis syndrome (MUTYH), and Neurofibromatosis type 1 (NF1).   The JOSUE, BRIP1, CHEK2, GREM1, NBN , PALB2, POLD1, POLE, RAD51C, and RAD51D genes are associated with increased cancer risk and have published management guidelines for certain cancers.    The remaining genes (BARD1, DICER1, HOXB13, MRE11A, RAD50, and SMARCA4) are associated with increased cancer risk and may allow us to make medical recommendations when mutations are identified.    Consent was obtained and genetic testing for CancerNext was sent to [facility]. Turn around time: 3-4 weeks after insurance authorization is obtained.  Testing will be placed on hold by the laboratory for insurance.      Kezia was provided with a CancerNext handout, which lists the included genes and associated cancer risks, from [facility].    Medical Management: For Kezia, we reviewed that the information from genetic testing may determine:    additional cancer screening for which Kezia may qualify (i.e. mammogram and breast MRI, more frequent colonoscopies, more frequent dermatologic exams, etc.),    options for risk reducing surgeries Kezia could consider (i.e. bilateral mastectomy, surgery to remove the ovary/fallopian tube and/or uterus, etc.),      and targeted chemotherapies for certain cancers in the future (i.e. immunotherapy for individuals with Martel syndrome, PARP inhibitors, etc.).     These recommendations will be discussed in detail once genetic testing is completed.     Plan:  1) Today Kezia elected to proceed with the CancerNext panel offered through [facility].  2) This information should be available 3-4 weeks after insurance authorization is obtained.  3) Kezia will return to clinic to discuss the results    Face to face time: 45 minutes    Tiffany Albert MS, [facility]  Licensed Genetic  Counselor  [phone]                 Again, thank you for allowing me to participate in the care of your patient.        Sincerely,        Tiffany Albert GC

## 2018-08-16 NOTE — LETTER
Cancer Risk Management  Program Locations    [facility] Cancer [facility] Cancer [facility] Cancer [facility] Cancer [facility]  Mailing Address  Cancer Risk Management Program  [facility]  [address]  [place] [address]    New patient appointments  [phone]  August 17, 2018    Kezia Nava  [address] [place] [address]      Dear Kezia,    It was a pleasure meeting with you at the [facility] on 8/16/2018.  Here is a copy of the progress note from your recent genetic counseling visit to the Cancer Risk Management Program.  If you have any additional questions, please feel free to call.    Referring Provider: Shamir Angeles MD    Presenting Information:   I met with Kezia Nava today for genetic counseling at the Cancer Risk Management Program at the [facility] to discuss her family history of breast, prostate, uterine and colon cancers.  She is here today to review this history, cancer screening recommendations, and available genetic testing options.    Personal History:  Kezia is a 42 year old female.  She does not have any personal history of cancer.  She had her first menstrual period at age 12, and does not have biological children.  Kezia has her uterus and left ovary and fallopian tube in place.  She reports having her right ovary and fallopian tube removed due to a dermoid cyst.  She reports no history of oral contraceptive use.  Her most recent mammogram from this year was normal and she plans to follow up annually.  Her breast density was noted as heterogeneously dense.  She does not regularly do any other cancer screening at this time.  Kezia reported no tobacco use, and no concerns regarding alcohol use or environmental exposures.    Family History: (Please see scanned pedigree for detailed family history information)    Her  sister was diagnosed with breast cancer at age 43 and is now 45.  She reportedly completed genetic testing in 2016 which was negative.     Her mother is 75 and does not have any history of cancer.  She reportedly had a total hysterectomy due to precancerous uterine cells in her 30's    One maternal uncle was diagnosed with prostate cancer in his 50's    Her maternal grandmother was diagnosed with uterine cancer at age 44 and colon cancer at age 64.  She passed away at age 64.    One maternal first cousin was recently diagnosed with triple negative breast cancer at age 41.  She has not completed genetic testing    One paternal aunt is 80 and has a history of breast cancer (age at diagnosis not reported)    One paternal aunt passed away at age 80 and had a history of lung cancer.  One of her children was diagnosed with lymphoma in their 40's-50's    Her paternal grandmother passed away at age 92 and had a history of stomach cancer at age 80.     Her maternal ethnicity is Bhutanese/Citizen of [place]/. Her paternal ethnicity is Mani.  There is no known Ashkenazi Scientology ancestry on either side of her family.    Discussion:    Kezia's family history of breast, uterine, colon and prostate cancer is suggestive of a possible hereditary cancer syndrome.    We reviewed the features of sporadic, familial, and hereditary cancers.  We discussed that Kezia's family history presents with several features suggestive of a hereditary cancer syndrome, including relatives in three generations diagnosed with cancer, with several of these diagnoses being at young ages (prior to age 50).  Based on her family history, Kezia meets current National Comprehensive Cancer Network (NCCN) criteria for genetic testing of BRCA1 and BRCA2.      We discussed the natural history and genetics of Hereditary Breast and Ovarian Cancer syndrome, caused by mutations in the BRCA1/2 genes, due to her family history of prostate cancer and early onset  breast cancer.  We also discussed the natural history and genetics of Martel syndrome, caused by mismatch repair gene mutations (MLH1, MSH2, MSH6, PMS2, EPCAM), as a possible genetic explanation for her family history of uterine, colon and prostate cancer.   A detailed handout regarding these hereditary cancer syndromes and the information we discussed was provided to Kezia at the end of our appointment today and can be found in the after visit summary.  Topics included: inheritance pattern, cancer risks, cancer screening recommendations, and also risks, benefits and limitations of testing.    We discussed that genetic testing for cancer susceptibility genes is typically most informative when it is first performed on a family member with a personal history of cancer. Kezia reported that her sister, who was diagnosed with breast cancer, did complete genetic testing which was negative approximately 2 years ago.  Otherwise she is not aware of any other relatives pursuing genetic testing. Testing is available to Kezia, but with limitations. If Kezia pursues testing at this time and receives a negative result, this does not rule out the possibility of a hereditary cancer syndrome in her and/or her family. Despite these limitations, Kezia expressed interest in proceeding with testing.    We reviewed genetic testing options due to her family history of several cancers, including actionable high/moderate and expanded panel options.  Kezia expressed an interest in learning as much information as possible from the testing. Therefore, she opted for the CancerNext panel due to her family history of uterine, colon, breast and prostate cancer.  Genetic testing is available for 34 genes associated with hereditary cancer: CancerNext (APC, JOSUE, BARD1, BRCA1, BRCA2, BRIP1, BMPR1A, CDH1, CDK4, CDKN2A, CHEK2, DICER1, EPCAM, GREM1, HOXB13, MLH1, MRE11A, MSH2, MSH6, MUTYH, NBN, NF1, PALB2, PMS2, POLD1, POLE, PTEN, RAD50,  RAD51C, RAD51D, SMAD4, SMARCA4, STK11, and TP53).  We discussed that many of the genes in the CancerNext panel have known risks and published management guidelines.  Some genes are associated with specific hereditary cancer syndromes: Hereditary Breast and Ovarian Cancer syndrome (BRCA1, BRCA2), Martel syndrome (MLH1, MSH2, MSH6, PMS2, EPCAM), Familial Adenomatous Polyposis syndrome (APC), Hereditary Gastric Cancer syndrome (CDH1), Familial Atypical Multiple Mole Melanoma syndrome (CDK4, CDKN2A), Juvenile Polyposis syndrome (BMPR1A, SMAD4), Cowden syndrome (PTEN), Li Fraumeni syndrome (TP53), Peutz-Jeghers syndrome (STK11), MUTYH Associated Polyposis syndrome (MUTYH), and Neurofibromatosis type 1 (NF1).   The JOSUE, BRIP1, CHEK2, GREM1, NBN , PALB2, POLD1, POLE, RAD51C, and RAD51D genes are associated with increased cancer risk and have published management guidelines for certain cancers.    The remaining genes (BARD1, DICER1, HOXB13, MRE11A, RAD50, and SMARCA4) are associated with increased cancer risk and may allow us to make medical recommendations when mutations are identified.    Consent was obtained and genetic testing for CancerNext was sent to [facility]. Turn around time: 3-4 weeks after insurance authorization is obtained.  Testing will be placed on hold by the laboratory for insurance.      Kezia was provided with a CancerNext handout, which lists the included genes and associated cancer risks, from [facility].    Medical Management: For Kezia, we reviewed that the information from genetic testing may determine:    additional cancer screening for which Kezia may qualify (i.e. mammogram and breast MRI, more frequent colonoscopies, more frequent dermatologic exams, etc.),    options for risk reducing surgeries Kezia could consider (i.e. bilateral mastectomy, surgery to remove the ovary/fallopian tube and/or uterus, etc.),      and targeted chemotherapies for certain cancers in the  future (i.e. immunotherapy for individuals with Martel syndrome, PARP inhibitors, etc.).     These recommendations will be discussed in detail once genetic testing is completed.     Plan:  1) Today Kezia elected to proceed with the CancerNext panel offered through [facility].  2) This information should be available 3-4 weeks after insurance authorization is obtained.  3) Kezia will return to clinic to discuss the results    Tiffany Albert MS, [facility]  Licensed Genetic Counselor  [phone]

## 2018-08-17 NOTE — PROGRESS NOTES
8/16/2018    Referring Provider: Shamir Angeles MD    Presenting Information:   I met with Kezia Nava today for genetic counseling at the Cancer Risk Management Program at the [facility] to discuss her family history of breast, prostate, uterine and colon cancers.  She is here today to review this history, cancer screening recommendations, and available genetic testing options.    Personal History:  Kezia is a 42 year old female.  She does not have any personal history of cancer.  She had her first menstrual period at age 12, and does not have biological children.  Kezia has her uterus and left ovary and fallopian tube in place.  She reports having her right ovary and fallopian tube removed due to a dermoid cyst.  She reports no history of oral contraceptive use.  Her most recent mammogram from this year was normal and she plans to follow up annually.  Her breast density was noted as heterogeneously dense.  She does not regularly do any other cancer screening at this time.  Kezia reported no tobacco use, and no concerns regarding alcohol use or environmental exposures.    Family History: (Please see scanned pedigree for detailed family history information)    Her sister was diagnosed with breast cancer at age 43 and is now 45.  She reportedly completed genetic testing in 2016 which was negative.     Her mother is 75 and does not have any history of cancer.  She reportedly had a total hysterectomy due to precancerous uterine cells in her 30's    One maternal uncle was diagnosed with prostate cancer in his 50's    Her maternal grandmother was diagnosed with uterine cancer at age 44 and colon cancer at age 64.  She passed away at age 64.    One maternal first cousin was recently diagnosed with triple negative breast cancer at age 41.  She has not completed genetic testing    One paternal aunt is 80 and has a history of breast cancer (age at diagnosis not reported)    One paternal aunt passed away at age  80 and had a history of lung cancer.  One of her children was diagnosed with lymphoma in their 40's-50's    Her paternal grandmother passed away at age 92 and had a history of stomach cancer at age 80.     Her maternal ethnicity is Gibraltarian/Fijian/. Her paternal ethnicity is Mani.  There is no known Ashkenazi Jehovah's witness ancestry on either side of her family.    Discussion:    Kezia's family history of breast, uterine, colon and prostate cancer is suggestive of a possible hereditary cancer syndrome.    We reviewed the features of sporadic, familial, and hereditary cancers.  We discussed that Kezia's family history presents with several features suggestive of a hereditary cancer syndrome, including relatives in three generations diagnosed with cancer, with several of these diagnoses being at young ages (prior to age 50).  Based on her family history, Kezia meets current National Comprehensive Cancer Network (NCCN) criteria for genetic testing of BRCA1 and BRCA2.      We discussed the natural history and genetics of Hereditary Breast and Ovarian Cancer syndrome, caused by mutations in the BRCA1/2 genes, due to her family history of prostate cancer and early onset breast cancer.  We also discussed the natural history and genetics of Martel syndrome, caused by mismatch repair gene mutations (MLH1, MSH2, MSH6, PMS2, EPCAM), as a possible genetic explanation for her family history of uterine, colon and prostate cancer.   A detailed handout regarding these hereditary cancer syndromes and the information we discussed was provided to Kezia at the end of our appointment today and can be found in the after visit summary.  Topics included: inheritance pattern, cancer risks, cancer screening recommendations, and also risks, benefits and limitations of testing.    We discussed that genetic testing for cancer susceptibility genes is typically most informative when it is first performed on a family member with a  personal history of cancer. Kezia reported that her sister, who was diagnosed with breast cancer, did complete genetic testing which was negative approximately 2 years ago.  Otherwise she is not aware of any other relatives pursuing genetic testing. Testing is available to Kezia, but with limitations. If Kezia pursues testing at this time and receives a negative result, this does not rule out the possibility of a hereditary cancer syndrome in her and/or her family. Despite these limitations, Kezia expressed interest in proceeding with testing.    We reviewed genetic testing options due to her family history of several cancers, including actionable high/moderate and expanded panel options.  Kezia expressed an interest in learning as much information as possible from the testing. Therefore, she opted for the CancerNext panel due to her family history of uterine, colon, breast and prostate cancer.  Genetic testing is available for 34 genes associated with hereditary cancer: CancerNext (APC, JOSUE, BARD1, BRCA1, BRCA2, BRIP1, BMPR1A, CDH1, CDK4, CDKN2A, CHEK2, DICER1, EPCAM, GREM1, HOXB13, MLH1, MRE11A, MSH2, MSH6, MUTYH, NBN, NF1, PALB2, PMS2, POLD1, POLE, PTEN, RAD50, RAD51C, RAD51D, SMAD4, SMARCA4, STK11, and TP53).  We discussed that many of the genes in the CancerNext panel have known risks and published management guidelines.  Some genes are associated with specific hereditary cancer syndromes: Hereditary Breast and Ovarian Cancer syndrome (BRCA1, BRCA2), Martel syndrome (MLH1, MSH2, MSH6, PMS2, EPCAM), Familial Adenomatous Polyposis syndrome (APC), Hereditary Gastric Cancer syndrome (CDH1), Familial Atypical Multiple Mole Melanoma syndrome (CDK4, CDKN2A), Juvenile Polyposis syndrome (BMPR1A, SMAD4), Cowden syndrome (PTEN), Li Fraumeni syndrome (TP53), Peutz-Jeghers syndrome (STK11), MUTYH Associated Polyposis syndrome (MUTYH), and Neurofibromatosis type 1 (NF1).   The JOSUE, BRIP1, CHEK2, GREM1, NBN , PALB2,  POLD1, POLE, RAD51C, and RAD51D genes are associated with increased cancer risk and have published management guidelines for certain cancers.    The remaining genes (BARD1, DICER1, HOXB13, MRE11A, RAD50, and SMARCA4) are associated with increased cancer risk and may allow us to make medical recommendations when mutations are identified.    Consent was obtained and genetic testing for CancerNext was sent to [facility]. Turn around time: 3-4 weeks after insurance authorization is obtained.  Testing will be placed on hold by the laboratory for insurance.      Kezia was provided with a CancerNext handout, which lists the included genes and associated cancer risks, from [facility].    Medical Management: For Kezia, we reviewed that the information from genetic testing may determine:    additional cancer screening for which Kezia may qualify (i.e. mammogram and breast MRI, more frequent colonoscopies, more frequent dermatologic exams, etc.),    options for risk reducing surgeries Kezia could consider (i.e. bilateral mastectomy, surgery to remove the ovary/fallopian tube and/or uterus, etc.),      and targeted chemotherapies for certain cancers in the future (i.e. immunotherapy for individuals with Martel syndrome, PARP inhibitors, etc.).     These recommendations will be discussed in detail once genetic testing is completed.     Plan:  1) Today Kezia elected to proceed with the CancerNext panel offered through [facility].  2) This information should be available 3-4 weeks after insurance authorization is obtained.  3) Kezia will return to clinic to discuss the results    Face to face time: 45 minutes    Tiffany Albert MS, [facility]  Licensed Genetic Counselor  [phone]

## 2018-09-18 ENCOUNTER — TELEPHONE (OUTPATIENT)
Dept: ONCOLOGY | Facility: CLINIC | Age: 43
End: 2018-09-18

## 2018-09-18 NOTE — TELEPHONE ENCOUNTER
I called Kezia today to follow up regarding the status of her genetic testing through [employer] (please see progress note from 8/16/2018 for details).  Per [employer], Kezia has not yet followed up with the laboratory to discuss the expected out of pocket cost for testing following several attempts to reach her.  The CancerNext panel is currently on hold through [employer], and Kezia is encouraged to follow up within the next 30 days to avoid test cancellation.       As Kezia was not available, I left a non-detailed voicemail message with my contact information. I can be reached directly at [phone].     Tiffany Albert MS, [facility]  Licensed Genetic Counselor  [phone]

## 2018-09-19 ENCOUNTER — TELEPHONE (OUTPATIENT)
Dept: ONCOLOGY | Facility: CLINIC | Age: 43
End: 2018-09-19

## 2018-09-19 DIAGNOSIS — Z80.3 FAMILY HISTORY OF MALIGNANT NEOPLASM OF BREAST: Primary | ICD-10-CM

## 2018-09-19 NOTE — TELEPHONE ENCOUNTER
Kezia called me today regarding the status of her genetic testing.  Per [email or web address], Kezia's expected out of pocket cost for the CancerNext panel is $399.  Kezia stated that she would like to keep her testing on hold until she decides whether to proceed with the test.  Per [email or web address], testing will be placed on an additional 30 day hold (as of 9/18/2018).  I encouraged Kezia to contact the laboratory if she has questions or concerns regarding the expected cost.     We reviewed screening recommendations for Kezia, due to her family history of breast cancer.  Based on her personal and family history, Kezia has a 26% lifetime risk of developing breast cancer based on the KAROLINA(v8) model.  As such, Kzeia meets current National Comprehensive Cancer Network (NCCN) guidelines for high risk breast screening.  This includes annual breast MRI in addition to annual mammogram at her current age.  In addition, Kezia should be receiving clinical breast exams by her physician. We discussed that Kezia could participate in our Cancer Risk Management Program in which our clinical nurse specialist provides an individual screening plan and assists with medical management.  Kezia expressed interest in proceeding with this screening, as she is still uncertain whether she will be proceeding with the genetic testing due to the out of pocket cost.  A referral was made to see Sheyla Hernandez, APRN-CNS, OCN, ANG-BC, for this service.     Kezia is encouraged to follow up with me regarding her interest in proceeding with genetic testing and revisit available options. In the meantime, she plans to call the [facility] to schedule an appointment with Sheyla Hernandez for high risk breast screening.  Genetic testing through [email or web address] will remain on hold, per Kezia's request.          Tiffany Albert MS, [facility]  Licensed Genetic Counselor  [phone]

## 2018-09-24 ENCOUNTER — OFFICE VISIT (OUTPATIENT)
Dept: FAMILY MEDICINE | Facility: CLINIC | Age: 43
End: 2018-09-24
Payer: COMMERCIAL

## 2018-09-24 VITALS
DIASTOLIC BLOOD PRESSURE: 84 MMHG | OXYGEN SATURATION: 98 % | TEMPERATURE: 98.8 F | SYSTOLIC BLOOD PRESSURE: 120 MMHG | BODY MASS INDEX: 35.7 KG/M2 | WEIGHT: 194 LBS | HEIGHT: 62 IN | HEART RATE: 93 BPM

## 2018-09-24 DIAGNOSIS — N91.2 ABSENCE OF MENSTRUATION: ICD-10-CM

## 2018-09-24 DIAGNOSIS — I10 BENIGN ESSENTIAL HYPERTENSION: Chronic | ICD-10-CM

## 2018-09-24 DIAGNOSIS — E11.65 TYPE 2 DIABETES MELLITUS WITH HYPERGLYCEMIA, WITHOUT LONG-TERM CURRENT USE OF INSULIN (H): Primary | Chronic | ICD-10-CM

## 2018-09-24 DIAGNOSIS — E66.01 SEVERE OBESITY (BMI 35.0-35.9 WITH COMORBIDITY) (H): ICD-10-CM

## 2018-09-24 DIAGNOSIS — R00.2 PALPITATIONS: ICD-10-CM

## 2018-09-24 DIAGNOSIS — E78.5 HYPERLIPIDEMIA LDL GOAL <100: ICD-10-CM

## 2018-09-24 DIAGNOSIS — R10.84 ABDOMINAL PAIN, GENERALIZED: ICD-10-CM

## 2018-09-24 LAB
ANION GAP SERPL CALCULATED.3IONS-SCNC: 6 MMOL/L (ref 3–14)
BUN SERPL-MCNC: 11 MG/DL (ref 7–30)
CALCIUM SERPL-MCNC: 9 MG/DL (ref 8.5–10.1)
CHLORIDE SERPL-SCNC: 106 MMOL/L (ref 94–109)
CHOLEST SERPL-MCNC: 189 MG/DL
CO2 SERPL-SCNC: 26 MMOL/L (ref 20–32)
CREAT SERPL-MCNC: 0.66 MG/DL (ref 0.52–1.04)
ESTRADIOL SERPL-MCNC: 18 PG/ML
FSH SERPL-ACNC: 60.7 IU/L
GFR SERPL CREATININE-BSD FRML MDRD: >90 ML/MIN/1.7M2
GLUCOSE SERPL-MCNC: 98 MG/DL (ref 70–99)
HBA1C MFR BLD: 5.8 % (ref 0–5.6)
HDLC SERPL-MCNC: 41 MG/DL
LDLC SERPL CALC-MCNC: 102 MG/DL
NONHDLC SERPL-MCNC: 148 MG/DL
POTASSIUM SERPL-SCNC: 3.9 MMOL/L (ref 3.4–5.3)
SODIUM SERPL-SCNC: 138 MMOL/L (ref 133–144)
TRIGL SERPL-MCNC: 229 MG/DL

## 2018-09-24 PROCEDURE — 80061 LIPID PANEL: CPT | Performed by: INTERNAL MEDICINE

## 2018-09-24 PROCEDURE — 80048 BASIC METABOLIC PNL TOTAL CA: CPT | Performed by: INTERNAL MEDICINE

## 2018-09-24 PROCEDURE — 99214 OFFICE O/P EST MOD 30 MIN: CPT | Performed by: INTERNAL MEDICINE

## 2018-09-24 PROCEDURE — 83036 HEMOGLOBIN GLYCOSYLATED A1C: CPT | Performed by: INTERNAL MEDICINE

## 2018-09-24 PROCEDURE — 93000 ELECTROCARDIOGRAM COMPLETE: CPT | Performed by: INTERNAL MEDICINE

## 2018-09-24 PROCEDURE — 82670 ASSAY OF TOTAL ESTRADIOL: CPT | Performed by: INTERNAL MEDICINE

## 2018-09-24 PROCEDURE — 83001 ASSAY OF GONADOTROPIN (FSH): CPT | Performed by: INTERNAL MEDICINE

## 2018-09-24 PROCEDURE — 36415 COLL VENOUS BLD VENIPUNCTURE: CPT | Performed by: INTERNAL MEDICINE

## 2018-09-24 RX ORDER — LIRAGLUTIDE 6 MG/ML
1.8 INJECTION SUBCUTANEOUS DAILY
Qty: 9 ML | Refills: 3 | Status: SHIPPED | OUTPATIENT
Start: 2018-09-24 | End: 2018-09-24

## 2018-09-24 RX ORDER — LIRAGLUTIDE 6 MG/ML
1.2 INJECTION SUBCUTANEOUS DAILY
Qty: 9 ML | Refills: 3 | Status: SHIPPED | OUTPATIENT
Start: 2018-09-24 | End: 2018-11-26

## 2018-09-24 RX ORDER — LIRAGLUTIDE 6 MG/ML
1.2 INJECTION SUBCUTANEOUS DAILY
Qty: 9 ML | Refills: 3 | COMMUNITY
Start: 2018-09-24 | End: 2018-09-24

## 2018-09-24 NOTE — PROGRESS NOTES
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  She also has abdominal pain  She also has not had period since June    Diabetes Follow-up      Patient is checking blood sugars: not at all    Diabetic concerns: None     Symptoms of hypoglycemia (low blood sugar): none     Paresthesias (numbness or burning in feet) or sores: No     Date of last diabetic eye exam: 03/01/2018    BP Readings from Last 2 Encounters:   09/24/18 120/84   07/13/18 118/70     Hemoglobin A1C (%)   Date Value   06/18/2018 5.9 (H)   10/06/2017 6.1 (H)     LDL Cholesterol Calculated (mg/dL)   Date Value   10/06/2017 109 (H)   10/05/2017 97       Diabetes Management Resources  Hypertension Follow-up      Outpatient blood pressures are being checked at work.  Results are 130/80.    Low Salt Diet: no added salt                Problem list and histories reviewed & adjusted, as indicated.  Additional history: as documented    Patient Active Problem List   Diagnosis     Pain in joint, ankle and foot     Cervical radiculopathy     Benign essential hypertension     Type 2 diabetes mellitus with hyperglycemia, without long-term current use of insulin (H)     ANA (obstructive sleep apnea)     Acute bilateral low back pain with right-sided sciatica     Paresthesias- ? TIA vs complex migraine (preferred per neuro)     Severe obesity (BMI 35.0-35.9 with comorbidity) (H)     Past Surgical History:   Procedure Laterality Date     ARTHROSCOPY ANKLE, OPEN REPAIR TENDON, COMBINED  11/8/2012    Procedure: COMBINED ARTHROSCOPY ANKLE, OPEN REPAIR TENDON;  Ankle Arthroscopy Left Ankle, Open Ligament Repair Left Ankle, Peroneal Tendon Repair Left Ankle ;  Surgeon: Otf Ramos DPM;  Location: [facility] OR     C APPENDECTOMY  1984     COMBINED CYSTOSCOPY, INSERT STENT URETER(S)  8/6/2013    Procedure:  COMBINED CYSTOSCOPY, INSERT STENT URETER(S);  cystoscopy, right retrograde,RIGHT STENT PLACEMENT.;  Surgeon: Kan Porter MD;  Location:  OR     CYSTOSCOPY, RETROGRADES, INSERT STENT URETER(S), COMBINED  8/6/2013    Procedure: COMBINED CYSTOSCOPY, RETROGRADES, INSERT STENT URETER(S);  cystoscopy, right retrograde, insert stent right ureter;  Surgeon: Kan Porter MD;  Location:  OR     DENTAL SURGERY      TOOTH#7 - DENTAL IMPLANT     DISCECTOMY, FUSION CERVICAL ANTERIOR ONE LEVEL, COMBINED N/A 12/13/2015    Procedure: COMBINED DISCECTOMY, FUSION CERVICAL ANTERIOR ONE LEVEL;  Surgeon: Seven Umaña MD;  Location:  OR     EXTRACORPOREAL SHOCK WAVE LITHOTRIPSY (ESWL)  8/19/2013    Procedure: EXTRACORPOREAL SHOCK WAVE LITHOTRIPSY (ESWL);   RIGHT EXTRACORPOREAL SHOCK WAVE LITHOTRIPSY;  Surgeon: Otf Tobias MD;  Location:  OR      REDUCTION OF LARGE BREAST  2001     LASIK BILATERAL       SALPINGO OOPHORECTOMY,R/L/DERREK  1995    Right       Social History   Substance Use Topics     Smoking status: Never Smoker     Smokeless tobacco: Never Used     Alcohol use 0.0 oz/week     0 Standard drinks or equivalent per week      Comment: socially     Family History   Problem Relation Age of Onset     C.A.D. Mother      stents     Hypertension Mother      KIDNEY DISEASE Mother      transplant     Hypertension Father      Breast Cancer Maternal Aunt      may have been fibrocystic     Breast Cancer Maternal Aunt      may have been fibrocystic     Cerebrovascular Disease Paternal Grandfather      Cancer Paternal Grandmother      stomach     Breast Cancer Sister      Nephrolithiasis Brother      Cancer - colorectal Maternal Grandmother          Current Outpatient Prescriptions   Medication Sig Dispense Refill     Acetaminophen (TYLENOL PO) Take 500-1,000 mg by mouth every 6 hours as needed As needed for pain        aspirin (ASPIRIN ADULT LOW DOSE) 81 MG EC tablet Take 1 tablet (81  "mg) by mouth daily 30 tablet 3     blood glucose monitoring (ACCU-CHEK ALMA PLUS) test strip Use to test blood sugar 1 times daily or as directed. 100 strip 1     blood glucose monitoring (ACCU-CHEK FASTCLIX) lancets Use to test blood sugar 1 times daily or as directed. 3 each 3     Ibuprofen (IBU PO) Take 600 mg by mouth every 6 hours as needed        insulin pen needle (BD VALENTINA U/F) 32G X 4 MM Use once daily as directed. 100 each PRN     liraglutide (VICTOZA PEN) 18 MG/3ML soln Inject 1.8 mg Subcutaneous daily 9 mL 3     loratadine (CLARITIN) 10 MG tablet Take 10 mg by mouth daily       losartan-hydrochlorothiazide (HYZAAR) 50-12.5 MG per tablet Take 1 tablet by mouth daily 90 tablet 1     metFORMIN (GLUCOPHAGE-XR) 500 MG 24 hr tablet Take 1 tablet (500 mg) by mouth daily (with dinner) 90 tablet 1     multivitamin, therapeutic with minerals (MULTI-VITAMIN) TABS tablet Take 1 tablet by mouth daily       ondansetron (ZOFRAN ODT) 4 MG ODT tab Take 1-2 tablets (4-8 mg) by mouth every 8 hours as needed for nausea 12 tablet 1     order for DME AIRSENSE 10  10-15 CM/H20  NASAL AIRFIT N10 FOR HER       STATIN NOT PRESCRIBED, INTENTIONAL, Please choose reason not prescribed, below       VITAMIN D, CHOLECALCIFEROL, PO Take 10,000 Units by mouth daily        zolpidem (AMBIEN CR) 6.25 MG CR tablet Take 1 tab by mouth at bedtime as needed. 90 tablet 0     [DISCONTINUED] liraglutide (VICTOZA PEN) 18 MG/3ML soln Inject 1.8 mg Subcutaneous daily 9 mL 3       Reviewed and updated as needed this visit by clinical staff  Tobacco  Allergies  Meds  Problems       Reviewed and updated as needed this visit by Provider  Tobacco  Allergies  Meds  Problems         ROS:  Constitutional, HEENT, cardiovascular, pulmonary, GI, , musculoskeletal, neuro, skin, endocrine and psych systems are negative, except as otherwise noted.    OBJECTIVE:     /84  Pulse 93  Temp 98.8  F (37.1  C) (Oral)  Ht 5' 2\" (1.575 m)  Wt 194 lb (88 " kg)  LMP 06/20/2018 (Exact Date)  SpO2 98%  BMI 35.48 kg/m2  Body mass index is 35.48 kg/(m^2).  GENERAL: healthy, alert and no distress  NECK: no adenopathy, no asymmetry, masses, or scars and thyroid normal to palpation  RESP: lungs clear to auscultation - no rales, rhonchi or wheezes  CV: regular rate and rhythm, normal S1 S2, no S3 or S4, no murmur, click or rub, no peripheral edema and peripheral pulses strong  ABDOMEN: soft, rt UQ tender, no hepatosplenomegaly, no masses and bowel sounds normal  MS: no gross musculoskeletal defects noted, no edema        ASSESSMENT/PLAN:             Kezia was seen today for recheck.    Diagnoses and all orders for this visit:    Type 2 diabetes mellitus with hyperglycemia, without long-term current use of insulin (H)  Continue metformin also  Will reduce Victoza to 1.2 mg daily  -     insulin pen needle (BD VALENTINA U/F) 32G X 4 MM; Use once daily as directed.  -     liraglutide (VICTOZA PEN) 18 MG/3ML soln; Inject 1.8 mg Subcutaneous daily  -     Hemoglobin A1c  -     **A1C FUTURE 6mo; Future    Palpitations  EKG show poor R wave progression V1-3  Will do echocardiogram   -     Cardiac Event Monitor - Peds/Adult; Future  -     EKG 12-lead complete w/read - Clinics    Absence of menstruation  May need Provera  -     Follicle stimulating hormone  -     Estradiol  -     US Pelvic Limited; Future    Abdominal pain, generalized  -     US Abdomen Complete; Future  Rt UQ tender  Benign essential hypertension  -     Basic metabolic panel  Continue Hyzaar  Severe obesity (BMI 35.0-35.9 with comorbidity) (H)  Has lost 4lb    Hyperlipidemia LDL goal <100  -     Lipid panel reflex to direct LDL Fasting        Shamir Angeles MD  [facility]

## 2018-09-24 NOTE — MR AVS SNAPSHOT
After Visit Summary   9/24/2018    Kezia Nava    MRN: [id number]           Patient Information     Date Of Birth          1975        Visit Information        Provider Department      9/24/2018 9:00 AM Shamir Angeles MD [facility]        Today's Diagnoses     Type 2 diabetes mellitus with hyperglycemia, without long-term current use of insulin (H)    -  1    Palpitations        Absence of menstruation        Abdominal pain, generalized        Benign essential hypertension        Severe obesity (BMI 35.0-35.9 with comorbidity) (H)        Hyperlipidemia LDL goal <100          Care Instructions    US         Cardiac monitor  [phone]    Reduce Victoza to 1.2 mg daily          Follow-ups after your visit        Follow-up notes from your care team     Return in about 6 months (around 3/24/2019) for Diabetes, HTN, Routine Visit, Non fasting repeat labs.      Your next 10 appointments already scheduled     Mar 29, 2019  9:00 AM CDT   Office Visit with Shamir Angeles MD   [facility]    [address] [id number] [phone]           Bring a current list of meds and any records pertaining to this visit. For Physicals, please bring immunization records and any forms needing to be filled out. Please arrive 10 minutes early to complete paperwork.              Future tests that were ordered for you today     Open Future Orders        Priority Expected Expires Ordered    Cardiac Event Monitor - Peds/Adult Routine  9/24/2019 9/24/2018    US Abdomen Complete Routine  9/24/2019 9/24/2018    US Pelvic Limited Routine  9/24/2019 9/24/2018    **A1C FUTURE 6mo Routine 3/23/2019 4/22/2019 9/24/2018            Who to contact     If you have questions or need follow up information about today's clinic visit or your schedule please contact [facility] directly at [phone].  Normal or non-critical lab and imaging  "results will be communicated to you by MyChart, letter or phone within 4 business days after the clinic has received the results. If you do not hear from us within 7 days, please contact the clinic through [facility] or phone. If you have a critical or abnormal lab result, we will notify you by phone as soon as possible.  Submit refill requests through [facility] or call your pharmacy and they will forward the refill request to us. Please allow 3 business days for your refill to be completed.          Additional Information About Your Visit        [facility] Information     [facility] gives you secure access to your electronic health record. If you see a primary care provider, you can also send messages to your care team and make appointments. If you have questions, please call your primary care clinic.  If you do not have a primary care provider, please call [phone] and they will assist you.        Care EveryWhere ID     This is your Care EveryWhere ID. This could be used by other organizations to access your [place] medical records  [id number]        Your Vitals Were     Pulse Temperature Height Last Period Pulse Oximetry BMI (Body Mass Index)    93 98.8  F (37.1  C) (Oral) 5' 2\" (1.575 m) 06/20/2018 (Exact Date) 98% 35.48 kg/m2       Blood Pressure from Last 3 Encounters:   09/24/18 120/84   07/13/18 118/70   06/18/18 134/88    Weight from Last 3 Encounters:   09/24/18 194 lb (88 kg)   07/13/18 196 lb (88.9 kg)   06/18/18 193 lb (87.5 kg)              We Performed the Following     Basic metabolic panel     EKG 12-lead complete w/read - Clinics     Estradiol     Follicle stimulating hormone     Hemoglobin A1c     Lipid panel reflex to direct LDL Fasting          Today's Medication Changes          These changes are accurate as of 9/24/18  9:47 AM.  If you have any questions, ask your nurse or doctor.               Start taking these medicines.        Dose/Directions    liraglutide 18 MG/3ML soln   Commonly known as:  " VICTOZA PEN   Used for:  Type 2 diabetes mellitus with hyperglycemia, without long-term current use of insulin (H)   Started by:  Shamir Angeles MD        Dose:  1.2 mg   Inject 1.2 mg Subcutaneous daily   Quantity:  9 mL   Refills:  3            Where to get your medicines      These medications were sent to [facility] [place], [place] - [address], [address]  [address], [address], [place] [address]     Phone:  [phone]     insulin pen needle 32G X 4 MM    liraglutide 18 MG/3ML soln                Primary Care Provider Office Phone # Fax #    Shamir Angeles [phone] [address]  [place] [address]        Equal Access to Services     [facility]NTHIA [facility] : Hadii petar rodriguez hadasho Soomaali, waaxda luqadaha, qaybta kaalmada adeegyada, helder xiao . So [facility] [phone].    ATENCIÓN: Si habla español, tiene a carter disposición servicios gratuitos de asistencia lingüística. Llame al [phone].    We comply with applicable federal civil rights laws and [place] laws. We do not discriminate on the basis of race, color, national origin, age, disability, sex, sexual orientation, or gender identity.            Thank you!     Thank you for choosing [facility]  for your care. Our goal is always to provide you with excellent care. Hearing back from our patients is one way we can continue to improve our services. Please take a few minutes to complete the written survey that you may receive in the mail after your visit with us. Thank you!             Your Updated Medication List - Protect others around you: Learn how to safely use, store and throw away your medicines at www.disposemymeds.org.          This list is accurate as of 9/24/18  9:47 AM.  Always use your most recent med list.                   Brand Name Dispense Instructions for use Diagnosis    aspirin 81 MG EC tablet    ASPIRIN ADULT LOW DOSE    30 tablet    Take 1  tablet (81 mg) by mouth daily    Cerebrovascular accident (CVA), unspecified mechanism (H)       blood glucose monitoring lancets     3 each    Use to test blood sugar 1 times daily or as directed.    Type 2 diabetes mellitus with hyperglycemia, without long-term current use of insulin (H)       blood glucose monitoring test strip    ACCU-CHEK ALMA PLUS    100 strip    Use to test blood sugar 1 times daily or as directed.    Type 2 diabetes mellitus with hyperglycemia, without long-term current use of insulin (H)       IBU PO      Take 600 mg by mouth every 6 hours as needed        insulin pen needle 32G X 4 MM    BD VALENTINA U/F    100 each    Use once daily as directed.    Type 2 diabetes mellitus with hyperglycemia, without long-term current use of insulin (H)       liraglutide 18 MG/3ML soln    VICTOZA PEN    9 mL    Inject 1.2 mg Subcutaneous daily    Type 2 diabetes mellitus with hyperglycemia, without long-term current use of insulin (H)       loratadine 10 MG tablet    CLARITIN     Take 10 mg by mouth daily        losartan-hydrochlorothiazide 50-12.5 MG per tablet    HYZAAR    90 tablet    Take 1 tablet by mouth daily    Benign essential hypertension       metFORMIN 500 MG 24 hr tablet    GLUCOPHAGE-XR    90 tablet    Take 1 tablet (500 mg) by mouth daily (with dinner)    Type 2 diabetes mellitus with hyperglycemia, without long-term current use of insulin (H)       Multi-vitamin Tabs tablet      Take 1 tablet by mouth daily        ondansetron 4 MG ODT tab    ZOFRAN ODT    12 tablet    Take 1-2 tablets (4-8 mg) by mouth every 8 hours as needed for nausea    Viral gastroenteritis       order for DME      AIRSENSE 10 10-15 CM/H20 NASAL AIRFIT N10 FOR HER        STATIN NOT PRESCRIBED (INTENTIONAL)      Please choose reason not prescribed, below    Type 2 diabetes mellitus with hyperglycemia, without long-term current use of insulin (H)       TYLENOL PO      Take 500-1,000 mg by mouth every 6 hours as needed As  needed for pain        VITAMIN D (CHOLECALCIFEROL) PO      Take 10,000 Units by mouth daily        zolpidem 6.25 MG CR tablet    AMBIEN CR    90 tablet    Take 1 tab by mouth at bedtime as needed.

## 2018-10-05 ENCOUNTER — HOSPITAL ENCOUNTER (OUTPATIENT)
Dept: ULTRASOUND IMAGING | Facility: CLINIC | Age: 43
End: 2018-10-05
Attending: INTERNAL MEDICINE
Payer: COMMERCIAL

## 2018-10-05 ENCOUNTER — HOSPITAL ENCOUNTER (OUTPATIENT)
Dept: CARDIOLOGY | Facility: CLINIC | Age: 43
End: 2018-10-05
Attending: INTERNAL MEDICINE
Payer: COMMERCIAL

## 2018-10-05 ENCOUNTER — HOSPITAL ENCOUNTER (OUTPATIENT)
Dept: CARDIOLOGY | Facility: CLINIC | Age: 43
Discharge: HOME | End: 2018-10-05
Attending: INTERNAL MEDICINE | Admitting: INTERNAL MEDICINE
Payer: COMMERCIAL

## 2018-10-05 DIAGNOSIS — R10.84 ABDOMINAL PAIN, GENERALIZED: ICD-10-CM

## 2018-10-05 DIAGNOSIS — R00.2 PALPITATIONS: ICD-10-CM

## 2018-10-05 DIAGNOSIS — N91.2 ABSENCE OF MENSTRUATION: ICD-10-CM

## 2018-10-05 PROCEDURE — 93270 REMOTE 30 DAY ECG REV/REPORT: CPT

## 2018-10-05 PROCEDURE — 76705 ECHO EXAM OF ABDOMEN: CPT

## 2018-10-05 PROCEDURE — 93272 ECG/REVIEW INTERPRET ONLY: CPT | Performed by: INTERNAL MEDICINE

## 2018-10-05 PROCEDURE — 76856 US EXAM PELVIC COMPLETE: CPT

## 2018-10-05 PROCEDURE — 93306 TTE W/DOPPLER COMPLETE: CPT

## 2018-10-05 PROCEDURE — 93306 TTE W/DOPPLER COMPLETE: CPT | Mod: 26 | Performed by: INTERNAL MEDICINE

## 2018-10-18 ENCOUNTER — ONCOLOGY VISIT (OUTPATIENT)
Dept: ONCOLOGY | Facility: CLINIC | Age: 43
End: 2018-10-18
Attending: CLINICAL NURSE SPECIALIST
Payer: COMMERCIAL

## 2018-10-18 ENCOUNTER — PRE VISIT (OUTPATIENT)
Dept: ONCOLOGY | Facility: CLINIC | Age: 43
End: 2018-10-18

## 2018-10-18 VITALS
WEIGHT: 199 LBS | TEMPERATURE: 98.5 F | HEART RATE: 92 BPM | OXYGEN SATURATION: 96 % | RESPIRATION RATE: 14 BRPM | SYSTOLIC BLOOD PRESSURE: 119 MMHG | DIASTOLIC BLOOD PRESSURE: 83 MMHG | BODY MASS INDEX: 36.4 KG/M2

## 2018-10-18 DIAGNOSIS — R92.30 DENSE BREASTS: ICD-10-CM

## 2018-10-18 DIAGNOSIS — Z12.31 ENCOUNTER FOR SCREENING MAMMOGRAM FOR HIGH-RISK PATIENT: ICD-10-CM

## 2018-10-18 DIAGNOSIS — Z12.39 ENCOUNTER FOR BREAST CANCER SCREENING OTHER THAN MAMMOGRAM: ICD-10-CM

## 2018-10-18 DIAGNOSIS — Z12.39 BREAST CANCER SCREENING, HIGH RISK PATIENT: Primary | ICD-10-CM

## 2018-10-18 PROCEDURE — G0463 HOSPITAL OUTPT CLINIC VISIT: HCPCS

## 2018-10-18 PROCEDURE — 99215 OFFICE O/P EST HI 40 MIN: CPT | Performed by: CLINICAL NURSE SPECIALIST

## 2018-10-18 NOTE — PROGRESS NOTES
"Oncology Rooming Note    October 18, 2018 10:04 AM   Kezia Nava is a 42 year old female who presents for:    Chief Complaint   Patient presents with     Family History Of Cancer     breast cancer     Initial Vitals: /83 (BP Location: Right arm, Patient Position: Chair, Cuff Size: Adult Large)  Pulse 92  Temp 98.5  F (36.9  C) (Oral)  Resp 14  Wt 90.3 kg (199 lb)  SpO2 96%  BMI 36.4 kg/m2 Estimated body mass index is 36.4 kg/(m^2) as calculated from the following:    Height as of 9/24/18: 1.575 m (5' 2\").    Weight as of this encounter: 90.3 kg (199 lb). Body surface area is 1.99 meters squared.  Data Unavailable Comment: Data Unavailable   No LMP recorded.  Allergies reviewed: Yes  Medications reviewed: Yes    Medications: Medication refills not needed today.  Pharmacy name entered into [facility]:    [facility] - [place], [place] - [address], [address]  [facility] - [place], [place] - [address]    Clinical concerns: None     5 minutes for nursing intake (face to face time)     Gogo Sharma CMA              "

## 2018-10-18 NOTE — TELEPHONE ENCOUNTER
Date of appointment: 10/18/2018    Diagnosis/reason for appointment: Family History Malignant Neoplasm of Breast  Referring provider/facility: Dr. Tiffany Albert  Who called:    Recent Studies  Imaging:  Pathology:  Labs:  Previous chemo/radiation (if known):    Records requested from:  Records received from:    Additional information:

## 2018-10-18 NOTE — PATIENT INSTRUCTIONS
Individualized Surveillance Plan for women  With 20% or greater lifetime risk of breast cancer   Per [facility] Breast Cancer Screening and Diagnosis Guidelines Version 2.2018    Recommended screening Test or procedure Last done Next Scheduled    Clinical encounter Ongoing risk assessment, risk reduction counseling and clinical breast exam, every 6-12 months. Refer to genetic counseling if not already done.   10/18/2018   June 2019   However, some family histories with breast cancers at a very young age, may warrant screening starting earlier.    *May begin at age 40 if breast cancers in the family occur at later ages.    Annual mammogram beginning 10 years younger than the earliest breast cancer in the family but not prior to age 30.    Recommend annual breast MRI to begin 10 years younger than the earliest breast cancer in the family but not prior to age 25.    Breast MRIs are preferably done on day 7-15 of the menstrual cycle in premenopausal women. 6/18/2018 - Tomosynthesis mammogram, [id number]    No historyof breast MRI NEXT: Breast MRI in December    Next exam: June 2019 followed by tomosynthesis mammogram (no sooner than 6/19)   Breast screening for patients at high risk due to thoracic radiation before 30 years old    Begin 10 years post radiation treatment or age 25.   Annual mammogram beginning 10 years after radiation but not prior to age 30    Annual breast MRI, preferably on day 7-15 of menstrual cycle for premenopausal women.       NA     NA   Women who have a lifetime risk of >20% based on history of LCIS or ADH/ALH Annual screening mammogram beginning at age of LCIS or ADH/ALH but not prior to age 30.    Consider annual MRI to begin at age of diagnosis of LCIS or ADH/ALH but not prior to age 25.    Consider risk reducing strategies.   NA   NA    Recommend risk reducing strategies for women with 1.7% 5 year risk of breast cancer. 2.1%        Magnetic Resonance Imaging (MRI) of the Breast  Magnetic resonance  imaging (MRI) of the breast is an imaging test that uses strong magnets and radio waves to form pictures of the inside of the breast. It also creates images of the tissues that surround the breast. Breast MRI is used to check for problems, such as a leaking breast implant or a suspicious lump or mass. It can also be used to help determine if breast cancer is present and aid in diagnosis and management. Most MRI tests take 30 to 60 minutes. Depending on the type of MRI you are having, the test may take longer. Give yourself extra time to check in.      During a breast MRI, you lie face down on a platform that slides into a tubelike machine called a scanner.   Before your test    Follow any directions you are given for taking medicines and for not eating or drinking before the test.    Follow your normal daily routine unless your provider tells you otherwise.    You ll be asked to remove your watch, hearing aids, credit cards, pens, eyeglasses, and other metal objects.    You may be asked to remove your makeup. Makeup may contain some metal.  MRI uses strong magnets. Metal is affected by magnets and can distort the image. The magnet used in MRI can cause metal objects in your body to move. If you have a metal implant, you may not be able to have an MRI unless the implant is certified as MRI safe. People with these implants should not have an MRI:    Ear (cochlear) implants    Certain clips used for brain aneurysms    Certain metal coils put in blood vessels    Most defibrillators    Most pacemakers  The radiologist or technologist may ask you if you:    Have had stereotactic breast biopsy    Have a pacemaker or implanted cardiac defibrillator    Have an artificial body part (prosthesis)    Have metal rods, screws, plates, or splinters in your body    Wear a medicated adhesive patch    Have tattoos or body piercings. Some tattoo inks contain metal.    Have implanted nerve stimulators or drug-infusion ports    Work with  metal    Have braces    Have a bullet or other metal in your body  Tell the radiologist or technologist if you:    Are allergic to any medicines    Are pregnant or think you may be pregnant    Are afraid of small, enclosed spaces (claustrophobic)    Have any serious health problems (If you have kidney disease or a liver transplant  you may not be able to have the contrast material used for MRI)    Have had previous surgeries    Are breastfeeding   During your test    You may be asked to wear a hospital gown.    You may be given earplugs to wear if you desire.    You may be injected with contrast through an intravenous (IV) line in your hand or arm. This is a special dye that makes the MRI image sharp that may be needed depending on the reason for your exam.    You ll lie on a platform that slides into a tube-like machine called a scanner. You ll be on your stomach with your breasts placed through openings in the platform.    Remain as still as you can while the camera takes the pictures. This will ensure the best images.  After your test    You can get back to normal activities right away.    If you were given contrast, it will pass naturally through your body within a day.    Drink lots of water so that the dye passes quickly out of your body.  Getting your results  Your healthcare provider will discuss the test results with you during a follow-up appointment or over the phone.  Date Last Reviewed: 2/1/2017 2000-2017 [employer]. [address] [place] [id number]. All rights reserved. This information is not intended as a substitute for professional medical care. Always follow your healthcare professional's instructions.      Understanding Breast Density  What is breast density?  Breasts are made of connective tissue, glandular tissue and fatty tissue. Dense breasts have a lot of the first two types of tissue, but not much fat.  Why is it important?  Having dense breasts means you have a  slightly higher risk of getting breast cancer. It also means your mammograms will be harder to read. This is because both dense tissue and lumps look white on a mammogram. Fatty tissue looks black. The pictures below show the 4 levels of breast density:     How do I know if I have dense breasts?  Only a mammogram can tell you if you have dense breasts. The person who reviews your mammogram (radiologist) will give your breasts a density score based on the levels shown above.  What should I do?  Talk to your doctor about your options. But know that mammograms are proven to reduce cancer deaths. Plus, a yearly mammogram is even more important for women who have a higher risk of breast cancer. Your doctor may order other tests as well.  You should also know how your breasts look and feel. Any time you feel or see something that isn't normal, tell your doctor.  For informational purposes only. Not to replace the advice of your health care provider. Images courtesy American College of Radiology (ACR)   and Society of Breast Imaging (SBI). Used with permission. Text copyright   2014 [employer]. All rights reserved. [employer] 290219 - 08/14.  Medicines to Reduce Breast Cancer Risk      What drugs are used to reduce the risk of breast cancer?  When drugs are used to reduce the risk of cancer in healthy people, it s called chemoprevention.     Tamoxifen and raloxifene  Tamoxifen and raloxifene are the only drugs that are approved in the [place] to help lower the risk of breast cancer. Both of these drugs are classified as selective estrogen receptor modulators (or SERMs).     This means that they act against (or block) estrogen (a female hormone) in some tissues of the body, but act like estrogen in others. Estrogen can fuel the growth of breast cancer cells.     Both of these drugs block estrogen in breast cells, which is why they can be useful in lowering the risk of breast cancer.    These drugs are more often  used for other things.   1. The main use of tamoxifen is to treat hormone receptor-positive breast cancer (breast cancer with cells that have estrogen and/or progesterone receptors on them).   2. The main use of raloxifene is to prevent and treat osteoporosis (very weak bones) in post-menopausal women.    Both drugs are pills that are taken once a day.  To lower the risk of breast cancer, these drugs are taken for 5 years.    Tamoxifen can be used by women whether or not they have gone through menopause, while raloxifene is only approved for use in post-menopausal women.     How much do these drugs lower the risk of breast cancer?  The effect of these drugs on breast cancer risk has varied in different studies. Tamoxifen has been shown to lower risk by 13% to 48%, while raloxifene has lowered risk by 18% to 58%. When the results of all the studies are taken together, the overall reduction in risk for these drugs was about 38% (more than a third). These drugs lower the risk of both invasive breast cancer and ductal carcinoma in situ.    What are the risks and side effects of these drugs?  The most common side effects of these drugs are symptoms of menopause. These include hot flashes and night sweats. Tamoxifen can also cause vaginal dryness and vaginal discharge. Pre-menopausal women taking tamoxifen can experience menstrual changes. Menstrual periods can become irregular or even stop. Although often periods resume again after the drug is stopped, they don t always, and some women go into menopause. This is more likely in women who were close to menopause when they started taking the drug.    Other more serious side effects are rare. These include serious blood clots and cancer of the uterus.   Blood clots   Both tamoxifen and raloxifene increase the risk of blood clots in a vein of the leg (deep venous thrombosis) or in the lung (pulmonary embolism). These clots can sometimes  cause serious problems, and even  death. In the  breast cancer prevention studies, the overall risk of these blood clots over 5 years of treatment was less than 1%. This risk could be higher if you had a serious blood clot in the past, so these drugs are not   recommended to lower breast cancer risk in anyone with a history of blood clots.     If you are taking tamoxifen or raloxifene, tell your doctor if you develop leg swelling, chest pain, or shortness of breath, as these can be symptoms of blood clots.    Because these drugs increase the risk of serious blood clots, there is also concern that they also increase the risk of heart attack or stroke. So far, though, this hasn t been seen in studies.     Cancer of the uterus   Because tamoxifen acts like estrogen in the uterus, it can increase a woman s chance of endometrial cancer and uterine sarcoma (cancers of the uterus). It also is linked to a higher risk of endometrial pre-cancers.      Raloxifene does not act like estrogen in the uterus and is not linked to an increased risk of uterine cancer.     Although tamoxifen does increase the risk of uterine cancer, the overall increase in risk  is low. In one large study of women taking the drug for up to 5 years to lower breast cancer risk, less than 1% of women taking  the drug were diagnosed with uterine cancer. Most of these cancers were found at a very early stage. Some women were diagnosed with endometrial pre-cancer. Risk of uterine cancer goes back to normal within a few  years of stopping the drug.     The increased risk seems to affect the women over 50 and not the younger women.   If you are taking tamoxifen, tell your doctor about any abnormal vaginal bleeding or spotting, especially after menopause, as these are possible symptoms of uterine cancer.      Women who have been diagnosed with uterine cancer or pre-cancer should not take tamoxifen. Women who have had a hysterectomy (surgery to remove the uterus) are not at risk for endometrial  cancer or uterine sarcoma and do not have to worry about these cancers.    Do not take tamoxifen nor raloxifene  to reduce breast cancer risk if you:    Are taking estrogen (including birth control pills or menopausal hormone therapy)    Are taking an aromatase inhibitor    Are younger than 35 years old    Ever had serious blood clots (deep venous thrombosis or pulmonary embolism)    Ever had a stroke or heart attack    Smoke    Have  high blood pressure    Have diabetes    Ever had uterine or atypical hyperplasia of the uterus (a kind of pre-cancer)    Have an increased risk of serious blood clots    Are pregnant or planning to become pregnant    Are breastfeeding    Raloxifene has not been tested in pre-menopausal women, and should only be used in women who have gone through menopause.      2014 Copyright American [employer] Society

## 2018-10-18 NOTE — MR AVS SNAPSHOT
After Visit Summary   10/18/2018    Kezia Nava    MRN: [id number]           Patient Information     Date Of Birth          1975        Visit Information        Provider Department      10/18/2018 10:00 AM Sheyla Hernandez APRN [facility]        Today's Diagnoses     Breast cancer screening, high risk patient    -  1    Dense breasts        Encounter for breast cancer screening other than mammogram        Encounter for screening mammogram for high-risk patient          Care Instructions    Individualized Surveillance Plan for women  With 20% or greater lifetime risk of breast cancer   Per NCCN Breast Cancer Screening and Diagnosis Guidelines Version 2.2018    Recommended screening Test or procedure Last done Next Scheduled    Clinical encounter Ongoing risk assessment, risk reduction counseling and clinical breast exam, every 6-12 months. Refer to genetic counseling if not already done.   10/18/2018   June 2019   However, some family histories with breast cancers at a very young age, may warrant screening starting earlier.    *May begin at age 40 if breast cancers in the family occur at later ages.    Annual mammogram beginning 10 years younger than the earliest breast cancer in the family but not prior to age 30.    Recommend annual breast MRI to begin 10 years younger than the earliest breast cancer in the family but not prior to age 25.    Breast MRIs are preferably done on day 7-15 of the menstrual cycle in premenopausal women. 6/18/2018 - Tomosynthesis mammogram, BiRads1    No historyof breast MRI NEXT: Breast MRI in December    Next exam: June 2019 followed by tomosynthesis mammogram (no sooner than 6/19)   Breast screening for patients at high risk due to thoracic radiation before 30 years old    Begin 10 years post radiation treatment or age 25.   Annual mammogram beginning 10 years after radiation but not prior to age 30    Annual breast MRI,  preferably on day 7-15 of menstrual cycle for premenopausal women.       NA     NA   Women who have a lifetime risk of >20% based on history of LCIS or ADH/ALH Annual screening mammogram beginning at age of LCIS or ADH/ALH but not prior to age 30.    Consider annual MRI to begin at age of diagnosis of LCIS or ADH/ALH but not prior to age 25.    Consider risk reducing strategies.   NA   NA    Recommend risk reducing strategies for women with 1.7% 5 year risk of breast cancer. 2.1%        Magnetic Resonance Imaging (MRI) of the Breast  Magnetic resonance imaging (MRI) of the breast is an imaging test that uses strong magnets and radio waves to form pictures of the inside of the breast. It also creates images of the tissues that surround the breast. Breast MRI is used to check for problems, such as a leaking breast implant or a suspicious lump or mass. It can also be used to help determine if breast cancer is present and aid in diagnosis and management. Most MRI tests take 30 to 60 minutes. Depending on the type of MRI you are having, the test may take longer. Give yourself extra time to check in.      During a breast MRI, you lie face down on a platform that slides into a tubelike machine called a scanner.   Before your test    Follow any directions you are given for taking medicines and for not eating or drinking before the test.    Follow your normal daily routine unless your provider tells you otherwise.    You ll be asked to remove your watch, hearing aids, credit cards, pens, eyeglasses, and other metal objects.    You may be asked to remove your makeup. Makeup may contain some metal.  MRI uses strong magnets. Metal is affected by magnets and can distort the image. The magnet used in MRI can cause metal objects in your body to move. If you have a metal implant, you may not be able to have an MRI unless the implant is certified as MRI safe. People with these implants should not have an MRI:    Ear (cochlear)  implants    Certain clips used for brain aneurysms    Certain metal coils put in blood vessels    Most defibrillators    Most pacemakers  The radiologist or technologist may ask you if you:    Have had stereotactic breast biopsy    Have a pacemaker or implanted cardiac defibrillator    Have an artificial body part (prosthesis)    Have metal rods, screws, plates, or splinters in your body    Wear a medicated adhesive patch    Have tattoos or body piercings. Some tattoo inks contain metal.    Have implanted nerve stimulators or drug-infusion ports    Work with metal    Have braces    Have a bullet or other metal in your body  Tell the radiologist or technologist if you:    Are allergic to any medicines    Are pregnant or think you may be pregnant    Are afraid of small, enclosed spaces (claustrophobic)    Have any serious health problems (If you have kidney disease or a liver transplant  you may not be able to have the contrast material used for MRI)    Have had previous surgeries    Are breastfeeding   During your test    You may be asked to wear a hospital gown.    You may be given earplugs to wear if you desire.    You may be injected with contrast through an intravenous (IV) line in your hand or arm. This is a special dye that makes the MRI image sharp that may be needed depending on the reason for your exam.    You ll lie on a platform that slides into a tube-like machine called a scanner. You ll be on your stomach with your breasts placed through openings in the platform.    Remain as still as you can while the camera takes the pictures. This will ensure the best images.  After your test    You can get back to normal activities right away.    If you were given contrast, it will pass naturally through your body within a day.    Drink lots of water so that the dye passes quickly out of your body.  Getting your results  Your healthcare provider will discuss the test results with you during a follow-up appointment  or over the phone.  Date Last Reviewed: 2/1/2017 2000-2017 [employer]. [address] [place] [address], [place] [address]. All rights reserved. This information is not intended as a substitute for professional medical care. Always follow your healthcare professional's instructions.      Understanding Breast Density  What is breast density?  Breasts are made of connective tissue, glandular tissue and fatty tissue. Dense breasts have a lot of the first two types of tissue, but not much fat.  Why is it important?  Having dense breasts means you have a slightly higher risk of getting breast cancer. It also means your mammograms will be harder to read. This is because both dense tissue and lumps look white on a mammogram. Fatty tissue looks black. The pictures below show the 4 levels of breast density:     How do I know if I have dense breasts?  Only a mammogram can tell you if you have dense breasts. The person who reviews your mammogram (radiologist) will give your breasts a density score based on the levels shown above.  What should I do?  Talk to your doctor about your options. But know that mammograms are proven to reduce cancer deaths. Plus, a yearly mammogram is even more important for women who have a higher risk of breast cancer. Your doctor may order other tests as well.  You should also know how your breasts look and feel. Any time you feel or see something that isn't normal, tell your doctor.  For informational purposes only. Not to replace the advice of your health care provider. Images courtesy American College of Radiology (ACR)   and Society of Breast Imaging (SBI). Used with permission. Text copyright   2014 [facility]. All rights reserved. [employer] 592887 - 08/14.  Medicines to Reduce Breast Cancer Risk      What drugs are used to reduce the risk of breast cancer?  When drugs are used to reduce the risk of cancer in healthy people, it s called chemoprevention.     Tamoxifen and  raloxifene  Tamoxifen and raloxifene are the only drugs that are approved in the [place] to help lower the risk of breast cancer. Both of these drugs are classified as selective estrogen receptor modulators (or SERMs).     This means that they act against (or block) estrogen (a female hormone) in some tissues of the body, but act like estrogen in others. Estrogen can fuel the growth of breast cancer cells.     Both of these drugs block estrogen in breast cells, which is why they can be useful in lowering the risk of breast cancer.    These drugs are more often used for other things.   1. The main use of tamoxifen is to treat hormone receptor-positive breast cancer (breast cancer with cells that have estrogen and/or progesterone receptors on them).   2. The main use of raloxifene is to prevent and treat osteoporosis (very weak bones) in post-menopausal women.    Both drugs are pills that are taken once a day.  To lower the risk of breast cancer, these drugs are taken for 5 years.    Tamoxifen can be used by women whether or not they have gone through menopause, while raloxifene is only approved for use in post-menopausal women.     How much do these drugs lower the risk of breast cancer?  The effect of these drugs on breast cancer risk has varied in different studies. Tamoxifen has been shown to lower risk by 13% to 48%, while raloxifene has lowered risk by 18% to 58%. When the results of all the studies are taken together, the overall reduction in risk for these drugs was about 38% (more than a third). These drugs lower the risk of both invasive breast cancer and ductal carcinoma in situ.    What are the risks and side effects of these drugs?  The most common side effects of these drugs are symptoms of menopause. These include hot flashes and night sweats. Tamoxifen can also cause vaginal dryness and vaginal discharge. Pre-menopausal women taking tamoxifen can experience menstrual changes. Menstrual periods can become  irregular or even stop. Although often periods resume again after the drug is stopped, they don t always, and some women go into menopause. This is more likely in women who were close to menopause when they started taking the drug.    Other more serious side effects are rare. These include serious blood clots and cancer of the uterus.   Blood clots   Both tamoxifen and raloxifene increase the risk of blood clots in a vein of the leg (deep venous thrombosis) or in the lung (pulmonary embolism). These clots can sometimes  cause serious problems, and even death. In the  breast cancer prevention studies, the overall risk of these blood clots over 5 years of treatment was less than 1%. This risk could be higher if you had a serious blood clot in the past, so these drugs are not   recommended to lower breast cancer risk in anyone with a history of blood clots.     If you are taking tamoxifen or raloxifene, tell your doctor if you develop leg swelling, chest pain, or shortness of breath, as these can be symptoms of blood clots.    Because these drugs increase the risk of serious blood clots, there is also concern that they also increase the risk of heart attack or stroke. So far, though, this hasn t been seen in studies.     Cancer of the uterus   Because tamoxifen acts like estrogen in the uterus, it can increase a woman s chance of endometrial cancer and uterine sarcoma (cancers of the uterus). It also is linked to a higher risk of endometrial pre-cancers.      Raloxifene does not act like estrogen in the uterus and is not linked to an increased risk of uterine cancer.     Although tamoxifen does increase the risk of uterine cancer, the overall increase in risk  is low. In one large study of women taking the drug for up to 5 years to lower breast cancer risk, less than 1% of women taking  the drug were diagnosed with uterine cancer. Most of these cancers were found at a very early stage. Some women were diagnosed with  endometrial pre-cancer. Risk of uterine cancer goes back to normal within a few  years of stopping the drug.     The increased risk seems to affect the women over 50 and not the younger women.   If you are taking tamoxifen, tell your doctor about any abnormal vaginal bleeding or spotting, especially after menopause, as these are possible symptoms of uterine cancer.      Women who have been diagnosed with uterine cancer or pre-cancer should not take tamoxifen. Women who have had a hysterectomy (surgery to remove the uterus) are not at risk for endometrial cancer or uterine sarcoma and do not have to worry about these cancers.    Do not take tamoxifen nor raloxifene  to reduce breast cancer risk if you:    Are taking estrogen (including birth control pills or menopausal hormone therapy)    Are taking an aromatase inhibitor    Are younger than 35 years old    Ever had serious blood clots (deep venous thrombosis or pulmonary embolism)    Ever had a stroke or heart attack    Smoke    Have  high blood pressure    Have diabetes    Ever had uterine or atypical hyperplasia of the uterus (a kind of pre-cancer)    Have an increased risk of serious blood clots    Are pregnant or planning to become pregnant    Are breastfeeding    Raloxifene has not been tested in pre-menopausal women, and should only be used in women who have gone through menopause.      2014 Copyright American Cancer Society                Follow-ups after your visit        Follow-up notes from your care team     Return in about 8 months (around 6/20/2019).      Your next 10 appointments already scheduled     Nov 26, 2018  1:00 PM CST   [facility] Visit with Shamir Angeles MD   [facility]    [address] [facility] [address]   [phone]           Bring a current list of meds and any records pertaining to this visit. For Physicals, please bring immunization records and any forms needing to be filled out. Please arrive  10 minutes early to complete paperwork.            Dec 07, 2018  1:45 PM CST   MR BREAST BILATERAL W/O & W CONTRAST with SHMRP2   [facility] ([facility])    [address] [place] [address] [phone]           How do I prepare for my exam? (Food and drink instructions) **If you will be receiving sedation or general anesthesia, please see special notes below.**  How do I prepare for my exam? (Other instructions) Take your medicines as usual, unless your doctor tells you not to. The timing of your exam may depend on the start of your last period. If you re in menopause, you may have the exam anytime.  You will have IV contrast for this exam.  You do not need to do anything special to prepare. **If you will be receiving sedation or general anesthesia, please see special notes below.**  What should I wear:  The MRI machine uses a strong magnet. Please wear clothes without metal (snaps, zippers). A sweatsuit works well, or we may give you a hospital gown. Please remove any body piercings and hair extensions before you arrive. You will also remove watches, jewelry, hairpins, wallets, dentures, partial dental plates and hearing aids. You may wear contact lenses, and you may be able to wear your rings. We have a safe place to keep your personal items, but it is safer to leave them at home.  How long does the exam take:  Most tests take 30 to 60 minutes.  HOWEVER, IF YOUR DOCTOR PRESCRIBES ANESTHESIA please plan on spending four to five hours in the recovery room.  What should I bring: Bring a list of your current medicines to your exam (including vitamins, minerals and over-the-counter drugs). Please bring any previous mammograms or breast MRIs from other facilities to the MRI dept. Do not mail these items to us.  Do I need a : **If you will be receiving sedation or general anesthesia, please see special notes below.**  What should I do after the exam: No Restrictions,  You may resume normal activities.  What is this test: MRI (magnetic resonance imaging) uses a strong magnet and radio waves to look inside the body. An MRA (magnetic resonance angiogram) does the same thing, but it lets us look at your blood vessels. A computer turns the radio waves into pictures showing cross sections of the body, much like slices of bread. This helps us see any problems more clearly. You may receive fluid (called  contrast ) before or during your scan. The fluid helps us see the pictures better. We give the fluid through an IV (small needle in your arm).  Who should I call with questions: If you have any questions, please contact your Imaging Department exam site. Directions, parking instructions, and other information is available on our website, [email or web address].  How do I prepare if I m having sedation or anesthesia? **IMPORTANT** THE INSTRUCTIONS BELOW ARE ONLY FOR THOSE PATIENTS WHO HAVE BEEN TOLD THEY WILL RECEIVE SEDATION OR GENERAL ANESTHESIA DURING THEIR MRI PROCEDURE:  IF YOU WILL RECEIVE SEDATION (take medicine to help you relax during your exam) You must get the medicine from your doctor before you arrive. Bring the medicine to the exam. Do not take it at home. Arrive one hour early. Bring someone who can take you home after the test. Your medicine will make you sleepy. After the exam, you may not drive, take a bus or take a taxi by yourself. No eating 8 hours before your exam. You may have clear liquids up until 4 hours before your exam. (Clear liquids include water, clear tea, black coffee and fruit juice without pulp.)  IF YOU WILL RECEIVE ANESTHESIA (be asleep for your exam) Arrive 1 1/2 hours early. Bring someone who can take you home after the test. You may not drive, take a bus or take a taxi by yourself. No eating 8 hours before your exam. You may have clear liquids up until 4 hours before your exam. (Clear liquids include water, clear tea, black coffee and fruit juice  without pulp.)            Mar 29, 2019  9:00 AM CDT   Office Visit with Shamir Angeles MD   [facility] ([facility])    [address] [facility] [id number] [phone]           Bring a current list of meds and any records pertaining to this visit. For Physicals, please bring immunization records and any forms needing to be filled out. Please arrive 10 minutes early to complete paperwork.            Jun 20, 2019 10:00 AM CDT   Return Visit with SALLIE Fuller CNS   [facility] Risk Management Program ([facility])    [facility]  [address] [facility] [address]  [facility] [address] [phone]            Jun 20, 2019 11:00 AM CDT   MA SCREENING BILATERAL W/ KOSTAS with SHBCMA2   [facility] ([facility])    [address] [facility], [address]  [facility] [id number] [phone]           How do I prepare for my exam? (Food and drink instructions) No Food and Drink Restrictions.  How do I prepare for my exam? (Other instructions) Do not use any powder, lotion or deodorant under your arms or on your breast. If you do, we will ask you to remove it before your exam.  What should I wear: Wear comfortable, two-piece clothing.  How long does the exam take: Most scans will take 15 minutes.  What should I bring: Bring any previous mammograms from other facilities or have them mailed to the breast center.  Do I need a :  No  is needed.  What do I need to tell my doctor: If you have any allergies, tell your care team.  What should I do after the exam: No restrictions, You may resume normal activities.  What is this test: This test is an x-ray of the breast to look for breast disease. The breast is pressed between two plates to flatten and spread the tissue. An X-ray is taken of the breast from different angles.  Who should I call with questions: If you have any questions, please call the Imaging Department  "where you will have your exam. Directions, parking instructions, and other information is available on our website, [email or web address].  Other information about my exam Three-dimensional (3D) mammograms are available at [place] locations in [facility], [facility], [place], and [place].  Health locations include [place] and the [facility] in [place].  Benefits of 3D mammograms include: * Improved rate of cancer detection * Decreases your chance of having to go back for more tests, which means fewer: * \"False-positive\" results (This means that there is an abnormal area but it isn't cancer.) * Invasive testing procedures, such as a biopsy or surgery * Can provide clearer images of the breast if you have dense breast tissue.  *3D mammography is an optional exam that anyone can have with a 2D mammogram. It doesn't replace or take the place of a 2D mammogram. 2D mammograms remain an effective screening test for all women.  Not all insurance companies cover the cost of a 3D mammogram. Check with your insurance.              Future tests that were ordered for you today     Open Future Orders        Priority Expected Expires Ordered    MR Breast Bilateral w/o & w Contrast Routine 12/1/2018 10/19/2019 10/18/2018    MA Screen Bilateral w/Cullen Routine 6/19/2019 10/19/2019 10/18/2018            Who to contact     If you have questions or need follow up information about today's clinic visit or your schedule please contact [facility] directly at [phone].  Normal or non-critical lab and imaging results will be communicated to you by MyChart, letter or phone within 4 business days after the clinic has received the results. If you do not hear from us within 7 days, please contact the clinic through MyChart or phone. If you have a critical or abnormal lab result, we will notify you by phone as soon as possible.  Submit refill requests through " [employer] or call your pharmacy and they will forward the refill request to us. Please allow 3 business days for your refill to be completed.          Additional Information About Your Visit        [employer] Information     [employer] gives you secure access to your electronic health record. If you see a primary care provider, you can also send messages to your care team and make appointments. If you have questions, please call your primary care clinic.  If you do not have a primary care provider, please call [phone] and they will assist you.        Care EveryWhere ID     This is your Care EveryWhere ID. This could be used by other organizations to access your [facility] medical records  [id number]        Your Vitals Were     Pulse Temperature Respirations Last Period Pulse Oximetry BMI (Body Mass Index)    92 98.5  F (36.9  C) (Oral) 14 06/01/2018 (Approximate) 96% 36.4 kg/m2       Blood Pressure from Last 3 Encounters:   10/18/18 119/83   09/24/18 120/84   07/13/18 118/70    Weight from Last 3 Encounters:   10/18/18 90.3 kg (199 lb)   09/24/18 88 kg (194 lb)   07/13/18 88.9 kg (196 lb)               Primary Care Provider Office Phone # Fax #    Bhavwalet MD Bridgette [phone] [address] [address]        Equal Access to Services     MONSERRAT MARCUM AH: Hadii petar ku hadasho Sogabriel, waaxda luqadaha, qaybta kaalmada ashley, helder malone. So [facility] [phone].    ATENCIÓN: Si habla español, tiene a carter disposición servicios gratuitos de asistencia lingüística. Tyree al [phone].    We comply with applicable federal civil rights laws and [place] laws. We do not discriminate on the basis of race, color, national origin, age, disability, sex, sexual orientation, or gender identity.            Thank you!     Thank you for choosing [facility]  for your care. Our goal is always to provide you with excellent care. Hearing back from our  patients is one way we can continue to improve our services. Please take a few minutes to complete the written survey that you may receive in the mail after your visit with us. Thank you!             Your Updated Medication List - Protect others around you: Learn how to safely use, store and throw away your medicines at www.disposemymeds.org.          This list is accurate as of 10/18/18 11:12 AM.  Always use your most recent med list.                   Brand Name Dispense Instructions for use Diagnosis    aspirin 81 MG EC tablet    ASPIRIN ADULT LOW DOSE    30 tablet    Take 1 tablet (81 mg) by mouth daily    Cerebrovascular accident (CVA), unspecified mechanism (H)       blood glucose monitoring lancets     3 each    Use to test blood sugar 1 times daily or as directed.    Type 2 diabetes mellitus with hyperglycemia, without long-term current use of insulin (H)       blood glucose monitoring test strip    ACCU-CHEK ALMA PLUS    100 strip    Use to test blood sugar 1 times daily or as directed.    Type 2 diabetes mellitus with hyperglycemia, without long-term current use of insulin (H)       IBU PO      Take 600 mg by mouth every 6 hours as needed        insulin pen needle 32G X 4 MM    BD VALENTINA U/F    100 each    Use once daily as directed.    Type 2 diabetes mellitus with hyperglycemia, without long-term current use of insulin (H)       liraglutide 18 MG/3ML soln    VICTOZA PEN    9 mL    Inject 1.2 mg Subcutaneous daily    Type 2 diabetes mellitus with hyperglycemia, without long-term current use of insulin (H)       loratadine 10 MG tablet    CLARITIN     Take 10 mg by mouth daily        losartan-hydrochlorothiazide 50-12.5 MG per tablet    HYZAAR    90 tablet    Take 1 tablet by mouth daily    Benign essential hypertension       metFORMIN 500 MG 24 hr tablet    GLUCOPHAGE-XR    90 tablet    Take 1 tablet (500 mg) by mouth daily (with dinner)    Type 2 diabetes mellitus with hyperglycemia, without long-term  current use of insulin (H)       Multi-vitamin Tabs tablet      Take 1 tablet by mouth daily        ondansetron 4 MG ODT tab    ZOFRAN ODT    12 tablet    Take 1-2 tablets (4-8 mg) by mouth every 8 hours as needed for nausea    Viral gastroenteritis       order for DME      AIRSENSE 10 10-15 CM/H20 NASAL AIRFIT N10 FOR HER        STATIN NOT PRESCRIBED (INTENTIONAL)      Please choose reason not prescribed, below    Type 2 diabetes mellitus with hyperglycemia, without long-term current use of insulin (H)       TYLENOL PO      Take 500-1,000 mg by mouth every 6 hours as needed As needed for pain        VITAMIN D (CHOLECALCIFEROL) PO      Take 10,000 Units by mouth daily        zolpidem 6.25 MG CR tablet    AMBIEN CR    90 tablet    Take 1 tab by mouth at bedtime as needed.

## 2018-10-18 NOTE — LETTER
"    Cancer Risk Management  Program [facility]  [facility]  Mailing Address  Cancer Risk Management Program  [facility]  [address]  [place] [address]    New patient appointments  [phone]  October 18, 2018    Kezia Nava  [address] [place] [address]      Dear Hazel,    It was a pleasure meeting with you today.  Below is a copy of my note from our visit, outlining your surveillance plan.      I look forward to seeing you in the future to coordinate your care and reduce your health risks. Please feel free to contact me if you have any questions or concerns.      Oncology Risk Management Consultation:  Date on this visit: 10/18/2018    Kezia \"Hazel\"  Elijah  is referred by Tiffany Albert, licensed genetic counselor, for an oncology risk management consultation. She requires evaluation for her risk of cancer secondary to having a family history of breast cancer in her sister at age 43, and maternal first cousin with triple negative breast cancer at age 41.  She also has a paternal aunt with a history of breast cancer.  She is considered to at high risk for breast cancer and has a 24.6% lifetime risk and a 2.1% 5-year risk for breast cancer by the KAROLINA model.     Primary Physician: Shamir Angeles MD    History Of Present Illness:  Ms. Nava is a very pleasant,  42 year old female who presents with family history of breast cancer.    Pertinent history:  Menarche at age 12.  Nulliparous.  Uterus, left ovary and fallopian tube in place.  History of right salpingo-oophorectomy removed for a dermoid cyst in 2006.  No  history of OCPs.  LMP: June 2018  HRT: 1-2 small doses of progesterone within the last 2 months.  S/P breast reduction surgery in 2003.  Heterogeneously dense breasts  No history of " breast biopsy.  No history of atypia, or malignancy.  No history of tobacco use.  No extra environmental exposures.    Genetic testing:  None to date.  Kezia was planning to be tested with a Cancer Next panel through [facility] but has not yet decided whether to proceed with the test.    Pertinent screening history:  3/28/2005: Screening mammogram, category 1, normal.  7/23/2008, Screening mammogram, category 1, normal  3/2/2015:  right diagnostic mammogram and right breast ultrasound for palpable lump in right breast, both BI-RADS 2 tiny cysts.  2/13/2017: Screening mammogram, BI-RADS 2.  6/18/2018: Screening Tomosynthesis mammogram, BI-RADS 1.    At this visit, she denies new fatigue, breast pain, asymmetry, lumps, masses, thickening, nipple discharge and skin changes in her breasts.    Past Medical/Surgical History:  Past Medical History:   Diagnosis Date     Diffuse cystic mastopathy      Elevated BP 12/15/2016     Mesenteric adenitis 4/14     Nephrolithiasis     s/p lithotripsy     Thyroiditis, acute 12/2/2014    transient hypothyroidism- resolved     Type 2 diabetes mellitus with hyperglycemia, without long-term current use of insulin (H) 2/6/2017     Past Surgical History:   Procedure Laterality Date     ARTHROSCOPY ANKLE, OPEN REPAIR TENDON, COMBINED  11/8/2012    Procedure: COMBINED ARTHROSCOPY ANKLE, OPEN REPAIR TENDON;  Ankle Arthroscopy Left Ankle, Open Ligament Repair Left Ankle, Peroneal Tendon Repair Left Ankle ;  Surgeon: Otf Ramos DPM;  Location: [facility] OR     C APPENDECTOMY  1984     COMBINED CYSTOSCOPY, INSERT STENT URETER(S)  8/6/2013    Procedure: COMBINED CYSTOSCOPY, INSERT STENT URETER(S);  cystoscopy, right retrograde,RIGHT STENT PLACEMENT.;  Surgeon: Kan Porter MD;  Location:  OR     CYSTOSCOPY, RETROGRADES, INSERT STENT URETER(S), COMBINED  8/6/2013    Procedure: COMBINED CYSTOSCOPY, RETROGRADES, INSERT STENT URETER(S);  cystoscopy, right retrograde, insert stent  right ureter;  Surgeon: Kan Porter MD;  Location:  OR     DENTAL SURGERY      TOOTH#7 - DENTAL IMPLANT     DISCECTOMY, FUSION CERVICAL ANTERIOR ONE LEVEL, COMBINED N/A 12/13/2015    Procedure: COMBINED DISCECTOMY, FUSION CERVICAL ANTERIOR ONE LEVEL;  Surgeon: Seven Umaña MD;  Location:  OR     EXTRACORPOREAL SHOCK WAVE LITHOTRIPSY (ESWL)  8/19/2013    Procedure: EXTRACORPOREAL SHOCK WAVE LITHOTRIPSY (ESWL);   RIGHT EXTRACORPOREAL SHOCK WAVE LITHOTRIPSY;  Surgeon: Otf Tobias MD;  Location:  OR     HC REDUCTION OF LARGE BREAST Bilateral 2003     LASIK BILATERAL       SALPINGO OOPHORECTOMY,R/L/DERREK Right 05/17/2006    Right       Allergies:  Allergies as of 10/18/2018 - Gino as Reviewed 10/18/2018   Allergen Reaction Noted     Lisinopril Cough 08/01/2017     No known drug allergies  06/23/2003     Pneumovax [pneumococcal polysaccharides] Other (See Comments) 07/13/2018       Current Medications:  Current Outpatient Prescriptions   Medication Sig Dispense Refill     Acetaminophen (TYLENOL PO) Take 500-1,000 mg by mouth every 6 hours as needed As needed for pain        aspirin (ASPIRIN ADULT LOW DOSE) 81 MG EC tablet Take 1 tablet (81 mg) by mouth daily 30 tablet 3     blood glucose monitoring (ACCU-CHEK ALMA PLUS) test strip Use to test blood sugar 1 times daily or as directed. 100 strip 1     blood glucose monitoring (ACCU-CHEK FASTCLIX) lancets Use to test blood sugar 1 times daily or as directed. 3 each 3     Ibuprofen (IBU PO) Take 600 mg by mouth every 6 hours as needed        insulin pen needle (BD VALENTINA U/F) 32G X 4 MM Use once daily as directed. 100 each PRN     liraglutide (VICTOZA PEN) 18 MG/3ML soln Inject 1.2 mg Subcutaneous daily 9 mL 3     loratadine (CLARITIN) 10 MG tablet Take 10 mg by mouth daily       losartan-hydrochlorothiazide (HYZAAR) 50-12.5 MG per tablet Take 1 tablet by mouth daily 90 tablet 1     metFORMIN (GLUCOPHAGE-XR) 500 MG 24 hr tablet Take 1  tablet (500 mg) by mouth daily (with dinner) 90 tablet 1     multivitamin, therapeutic with minerals (MULTI-VITAMIN) TABS tablet Take 1 tablet by mouth daily       ondansetron (ZOFRAN ODT) 4 MG ODT tab Take 1-2 tablets (4-8 mg) by mouth every 8 hours as needed for nausea 12 tablet 1     order for DME AIRSENSE 10  10-15 CM/H20  NASAL AIRFIT N10 FOR HER       STATIN NOT PRESCRIBED, INTENTIONAL, Please choose reason not prescribed, below       VITAMIN D, CHOLECALCIFEROL, PO Take 10,000 Units by mouth daily        zolpidem (AMBIEN CR) 6.25 MG CR tablet Take 1 tab by mouth at bedtime as needed. 90 tablet 0        Family History:  Family History   Problem Relation Age of Onset     C.A.D. Mother      stents     Hypertension Mother      KIDNEY DISEASE Mother      transplant     Hypertension Father      Breast Cancer Maternal Aunt      may have been fibrocystic     Breast Cancer Maternal Aunt      may have been fibrocystic     Cerebrovascular Disease Paternal Grandfather      Cancer Paternal Grandmother      stomach     Breast Cancer Sister 43     Nephrolithiasis Brother      Uterine Cancer Maternal Grandmother 44     Colon Cancer Maternal Grandmother 64     Prostate Cancer Maternal Uncle 50     Breast Cancer Cousin 41     maternal first cousin, triple negative breast cancer     Breast Cancer Paternal Aunt        Social History:  Social History     Social History     Marital status: Single     Spouse name: N/A     Number of children: 0     Years of education: N/A     Occupational History     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 Yes     Social History Narrative        Physical Exam:  /83 (BP Location: Right arm, Patient Position: Chair, Cuff Size: Adult Large)  Pulse 92  Temp 98.5  F (36.9  C) (Oral)  Resp 14  Wt 90.3 kg (199 lb)  SpO2 96%  BMI 36.4 kg/m2    GENERAL APPEARANCE: healthy, alert and mild distress     NECK: no adenopathy, no asymmetry or masses     LYMPHATICS: No cervical, supraclavicular or axillary lymphadenopathy     RESP: lungs clear to auscultation - no rales, rhonchi or wheezes     BREAST: A multipositional, bilateral breast exam was performed.  Fairly symmetrical -Lsl>R. Right breast: no palpable dominant masses, no nipple discharge, no skin changes.  Very dense tissue with multiple areas of fibrocystic changes throughout tissue.  Right axilla: no palpable adenopathy. Left breast: no palpable dominant masses, no nipple discharge, no skin changes. Left axilla: no palpable adenopathy. Dense tissue with fibrocystic changes throughout.  Minimal faint scarring vertically in the posterior portion of  breasts bilaterally.  CARDIOVASCULAR: Event monitor in place.  Fast rate, regular rhythm, normal S1 S2, no S3 or S4 and no murmur.        SKIN: no suspicious lesions or rashes on anterior torso, upper extremities and face.    Laboratory/Imaging Studies  Results for orders placed or performed during the hospital encounter of 10/05/18   Cardiac Event Monitor - Peds/Adult    Narrative    [facility] RADIOLOGY - [facility]  [address]  [place] [address]  [phone]  10/5/2018      Patient:  Kezia Nava  Chart: [id number]  :  1975  Age:  42 year old  Sex:  female       Procedure:  Event Monitor Placed: Please see scanned document for result once interpretation is completed.        Technician performing hook-up:  Alejandra Corona         ASSESSMENT  Back is not interested in genetic testing as this time because of the out-of-pocket expenses.  She is interested in redo minimizing her risk for breast cancer and we  discussed the risks and benefits of tamoxifen at length.  She is going to consider whether she wants to start this and was given printed information about it.  We discussed the differences between prevention and surveillance.  We discussed signs and symptoms to be watchful for in between surveillance visits.  She verbalized understanding of signs and symptoms to address with her physicians including lumps, thickening, swelling, tenderness, nipple discharge or changes in skin of breasts.    We also discussed following  a healthy lifestyle plan recommended by both NCCN and the American Cancer Society that can reduce the risk of breast cancer:  1 Limit alcohol consumption to less than 1 drink per day (1 drink=5 oz.wine, 12 oz. Beer or 1.5 oz. 80-proof liquor).  She drinks a drink about once a month.  2. Exercise per American Cancer Society guidelines of at least 150 minutes of moderate-intensity activity or 75 minutes of vigorous activity each week. (Or a combination of both.) Exercise should be spread  out over the week. Regular recreational exercise has been shown to reduce the risk of cancer by 30%.  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  She has an aging pet that does not need much walking.  We discussed stress reduction in other ways but I would recommend that she exercise at least 30 minutes a day preferably outside to gain the benefit of both being in nature with stress reduction as well as the exercise.  3. Maintain a healthy weight with a Body Mass Index between 19-24.9. A postmenopausal BMI of 30.7 has been shown to have increased the risk for breast cancer by 37% in some studies.  Her current BMI is 36.  4. Do not use tobacco products and limit exposure to passive smoke.  She does not smoke.  5. Breastfeed if possible. Research shows that 16+ months of breastfeeding, over a lifetime, can reduce a woman's risk of breast cancer by up to 27%.      INDIVIDUALIZED CANCER RISK MANAGEMENT  PLAN:    Individualized Surveillance Plan for women  With 20% or greater lifetime risk of breast cancer   Per NCCN Breast Cancer Screening and Diagnosis Guidelines Version 2.2018    Recommended screening Test or procedure Last done Next Scheduled    Clinical encounter Ongoing risk assessment, risk reduction counseling and clinical breast exam, every 6-12 months. Refer to genetic counseling if not already done.   10/18/2018   June 2019   However, some family histories with breast cancers at a very young age, may warrant screening starting earlier.    *May begin at age 40 if breast cancers in the family occur at later ages.    Annual mammogram beginning 10 years younger than the earliest breast cancer in the family but not prior to age 30.    Recommend annual breast MRI to begin 10 years younger than the earliest breast cancer in the family but not prior to age 25.    Breast MRIs are preferably done on day 7-15 of the menstrual cycle in premenopausal women. 6/18/2018 - Tomosynthesis mammogram, BiRads1    No history of breast MRI NEXT: Breast MRI in December    Next exam: June 2019 followed by tomosynthesis mammogram (no sooner than 6/19)   Breast screening for patients at high risk due to thoracic radiation before 30 years old    Begin 10 years post radiation treatment or age 25.   Annual mammogram beginning 10 years after radiation but not prior to age 30    Annual breast MRI, preferably on day 7-15 of menstrual cycle for premenopausal women.       NA     NA   Women who have a lifetime risk of >20% based on history of LCIS or ADH/ALH Annual screening mammogram beginning at age of LCIS or ADH/ALH but not prior to age 30.    Consider annual MRI to begin at age of diagnosis of LCIS or ADH/ALH but not prior to age 25.    Consider risk reducing strategies.   NA   NA    Recommend risk reducing strategies for women with 1.7% 5 year risk of breast cancer. 2.1%      I will be happy to work with her to manage her cancer risk,  "will follow up on her screenings and see her in the Cancer Risk Management clinic in six months.    I spent 40 minutes with the patient with greater that 50% of it in counseling and coordinating care as documented above.    SALLIE Fuller-CNS, OCN, AGN-BC  Clinical Nurse Specialist  Cancer Risk Management Program  [address]  [place] [address]    phone:  [phone]  Pager: [phone]  fax: [phone]    CC: Shamir Angeles MD                            Oncology Rooming Note    October 18, 2018 10:04 AM   Kezia Nava is a 42 year old female who presents for:    Chief Complaint   Patient presents with     Family History Of Cancer     breast cancer     Initial Vitals: /83 (BP Location: Right arm, Patient Position: Chair, Cuff Size: Adult Large)  Pulse 92  Temp 98.5  F (36.9  C) (Oral)  Resp 14  Wt 90.3 kg (199 lb)  SpO2 96%  BMI 36.4 kg/m2 Estimated body mass index is 36.4 kg/(m^2) as calculated from the following:    Height as of 9/24/18: 1.575 m (5' 2\").    Weight as of this encounter: 90.3 kg (199 lb). Body surface area is 1.99 meters squared.  Data Unavailable Comment: Data Unavailable   No LMP recorded.  Allergies reviewed: Yes  Medications reviewed: Yes    Medications: Medication refills not needed today.  Pharmacy name entered into [facility]:    [facility] - [place], [place] - [address]  [facility] - [place], [place] - [address]    Clinical concerns: None     5 minutes for nursing intake (face to face time)     Gogo Sharma CMA                                    "

## 2018-10-31 ENCOUNTER — TELEPHONE (OUTPATIENT)
Dept: ONCOLOGY | Facility: CLINIC | Age: 43
End: 2018-10-31

## 2018-10-31 NOTE — TELEPHONE ENCOUNTER
I followed up with Kezia regarding the status of her genetic testing.  She had her blood drawn 8/16/2018 for the CancerNext panel through [facility], which was placed on hold due to the expected out of pocket cost.  Per [facility], Kezia's expected out of pocket cost for the CancerNext panel is $399.  Kezia is encouraged to contact the laboratory if she has questions or concerns regarding billing/insurance coverage. Kezia previously stated that she would like to keep her testing on hold until she decides whether to proceed with the test (see telephone note from 9/18/2018).  We reviewed this out of pocket cost today as well as other possible options for testing through laboratories which may be in-network for her insurance,some with lower out of pocket costs if we are unable to go through insurance (such as the up to $250 cash price currently offered through [facility]).      Kezia stated that she was interested in genetic testing, however was not comfortable with the price at this time.  Kezia is encouraged to contact me should she like to revisit available genetic testing, insurance coverage, and out of pocket cost as this may change in the future and can vary by laboratory.  A prior authorization can be re-submitted in the future to determine coverage and possible out of pocket cost, and Kezia should contact me should she be interested in revisiting these options.  Kezia agreed with this plan, and denied additional questions at this time.      Screening recommendations were previously discussed, and Kezia is being followed by Sheyla Hernandez, APRN-CNS, OCN, ANG-BC, for high risk breast screening.      Tiffany Albert MS, [facility]  Licensed Genetic Counselor  [phone]

## 2018-11-08 ENCOUNTER — TELEPHONE (OUTPATIENT)
Dept: FAMILY MEDICINE | Facility: CLINIC | Age: 43
End: 2018-11-08

## 2018-11-08 NOTE — TELEPHONE ENCOUNTER
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  If very symptomatic, can treat,  Otherwise, reduce caffeine, observe.

## 2018-11-08 NOTE — TELEPHONE ENCOUNTER
Pt called back informed her that our nurses sent a MyChart message and also read the findings.   PT had no further questions

## 2018-11-26 ENCOUNTER — OFFICE VISIT (OUTPATIENT)
Dept: FAMILY MEDICINE | Facility: CLINIC | Age: 43
End: 2018-11-26
Payer: COMMERCIAL

## 2018-11-26 VITALS
WEIGHT: 198 LBS | SYSTOLIC BLOOD PRESSURE: 119 MMHG | HEART RATE: 89 BPM | OXYGEN SATURATION: 97 % | HEIGHT: 62 IN | BODY MASS INDEX: 36.44 KG/M2 | TEMPERATURE: 97.9 F | DIASTOLIC BLOOD PRESSURE: 86 MMHG

## 2018-11-26 DIAGNOSIS — I10 BENIGN ESSENTIAL HYPERTENSION: ICD-10-CM

## 2018-11-26 DIAGNOSIS — R73.03 PREDIABETES: Primary | ICD-10-CM

## 2018-11-26 DIAGNOSIS — Z78.0 MENOPAUSE PRESENT: ICD-10-CM

## 2018-11-26 DIAGNOSIS — Z80.3 FAMILY HISTORY OF MALIGNANT NEOPLASM OF BREAST: ICD-10-CM

## 2018-11-26 PROBLEM — E11.65 TYPE 2 DIABETES MELLITUS WITH HYPERGLYCEMIA, WITHOUT LONG-TERM CURRENT USE OF INSULIN (H): Chronic | Status: RESOLVED | Noted: 2017-02-06 | Resolved: 2018-11-26

## 2018-11-26 LAB
HBA1C MFR BLD: 5.8 % (ref 0–5.6)
LAB SCANNED RESULT: NORMAL

## 2018-11-26 PROCEDURE — 83036 HEMOGLOBIN GLYCOSYLATED A1C: CPT | Performed by: INTERNAL MEDICINE

## 2018-11-26 PROCEDURE — 99214 OFFICE O/P EST MOD 30 MIN: CPT | Performed by: INTERNAL MEDICINE

## 2018-11-26 PROCEDURE — 36415 COLL VENOUS BLD VENIPUNCTURE: CPT | Performed by: INTERNAL MEDICINE

## 2018-11-26 RX ORDER — TAMOXIFEN CITRATE 20 MG/1
20 TABLET ORAL DAILY
Qty: 90 TABLET | Refills: 3 | Status: SHIPPED | OUTPATIENT
Start: 2018-11-26 | End: 2019-12-01

## 2018-11-26 RX ORDER — METFORMIN HCL 500 MG
500 TABLET, EXTENDED RELEASE 24 HR ORAL
Qty: 90 TABLET | Refills: 3 | Status: SHIPPED | OUTPATIENT
Start: 2018-11-26 | End: 2020-01-02

## 2018-11-26 RX ORDER — LOSARTAN POTASSIUM AND HYDROCHLOROTHIAZIDE 12.5; 5 MG/1; MG/1
1 TABLET ORAL DAILY
Qty: 90 TABLET | Refills: 3 | Status: SHIPPED | OUTPATIENT
Start: 2018-11-26 | End: 2020-01-02

## 2018-11-26 RX ORDER — LIRAGLUTIDE 6 MG/ML
1.2 INJECTION SUBCUTANEOUS DAILY
Qty: 9 ML | Refills: 3 | Status: SHIPPED | OUTPATIENT
Start: 2018-11-26 | End: 2019-05-28

## 2018-11-26 NOTE — MR AVS SNAPSHOT
After Visit Summary   11/26/2018    Kezia Nava    MRN: [id number]           Patient Information     Date Of Birth          1975        Visit Information        Provider Department      11/26/2018 1:00 PM Shamir Angeles MD [facility]        Today's Diagnoses     Family history of malignant neoplasm of breast    -  1    Type 2 diabetes mellitus with hyperglycemia, without long-term current use of insulin (H)        Menopause present        Benign essential hypertension           Follow-ups after your visit        Follow-up notes from your care team     Return in about 3 months (around 2/26/2019) for Fasting Labs 3months, fup 6 months, DM.      Your next 10 appointments already scheduled     Dec 07, 2018  1:45 PM CST   (Arrive by 1:15 PM)   MR BREAST BILATERAL W/O & W CONTRAST with SHMRP2   [facility] ([facility])    [address] [place] [address]   [phone]           How do I prepare for my exam? (Food and drink instructions) **If you will be receiving sedation or general anesthesia, please see special notes below.**  How do I prepare for my exam? (Other instructions) Take your medicines as usual, unless your doctor tells you not to. The timing of your exam may depend on the start of your last period. If you re in menopause, you may have the exam anytime.  You will have IV contrast for this exam.  You do not need to do anything special to prepare. **If you will be receiving sedation or general anesthesia, please see special notes below.**  What should I wear:  The MRI machine uses a strong magnet. Please wear clothes without metal (snaps, zippers). A sweatsuit works well, or we may give you a hospital gown. Please remove any body piercings and hair extensions before you arrive. You will also remove watches, jewelry, hairpins, wallets, dentures, partial dental plates and hearing aids. You may wear contact lenses, and you may be able to  wear your rings. We have a safe place to keep your personal items, but it is safer to leave them at home.  How long does the exam take:  Most tests take 30 to 60 minutes.  HOWEVER, IF YOUR DOCTOR PRESCRIBES ANESTHESIA please plan on spending four to five hours in the recovery room.  What should I bring: Bring a list of your current medicines to your exam (including vitamins, minerals and over-the-counter drugs). Please bring any previous mammograms or breast MRIs from other facilities to the MRI dept. Do not mail these items to us.  Do I need a : **If you will be receiving sedation or general anesthesia, please see special notes below.**  What should I do after the exam: No Restrictions, You may resume normal activities.  What is this test: MRI (magnetic resonance imaging) uses a strong magnet and radio waves to look inside the body. An MRA (magnetic resonance angiogram) does the same thing, but it lets us look at your blood vessels. A computer turns the radio waves into pictures showing cross sections of the body, much like slices of bread. This helps us see any problems more clearly. You may receive fluid (called  contrast ) before or during your scan. The fluid helps us see the pictures better. We give the fluid through an IV (small needle in your arm).  Who should I call with questions: If you have any questions, please contact your Imaging Department exam site. Directions, parking instructions, and other information is available on our website, [email or web address].  How do I prepare if I m having sedation or anesthesia? **IMPORTANT** THE INSTRUCTIONS BELOW ARE ONLY FOR THOSE PATIENTS WHO HAVE BEEN TOLD THEY WILL RECEIVE SEDATION OR GENERAL ANESTHESIA DURING THEIR MRI PROCEDURE:  IF YOU WILL RECEIVE SEDATION (take medicine to help you relax during your exam) You must get the medicine from your doctor before you arrive. Bring the medicine to the exam. Do not take it at home. Arrive one hour early. Bring  someone who can take you home after the test. Your medicine will make you sleepy. After the exam, you may not drive, take a bus or take a taxi by yourself. No eating 8 hours before your exam. You may have clear liquids up until 4 hours before your exam. (Clear liquids include water, clear tea, black coffee and fruit juice without pulp.)  IF YOU WILL RECEIVE ANESTHESIA (be asleep for your exam) Arrive 1 1/2 hours early. Bring someone who can take you home after the test. You may not drive, take a bus or take a taxi by yourself. No eating 8 hours before your exam. You may have clear liquids up until 4 hours before your exam. (Clear liquids include water, clear tea, black coffee and fruit juice without pulp.)            Mar 01, 2019  8:00 AM CST   [facility]   [facility]    [address] [address] [phone]           Please do not eat 10-12 hours before your appointment if you are coming in fasting for labs on lipids, cholesterol, or glucose (sugar). This does not apply to pregnant women. Water, hot tea and black coffee (with nothing added) are okay. Do not drink other fluids, diet soda or chew gum.            May 31, 2019  9:30 AM CDT   [facility]   [facility]    [address] [facility] [id number] [phone]           Bring a current list of meds and any records pertaining to this visit. For Physicals, please bring immunization records and any forms needing to be filled out. Please arrive 10 minutes early to complete paperwork.            Jun 20, 2019 10:00 AM CDT   Return Visit with SALLIE Fuller CNS   [facility] ([facility])    [facility]  [address] [address] [phone]            Jun 20, 2019 11:00 AM CDT   MA SCREENING BILATERAL W/ KOSTAS with SHBCMA2   [place]  [facility] ([facility])    [address], [address]  [facility] [address] [phone]           How do I prepare for my exam? (Food and drink instructions) No Food and Drink Restrictions.  How do I prepare for my exam? (Other instructions) Do not use any powder, lotion or deodorant under your arms or on your breast. If you do, we will ask you to remove it before your exam.  What should I wear: Wear comfortable, two-piece clothing.  How long does the exam take: Most scans will take 15 minutes.  What should I bring: Bring any previous mammograms from other facilities or have them mailed to the breast center.  Do I need a :  No  is needed.  What do I need to tell my doctor: If you have any allergies, tell your care team.  What should I do after the exam: No restrictions, You may resume normal activities.  What is this test: This test is an x-ray of the breast to look for breast disease. The breast is pressed between two plates to flatten and spread the tissue. An X-ray is taken of the breast from different angles.  Who should I call with questions: If you have any questions, please call the Imaging Department where you will have your exam. Directions, parking instructions, and other information is available on our website, [email or web address].  Other information about my exam Three-dimensional (3D) mammograms are available at [facility] locations in [facility], [place], [facility], [place], and [place].  Health locations include [place] and the [facility] in [place].  Benefits of 3D mammograms include: * Improved rate of cancer detection * Decreases your chance of having to go back for more tests, which means fewer: * \"False-positive\" results (This means that there is an abnormal area but it isn't cancer.) * Invasive testing procedures, such as a biopsy or surgery * Can provide clearer images of the breast if you have " "dense breast tissue.  *3D mammography is an optional exam that anyone can have with a 2D mammogram. It doesn't replace or take the place of a 2D mammogram. 2D mammograms remain an effective screening test for all women.  Not all insurance companies cover the cost of a 3D mammogram. Check with your insurance.              Who to contact     If you have questions or need follow up information about today's clinic visit or your schedule please contact [facility] directly at [phone].  Normal or non-critical lab and imaging results will be communicated to you by LaunchSide[employer], letter or phone within 4 business days after the clinic has received the results. If you do not hear from us within 7 days, please contact the clinic through [employer] or phone. If you have a critical or abnormal lab result, we will notify you by phone as soon as possible.  Submit refill requests through [employer] or call your pharmacy and they will forward the refill request to us. Please allow 3 business days for your refill to be completed.          Additional Information About Your Visit        [employer] Information     [employer] gives you secure access to your electronic health record. If you see a primary care provider, you can also send messages to your care team and make appointments. If you have questions, please call your primary care clinic.  If you do not have a primary care provider, please call [phone] and they will assist you.        Care EveryWhere ID     This is your Care EveryWhere ID. This could be used by other organizations to access your [place] medical records  [id number]        Your Vitals Were     Pulse Temperature Height Last Period Pulse Oximetry BMI (Body Mass Index)    89 97.9  F (36.6  C) (Oral) 5' 2\" (1.575 m) 06/01/2018 (Approximate) 97% 36.21 kg/m2       Blood Pressure from Last 3 Encounters:   11/26/18 119/86   10/18/18 119/83   09/24/18 120/84    Weight from Last 3 Encounters:   11/26/18 198 lb (89.8 " kg)   10/18/18 199 lb (90.3 kg)   09/24/18 194 lb (88 kg)              Today, you had the following     No orders found for display         Today's Medication Changes          These changes are accurate as of 11/26/18  1:50 PM.  If you have any questions, ask your nurse or doctor.               Start taking these medicines.        Dose/Directions    tamoxifen 20 MG tablet   Commonly known as:  NOLVADEX   Used for:  Family history of malignant neoplasm of breast   Started by:  Shamir Angeles MD        Dose:  20 mg   Take 1 tablet (20 mg) by mouth daily   Quantity:  90 tablet   Refills:  3            Where to get your medicines      These medications were sent to [facility], [place] - [address]  [address], [address], [facility] [address]     Phone:  [phone]     liraglutide 18 MG/3ML solution    losartan-hydrochlorothiazide 50-12.5 MG per tablet    metFORMIN 500 MG 24 hr tablet    tamoxifen 20 MG tablet                Primary Care Provider Office Phone # Fax #    Shamir Angeles [phone] [address]  [facility] [address]        Equal Access to Services     [facility] : Hadii petar rodriguez hadasho Soleathaali, waaxda luqadaha, qaybta kaalmada adeegyada, helder malone. So [facility] [phone].    ATENCIÓN: Si habla español, tiene a carter disposición servicios gratuitos de asistencia lingüística. Llame al [phone].    We comply with applicable federal civil rights laws and [place] laws. We do not discriminate on the basis of race, color, national origin, age, disability, sex, sexual orientation, or gender identity.            Thank you!     Thank you for choosing [facility]  for your care. Our goal is always to provide you with excellent care. Hearing back from our patients is one way we can continue to improve our services. Please take a few minutes to complete the written survey that you may receive in  the mail after your visit with us. Thank you!             Your Updated Medication List - Protect others around you: Learn how to safely use, store and throw away your medicines at www.disposemymeds.org.          This list is accurate as of 11/26/18  1:50 PM.  Always use your most recent med list.                   Brand Name Dispense Instructions for use Diagnosis    aspirin 81 MG EC tablet    ASPIRIN ADULT LOW DOSE    30 tablet    Take 1 tablet (81 mg) by mouth daily    Cerebrovascular accident (CVA), unspecified mechanism (H)       blood glucose monitoring lancets     3 each    Use to test blood sugar 1 times daily or as directed.    Type 2 diabetes mellitus with hyperglycemia, without long-term current use of insulin (H)       blood glucose monitoring test strip    ACCU-CHEK ALMA PLUS    100 strip    Use to test blood sugar 1 times daily or as directed.    Type 2 diabetes mellitus with hyperglycemia, without long-term current use of insulin (H)       IBU PO      Take 600 mg by mouth every 6 hours as needed        insulin pen needle 32G X 4 MM miscellaneous    BD VALENTINA U/F    100 each    Use once daily as directed.    Type 2 diabetes mellitus with hyperglycemia, without long-term current use of insulin (H)       liraglutide 18 MG/3ML solution    VICTOZA PEN    9 mL    Inject 1.2 mg Subcutaneous daily    Type 2 diabetes mellitus with hyperglycemia, without long-term current use of insulin (H)       loratadine 10 MG tablet    CLARITIN     Take 10 mg by mouth daily        losartan-hydrochlorothiazide 50-12.5 MG per tablet    HYZAAR    90 tablet    Take 1 tablet by mouth daily    Benign essential hypertension       metFORMIN 500 MG 24 hr tablet    GLUCOPHAGE-XR    90 tablet    Take 1 tablet (500 mg) by mouth daily (with dinner)    Type 2 diabetes mellitus with hyperglycemia, without long-term current use of insulin (H)       Multi-vitamin Tabs tablet      Take 1 tablet by mouth daily        ondansetron 4 MG ODT tab     ZOFRAN ODT    12 tablet    Take 1-2 tablets (4-8 mg) by mouth every 8 hours as needed for nausea    Viral gastroenteritis       order for DME      AIRSENSE 10 10-15 CM/H20 NASAL AIRFIT N10 FOR HER        STATIN NOT PRESCRIBED (INTENTIONAL)      Please choose reason not prescribed, below    Type 2 diabetes mellitus with hyperglycemia, without long-term current use of insulin (H)       tamoxifen 20 MG tablet    NOLVADEX    90 tablet    Take 1 tablet (20 mg) by mouth daily    Family history of malignant neoplasm of breast       TYLENOL PO      Take 500-1,000 mg by mouth every 6 hours as needed As needed for pain        VITAMIN D (CHOLECALCIFEROL) PO      Take 10,000 Units by mouth daily        zolpidem 6.25 MG CR tablet    AMBIEN CR    90 tablet    Take 1 tab by mouth at bedtime as needed.

## 2018-11-26 NOTE — PROGRESS NOTES
SUBJECTIVE:   Kezia Nava is a 42 year old female who presents to clinic today for the following health issues:  Patient still has rt UQ and left LQ Pain in abdomen  DLMP 6/18        Diabetes Follow-up      Patient is checking blood sugars: not at all    Diabetic concerns: None     Symptoms of hypoglycemia (low blood sugar): none     Paresthesias (numbness or burning in feet) or sores: No     Date of last diabetic eye exam: 03/01/2018    BP Readings from Last 2 Encounters:   11/26/18 119/86   10/18/18 119/83     Hemoglobin A1C (%)   Date Value   09/24/2018 5.8 (H)   06/18/2018 5.9 (H)     LDL Cholesterol Calculated (mg/dL)   Date Value   09/24/2018 102 (H)   10/06/2017 109 (H)       Diabetes Management Resources          Problem list and histories reviewed & adjusted, as indicated.  Additional history: as documented    Patient Active Problem List   Diagnosis     Pain in joint, ankle and foot     Cervical radiculopathy     Benign essential hypertension     Type 2 diabetes mellitus with hyperglycemia, without long-term current use of insulin (H)     ANA (obstructive sleep apnea)     Acute bilateral low back pain with right-sided sciatica     Paresthesias- ? TIA vs complex migraine (preferred per neuro)     Severe obesity (BMI 35.0-35.9 with comorbidity) (H)     Past Surgical History:   Procedure Laterality Date     ARTHROSCOPY ANKLE, OPEN REPAIR TENDON, COMBINED  11/8/2012    Procedure: COMBINED ARTHROSCOPY ANKLE, OPEN REPAIR TENDON;  Ankle Arthroscopy Left Ankle, Open Ligament Repair Left Ankle, Peroneal Tendon Repair Left Ankle ;  Surgeon: Otf Ramos DPM;  Location: [facility] OR     C APPENDECTOMY  1984     COMBINED CYSTOSCOPY, INSERT STENT URETER(S)  8/6/2013    Procedure: COMBINED CYSTOSCOPY, INSERT STENT URETER(S);  cystoscopy, right retrograde,RIGHT STENT PLACEMENT.;  Surgeon: Kan Porter MD;  Location: [facility] OR     CYSTOSCOPY, RETROGRADES, INSERT STENT URETER(S), COMBINED  8/6/2013     Procedure: COMBINED CYSTOSCOPY, RETROGRADES, INSERT STENT URETER(S);  cystoscopy, right retrograde, insert stent right ureter;  Surgeon: Kan Porter MD;  Location:  OR     DENTAL SURGERY      TOOTH#7 - DENTAL IMPLANT     DISCECTOMY, FUSION CERVICAL ANTERIOR ONE LEVEL, COMBINED N/A 12/13/2015    Procedure: COMBINED DISCECTOMY, FUSION CERVICAL ANTERIOR ONE LEVEL;  Surgeon: Seven Umaña MD;  Location: [facility] OR     EXTRACORPOREAL SHOCK WAVE LITHOTRIPSY (ESWL)  8/19/2013    Procedure: EXTRACORPOREAL SHOCK WAVE LITHOTRIPSY (ESWL);   RIGHT EXTRACORPOREAL SHOCK WAVE LITHOTRIPSY;  Surgeon: Otf Tobias MD;  Location:  OR     HC REDUCTION OF LARGE BREAST Bilateral 2003     LASIK BILATERAL       SALPINGO OOPHORECTOMY,R/L/DERREK Right 05/17/2006    Right       Social History   Substance Use Topics     Smoking status: Never Smoker     Smokeless tobacco: Never Used     Alcohol use 0.0 oz/week     0 Standard drinks or equivalent per week      Comment: socially     Family History   Problem Relation Age of Onset     C.A.D. Mother      stents     Hypertension Mother      KIDNEY DISEASE Mother      transplant     Hypertension Father      Breast Cancer Maternal Aunt      may have been fibrocystic     Breast Cancer Maternal Aunt      may have been fibrocystic     Cerebrovascular Disease Paternal Grandfather      Cancer Paternal Grandmother      stomach     Breast Cancer Sister 43     Nephrolithiasis Brother      Uterine Cancer Maternal Grandmother 44     Colon Cancer Maternal Grandmother 64     Prostate Cancer Maternal Uncle 50     Breast Cancer Cousin 41     maternal first cousin, triple negative breast cancer     Breast Cancer Paternal Aunt          Current Outpatient Prescriptions   Medication Sig Dispense Refill     Acetaminophen (TYLENOL PO) Take 500-1,000 mg by mouth every 6 hours as needed As needed for pain        aspirin (ASPIRIN ADULT LOW DOSE) 81 MG EC tablet Take 1 tablet (81 mg) by mouth  daily 30 tablet 3     blood glucose monitoring (ACCU-CHEK ALMA PLUS) test strip Use to test blood sugar 1 times daily or as directed. 100 strip 1     blood glucose monitoring (ACCU-CHEK FASTCLIX) lancets Use to test blood sugar 1 times daily or as directed. 3 each 3     Ibuprofen (IBU PO) Take 600 mg by mouth every 6 hours as needed        insulin pen needle (BD VALENTINA U/F) 32G X 4 MM Use once daily as directed. 100 each PRN     liraglutide (VICTOZA PEN) 18 MG/3ML solution Inject 1.2 mg Subcutaneous daily 9 mL 3     loratadine (CLARITIN) 10 MG tablet Take 10 mg by mouth daily       losartan-hydrochlorothiazide (HYZAAR) 50-12.5 MG per tablet Take 1 tablet by mouth daily 90 tablet 1     metFORMIN (GLUCOPHAGE-XR) 500 MG 24 hr tablet Take 1 tablet (500 mg) by mouth daily (with dinner) 90 tablet 1     multivitamin, therapeutic with minerals (MULTI-VITAMIN) TABS tablet Take 1 tablet by mouth daily       ondansetron (ZOFRAN ODT) 4 MG ODT tab Take 1-2 tablets (4-8 mg) by mouth every 8 hours as needed for nausea 12 tablet 1     order for DME AIRSENSE 10  10-15 CM/H20  NASAL AIRFIT N10 FOR HER       VITAMIN D, CHOLECALCIFEROL, PO Take 10,000 Units by mouth daily        zolpidem (AMBIEN CR) 6.25 MG CR tablet Take 1 tab by mouth at bedtime as needed. 90 tablet 0     STATIN NOT PRESCRIBED, INTENTIONAL, Please choose reason not prescribed, below (Patient not taking: Reported on 11/26/2018)       [DISCONTINUED] liraglutide (VICTOZA PEN) 18 MG/3ML soln Inject 1.2 mg Subcutaneous daily 9 mL 3       Reviewed and updated as needed this visit by clinical staff  Tobacco  Allergies  Meds  Problems       Reviewed and updated as needed this visit by Provider  Allergies  Meds  Problems         ROS:  Constitutional, HEENT, cardiovascular, pulmonary, GI, , musculoskeletal, neuro, skin, endocrine and psych systems are negative, except as otherwise noted.    OBJECTIVE:     /86 (BP Location: Left arm, Cuff Size: Adult Large)   "Pulse 89  Temp 97.9  F (36.6  C) (Oral)  Ht 5' 2\" (1.575 m)  Wt 198 lb (89.8 kg)  LMP 06/01/2018 (Approximate)  SpO2 97%  BMI 36.21 kg/m2  Body mass index is 36.21 kg/(m^2).  MENTAL STATUS EXAM:  Appearance/Behavior: No apparent distress  Speech: Normal  Mood/Affect: normal affect  Insight: Adequate    Component      Latest Ref Rng & Units 9/24/2018   Sodium      133 - 144 mmol/L 138   Potassium      3.4 - 5.3 mmol/L 3.9   Chloride      94 - 109 mmol/L 106   Carbon Dioxide      20 - 32 mmol/L 26   Anion Gap      3 - 14 mmol/L 6   Glucose      70 - 99 mg/dL 98   Urea Nitrogen      7 - 30 mg/dL 11   Creatinine      0.52 - 1.04 mg/dL 0.66   GFR Estimate      >60 mL/min/1.7m2 >90   GFR Estimate If Black      >60 mL/min/1.7m2 >90   Calcium      8.5 - 10.1 mg/dL 9.0   Cholesterol      <200 mg/dL 189   Triglycerides      <150 mg/dL 229 (H)   HDL Cholesterol      >49 mg/dL 41 (L)   LDL Cholesterol Calculated      <100 mg/dL 102 (H)   Non HDL Cholesterol      <130 mg/dL 148 (H)   Hemoglobin A1C      0 - 5.6 % 5.8 (H)   FSH      IU/L 60.7   Estradiol      pg/mL 18       ASSESSMENT/PLAN:             Kezia was seen today for diabetes.    Diagnoses and all orders for this visit:    Family history of malignant neoplasm of breast  -     tamoxifen (NOLVADEX) 20 MG tablet; Take 1 tablet (20 mg) by mouth daily  +we discussed about genetics consult  MRI in Dec  Mammo Geno   Side effects of Tamoxifen in particular Uterine cancer, DVT and hot flashes discussed    Type 2 diabetes mellitus with hyperglycemia, without long-term current use of insulin (H)  -     liraglutide (VICTOZA PEN) 18 MG/3ML solution; Inject 1.2 mg Subcutaneous daily  -     metFORMIN (GLUCOPHAGE-XR) 500 MG 24 hr tablet; Take 1 tablet (500 mg) by mouth daily (with dinner)    Menopause present  Can now use tamoxifen over evista  Benign essential hypertension  -     losartan-hydrochlorothiazide (HYZAAR) 50-12.5 MG per tablet; Take 1 tablet by mouth " daily    Repeat visit 3-6 months        Shamir Angeles MD  [facility]

## 2018-12-07 ENCOUNTER — HOSPITAL ENCOUNTER (OUTPATIENT)
Dept: MRI IMAGING | Facility: CLINIC | Age: 43
Discharge: HOME | End: 2018-12-07
Attending: CLINICAL NURSE SPECIALIST | Admitting: CLINICAL NURSE SPECIALIST
Payer: COMMERCIAL

## 2018-12-07 DIAGNOSIS — Z12.39 BREAST CANCER SCREENING, HIGH RISK PATIENT: ICD-10-CM

## 2018-12-07 DIAGNOSIS — R92.30 DENSE BREASTS: ICD-10-CM

## 2018-12-07 DIAGNOSIS — Z12.39 ENCOUNTER FOR BREAST CANCER SCREENING OTHER THAN MAMMOGRAM: ICD-10-CM

## 2018-12-07 LAB
CREAT BLD-MCNC: 0.7 MG/DL (ref 0.52–1.04)
GFR SERPL CREATININE-BSD FRML MDRD: >90 ML/MIN/1.7M2

## 2018-12-07 PROCEDURE — 25800025 ZZH RX 258: Performed by: CLINICAL NURSE SPECIALIST

## 2018-12-07 PROCEDURE — 77059 MR BREAST BILATERAL W/O & W CONTRAST: CPT

## 2018-12-07 PROCEDURE — 25500064 ZZH RX 255 OP 636: Performed by: CLINICAL NURSE SPECIALIST

## 2018-12-07 PROCEDURE — 82565 ASSAY OF CREATININE: CPT

## 2018-12-07 PROCEDURE — A9585 GADOBUTROL INJECTION: HCPCS | Performed by: CLINICAL NURSE SPECIALIST

## 2018-12-07 RX ORDER — GADOBUTROL 604.72 MG/ML
10 INJECTION INTRAVENOUS ONCE
Status: COMPLETED | OUTPATIENT
Start: 2018-12-07 | End: 2018-12-07

## 2018-12-07 RX ORDER — ACYCLOVIR 200 MG/1
30 CAPSULE ORAL ONCE
Status: COMPLETED | OUTPATIENT
Start: 2018-12-07 | End: 2018-12-07

## 2018-12-07 RX ADMIN — GADOBUTROL 10 ML: 604.72 INJECTION INTRAVENOUS at 14:46

## 2018-12-07 RX ADMIN — SODIUM CHLORIDE 30 ML: 9 INJECTION, SOLUTION INTRAMUSCULAR; INTRAVENOUS; SUBCUTANEOUS at 14:46

## 2019-01-31 ENCOUNTER — TRANSFERRED RECORDS (OUTPATIENT)
Dept: HEALTH INFORMATION MANAGEMENT | Facility: CLINIC | Age: 44
End: 2019-01-31

## 2019-01-31 LAB — RETINOPATHY: NORMAL

## 2019-03-01 ENCOUNTER — DOCUMENTATION ONLY (OUTPATIENT)
Dept: LAB | Facility: CLINIC | Age: 44
End: 2019-03-01

## 2019-03-01 ENCOUNTER — APPOINTMENT (OUTPATIENT)
Dept: CT IMAGING | Facility: CLINIC | Age: 44
End: 2019-03-01
Attending: EMERGENCY MEDICINE
Payer: COMMERCIAL

## 2019-03-01 ENCOUNTER — HOSPITAL ENCOUNTER (EMERGENCY)
Facility: CLINIC | Age: 44
Discharge: HOME | End: 2019-03-01
Attending: EMERGENCY MEDICINE | Admitting: EMERGENCY MEDICINE
Payer: COMMERCIAL

## 2019-03-01 VITALS
WEIGHT: 190 LBS | BODY MASS INDEX: 34.96 KG/M2 | HEART RATE: 102 BPM | DIASTOLIC BLOOD PRESSURE: 69 MMHG | RESPIRATION RATE: 20 BRPM | SYSTOLIC BLOOD PRESSURE: 99 MMHG | HEIGHT: 62 IN | TEMPERATURE: 99.2 F | OXYGEN SATURATION: 93 %

## 2019-03-01 DIAGNOSIS — R31.9 HEMATURIA, UNSPECIFIED TYPE: ICD-10-CM

## 2019-03-01 DIAGNOSIS — E66.01 SEVERE OBESITY (BMI 35.0-35.9 WITH COMORBIDITY) (H): ICD-10-CM

## 2019-03-01 DIAGNOSIS — I10 BENIGN ESSENTIAL HYPERTENSION: Chronic | ICD-10-CM

## 2019-03-01 DIAGNOSIS — K52.9 GASTROENTERITIS: ICD-10-CM

## 2019-03-01 DIAGNOSIS — R73.03 PREDIABETES: Primary | ICD-10-CM

## 2019-03-01 DIAGNOSIS — I10 BENIGN ESSENTIAL HYPERTENSION: Primary | Chronic | ICD-10-CM

## 2019-03-01 LAB
ALBUMIN SERPL-MCNC: 3.8 G/DL (ref 3.4–5)
ALBUMIN UR-MCNC: 30 MG/DL
ALP SERPL-CCNC: 36 U/L (ref 40–150)
ALT SERPL W P-5'-P-CCNC: 31 U/L (ref 0–50)
ALT SERPL W P-5'-P-CCNC: NORMAL U/L (ref 0–50)
ANION GAP SERPL CALCULATED.3IONS-SCNC: 8 MMOL/L (ref 3–14)
ANION GAP SERPL CALCULATED.3IONS-SCNC: NORMAL MMOL/L (ref 6–17)
APPEARANCE UR: CLEAR
AST SERPL W P-5'-P-CCNC: 25 U/L (ref 0–45)
BASOPHILS # BLD AUTO: 0 10E9/L (ref 0–0.2)
BASOPHILS NFR BLD AUTO: 0 %
BILIRUB SERPL-MCNC: 0.4 MG/DL (ref 0.2–1.3)
BILIRUB UR QL STRIP: NEGATIVE
BUN SERPL-MCNC: 12 MG/DL (ref 7–30)
BUN SERPL-MCNC: NORMAL MG/DL (ref 7–30)
CALCIUM SERPL-MCNC: 8.5 MG/DL (ref 8.5–10.1)
CALCIUM SERPL-MCNC: NORMAL MG/DL (ref 8.5–10.1)
CHLORIDE SERPL-SCNC: 107 MMOL/L (ref 94–109)
CHLORIDE SERPL-SCNC: NORMAL MMOL/L (ref 94–109)
CO2 SERPL-SCNC: 24 MMOL/L (ref 20–32)
CO2 SERPL-SCNC: NORMAL MMOL/L (ref 20–32)
COLOR UR AUTO: YELLOW
CREAT SERPL-MCNC: 0.72 MG/DL (ref 0.52–1.04)
CREAT SERPL-MCNC: NORMAL MG/DL (ref 0.52–1.04)
CREAT UR-MCNC: 238 MG/DL
DIFFERENTIAL METHOD BLD: ABNORMAL
EOSINOPHIL # BLD AUTO: 0 10E9/L (ref 0–0.7)
EOSINOPHIL NFR BLD AUTO: 0.3 %
ERYTHROCYTE [DISTWIDTH] IN BLOOD BY AUTOMATED COUNT: 13.1 % (ref 10–15)
FLUAV+FLUBV AG SPEC QL: NEGATIVE
FLUAV+FLUBV AG SPEC QL: NEGATIVE
GFR SERPL CREATININE-BSD FRML MDRD: >90 ML/MIN/{1.73_M2}
GFR SERPL CREATININE-BSD FRML MDRD: NORMAL ML/MIN/{1.73_M2}
GLUCOSE SERPL-MCNC: 113 MG/DL (ref 70–99)
GLUCOSE SERPL-MCNC: NORMAL MG/DL (ref 70–99)
GLUCOSE UR STRIP-MCNC: NEGATIVE MG/DL
HBA1C MFR BLD: 5.6 % (ref 0–5.6)
HCT VFR BLD AUTO: 38.7 % (ref 35–47)
HGB BLD-MCNC: 13.1 G/DL (ref 11.7–15.7)
HGB UR QL STRIP: ABNORMAL
HYALINE CASTS #/AREA URNS LPF: 2 /LPF (ref 0–2)
IMM GRANULOCYTES # BLD: 0 10E9/L (ref 0–0.4)
IMM GRANULOCYTES NFR BLD: 0.2 %
KETONES UR STRIP-MCNC: NEGATIVE MG/DL
LACTATE BLD-SCNC: 1.8 MMOL/L (ref 0.7–2)
LEUKOCYTE ESTERASE UR QL STRIP: NEGATIVE
LYMPHOCYTES # BLD AUTO: 0.3 10E9/L (ref 0.8–5.3)
LYMPHOCYTES NFR BLD AUTO: 4.2 %
MCH RBC QN AUTO: 29.3 PG (ref 26.5–33)
MCHC RBC AUTO-ENTMCNC: 33.9 G/DL (ref 31.5–36.5)
MCV RBC AUTO: 87 FL (ref 78–100)
MICROALBUMIN UR-MCNC: 756 MG/L
MICROALBUMIN/CREAT UR: 317.65 MG/G CR (ref 0–25)
MONOCYTES # BLD AUTO: 0.1 10E9/L (ref 0–1.3)
MONOCYTES NFR BLD AUTO: 1.7 %
MUCOUS THREADS #/AREA URNS LPF: PRESENT /LPF
NEUTROPHILS # BLD AUTO: 5.6 10E9/L (ref 1.6–8.3)
NEUTROPHILS NFR BLD AUTO: 93.6 %
NITRATE UR QL: NEGATIVE
NRBC # BLD AUTO: 0 10*3/UL
NRBC BLD AUTO-RTO: 0 /100
PH UR STRIP: 5.5 PH (ref 5–7)
PLATELET # BLD AUTO: 177 10E9/L (ref 150–450)
POTASSIUM SERPL-SCNC: 3.4 MMOL/L (ref 3.4–5.3)
POTASSIUM SERPL-SCNC: NORMAL MMOL/L (ref 3.4–5.3)
PROT SERPL-MCNC: 7.9 G/DL (ref 6.8–8.8)
RBC # BLD AUTO: 4.47 10E12/L (ref 3.8–5.2)
RBC #/AREA URNS AUTO: 10 /HPF (ref 0–2)
SODIUM SERPL-SCNC: 139 MMOL/L (ref 133–144)
SODIUM SERPL-SCNC: NORMAL MMOL/L (ref 133–144)
SOURCE: ABNORMAL
SP GR UR STRIP: 1.02 (ref 1–1.03)
SPECIMEN SOURCE: NORMAL
SQUAMOUS #/AREA URNS AUTO: 1 /HPF (ref 0–1)
UROBILINOGEN UR STRIP-MCNC: NORMAL MG/DL (ref 0–2)
WBC # BLD AUTO: 6 10E9/L (ref 4–11)
WBC #/AREA URNS AUTO: 0 /HPF (ref 0–5)

## 2019-03-01 PROCEDURE — 25000125 ZZHC RX 250: Performed by: EMERGENCY MEDICINE

## 2019-03-01 PROCEDURE — 85025 COMPLETE CBC W/AUTO DIFF WBC: CPT | Performed by: EMERGENCY MEDICINE

## 2019-03-01 PROCEDURE — 96374 THER/PROPH/DIAG INJ IV PUSH: CPT | Mod: 59

## 2019-03-01 PROCEDURE — 87804 INFLUENZA ASSAY W/OPTIC: CPT | Mod: 91 | Performed by: EMERGENCY MEDICINE

## 2019-03-01 PROCEDURE — 82043 UR ALBUMIN QUANTITATIVE: CPT | Performed by: INTERNAL MEDICINE

## 2019-03-01 PROCEDURE — 25800030 ZZH RX IP 258 OP 636: Performed by: EMERGENCY MEDICINE

## 2019-03-01 PROCEDURE — 83036 HEMOGLOBIN GLYCOSYLATED A1C: CPT | Performed by: INTERNAL MEDICINE

## 2019-03-01 PROCEDURE — 25000132 ZZH RX MED GY IP 250 OP 250 PS 637: Performed by: EMERGENCY MEDICINE

## 2019-03-01 PROCEDURE — 36415 COLL VENOUS BLD VENIPUNCTURE: CPT | Performed by: INTERNAL MEDICINE

## 2019-03-01 PROCEDURE — 99285 EMERGENCY DEPT VISIT HI MDM: CPT | Mod: 25

## 2019-03-01 PROCEDURE — 83605 ASSAY OF LACTIC ACID: CPT | Performed by: EMERGENCY MEDICINE

## 2019-03-01 PROCEDURE — 81001 URINALYSIS AUTO W/SCOPE: CPT | Performed by: EMERGENCY MEDICINE

## 2019-03-01 PROCEDURE — 96361 HYDRATE IV INFUSION ADD-ON: CPT

## 2019-03-01 PROCEDURE — 80053 COMPREHEN METABOLIC PANEL: CPT | Performed by: EMERGENCY MEDICINE

## 2019-03-01 PROCEDURE — 25000128 H RX IP 250 OP 636: Performed by: EMERGENCY MEDICINE

## 2019-03-01 PROCEDURE — 74177 CT ABD & PELVIS W/CONTRAST: CPT

## 2019-03-01 RX ORDER — SODIUM CHLORIDE 9 MG/ML
1000 INJECTION, SOLUTION INTRAVENOUS CONTINUOUS
Status: DISCONTINUED | OUTPATIENT
Start: 2019-03-01 | End: 2019-03-01 | Stop reason: HOSPADM

## 2019-03-01 RX ORDER — MORPHINE SULFATE 4 MG/ML
4 INJECTION, SOLUTION INTRAMUSCULAR; INTRAVENOUS
Status: DISCONTINUED | OUTPATIENT
Start: 2019-03-01 | End: 2019-03-01 | Stop reason: HOSPADM

## 2019-03-01 RX ORDER — ONDANSETRON 4 MG/1
4 TABLET, ORALLY DISINTEGRATING ORAL EVERY 8 HOURS PRN
Qty: 18 TABLET | Refills: 0 | Status: SHIPPED | OUTPATIENT
Start: 2019-03-01 | End: 2019-05-24

## 2019-03-01 RX ORDER — IOPAMIDOL 755 MG/ML
81 INJECTION, SOLUTION INTRAVASCULAR ONCE
Status: COMPLETED | OUTPATIENT
Start: 2019-03-01 | End: 2019-03-01

## 2019-03-01 RX ORDER — ACETAMINOPHEN 500 MG
1000 TABLET ORAL ONCE
Status: COMPLETED | OUTPATIENT
Start: 2019-03-01 | End: 2019-03-01

## 2019-03-01 RX ADMIN — SODIUM CHLORIDE 1000 ML: 9 INJECTION, SOLUTION INTRAVENOUS at 14:32

## 2019-03-01 RX ADMIN — IOPAMIDOL 81 ML: 755 INJECTION, SOLUTION INTRAVENOUS at 13:31

## 2019-03-01 RX ADMIN — ACETAMINOPHEN 1000 MG: 500 TABLET, FILM COATED ORAL at 12:01

## 2019-03-01 RX ADMIN — SODIUM CHLORIDE 69 ML: 9 INJECTION, SOLUTION INTRAVENOUS at 13:31

## 2019-03-01 RX ADMIN — MORPHINE SULFATE 4 MG: 4 INJECTION INTRAVENOUS at 12:20

## 2019-03-01 RX ADMIN — SODIUM CHLORIDE 1000 ML: 9 INJECTION, SOLUTION INTRAVENOUS at 12:20

## 2019-03-01 ASSESSMENT — ENCOUNTER SYMPTOMS
NAUSEA: 1
COUGH: 0
FEVER: 1
VOMITING: 1
RHINORRHEA: 0
ABDOMINAL PAIN: 1
DIARRHEA: 1
MYALGIAS: 1
BLOOD IN STOOL: 0

## 2019-03-01 ASSESSMENT — MIFFLIN-ST. JEOR: SCORE: 1465.08

## 2019-03-01 NOTE — ED PROVIDER NOTES
History     Chief Complaint:    Abdominal Pain    HPI   Kezia Nava is a 43 year old female with a history of type II diabetes, hypertension, nephrolithiasis, amongst others, who presents with abdominal pain. The patient reports that yesterday she started to develop left sided abdominal pain that is stabbing and squeezing sensations. She took Tylenol last night which seemed to slightly improve the symptoms. Today, she also had associated vomiting x3, diarrhea x1, nausea, and body aches with a fever. The patient denies cough, runny nose, blood in stool or vomit, urinary symptoms. The patient took Zofran this morning to combat as well at 600 mg of ibuprofen prior to arrival to the ED. Of note, she had a routine laboratory draw this morning.     Allergies:  Lisinopril  Pneumovax      Medications:    Aspirin  Vicotza Pen  Claritin  Hyzaar  Glucophage  Zofran  Nolvadex  Blood glucose monitoring  BD bernardino insulin pen needle  Ambien     Past Medical History:    Diffuse cystic mastopathy  Mesenteric adenitis  Nephrolithiasis  thyroiditis acute  Type II diabetes   ANA  Paresthesias  Hypertension      Past Surgical History:    Arthroscopy ankle  Appendectomy  Cystoscopy, insert stents  Dental implant  Discectomy, fusion cervical anterior  extracorporeal shock wave lithotripsy   Reduction large breast  lasik bilaterally  Salpingo oophorectomy     Family History:    Mother: C.A.D., Hypertension, Kidney transplant  Father: Hypertension  Sister: Breast Cacjose    Social History:  The patient was accompanied to the ED by herself.  Smoking Status: Never Smoker  Smokeless Tobacco: Never Used  Alcohol Use: Positive  Drug Use: Negative  PCP: Shamir Angeles   Marital Status:  Single      Review of Systems   Constitutional: Positive for fever.   HENT: Negative for rhinorrhea.    Respiratory: Negative for cough.    Gastrointestinal: Positive for abdominal pain, diarrhea, nausea and vomiting. Negative for blood in stool.  "  Genitourinary:        No urinary symptoms   Musculoskeletal: Positive for myalgias.   All other systems reviewed and are negative.    Physical Exam     Patient Vitals for the past 24 hrs:   BP Temp Temp src Pulse Resp SpO2 Height Weight   03/01/19 1258 -- 100.2  F (37.9  C) Oral -- -- -- -- --   03/01/19 1134 (!) 118/91 101.1  F (38.4  C) Oral 119 20 98 % 1.575 m (5' 2\") 86.2 kg (190 lb)      Physical Exam  Vitals: reviewed by me  General: Pt seen on [facility], pleasant, cooperative, and alert to conversation, minimally diaphoretic.  Nontoxic.    Eyes: Tracking well, clear conjunctiva BL  ENT: MMM, midline trachea.   Lungs:   No tachypnea, no accessory muscle use. No respiratory distress.   CV: Rate as above, regular rhythm.    Abd: Soft, nonspecific tenderness to all areas of the abdomen, no guarding, no rebound, no CVA tenderness bilaterally.  MSK: no peripheral edema or joint effusion.  No evidence of trauma  Skin: No rash, normal turgor and temperature  Neuro: Clear speech and no facial droop.  Psych: Not RIS, no e/o AH/VH      Emergency Department Course     Imaging:  Radiology findings were communicated with the patient who voiced understanding of the findings.    CT Abdomen Pelvis w Contrast   Preliminary Result   IMPRESSION:   1. No convincing acute abnormality.   2. Left ovarian cystic lesion is 2.9 cm in approximate size.        Laboratory:  Laboratory findings were communicated with the patient who voiced understanding of the findings.    CBC: WBC 6.0, HGB 13.1,   CMP: Glucose 113 (H0, Alkphos 36 (L) o/w WNL (Creatinine 0.72)  Lactic Acid (Resulted: 1247): 1.8  UA: Blood moderate (A), Protein Albumin 30 (A), RBC 10 (H), Mucous present (A), o/w WNL.   Influenza A/B antigen: Negative     Interventions:  1201 Tylenol 1000 mg PO  1220 NS 1000 mL IV  1220 Morphine 4 mg IV     Emergency Department Course:     Nursing notes and vitals reviewed.    1138 I performed an exam of the patient as " documented above.     1157 A influenza sample was obtained for laboratory testing as documented above.     1157 The patient provided a urine sample here in the emergency department. This was sent for laboratory testing, findings above.     1220 IV was inserted and blood was drawn for laboratory testing, results above.     1334 The patient was sent for a CT while in the emergency department, results above.       I personally reviewed the imaging and lab results with the patient and answered all related questions prior to discharge.    Impression & Plan      Medical Decision Making:  Kezia Nava is a 43 year old female who presents to the emergency department today for evaluation of abdominal pain, nausea, vomiting, diarrhea, and fever. Fortunately, her CT scan of the abdomin shows no evidence of kidney stone disease, bacterial infection, or emergent abdominal abnormality. This is consistent with her exam which involves some tenderness but no guarding or rebounding, no focal tenderness and instead she has some diffuse tenderness. Her myalgias are likely due to her fever, as is her tachycardia, and both are decreasing with antipyretics here in the ER. She received supportive therapy with IV luids and Zofran and feels much better. She has had numerous episodes of vomiting, but only 1 of diarrhea, and I do suspects she will have more episodes of diarrhea as gastroenteritis is the most likely cause of her symptoms at this point. She has no evidence of UTI, no CVA tenderness to evidence a small stone that would have been missed in the CT scan with contrast, and is otherwise doing well here. Reassuring that she has a normal white count as well. Will plan for discharge and very clear return precautions as above, the patient is highly health care literate and is ok with this plan.     Diagnosis:    ICD-10-CM    1. Gastroenteritis K52.9    2. Hematuria, unspecified type R31.9      Disposition:   The patient is discharged  to home.     Discharge Medications:    CONTINUE these medicines which may have CHANGED, or have new prescriptions. If we are uncertain of the size of tablets/capsules you have at home, strength may be listed as something that might have changed.      Dose / Directions   * ondansetron 4 MG ODT tab  Commonly known as:  ZOFRAN ODT  This may have changed:  Another medication with the same name was added. Make sure you understand how and when to take each.  Used for:  Viral gastroenteritis      Dose:  4-8 mg  Take 1-2 tablets (4-8 mg) by mouth every 8 hours as needed for nausea  Quantity:  12 tablet  Refills:  1            Where to get your medicines      Some of these will need a paper prescription and others can be bought over the counter. Ask your nurse if you have questions.    Bring a paper prescription for each of these medications    ondansetron 4 MG ODT tab         Scribe Disclosure:  I, Orla Severson, am serving as a scribe at 11:46 AM on 3/1/2019 to document services personally performed by John Mendiola MD based on my observations and the provider's statements to me.      EMERGENCY DEPARTMENT       John Mendiola MD  03/01/19 [id number]

## 2019-03-01 NOTE — PROGRESS NOTES
Patient had lab appointment today.  orders are in chart, but patient refused the orders from the other provider.  Patient is requesting orders only from Dr Angeles, for the future appointment with her.  Lab robert a 'just in case rainbow' tubes.  If needed, please place orders for lab.  Thank you Lab,            ROS      Physical Exam

## 2019-04-15 ENCOUNTER — TELEPHONE (OUTPATIENT)
Dept: PHARMACY | Facility: CLINIC | Age: 44
End: 2019-04-15

## 2019-04-15 NOTE — TELEPHONE ENCOUNTER
This patient is due for MTM follow-up. Called and LM for pt to call the MTM scheduling line to schedule an appointment with the pharmacist.    Gogo Wilde, PharmD  Medication Therapy Management Resident, [facility]  Pager: [phone]

## 2019-05-09 ENCOUNTER — MYC MEDICAL ADVICE (OUTPATIENT)
Dept: FAMILY MEDICINE | Facility: CLINIC | Age: 44
End: 2019-05-09

## 2019-05-09 NOTE — TELEPHONE ENCOUNTER
PCP,    Pt has surgery scheduled 5/29 and has 6 month f/u scheduled 5/31. Would you like to change 6 month f/u to a later date and become a hospital f/u?    Please advise.    Thank you,  Magen SHERWOOD RN

## 2019-05-13 ENCOUNTER — OFFICE VISIT (OUTPATIENT)
Dept: FAMILY MEDICINE | Facility: CLINIC | Age: 44
End: 2019-05-13
Payer: COMMERCIAL

## 2019-05-13 VITALS
WEIGHT: 203 LBS | BODY MASS INDEX: 37.13 KG/M2 | TEMPERATURE: 97.8 F | SYSTOLIC BLOOD PRESSURE: 130 MMHG | HEART RATE: 94 BPM | OXYGEN SATURATION: 98 % | DIASTOLIC BLOOD PRESSURE: 86 MMHG

## 2019-05-13 DIAGNOSIS — Z01.818 PREOP GENERAL PHYSICAL EXAM: Primary | ICD-10-CM

## 2019-05-13 DIAGNOSIS — R73.03 PRE-DIABETES: ICD-10-CM

## 2019-05-13 DIAGNOSIS — D25.9 UTERINE LEIOMYOMA, UNSPECIFIED LOCATION: ICD-10-CM

## 2019-05-13 DIAGNOSIS — R09.81 CONGESTION OF PARANASAL SINUS: ICD-10-CM

## 2019-05-13 LAB
BASOPHILS # BLD AUTO: 0 10E9/L (ref 0–0.2)
BASOPHILS NFR BLD AUTO: 0.4 %
DIFFERENTIAL METHOD BLD: NORMAL
EOSINOPHIL # BLD AUTO: 0.1 10E9/L (ref 0–0.7)
EOSINOPHIL NFR BLD AUTO: 1.3 %
ERYTHROCYTE [DISTWIDTH] IN BLOOD BY AUTOMATED COUNT: 13.2 % (ref 10–15)
HBA1C MFR BLD: 6 % (ref 0–5.6)
HCT VFR BLD AUTO: 38 % (ref 35–47)
HGB BLD-MCNC: 12.9 G/DL (ref 11.7–15.7)
LYMPHOCYTES # BLD AUTO: 1.5 10E9/L (ref 0.8–5.3)
LYMPHOCYTES NFR BLD AUTO: 16.6 %
MCH RBC QN AUTO: 30 PG (ref 26.5–33)
MCHC RBC AUTO-ENTMCNC: 33.9 G/DL (ref 31.5–36.5)
MCV RBC AUTO: 88 FL (ref 78–100)
MONOCYTES # BLD AUTO: 0.7 10E9/L (ref 0–1.3)
MONOCYTES NFR BLD AUTO: 7.8 %
NEUTROPHILS # BLD AUTO: 6.7 10E9/L (ref 1.6–8.3)
NEUTROPHILS NFR BLD AUTO: 73.9 %
PLATELET # BLD AUTO: 218 10E9/L (ref 150–450)
RBC # BLD AUTO: 4.3 10E12/L (ref 3.8–5.2)
WBC # BLD AUTO: 9.1 10E9/L (ref 4–11)

## 2019-05-13 PROCEDURE — 80048 BASIC METABOLIC PNL TOTAL CA: CPT | Performed by: NURSE PRACTITIONER

## 2019-05-13 PROCEDURE — 85025 COMPLETE CBC W/AUTO DIFF WBC: CPT | Performed by: NURSE PRACTITIONER

## 2019-05-13 PROCEDURE — 99215 OFFICE O/P EST HI 40 MIN: CPT | Performed by: NURSE PRACTITIONER

## 2019-05-13 PROCEDURE — 36415 COLL VENOUS BLD VENIPUNCTURE: CPT | Performed by: NURSE PRACTITIONER

## 2019-05-13 PROCEDURE — 83036 HEMOGLOBIN GLYCOSYLATED A1C: CPT | Performed by: NURSE PRACTITIONER

## 2019-05-13 NOTE — PROGRESS NOTES
[address] [address]  [phone]  Dept: [phone]    PRE-OP EVALUATION:  Today's date: 2019    Kezia Nava (: 1975) presents for pre-operative evaluation assessment as requested by Dr. Horowitz.  She requires evaluation and anesthesia risk assessment prior to undergoing surgery/procedure for treatment of  Uterine fibroids    Fax number for surgical facility: at [facility] but fax to [phone]  Primary Physician: Shamir Angeles  Type of Anesthesia Anticipated: General    Patient has a Health Care Directive or Living Will:   NO    Preop Questions 2019   Who is doing your surgery? Pauline Horowitz   What are you having done? Hysterectomy, left salpingectomy oophrectomy laparoscopic possibly laparotomy    Date of Surgery/Procedure: 19   Facility or Hospital where procedure/surgery will be performed: [facility]   1.  Do you have a history of Heart attack, stroke, stent, coronary bypass surgery, or other heart surgery? No   2.  Do you ever have any pain or discomfort in your chest? No   3.  Do you have a history of  Heart Failure? No   4.   Are you troubled by shortness of breath when:  walking on a level surface, or up a slight hill, or at night? No   5.  Do you currently have a cold, bronchitis or other respiratory infection? No   6.  Do you have a cough, shortness of breath, or wheezing? No   7.  Do you sometimes get pains in the calves of your legs when you walk? No   8. Do you or anyone in your family have previous history of blood clots? YES - sister after port placement at site   9.  Do you or does anyone in your family have a serious bleeding problem such as prolonged bleeding following surgeries or cuts? No   10. Have you ever had problems with anemia or been told to take iron pills? No   11. Have you had any abnormal blood loss such as black, tarry or bloody stools, or abnormal vaginal bleeding? No   12. Have you ever had a blood transfusion? No    13. Have you or any of your relatives ever had problems with anesthesia? No   14. Do you have sleep apnea, excessive snoring or daytime drowsiness? YES -  Does not use her CPAP   15. Do you have any prosthetic heart valves? No   16. Do you have prosthetic joints? No   17. Is there any chance that you may be pregnant? No         HPI:     HPI related to upcoming procedure: several EM bx for thickened EM lining; currently on tamoxifen, left ovarian cyst  See problem list for active medical problems.  Problems all longstanding and stable, except as noted/documented.  See ROS for pertinent symptoms related to these conditions.                                                                                                                                                          .    MEDICAL HISTORY:     Patient Active Problem List    Diagnosis Date Noted     Pre-diabetes 05/13/2019     Priority: Medium     Fibroid uterus 05/13/2019     Priority: Medium     Paresthesias- ? TIA vs complex migraine (preferred per neuro) 10/05/2017     Priority: Medium     Severe obesity (BMI 35.0-35.9 with comorbidity) (H) 10/05/2017     Priority: Medium     Acute bilateral low back pain with right-sided sciatica 09/01/2017     Priority: Medium     ANA (obstructive sleep apnea) 08/15/2017     Priority: Medium     Benign essential hypertension 01/04/2017     Priority: Medium     Cervical radiculopathy 12/12/2015     Priority: Medium     Pain in joint, ankle and foot 06/14/2010     Priority: Medium      Past Medical History:   Diagnosis Date     Diffuse cystic mastopathy      Elevated BP 12/15/2016     Mesenteric adenitis 4/14     Nephrolithiasis     s/p lithotripsy     Thyroiditis, acute 12/2/2014    transient hypothyroidism- resolved     Type 2 diabetes mellitus with hyperglycemia, without long-term current use of insulin (H) 2/6/2017     Past Surgical History:   Procedure Laterality Date     ARTHROSCOPY ANKLE, OPEN REPAIR TENDON, COMBINED   11/8/2012    Procedure: COMBINED ARTHROSCOPY ANKLE, OPEN REPAIR TENDON;  Ankle Arthroscopy Left Ankle, Open Ligament Repair Left Ankle, Peroneal Tendon Repair Left Ankle ;  Surgeon: Otf Ramos DPM;  Location: [facility] OR     C APPENDECTOMY  1984     COMBINED CYSTOSCOPY, INSERT STENT URETER(S)  8/6/2013    Procedure: COMBINED CYSTOSCOPY, INSERT STENT URETER(S);  cystoscopy, right retrograde,RIGHT STENT PLACEMENT.;  Surgeon: Kan Porter MD;  Location:  OR     CYSTOSCOPY, RETROGRADES, INSERT STENT URETER(S), COMBINED  8/6/2013    Procedure: COMBINED CYSTOSCOPY, RETROGRADES, INSERT STENT URETER(S);  cystoscopy, right retrograde, insert stent right ureter;  Surgeon: Kan Porter MD;  Location:  OR     DENTAL SURGERY      TOOTH#7 - DENTAL IMPLANT     DISCECTOMY, FUSION CERVICAL ANTERIOR ONE LEVEL, COMBINED N/A 12/13/2015    Procedure: COMBINED DISCECTOMY, FUSION CERVICAL ANTERIOR ONE LEVEL;  Surgeon: Seven Umaña MD;  Location:  OR     EXTRACORPOREAL SHOCK WAVE LITHOTRIPSY (ESWL)  8/19/2013    Procedure: EXTRACORPOREAL SHOCK WAVE LITHOTRIPSY (ESWL);   RIGHT EXTRACORPOREAL SHOCK WAVE LITHOTRIPSY;  Surgeon: Otf Tobias MD;  Location:  OR     HC REDUCTION OF LARGE BREAST Bilateral 2003     LASIK BILATERAL       SALPINGO OOPHORECTOMY,R/L/DERREK Right 05/17/2006    Right     Current Outpatient Medications   Medication Sig Dispense Refill     Acetaminophen (TYLENOL PO) Take 500-1,000 mg by mouth every 6 hours as needed As needed for pain        aspirin (ASPIRIN ADULT LOW DOSE) 81 MG EC tablet Take 1 tablet (81 mg) by mouth daily 30 tablet 3     blood glucose monitoring (ACCU-CHEK ALMA PLUS) test strip Use to test blood sugar 1 times daily or as directed. 100 strip 1     blood glucose monitoring (ACCU-CHEK FASTCLIX) lancets Use to test blood sugar 1 times daily or as directed. 3 each 3     Ibuprofen (IBU PO) Take 600 mg by mouth every 6 hours as needed        insulin  pen needle (BD VALENTINA U/F) 32G X 4 MM Use once daily as directed. 100 each PRN     liraglutide (VICTOZA PEN) 18 MG/3ML solution Inject 1.2 mg Subcutaneous daily 9 mL 3     loratadine (CLARITIN) 10 MG tablet Take 10 mg by mouth daily       losartan-hydrochlorothiazide (HYZAAR) 50-12.5 MG per tablet Take 1 tablet by mouth daily 90 tablet 3     metFORMIN (GLUCOPHAGE-XR) 500 MG 24 hr tablet Take 1 tablet (500 mg) by mouth daily (with dinner) 90 tablet 3     multivitamin, therapeutic with minerals (MULTI-VITAMIN) TABS tablet Take 1 tablet by mouth daily       ondansetron (ZOFRAN ODT) 4 MG ODT tab Take 1-2 tablets (4-8 mg) by mouth every 8 hours as needed for nausea 12 tablet 1     order for DME AIRSENSE 10  10-15 CM/H20  NASAL AIRFIT N10 FOR HER       STATIN NOT PRESCRIBED, INTENTIONAL, Please choose reason not prescribed, below       tamoxifen (NOLVADEX) 20 MG tablet Take 1 tablet (20 mg) by mouth daily 90 tablet 3     VITAMIN D, CHOLECALCIFEROL, PO Take 10,000 Units by mouth daily        zolpidem (AMBIEN CR) 6.25 MG CR tablet Take 1 tab by mouth at bedtime as needed. 90 tablet 0     OTC products: krill oil will discontinue with aspirin      Allergies   Allergen Reactions     Lisinopril Cough     No Known Drug Allergies      Pneumovax [Pneumococcal Polysaccharides] Other (See Comments)     Red streaking and pain      Latex Allergy: NO    Social History     Tobacco Use     Smoking status: Never Smoker     Smokeless tobacco: Never Used   Substance Use Topics     Alcohol use: Yes     Alcohol/week: 0.0 oz     Comment: socially     History   Drug Use No       REVIEW OF SYSTEMS:   CONSTITUTIONAL: NEGATIVE for fever, chills, or myalgia change in weight  INTEGUMENTARY/SKIN: NEGATIVE for worrisome rashes, moles or lesions  EYES: NEGATIVE for vision changes or irritation  ENT/MOUTH:POSITIVE for sinus congestion , post nasal drainage triggering cough possibly allergy related  RESP: NEGATIVE for significant cough or SOB  BREAST:  NEGATIVE for masses, tenderness or discharge  CV: NEGATIVE for chest pain, palpitations or peripheral edema  GI: NEGATIVE for nausea, abdominal pain, heartburn, or change in bowel habits   female: urgency and frequency have been chronic and not associated with URI's  MUSCULOSKELETAL: NEGATIVE for significant arthralgias or myalgia  NEURO: NEGATIVE for weakness, dizziness or paresthesias  ENDOCRINE: NEGATIVE for temperature intolerance, skin/hair changes  HEME: NEGATIVE for bleeding problems  PSYCHIATRIC: NEGATIVE for changes in mood or affect    EXAM:   /86 (BP Location: Right arm, Cuff Size: Adult Regular)   Pulse 94   Temp 97.8  F (36.6  C) (Tympanic)   Wt 92.1 kg (203 lb)   SpO2 98%   BMI 37.13 kg/m      GENERAL APPEARANCE: healthy, alert and no distress     EYES: EOMI, PERRL     HENT: ear canals and right TM injected, left TM's normal and nose- nasal passages hyperemic, and mouth without ulcers or lesions     NECK: no adenopathy, no asymmetry, masses, or scars and thyroid normal to palpation     RESP: lungs clear to auscultation - no rales, rhonchi or wheezes     CV: regular rates and rhythm, normal S1 S2, no S3 or S4 and no murmur, click or rub     ABDOMEN:  soft, nontender, no HSM or masses and bowel sounds normal     MS: extremities normal- no gross deformities noted, no evidence of inflammation in joints, FROM in all extremities.     SKIN: no suspicious lesions or rashes     NEURO: Normal strength and tone, sensory exam grossly normal, mentation intact and speech normal     PSYCH: mentation appears normal. and affect normal/bright     LYMPHATICS: No cervical adenopathy    DIAGNOSTICS:   EKG: Not indicated due to non-vascular surgery and low risk of event (age <65 and without cardiac risk factors)    Recent Labs   Lab Test 03/01/19  1236 03/01/19  1220 11/26/18  1356  01/31/18  1053  10/05/17  2002  12/12/15  2225   HGB  --  13.1  --   --  12.8   < > 13.8   < >  --    PLT  --  177  --   --  259    < > 253   < >  --    INR  --   --   --   --   --   --  0.91  --  0.96   NA Canceled, Test credited 139  --    < >  --    < > 139   < >  --    POTASSIUM Canceled, Test credited 3.4  --    < >  --    < > 3.5   < >  --    CR Canceled, Test credited 0.72  --    < >  --    < > 0.74   < >  --    A1C 5.6  --  5.8*   < >  --    < > 6.1*   < >  --     < > = values in this interval not displayed.     Results for orders placed or performed in visit on 05/13/19   Basic metabolic panel  (Ca, Cl, CO2, Creat, Gluc, K, Na, BUN)   Result Value Ref Range    Sodium 140 133 - 144 mmol/L    Potassium 3.8 3.4 - 5.3 mmol/L    Chloride 106 94 - 109 mmol/L    Carbon Dioxide 23 20 - 32 mmol/L    Anion Gap 11 3 - 14 mmol/L    Glucose 103 (H) 70 - 99 mg/dL    Urea Nitrogen 10 7 - 30 mg/dL    Creatinine 0.72 0.52 - 1.04 mg/dL    GFR Estimate >90 >60 mL/min/[1.73_m2]    GFR Estimate If Black >90 >60 mL/min/[1.73_m2]    Calcium 9.7 8.5 - 10.1 mg/dL   Hemoglobin A1c   Result Value Ref Range    Hemoglobin A1C 6.0 (H) 0 - 5.6 %   CBC with platelets differential   Result Value Ref Range    WBC 9.1 4.0 - 11.0 10e9/L    RBC Count 4.30 3.8 - 5.2 10e12/L    Hemoglobin 12.9 11.7 - 15.7 g/dL    Hematocrit 38.0 35.0 - 47.0 %    MCV 88 78 - 100 fl    MCH 30.0 26.5 - 33.0 pg    MCHC 33.9 31.5 - 36.5 g/dL    RDW 13.2 10.0 - 15.0 %    Platelet Count 218 150 - 450 10e9/L    % Neutrophils 73.9 %    % Lymphocytes 16.6 %    % Monocytes 7.8 %    % Eosinophils 1.3 %    % Basophils 0.4 %    Absolute Neutrophil 6.7 1.6 - 8.3 10e9/L    Absolute Lymphocytes 1.5 0.8 - 5.3 10e9/L    Absolute Monocytes 0.7 0.0 - 1.3 10e9/L    Absolute Eosinophils 0.1 0.0 - 0.7 10e9/L    Absolute Basophils 0.0 0.0 - 0.2 10e9/L    Diff Method Automated Method        IMPRESSION:   Reason for surgery/procedure: fibroid uterus, left ovarian cyst  Diagnosis/reason for consult: pre op consult  The proposed surgical procedure is considered INTERMEDIATE risk.    REVISED CARDIAC RISK INDEX  The  patient has the following serious cardiovascular risks for perioperative complications such as (MI, PE, VFib and 3  AV Block):  No serious cardiac risks  INTERPRETATION: 0 risks: Class I (very low risk - 0.4% complication rate)    The patient has the following additional risks for perioperative complications:  ANA  Pre diabetes        ICD-10-CM    1. Preop general physical exam Z01.818 Basic metabolic panel  (Ca, Cl, CO2, Creat, Gluc, K, Na, BUN)     CBC with platelets differential   2. Uterine leiomyoma, unspecified location D25.9    3. Pre-diabetes R73.03 Hemoglobin A1c   4. Congestion of paranasal sinus R09.81        RECOMMENDATIONS:       Obstructive Sleep Apnea (or suspected sleep apnea)  Patient is to bring their home CPAP with them on the day of surgery      --Patient is to take all scheduled medications on the day of surgery EXCEPT for modifications listed below.    Diabetes Medication Use  Will hold victoza 24 hours before surgery  Will also hold metformin 24 hours before surgery       Anticoagulant or Antiplatelet Medication Use  ASPIRIN: Discontinue ASA 2 weeks  prior to procedure to reduce bleeding risk.  It should be resumed post-operatively.        ACE Inhibitor or Angiotensin Receptor Blocker (ARB) Use  Ace inhibitor or Angiotensin Receptor Blocker (ARB) and should HOLD this medication for the 24 hours prior to surgery.      APPROVAL GIVEN to proceed with proposed procedure, without further diagnostic evaluation  She will begin zyrtec and flonase   If symptoms of sinus congestion progress or do not resolve or she develops fever or chills she will contact me by weeks end for antibiotic rx        Signed Electronically by: SALLIE Ingram CNP    Copy of this evaluation report is provided to requesting physician.    [facility] Preop Guidelines    Revised Cardiac Risk Index

## 2019-05-14 LAB
ANION GAP SERPL CALCULATED.3IONS-SCNC: 11 MMOL/L (ref 3–14)
BUN SERPL-MCNC: 10 MG/DL (ref 7–30)
CALCIUM SERPL-MCNC: 9.7 MG/DL (ref 8.5–10.1)
CHLORIDE SERPL-SCNC: 106 MMOL/L (ref 94–109)
CO2 SERPL-SCNC: 23 MMOL/L (ref 20–32)
CREAT SERPL-MCNC: 0.72 MG/DL (ref 0.52–1.04)
GFR SERPL CREATININE-BSD FRML MDRD: >90 ML/MIN/{1.73_M2}
GLUCOSE SERPL-MCNC: 103 MG/DL (ref 70–99)
POTASSIUM SERPL-SCNC: 3.8 MMOL/L (ref 3.4–5.3)
SODIUM SERPL-SCNC: 140 MMOL/L (ref 133–144)

## 2019-05-15 ENCOUNTER — TELEPHONE (OUTPATIENT)
Dept: PHARMACY | Facility: CLINIC | Age: 44
End: 2019-05-15

## 2019-05-15 NOTE — TELEPHONE ENCOUNTER
We have been unable to reach this patient for MTM follow-up after several attempts. We will stop reaching out to the patient at this time. Please let us know if we can assist in this patient's care in the future.    Routing to PCP as Panhco SalazarD, [facility]  Medication Therapy Management Provider  Pager: [phone]

## 2019-05-24 RX ORDER — CETIRIZINE HYDROCHLORIDE 10 MG/1
10 TABLET ORAL DAILY
COMMUNITY

## 2019-05-24 RX ORDER — AMPICILLIN TRIHYDRATE 250 MG
CAPSULE ORAL DAILY
COMMUNITY
End: 2020-07-03

## 2019-05-24 RX ORDER — BIOTIN 5 MG
TABLET ORAL DAILY
COMMUNITY
End: 2021-01-20

## 2019-05-24 ASSESSMENT — MIFFLIN-ST. JEOR: SCORE: 1524.05

## 2019-05-28 DIAGNOSIS — R73.03 PRE-DIABETES: Primary | ICD-10-CM

## 2019-05-28 NOTE — TELEPHONE ENCOUNTER
Pending Prescriptions:                       Disp   Refills    VICTOZA PEN 18 MG/3ML soln [Pharmacy Med *9 mL   0            Sig: INJECT 1.2 MG UNDER THE SKIN ONCE DAILY            Last Office Visit: 11/26/18  Future Office visit:    Next 5 appointments (look out 90 days)    May 31, 2019  9:30 AM CDT  [facility]ir [facility] [address] [facility] [address] [phone]   Jun 20, 2019 10:00 AM CDT  Return Visit with SALLIE Fuller CNS  [facility] ([facility]) [facility]  [address] [address] [phone]           Routing refill request to provider for review/approval because:  Drug not active on patient's medication list

## 2019-05-29 ENCOUNTER — ANESTHESIA EVENT (OUTPATIENT)
Dept: SURGERY | Facility: CLINIC | Age: 44
End: 2019-05-29
Payer: COMMERCIAL

## 2019-05-29 ENCOUNTER — HOSPITAL ENCOUNTER (OUTPATIENT)
Facility: CLINIC | Age: 44
Discharge: HOME | End: 2019-05-29
Attending: OBSTETRICS & GYNECOLOGY | Admitting: OBSTETRICS & GYNECOLOGY
Payer: COMMERCIAL

## 2019-05-29 ENCOUNTER — ANESTHESIA (OUTPATIENT)
Dept: SURGERY | Facility: CLINIC | Age: 44
End: 2019-05-29
Payer: COMMERCIAL

## 2019-05-29 VITALS
OXYGEN SATURATION: 96 % | BODY MASS INDEX: 37.17 KG/M2 | HEART RATE: 94 BPM | HEIGHT: 62 IN | RESPIRATION RATE: 18 BRPM | WEIGHT: 202 LBS | DIASTOLIC BLOOD PRESSURE: 84 MMHG | TEMPERATURE: 98.6 F | SYSTOLIC BLOOD PRESSURE: 118 MMHG

## 2019-05-29 DIAGNOSIS — D25.9 UTERINE LEIOMYOMA, UNSPECIFIED LOCATION: Primary | ICD-10-CM

## 2019-05-29 LAB
ABO + RH BLD: NORMAL
ABO + RH BLD: NORMAL
B-HCG SERPL-ACNC: <1 IU/L (ref 0–5)
BLD GP AB SCN SERPL QL: NORMAL
BLOOD BANK CMNT PATIENT-IMP: NORMAL
GLUCOSE BLDC GLUCOMTR-MCNC: 101 MG/DL (ref 70–99)
GLUCOSE BLDC GLUCOMTR-MCNC: 132 MG/DL (ref 70–99)
HGB BLD-MCNC: 11.8 G/DL (ref 11.7–15.7)
SPECIMEN EXP DATE BLD: NORMAL

## 2019-05-29 PROCEDURE — 36000093 ZZH SURGERY LEVEL 4 1ST 30 MIN: Performed by: OBSTETRICS & GYNECOLOGY

## 2019-05-29 PROCEDURE — 71000015 ZZH RECOVERY PHASE 1 LEVEL 2 EA ADDTL HR: Performed by: OBSTETRICS & GYNECOLOGY

## 2019-05-29 PROCEDURE — 25800030 ZZH RX IP 258 OP 636: Performed by: ANESTHESIOLOGY

## 2019-05-29 PROCEDURE — 36000063 ZZH SURGERY LEVEL 4 EA 15 ADDTL MIN: Performed by: OBSTETRICS & GYNECOLOGY

## 2019-05-29 PROCEDURE — 85018 HEMOGLOBIN: CPT | Performed by: OBSTETRICS & GYNECOLOGY

## 2019-05-29 PROCEDURE — 71000014 ZZH RECOVERY PHASE 1 LEVEL 2 FIRST HR: Performed by: OBSTETRICS & GYNECOLOGY

## 2019-05-29 PROCEDURE — 86900 BLOOD TYPING SEROLOGIC ABO: CPT | Performed by: OBSTETRICS & GYNECOLOGY

## 2019-05-29 PROCEDURE — 25000128 H RX IP 250 OP 636: Performed by: NURSE ANESTHETIST, CERTIFIED REGISTERED

## 2019-05-29 PROCEDURE — 86850 RBC ANTIBODY SCREEN: CPT | Performed by: OBSTETRICS & GYNECOLOGY

## 2019-05-29 PROCEDURE — 82962 GLUCOSE BLOOD TEST: CPT | Mod: 91

## 2019-05-29 PROCEDURE — 88307 TISSUE EXAM BY PATHOLOGIST: CPT | Mod: 26 | Performed by: OBSTETRICS & GYNECOLOGY

## 2019-05-29 PROCEDURE — 36415 COLL VENOUS BLD VENIPUNCTURE: CPT | Performed by: OBSTETRICS & GYNECOLOGY

## 2019-05-29 PROCEDURE — 27211024 ZZHC OR SUPPLY OTHER OPNP: Performed by: OBSTETRICS & GYNECOLOGY

## 2019-05-29 PROCEDURE — 27210794 ZZH OR GENERAL SUPPLY STERILE: Performed by: OBSTETRICS & GYNECOLOGY

## 2019-05-29 PROCEDURE — 37000008 ZZH ANESTHESIA TECHNICAL FEE, 1ST 30 MIN: Performed by: OBSTETRICS & GYNECOLOGY

## 2019-05-29 PROCEDURE — 25000132 ZZH RX MED GY IP 250 OP 250 PS 637: Performed by: ANESTHESIOLOGY

## 2019-05-29 PROCEDURE — 37000009 ZZH ANESTHESIA TECHNICAL FEE, EACH ADDTL 15 MIN: Performed by: OBSTETRICS & GYNECOLOGY

## 2019-05-29 PROCEDURE — 25800030 ZZH RX IP 258 OP 636: Performed by: NURSE ANESTHETIST, CERTIFIED REGISTERED

## 2019-05-29 PROCEDURE — 84702 CHORIONIC GONADOTROPIN TEST: CPT | Performed by: OBSTETRICS & GYNECOLOGY

## 2019-05-29 PROCEDURE — 88307 TISSUE EXAM BY PATHOLOGIST: CPT | Performed by: OBSTETRICS & GYNECOLOGY

## 2019-05-29 PROCEDURE — 40000306 ZZH STATISTIC PRE PROC ASSESS II: Performed by: OBSTETRICS & GYNECOLOGY

## 2019-05-29 PROCEDURE — 93010 ELECTROCARDIOGRAM REPORT: CPT | Performed by: INTERNAL MEDICINE

## 2019-05-29 PROCEDURE — 86901 BLOOD TYPING SEROLOGIC RH(D): CPT | Performed by: OBSTETRICS & GYNECOLOGY

## 2019-05-29 PROCEDURE — 25000128 H RX IP 250 OP 636: Performed by: ANESTHESIOLOGY

## 2019-05-29 PROCEDURE — 25000128 H RX IP 250 OP 636: Performed by: OBSTETRICS & GYNECOLOGY

## 2019-05-29 PROCEDURE — 71000027 ZZH RECOVERY PHASE 2 EACH 15 MINS: Performed by: OBSTETRICS & GYNECOLOGY

## 2019-05-29 PROCEDURE — 25000125 ZZHC RX 250: Performed by: NURSE ANESTHETIST, CERTIFIED REGISTERED

## 2019-05-29 PROCEDURE — 25000132 ZZH RX MED GY IP 250 OP 250 PS 637: Performed by: OBSTETRICS & GYNECOLOGY

## 2019-05-29 RX ORDER — ONDANSETRON 4 MG/1
4 TABLET, ORALLY DISINTEGRATING ORAL EVERY 30 MIN PRN
Status: DISCONTINUED | OUTPATIENT
Start: 2019-05-29 | End: 2019-05-29 | Stop reason: HOSPADM

## 2019-05-29 RX ORDER — KETOROLAC TROMETHAMINE 30 MG/ML
INJECTION, SOLUTION INTRAMUSCULAR; INTRAVENOUS PRN
Status: DISCONTINUED | OUTPATIENT
Start: 2019-05-29 | End: 2019-05-29

## 2019-05-29 RX ORDER — NALOXONE HYDROCHLORIDE 0.4 MG/ML
.1-.4 INJECTION, SOLUTION INTRAMUSCULAR; INTRAVENOUS; SUBCUTANEOUS
Status: DISCONTINUED | OUTPATIENT
Start: 2019-05-29 | End: 2019-05-29 | Stop reason: HOSPADM

## 2019-05-29 RX ORDER — LIRAGLUTIDE 6 MG/ML
INJECTION SUBCUTANEOUS
Qty: 9 ML | Refills: 0 | Status: SHIPPED | OUTPATIENT
Start: 2019-05-29 | End: 2019-07-12

## 2019-05-29 RX ORDER — HYDROMORPHONE HYDROCHLORIDE 1 MG/ML
.3-.5 INJECTION, SOLUTION INTRAMUSCULAR; INTRAVENOUS; SUBCUTANEOUS EVERY 10 MIN PRN
Status: DISCONTINUED | OUTPATIENT
Start: 2019-05-29 | End: 2019-05-29 | Stop reason: HOSPADM

## 2019-05-29 RX ORDER — SODIUM CHLORIDE, SODIUM LACTATE, POTASSIUM CHLORIDE, CALCIUM CHLORIDE 600; 310; 30; 20 MG/100ML; MG/100ML; MG/100ML; MG/100ML
INJECTION, SOLUTION INTRAVENOUS CONTINUOUS
Status: DISCONTINUED | OUTPATIENT
Start: 2019-05-29 | End: 2019-05-29 | Stop reason: HOSPADM

## 2019-05-29 RX ORDER — ACETAMINOPHEN 325 MG/1
975 TABLET ORAL ONCE
Status: COMPLETED | OUTPATIENT
Start: 2019-05-29 | End: 2019-05-29

## 2019-05-29 RX ORDER — NEOSTIGMINE METHYLSULFATE 1 MG/ML
VIAL (ML) INJECTION PRN
Status: DISCONTINUED | OUTPATIENT
Start: 2019-05-29 | End: 2019-05-29

## 2019-05-29 RX ORDER — ONDANSETRON 2 MG/ML
4 INJECTION INTRAMUSCULAR; INTRAVENOUS EVERY 30 MIN PRN
Status: DISCONTINUED | OUTPATIENT
Start: 2019-05-29 | End: 2019-05-29 | Stop reason: HOSPADM

## 2019-05-29 RX ORDER — LABETALOL 20 MG/4 ML (5 MG/ML) INTRAVENOUS SYRINGE
10
Status: DISCONTINUED | OUTPATIENT
Start: 2019-05-29 | End: 2019-05-29 | Stop reason: HOSPADM

## 2019-05-29 RX ORDER — PROPOFOL 10 MG/ML
INJECTION, EMULSION INTRAVENOUS PRN
Status: DISCONTINUED | OUTPATIENT
Start: 2019-05-29 | End: 2019-05-29

## 2019-05-29 RX ORDER — FENTANYL CITRATE 50 UG/ML
25-50 INJECTION, SOLUTION INTRAMUSCULAR; INTRAVENOUS
Status: DISCONTINUED | OUTPATIENT
Start: 2019-05-29 | End: 2019-05-29 | Stop reason: HOSPADM

## 2019-05-29 RX ORDER — DEXAMETHASONE SODIUM PHOSPHATE 4 MG/ML
INJECTION, SOLUTION INTRA-ARTICULAR; INTRALESIONAL; INTRAMUSCULAR; INTRAVENOUS; SOFT TISSUE PRN
Status: DISCONTINUED | OUTPATIENT
Start: 2019-05-29 | End: 2019-05-29

## 2019-05-29 RX ORDER — LIDOCAINE 40 MG/G
CREAM TOPICAL
Status: DISCONTINUED | OUTPATIENT
Start: 2019-05-29 | End: 2019-05-29 | Stop reason: HOSPADM

## 2019-05-29 RX ORDER — FENTANYL CITRATE 50 UG/ML
INJECTION, SOLUTION INTRAMUSCULAR; INTRAVENOUS PRN
Status: DISCONTINUED | OUTPATIENT
Start: 2019-05-29 | End: 2019-05-29

## 2019-05-29 RX ORDER — KETAMINE HYDROCHLORIDE 10 MG/ML
INJECTION INTRAMUSCULAR; INTRAVENOUS PRN
Status: DISCONTINUED | OUTPATIENT
Start: 2019-05-29 | End: 2019-05-29

## 2019-05-29 RX ORDER — GLYCOPYRROLATE 0.2 MG/ML
INJECTION, SOLUTION INTRAMUSCULAR; INTRAVENOUS PRN
Status: DISCONTINUED | OUTPATIENT
Start: 2019-05-29 | End: 2019-05-29

## 2019-05-29 RX ORDER — BUPIVACAINE HYDROCHLORIDE 5 MG/ML
INJECTION, SOLUTION EPIDURAL; INTRACAUDAL PRN
Status: DISCONTINUED | OUTPATIENT
Start: 2019-05-29 | End: 2019-05-29 | Stop reason: HOSPADM

## 2019-05-29 RX ORDER — OXYCODONE HYDROCHLORIDE 5 MG/1
5-10 TABLET ORAL EVERY 4 HOURS PRN
Qty: 16 TABLET | Refills: 0 | Status: SHIPPED | OUTPATIENT
Start: 2019-05-29 | End: 2019-11-18

## 2019-05-29 RX ORDER — MEPERIDINE HYDROCHLORIDE 50 MG/ML
12.5 INJECTION INTRAMUSCULAR; INTRAVENOUS; SUBCUTANEOUS
Status: DISCONTINUED | OUTPATIENT
Start: 2019-05-29 | End: 2019-05-29 | Stop reason: HOSPADM

## 2019-05-29 RX ORDER — OXYCODONE HYDROCHLORIDE 5 MG/1
5 TABLET ORAL ONCE
Status: COMPLETED | OUTPATIENT
Start: 2019-05-29 | End: 2019-05-29

## 2019-05-29 RX ORDER — CEFAZOLIN SODIUM 1 G/3ML
1 INJECTION, POWDER, FOR SOLUTION INTRAMUSCULAR; INTRAVENOUS SEE ADMIN INSTRUCTIONS
Status: DISCONTINUED | OUTPATIENT
Start: 2019-05-29 | End: 2019-05-29 | Stop reason: HOSPADM

## 2019-05-29 RX ORDER — CEFAZOLIN SODIUM 2 G/100ML
2 INJECTION, SOLUTION INTRAVENOUS
Status: COMPLETED | OUTPATIENT
Start: 2019-05-29 | End: 2019-05-29

## 2019-05-29 RX ORDER — LIDOCAINE HYDROCHLORIDE 10 MG/ML
INJECTION, SOLUTION INFILTRATION; PERINEURAL PRN
Status: DISCONTINUED | OUTPATIENT
Start: 2019-05-29 | End: 2019-05-29

## 2019-05-29 RX ORDER — PROPOFOL 10 MG/ML
INJECTION, EMULSION INTRAVENOUS CONTINUOUS PRN
Status: DISCONTINUED | OUTPATIENT
Start: 2019-05-29 | End: 2019-05-29

## 2019-05-29 RX ORDER — ONDANSETRON 2 MG/ML
INJECTION INTRAMUSCULAR; INTRAVENOUS PRN
Status: DISCONTINUED | OUTPATIENT
Start: 2019-05-29 | End: 2019-05-29

## 2019-05-29 RX ADMIN — CEFAZOLIN SODIUM 2 G: 2 INJECTION, SOLUTION INTRAVENOUS at 07:33

## 2019-05-29 RX ADMIN — FENTANYL CITRATE 150 MCG: 50 INJECTION, SOLUTION INTRAMUSCULAR; INTRAVENOUS at 07:38

## 2019-05-29 RX ADMIN — OXYCODONE HYDROCHLORIDE 5 MG: 5 TABLET ORAL at 13:34

## 2019-05-29 RX ADMIN — ROCURONIUM BROMIDE 10 MG: 10 INJECTION INTRAVENOUS at 10:27

## 2019-05-29 RX ADMIN — Medication 4 MG: at 11:31

## 2019-05-29 RX ADMIN — DEXAMETHASONE SODIUM PHOSPHATE 4 MG: 4 INJECTION, SOLUTION INTRA-ARTICULAR; INTRALESIONAL; INTRAMUSCULAR; INTRAVENOUS; SOFT TISSUE at 07:38

## 2019-05-29 RX ADMIN — ROCURONIUM BROMIDE 10 MG: 10 INJECTION INTRAVENOUS at 10:00

## 2019-05-29 RX ADMIN — ONDANSETRON 4 MG: 2 INJECTION INTRAMUSCULAR; INTRAVENOUS at 11:31

## 2019-05-29 RX ADMIN — Medication 20 MG: at 09:40

## 2019-05-29 RX ADMIN — SODIUM CHLORIDE, POTASSIUM CHLORIDE, SODIUM LACTATE AND CALCIUM CHLORIDE: 600; 310; 30; 20 INJECTION, SOLUTION INTRAVENOUS at 11:18

## 2019-05-29 RX ADMIN — Medication 10 MG: at 10:27

## 2019-05-29 RX ADMIN — GLYCOPYRROLATE 0.2 MG: 0.2 INJECTION, SOLUTION INTRAMUSCULAR; INTRAVENOUS at 07:38

## 2019-05-29 RX ADMIN — CEFAZOLIN 1 G: 1 INJECTION, POWDER, FOR SOLUTION INTRAMUSCULAR; INTRAVENOUS at 09:43

## 2019-05-29 RX ADMIN — FENTANYL CITRATE 100 MCG: 50 INJECTION, SOLUTION INTRAMUSCULAR; INTRAVENOUS at 08:15

## 2019-05-29 RX ADMIN — SODIUM CHLORIDE, POTASSIUM CHLORIDE, SODIUM LACTATE AND CALCIUM CHLORIDE: 600; 310; 30; 20 INJECTION, SOLUTION INTRAVENOUS at 08:21

## 2019-05-29 RX ADMIN — PROPOFOL 200 MG: 10 INJECTION, EMULSION INTRAVENOUS at 07:38

## 2019-05-29 RX ADMIN — HYDROMORPHONE HYDROCHLORIDE 1 MG: 1 INJECTION, SOLUTION INTRAMUSCULAR; INTRAVENOUS; SUBCUTANEOUS at 08:53

## 2019-05-29 RX ADMIN — PROPOFOL 50 MCG/KG/MIN: 10 INJECTION, EMULSION INTRAVENOUS at 07:42

## 2019-05-29 RX ADMIN — FENTANYL CITRATE 50 MCG: 50 INJECTION, SOLUTION INTRAMUSCULAR; INTRAVENOUS at 13:04

## 2019-05-29 RX ADMIN — HYDROMORPHONE HYDROCHLORIDE 1 MG: 1 INJECTION, SOLUTION INTRAMUSCULAR; INTRAVENOUS; SUBCUTANEOUS at 11:04

## 2019-05-29 RX ADMIN — LIDOCAINE HYDROCHLORIDE 30 MG: 10 INJECTION, SOLUTION INFILTRATION; PERINEURAL at 07:38

## 2019-05-29 RX ADMIN — PHENYLEPHRINE HYDROCHLORIDE 100 MCG: 10 INJECTION INTRAVENOUS at 08:02

## 2019-05-29 RX ADMIN — ACETAMINOPHEN 975 MG: 325 TABLET, FILM COATED ORAL at 13:54

## 2019-05-29 RX ADMIN — GLYCOPYRROLATE 0.6 MG: 0.2 INJECTION, SOLUTION INTRAMUSCULAR; INTRAVENOUS at 11:31

## 2019-05-29 RX ADMIN — SODIUM CHLORIDE, POTASSIUM CHLORIDE, SODIUM LACTATE AND CALCIUM CHLORIDE: 600; 310; 30; 20 INJECTION, SOLUTION INTRAVENOUS at 07:33

## 2019-05-29 RX ADMIN — FENTANYL CITRATE 50 MCG: 50 INJECTION, SOLUTION INTRAMUSCULAR; INTRAVENOUS at 12:41

## 2019-05-29 RX ADMIN — ROCURONIUM BROMIDE 20 MG: 10 INJECTION INTRAVENOUS at 11:02

## 2019-05-29 RX ADMIN — Medication 20 MG: at 07:51

## 2019-05-29 RX ADMIN — KETOROLAC TROMETHAMINE 30 MG: 30 INJECTION, SOLUTION INTRAMUSCULAR at 11:31

## 2019-05-29 RX ADMIN — ROCURONIUM BROMIDE 50 MG: 10 INJECTION INTRAVENOUS at 07:38

## 2019-05-29 RX ADMIN — MIDAZOLAM 2 MG: 1 INJECTION INTRAMUSCULAR; INTRAVENOUS at 07:33

## 2019-05-29 ASSESSMENT — MIFFLIN-ST. JEOR: SCORE: 1519.65

## 2019-05-29 NOTE — TELEPHONE ENCOUNTER
Med shows as suspended, and resume at discharge, so although historical, this may be for surgical purposes. Pt will be in clinic in 2 Days.  Jelly Sargent RN

## 2019-05-29 NOTE — ANESTHESIA POSTPROCEDURE EVALUATION
Patient: Kezia Nava    Procedure(s):  single site total LAPAROSCOPIC HYSTERECTOMY, left oophorectomy and lysis of adhesions    Diagnosis:pelvic pain, ovarian cyst  Diagnosis Additional Information: No value filed.    Anesthesia Type:  General, ETT    Note:  Anesthesia Post Evaluation    Patient location during evaluation: PACU  Patient participation: Able to fully participate in evaluation  Level of consciousness: awake and alert  Pain management: adequate  Airway patency: patent  Cardiovascular status: acceptable  Respiratory status: acceptable  Hydration status: acceptable  PONV: none     Anesthetic complications: None          Last vitals:  Vitals:    05/29/19 1300 05/29/19 1315 05/29/19 1319   BP: 105/57 107/53    Pulse: 66 61    Resp: 15 12    Temp:      SpO2: 91% 96% 92%         Electronically Signed By: Kan Arellano MD  May 29, 2019  1:36 PM

## 2019-05-29 NOTE — ANESTHESIA PREPROCEDURE EVALUATION
Anesthesia Pre-Procedure Evaluation    Patient: Kezia Nava   MRN: [id number] : 1975          Preoperative Diagnosis: pelvic pain, ovarian cyst    Procedure(s):  total LAPAROSCOPIC HYSTERECTOMY, bilateral salpingectomy, left oophorectomy  possible laparotomy    Past Medical History:   Diagnosis Date     Diffuse cystic mastopathy      Elevated BP 12/15/2016     Hypertension      Mesenteric adenitis      Migraines      Nephrolithiasis     s/p lithotripsy     Sleep apnea     no cpap     Thyroiditis, acute 2014    transient hypothyroidism- resolved     Type 2 diabetes mellitus with hyperglycemia, without long-term current use of insulin (H) 2017     Past Surgical History:   Procedure Laterality Date     ARTHROSCOPY ANKLE, OPEN REPAIR TENDON, COMBINED  2012    Procedure: COMBINED ARTHROSCOPY ANKLE, OPEN REPAIR TENDON;  Ankle Arthroscopy Left Ankle, Open Ligament Repair Left Ankle, Peroneal Tendon Repair Left Ankle ;  Surgeon: Otf Ramos DPM;  Location: [facility] OR     C APPENDECTOMY  1984     COMBINED CYSTOSCOPY, INSERT STENT URETER(S)  2013    Procedure: COMBINED CYSTOSCOPY, INSERT STENT URETER(S);  cystoscopy, right retrograde,RIGHT STENT PLACEMENT.;  Surgeon: Kan Porter MD;  Location:  OR     CYSTOSCOPY, RETROGRADES, INSERT STENT URETER(S), COMBINED  2013    Procedure: COMBINED CYSTOSCOPY, RETROGRADES, INSERT STENT URETER(S);  cystoscopy, right retrograde, insert stent right ureter;  Surgeon: Kan Porter MD;  Location:  OR     DENTAL SURGERY      TOOTH#7 - DENTAL IMPLANT     DISCECTOMY, FUSION CERVICAL ANTERIOR ONE LEVEL, COMBINED N/A 2015    Procedure: COMBINED DISCECTOMY, FUSION CERVICAL ANTERIOR ONE LEVEL;  Surgeon: Seven Umaña MD;  Location:  OR     EXTRACORPOREAL SHOCK WAVE LITHOTRIPSY (ESWL)  2013    Procedure: EXTRACORPOREAL SHOCK WAVE LITHOTRIPSY (ESWL);   RIGHT EXTRACORPOREAL SHOCK WAVE LITHOTRIPSY;  Surgeon:  Otf Tobias MD;  Location: [facility] OR     HC REDUCTION OF LARGE BREAST Bilateral 2003     LASIK BILATERAL       SALPINGO OOPHORECTOMY,R/L/DERREK Right 05/17/2006    Right     Anesthesia Evaluation     . Pt has had prior anesthetic. Type: General    No history of anesthetic complications          ROS/MED HX    ENT/Pulmonary:     (+)sleep apnea, doesn't use CPAP , . .    Neurologic:  - neg neurologic ROS     Cardiovascular:     (+) hypertension----. : . . . :. .       METS/Exercise Tolerance:     Hematologic:  - neg hematologic  ROS       Musculoskeletal:  - neg musculoskeletal ROS       GI/Hepatic:  - neg GI/hepatic ROS       Renal/Genitourinary:     (+) chronic renal disease, Nephrolithiasis ,       Endo:     (+) type II DM (6) Using insulin thyroid problem  Thyroid disease - Other, Obesity, .      Psychiatric:  - neg psychiatric ROS       Infectious Disease:  - neg infectious disease ROS       Malignancy:         Other:    - neg other ROS                      Physical Exam  Normal systems: pulmonary and dental    Airway   Mallampati: II  TM distance: >3 FB  Neck ROM: full    Dental     Cardiovascular   Rhythm and rate: regular and normal  (+) murmur     PE comment: 1/6 sys m    Pulmonary             Lab Results   Component Value Date    WBC 9.1 05/13/2019    HGB 12.9 05/13/2019    HCT 38.0 05/13/2019     05/13/2019    CRP <2.9 10/06/2017    SED 24 (H) 10/06/2017     05/13/2019    POTASSIUM 3.8 05/13/2019    CHLORIDE 106 05/13/2019    CO2 23 05/13/2019    BUN 10 05/13/2019    CR 0.72 05/13/2019     (H) 05/13/2019    JAMIE 9.7 05/13/2019    PHOS 2.6 10/07/2017    MAG 1.8 10/07/2017    ALBUMIN 3.8 03/01/2019    PROTTOTAL 7.9 03/01/2019    ALT Canceled, Test credited 03/01/2019    AST 25 03/01/2019    ALKPHOS 36 (L) 03/01/2019    BILITOTAL 0.4 03/01/2019    PTT 33 10/05/2017    INR 0.91 10/05/2017    TSH 2.92 10/06/2017    T4 1.36 09/09/2015    HCG Negative 12/13/2015    HCGS Negative 12/08/2010  "      Preop Vitals  BP Readings from Last 3 Encounters:   05/29/19 (!) 134/91   05/13/19 130/86   03/01/19 99/69    Pulse Readings from Last 3 Encounters:   05/13/19 94   03/01/19 102   11/26/18 89      Resp Readings from Last 3 Encounters:   05/29/19 15   03/01/19 20   10/18/18 14    SpO2 Readings from Last 3 Encounters:   05/29/19 96%   05/13/19 98%   03/01/19 93%      Temp Readings from Last 1 Encounters:   05/29/19 97.4  F (36.3  C) (Temporal)    Ht Readings from Last 1 Encounters:   05/29/19 1.575 m (5' 2.01\")      Wt Readings from Last 1 Encounters:   05/29/19 91.6 kg (202 lb)    Estimated body mass index is 36.94 kg/m  as calculated from the following:    Height as of this encounter: 1.575 m (5' 2.01\").    Weight as of this encounter: 91.6 kg (202 lb).       Anesthesia Plan      History & Physical Review  History and physical reviewed and following examination; no interval change.    ASA Status:  2 .    NPO Status:  > 8 hours    Plan for General and ETT with Intravenous induction. Maintenance will be Balanced.    PONV prophylaxis:  Ondansetron (or other 5HT-3) and Dexamethasone or Solumedrol       Postoperative Care  Postoperative pain management:  IV analgesics and Oral pain medications.      Consents  Anesthetic plan, risks, benefits and alternatives discussed with:  Patient.  Use of blood products discussed: Yes.   Consented to blood products.  .                 Kan Arellano MD                    .  "

## 2019-05-29 NOTE — ANESTHESIA CARE TRANSFER NOTE
Patient: Kezia Nava    Procedure(s):  single site total LAPAROSCOPIC HYSTERECTOMY, left oophorectomy and lysis of adhesions    Diagnosis: pelvic pain, ovarian cyst  Diagnosis Additional Information: No value filed.    Anesthesia Type:   General, ETT     Note:  Airway :Face Mask and Nasopharyngeal Airway  Patient transferred to:PACU  Comments: Spont Resps. Follows commands. Extubated. Maintains Resps. Tx to pacu. Report to RNHandoff Report: Identifed the Patient, Identified the Reponsible Provider, Reviewed the pertinent medical history, Discussed the surgical course, Reviewed Intra-OP anesthesia mangement and issues during anesthesia, Set expectations for post-procedure period and Allowed opportunity for questions and acknowledgement of understanding      Vitals: (Last set prior to Anesthesia Care Transfer)    CRNA VITALS  5/29/2019 1115 - 5/29/2019 1150      5/29/2019             Pulse:  104    SpO2:  97 %    Resp Rate (observed):  2  (Abnormal)                 Electronically Signed By: SALLIE Bullock CRNA  May 29, 2019  11:50 AM

## 2019-05-29 NOTE — OR NURSING
Instructed pt to use spirometer as oxygen saturation readings in 80's.  Pt has a cough, instructed to take deep breaths and cough as well.

## 2019-05-29 NOTE — BRIEF OP NOTE
[facility] Brief Operative Note    Pre-operative diagnosis: pelvic pain, ovarian cyst   Post-operative diagnosis Uterine fibroid, left ovarian cyst, previous right salpingo-oophorectomy, dense pelvic adhesions     Procedure: Procedure(s):  single site total LAPAROSCOPIC HYSTERECTOMY, left oophorectomy and lysis of adhesions   Surgeon(s): Surgeon(s) and Role:     * Pauline Horowitz MD - Primary     * Chele Horowitz MD - Assisting   Estimated blood loss: 20 mL    Specimens: ID Type Source Tests Collected by Time Destination   A : Uterus, cervix, left ovary Tissue Uterus, Cervix, Left Fallopian Tube SURGICAL PATHOLOGY EXAM Pauline Horowitz MD 5/29/2019 11:21 AM       Findings: Uterine fibroid, surgically absent right tube and ovary, left ovarian cyst; left fallopian tube and ovary encased in dense pelvic side wall adhesions

## 2019-05-29 NOTE — DISCHARGE INSTRUCTIONS
GENERAL ANESTHESIA OR SEDATION ADULT DISCHARGE INSTRUCTIONS   SPECIAL PRECAUTIONS FOR 24 HOURS AFTER SURGERY    IT IS NOT UNUSUAL TO FEEL LIGHT-HEADED OR FAINT, UP TO 24 HOURS AFTER SURGERY OR WHILE TAKING PAIN MEDICATION.  IF YOU HAVE THESE SYMPTOMS; SIT FOR A FEW MINUTES BEFORE STANDING AND HAVE SOMEONE ASSIST YOU WHEN YOU GET UP TO WALK OR USE THE BATHROOM.    YOU SHOULD REST AND RELAX FOR THE NEXT 24 HOURS AND YOU MUST MAKE ARRANGEMENTS TO HAVE SOMEONE STAY WITH YOU FOR AT LEAST 24 HOURS AFTER YOUR DISCHARGE.  AVOID HAZARDOUS AND STRENUOUS ACTIVITIES.  DO NOT MAKE IMPORTANT DECISIONS FOR 24 HOURS.    DO NOT DRIVE ANY VEHICLE OR OPERATE MECHANICAL EQUIPMENT FOR 24 HOURS FOLLOWING THE END OF YOUR SURGERY.  EVEN THOUGH YOU MAY FEEL NORMAL, YOUR REACTIONS MAY BE AFFECTED BY THE MEDICATION YOU HAVE RECEIVED.    DO NOT DRINK ALCOHOLIC BEVERAGES FOR 24 HOURS FOLLOWING YOUR SURGERY.    DRINK CLEAR LIQUIDS (APPLE JUICE, GINGER ALE, 7-UP, BROTH, ETC.).  PROGRESS TO YOUR REGULAR DIET AS YOU FEEL ABLE.    YOU MAY HAVE A DRY MOUTH, A SORE THROAT, MUSCLES ACHES OR TROUBLE SLEEPING.  THESE SHOULD GO AWAY AFTER 24 HOURS.    CALL YOUR DOCTOR FOR ANY OF THE FOLLOWING:  SIGNS OF INFECTION (FEVER, GROWING TENDERNESS AT THE SURGERY SITE, A LARGE AMOUNT OF DRAINAGE OR BLEEDING, SEVERE PAIN, FOUL-SMELLING DRAINAGE, REDNESS OR SWELLING.    IT HAS BEEN OVER 8 TO 10 HOURS SINCE SURGERY AND YOU ARE STILL NOT ABLE TO URINATE (PASS WATER).   LAPAROSCOPIC HYSTERECTOMY DISCHARGE INSTRUCTIONS    PLEASE RETURN TO THE CLINIC IN:  ____2 WEEKS  ____4 WEEKS  ____6 WEEKS  MAKE THIS APPOINTMENT AFTER YOU GET HOME IF IT HAS NOT ALREADY BEEN SCHEDULED.     YOU HAVE HAD A HYSTERECTOMY (SURGERY TO REMOVE YOUR UTERUS).  YOU CANNOT HAVE CHILDREN AFTER THIS SURGERY.  YOU WILL NO LONGER HAVE MONTHLY PERIODS, UNLESS YOU STILL HAVE YOUR CERVIX (CALLED SUBTOTAL HYSTERECTOMY).  IN THIS CASE, YOU MAY HAVE LIGHT MONTHLY BLEEDING.    DIET  YOU MAY FEEL LESS HUNGRY  AT FIRST.  TRY TO EAT A WELL-BALANCED DIET WITH LOTS OF PROTEINS, FRUITS, VEGETABLES AND WHOLE GRAINS.  AVOID SPICY AND GREASY FOODS.    DRINK PLENTY OF FLUIDS--AT LEAST 8 TALL GLASSES A DAY.  WATER IS BEST.  TRY TO LIMIT CAFFEINE (FOUND IN COFFEE, TEA AND COLA DRINKS).  THIS HELPS PREVENT CONSTIPATION (HARD STOOLS THAT ARE DIFFICULT TO PASS).    IF CONSTIPATION IS A PROBLEM, YOU MAY TAKE ONE OF THESE MEDICINES:  DOCUSATE (COLACE), DOCUSATE WITH CASANTHRANOL (CALOS-COLACE), PSYLLIUM (METAMUCIL) OR MILK OF MAGNESIA.  YOU CAN BUY THESE AT THE DRUG STORE.  FOLLOW THE INSTRUCTIONS LISTED ON THE LABEL.    ACTIVITY  YOU WILL NEED PLENTY OF REST AT FIRST.  SLOWLY GO BACK TO YOUR NORMAL ACTIVITIES.  IT WILL HELP TO TAKE SEVERAL SHORT WALKS DURING THE DAY.  IT IS OKAY TO CLIMB STAIRS, BUT USE THE HANDRAIL IN CASE YOU GET DIZZY.    DO NOT DRIVE WHILE USING STRONG PAIN MEDICINE (NARCOTICS).  AFTER THAT, DO NOT DRIVE UNTIL YOU CAN STOMP ON THE BRAKES WITHOUT PAIN.    DO NOT USE TAMPONS, DOUCHE OR HAVE SEX (INTERCOURSE) UNTIL YOU SEE YOUR DOCTOR AGAIN.    DON'T LIFT OR PUSH ANYTHING OVER 20 POUNDS UNTIL YOUR DOCTOR SAYS IT'S SAFE.  AVOID HEAVY OR STRENUOUS EXERCISE.    YOUR DOCTOR WILL TELL YOU WHEN YOU CAN SAFELY RETURN TO WORK.    PAIN CONTROL  YOU MAY HAVE PAIN AROUND YOUR INCISIONS (SURGERY WOUNDS).  THIS SHOULD IMPROVE OVER THE NEXT WEEK.  USE YOUR PAIN MEDICINE AS DIRECTED.  IF YOU HAVE INCREASED PAIN, PLEASE CALL YOUR CLINIC.  IT IS NORMAL TO FEEL A LITTLE SORE AFTER MILD EXERCISE.      FOR THE NEXT TWO DAYS, YOU MAY HAVE SOME PAIN IN YOUR SHOULDERS AND UPPER CHEST.  THIS IS FROM THE AIR PUMPED INTO YOUR BODY DURING THE SURGERY.  TO RELIEVE THIS TYPE OF PAIN, YOU MAY TAKE TYLENOL (ACETAMINOPHEN) OR ADVIL (IBUPROFEN).  FOLLOW THE INSTRUCTIONS LISTED ON THE LABEL.  DRINK A FULL GLASS OF WATER WITH EACH DOSE.  YOU CAN ALSO TRY LYING FLAT OR RAISING YOUR HIPS ABOVE SHOULDER LEVEL.    BATHING  YOU MAY SHOWER OR BATHE AT ANY  TIME.  IF YOU TAKE A BATH, DON'T ADD ANYTHING TO THE BATH WATER.    CARING FOR YOUR STITCHES  YOUR BODY WILL ABSORB YOUR STITCHES OVER TIME.  KEEP THEM CLEAN AND DRY.  WEAR LOOSE, COMFORTABLE CLOTHES.    NORMAL SYMPTOMS  YOU MAY NOTICE A SMALL AMOUNT OF BLEEDING FROM YOUR VAGINA.  THIS COULD LAST UP TO A WEEK.  YOU MAY ALSO HAVE BROWN FLUID LEAKING FROM THE VAGINA.  THIS COULD LAST FOR A FEW WEEKS.  WEAR PADS AS NEEDED.    YOU MAY HAVE BRUISING ON YOUR BELLY.  YOU MIGHT ALSO HAVE A PUFFY FEELING IN YOUR BELLY.    YOU MAY SEE A LITTLE BLOOD OR FLUID OOZING FROM THE INCISION.    HORMONES  IF WE REMOVED YOUR OVARIES, YOU MAY NEED HORMONE MEDICINE (HT, OR HORMONE THERAPY).  DISCUSS THIS WITH YOUR DOCTOR.  IF WE DID NOT REMOVE YOUR OVARIES, YOU SHOULD REACH MENOPAUSE AT THE NORMAL TIME (IN GENERAL, BETWEEN AGES 40 AND 55).    CALL YOUR DOCTOR IF YOU HAVE:  SEVERE CHILLS AND FEVER .4 DEGREES FAHRENHEIT OR HIGHER, TAKEN UNDER THE TONGUE.   BRIGHT RED BLOOD OR LARGE CLOTS COMING OUT OF THE VAGINA--ENOUGH TO SOAK ONE PAD IN AN HOUR.  INCREASED PAIN, WARMTH, SWELLING, REDNESS, BLEEDING OR FLUID LEAKING FROM THE SURGERY SITE.  URINE OR VAGINAL FLUID THAT SMELLS BAD.  YOU CANNOT URINATE (USE THE TOILET), IT BURNS WHEN YOU URINATE OR YOU NEED TO GO MORE OFTEN.  SEVERE NAUSEA (FEELING SICK TO YOUR STOMACH) OR VOMITING (THROWING UP).  INCREASED PAIN THAT YOU CANNOT CONTROL WITH MEDICINE.      You received Toradol, an IV form of ibuprofen (Motrin) at 1130.  Do not take any ibuprofen products until 5:30 pm.    Maximum acetaminophen (Tylenol) dose from all sources should not exceed 4 grams (4000 mg) per day. You had tylenol 975 mg at 2 pm.    You had oxycodone 5 mg at 1:30 pm            DR. ESTELLE CHEN M.D.    CLINIC PHONE NUMBER:  [phone].

## 2019-05-29 NOTE — OR NURSING
Patient unable to void.  Bladder scan shows 50 ml.  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

## 2019-05-30 LAB — COPATH REPORT: NORMAL

## 2019-05-30 NOTE — OP NOTE
Procedure Date: 05/29/2019      PREOPERATIVE DIAGNOSIS:  Pelvic pain.      POSTOPERATIVE DIAGNOSIS:  Pelvic pain, dense adhesions.      PROCEDURE:  Single site laparoscopy, adhesiolysis, total laparoscopic hysterectomy, left oophorectomy.      SURGEON:  Pauline Horowitz MD      ASSISTANT:  Chele Horowitz MD      ANESTHESIA:  General.      ESTIMATED BLOOD LOSS:  20 mL.      FINDINGS:  There were adhesions of the omentum covering nearly the entire anterior abdominal wall.  The small and large bowel were encased in adhesions surrounding the uterine fundus and the bilateral pelvic sidewalls.  The left ovary and fallopian tubes were encased in dense adhesions to the pelvic sidewall.      HISTORY OF PRESENTING ILLNESS:   The patient is a 43-year-old who presents with ongoing pelvic pain.  Her past surgical history is significant for previous appendectomy followed by a right oophorectomy at which time adhesions were so dense that laparoscopy was converted to laparotomy.  She has continued pelvic pain.  An ultrasound demonstrates a 4 cm simple cyst on the left ovary.  She was counseled on her options and the patient elects now to proceed with hysterectomy with left salpingo-oophorectomy.  Risks, benefits and alternatives to surgery were discussed.  She was counseled on the possibility of laparotomy.  The risks of infection, bleeding, and injury to the intestines, bladder, ureters and other surrounding organs.      PROCEDURE:  After obtaining informed consent, the patient was taken to the operating room where she was prepped and draped in the usual sterile fashion and placed in the dorsal lithotomy position.  Exam under anesthesia revealed a mobile uterus.  A Patrick catheter was placed in the bladder.  A sterile speculum was placed in the vagina.  The medium VCare was then advanced into the uterine fundus and the balloon was inflated.  The VCare cup was then cinched down around the cervix.  Attention was turned to the patient's  abdomen where a 15 mm smile incision was made with a scalpel.  Cautery was used to dissect down to fascia.  The fascia was tagged bilaterally with 0 Vicryl sutures then incised at the midline with Rehman scissors.  The peritoneum was entered bluntly.  The Olympus TriPort Plus was then placed and deployed and the abdomen was insufflated with carbon dioxide gas.  The patient was placed in Trendelenburg.  The laparoscope was inserted.  Immediately evident there were omental adhesions to the anterior abdominal wall.  There were small openings that we were able to pass the camera through but this did obscure visualization.  In the pelvis, there was a large phlegmon of large and small bowel covering the uterine fundus extending to the pelvic sidewalls.  The left ovary was not visualized at this time.  The uterus was completely obscured at this time.  Using a combination of sharp and blunt dissection, the planes of adhesions were identified and dissected with careful attention to the bowel margins.  Using gentle traction, we were able to create a plane anteriorly and this identified the anterior surface of the uterus and the bladder.  Extending these planes of dissection, we were able to dissect the bowel off the uterine fundus, and following the planes of dissection, we were able to open up the posterior cul-de-sac.  At this time, the adhesions were now primarily in the pelvic sidewalls.  Using careful dissection, the right pelvic sidewall was opened up, the ovary and tube were absent on this side.  Now on the left side, there were adhesions of colon to the round ligament, posterior broad ligament, and pelvic sidewall.  These were taken down.  At this time, the left fallopian tube and ovary were visualized deep in the pelvic sidewall and completely encased in adhesions.  The round ligaments were now identifiable.  In total, the adhesiolysis took 90 minutes.  A 5 mm incision was made in the right lower quadrant, and a 5 mm  port was inserted under direct visualization. This port was used to place the camera through to avoid the omental adhesions obscuring the umbilical port.  The left round ligament was cauterized and transected with the Thunderbeat.  The left fallopian tube was then cauterized and transected with the Thunderbeat.  Sequential bites were taken down the broad ligament.  The anterior vesicouterine peritoneum was taken down with Thunderbeat device.  The right round ligament was then cauterized and transected and sequential bites were taken down the broad ligament.  The vesicouterine peritoneum was then taken down again to create the bladder flap.  The bladder was dissected off the cervix.  At this time, the broad ligaments were taken down with the Thunderbeat device to the level of the uterine vessels.  The uterine vessels were cauterized bilaterally and transected.  Sequential bites were taken on the cardinal ligament.  The VCare cup ridge was then palpated.  Colpotomy incision was made using the Thunderbeat device overlying the anterior VCare ring.  The colpotomy incision was continued in a circumferential fashion around the VCare cup ridge.  At this time, the uterus was withdrawn through the vagina.  A laparotomy sponge was placed in a sterile glove which was placed in the vagina to help reestablish the pneumoperitoneum.  Attention was turned to the left ovary and fallopian tube, which were completely encased in adhesions and densely adherent to the left pelvic sidewall.  The ovary was elevated.  The cyst was ruptured at this time with serous fluid.  Using careful blunt dissection, the ovary was peeled from the pelvic sidewall and the Thunderbeat was used to identify the infundibulopelvic ligament, to cauterize and transect it.  The left fallopian tube was completely encased in adhesions and a dense phlegmon to the left  colon and pelvic sidewall and it was decided at this time that we would not remove this in order to  prevent further dissection of the bowel or risk injury to the ureter.  At this time, the right urovaginal angle was closed with a 0 Vicryl EndoStitch and extracorporeal knots.  This was repeated for the left vaginal cuff angle.  The vaginal cuff angles were elevated.  The remainder of the vaginal cuff was closed in an interrupted fashion using 0 Vicryl EndoStitches, A total of 6 interrupted stitches were placed across the vaginal cuff.  The pelvis was copiously irrigated and all surgical sites were inspected and noted to be hemostatic.  Nela was placed overlying the pelvic surgical sites.  At this time, the surgical sites were inspected and noted to be hemostatic.  The ports were removed and the gas was released.  The fascia was closed with 0 Vicryl in a continuous fashion.  The skin was closed with 4-0 Monocryl in a running subcuticular fashion.  The patient tolerated the procedure without difficulty and she was taken to the recovery room in stable condition.  Sponge, lap and needle counts were correct.         ESTELLE CHEN MD             D: 2019   T: 2019   MT: TERESA      Name:     JOSE FENG   MRN:      [id number]        Account:        [id number]   :      1975           Procedure Date: 2019      Document: [id number]

## 2019-05-31 ENCOUNTER — OFFICE VISIT (OUTPATIENT)
Dept: FAMILY MEDICINE | Facility: CLINIC | Age: 44
End: 2019-05-31
Payer: COMMERCIAL

## 2019-05-31 VITALS
SYSTOLIC BLOOD PRESSURE: 134 MMHG | TEMPERATURE: 98.8 F | DIASTOLIC BLOOD PRESSURE: 86 MMHG | HEART RATE: 83 BPM | WEIGHT: 206.2 LBS | HEIGHT: 62 IN | OXYGEN SATURATION: 97 % | BODY MASS INDEX: 37.94 KG/M2

## 2019-05-31 DIAGNOSIS — I10 BENIGN ESSENTIAL HYPERTENSION: ICD-10-CM

## 2019-05-31 DIAGNOSIS — E89.40 SURGICAL MENOPAUSE: Primary | ICD-10-CM

## 2019-05-31 DIAGNOSIS — E66.01 SEVERE OBESITY (BMI 35.0-35.9 WITH COMORBIDITY) (H): ICD-10-CM

## 2019-05-31 DIAGNOSIS — G47.33 OSA (OBSTRUCTIVE SLEEP APNEA): ICD-10-CM

## 2019-05-31 DIAGNOSIS — E78.5 HYPERLIPIDEMIA LDL GOAL <100: ICD-10-CM

## 2019-05-31 DIAGNOSIS — E11.65 TYPE 2 DIABETES MELLITUS WITH HYPERGLYCEMIA, WITHOUT LONG-TERM CURRENT USE OF INSULIN (H): ICD-10-CM

## 2019-05-31 PROCEDURE — 99214 OFFICE O/P EST MOD 30 MIN: CPT | Performed by: INTERNAL MEDICINE

## 2019-05-31 ASSESSMENT — MIFFLIN-ST. JEOR: SCORE: 1538.7

## 2019-05-31 NOTE — PROGRESS NOTES
Subjective     Kezia Nava is a 43 year old female who presents to clinic today for the following health issues:    HPI     Hypertension and -Diabetes Follow-up      Do you check your blood pressure regularly outside of the clinic? Yes     Are you following a low salt diet? Yes    Are your blood pressures ever more than 140 on the top number (systolic) OR more   than 90 on the bottom number (diastolic), for example 140/90? No      Patient is not checking her blood sugars and follows low carbohydrate diet  She is 3 days postop hysterectomy and feels pretty good except for pain and she is taking oxycodone for that  She is constipated      Patient Active Problem List   Diagnosis     Pain in joint, ankle and foot     Cervical radiculopathy     Benign essential hypertension     ANA (obstructive sleep apnea)     Acute bilateral low back pain with right-sided sciatica     Paresthesias- ? TIA vs complex migraine (preferred per neuro)     Severe obesity (BMI 35.0-35.9 with comorbidity) (H)     Pre-diabetes     Fibroid uterus     Past Surgical History:   Procedure Laterality Date     ARTHROSCOPY ANKLE, OPEN REPAIR TENDON, COMBINED  11/8/2012    Procedure: COMBINED ARTHROSCOPY ANKLE, OPEN REPAIR TENDON;  Ankle Arthroscopy Left Ankle, Open Ligament Repair Left Ankle, Peroneal Tendon Repair Left Ankle ;  Surgeon: Otf Ramos DPM;  Location: [facility]     C APPENDECTOMY  1984     COMBINED CYSTOSCOPY, INSERT STENT URETER(S)  8/6/2013    Procedure: COMBINED CYSTOSCOPY, INSERT STENT URETER(S);  cystoscopy, right retrograde,RIGHT STENT PLACEMENT.;  Surgeon: Kan Porter MD;  Location:  OR     CYSTOSCOPY, RETROGRADES, INSERT STENT URETER(S), COMBINED  8/6/2013    Procedure: COMBINED CYSTOSCOPY, RETROGRADES, INSERT STENT URETER(S);  cystoscopy, right retrograde, insert stent right ureter;  Surgeon: Kan Porter MD;  Location:  OR     DENTAL SURGERY      TOOTH#7 - DENTAL IMPLANT     DISCECTOMY, FUSION  CERVICAL ANTERIOR ONE LEVEL, COMBINED N/A 12/13/2015    Procedure: COMBINED DISCECTOMY, FUSION CERVICAL ANTERIOR ONE LEVEL;  Surgeon: Seven Umaña MD;  Location: [facility]     EXTRACORPOREAL SHOCK WAVE LITHOTRIPSY (ESWL)  8/19/2013    Procedure: EXTRACORPOREAL SHOCK WAVE LITHOTRIPSY (ESWL);   RIGHT EXTRACORPOREAL SHOCK WAVE LITHOTRIPSY;  Surgeon: Otf Tobias MD;  Location: [facility]     HC REDUCTION OF LARGE BREAST Bilateral 2003     HYSTERECTOMY, PAP NO LONGER INDICATED  2019     LAPAROSCOPIC HYSTERECTOMY TOTAL, SALPINGECTOMY Left 5/29/2019    Procedure: single site total LAPAROSCOPIC HYSTERECTOMY, left oophorectomy and lysis of adhesions;  Surgeon: Pauline Horowitz MD;  Location: [facility]     LASIK BILATERAL       SALPINGO OOPHORECTOMY,R/L/DERREK Right 05/17/2006    Right       Social History     Tobacco Use     Smoking status: Never Smoker     Smokeless tobacco: Never Used   Substance Use Topics     Alcohol use: Yes     Alcohol/week: 0.0 oz     Comment: 0-1 drinks per week     Family History   Problem Relation Age of Onset     C.A.D. Mother         stents     Hypertension Mother      Kidney Disease Mother         transplant     Hypertension Father      Breast Cancer Maternal Aunt         may have been fibrocystic     Breast Cancer Maternal Aunt         may have been fibrocystic     Cerebrovascular Disease Paternal Grandfather      Cancer Paternal Grandmother         stomach     Breast Cancer Sister 43     Nephrolithiasis Brother      Uterine Cancer Maternal Grandmother 44     Colon Cancer Maternal Grandmother 64     Prostate Cancer Maternal Uncle 50     Breast Cancer Cousin 41        maternal first cousin, triple negative breast cancer     Breast Cancer Paternal Aunt          Current Outpatient Medications   Medication Sig Dispense Refill     Acetaminophen (TYLENOL PO) Take 500-1,000 mg by mouth every 6 hours as needed As needed for pain        aspirin (ASPIRIN ADULT LOW DOSE) 81 MG EC tablet Take 1  tablet (81 mg) by mouth daily 30 tablet 3     blood glucose monitoring (ACCU-CHEK ALMA PLUS) test strip Use to test blood sugar 1 times daily or as directed. 100 strip 1     blood glucose monitoring (ACCU-CHEK FASTCLIX) lancets Use to test blood sugar 1 times daily or as directed. 3 each 3     cetirizine (ZYRTEC) 10 MG tablet Take 10 mg by mouth daily       cinnamon 500 MG CAPS Take by mouth daily       Ibuprofen (IBU PO) Take 600 mg by mouth every 6 hours as needed        insulin pen needle (BD VALENTINA U/F) 32G X 4 MM Use once daily as directed. 100 each PRN     Krill Oil 1000 MG CAPS Take by mouth daily       losartan-hydrochlorothiazide (HYZAAR) 50-12.5 MG per tablet Take 1 tablet by mouth daily 90 tablet 3     metFORMIN (GLUCOPHAGE-XR) 500 MG 24 hr tablet Take 1 tablet (500 mg) by mouth daily (with dinner) (Patient taking differently: Take 500 mg by mouth daily (with breakfast) ) 90 tablet 3     multivitamin, therapeutic with minerals (MULTI-VITAMIN) TABS tablet Take 1 tablet by mouth daily       ondansetron (ZOFRAN ODT) 4 MG ODT tab Take 1-2 tablets (4-8 mg) by mouth every 8 hours as needed for nausea 12 tablet 1     order for DME AIRSENSE 10  10-15 CM/H20  NASAL AIRFIT N10 FOR HER       oxyCODONE (ROXICODONE) 5 MG tablet Take 1-2 tablets (5-10 mg) by mouth every 4 hours as needed for moderate to severe pain 16 tablet 0     STATIN NOT PRESCRIBED, INTENTIONAL, Please choose reason not prescribed, below       tamoxifen (NOLVADEX) 20 MG tablet Take 1 tablet (20 mg) by mouth daily 90 tablet 3     VICTOZA PEN 18 MG/3ML soln INJECT 1.2 MG UNDER THE SKIN ONCE DAILY 9 mL 0     VITAMIN D, CHOLECALCIFEROL, PO Take 10,000 Units by mouth daily        zolpidem (AMBIEN CR) 6.25 MG CR tablet Take 1 tab by mouth at bedtime as needed. 90 tablet 0       Reviewed and updated as needed this visit by Provider  Tobacco  Allergies  Meds  Problems  Med Hx  Surg Hx  Fam Hx         Review of Systems   10 point ROS of systems  "including Constitutional, Eyes, Respiratory, Cardiovascular, Gastroenterology, Genitourinary, Integumentary, Muscularskeletal, Psychiatric were all negative except for pertinent positives noted in my HPI.      Objective    /86 (BP Location: Left arm, Patient Position: Sitting, Cuff Size: Adult Regular)   Pulse 83   Temp 98.8  F (37.1  C) (Oral)   Ht 1.575 m (5' 2.01\")   Wt 93.5 kg (206 lb 3.2 oz)   LMP 06/20/2018 (Exact Date)   SpO2 97%   BMI 37.70 kg/m    Body mass index is 37.7 kg/m .  Physical Exam   GENERAL: healthy, alert and no distress  NECK: no adenopathy, no asymmetry, masses, or scars and thyroid normal to palpation  RESP: lungs clear to auscultation - no rales, rhonchi or wheezes  CV: regular rate and rhythm, normal S1 S2, no S3 or S4, no murmur, click or rub, no peripheral edema and peripheral pulses strong  ABDOMEN: soft, nontender, no hepatosplenomegaly, no masses and bowel sounds normal  The laparoscopic wounds look healthy  MS: no gross musculoskeletal defects noted, no edema    Admission on 05/29/2019, Discharged on 05/29/2019   Component Date Value Ref Range Status     HCG Quantitative Serum 05/29/2019 <1  0 - 5 IU/L Final     Hemoglobin 05/29/2019 11.8  11.7 - 15.7 g/dL Final     ABO 05/29/2019 A   Final     RH(D) 05/29/2019 Pos   Final     Antibody Screen 05/29/2019 Neg   Final     Test Valid Only At 05/29/2019 [facility]      Final     Specimen Expires 05/29/2019 06/01/2019   Final     Glucose 05/29/2019 101* 70 - 99 mg/dL Final     Copath Report 05/29/2019    Final                    Value:Patient Name: JOSE FENG  MR#: [id number]  Specimen #: [id number]  Collected: 5/29/2019  Received: 5/29/2019  Reported: 5/30/2019 13:36  Ordering Phy(s): ESTELLE CHEN    For improved result formatting, select 'View Enhanced Report Format' under   Linked Documents section.    SPECIMEN(S):  Uterus, cervix, left ovary    FINAL DIAGNOSIS:  Uterus, resection:  - Cervix with tubal " "metaplasia and chronic cervicitis.  Negative for   dysplasia.  - Disordered proliferative endometrium.  - Adenomyosis, focal.  - Leiomyomas.  - Serosa with fibrous adhesions.  - Negative for endometrial polyp, hyperplasia and malignancy.    Ovary, left, resection:  - Hemorrhagic follicular cyst, consistent with hemorrhagic corpus luteum   cyst.  - No evidence of malignancy.    Electronically signed out by:    Michael Wolf M.D.    CLINICAL HISTORY:  Uterine fibroid, left ovarian cyst, previous right salpingo-oophorectomy,   dense pelvic adhesions    GROSS:  A single specimen container with formalin is re                          ceived labeled with the   patient's name, date of birth, and  medical record number. Information on the requisition slip, container, and   associated labels is confirmed.    The specimen is designated \"uterus, cervix, left ovary\" consisting of a   159 g 11 x 6 x 4.5 cm uterus and  cervix.  The serosal surface is purple-red and glistening.  A subserosal   nodule is identified on the fundus  measuring 3 x 2 x 2 cm.  The cervix is covered by a tan-pink dull   ectocervix with slitlike external os,  measuring 1.1 cm in diameter.  The uterus is bivalved to reveal a grossly   unremarkable endocervical canal.  The endometrial cavity measures 5 cm in length by 3.5 cm cornu to cornu.    It is covered by a purple-red lush  endometrium measuring up to 0.3 cm in thickness.  Sectioning reveals two   intramural white whorled nodules.  The first is in the anterior aspect of the uterus and measures 1 x 0.4 x   0.4 cm.  It is sectioned to reveal a  white whorled surface.  The subserosal nodule is sectio                          elmo to reveal white   dense tissue .  The myometrium is  tan-pink and unremarkable, measuring up to 2.9 cm in thickness.,  The left   ovary weighs 8 g and measures 4 x  2.5 x 1.5 cm.  It is sectioned to reveal a 2.5 x 1.5 x 0.7 cm   smooth-walled serous filled cyst.  In addition " a  hemorrhagic corpus luteum is identified.  The remaining ovarian parenchyma   is grossly unremarkable.  Representative sections are submitted in nine cassettes.    Summary of Sections:  A1 - anterior cervix  A2 - anterior endomyometrium with white whorled nodule  A3 - anterior endomyometrium, full thickness  A4 - posterior cervix  A5 - posterior endomyometrium with white whorled nodule  A6 - posterior endomyometrium, full thickness  A7-A8 - subserosal white whirled nodule  A9 - left ovary to include cyst and hemorrhagic corpus luteum (Dictated   by: Chrissy Brown 5/29/2019 12:24 PM)    MICROSCOPIC:  Microscopic examination was performed.    The technical component of this testing was completed at the [facility], with the professional component performed   at the [facility], [address], [place]  [address]   ([phone])    CPT Codes:  A: 54104-EF1    COLLECTION SITE:  Client: [facility]  Location: [facility] (R)         Glucose 05/29/2019 132* 70 - 99 mg/dL Final             Assessment & Plan     Kezia was seen today for diabetes and recheck.    Diagnoses and all orders for this visit:    Surgical menopause  -     **CBC with platelets FUTURE anytime; Future  Patient may need vaginal estrogens  Systemic estrogens are relatively contraindicated because of family history of breast cancer  She is in fact on tamoxifen for breast cancer prevention  Type 2 diabetes mellitus with hyperglycemia, without long-term current use of insulin (H)  -     **A1C FUTURE anytime; Future  She is well controlled and will repeat A1c  She is on Victoza severe obesity (BMI 35.0-35.9 with comorbidity) (H) on Victoza  She has gained some weight from surgery and inability to exercise but plans to resume exercise program  Benign essential hypertension  Under excellent control ANA (obstructive sleep  "apnea)  Weight loss will help in this regard to hyperlipidemia LDL goal <100  -     Lipid panel reflex to direct LDL Fasting; Future    She is slightly anemic perioperatively and will take prenatal vitamins and repeat CBC     BMI:   Estimated body mass index is 37.7 kg/m  as calculated from the following:    Height as of this encounter: 1.575 m (5' 2.01\").    Weight as of this encounter: 93.5 kg (206 lb 3.2 oz).   Weight management plan: Discussed healthy diet and exercise guidelines          Return in about 2 months (around 7/31/2019) for Fasting Labs.    Shamir Angeles MD  [facility]    "

## 2019-06-04 LAB — INTERPRETATION ECG - MUSE: NORMAL

## 2019-06-19 NOTE — PROGRESS NOTES
Oncology Risk Management Consultation:  Date on this visit: 6/20/2019    Kezia Nava  returns to the clinic today for a six month follow up.  She requires heightened screening and surveillance to reduce her risk  of cancer secondary to having a family history of breast cancer in her sister at age 43, and maternal first cousin with triple negative breast cancer at age 41.  She also has a paternal aunt with a history of breast cancer.      She is considered to at high risk for breast cancer and has a 23% lifetime risk and a 2.1% 5-year risk for breast cancer by the KAROLINA model.     She has been taking full dose tamoxifen for breast cancer prevention and to address her ovarian cysts since November 2018.    Primary Physician: Shamir Angeles MD    History Of Present Illness:  Ms. Nava is a very pleasant  43 year old female who presents with family history of breast cancer.     Pertinent history:  Menarche at age 12.  Nulliparous.  Postmenopausal at 42.  5/29/2019 - Surgical menopause due to THSO for leiomyoma, history of ovarian cysts and pain. Pathology negative for endometrial polyp, hyperplasia and malignancy.  No  history of OCPs.  LMP: February 2018 (see above)  HRT: 1-2 small doses of progesterone   S/P breast reduction surgery in 2003.  Heterogeneously dense breasts  No history of breast biopsy.  No history of atypia, or malignancy.  No history of tobacco use.  No extra environmental exposures.  Taking full dose tamoxifen since November 2018 as chemoprevention for breast cancer.    Genetic testing:  None to date.  Kezia was planning to be tested with a Cancer Next panel through [facility] but decided not to proceed with the test due to cost.    Pertinent screening history:  3/28/2005: Screening mammogram, category 1, normal.  7/23/2008, Screening mammogram, category 1, normal  3/2/2015:  right diagnostic mammogram and right breast ultrasound for palpable lump in right breast, both BI-RADS 2 tiny  cysts.  2/13/2017: Screening mammogram, BI-RADS 2.  6/18/2018: Screening Tomosynthesis mammogram, BI-RADS 1.  12/7/2018 - Breast MRI, BiRads1.    At this visit, she denies new fatigue,  asymmetry, lumps, masses, thickening, pain, nipple discharge and skin changes in her breasts.    Past Medical/Surgical History:  Past Medical History:   Diagnosis Date     Diffuse cystic mastopathy      Elevated BP 12/15/2016     Hypertension      Mesenteric adenitis 4/14     Migraines      Nephrolithiasis     s/p lithotripsy     Sleep apnea     no cpap     Thyroiditis, acute 12/2/2014    transient hypothyroidism- resolved     Type 2 diabetes mellitus with hyperglycemia, without long-term current use of insulin (H) 2/6/2017     Past Surgical History:   Procedure Laterality Date     ARTHROSCOPY ANKLE, OPEN REPAIR TENDON, COMBINED  11/8/2012    Procedure: COMBINED ARTHROSCOPY ANKLE, OPEN REPAIR TENDON;  Ankle Arthroscopy Left Ankle, Open Ligament Repair Left Ankle, Peroneal Tendon Repair Left Ankle ;  Surgeon: Otf Ramos DPM;  Location: [facility]     C APPENDECTOMY  1984     COMBINED CYSTOSCOPY, INSERT STENT URETER(S)  8/6/2013    Procedure: COMBINED CYSTOSCOPY, INSERT STENT URETER(S);  cystoscopy, right retrograde,RIGHT STENT PLACEMENT.;  Surgeon: Kan Porter MD;  Location:  OR     CYSTOSCOPY, RETROGRADES, INSERT STENT URETER(S), COMBINED  8/6/2013    Procedure: COMBINED CYSTOSCOPY, RETROGRADES, INSERT STENT URETER(S);  cystoscopy, right retrograde, insert stent right ureter;  Surgeon: Kan Porter MD;  Location:  OR     DENTAL SURGERY      TOOTH#7 - DENTAL IMPLANT     DISCECTOMY, FUSION CERVICAL ANTERIOR ONE LEVEL, COMBINED N/A 12/13/2015    Procedure: COMBINED DISCECTOMY, FUSION CERVICAL ANTERIOR ONE LEVEL;  Surgeon: Seven Umaña MD;  Location:  OR     EXTRACORPOREAL SHOCK WAVE LITHOTRIPSY (ESWL)  8/19/2013    Procedure: EXTRACORPOREAL SHOCK WAVE LITHOTRIPSY (ESWL);   RIGHT  EXTRACORPOREAL SHOCK WAVE LITHOTRIPSY;  Surgeon: Otf Tobias MD;  Location: [facility] OR     HC REDUCTION OF LARGE BREAST Bilateral 2003     HYSTERECTOMY, PAP NO LONGER INDICATED  2019     LAPAROSCOPIC HYSTERECTOMY TOTAL, SALPINGECTOMY Left 5/29/2019    Procedure: single site total LAPAROSCOPIC HYSTERECTOMY, left oophorectomy and lysis of adhesions;  Surgeon: Pauline Horowitz MD;  Location: [facility] OR     LASIK BILATERAL       SALPINGO OOPHORECTOMY,R/L/DERREK Right 05/17/2006    Right       Allergies:  Allergies as of 06/20/2019 - Reviewed 06/20/2019   Allergen Reaction Noted     Lisinopril Cough 08/01/2017     Pneumovax [pneumococcal polysaccharides] Other (See Comments) 07/13/2018       Current Medications:  Current Outpatient Medications   Medication Sig Dispense Refill     Acetaminophen (TYLENOL PO) Take 500-1,000 mg by mouth every 6 hours as needed As needed for pain        aspirin (ASPIRIN ADULT LOW DOSE) 81 MG EC tablet Take 1 tablet (81 mg) by mouth daily 30 tablet 3     blood glucose monitoring (ACCU-CHEK ALMA PLUS) test strip Use to test blood sugar 1 times daily or as directed. 100 strip 1     blood glucose monitoring (ACCU-CHEK FASTCLIX) lancets Use to test blood sugar 1 times daily or as directed. 3 each 3     cetirizine (ZYRTEC) 10 MG tablet Take 10 mg by mouth daily       cinnamon 500 MG CAPS Take by mouth daily       Ibuprofen (IBU PO) Take 600 mg by mouth every 6 hours as needed        insulin pen needle (BD VALENTINA U/F) 32G X 4 MM Use once daily as directed. 100 each PRN     Krill Oil 1000 MG CAPS Take by mouth daily       losartan-hydrochlorothiazide (HYZAAR) 50-12.5 MG per tablet Take 1 tablet by mouth daily 90 tablet 3     metFORMIN (GLUCOPHAGE-XR) 500 MG 24 hr tablet Take 1 tablet (500 mg) by mouth daily (with dinner) (Patient taking differently: Take 500 mg by mouth daily (with breakfast) ) 90 tablet 3     ondansetron (ZOFRAN ODT) 4 MG ODT tab Take 1-2 tablets (4-8 mg) by mouth every 8 hours as needed  for nausea 12 tablet 1     order for DME AIRSENSE 10  10-15 CM/H20  NASAL AIRFIT N10 FOR HER       PRENATAL VIT-DOCUSATE-IRON-FA PO        STATIN NOT PRESCRIBED, INTENTIONAL, Please choose reason not prescribed, below       tamoxifen (NOLVADEX) 20 MG tablet Take 1 tablet (20 mg) by mouth daily 90 tablet 3     VICTOZA PEN 18 MG/3ML soln INJECT 1.2 MG UNDER THE SKIN ONCE DAILY 9 mL 0     VITAMIN D, CHOLECALCIFEROL, PO Take 10,000 Units by mouth daily        zolpidem (AMBIEN CR) 6.25 MG CR tablet Take 1 tab by mouth at bedtime as needed. 90 tablet 0     multivitamin, therapeutic with minerals (MULTI-VITAMIN) TABS tablet Take 1 tablet by mouth daily       oxyCODONE (ROXICODONE) 5 MG tablet Take 1-2 tablets (5-10 mg) by mouth every 4 hours as needed for moderate to severe pain (Patient not taking: Reported on 6/20/2019) 16 tablet 0        Family History:  Family History   Problem Relation Age of Onset     C.A.D. Mother         stents     Hypertension Mother      Kidney Disease Mother         transplant     Hypertension Father      Breast Cancer Maternal Aunt         may have been fibrocystic     Breast Cancer Maternal Aunt         may have been fibrocystic     Cerebrovascular Disease Paternal Grandfather      Cancer Paternal Grandmother         stomach     Breast Cancer Sister 43     Nephrolithiasis Brother      Uterine Cancer Maternal Grandmother 44     Colon Cancer Maternal Grandmother 64     Prostate Cancer Maternal Uncle 50     Breast Cancer Cousin 41        maternal first cousin, triple negative breast cancer     Breast Cancer Paternal Aunt        Social History:  Social History     Socioeconomic History     Marital status: Single     Spouse name: Not on file     Number of children: 0     Years of education: Not on file     Highest education level: Not on file   Occupational History     Occupation: RN- ER     Employer: [employer]   Social Needs     Financial resource strain: Not on  "file     Food insecurity:     Worry: Not on file     Inability: Not on file     Transportation needs:     Medical: Not on file     Non-medical: Not on file   Tobacco Use     Smoking status: Never Smoker     Smokeless tobacco: Never Used   Substance and Sexual Activity     Alcohol use: Yes     Alcohol/week: 0.0 oz     Comment: 0-1 drinks per week     Drug use: No     Sexual activity: Not Currently     Partners: Male   Lifestyle     Physical activity:     Days per week: Not on file     Minutes per session: Not on file     Stress: Not on file   Relationships     Social connections:     Talks on phone: Not on file     Gets together: Not on file     Attends Taoism service: Not on file     Active member of club or organization: Not on file     Attends meetings of clubs or organizations: Not on file     Relationship status: Not on file     Intimate partner violence:     Fear of current or ex partner: Not on file     Emotionally abused: Not on file     Physically abused: Not on file     Forced sexual activity: Not on file   Other Topics Concern     Parent/sibling w/ CABG, MI or angioplasty before 65F 55M? Yes   Social History Narrative     Not on file       Physical Exam:  /74 (BP Location: Left arm, Patient Position: Sitting, Cuff Size: Adult Large)   Pulse 98   Temp 99.3  F (37.4  C) (Oral)   Resp 18   Ht 1.575 m (5' 2.02\")   Wt 90.2 kg (198 lb 12.8 oz)   LMP 06/20/2018 (Exact Date)   SpO2 97%   BMI 36.34 kg/m      GENERAL APPEARANCE:  fatigued, no distress     NECK: no adenopathy, no asymmetry or masses     LYMPHATICS: anterior cervical: enlarged lymph nodes in the anterior cervical area.     RESP: rhonchi R upper anterior and R upper posterior lobe     CARDIOVASCULAR: regular rate and rhythm, normal S1 S2, no S3 or S4 and no murmur.   BREAST: A multi positional, bilateral breast exam was performed.  -Rsl>L. Right breast: no palpable dominant masses, no nipple discharge, no skin changes. Dense tissue.  " "Right axilla: no palpable adenopathy. Left breast: no palpable dominant masses, no nipple discharge, no skin changes. Left axilla: no palpable adenopathy. Dense tissue.    SKIN: no suspicious lesions or rashes on anterior torso, upper extremities or face.    Laboratory/Imaging Studies  Results for orders placed or performed during the hospital encounter of 05/29/19   HCG quantitative pregnancy   Result Value Ref Range    HCG Quantitative Serum <1 0 - 5 IU/L   Hemoglobin   Result Value Ref Range    Hemoglobin 11.8 11.7 - 15.7 g/dL   Glucose by meter   Result Value Ref Range    Glucose 101 (H) 70 - 99 mg/dL   Surgical pathology exam   Result Value Ref Range    Copath Report       Patient Name: JOSE FENG  MR#: [id number]  Specimen #: [id number]  Collected: 5/29/2019  Received: 5/29/2019  Reported: 5/30/2019 13:36  Ordering Phy(s): ESTELLE CHEN    For improved result formatting, select 'View Enhanced Report Format' under   Linked Documents section.    SPECIMEN(S):  Uterus, cervix, left ovary    FINAL DIAGNOSIS:  Uterus, resection:  - Cervix with tubal metaplasia and chronic cervicitis.  Negative for   dysplasia.  - Disordered proliferative endometrium.  - Adenomyosis, focal.  - Leiomyomas.  - Serosa with fibrous adhesions.  - Negative for endometrial polyp, hyperplasia and malignancy.    Ovary, left, resection:  - Hemorrhagic follicular cyst, consistent with hemorrhagic corpus luteum   cyst.  - No evidence of malignancy.    Electronically signed out by:    Michael Wolf M.D.    CLINICAL HISTORY:  Uterine fibroid, left ovarian cyst, previous right salpingo-oophorectomy,   dense pelvic adhesions    GROSS:  A single specimen container with formalin is re ceived labeled with the   patient's name, date of birth, and  medical record number. Information on the requisition slip, container, and   associated labels is confirmed.    The specimen is designated \"uterus, cervix, left ovary\" consisting of a   159 g 11 x 6 " x 4.5 cm uterus and  cervix.  The serosal surface is purple-red and glistening.  A subserosal   nodule is identified on the fundus  measuring 3 x 2 x 2 cm.  The cervix is covered by a tan-pink dull   ectocervix with slitlike external os,  measuring 1.1 cm in diameter.  The uterus is bivalved to reveal a grossly   unremarkable endocervical canal.  The endometrial cavity measures 5 cm in length by 3.5 cm cornu to cornu.    It is covered by a purple-red lush  endometrium measuring up to 0.3 cm in thickness.  Sectioning reveals two   intramural white whorled nodules.  The first is in the anterior aspect of the uterus and measures 1 x 0.4 x   0.4 cm.  It is sectioned to reveal a  white whorled surface.  The subserosal nodule is sectio elmo to reveal white   dense tissue .  The myometrium is  tan-pink and unremarkable, measuring up to 2.9 cm in thickness.,  The left   ovary weighs 8 g and measures 4 x  2.5 x 1.5 cm.  It is sectioned to reveal a 2.5 x 1.5 x 0.7 cm   smooth-walled serous filled cyst.  In addition a  hemorrhagic corpus luteum is identified.  The remaining ovarian parenchyma   is grossly unremarkable.  Representative sections are submitted in nine cassettes.    Summary of Sections:  A1 - anterior cervix  A2 - anterior endomyometrium with white whorled nodule  A3 - anterior endomyometrium, full thickness  A4 - posterior cervix  A5 - posterior endomyometrium with white whorled nodule  A6 - posterior endomyometrium, full thickness  A7-A8 - subserosal white whirled nodule  A9 - left ovary to include cyst and hemorrhagic corpus luteum (Dictated   by: Chrissy Brown 5/29/2019 12:24 PM)    MICROSCOPIC:  Microscopic examination was performed.    The technical component of this testing was completed at the [facility], with the professional component performed   at the [facility]  Laboratory, [address], [place]  [address]    ([phone])    CPT Codes:  A: 79826-ML6    COLLECTION SITE:  Client: [facility]  Location: [facility] (R)       Glucose by meter   Result Value Ref Range    Glucose 132 (H) 70 - 99 mg/dL   EKG 12-lead, tracing only   Result Value Ref Range    Interpretation ECG Click View Image link to view waveform and result    ABO/Rh type and screen   Result Value Ref Range    ABO A     RH(D) Pos     Antibody Screen Neg     Test Valid Only At [facility]        Specimen Expires 06/01/2019        ASSESSMENT  Hazel is recovering from her hysterectomy and states she is doing well, not taking pain medication. She was certified to become a  on Friday and last night had her first experience as a foster mother with three children, who will be staying with her for awhile.     We discussed whether she is considering having genetic testing, but she states that the out of pocket costs are too high at $300-400. We reviewed her interval history and her plan and her prevention practices, encouraging weight loss and exercise once she is cleared to do so.  We discussed chemoprevention with tamoxifen, which may have a protective effect for breast cancer up to 10 years and that her recent oophorectomy may have reduced her risk somewhat. She reports no issues with tamoxifen and seems to be doing well, with no side effects. She should continue taking this for 5 years, until November 2023.    She is having her mammogram after this visit today. Her exam today is unremarkable and she will proceed with the following screening plan.       INDIVIDUALIZED CANCER RISK MANAGEMENT PLAN:  Individualized Surveillance Plan for women  With 20% or greater lifetime risk of breast cancer   Per NCCN Breast Cancer Screening and Diagnosis Guidelines Version 2.2018    Recommended screening Test or procedure Last done Next Scheduled    Clinical encounter Ongoing risk assessment, risk reduction counseling and clinical breast exam, every  6-12 months. Refer to genetic counseling if not already done.   6/20/2019 June 2020   However, some family histories with breast cancers at a very young age, may warrant screening starting earlier.    *May begin at age 40 if breast cancers in the family occur at later ages.    Annual mammogram beginning 10 years younger than the earliest breast cancer in the family but not prior to age 30.    Recommend annual breast MRI to begin 10 years younger than the earliest breast cancer in the family but not prior to age 25.    Breast MRIs are preferably done on day 7-15 of the menstrual cycle in premenopausal women. 6/18/2018: Screening Tomosynthesis mammogram, BI-RADS 1.    12/7/2018 - Breast MRI, BiRads1. Mammogram today after appt.    Breast MRI in December 2019 (12/8 at the earliest)    Next exam: June 2020, followed by tomosynthesis mammogram   Breast screening for patients at high risk due to thoracic radiation before 30 years old    Begin 10 years post radiation treatment or age 25.   Annual mammogram beginning 10 years after radiation but not prior to age 30    Annual breast MRI, preferably on day 7-15 of menstrual cycle for premenopausal women.       NA     NA   Women who have a lifetime risk of >20% based on history of LCIS or ADH/ALH Annual screening mammogram beginning at age of LCIS or ADH/ALH but not prior to age 30.    Consider annual MRI to begin at age of diagnosis of LCIS or ADH/ALH but not prior to age 25.    Consider risk reducing strategies.     NA     NA    Recommend risk reducing strategies for women with 1.7% 5 year risk of breast cancer. 2.1% 5 year risk. Taking full dose tamoxifen      I spent 28 minutes with the patient with greater that 50% of it in counseling and coordinating care as documented above.    Sheyla Hernandez, APRN-CNS, OCN, AGN-BC  Clinical Nurse Specialist  Cancer Risk Management Program  [address]  [place] [address]    phone:  [phone]  Pager: [phone]  fax: [phone]    CC: Shamir Angeles MD

## 2019-06-20 ENCOUNTER — HOSPITAL ENCOUNTER (OUTPATIENT)
Dept: MAMMOGRAPHY | Facility: CLINIC | Age: 44
Discharge: HOME | End: 2019-06-20
Attending: CLINICAL NURSE SPECIALIST | Admitting: CLINICAL NURSE SPECIALIST
Payer: COMMERCIAL

## 2019-06-20 ENCOUNTER — ONCOLOGY VISIT (OUTPATIENT)
Dept: ONCOLOGY | Facility: CLINIC | Age: 44
End: 2019-06-20
Attending: CLINICAL NURSE SPECIALIST
Payer: COMMERCIAL

## 2019-06-20 VITALS
OXYGEN SATURATION: 97 % | TEMPERATURE: 99.3 F | WEIGHT: 198.8 LBS | HEIGHT: 62 IN | SYSTOLIC BLOOD PRESSURE: 110 MMHG | DIASTOLIC BLOOD PRESSURE: 74 MMHG | HEART RATE: 98 BPM | BODY MASS INDEX: 36.58 KG/M2 | RESPIRATION RATE: 18 BRPM

## 2019-06-20 DIAGNOSIS — Z12.39 BREAST CANCER SCREENING, HIGH RISK PATIENT: Primary | ICD-10-CM

## 2019-06-20 DIAGNOSIS — Z12.31 ENCOUNTER FOR SCREENING MAMMOGRAM FOR HIGH-RISK PATIENT: ICD-10-CM

## 2019-06-20 DIAGNOSIS — R92.30 DENSE BREASTS: ICD-10-CM

## 2019-06-20 DIAGNOSIS — Z12.39 BREAST CANCER SCREENING, HIGH RISK PATIENT: ICD-10-CM

## 2019-06-20 PROCEDURE — 99214 OFFICE O/P EST MOD 30 MIN: CPT | Performed by: CLINICAL NURSE SPECIALIST

## 2019-06-20 PROCEDURE — 77063 BREAST TOMOSYNTHESIS BI: CPT

## 2019-06-20 PROCEDURE — G0463 HOSPITAL OUTPT CLINIC VISIT: HCPCS

## 2019-06-20 ASSESSMENT — PAIN SCALES - GENERAL: PAINLEVEL: SEVERE PAIN (7)

## 2019-06-20 ASSESSMENT — MIFFLIN-ST. JEOR: SCORE: 1505.32

## 2019-06-20 NOTE — PROGRESS NOTES
"Oncology Rooming Note    June 20, 2019 10:11 AM   Kezia Nava is a 43 year old female who presents for:    Chief Complaint   Patient presents with     Oncology Clinic Visit     Fibroid uterus     Initial Vitals: /74 (BP Location: Left arm, Patient Position: Sitting, Cuff Size: Adult Large)   Pulse 98   Temp 99.3  F (37.4  C) (Oral)   Resp 18   Ht 1.575 m (5' 2.02\")   Wt 90.2 kg (198 lb 12.8 oz)   LMP 06/20/2018 (Exact Date)   SpO2 97%   BMI 36.34 kg/m   Estimated body mass index is 36.34 kg/m  as calculated from the following:    Height as of this encounter: 1.575 m (5' 2.02\").    Weight as of this encounter: 90.2 kg (198 lb 12.8 oz). Body surface area is 1.99 meters squared.  Severe Pain (7) Comment: Data Unavailable   Patient's last menstrual period was 06/20/2018 (exact date).  Allergies reviewed: Yes  Medications reviewed: Yes    Medications: Medication refills not needed today.  Pharmacy name entered into [facility]:    [facility] - [place], [place] - [address]  [facility] - [place], [place] - [address]  [facility] [place], [place] - [address]    Clinical concerns: no        Connie Irene MA              "

## 2019-06-20 NOTE — PATIENT INSTRUCTIONS
Individualized Surveillance Plan for women  With 20% or greater lifetime risk of breast cancer   Per NCCN Breast Cancer Screening and Diagnosis Guidelines Version 2.2018    Recommended screening Test or procedure Last done Next Scheduled    Clinical encounter Ongoing risk assessment, risk reduction counseling and clinical breast exam, every 6-12 months. Refer to genetic counseling if not already done.   6/20/2019 June 2020   However, some family histories with breast cancers at a very young age, may warrant screening starting earlier.    *May begin at age 40 if breast cancers in the family occur at later ages.    Annual mammogram beginning 10 years younger than the earliest breast cancer in the family but not prior to age 30.    Recommend annual breast MRI to begin 10 years younger than the earliest breast cancer in the family but not prior to age 25.    Breast MRIs are preferably done on day 7-15 of the menstrual cycle in premenopausal women. 6/18/2018: Screening Tomosynthesis mammogram, BI-RADS 1.    12/7/2018 - Breast MRI, BiRads1. Mammogram today after appt.    Breast MRI in December 2019 (12/8 at the earliest)    Next exam: June 2020, followed by tomosynthesis mammogram   Breast screening for patients at high risk due to thoracic radiation before 30 years old    Begin 10 years post radiation treatment or age 25.   Annual mammogram beginning 10 years after radiation but not prior to age 30    Annual breast MRI, preferably on day 7-15 of menstrual cycle for premenopausal women.       NA     NA   Women who have a lifetime risk of >20% based on history of LCIS or ADH/ALH Annual screening mammogram beginning at age of LCIS or ADH/ALH but not prior to age 30.    Consider annual MRI to begin at age of diagnosis of LCIS or ADH/ALH but not prior to age 25.    Consider risk reducing strategies.     NA     NA    Recommend risk reducing strategies for women with 1.7% 5 year risk of breast cancer. 2.1% 5 year risk. Taking  full dose tamoxifen

## 2019-06-20 NOTE — LETTER
Cancer Risk Management  Program Locations    [facility]  [facility]  [facility]  [facility]  Mailing Address  Cancer Risk Management Program  [facility]  [address]  [place] [address]    New patient appointments  [phone]  June 20, 2019    Kezia Nava  [address] [place] [address]      Dear Hazel    It was a pleasure meeting with you today.  Below is a copy of my note from our visit, outlining your surveillance plan.      I look forward to seeing you in the future to coordinate your care and reduce your health risks. Please feel free to contact me if you have any questions or concerns.      Oncology Risk Management Consultation:  Date on this visit: 6/20/2019    Kezia Nava  returns to the clinic today for a six month follow up.  She requires heightened screening and surveillance to reduce her risk  of cancer secondary to having a family history of breast cancer in her sister at age 43, and maternal first cousin with triple negative breast cancer at age 41.  She also has a paternal aunt with a history of breast cancer.      She is considered to at high risk for breast cancer and has a 23% lifetime risk and a 2.1% 5-year risk for breast cancer by the KAROLINA model.      She has been taking full dose tamoxifen for breast cancer prevention and to address her ovarian cysts since November 2018.    Primary Physician: Shamir Angeles MD    History Of Present Illness:  Ms. Nava is a very pleasant  43 year old female who presents with family history of breast cancer.     Pertinent history:  Menarche at age 12.  Nulliparous.  Postmenopausal at 42.  5/29/2019 - Surgical menopause due to THSO for leiomyoma, history of ovarian cysts and pain. Pathology negative for endometrial polyp, hyperplasia and malignancy.  No  history of  OCPs.  LMP:  February 2018 (see above)  HRT: 1-2 small doses of progesterone   S/P breast reduction surgery in 2003.  Heterogeneously dense breasts  No history of breast biopsy.  No history of atypia, or malignancy.  No history of tobacco use.  No extra environmental exposures.  Taking full dose tamoxifen since November 2018 as chemoprevention for breast cancer.    Genetic testing:  None to date.  Kezia was planning to be tested with a Cancer Next panel through [facility] but decided not to proceed with the test due to cost.    Pertinent screening history:  3/28/2005: Screening mammogram, category 1, normal.  7/23/2008, Screening mammogram, category 1, normal  3/2/2015:  right diagnostic mammogram and right breast ultrasound for palpable lump in right breast, both BI-RADS 2 tiny cysts.  2/13/2017: Screening mammogram, BI-RADS 2.  6/18/2018: Screening Tomosynthesis mammogram, BI-RADS 1.  12/7/2018 - Breast MRI, BiRads1.    At this visit, she denies new fatigue,  asymmetry, lumps, masses, thickening, pain, nipple discharge and skin changes in her breasts.    Past Medical/Surgical History:  Past Medical History:   Diagnosis Date     Diffuse cystic mastopathy      Elevated BP 12/15/2016     Hypertension      Mesenteric adenitis 4/14     Migraines      Nephrolithiasis     s/p lithotripsy     Sleep apnea     no cpap     Thyroiditis, acute 12/2/2014    transient hypothyroidism- resolved     Type 2 diabetes mellitus with hyperglycemia, without long-term current use of insulin (H) 2/6/2017     Past Surgical History:   Procedure Laterality Date     ARTHROSCOPY ANKLE, OPEN REPAIR TENDON, COMBINED  11/8/2012    Procedure: COMBINED ARTHROSCOPY ANKLE, OPEN REPAIR TENDON;  Ankle Arthroscopy Left Ankle, Open Ligament Repair Left Ankle, Peroneal Tendon Repair Left Ankle ;  Surgeon: Otf Ramos DPM;  Location: [facility] OR     C APPENDECTOMY  1984     COMBINED CYSTOSCOPY, INSERT STENT URETER(S)  8/6/2013    Procedure: COMBINED  CYSTOSCOPY, INSERT STENT URETER(S);  cystoscopy, right retrograde,RIGHT STENT PLACEMENT.;  Surgeon: Kan Porter MD;  Location:  OR     CYSTOSCOPY, RETROGRADES, INSERT STENT URETER(S), COMBINED  8/6/2013    Procedure: COMBINED CYSTOSCOPY, RETROGRADES, INSERT STENT URETER(S);  cystoscopy, right retrograde, insert stent right ureter;  Surgeon: Kan Porter MD;  Location:  OR     DENTAL SURGERY      TOOTH#7 - DENTAL IMPLANT     DISCECTOMY, FUSION CERVICAL ANTERIOR ONE LEVEL, COMBINED N/A 12/13/2015    Procedure: COMBINED DISCECTOMY, FUSION CERVICAL ANTERIOR ONE LEVEL;  Surgeon: Seven Umaña MD;  Location:  OR     EXTRACORPOREAL SHOCK WAVE LITHOTRIPSY (ESWL)  8/19/2013    Procedure: EXTRACORPOREAL SHOCK WAVE LITHOTRIPSY (ESWL);   RIGHT EXTRACORPOREAL SHOCK WAVE LITHOTRIPSY;  Surgeon: Otf Tobias MD;  Location:  OR      REDUCTION OF LARGE BREAST Bilateral 2003     HYSTERECTOMY, PAP NO LONGER INDICATED  2019     LAPAROSCOPIC HYSTERECTOMY TOTAL, SALPINGECTOMY Left 5/29/2019    Procedure: single site total LAPAROSCOPIC HYSTERECTOMY, left oophorectomy and lysis of adhesions;  Surgeon: Pauline Horowitz MD;  Location: [facility] OR     LASIK BILATERAL       SALPINGO OOPHORECTOMY,R/L/DERREK Right 05/17/2006    Right       Allergies:  Allergies as of 06/20/2019 - Reviewed 06/20/2019   Allergen Reaction Noted     Lisinopril Cough 08/01/2017     Pneumovax [pneumococcal polysaccharides] Other (See Comments) 07/13/2018       Current Medications:  Current Outpatient Medications   Medication Sig Dispense Refill     Acetaminophen (TYLENOL PO) Take 500-1,000 mg by mouth every 6 hours as needed As needed for pain        aspirin (ASPIRIN ADULT LOW DOSE) 81 MG EC tablet Take 1 tablet (81 mg) by mouth daily 30 tablet 3     blood glucose monitoring (ACCU-CHEK ALMA PLUS) test strip Use to test blood sugar 1 times daily or as directed. 100 strip 1     blood glucose monitoring (ACCU-CHEK FASTCLIX)  lancets Use to test blood sugar 1 times daily or as directed. 3 each 3     cetirizine (ZYRTEC) 10 MG tablet Take 10 mg by mouth daily       cinnamon 500 MG CAPS Take by mouth daily       Ibuprofen (IBU PO) Take 600 mg by mouth every 6 hours as needed        insulin pen needle (BD VALENTINA U/F) 32G X 4 MM Use once daily as directed. 100 each PRN     Krill Oil 1000 MG CAPS Take by mouth daily       losartan-hydrochlorothiazide (HYZAAR) 50-12.5 MG per tablet Take 1 tablet by mouth daily 90 tablet 3     metFORMIN (GLUCOPHAGE-XR) 500 MG 24 hr tablet Take 1 tablet (500 mg) by mouth daily (with dinner) (Patient taking differently: Take 500 mg by mouth daily (with breakfast) ) 90 tablet 3     ondansetron (ZOFRAN ODT) 4 MG ODT tab Take 1-2 tablets (4-8 mg) by mouth every 8 hours as needed for nausea 12 tablet 1     order for DME AIRSENSE 10  10-15 CM/H20  NASAL AIRFIT N10 FOR HER       PRENATAL VIT-DOCUSATE-IRON-FA PO        STATIN NOT PRESCRIBED, INTENTIONAL, Please choose reason not prescribed, below       tamoxifen (NOLVADEX) 20 MG tablet Take 1 tablet (20 mg) by mouth daily 90 tablet 3     VICTOZA PEN 18 MG/3ML soln INJECT 1.2 MG UNDER THE SKIN ONCE DAILY 9 mL 0     VITAMIN D, CHOLECALCIFEROL, PO Take 10,000 Units by mouth daily        zolpidem (AMBIEN CR) 6.25 MG CR tablet Take 1 tab by mouth at bedtime as needed. 90 tablet 0     multivitamin, therapeutic with minerals (MULTI-VITAMIN) TABS tablet Take 1 tablet by mouth daily       oxyCODONE (ROXICODONE) 5 MG tablet Take 1-2 tablets (5-10 mg) by mouth every 4 hours as needed for moderate to severe pain (Patient not taking: Reported on 6/20/2019) 16 tablet 0        Family History:  Family History   Problem Relation Age of Onset     C.A.D. Mother         stents     Hypertension Mother      Kidney Disease Mother         transplant     Hypertension Father      Breast Cancer Maternal Aunt         may have been fibrocystic     Breast Cancer Maternal Aunt         may have been  fibrocystic     Cerebrovascular Disease Paternal Grandfather      Cancer Paternal Grandmother         stomach     Breast Cancer Sister 43     Nephrolithiasis Brother      Uterine Cancer Maternal Grandmother 44     Colon Cancer Maternal Grandmother 64     Prostate Cancer Maternal Uncle 50     Breast Cancer Cousin 41        maternal first cousin, triple negative breast cancer     Breast Cancer Paternal Aunt        Social History:  Social History     Socioeconomic History     Marital status: Single     Spouse name: Not on file     Number of children: 0     Years of education: Not on file     Highest education level: Not on file   Occupational History     Occupation: RN- ER     Employer: [employer] [address]   Social Needs     Financial resource strain: Not on file     Food insecurity:     Worry: Not on file     Inability: Not on file     Transportation needs:     Medical: Not on file     Non-medical: Not on file   Tobacco Use     Smoking status: Never Smoker     Smokeless tobacco: Never Used   Substance and Sexual Activity     Alcohol use: Yes     Alcohol/week: 0.0 oz     Comment: 0-1 drinks per week     Drug use: No     Sexual activity: Not Currently     Partners: Male   Lifestyle     Physical activity:     Days per week: Not on file     Minutes per session: Not on file     Stress: Not on file   Relationships     Social connections:     Talks on phone: Not on file     Gets together: Not on file     Attends [employer] service: Not on file     Active member of club or organization: Not on file     Attends meetings of clubs or organizations: Not on file     Relationship status: Not on file     Intimate partner violence:     Fear of current or ex partner: Not on file     Emotionally abused: Not on file     Physically abused: Not on file     Forced sexual activity: Not on file   Other Topics Concern     Parent/sibling w/ CABG, MI or angioplasty before 65F 55M? Yes   Social History Narrative     Not on  "file       Physical Exam:  /74 (BP Location: Left arm, Patient Position: Sitting, Cuff Size: Adult Large)   Pulse 98   Temp 99.3  F (37.4  C) (Oral)   Resp 18   Ht 1.575 m (5' 2.02\")   Wt 90.2 kg (198 lb 12.8 oz)   LMP 06/20/2018 (Exact Date)   SpO2 97%   BMI 36.34 kg/m       GENERAL APPEARANCE:  fatigued, no distress     NECK: no adenopathy, no asymmetry or masses     LYMPHATICS: anterior cervical: enlarged lymph nodes in the anterior cervical area.     RESP: rhonchi R upper anterior and R upper posterior lobe     CARDIOVASCULAR: regular rate and rhythm, normal S1 S2, no S3 or S4 and no murmur.   BREAST: A multi positional, bilateral breast exam was performed.  -Rsl>L. Right breast: no palpable dominant masses, no nipple discharge, no skin changes. Dense tissue.  Right axilla: no palpable adenopathy. Left breast: no palpable dominant masses, no nipple discharge, no skin changes. Left axilla: no palpable adenopathy. Dense tissue.    SKIN: no suspicious lesions or rashes on anterior torso, upper extremities or face.    Laboratory/Imaging Studies  Results for orders placed or performed during the hospital encounter of 05/29/19   HCG quantitative pregnancy   Result Value Ref Range    HCG Quantitative Serum <1 0 - 5 IU/L   Hemoglobin   Result Value Ref Range    Hemoglobin 11.8 11.7 - 15.7 g/dL   Glucose by meter   Result Value Ref Range    Glucose 101 (H) 70 - 99 mg/dL   Surgical pathology exam   Result Value Ref Range    Copath Report       Patient Name: JOSE FENG  MR#: [id number]  Specimen #: [id number]  Collected: 5/29/2019  Received: 5/29/2019  Reported: 5/30/2019 13:36  Ordering Phy(s): ESTELLE CHEN    For improved result formatting, select 'View Enhanced Report Format' under   Linked Documents section.    SPECIMEN(S):  Uterus, cervix, left ovary    FINAL DIAGNOSIS:  Uterus, resection:  - Cervix with tubal metaplasia and chronic cervicitis.  Negative for   dysplasia.  - Disordered proliferative " "endometrium.  - Adenomyosis, focal.  - Leiomyomas.  - Serosa with fibrous adhesions.  - Negative for endometrial polyp, hyperplasia and malignancy.    Ovary, left, resection:  - Hemorrhagic follicular cyst, consistent with hemorrhagic corpus luteum   cyst.  - No evidence of malignancy.    Electronically signed out by:    Michael Wolf M.D.    CLINICAL HISTORY:  Uterine fibroid, left ovarian cyst, previous right salpingo-oophorectomy,   dense pelvic adhesions    GROSS:  A single specimen container with formalin is re ceived labeled with the   patient's name, date of birth, and  medical record number. Information on the requisition slip, container, and   associated labels is confirmed.    The specimen is designated \"uterus, cervix, left ovary\" consisting of a   159 g 11 x 6 x 4.5 cm uterus and  cervix.  The serosal surface is purple-red and glistening.  A subserosal   nodule is identified on the fundus  measuring 3 x 2 x 2 cm.  The cervix is covered by a tan-pink dull   ectocervix with slitlike external os,  measuring 1.1 cm in diameter.  The uterus is bivalved to reveal a grossly   unremarkable endocervical canal.  The endometrial cavity measures 5 cm in length by 3.5 cm cornu to cornu.    It is covered by a purple-red lush  endometrium measuring up to 0.3 cm in thickness.  Sectioning reveals two   intramural white whorled nodules.  The first is in the anterior aspect of the uterus and measures 1 x 0.4 x   0.4 cm.  It is sectioned to reveal a  white whorled surface.  The subserosal nodule is sectio elmo to reveal white   dense tissue .  The myometrium is  tan-pink and unremarkable, measuring up to 2.9 cm in thickness.,  The left   ovary weighs 8 g and measures 4 x  2.5 x 1.5 cm.  It is sectioned to reveal a 2.5 x 1.5 x 0.7 cm   smooth-walled serous filled cyst.  In addition a  hemorrhagic corpus luteum is identified.  The remaining ovarian parenchyma   is grossly unremarkable.  Representative sections are " submitted in nine cassettes.    Summary of Sections:  A1 - anterior cervix  A2 - anterior endomyometrium with white whorled nodule  A3 - anterior endomyometrium, full thickness  A4 - posterior cervix  A5 - posterior endomyometrium with white whorled nodule  A6 - posterior endomyometrium, full thickness  A7-A8 - subserosal white whirled nodule  A9 - left ovary to include cyst and hemorrhagic corpus luteum (Dictated   by: Chrissy Brown 5/29/2019 12:24 PM)    MICROSCOPIC:  Microscopic examination was performed.    The technical component of this testing was completed at the [facility], with the professional component performed   at the [facility]  Laboratory, [address], [place]  [address]   ([phone])    CPT Codes:  A: 92614-CR3    COLLECTION SITE:  Client: [facility]  Location: [facility] (R)       Glucose by meter   Result Value Ref Range    Glucose 132 (H) 70 - 99 mg/dL   EKG 12-lead, tracing only   Result Value Ref Range    Interpretation ECG Click View Image link to view waveform and result    ABO/Rh type and screen   Result Value Ref Range    ABO A     RH(D) Pos     Antibody Screen Neg     Test Valid Only At [facility]        Specimen Expires 06/01/2019        ASSESSMENT  Hazel is recovering from her hysterectomy and states she is doing well, not taking pain medication. She was certified to become a  on Friday and last night had her first experience as a foster mother with three children, who will be staying with her for awhile.     We discussed whether she is considering having genetic testing, but she states that the out of pocket costs are too high at $300-400. We reviewed her interval history and her plan and her prevention practices, encouraging weight loss and exercise once she is cleared to do so.  We discussed chemoprevention with tamoxifen, which may have a protective effect  for breast cancer up to 10 years and that her recent oophorectomy may have reduced her risk somewhat. She reports no issues with tamoxifen and seems to be doing well, with no side effects. She should continue taking this for 5 years, until November 2023.    She is having her mammogram after this visit today. Her exam today is unremarkable and she will proceed with the following screening plan.       INDIVIDUALIZED CANCER RISK MANAGEMENT PLAN:  Individualized Surveillance Plan for women  With 20% or greater lifetime risk of breast cancer   Per NCCN Breast Cancer Screening and Diagnosis Guidelines Version 2.2018    Recommended screening Test or procedure Last done Next Scheduled    Clinical encounter Ongoing risk assessment, risk reduction counseling and clinical breast exam, every 6-12 months. Refer to genetic counseling if not already done.   6/20/2019 June 2020   However, some family histories with breast cancers at a very young age, may warrant screening starting earlier.    *May begin at age 40 if breast cancers in the family occur at later ages.    Annual mammogram beginning 10 years younger than the earliest breast cancer in the family but not prior to age 30.    Recommend annual breast MRI to begin 10 years younger than the earliest breast cancer in the family but not prior to age 25.    Breast MRIs are preferably done on day 7-15 of the menstrual cycle in premenopausal women. 6/18/2018: Screening Tomosynthesis mammogram, BI-RADS 1.    12/7/2018 - Breast MRI, BiRads1. Mammogram today after appt.    Breast MRI in December 2019 (12/8 at the earliest)    Next exam: June 2020, followed by tomosynthesis mammogram   Breast screening for patients at high risk due to thoracic radiation before 30 years old    Begin 10 years post radiation treatment or age 25.   Annual mammogram beginning 10 years after radiation but not prior to age 30    Annual breast MRI, preferably on day 7-15 of menstrual cycle for premenopausal  "women.       NA     NA   Women who have a lifetime risk of >20% based on history of LCIS or ADH/ALH Annual screening mammogram beginning at age of LCIS or ADH/ALH but not prior to age 30.    Consider annual MRI to begin at age of diagnosis of LCIS or ADH/ALH but not prior to age 25.    Consider risk reducing strategies.     NA     NA    Recommend risk reducing strategies for women with 1.7% 5 year risk of breast cancer. 2.1% 5 year risk. Taking full dose tamoxifen      I spent 28 minutes with the patient with greater that 50% of it in counseling and coordinating care as documented above.    Sheyla Hernandez, APRN-CNS, OCN, AGN-BC  Clinical Nurse Specialist  Cancer Risk Management Program  [address]  [place] [address]    phone:  [phone]  Pager: [phone]  fax: [phone]    CC: Shamir Angeles MD                            Oncology Rooming Note    June 20, 2019 10:11 AM   Kezia Nava is a 43 year old female who presents for:    Chief Complaint   Patient presents with     Oncology Clinic Visit     Fibroid uterus     Initial Vitals: /74 (BP Location: Left arm, Patient Position: Sitting, Cuff Size: Adult Large)   Pulse 98   Temp 99.3  F (37.4  C) (Oral)   Resp 18   Ht 1.575 m (5' 2.02\")   Wt 90.2 kg (198 lb 12.8 oz)   LMP 06/20/2018 (Exact Date)   SpO2 97%   BMI 36.34 kg/m    Estimated body mass index is 36.34 kg/m  as calculated from the following:    Height as of this encounter: 1.575 m (5' 2.02\").    Weight as of this encounter: 90.2 kg (198 lb 12.8 oz). Body surface area is 1.99 meters squared.  Severe Pain (7) Comment: Data Unavailable   Patient's last menstrual period was 06/20/2018 (exact date).  Allergies reviewed: Yes  Medications reviewed: Yes    Medications: Medication refills not needed today.  Pharmacy name entered into [facility]:    [facility] [place], [place] - [address]  [facility] - [place], [place] - [address]  [facility] [place] [facility] [place], [place] - [address]    Clinical concerns: no        Connie Irene MA

## 2019-07-12 DIAGNOSIS — R73.03 PRE-DIABETES: ICD-10-CM

## 2019-07-12 RX ORDER — LIRAGLUTIDE 6 MG/ML
INJECTION SUBCUTANEOUS
Qty: 6 ML | Refills: 0 | Status: SHIPPED | OUTPATIENT
Start: 2019-07-12 | End: 2019-08-15

## 2019-07-12 NOTE — TELEPHONE ENCOUNTER
VICTOZA PEN 18 MG/3ML soln      Last Written Prescription Date:  05/29/2019  Last Fill Quantity: 9 mL,  # refills: 0   Last office visit: 5/31/2019 with prescribing provider:  Bridgette   Future Office Visit:   Next 5 appointments (look out 90 days)    Jul 30, 2019  8:00 AM CDT  [facility] with [facility] [address] [facility] [address] [phone]           Requested Prescriptions   Pending Prescriptions Disp Refills     VICTOZA PEN 18 MG/3ML soln [Pharmacy Med Name: VICTOZA 18MG/3ML SOPN] 9 mL 0     Sig: INJECT 1.2 MG UNDER THE SKIN ONCE DAILY       GLP-1 Agonists Protocol Passed - 7/12/2019  7:19 AM        Passed - Blood pressure less than 140/90 in past 6 months     BP Readings from Last 3 Encounters:   06/20/19 110/74   05/31/19 134/86   05/29/19 118/84                 Passed - LDL on file in past 12 months     Recent Labs   Lab Test 09/24/18  1004   *             Passed - Microalbumin on file in past 12 months     Recent Labs   Lab Test 03/01/19  1431   MICROL 756   UMALCR 317.65*             Passed - HgbA1C in past 3 or 6 months     If HgbA1C is 8 or greater, it needs to be on file within the past 3 months.  If less than 8, must be on file within the past 6 months.     Recent Labs   Lab Test 05/13/19  0843   A1C 6.0*             Passed - Medication is active on med list        Passed - Patient is age 18 or older        Passed - No active pregnancy on record        Passed - Normal serum creatinine on file in past 12 months     Recent Labs   Lab Test 05/13/19  0843  12/07/18  1352   CR 0.72   < >  --    CREAT  --   --  0.7    < > = values in this interval not displayed.             Passed - No positive pregnancy test in past 12 months        Passed - Recent (6 mo) or future (30 days) visit within the authorizing provider's specialty     Patient had office visit in the last 6 months or has a visit in the next 30 days with authorizing provider.   "See \"Patient Info\" tab in inbasket, or \"Choose Columns\" in Meds & Orders section of the refill encounter.              "

## 2019-07-30 DIAGNOSIS — E78.5 HYPERLIPIDEMIA LDL GOAL <100: ICD-10-CM

## 2019-07-30 DIAGNOSIS — E11.65 TYPE 2 DIABETES MELLITUS WITH HYPERGLYCEMIA, WITHOUT LONG-TERM CURRENT USE OF INSULIN (H): ICD-10-CM

## 2019-07-30 DIAGNOSIS — E89.40 SURGICAL MENOPAUSE: ICD-10-CM

## 2019-07-30 LAB
CHOLEST SERPL-MCNC: 206 MG/DL
ERYTHROCYTE [DISTWIDTH] IN BLOOD BY AUTOMATED COUNT: 13.3 % (ref 10–15)
HBA1C MFR BLD: 5.9 % (ref 0–5.6)
HCT VFR BLD AUTO: 40 % (ref 35–47)
HDLC SERPL-MCNC: 49 MG/DL
HGB BLD-MCNC: 13.4 G/DL (ref 11.7–15.7)
LDLC SERPL CALC-MCNC: 79 MG/DL
LDLC SERPL DIRECT ASSAY-MCNC: 128 MG/DL
MCH RBC QN AUTO: 29.5 PG (ref 26.5–33)
MCHC RBC AUTO-ENTMCNC: 33.5 G/DL (ref 31.5–36.5)
MCV RBC AUTO: 88 FL (ref 78–100)
NONHDLC SERPL-MCNC: 157 MG/DL
PLATELET # BLD AUTO: 186 10E9/L (ref 150–450)
RBC # BLD AUTO: 4.54 10E12/L (ref 3.8–5.2)
TRIGL SERPL-MCNC: 408 MG/DL
WBC # BLD AUTO: 5.8 10E9/L (ref 4–11)

## 2019-07-30 PROCEDURE — 36415 COLL VENOUS BLD VENIPUNCTURE: CPT | Performed by: INTERNAL MEDICINE

## 2019-07-30 PROCEDURE — 85027 COMPLETE CBC AUTOMATED: CPT | Performed by: INTERNAL MEDICINE

## 2019-07-30 PROCEDURE — 80061 LIPID PANEL: CPT | Performed by: INTERNAL MEDICINE

## 2019-07-30 PROCEDURE — 83721 ASSAY OF BLOOD LIPOPROTEIN: CPT | Mod: 59 | Performed by: INTERNAL MEDICINE

## 2019-07-30 PROCEDURE — 83036 HEMOGLOBIN GLYCOSYLATED A1C: CPT | Performed by: INTERNAL MEDICINE

## 2019-08-15 DIAGNOSIS — R73.03 PRE-DIABETES: ICD-10-CM

## 2019-08-15 RX ORDER — LIRAGLUTIDE 6 MG/ML
1.2 INJECTION SUBCUTANEOUS DAILY
Qty: 6 ML | Refills: 1 | Status: SHIPPED | OUTPATIENT
Start: 2019-08-15 | End: 2019-10-28

## 2019-08-15 NOTE — TELEPHONE ENCOUNTER
Prescription approved per [facility] Refill Protocol.  Sarah Beth LÓPEZ RN

## 2019-08-15 NOTE — TELEPHONE ENCOUNTER
"     VICTOZA PEN 18 MG/3ML soln 6 mL 0 7/12/2019         Last Written Prescription Date:  07/12/2019  Last Fill Quantity: 6 ml,  # refills: 0   Last office visit: 5/31/2019 with prescribing provider:  Marilee   Future Office Visit:  Unknown    Requested Prescriptions   Pending Prescriptions Disp Refills     VICTOZA PEN 18 MG/3ML soln [Pharmacy Med Name: VICTOZA 18MG/3ML SOPN] 6 mL 0     Sig: INJECT 1.2MG UNDER THE SKIN ONCE DAILY       GLP-1 Agonists Protocol Passed - 8/15/2019  1:18 PM        Passed - Blood pressure less than 140/90 in past 6 months     BP Readings from Last 3 Encounters:   06/20/19 110/74   05/31/19 134/86   05/29/19 118/84                 Passed - LDL on file in past 12 months     Recent Labs   Lab Test 07/30/19  0806   LDL 79  128*             Passed - Microalbumin on file in past 12 months     Recent Labs   Lab Test 03/01/19  1431   MICROL 756   UMALCR 317.65*             Passed - HgbA1C in past 3 or 6 months     If HgbA1C is 8 or greater, it needs to be on file within the past 3 months.  If less than 8, must be on file within the past 6 months.     Recent Labs   Lab Test 07/30/19  0806   A1C 5.9*             Passed - Medication is active on med list        Passed - Patient is age 18 or older        Passed - No active pregnancy on record        Passed - Normal serum creatinine on file in past 12 months     Recent Labs   Lab Test 05/13/19  0843  12/07/18  1352   CR 0.72   < >  --    CREAT  --   --  0.7    < > = values in this interval not displayed.             Passed - No positive pregnancy test in past 12 months        Passed - Recent (6 mo) or future (30 days) visit within the authorizing provider's specialty     Patient had office visit in the last 6 months or has a visit in the next 30 days with authorizing provider.  See \"Patient Info\" tab in inbasket, or \"Choose Columns\" in Meds & Orders section of the refill encounter.              "

## 2019-10-19 ENCOUNTER — TRANSFERRED RECORDS (OUTPATIENT)
Dept: HEALTH INFORMATION MANAGEMENT | Facility: CLINIC | Age: 44
End: 2019-10-19

## 2019-10-28 DIAGNOSIS — R73.03 PRE-DIABETES: ICD-10-CM

## 2019-10-29 RX ORDER — LIRAGLUTIDE 6 MG/ML
1.2 INJECTION SUBCUTANEOUS DAILY
Qty: 6 ML | Refills: 1 | Status: SHIPPED | OUTPATIENT
Start: 2019-10-29 | End: 2021-02-01

## 2019-10-29 NOTE — TELEPHONE ENCOUNTER
Prescription approved per [facility] Refill Protocol.  Sarah Beth LÓPEZ RN

## 2019-10-29 NOTE — TELEPHONE ENCOUNTER
liraglutide (VICTOZA PEN) 18 MG/3ML solution 6 mL 1 8/15/2019  No   Sig - Route: Inject 1.2 mg Subcutaneous daily      Last Written Prescription Date:  08/15/2019  Last Fill Quantity: 6 ml,  # refills: 1   Last office visit: 5/31/2019 with prescribing provider:     Future Office Visit:   Next 5 appointments (look out 90 days)    Nov 18, 2019  3:30 PM CST  Office Visit with Shamir Angeles MD  [facility] [address] [facility] [address]  [phone]         Requested Prescriptions   Pending Prescriptions Disp Refills     VICTOZA PEN 18 MG/3ML soln [Pharmacy Med Name: VICTOZA 18MG/3ML SOPN] 6 mL 1     Sig: INJECT 1.2 MG UNDER THE SKIN DAILY       GLP-1 Agonists Protocol Passed - 10/28/2019 11:29 AM        Passed - Blood pressure less than 140/90 in past 6 months     BP Readings from Last 3 Encounters:   06/20/19 110/74   05/31/19 134/86   05/29/19 118/84                 Passed - LDL on file in past 12 months     Recent Labs   Lab Test 07/30/19  0806   LDL 79  128*             Passed - Microalbumin on file in past 12 months     Recent Labs   Lab Test 03/01/19  1431   MICROL 756   UMALCR 317.65*             Passed - HgbA1C in past 3 or 6 months     If HgbA1C is 8 or greater, it needs to be on file within the past 3 months.  If less than 8, must be on file within the past 6 months.     Recent Labs   Lab Test 07/30/19  0806   A1C 5.9*             Passed - Medication is active on med list        Passed - Patient is age 18 or older        Passed - No active pregnancy on record        Passed - Normal serum creatinine on file in past 12 months     Recent Labs   Lab Test 05/13/19  0843  12/07/18  1352   CR 0.72   < >  --    CREAT  --   --  0.7    < > = values in this interval not displayed.             Passed - No positive pregnancy test in past 12 months        Passed - Recent (6 mo) or future (30 days) visit within the authorizing provider's specialty     Patient had office  "visit in the last 6 months or has a visit in the next 30 days with authorizing provider.  See \"Patient Info\" tab in inbasket, or \"Choose Columns\" in Meds & Orders section of the refill encounter.              "

## 2019-11-04 ENCOUNTER — HEALTH MAINTENANCE LETTER (OUTPATIENT)
Age: 44
End: 2019-11-04

## 2019-11-18 ENCOUNTER — OFFICE VISIT (OUTPATIENT)
Dept: FAMILY MEDICINE | Facility: CLINIC | Age: 44
End: 2019-11-18
Payer: COMMERCIAL

## 2019-11-18 VITALS
WEIGHT: 205 LBS | SYSTOLIC BLOOD PRESSURE: 138 MMHG | TEMPERATURE: 98.8 F | HEART RATE: 98 BPM | OXYGEN SATURATION: 97 % | DIASTOLIC BLOOD PRESSURE: 88 MMHG | HEIGHT: 62 IN | BODY MASS INDEX: 37.73 KG/M2

## 2019-11-18 DIAGNOSIS — E11.65 TYPE 2 DIABETES MELLITUS WITH HYPERGLYCEMIA, WITHOUT LONG-TERM CURRENT USE OF INSULIN (H): ICD-10-CM

## 2019-11-18 DIAGNOSIS — Z78.0 MENOPAUSE: ICD-10-CM

## 2019-11-18 DIAGNOSIS — Z71.84 TRAVEL ADVICE ENCOUNTER: ICD-10-CM

## 2019-11-18 DIAGNOSIS — R09.81 CONGESTION OF PARANASAL SINUS: ICD-10-CM

## 2019-11-18 DIAGNOSIS — Z11.3 SCREEN FOR STD (SEXUALLY TRANSMITTED DISEASE): ICD-10-CM

## 2019-11-18 DIAGNOSIS — J22 LRTI (LOWER RESPIRATORY TRACT INFECTION): Primary | ICD-10-CM

## 2019-11-18 LAB
BASOPHILS # BLD AUTO: 0 10E9/L (ref 0–0.2)
BASOPHILS NFR BLD AUTO: 0.6 %
CRP SERPL-MCNC: <2.9 MG/L (ref 0–8)
DIFFERENTIAL METHOD BLD: NORMAL
EOSINOPHIL # BLD AUTO: 0.1 10E9/L (ref 0–0.7)
EOSINOPHIL NFR BLD AUTO: 1.9 %
ERYTHROCYTE [DISTWIDTH] IN BLOOD BY AUTOMATED COUNT: 13.5 % (ref 10–15)
HBA1C MFR BLD: 6.6 % (ref 0–5.6)
HCT VFR BLD AUTO: 38.2 % (ref 35–47)
HGB BLD-MCNC: 13 G/DL (ref 11.7–15.7)
LYMPHOCYTES # BLD AUTO: 2 10E9/L (ref 0.8–5.3)
LYMPHOCYTES NFR BLD AUTO: 28.8 %
MCH RBC QN AUTO: 29.7 PG (ref 26.5–33)
MCHC RBC AUTO-ENTMCNC: 34 G/DL (ref 31.5–36.5)
MCV RBC AUTO: 87 FL (ref 78–100)
MONOCYTES # BLD AUTO: 0.5 10E9/L (ref 0–1.3)
MONOCYTES NFR BLD AUTO: 7.8 %
NEUTROPHILS # BLD AUTO: 4.2 10E9/L (ref 1.6–8.3)
NEUTROPHILS NFR BLD AUTO: 60.9 %
PLATELET # BLD AUTO: 218 10E9/L (ref 150–450)
RBC # BLD AUTO: 4.38 10E12/L (ref 3.8–5.2)
WBC # BLD AUTO: 6.8 10E9/L (ref 4–11)

## 2019-11-18 PROCEDURE — 99214 OFFICE O/P EST MOD 30 MIN: CPT | Performed by: INTERNAL MEDICINE

## 2019-11-18 PROCEDURE — 36415 COLL VENOUS BLD VENIPUNCTURE: CPT | Performed by: INTERNAL MEDICINE

## 2019-11-18 PROCEDURE — 83036 HEMOGLOBIN GLYCOSYLATED A1C: CPT | Performed by: INTERNAL MEDICINE

## 2019-11-18 PROCEDURE — 87389 HIV-1 AG W/HIV-1&-2 AB AG IA: CPT | Performed by: INTERNAL MEDICINE

## 2019-11-18 PROCEDURE — 86140 C-REACTIVE PROTEIN: CPT | Performed by: INTERNAL MEDICINE

## 2019-11-18 PROCEDURE — 85025 COMPLETE CBC W/AUTO DIFF WBC: CPT | Performed by: INTERNAL MEDICINE

## 2019-11-18 RX ORDER — AZITHROMYCIN 250 MG/1
500 TABLET, FILM COATED ORAL DAILY
Qty: 6 TABLET | Refills: 0 | Status: SHIPPED | OUTPATIENT
Start: 2019-11-18 | End: 2020-03-13

## 2019-11-18 RX ORDER — SCOLOPAMINE TRANSDERMAL SYSTEM 1 MG/1
1 PATCH, EXTENDED RELEASE TRANSDERMAL
Qty: 4 PATCH | Refills: 0 | Status: SHIPPED | OUTPATIENT
Start: 2019-11-18 | End: 2020-03-13

## 2019-11-18 RX ORDER — DESVENLAFAXINE 25 MG/1
25 TABLET, EXTENDED RELEASE ORAL DAILY
Qty: 90 TABLET | Refills: 1 | Status: SHIPPED | OUTPATIENT
Start: 2019-11-18 | End: 2020-06-03

## 2019-11-18 ASSESSMENT — MIFFLIN-ST. JEOR: SCORE: 1533.44

## 2019-11-18 NOTE — PATIENT INSTRUCTIONS
Breo  1 spary a day       Augmentin 875 mg twice daily for 7-10 days    Report if symptoms are not improving    Pristiq 25 mg daily after few days of Augmentin

## 2019-11-18 NOTE — Clinical Note
Please abstract the following data from this visit with this patient into the appropriate field in Epic:Tests that can be patient reported without a hard copy:[facility] this date[facility] Tests found in the patient's chart through Chart Review/Care Everywhere:{Abstract Quality List (Optional):936026}Note to Abstraction: If this section is blank, no results were found via Chart Review/Care Everywhere.

## 2019-11-18 NOTE — PROGRESS NOTES
Subjective     Kezia Nava is a 43 year old female who presents to clinic today for the following health issues:    HPI   ED/UC Followup:    Facility:  [facility]  Date of visit: 10/19/2019  Reason for visit: Atypical pneumonia (Primary Dx)  Current Status: Patient state that she still coughing     Patient had  Cough with green sputum 2 days after she came back from [place]  She is also short of breath since that time and still has cough  Expectoration has improved  She wishes to check HIV  Also she is losing to [facility] in February and would like to know about the immunizations and would like to take an antibiotic with her  9 days [place] be spent on the [place]  She is otherwise feeling pretty good constitutionally except for menopausal symptoms  These were present before the lung infection    She is not checking her blood sugars and has been to eye doctor  Her previous A1c's have been good    She is status post hysterectomy and oophorectomy  She has history of TIA versus complex migraine in the past          BP Readings from Last 2 Encounters:   11/18/19 138/88   06/20/19 110/74     Hemoglobin A1C (%)   Date Value   07/30/2019 5.9 (H)   05/13/2019 6.0 (H)     LDL Cholesterol Calculated (mg/dL)   Date Value   07/30/2019 79   09/24/2018 102 (H)     LDL Cholesterol Direct (mg/dL)   Date Value   07/30/2019 128 (H)       Diabetes Management Resources      Patient Active Problem List   Diagnosis     Pain in joint, ankle and foot     Cervical radiculopathy     Benign essential hypertension     ANA (obstructive sleep apnea)     Acute bilateral low back pain with right-sided sciatica     Paresthesias- ? TIA vs complex migraine (preferred per neuro)     Severe obesity (BMI 35.0-35.9 with comorbidity) (H)     Pre-diabetes     Fibroid uterus     Past Surgical History:   Procedure Laterality Date     ARTHROSCOPY ANKLE, OPEN REPAIR TENDON, COMBINED   11/8/2012    Procedure: COMBINED ARTHROSCOPY ANKLE, OPEN REPAIR TENDON;  Ankle Arthroscopy Left Ankle, Open Ligament Repair Left Ankle, Peroneal Tendon Repair Left Ankle ;  Surgeon: Otf Ramos DPM;  Location: [facility] OR     C APPENDECTOMY  1984     COMBINED CYSTOSCOPY, INSERT STENT URETER(S)  8/6/2013    Procedure: COMBINED CYSTOSCOPY, INSERT STENT URETER(S);  cystoscopy, right retrograde,RIGHT STENT PLACEMENT.;  Surgeon: Kan Porter MD;  Location:  OR     CYSTOSCOPY, RETROGRADES, INSERT STENT URETER(S), COMBINED  8/6/2013    Procedure: COMBINED CYSTOSCOPY, RETROGRADES, INSERT STENT URETER(S);  cystoscopy, right retrograde, insert stent right ureter;  Surgeon: Kan Porter MD;  Location:  OR     DENTAL SURGERY      TOOTH#7 - DENTAL IMPLANT     DISCECTOMY, FUSION CERVICAL ANTERIOR ONE LEVEL, COMBINED N/A 12/13/2015    Procedure: COMBINED DISCECTOMY, FUSION CERVICAL ANTERIOR ONE LEVEL;  Surgeon: Seven Umaña MD;  Location:  OR     EXTRACORPOREAL SHOCK WAVE LITHOTRIPSY (ESWL)  8/19/2013    Procedure: EXTRACORPOREAL SHOCK WAVE LITHOTRIPSY (ESWL);   RIGHT EXTRACORPOREAL SHOCK WAVE LITHOTRIPSY;  Surgeon: Otf Tobias MD;  Location:  OR     HC REDUCTION OF LARGE BREAST Bilateral 2003     HYSTERECTOMY, PAP NO LONGER INDICATED  2019     LAPAROSCOPIC HYSTERECTOMY TOTAL, SALPINGECTOMY Left 5/29/2019    Procedure: single site total LAPAROSCOPIC HYSTERECTOMY, left oophorectomy and lysis of adhesions;  Surgeon: Pauline Horowitz MD;  Location: [facility] OR     LASIK BILATERAL       SALPINGO OOPHORECTOMY,R/L/DERREK Right 05/17/2006    Right       Social History     Tobacco Use     Smoking status: Never Smoker     Smokeless tobacco: Never Used   Substance Use Topics     Alcohol use: Yes     Alcohol/week: 0.0 standard drinks     Comment: 0-1 drinks per week     Family History   Problem Relation Age of Onset     C.A.D. Mother         stents     Hypertension Mother      Kidney Disease  "Mother         transplant     Hypertension Father      Breast Cancer Maternal Aunt         may have been fibrocystic     Breast Cancer Maternal Aunt         may have been fibrocystic     Cerebrovascular Disease Paternal Grandfather      Cancer Paternal Grandmother         stomach     Breast Cancer Sister 43     Nephrolithiasis Brother      Uterine Cancer Maternal Grandmother 44     Colon Cancer Maternal Grandmother 64     Prostate Cancer Maternal Uncle 50     Breast Cancer Cousin 41        maternal first cousin, triple negative breast cancer     Breast Cancer Paternal Aunt              Reviewed and updated as needed this visit by Provider  Tobacco  Allergies  Meds  Problems  Med Hx  Surg Hx  Fam Hx         Review of Systems   10 point ROS of systems including Constitutional, Eyes, Respiratory, Cardiovascular, Gastroenterology, Genitourinary, Integumentary, Muscularskeletal, Psychiatric were all negative except for pertinent positives noted in my HPI.        Objective    /88 (BP Location: Left arm, Patient Position: Sitting, Cuff Size: Adult Large)   Pulse 98   Temp 98.8  F (37.1  C) (Oral)   Ht 1.575 m (5' 2.02\")   Wt 93 kg (205 lb)   LMP 06/20/2018 (Exact Date)   SpO2 97%   BMI 37.47 kg/m    Body mass index is 37.47 kg/m .  Physical Exam   GENERAL: healthy, alert and no distress  HENT: ear canals and TM's normal, nose is congested and mouth without ulcers or lesions  NECK: no adenopathy, no asymmetry, masses, or scars and thyroid normal to palpation  RESP: lungs clear to auscultation - no rales, rhonchi or wheezes  CV: regular rate and rhythm, normal S1 S2, no S3 or S4, no murmur, click or rub, no peripheral edema and peripheral pulses strong  ABDOMEN: soft, nontender, no hepatosplenomegaly, no masses and bowel sounds normal  MS: no gross musculoskeletal defects noted, no edema            Assessment & Plan     Kezia was seen today for er f/u and diabetes.    Diagnoses and all orders for this " "visit:    LRTI (lower respiratory tract infection)  -     fluticasone-vilanterol (BREO ELLIPTA) 200-25 MCG/INH inhaler; Inhale 1 puff into the lungs daily  -     CBC with platelets differential  -     CRP inflammation  -     amoxicillin-clavulanate (AUGMENTIN) 875-125 MG tablet; Take 1 tablet by mouth 2 times daily  Emergency room records from [facility] are reviewed   Chest x-ray was negative and CBC was normal   She was given a Z-Dorene   I believe she has sinusitis with postnasal drip   If symptoms do not improve with Augmentin, she will need to see ENT     type 2 diabetes mellitus with hyperglycemia, without long-term current use of insulin (H)  -     Hemoglobin A1c  She is doing pretty well and is actually consulting bariatric service   congestion of paranasal sinus  As above     menopause  -     desvenlafaxine succinate (PRISTIQ) 25 MG 24 hr tablet; Take 1 tablet (25 mg) by mouth daily  She has complicated migraine and family history of breast cancer therefore we discussed alternate options for menopause and we will choose Pristiq   NRTI  Can cause reduced libido and weight gain and elevated blood pressure     screen for STD (sexually transmitted disease)  -     HIV Antigen Antibody Combo    Travel advice encounter  -     scopolamine (TRANSDERM) 1 MG/3DAYS 72 hr patch; Place 1 patch onto the skin every 72 hours  -     azithromycin (ZITHROMAX Z-DORENE) 250 MG tablet; Take 2 tablets (500 mg) by mouth daily  These will be given on hand and no further immunizations are needed and she is had a hepatitis A vaccine     BMI:   Estimated body mass index is 37.47 kg/m  as calculated from the following:    Height as of this encounter: 1.575 m (5' 2.02\").    Weight as of this encounter: 93 kg (205 lb).           Patient took a flu vaccine  Return in about 3 days (around 11/21/2019) for cough.    Shamir Angeles MD  [facility]      "

## 2019-11-20 LAB — HIV 1+2 AB+HIV1 P24 AG SERPL QL IA: NONREACTIVE

## 2019-11-22 ENCOUNTER — OFFICE VISIT (OUTPATIENT)
Dept: SURGERY | Facility: CLINIC | Age: 44
End: 2019-11-22
Payer: COMMERCIAL

## 2019-11-22 VITALS
OXYGEN SATURATION: 99 % | HEART RATE: 84 BPM | BODY MASS INDEX: 37.81 KG/M2 | DIASTOLIC BLOOD PRESSURE: 94 MMHG | HEIGHT: 62 IN | WEIGHT: 205.5 LBS | SYSTOLIC BLOOD PRESSURE: 131 MMHG

## 2019-11-22 VITALS — WEIGHT: 205.5 LBS | HEIGHT: 62 IN | BODY MASS INDEX: 37.81 KG/M2

## 2019-11-22 DIAGNOSIS — I10 BENIGN ESSENTIAL HYPERTENSION: Chronic | ICD-10-CM

## 2019-11-22 DIAGNOSIS — G47.33 OSA (OBSTRUCTIVE SLEEP APNEA): Chronic | ICD-10-CM

## 2019-11-22 DIAGNOSIS — E66.01 CLASS 2 SEVERE OBESITY DUE TO EXCESS CALORIES WITH SERIOUS COMORBIDITY AND BODY MASS INDEX (BMI) OF 37.0 TO 37.9 IN ADULT (H): Primary | ICD-10-CM

## 2019-11-22 DIAGNOSIS — N39.3 STRESS INCONTINENCE: ICD-10-CM

## 2019-11-22 DIAGNOSIS — E66.812 CLASS 2 SEVERE OBESITY DUE TO EXCESS CALORIES WITH SERIOUS COMORBIDITY AND BODY MASS INDEX (BMI) OF 37.0 TO 37.9 IN ADULT (H): Primary | ICD-10-CM

## 2019-11-22 DIAGNOSIS — E66.01 SEVERE OBESITY (BMI 35.0-35.9 WITH COMORBIDITY) (H): ICD-10-CM

## 2019-11-22 PROCEDURE — 99204 OFFICE O/P NEW MOD 45 MIN: CPT | Performed by: PHYSICIAN ASSISTANT

## 2019-11-22 PROCEDURE — 97802 MEDICAL NUTRITION INDIV IN: CPT | Performed by: DIETITIAN, REGISTERED

## 2019-11-22 ASSESSMENT — MIFFLIN-ST. JEOR
SCORE: 1535.39
SCORE: 1535.39

## 2019-11-22 NOTE — LETTER
Kezia Nava  November 22, 2019        Bariatric Task List      Lose 5 lbs prior to surgery.  Goal Weight: 200 lbs  Have preoperative laboratory tests drawn - Ordered CMP and Vitamin D    Psychological Evaluation with MMPI and clearance for weight loss surgery.    Achieve clearance from dietitian to see surgeon.    A total of 6 structured dietitian weight loss visits are required by your insurance.    [facility] Center clearance

## 2019-11-22 NOTE — PROGRESS NOTES
"New Bariatric Surgery Consultation Note    2019    RE: Kezia Nava  MR#: [id number]  : 1975      Referring provider: No flowsheet data found.    Chief Complaint/Reason for visit: evaluation for possible weight loss surgery    Dear Shamir Angeles MD (General),    I had the pleasure of seeing your patient, Kezia Nava, to evaluate her obesity and consider her for possible weight loss surgery. As you know, Kezia Nava is 43 year old.  She has a height of 5' 2\", a weight of 205 lbs 8 oz, and calculated Body mass index is 37.59 kg/m .        HISTORY OF PRESENT ILLNESS:  Weight Loss History Reviewed with Patient 2019   How long have you been overweight? Since early childhood   What is the most that you have ever weighed? 209   What is the most weight you have lost? 40   I have tried the following methods to lose weight Watching portions or calories, Exercise, Weight Watchers, Atkins type diet (low carb/high protein), Abigail Fly, Pre packaged meals ex: Nutrisystem, Slimfast   I have tried the following weight loss medications? (Check all that apply) Victoza/liraglutide   Have you ever had weight loss surgery? No     Patient has had DM Type II more than 5 years.  Takes oral and injectable.  Last hemoglobin A1C= 6.6 on 2019.  On tamoxifen for Breast cancer PREVENTION.  Strong family history patient has NO history of cancer.  Will be taking pristiq for hot flashes  Has a family history of HTN.  Patient is currently being treated.  Just saw her primary about a week ago.  Reveiwed her recent BP reading and normal to borderline high    Has ANA.  Has not used CPAP in 2-3 years.      CO-MORBIDITIES OF OBESITY INCLUDE:     2019   I have the following co-morbidities associated with obesity: Type II Diabetes, High Blood Pressure, High Cholesterol, Sleep Apnea, Stress Incontinence   What was your most recent hemoglobin a1c  5.9   How many years have you had diabetes? 3   Do you use " a CPAP? No       PAST MEDICAL HISTORY:  Past Medical History:   Diagnosis Date     Diffuse cystic mastopathy      Elevated BP 12/15/2016     Hypertension      Mesenteric adenitis 4/14     Migraines      Nephrolithiasis     s/p lithotripsy     Sleep apnea     no cpap     Thyroiditis, acute 12/2/2014    transient hypothyroidism- resolved     Type 2 diabetes mellitus with hyperglycemia, without long-term current use of insulin (H) 2/6/2017       PAST SURGICAL HISTORY:  Past Surgical History:   Procedure Laterality Date     ARTHROSCOPY ANKLE, OPEN REPAIR TENDON, COMBINED  11/8/2012    Procedure: COMBINED ARTHROSCOPY ANKLE, OPEN REPAIR TENDON;  Ankle Arthroscopy Left Ankle, Open Ligament Repair Left Ankle, Peroneal Tendon Repair Left Ankle ;  Surgeon: Otf Ramos DPM;  Location: [facility] OR     C APPENDECTOMY  1984     COMBINED CYSTOSCOPY, INSERT STENT URETER(S)  8/6/2013    Procedure: COMBINED CYSTOSCOPY, INSERT STENT URETER(S);  cystoscopy, right retrograde,RIGHT STENT PLACEMENT.;  Surgeon: Kan Porter MD;  Location:  OR     CYSTOSCOPY, RETROGRADES, INSERT STENT URETER(S), COMBINED  8/6/2013    Procedure: COMBINED CYSTOSCOPY, RETROGRADES, INSERT STENT URETER(S);  cystoscopy, right retrograde, insert stent right ureter;  Surgeon: Kan Porter MD;  Location:  OR     DENTAL SURGERY      TOOTH#7 - DENTAL IMPLANT     DISCECTOMY, FUSION CERVICAL ANTERIOR ONE LEVEL, COMBINED N/A 12/13/2015    Procedure: COMBINED DISCECTOMY, FUSION CERVICAL ANTERIOR ONE LEVEL;  Surgeon: Seven Umaña MD;  Location:  OR     EXTRACORPOREAL SHOCK WAVE LITHOTRIPSY (ESWL)  8/19/2013    Procedure: EXTRACORPOREAL SHOCK WAVE LITHOTRIPSY (ESWL);   RIGHT EXTRACORPOREAL SHOCK WAVE LITHOTRIPSY;  Surgeon: Otf Tobias MD;  Location:  OR     HC REDUCTION OF LARGE BREAST Bilateral 2003     HYSTERECTOMY, PAP NO LONGER INDICATED  2019     LAPAROSCOPIC HYSTERECTOMY TOTAL, SALPINGECTOMY Left 5/29/2019     Procedure: single site total LAPAROSCOPIC HYSTERECTOMY, left oophorectomy and lysis of adhesions;  Surgeon: Pauline Horowitz MD;  Location: [facility] OR     LASIK BILATERAL       SALPINGO OOPHORECTOMY,R/L/DERREK Right 05/17/2006    Right     No personal or family history of blood clots or anesthesia concerns      FAMILY HISTORY:   Family History   Problem Relation Age of Onset     C.A.D. Mother         stents     Hypertension Mother      Kidney Disease Mother         transplant     Hypertension Father      Breast Cancer Maternal Aunt         may have been fibrocystic     Breast Cancer Maternal Aunt         may have been fibrocystic     Cerebrovascular Disease Paternal Grandfather      Cancer Paternal Grandmother         stomach     Breast Cancer Sister 43     Nephrolithiasis Brother      Uterine Cancer Maternal Grandmother 44     Colon Cancer Maternal Grandmother 64     Prostate Cancer Maternal Uncle 50     Breast Cancer Cousin 41        maternal first cousin, triple negative breast cancer     Breast Cancer Paternal Aunt        SOCIAL HISTORY:   Social History Questions Reviewed With Patient 11/19/2019   Which best describes your employment status (select all that apply) I work full-time   If you work, 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 No   Who do you have in your support network that can be available to help you for the first 2 weeks after surgery? Sisters and friends   Who can you count on for support throughout your weight loss surgery journey? Friends   Can you afford 3 meals a day?  Yes   Can you afford 50-60 dollars a month for vitamins? Yes       HABITS:     11/19/2019   How often do you drink alcohol? Monthly or less   If you do drink alcohol, how many drinks might you have in a day? (one drink = 5 oz. wine, 1 can/bottle of beer, 1 shot liquor) 1 or 2   Have you ever used any of the following nicotine products? No   Have you or are you  currently using street drugs or prescription strength medication for which you do not have a prescription for? No   Do you have a history of chemical dependency (alcohol or drug abuse)? No       PSYCHOLOGICAL HISTORY:   Psychological History Reviewed With Patient 11/19/2019   Have you ever attempted suicide? Never.   Have you had thoughts of suicide in the past year? No   Have you ever been hospitalized for mental illness or a suicide attempt? Never.   Do you have a history of chronic pain? No   Have you ever been diagnosed with fibromyalgia? No   Are you currently seeing a therapist or counselor?  No   Are you currently seeing a psychiatrist? No       ROS:     11/19/2019   Skin:  None of the above   HEENT: Headaches   Musculoskeletal: Back pain   Cardiovascular: Shortness of breath with activity   Pulmonary: Shortness of breath with activity   Gastrointestinal: Diarrhea, Constipation   Genitourinary: Stress incontinence (losing urine when coughing, sneezing, etc.)   Hematological: None of the above   Neurological: None of the above   Female only: Post-menopausal       EATING BEHAVIORS:     11/19/2019   Have you or anyone else thought that you had an eating disorder? No   Do you currently binge eat (eat a large amount of food in a short time)? No   Are you an emotional eater? No   Do you get up to eat after falling asleep? No       EXERCISE:     11/19/2019   How often do you exercise? Less than 1 time per week   What is the duration of your exercise (in minutes)? 30 Minutes   What types of exercise do you do? walking   What keeps you from being more active?  I should be more active but I just have not gotten around to it, Lack of Time       MEDICATIONS:  Current Outpatient Medications   Medication Sig Dispense Refill     Acetaminophen (TYLENOL PO) Take 500-1,000 mg by mouth every 6 hours as needed As needed for pain        amoxicillin-clavulanate (AUGMENTIN) 875-125 MG tablet Take 1 tablet by mouth 2 times daily 20  tablet 1     azithromycin (ZITHROMAX Z-DORENE) 250 MG tablet Take 2 tablets (500 mg) by mouth daily 6 tablet 0     blood glucose monitoring (ACCU-CHEK ALMA PLUS) test strip Use to test blood sugar 1 times daily or as directed. 100 strip 1     blood glucose monitoring (ACCU-CHEK FASTCLIX) lancets Use to test blood sugar 1 times daily or as directed. 3 each 3     cetirizine (ZYRTEC) 10 MG tablet Take 10 mg by mouth daily       cinnamon 500 MG CAPS Take by mouth daily       fluticasone-vilanterol (BREO ELLIPTA) 200-25 MCG/INH inhaler Inhale 1 puff into the lungs daily 1 Inhaler 1     Ibuprofen (IBU PO) Take 600 mg by mouth every 6 hours as needed        insulin pen needle (BD VALENTINA U/F) 32G X 4 MM Use once daily as directed. 100 each PRN     Krill Oil 1000 MG CAPS Take by mouth daily       liraglutide (VICTOZA PEN) 18 MG/3ML solution Inject 1.2 mg Subcutaneous daily 6 mL 1     losartan-hydrochlorothiazide (HYZAAR) 50-12.5 MG per tablet Take 1 tablet by mouth daily 90 tablet 3     metFORMIN (GLUCOPHAGE-XR) 500 MG 24 hr tablet Take 1 tablet (500 mg) by mouth daily (with dinner) (Patient taking differently: Take 500 mg by mouth daily (with breakfast) ) 90 tablet 3     multivitamin, therapeutic with minerals (MULTI-VITAMIN) TABS tablet Take 1 tablet by mouth daily       ondansetron (ZOFRAN ODT) 4 MG ODT tab Take 1-2 tablets (4-8 mg) by mouth every 8 hours as needed for nausea 12 tablet 1     PRENATAL VIT-DOCUSATE-IRON-FA PO        STATIN NOT PRESCRIBED, INTENTIONAL, Please choose reason not prescribed, below       tamoxifen (NOLVADEX) 20 MG tablet Take 1 tablet (20 mg) by mouth daily 90 tablet 3     VITAMIN D, CHOLECALCIFEROL, PO Take 10,000 Units by mouth daily        desvenlafaxine succinate (PRISTIQ) 25 MG 24 hr tablet Take 1 tablet (25 mg) by mouth daily (Patient not taking: Reported on 11/22/2019) 90 tablet 1     scopolamine (TRANSDERM) 1 MG/3DAYS 72 hr patch Place 1 patch onto the skin every 72 hours (Patient not  "taking: Reported on 11/22/2019) 4 patch 0     zolpidem (AMBIEN CR) 6.25 MG CR tablet Take 1 tab by mouth at bedtime as needed. 90 tablet 0       ALLERGIES:  Allergies   Allergen Reactions     Lisinopril Cough     Pneumovax [Pneumococcal Polysaccharides] Other (See Comments)     Red streaking and pain       LABS/IMAGING/MEDICAL RECORDS REVIEW: Recent hemoglobin A1C and CBC    PHYSICAL EXAM:  BP (!) 131/94   Pulse 84   Ht 5' 2\" (1.575 m)   Wt 205 lb 8 oz (93.2 kg)   LMP 06/20/2018 (Exact Date)   SpO2 99%   BMI 37.59 kg/m      GENERAL:  Good development and normal affect in no acute distress. Patient is alert and orientated x 3 and answers all questions appropriately.  HEENT: Head is normocephalic, nontraumatic. Pupils equal and round without conjunctival injection. Neck is supple without lymphadenopathy, thyroidmegaly, or mass. Trachea midline. Dentition WNL.  No missing teeth  CARDIOVASCULAR:  Regular rate and rhythm without murmurs, rubs, or gallops.  RESPIRATORY: Lungs are clear to auscultation bilaterally with good breath sounds.  GASTROINTESTINAL: Abdomen is obese, nondistended, soft, nontender, without organomegaly or masses. No bruits.  LOWER EXTREMITIES: No LE edema bilaterally, no cyanosis, ulceration, or chronic venous stasis noted.  MUSCULOSKELETAL:  Moves all 4 extremities equal and strong. Has a normal gait.   NEUROLOGIC: Cranial nerves II-XII grossly intact.  SKIN: No intertriginous irritation or rash.       In summary, Kezia Nava has obesity with a  Body mass index is 37.59 kg/m .  and the comorbidities stated above. She completed an informational seminar and is a candidate for the laparoscopic gastric bypass.  She will have to complete the following pre-requisites:    Lose 5 lbs prior to surgery.  Goal Weight: 200 lbs  Have preoperative laboratory tests drawn - Ordered CMP and Vitamin D  Psychological Evaluation with MMPI and clearance for weight loss surgery.  Achieve clearance from " dietitian to see surgeon.  A total of 6 structured dietitian weight loss visits are required by your insurance.  [facility] clearance    Today in the office we discussed gastric bypass and gastric sleeve surgery.  Preoperative, perioperative, and postoperative processes, management, and follow up were addressed.  Risks and benefits were outlined including the risk of death, staple line leak (1-2%), PE, DVT, ulcer, N/V, stricture, hernia, wound infection, weight regain, and vitamin deficiencies. I emphasized exercise and activity along with appropriate food choice as the main foundation for weight loss with surgery providing surgical reinforcement of this. All questions were answered.  A goal sheet and support group handout were given to the patient.      Once the patient has completed the requirements in their task list and there are no further recommendations, the pt will be allowed to see the surgeon of her choice for consultation on the laparoscopic gastric bypass or laparoscopic gastric sleeve surgery. Patient verbalizes understanding of the process to surgery and expectations for the postoperative period including the need for lifelong lifestyle changes, vitamin supplementation, and laboratory monitoring.      Sincerely,     Vidal Aguilar PA-C    I spent a total of 50 minutes face to face with the patient during today's office visit. Over 50% of this time was spent counseling the patient and/or coordinating care.

## 2019-11-22 NOTE — PROGRESS NOTES
"New Bariatric Nutrition Consultation Note    Reason For Visit: Nutrition Assessment    Kezia Nava is a 43 year old presenting today for new bariatric nutrition consult.  Pt is interested in laparoscopic (undecided).  Patient is accompanied by self.    Support System Reviewed With Patient 11/19/2019   Who do you have in your support network that can be available to help you for the first 2 weeks after surgery? Sisters and friends   Who can you count on for support throughout your weight loss surgery journey? Friends       ANTHROPOMETRICS:  Estimated body mass index is 37.59 kg/m  as calculated from the following:    Height as of this encounter: 1.575 m (5' 2\").    Weight as of this encounter: 93.2 kg (205 lb 8 oz).    Required weight loss goal pre-op: 5 lbs from initial consult weight (goal weight 200.5 lbs or less before surgery)       11/19/2019   I have tried the following methods to lose weight Watching portions or calories, Exercise, Weight Watchers, Atkins type diet (low carb/high protein), Abigail Bill, Pre packaged meals ex: Nutrisystem, Slimfast       Weight Loss Questions Reviewed With Patient 11/19/2019   How long have you been overweight? Since early childhood         NUTRITION HISTORY:  Recall Diet Questions Reviewed With Patient 11/19/2019   Describe what you typically consume for breakfast (typical or most recent): Breakfast sandwiches   Describe what you typically consume for lunch (typical or most recent): Fruit, salad   Describe what you typically consume for supper (typical or most recent): Going out to eat   Describe what you typically consume as snacks (typical or most recent): Fruits   How many ounces of water, or other low calorie drinks, do you drink daily (8 oz=1 glass)? 64 oz or more   How often do you drink alcohol? Monthly or less   If you do drink alcohol, how many drinks might you have in a day? (one drink = 5 oz. wine, 1 can/bottle of beer, 1 shot liquor) 1 or 2       Eating Habits " 11/19/2019   Do you have any dietary restrictions? No   Do you currently binge eat (eat a large amount of food in a short time)? No   Are you an emotional eater? No   Do you get up to eat after falling asleep? No   What foods do you crave? Juan barrios       ADDITIONAL INFORMATION:  Pt has been considering weight loss surgery for ~1y. Knows two people who have had gastric bypass and have struggled with EtOH. Works as ER RN, 12h shifts (7am-7pm). No food allergies however reports that oatmeal causes heartburn.     Dining Out History Reviewed With Patient 11/19/2019   How often do you dine out? A couple of times a week.   Where do you dine out? (select all that apply) sit-down restaurants, fast food chains, take out   What types of food do you order when you dine out? Chicken pastas sandwiches (typically when I go I out with friends we order multiple meals and share)       Physical Activity Reviewed With Patient 11/19/2019   How often do you exercise? Less than 1 time per week   What is the duration of your exercise (in minutes)? 30 Minutes   What types of exercise do you do? walking   What keeps you from being more active?  I should be more active but I just have not gotten around to it, Lack of Time       NUTRITION DIAGNOSIS:  Obesity r/t long history of self-monitoring deficit and excessive energy intake aeb BMI >30 kg/m2.    INTERVENTION:  Intervention Provided/Education Provided on post-op diet guidelines, vitamins/minerals essential post-operatively, GI anatomy of bariatric surgeries, ways to help prepare for post-op diet guidelines pre-operatively, portion/calorie-control, mindful eating. Provided pt with list of goals RD contact information.      Questions Reviewed With Patient 11/19/2019   How ready are you to make changes regarding your weight? Number 1 = Not ready at all to make changes up to 10 = very ready. 10   How confident are you that you can change? 1 = Not confident that you will be successful  making changes up to 10 = very confident. 5       Patient Understanding: good  Expected Compliance: good    GOALS:  Begin taking multivitamin, calcium and vitamin D per guidelines  Eat 3 meals/day at consistent intervals  Avoid drinking during meals     Follow-Up:   Recommend 5 follow up visits to assist with lifestyle changes or per insurance.      Time spent with patient: 45 minutes.    Donna Howe RD, LD  Clinical Dietitian

## 2019-11-26 ENCOUNTER — APPOINTMENT (OUTPATIENT)
Dept: MRI IMAGING | Facility: CLINIC | Age: 44
End: 2019-11-26
Attending: EMERGENCY MEDICINE
Payer: OTHER MISCELLANEOUS

## 2019-11-26 ENCOUNTER — HOSPITAL ENCOUNTER (EMERGENCY)
Facility: CLINIC | Age: 44
Discharge: HOME | End: 2019-11-26
Attending: EMERGENCY MEDICINE | Admitting: EMERGENCY MEDICINE
Payer: OTHER MISCELLANEOUS

## 2019-11-26 VITALS
TEMPERATURE: 98.7 F | WEIGHT: 204 LBS | OXYGEN SATURATION: 97 % | RESPIRATION RATE: 18 BRPM | SYSTOLIC BLOOD PRESSURE: 135 MMHG | HEART RATE: 91 BPM | BODY MASS INDEX: 37.54 KG/M2 | DIASTOLIC BLOOD PRESSURE: 97 MMHG | HEIGHT: 62 IN
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
## 2019-11-26 DIAGNOSIS — R20.2 PARESTHESIAS: ICD-10-CM

## 2019-11-26 PROCEDURE — 72141 MRI NECK SPINE W/O DYE: CPT

## 2019-11-26 PROCEDURE — 99284 EMERGENCY DEPT VISIT MOD MDM: CPT | Mod: 25

## 2019-11-26 ASSESSMENT — ENCOUNTER SYMPTOMS
MYALGIAS: 1
WOUND: 0
ABDOMINAL PAIN: 0
BACK PAIN: 1
NUMBNESS: 1
WEAKNESS: 0
BRUISES/BLEEDS EASILY: 0

## 2019-11-26 ASSESSMENT — MIFFLIN-ST. JEOR: SCORE: 1528.59

## 2019-11-26 NOTE — ED AVS SNAPSHOT
Emergency Department  [address] [place] [id number]  Phone:  [phone]  Fax:  [phone]                                    Kezia Nava   MRN: [id number]    Department:   Emergency Department   Date of Visit:  11/26/2019           After Visit Summary Signature Page    I have received my discharge instructions, and my questions have been answered. I have discussed any challenges I see with this plan with the nurse or doctor.    ..........................................................................................................................................  Patient/Patient Representative Signature      ..........................................................................................................................................  Patient Representative Print Name and Relationship to Patient    ..................................................               ................................................  Date                                   Time    ..........................................................................................................................................  Reviewed by Signature/Title    ...................................................              ..............................................  Date                                               Time          22EPIC Rev 08/18

## 2019-11-26 NOTE — DISCHARGE INSTRUCTIONS
If your numbness progresses or worsens return to the emergency department right away.  If your symptoms have not resolved in the next few days please make sure to see a neurosurgeon or spine surgeon.  Follow-up with your primary care provider if you are feeling better.
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
## 2019-11-26 NOTE — LETTER
November 27, 2019      To Whom It May Concern:      Kezia Nava was seen in our Emergency Department today, 11/26/19.  I expect her condition to improve over the next 2 days.  She may return to work/school when improved.    Sincerely,        Chris Teague RN

## 2019-11-27 ENCOUNTER — PATIENT OUTREACH (OUTPATIENT)
Dept: CARE COORDINATION | Facility: CLINIC | Age: 44
End: 2019-11-27

## 2019-11-27 DIAGNOSIS — M25.521 PAIN IN JOINT, UPPER ARM, RIGHT: Primary | ICD-10-CM

## 2019-11-27 NOTE — PROGRESS NOTES
Clinic Care Coordination Contact  [facility]/Voicemail    Referral Source: ED Follow-Up    [facility] ED visit 11/26/2019    Arm Pain (was wrestling with a pt at work has pain mid upper back through shoulder blade down arm to lower foream has numbness in fingers 3,4,5th)    Clinical Data: Care Coordinator Outreach  Outreach attempted x 1.  Left message on patient's voicemail with call back information and requested return call.    Plan: Care Coordinator will try to reach patient again in 1-2 business days.    [facility]     Marva Rodriguez  RN Care Coordinator   [facility] / [facility]   Phone: [phone]  Email :  [email or web address]

## 2019-11-29 NOTE — PROGRESS NOTES
No return call from the patient.  Patient is in communication with provider via My Chart for questions or concerns.  No further outreaches will be made     [facility] Rodriguez  RN Care Coordinator   [facility] / [facility] [place]. \[facility].\""   Phone: [phone]  Email :  [email or web address]

## 2019-12-01 DIAGNOSIS — Z80.3 FAMILY HISTORY OF MALIGNANT NEOPLASM OF BREAST: ICD-10-CM

## 2019-12-02 RX ORDER — TAMOXIFEN CITRATE 20 MG/1
TABLET ORAL
Qty: 90 TABLET | Refills: 1 | Status: SHIPPED | OUTPATIENT
Start: 2019-12-02 | End: 2021-01-20

## 2019-12-02 NOTE — TELEPHONE ENCOUNTER
Requested Prescriptions   Pending Prescriptions Disp Refills     tamoxifen (NOLVADEX) 20 MG tablet [Pharmacy Med Name: TAMOXIFEN CITRATE 20MG TABS] 90 tablet 1     Sig: TAKE ONE TABLET BY MOUTH ONCE DAILY   Last Written Prescription Date:  11/26/2018  Last Fill Quantity: 90,  # refills: 3   Last office visit: 11/18/2019 with prescribing provider:  Bridgette Dean Office Visit:        There is no refill protocol information for this order        Routing refill request to provider for review/approval because:  Drug not on the [facility] refill protocol     SERGIO Nesbitt, RN  [employer] Triage

## 2019-12-04 DIAGNOSIS — R73.03 PRE-DIABETES: ICD-10-CM

## 2019-12-05 RX ORDER — LIRAGLUTIDE 6 MG/ML
INJECTION SUBCUTANEOUS
Qty: 6 ML | Refills: 5 | Status: SHIPPED | OUTPATIENT
Start: 2019-12-05 | End: 2020-06-22

## 2019-12-05 NOTE — TELEPHONE ENCOUNTER
"  Routing refill request to provider for review/approval because:  BPs out of range.  Please authorize if appropriate.  Thanks,  Wilda Chaudhry RN      victoza      Last Written Prescription Date:  10/29/19  Last Fill Quantity: 6,   # refills: 1  Last Office Visit: 11/18/19  Future Office visit:         Requested Prescriptions   Pending Prescriptions Disp Refills     VICTOZA PEN 18 MG/3ML soln [Pharmacy Med Name: VICTOZA 18MG/3ML SOPN] 6 mL 1     Sig: INJECT 1.2 MG UNDER THE SKIN DAILY       GLP-1 Agonists Protocol Failed - 12/4/2019 10:06 PM        Failed - Blood pressure less than 140/90 in past 6 months     BP Readings from Last 3 Encounters:   11/26/19 (!) 135/97   11/22/19 (!) 131/94   11/18/19 138/88                 Passed - LDL on file in past 12 months     Recent Labs   Lab Test 07/30/19  0806   LDL 79  128*             Passed - Microalbumin on file in past 12 months     Recent Labs   Lab Test 03/01/19  1431   MICROL 756   UMALCR 317.65*             Passed - HgbA1C in past 3 or 6 months     If HgbA1C is 8 or greater, it needs to be on file within the past 3 months.  If less than 8, must be on file within the past 6 months.     Recent Labs   Lab Test 11/18/19  1559   A1C 6.6*             Passed - Medication is active on med list        Passed - Patient is age 18 or older        Passed - No active pregnancy on record        Passed - Normal serum creatinine on file in past 12 months     Recent Labs   Lab Test 05/13/19  0843  12/07/18  1352   CR 0.72   < >  --    CREAT  --   --  0.7    < > = values in this interval not displayed.             Passed - No positive pregnancy test in past 12 months        Passed - Recent (6 mo) or future (30 days) visit within the authorizing provider's specialty     Patient had office visit in the last 6 months or has a visit in the next 30 days with authorizing provider.  See \"Patient Info\" tab in inbasket, or \"Choose Columns\" in Meds & Orders section of the refill encounter.        "

## 2019-12-09 ENCOUNTER — HOSPITAL ENCOUNTER (OUTPATIENT)
Dept: MRI IMAGING | Facility: CLINIC | Age: 44
Discharge: HOME | End: 2019-12-09
Attending: CLINICAL NURSE SPECIALIST | Admitting: CLINICAL NURSE SPECIALIST
Payer: COMMERCIAL

## 2019-12-09 DIAGNOSIS — Z12.39 BREAST CANCER SCREENING, HIGH RISK PATIENT: ICD-10-CM

## 2019-12-09 DIAGNOSIS — R92.30 DENSE BREASTS: ICD-10-CM

## 2019-12-09 LAB
CREAT BLD-MCNC: 0.7 MG/DL (ref 0.52–1.04)
GFR SERPL CREATININE-BSD FRML MDRD: >90 ML/MIN/{1.73_M2}

## 2019-12-09 PROCEDURE — 25500064 ZZH RX 255 OP 636: Performed by: CLINICAL NURSE SPECIALIST

## 2019-12-09 PROCEDURE — 82565 ASSAY OF CREATININE: CPT

## 2019-12-09 PROCEDURE — 77049 MRI BREAST C-+ W/CAD BI: CPT

## 2019-12-09 PROCEDURE — A9585 GADOBUTROL INJECTION: HCPCS | Performed by: CLINICAL NURSE SPECIALIST

## 2019-12-09 RX ORDER — GADOBUTROL 604.72 MG/ML
9 INJECTION INTRAVENOUS ONCE
Status: COMPLETED | OUTPATIENT
Start: 2019-12-09 | End: 2019-12-09

## 2019-12-09 RX ADMIN — GADOBUTROL 9 ML: 604.72 INJECTION INTRAVENOUS at 12:02

## 2019-12-10 ENCOUNTER — TELEPHONE (OUTPATIENT)
Dept: ONCOLOGY | Facility: CLINIC | Age: 44
End: 2019-12-10

## 2019-12-10 DIAGNOSIS — N64.59 ABNORMAL BREAST FINDING: Primary | ICD-10-CM

## 2019-12-18 ENCOUNTER — HOSPITAL ENCOUNTER (OUTPATIENT)
Dept: MAMMOGRAPHY | Facility: CLINIC | Age: 44
Discharge: HOME | End: 2019-12-18
Attending: CLINICAL NURSE SPECIALIST | Admitting: CLINICAL NURSE SPECIALIST
Payer: COMMERCIAL

## 2019-12-18 ENCOUNTER — HOSPITAL ENCOUNTER (OUTPATIENT)
Dept: MAMMOGRAPHY | Facility: CLINIC | Age: 44
End: 2019-12-18
Attending: CLINICAL NURSE SPECIALIST
Payer: COMMERCIAL

## 2019-12-18 DIAGNOSIS — N64.59 ABNORMAL BREAST FINDING: ICD-10-CM

## 2019-12-18 PROCEDURE — 76642 ULTRASOUND BREAST LIMITED: CPT | Mod: RT

## 2019-12-18 PROCEDURE — 88305 TISSUE EXAM BY PATHOLOGIST: CPT | Mod: 26 | Performed by: CLINICAL NURSE SPECIALIST

## 2019-12-18 PROCEDURE — 40000986 MA POST PROCEDURE RIGHT

## 2019-12-18 PROCEDURE — 27210206 US BREAST BIOPSY CORE NEEDLE RIGHT

## 2019-12-18 PROCEDURE — 25000125 ZZHC RX 250: Performed by: CLINICAL NURSE SPECIALIST

## 2019-12-18 PROCEDURE — 88305 TISSUE EXAM BY PATHOLOGIST: CPT | Performed by: CLINICAL NURSE SPECIALIST

## 2019-12-18 RX ADMIN — LIDOCAINE HYDROCHLORIDE 5 ML: 10 INJECTION, SOLUTION INFILTRATION; PERINEURAL at 09:20

## 2019-12-18 NOTE — DISCHARGE INSTRUCTIONS

## 2019-12-19 ENCOUNTER — TELEPHONE (OUTPATIENT)
Dept: SURGERY | Facility: CLINIC | Age: 44
End: 2019-12-19

## 2019-12-19 ENCOUNTER — TELEPHONE (OUTPATIENT)
Dept: ONCOLOGY | Facility: CLINIC | Age: 44
End: 2019-12-19

## 2019-12-19 LAB — COPATH REPORT: NORMAL

## 2019-12-19 NOTE — TELEPHONE ENCOUNTER
Call placed to patient.    Patient notified pathology results from right breast biopsy performed on 12/18/2019 revealed: stromal fibrosis, no evidence of malignancy.     Per radiologist, Dr. Tay Rios, results are concordant with imaging findings.    Recommendation: Annual screening mammography.     Patient was also notified of results from Sheyla Hernandez.      All patient's questions answered appropriately and thoroughly. Patient will call our office in the interim with additional questions/concerns.     Both parties in agreement of above plan.    Freda KERRN, RN, OCN  Oncology Care Coordinator  [facility]  [facility]  Phone: [phone]

## 2019-12-26 ENCOUNTER — OFFICE VISIT (OUTPATIENT)
Dept: SURGERY | Facility: CLINIC | Age: 44
End: 2019-12-26
Payer: COMMERCIAL

## 2019-12-26 PROCEDURE — 97803 MED NUTRITION INDIV SUBSEQ: CPT | Performed by: DIETITIAN, REGISTERED

## 2019-12-26 NOTE — PROGRESS NOTES
"PRE SURGICAL WEIGHT LOSS NUTRITION APPOINTMENT    Kezia Nava  1975  female  [id number]  44 year old    ASSESSMENT    Desired Surgical Procedure: undecided    REASON FOR VISIT:  Kezia Nava is a 44 year old year old female presents today for a pre-surgical weight loss follow-up appointment. Patient accompanied by self.    DIAGNOSIS:  Weight Status Obesity Grade II BMI 35-39.9    ANTHROPOMETRICS:  Height: 5'2\"    Initial Weight: 205.5 lbs       Current weight: 201.8 lbs    BMI: 36.9 kg/(m^2).    VITAMINS AND MINERALS:  prenatal bedtime -bought [place] and will start when finishes bottle of prenatal  500 mg Calcium with Vitamin D (3 times day)  2000 International units Vitamin D    NUTRITION HISTORY:  Breakfast: scrambled eggs  Lunch: protein bar or peanut butter and jelly sandwich  Supper: spaghettios; chicken and wild rice soup or fast food -chicken sandwich   Snacks: grapes    Fluids Consumed: Water  Eating slower: Yes  Chewing foods thoroughly: Yes  Take 20-30 minutes to consume each meal: Yes  Fluids and meals separate by at least 30 minutes: No  Carbonation: occasionally -soda  Caffeine: occassionally   Additional Information: Patient has been focusing on making diet and behavior changes.  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     PHYSICAL ACTIVITY:  Type: none    DIAGNOSIS:  Previous Nutrition Diagnosis: Obesity related to long history of self- monitoring deficit and excessive energy intake evidenced by BMI of 37.5 kg/m2  No change, modified below    Previous goals:   Begin taking multivitamin, calcium and vitamin D per guidelines-met  Eat 3 meals/day at consistent intervals-improving  Avoid drinking during meals-improving     Current Nutrition Diagnosis: Obesity related to long history of self-monitoring deficit and excessive energy intake as evidenced by BMI of 36.9 kg/m2.    INTERVENTION:  Nutrition Prescription: Recommended energy/nutrient " modification.    GOALS:  Exercise 2 times per week  Separate liquids from meals  Check on insurance requirements    Intervention:  - Discussed progress towards previous goals.  - Reinforced importance of making behavior changes in preparation for bariatric surgery.   - Assessed learning needs and learning preferences    NUTRITION MONITORING AND EVALUATION:  Anticipated compliance: fair-good  Patient demonstrated good understanding.     Follow up: Continue to monitor patient closely regarding weight loss and diet.  # of visits needed: 1-2 if does not require 6 month structured weight loss program   Cleared by RD: No     TIME SPENT WITH PATIENT: 25 minutes    Moreno Enamorado, RD, LD  [facility] Outpatient Dietitian  [phone] (office phone)

## 2020-01-02 ENCOUNTER — TRANSFERRED RECORDS (OUTPATIENT)
Dept: HEALTH INFORMATION MANAGEMENT | Facility: CLINIC | Age: 45
End: 2020-01-02

## 2020-01-02 DIAGNOSIS — E11.65 TYPE 2 DIABETES MELLITUS WITH HYPERGLYCEMIA, WITHOUT LONG-TERM CURRENT USE OF INSULIN (H): Chronic | ICD-10-CM

## 2020-01-02 DIAGNOSIS — I10 BENIGN ESSENTIAL HYPERTENSION: ICD-10-CM

## 2020-01-02 LAB — RETINOPATHY: NEGATIVE

## 2020-01-02 RX ORDER — LOSARTAN POTASSIUM AND HYDROCHLOROTHIAZIDE 12.5; 5 MG/1; MG/1
TABLET ORAL
Qty: 90 TABLET | Refills: 1 | Status: SHIPPED | OUTPATIENT
Start: 2020-01-02 | End: 2020-07-15

## 2020-01-02 RX ORDER — METFORMIN HCL 500 MG
TABLET, EXTENDED RELEASE 24 HR ORAL
Qty: 90 TABLET | Refills: 1 | Status: SHIPPED | OUTPATIENT
Start: 2020-01-02 | End: 2020-07-15

## 2020-01-02 NOTE — TELEPHONE ENCOUNTER
"metFORMIN (GLUCOPHAGE-XR) 500 MG 24 hr tablet 90 tablet 3 11/26/2018  No   Sig - Route: Take 1 tablet (500 mg) by mouth daily (with dinner)      Last Written Prescription Date:  11/26/2018  Last Fill Quantity: 90,  # refills: 3   Last office visit: 11/18/2019 with prescribing provider:     Future Office Visit:      ~~~~~~~~~~~~~~~~~~~~~~~~~~~~~~    insulin pen needle (BD VALENTINA U/F) 32G X 4  each PRN 9/24/2018  No   Sig: Use once daily as directed.     Last Written Prescription Date:  09/24/2018  Last Fill Quantity: 100,  # refills: ---   Last office visit: 11/18/2019 with prescribing provider:     Future Office Visit:      ~~~~~~~~~~~~~~~~~~~~~~~~~~~~~~    losartan-hydrochlorothiazide (HYZAAR) 50-12.5 MG per tablet 90 tablet 3 11/26/2018  No   Sig - Route: Take 1 tablet by mouth daily     Requested Prescriptions   Pending Prescriptions Disp Refills     losartan-hydrochlorothiazide (HYZAAR) 50-12.5 MG tablet [Pharmacy Med Name: LOSARTAN-HCTZ 50/12.5 MG TAB] 90 tablet 1     Sig: TAKE ONE TABLET BY MOUTH ONCE DAILY       Angiotensin-II Receptors Failed - 1/2/2020 12:08 AM        Failed - Last blood pressure under 140/90 in past 12 months     BP Readings from Last 3 Encounters:   11/26/19 (!) 135/97   11/22/19 (!) 131/94   11/18/19 138/88                 Passed - Recent (12 mo) or future (30 days) visit within the authorizing provider's specialty     Patient has had an office visit with the authorizing provider or a provider within the authorizing providers department within the previous 12 mos or has a future within next 30 days. See \"Patient Info\" tab in inbasket, or \"Choose Columns\" in Meds & Orders section of the refill encounter.              Passed - Medication is active on med list        Passed - Patient is age 18 or older        Passed - No active pregnancy on record        Passed - Normal serum creatinine on file in past 12 months     Recent Labs   Lab Test 12/09/19  1212 05/13/19  0843   CR  --  0.72 " "  CREAT 0.7  --              Passed - Normal serum potassium on file in past 12 months     Recent Labs   Lab Test 05/13/19  0843   POTASSIUM 3.8                    Passed - No positive pregnancy test in past 12 months        insulin pen needle (BD VALENTINA U/F) 32G X 4 MM miscellaneous [Pharmacy Med Name: BD PEN NEEDLE VALENTINA  32G X 4 MM MISC] 100 each 96     Sig: USE ONCE DAILY AS DIRECTED       Diabetic Supplies Protocol Passed - 1/2/2020 12:08 AM        Passed - Medication is active on med list        Passed - Patient is 18 years of age or older        Passed - Recent (6 mo) or future (30 days) visit within the authorizing provider's specialty     Patient had office visit in the last 6 months or has a visit in the next 30 days with authorizing provider.  See \"Patient Info\" tab in inbasket, or \"Choose Columns\" in Meds & Orders section of the refill encounter.            metFORMIN (GLUCOPHAGE-XR) 500 MG 24 hr tablet [Pharmacy Med Name: METFORMIN HCL ER 500MG TB24] 90 tablet 1     Sig: TAKE ONE TABLET BY MOUTH ONCE DAILY WITH DINNER     Last Written Prescription Date:  11-  Last Fill Quantity: 90,  # refills: 3   Last office visit: 11/18/2019 with prescribing provider:     Future Office Visit:            Biguanide Agents Failed - 1/2/2020 12:08 AM        Failed - Blood pressure less than 140/90 in past 6 months     BP Readings from Last 3 Encounters:   11/26/19 (!) 135/97   11/22/19 (!) 131/94   11/18/19 138/88                 Passed - Patient has documented LDL within the past 12 mos.     Recent Labs   Lab Test 07/30/19  0806   LDL 79  128*             Passed - Patient has had a Microalbumin in the past 15 mos.     Recent Labs   Lab Test 03/01/19  1431   MICROL 756   UMALCR 317.65*             Passed - Patient is age 10 or older        Passed - Patient has documented A1c within the specified period of time.     If HgbA1C is 8 or greater, it needs to be on file within the past 3 months.  If less than 8, must be on " "file within the past 6 months.     Recent Labs   Lab Test 11/18/19  1559   A1C 6.6*             Passed - Patient's CR is NOT>1.4 OR Patient's EGFR is NOT<45 within past 12 mos.     Recent Labs   Lab Test 12/09/19  1212   GFRESTIMATED >90   GFRESTBLACK >90       Recent Labs   Lab Test 05/13/19  0843   CR 0.72             Passed - Patient does NOT have a diagnosis of CHF.        Passed - Medication is active on med list        Passed - Patient is not pregnant        Passed - Patient has not had a positive pregnancy test within the past 12 mos.         Passed - Recent (6 mo) or future (30 days) visit within the authorizing provider's specialty     Patient had office visit in the last 6 months or has a visit in the next 30 days with authorizing provider or within the authorizing provider's specialty.  See \"Patient Info\" tab in inbasket, or \"Choose Columns\" in Meds & Orders section of the refill encounter.              "

## 2020-01-17 ENCOUNTER — TRANSFERRED RECORDS (OUTPATIENT)
Dept: HEALTH INFORMATION MANAGEMENT | Facility: CLINIC | Age: 45
End: 2020-01-17

## 2020-01-23 ENCOUNTER — OFFICE VISIT (OUTPATIENT)
Dept: SURGERY | Facility: CLINIC | Age: 45
End: 2020-01-23
Payer: COMMERCIAL

## 2020-01-23 VITALS — HEIGHT: 62 IN | BODY MASS INDEX: 37.43 KG/M2 | WEIGHT: 203.4 LBS

## 2020-01-23 DIAGNOSIS — E66.01 SEVERE OBESITY (BMI 35.0-35.9 WITH COMORBIDITY) (H): ICD-10-CM

## 2020-01-23 PROCEDURE — 97803 MED NUTRITION INDIV SUBSEQ: CPT | Performed by: DIETITIAN, REGISTERED

## 2020-01-23 ASSESSMENT — MIFFLIN-ST. JEOR: SCORE: 1520.87

## 2020-01-23 NOTE — PROGRESS NOTES
"PRE SURGICAL WEIGHT LOSS NUTRITION APPOINTMENT    Kezia Nava  1975  female  [id number]  44 year old    ASSESSMENT    Desired Surgical Procedure: undecided    REASON FOR VISIT:  Kezia Nava is a 44 year old year old female presents today for a pre-surgical weight loss follow-up appointment. Patient accompanied by self.    DIAGNOSIS:  Weight Status Obesity Grade II BMI 35-39.9    ANTHROPOMETRICS:  Height: 157.5 cm (5' 2\")  Initial Weight: 205.5 lbs     Weight last visit: 201.8 lbs    Current weight: 92.3 kg (203 lb 6.4 oz)  BMI: 37.2 kg/(m^2).    VITAMINS AND MINERALS:  1 Multivitamin with Minerals (HS)  600 mg Calcium with Vitamin D (BID; 7am and 3pm)  4000 International units Vitamin D      NUTRITION HISTORY:  Breakfast: [wakes at 5:45am, eats at 7am] granola or Kind bar  Lunch: [11-1pm] chicken salad pouch + crackers   Supper: [7:30pm] restaurants  fast food - chicken sandwich  granola bar   Snacks: none   Fluids Consumed: Water (64oz+), soda (caffeine free/diet; rare - every 2 weeks)    Eating slower: Yes/usually   Chewing foods thoroughly: Yes  Take 20-30 minutes to consume each meal: Yes  Fluids and meals separate by at least 30 minutes: Yes/usually   Carbonation: rare  Caffeine: none  Additional Information: Pt shares she wasn't as focused on food choices this month and at more restaurant/fast food. Recently saw GI doctor who recommended 25g fiber; discussed sources and how to incorporate this into diet. Pt has upcoming cruise this month; discussed healthy meal intake while traveling and the importance of staying active.     PHYSICAL ACTIVITY:  Type: walking  Frequency: 3 (days per week)  Duration: 10 (minutes)     DIAGNOSIS:  Previous Nutrition Diagnosis: Obesity related to long history of self- monitoring deficit and excessive energy intake evidenced by BMI of 36.9 kg/m2  No change, modified below    Previous goals:   Exercise 2 times per week - met  Separate liquids from meals - met  Check " on insurance requirements - met    Current Nutrition Diagnosis: Obesity related to long history of self-monitoring deficit and excessive energy intake as evidenced by BMI of 37.2 kg/m2.    INTERVENTION:  Nutrition Prescription: Recommended energy/nutrient modification.    GOALS:  Limit restaurant/fast food to 2x/week  Have protein with every meal  Exercise for 15-20 minutes, 3x/week    Intervention:  - Discussed progress towards previous goals.  - Reinforced importance of making behavior changes in preparation for bariatric surgery.   - Assessed learning needs and learning preferences       NUTRITION MONITORING AND EVALUATION:  Anticipated compliance: fair-good  Patient demonstrated good understanding.     Follow up: Continue to monitor patient closely regarding weight loss and diet.  # of visits needed: 3  Cleared by RD: No     TIME SPENT WITH PATIENT: 25 minutes    Donna Howe RD, LD  Clinical Dietitian

## 2020-02-07 ENCOUNTER — PSYCHE (OUTPATIENT)
Dept: PSYCHOLOGY | Facility: CLINIC | Age: 45
End: 2020-02-07
Payer: COMMERCIAL

## 2020-02-07 DIAGNOSIS — E66.01 SEVERE OBESITY (BMI 35.0-35.9 WITH COMORBIDITY) (H): Primary | ICD-10-CM

## 2020-02-07 PROCEDURE — 90791 PSYCH DIAGNOSTIC EVALUATION: CPT | Performed by: PSYCHOLOGIST

## 2020-02-07 ASSESSMENT — ANXIETY QUESTIONNAIRES
6. BECOMING EASILY ANNOYED OR IRRITABLE: NOT AT ALL
GAD7 TOTAL SCORE: 4
IF YOU CHECKED OFF ANY PROBLEMS ON THIS QUESTIONNAIRE, HOW DIFFICULT HAVE THESE PROBLEMS MADE IT FOR YOU TO DO YOUR WORK, TAKE CARE OF THINGS AT HOME, OR GET ALONG WITH OTHER PEOPLE: NOT DIFFICULT AT ALL
2. NOT BEING ABLE TO STOP OR CONTROL WORRYING: SEVERAL DAYS
7. FEELING AFRAID AS IF SOMETHING AWFUL MIGHT HAPPEN: SEVERAL DAYS
5. BEING SO RESTLESS THAT IT IS HARD TO SIT STILL: NOT AT ALL
3. WORRYING TOO MUCH ABOUT DIFFERENT THINGS: SEVERAL DAYS
1. FEELING NERVOUS, ANXIOUS, OR ON EDGE: NOT AT ALL

## 2020-02-07 ASSESSMENT — COLUMBIA-SUICIDE SEVERITY RATING SCALE - C-SSRS
4. HAVE YOU HAD THESE THOUGHTS AND HAD SOME INTENTION OF ACTING ON THEM?: NO
4. HAVE YOU HAD THESE THOUGHTS AND HAD SOME INTENTION OF ACTING ON THEM?: NO
5. HAVE YOU STARTED TO WORK OUT OR WORKED OUT THE DETAILS OF HOW TO KILL YOURSELF? DO YOU INTEND TO CARRY OUT THIS PLAN?: NO
1. IN THE PAST MONTH, HAVE YOU WISHED YOU WERE DEAD OR WISHED YOU COULD GO TO SLEEP AND NOT WAKE UP?: NO
3. HAVE YOU BEEN THINKING ABOUT HOW YOU MIGHT KILL YOURSELF?: NO
1. IN THE PAST MONTH, HAVE YOU WISHED YOU WERE DEAD OR WISHED YOU COULD GO TO SLEEP AND NOT WAKE UP?: NO
2. HAVE YOU ACTUALLY HAD ANY THOUGHTS OF KILLING YOURSELF LIFETIME?: NO
2. HAVE YOU ACTUALLY HAD ANY THOUGHTS OF KILLING YOURSELF?: NO
5. HAVE YOU STARTED TO WORK OUT OR WORKED OUT THE DETAILS OF HOW TO KILL YOURSELF? DO YOU INTEND TO CARRY OUT THIS PLAN?: NO

## 2020-02-07 ASSESSMENT — PATIENT HEALTH QUESTIONNAIRE - PHQ9
5. POOR APPETITE OR OVEREATING: SEVERAL DAYS
SUM OF ALL RESPONSES TO PHQ QUESTIONS 1-9: 7

## 2020-02-07 NOTE — PROGRESS NOTES
"                                                                                             Adult Bariatric Pre-Surgical Psychological Assessment - Structured Interview      CLIENT'S NAME: Kezia Nava  MRN:   [id number]  :   1975  ACCT. NUMBER: [id number]  DATE OF SERVICE: 20      Identifying Information:  Client is a 44 year old, , single female. Client was referred for an evaluation by the [facility] Weight Loss Surgery Team. Client is currently employed full time and reports @HIS@ is able to function appropriately at work.. Client attended the session alone.       Client's Statement of Presenting Concern:  Client is presenting for a psychological assessment as a routine part of the process for pursuing weight loss surgery.  Client reported being overweight all her life and needing surgery to manage medical conditions.      History of Presenting Concern:  Client reports that these problem(s) began in childhood.  Client reports they have attempted to lose weight in the past through {previous weight loss strategies:Abigail Fly, Weight Watchers, meal plans, and cutting carbohydrates.  The client reports they believe the surgery will benefit them by improving overall health and confidence.  Client has attempted to resolve these concerns in the past through weight loss programming. Client reports that other professional(s) are involved in providing support / services.       Social History:  Client reported she grew up in [place], [place]. They were the fourth born of 5 children. This is an intact family and parents remain . Client reported that her childhood was \"typical.\" Client described her current relationships with family of origin as healthy.    Client reported a history of few long-term committed relationships and no marriages. Client has been single for 6 months. Client reported having 0 children. Client identified some stable and meaningful social connections. Client " reported that she has not been involved with the legal system. Client's highest education level was college graduate. Client did identify the following learning problems: attention and concentration. There are no ethnic, cultural or Religion factors that may be relevant for therapy. Client identified her preferred language to be English. Client reported she does not need the assistance of an  or other support involved in therapy. Modifications will not be used to assist communication in therapy. Client did not serve in the .     Client reports family history includes Breast Cancer in her maternal aunt, maternal aunt, and paternal aunt; Breast Cancer (age of onset: 41) in her cousin; Breast Cancer (age of onset: 43) in her sister; C.A.D. in her mother; Cancer in her paternal grandmother; Cerebrovascular Disease in her paternal grandfather; Colon Cancer (age of onset: 64) in her maternal grandmother; Hypertension in her father and mother; Kidney Disease in her mother; Nephrolithiasis in her brother; Prostate Cancer (age of onset: 50) in her maternal uncle; Uterine Cancer (age of onset: 44) in her maternal grandmother.    Mental Health History:  Client reported no family history of mental health issues. Client has not received mental health services in the past. Hospitalizations: None.  Client is not currently receiving any mental health services.      Chemical Health History:  Client reported no family history of chemical health issues. Client has not received chemical dependency treatment in the past. Client is not currently receiving any chemical dependency treatment. Client reports no problems as a result of their drinking / drug use.      Client Reports:  Client reports using alcohol 1 times per year and has 2 glasses of wine at a time. Patient first started drinking at age 20.  Patient reported date of last use was unknown.  Patient reports heaviest use is current use. Client denied illegal  drug use and prescription abuse.     CAGE: None of the patient's responses to the CAGE screening were positive / Negative CAGE score   Based on the positive Cage-Aid score and clinical interview there  are not indications of drug or alcohol abuse.    Discussed the general effects of drugs and alcohol on health and well-being. Therapist gave client printed information about the effects of chemical use on her health and well being.  We also discussed the specific risks of alcohol use following bariatric surgery, including issues related to cross-addiction.       Significant Losses / Trauma / Abuse / Neglect Issues:  There are no indications or report of: significant losses, trauma, abuse or neglect.    Issues of possible neglect are not present.      Medical Issues:  Client has had a physical exam to rule out medical causes for current symptoms. Date of last physical exam was within the past year. Client was encouraged to follow up with PCP if symptoms were to develop. The client has a [place] Primary Care Provider, who is named Shamir Angeles.. The client reports not having a psychiatrist. Client reports no current medical concerns. The client denies the presence of chronic or episodic pain. There are not significant nutritional concerns.     Patient reports current meds as:   No outpatient medications have been marked as taking for the 2/7/20 encounter ([facility]) with Deidre Bear, PhD LP.       Client Allergies:  Allergies   Allergen Reactions     Lisinopril Cough     Pneumovax [Pneumococcal Polysaccharides] Other (See Comments)     Red streaking and pain     see allergy list    Medical History:  Past Medical History:   Diagnosis Date     Diffuse cystic mastopathy      Elevated BP 12/15/2016     Hypertension      Mesenteric adenitis 4/14     Migraines      Nephrolithiasis     s/p lithotripsy     Sleep apnea     no cpap     Thyroiditis, acute 12/2/2014    transient hypothyroidism- resolved     Type 2 diabetes  mellitus with hyperglycemia, without long-term current use of insulin (H) 2/6/2017         Medication Adherence:  Client reports taking prescribed medications as prescribed.    Client was provided recommendation to follow-up with prescribing physician.    Mental Status Assessment:  Appearance:   Appropriate   Eye Contact:   Good   Psychomotor Behavior: Normal   Attitude:   Cooperative   Orientation:   All  Speech   Rate / Production: Normal    Volume:  Normal   Mood:    Sad   Affect:    Appropriate  Constricted   Thought Content:  Clear   Thought Form:  Coherent  Goal Directed  Logical   Insight:    Good       Review of Symptoms:  Depression: Sleep Guilt Energy Ruminations  Vivi:  No symptoms  Psychosis: No symptoms  Anxiety: Worries Nervousness  Panic:  No symptoms  Post Traumatic Stress Disorder: No symptoms  Obsessive Compulsive Disorder: No symptoms  Eating Disorder: No symptoms  Oppositional Defiant Disorder: No symptoms  ADD / ADHD: Forgetful  Conduct Disorder: No symptoms          Safety Issues and Plan for Safety and Risk Management:  Client denies a history of suicidal ideation, suicide attempts, self-injurious behavior, homicidal ideation, homicidal behavior and and other safety concerns    Client denies current fears or concerns for personal safety.  Client denies current or recent suicidal ideation or behaviors.  Client denies current or recent homicidal ideation or behaviors.  Client denies current or recent self injurious behavior or ideation.  Client denies other safety concerns.  Client reports there are no firearms in the house.  Recommended that patient call 911 or go to the local ED should there be a change in any of these risk factors.      Patient's Strengths and Limitations:  Client identified the following strengths or resources that will help her succeed in counseling: friends / good social support, insight, intelligence and work ethic. Client identified the following supports: friends.  Things that may interfere with the clients success in counseling include:few friends and financial hardship.    Diagnostic Criteria:  Normal      Functional Status:  Client's symptoms have caused reduced functional status in the following areas: relationships.    WHODAS 2.0 (12 item)            This questionnaire asks about difficulties due to health conditions. Health conditions  include  disease or illnesses, other health problems that may be short or long lasting,  injuries, mental health or emotional problems, and problems with alcohol or drugs.                     Think back over the past 30 days and answer these questions, thinking about how much  difficulty you had doing the following activities. For each question, please [place] only one  response.    S1 Standing for long periods such as 30 minutes? Mild =           2   S2 Taking care of household responsibilities? None =         1   S3 Learning a new task, for example, learning how to get to a new place? None =         1   S4 How much of a problem do you have joining community activities (for example, festivals, Sabian[employer] or other activities) in the same way as anyone else can? Moderate =   3   S5 How much have you been emotionally affected by your health problems? Moderate =   3     In the past 30 days, how much difficulty did you have in:   S6 Concentrating on doing something for ten minutes? None =         1   S7 Walking a long distance such as a kilometer (or equivalent)? None =         1   S8 Washing your whole body? None =         1   S9 Getting dressed? None =         1   S10 Dealing with people you do not know? Moderate =   3   S11 Maintaining a friendship? None =         1   S12 Your day to day work? Mild =           2     H1 Overall, in the past 30 days, how many days were these difficulties present? Record number of days 15     H2 In the past 30 days, for how many days were you totally unable to carry out your usual activities or work because of  any health condition? Record number of days  0   H3 In the past 30 days, not counting the days that you were totally unable, for how many days did you cut back or reduce your usual activities or work because of any health condition? Record number of days 2     Attendance Agreement:  Client has signed Attendance Agreement:Yes      Preliminary Treatment Plan:    Client will return for follow-up session next month.  Client will continue with scheduled weight loss surgery clinic appointments.  she has writer's contact information and is aware that she may contact writer in the meantime as needed.      Client will work on the following homework assignment: Meal Prep one meal per day and reach out to a support to discuss feelings    The client reports no currently identified Latter-day, ethnic or cultural issues relevant to therapy.     services are not indicated.    Modifications to assist communication are not indicated.    The concerns identified by the client will be addressed in therapy.    Initial Treatment will focus on: Depressed Mood - sadness.    As a preliminary treatment goal, client will experience a reduction in depressed mood.    The focus of initial interventions will be to provide homework to reinforce skill development.    Collaboration / coordination of treatment will be initiated with the following support professionals: Medical team.    Referral to another professional/service is not indicated at this time..    A Release of Information is not needed at this time.    Report to child / adult protection services was NA.    Patient will have open access to their mental health medical record.    [unfilled]  2/7/2020

## 2020-02-08 ASSESSMENT — ANXIETY QUESTIONNAIRES: GAD7 TOTAL SCORE: 4

## 2020-02-17 ENCOUNTER — PSYCHE (OUTPATIENT)
Dept: PSYCHOLOGY | Facility: CLINIC | Age: 45
End: 2020-02-17
Payer: COMMERCIAL

## 2020-02-17 ENCOUNTER — OFFICE VISIT (OUTPATIENT)
Dept: SURGERY | Facility: CLINIC | Age: 45
End: 2020-02-17
Payer: COMMERCIAL

## 2020-02-17 VITALS — HEIGHT: 62 IN | WEIGHT: 200.4 LBS | BODY MASS INDEX: 36.88 KG/M2

## 2020-02-17 DIAGNOSIS — E66.01 SEVERE OBESITY (BMI 35.0-35.9 WITH COMORBIDITY) (H): ICD-10-CM

## 2020-02-17 DIAGNOSIS — E66.01 SEVERE OBESITY (BMI 35.0-35.9 WITH COMORBIDITY) (H): Primary | ICD-10-CM

## 2020-02-17 PROCEDURE — 97803 MED NUTRITION INDIV SUBSEQ: CPT | Performed by: DIETITIAN, REGISTERED

## 2020-02-17 PROCEDURE — 99207 ZZC NO CHARGE LOS: CPT | Performed by: PSYCHOLOGIST

## 2020-02-17 ASSESSMENT — MIFFLIN-ST. JEOR: SCORE: 1507.26

## 2020-02-17 NOTE — PROGRESS NOTES
Ms. Nava presented today for a homework completion check-in and for the administration of the MMPI-2. She highlighted she checked-in with her friends and shared highlights and struggles; she reported feeling stressed few days but overall has been in a good mood. She indicated that her friends were receptive to her bids and supportive. She added she increased communication overall and spent time together with a friend. She asserted she and her friends will be going on a 10 day cruise at the end of this week. For additional homework, she was asked to focus on selecting healthy food options for at least one meal and participate in one activity which is physically active, if she is unable to do this, she will engage in a brief exercise. Ms. Nava also completed her second homework assignment which was to prepare meals. She asserted she cooked a taco soup and ate it for several lunches.

## 2020-02-17 NOTE — PROGRESS NOTES
"PRE SURGICAL WEIGHT LOSS NUTRITION APPOINTMENT    Kezia Nava  1975  female  [id number]  44 year old    ASSESSMENT    Desired Surgical Procedure: undecided    REASON FOR VISIT:  Kezia Nava is a 44 year old year old female presents today for a pre-surgical weight loss follow-up appointment. Patient accompanied by self.    DIAGNOSIS:  Weight Status Obesity Grade II BMI 35-39.9    ANTHROPOMETRICS:  Height:  5'2\"  Initial Weight: 205.5  lb     Weight last visit: 203.4 lb    Current weight: 200.4 lb   BMI: 36.65  kg/(m^2).    VITAMINS AND MINERALS:  1 Multivitamin with Minerals  600 mg Calcium with Vitamin D BID  4000 International units Vitamin D      NUTRITION HISTORY:  Breakfast: protein bar or instant breakfast drink  Lunch: chicken or protein bar  Supper: chicken chili  Snacks: generally none   Fluids Consumed: 72 oz  Water/ enhanced water  Eating slower: Yes  Chewing foods thoroughly: Yes  Take 20-30 minutes to consume each meal: Yes  Fluids and meals separate by at least 30 minutes: Yes  Carbonation: none  Caffeine: none  Additional Information: Nima godfrey is pleased with her weight loss over the past month. She is concerned about going on a 10 day cruise at the end of this week and is concerned about with regain.    PHYSICAL ACTIVITY:  Type: walking  Frequency: 2-3 (days per week)  Duration: 15-20 (minutes)     DIAGNOSIS:  Previous Nutrition Diagnosis: Obesity related to long history of self- monitoring deficit and excessive energy intake evidenced by BMI of 37.2 kg/m2  No change, modified below    Previous goals:   Limit restaurant/fast food to 2x/week-met  Have protein with every meal-met  Exercise for 15-20 minutes, 3x/week-met    Current Nutrition Diagnosis: Obesity related to long history of self-monitoring deficit and excessive energy intake as evidenced by BMI of 36.65 kg/m2.    INTERVENTION:  Nutrition Prescription: Recommended energy/nutrient modification.    GOALS:  Explore bariatric " applications  Schedule sleep medicine consult  Focus on small portions and healthy food choices when on 10 day cruise as able    Intervention:  - Discussed progress towards previous goals.  - Reinforced importance of making behavior changes in preparation for bariatric surgery.   - Assessed learning needs and learning preferences       NUTRITION MONITORING AND EVALUATION:  Anticipated compliance: good  Patient demonstrated good understanding.       Follow up: Continue to monitor patient closely regarding weight loss and diet.  # of visits needed: 2  Cleared by RD: No     TIME SPENT WITH PATIENT: 20 minutes  Isra Underwood RD, HOLDEN  [facility]  [phone]

## 2020-02-18 ENCOUNTER — MYC MEDICAL ADVICE (OUTPATIENT)
Dept: FAMILY MEDICINE | Facility: CLINIC | Age: 45
End: 2020-02-18

## 2020-02-18 DIAGNOSIS — A08.4 VIRAL GASTROENTERITIS: ICD-10-CM

## 2020-02-18 NOTE — TELEPHONE ENCOUNTER
Pt requesting zofran refill and if needing any other labs since getting some from the [facility].  Zofran was last filled 2/7/17 for 12 tabs and 1 refill.   Dr. Angeles may want a repeat a1c    Sent message back to pt about zofran request before routing to pcp    Hemoglobin A1C   Date Value Ref Range Status   11/18/2019 6.6 (H) 0 - 5.6 % Final     Comment:     Normal <5.7% Prediabetes 5.7-6.4%  Diabetes 6.5% or higher - adopted from ADA   consensus guidelines.     07/30/2019 5.9 (H) 0 - 5.6 % Final     Comment:     Normal <5.7% Prediabetes 5.7-6.4%  Diabetes 6.5% or higher - adopted from ADA   consensus guidelines.     05/13/2019 6.0 (H) 0 - 5.6 % Final     Comment:     Normal <5.7% Prediabetes 5.7-6.4%  Diabetes 6.5% or higher - adopted from ADA   consensus guidelines.

## 2020-02-19 RX ORDER — ONDANSETRON 4 MG/1
4-8 TABLET, ORALLY DISINTEGRATING ORAL EVERY 8 HOURS PRN
Qty: 12 TABLET | Refills: 1 | Status: SHIPPED | OUTPATIENT
Start: 2020-02-19 | End: 2022-12-12

## 2020-02-28 ENCOUNTER — DOCUMENTATION ONLY (OUTPATIENT)
Dept: LAB | Facility: CLINIC | Age: 45
End: 2020-02-28

## 2020-02-28 DIAGNOSIS — E11.65 TYPE 2 DIABETES MELLITUS WITH HYPERGLYCEMIA, WITHOUT LONG-TERM CURRENT USE OF INSULIN (H): Primary | ICD-10-CM

## 2020-02-28 NOTE — PROGRESS NOTES
Patient has a lab appointment on Monday, but no orders are in.  Patient is requesting for a VITD and BMP test.  Please place orders for lab, if needed.  Thank you lab,

## 2020-03-02 DIAGNOSIS — E11.65 TYPE 2 DIABETES MELLITUS WITH HYPERGLYCEMIA, WITHOUT LONG-TERM CURRENT USE OF INSULIN (H): ICD-10-CM

## 2020-03-02 LAB — HBA1C MFR BLD: 6.8 % (ref 0–5.6)

## 2020-03-02 PROCEDURE — 83036 HEMOGLOBIN GLYCOSYLATED A1C: CPT | Performed by: INTERNAL MEDICINE

## 2020-03-02 PROCEDURE — 36415 COLL VENOUS BLD VENIPUNCTURE: CPT | Performed by: INTERNAL MEDICINE

## 2020-03-02 NOTE — RESULT ENCOUNTER NOTE
Anatoly Mast,    I have had the opportunity to review your recent results and an interpretation is as follows:  Your A1c shows continued elevation.  I would recommend a follow-up in the next 1 to 2 weeks with Dr. Angeles to discuss management of blood sugar    Sincerely,  José Miguel Lemus MD

## 2020-03-09 ENCOUNTER — PSYCHE (OUTPATIENT)
Dept: PSYCHOLOGY | Facility: CLINIC | Age: 45
End: 2020-03-09
Payer: COMMERCIAL

## 2020-03-09 ENCOUNTER — FCC EXTENDED DOCUMENTATION (OUTPATIENT)
Dept: PSYCHOLOGY | Facility: CLINIC | Age: 45
End: 2020-03-09

## 2020-03-09 DIAGNOSIS — E66.01 SEVERE OBESITY (BMI 35.0-35.9 WITH COMORBIDITY) (H): Primary | ICD-10-CM

## 2020-03-09 PROCEDURE — 96130 PSYCL TST EVAL PHYS/QHP 1ST: CPT | Performed by: PSYCHOLOGIST

## 2020-03-09 PROCEDURE — 96131 PSYCL TST EVAL PHYS/QHP EA: CPT | Performed by: PSYCHOLOGIST

## 2020-03-09 NOTE — PROGRESS NOTES
[facility]  Adult Bariatric Pre-Surgical Psychological Assessment   Structured Interview    CLIENT'S NAME: Kezia Nava  : 1975    EVALUATOR: Dr. Deidre Bear PsyD, LP    DATE OF SERVICE: 2020 & 2020    Sources of Information:    Individual Therapy Session, Conducted 2020    [place] Multiphasic Personality Inventory- 2nd Edition, Administered 2020    World Health Organization Disability Assessment Schedule 2.0, Administered 2020    Generalized Anxiety Disorder- 7 Item, Administered 2020    Patient Health Questionnaire- 9 Item, Administered 2020    Clinical Interview, Completed 2020 & 2020    [facility], Medical Chart, Reviewed 2020    Identifying Information:  Ms. Nava is a 44-year-old, single  female; she is employed full-time. She was referred for an evaluation by the [facility] Weight Loss Surgery Team for a pre-surgical psychological assessment. She arrived alone and on time for appointments at the [facility].    Client's Statement of Presenting Concern:  Ms. Nava s assessment was a routine part of her pursuit for weight loss surgery.  She asserted her weight has been a concern all her life. She expressed a desire to lose weight so that when she goes to the doctor, she will no longer be told that if she were to lose weight her medical problems would be  solved.     History of Presenting Concern:  Ms. Nava stated she has  never been an ideal weight.  She recalled her mother telling her that if she  didn t stop eating, [she] would look like her [overweight].  She indicated she attended her first weight loss surgery informational session in 2019. She highlighted she researched surgery as an option for weight loss years ago, but did not initiate the process at the time because of fear. 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 Ms. Nava asserted she changed her mind because she was  sick of the way [she] looks.  She reported she has attempted several different methods to lose weight including receiving weight loss medications, cutting carbohydrates, having portioned meals delivered, drinking Slim Fast shakes, exercising and weight loss programming including Abigail Fly and Weight Watchers. She indicated she lost the most weight with Weight Watchers 20 years ago. Ms. Nava asserted that over the course of a year, she lost 40 pounds. She acknowledged she gained all the weight back and  more.  She added that recently injections of Victoza helped her lose 10 pounds, but the medication at the highest does caused her heart to beat out of rhythm.     Ms. Nava believed that weight loss surgery would help her  be accountable  for the amount of food she eats and help her to not  eat as much.  She demonstrated an awareness that the amount of food she eats per meal will be drastically reduced yet added that  that s what the surgery is for.  Ms. Nava asserted that one barrier to maintaining the post-surgery lifestyle will be selecting healthy food options in particular when dining out. She added she has begun implementing eating habit changes and increasing her water consumption. She highlighted she has the support of her friends to purse surgery. She indicated she has not shared the news with her family yet because they do not  keep things private.  She indicated she plans to inform her family of her decision when she is approved for the surgery. She expressed the belief that they will be supportive.     Social History:  Developmental History  Ms. Nava was raised in [place], [place]. She was the third child born to her parents; she stated her eldest sibling was adopted and then a set of twin girls were born, herself and younger brother. She described her childhood as  typical.  She reported feeling loved and  nurtured by her parents. She indicated she was raised on a  hobby farm.  Ms. 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 She highlighted she did not know the family was poor until she was an adult. She recalled her mother sewing the family s clothing, making bread, receiving reduced school lunch and drinking milk for their cow. Ms. Nava stated the when the children misbehaved, they were spanked. She denied witnessing incidents of domestic violence or being the victim of childhood abuse.     Intimate Partner History  Ms. Nava reported she is not currently involved in a romantic relationship yet is  trying to date.  She indicated she has dating profiles online and is open to accepting invitations for dates. Ms. Nava asserted her last relationship ended in August of 2019. She believed the relationship was  getting serious  but the man  ghosted  her after a couple of months. She added she did not  think he was honest  with her. Ms. Nava acknowledged she has never been involved in a long-term relationship. She expressed being  very shy  and  self-conscious  around men.    Peer Relationship History  Ms. Nava reported having a best friend whom she has known for over 20 years. She added she has six other friends whom she sees monthly and travels with. Ms. Nava asserted she feels supported by her friends yet lonely at times. She acknowledged she does not reach out to her friends or family members when distressed because she does not want to be a  burden.      Educational & Occupational History  Ms. Nava graduated from [employer] in the year 1993. She asserted she earned good grades and was in the top 12% of her class. She denied difficulties with learning yet reported troubles with concentrating. She recalled having a  study carrel  placed around her in the 2nd grade because she was  too busy looking around and not doing work.  She added  she also struggled with procrastination and organization. Ms. Nava stated she got along well with teachers and her peers yet indicated she had few friends. She added she was the  for basketball, track and volleyball.     Following high school, Ms. Nava enrolled at [employer]. She stated she received a diploma in the year 1996. She asserted she returned to school to earn her associate s degree and then bachelor s degree. Ms. 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     Mental Health History:  Ms. Nava reported she has never been evaluated for, diagnosed with or treated for a mental illness; she has been prescribed Pristiq for menopause symptoms. She indicated that her biggest source of stress is money. She acknowledged she has difficulty paying her bills because she sometimes forgets to pay them or has unexpected expenses. She admitted to having significant credit card debt and student loans. Ms. Nava stated she has experienced some depressive and anxiety symptoms throughout the years but at a subclinical level. She indicated that some nights she struggles to fall asleep because of racing or ruminating thoughts. She stated she typically goes to be at 2100 and wakes at 0500. She asserted she does not feel rested upon waking. She acknowledged she experienced one panic attack during her lifetime. She also admitted to having passive suicidal thoughts in the past; she denied ever attempting to harm or kill herself.     Ms. Nava denied a problematic pattern of eating. She asserted she has never felt out of control when eating or eating to cope with negative emotions. She acknowledged feeling embarrassed by the amount of food she has eaten or eating beyond the point of being full. She indicated she consumes two to three meals per day including  a protein shake or bar for breakfast, cafeteria meal for lunch and a cooked meal with protein for dinner. Ms. Nava added she exercises three to four times per week by walking 15 minutes or climbing the stairs.     Chemical Health History:  Ms. Nava reported she consumed alcohol for the first time at the age of 8 years old at a wedding. She asserted she did not purposefully seek out alcohol until she was an adult. Ms. Nava estimated she consumes an alcoholic beverage once per month or slightly more if on vacation. She highlighted she does not keep alcohol in her home. She denied she has ever abuse illegal drugs or prescription medication. Ms. Nava was aware of the risks with consuming alcohol, abusing drugs and ingesting caffeine following weight loss surgery.     Significant Losses / Trauma / Abuse / Neglect Issues:  Ms. Nava acknowledged that being an ED nurse can be particularly stressful or upsetting at times. She indicated that if there is a trauma experienced at work, she participates in staff debriefings. She added she will sew or read to cope with negative emotions yet  rarely  reaches out to others for support.     Medical Issues:  Ms. Nava indicated she had been diagnosed with Sleep Apnea, Hypertension and Type II Diabetes. She also stated she has been treated for thyroid issues and migraines.     Allergies   Allergen Reactions     Lisinopril Cough     Pneumovax [Pneumococcal Polysaccharides] Other (See Comments)     Red streaking and pain     Past Medical History   Diagnosis Date     Diffuse cystic mastopathy      Elevated BP 12/15/2016     Hypertension      Mesenteric adenitis 4/14     Migraines      Nephrolithiasis     s/p lithotripsy     Sleep apnea     no cpap     Thyroiditis, acute 12/2/2014    transient hypothyroidism- resolved     Type 2 diabetes mellitus with hyperglycemia, without long-term current use of insulin (H) 2/6/2017     Medication Adherence:  Ms. Nava asserted she is taking her  medications as prescribed. She was encouraged to reach out to her providers if concerns arise regarding her medications.     Mental Status Assessment:  Appearance:   Appropriate  Eye Contact:   Good  Psychomotor Behavior: Normal  Attitude:   Cooperative  Orientation:   All  Speech   Rate / Production: Normal   Volume:  Normal  Mood:    Normal  Affect:    Appropriate, Slightly Constricted  Thought Content:  Clear   Thought Form:  Coherent, Goal Directed, Logical  Insight:   Good     Review of Symptoms:  Depression:     Sleep Issues, Guilt, Loss of Energy, Rumination  Vivi:             No symptoms  Psychosis:       No symptoms  Anxiety:          Worries Nervousness  Panic:              No symptoms  Post-Traumatic Stress Disorder:        No symptoms  Obsessive Compulsive Disorder:       No symptoms  Eating Disorder:          No symptoms  Oppositional Defiant Disorder:           No symptoms  ADD / ADHD:  Forgetful  Conduct Disorder:       No symptoms    Safety Issues and Plan for Safety and Risk Management:  Ms. Nava acknowledged a history of passive suicidal ideations. She denied she has ever attempted to end her life or harm herself. She reported no history of homicidal ideations or threats    Client denied current fears or concerns for personal safety.  Client denied current or recent suicidal ideation or behaviors.  Client denied current or recent homicidal ideation or behaviors.  Client denied current or recent self-injurious behavior or ideation.  Client denied other safety concerns.  Client reported there are no firearms in her home.    Ms. Nava was recommended to call 911 or go to the local emergency department should there be a change in any of these risk factors.    Patient's Strengths and Limitations:  Ms. Nava identified the following strengths or resources that will help her succeed in counseling: friends / good social support, insight, intelligence and work ethic. Client identified the following  supports: friends. Things that may interfere with the client s success in treatment include: difficulty reaching out for support and financial hardship.    Diagnostic Criteria:  Ms. Nava denied she has ever experienced an episode of depression, anxiety, laura or psychosis which caused clinically significant impairment or distress. She also denied a history of drug and alcohol abuse. She did not meet criterion for a mental illness or substance abuse disorder.     Additionally, Ms. Nava did not criterion for an eating disorder. She denied episodes of binge eating or restricting. She asserted she has never felt out of control when eating or eating to cope with negative emotions. She acknowledged feeling embarrassed by the amount of food she has eaten or eating beyond the point of being full.     Functional Status:  Ms. Nava s health concerns have caused reduced functioning in the following areas: physical health and relational    Attendance Agreement:  Ms. Nava signed the attendance agreement.    Preliminary Treatment Plan:  Ms. Nava will return for a follow-up session in 4 weeks. She will continue with scheduled weight loss surgery clinic appointments. For homework, she was encouraged to continue exercising, preparing one meal per day, and selecting healthy meal options on vacation.      The client identified no Jain, ethnic or cultural issues relevant to the assessment process.      services are not indicated.     Modifications to assist communication are not indicated.     The concerns identified by the client will be addressed in upon completion of the evaluation.      Initial Treatment will focus on: Functional Impairment as related to weight.     As a preliminary treatment goal, client will complete assessment process.     The focus of initial interventions will be to increase ability to function adaptively.     Treatment team will be advised to coordinate care with the aforementioned support  professionals.     Referral to another professional/service is not indicated at this time.     A Release of Information is not needed at this time.     Report to child / adult protection services was not needed.     Patient will have open access to their mental health medical record.    Assessment Measures:  World Health Organization Disability Assessment Schedule 2.0  The World Health Organization Disability Assessment Schedule 2.0 (WHODAS 2.0) short version is a 12-item measure that screens disability in adults age 18 years and older. Each self-administered item asks the individual to rate how much difficulty he or she has had in specific areas of functioning during the past 30 days.     S1 Standing for long periods such as 30 minutes? Mild = 2   S2 Taking care of household responsibilities? Not Rated   S3 Learning a new task, for example, learning how to get to a new place? None = 1   S4 How much of a problem do you have joining community activities (for example, festivals, Cheondoism or other activities) in the same way as anyone else can? Severe = 4   S5 How much have you been emotionally affected by your health problems? Moderate = 3           In the past 30 days, how much difficulty did you have in:   S6 Concentrating on doing something for ten minutes? None = 1   S7 Walking a long distance such as a kilometer (or equivalent)? None = 1   S8 Washing your whole body? None = 1   S9 Getting dressed? None = 1   S10 Dealing with people you do not know? Moderate = 3   S11 Maintaining a friendship? None = 1   S12 Your day to day work? Mild = 2      H1 Overall, in the past 30 days, how many days were these difficulties present? Record number of days 15      H2 In the past 30 days, for how many days were you totally unable to carry out your usual activities or work because of any health condition? Record number of days 0   H3 In the past 30 days, not counting the days that you were totally unable, for how many days did  you cut back or reduce your usual activities or work because of any health condition? Record number of days 2     Patient Health Questionnaire 9- Item Scale  The PHQ-9 is a multipurpose instrument for screening, diagnosing, monitoring and measuring the severity of depression. It incorporates the diagnostic criterion for a depressive disorder within a brief self-report tool.     Ms. Nava was administered the PHQ-9 on 02/70/2020. She earned a score of 7 which placed her within the minimum range. She reported experiencing trouble with sleeping, low energy, overeating, and low self-esteem.      Generalized Anxiety Disorder 7-Item Scale   The ASHA-7 is a multipurpose instrument for screening, diagnosing, monitoring and measuring the severity of anxiety. It incorporates the diagnostic criterion for Generalized Anxiety Disorder yet is sensitive to other anxiety disorders within a brief self-report tool.       Ms. Nava was administered the ASHA-7 on 02/07/2020. She earned a score of 4 which placed her within the minimum range. She acknowledged difficulty with controlling worry and relaxing. She also indicated she experiences feelings of dread.      [place] Multiphasic Personality Inventory-2nd Edition  First developed in the 1930's, the [place] Multiphasic Personality Inventory is a complex psychological instrument designed to measure personality characteristics of adults including mental illnesses, behaviors, thought patterns, strengths, and weaknesses. Several validity scales have been incorporated into the test in order to measure respondents  approach to the testing environment. In addition, ten standard clinical sub-scales are used to identify problem areas, problem intensity, and behaviors associated with identified characteristics. The MMPI-II, the most recent version of the instrument, was refined in the 1990's and adds additional strengths in terms of validity analysis and additional clinical sub-scales It  should be noted the MMPI-II is regarded as one aspect only of a thorough diagnostic assessment and it was not normed with a standardized population including Native Americans.      Ms. Nava was administered the MMPI-2 on 02/18/2020 at the [facility] by a staff member. The validity scales indicated the profile was valid. Ms. Nava appeared to attempt to present herself in a favorable light as she avoided acknowledging socially unacceptable or disturbing content represented in the scales. Most clinical, content, and supplementary fell within the average range or below. There was some elevation on scales which measured depressive symptoms and social discomfort. Individuals with profiles similar to Ms. Nava often report feelings of inadequacy, anxiety, and misery. These adults tend to lack self-confidence causing them to be sensitive to criticism from others and internalize negative experiences. Due to their low self-esteem people like Ms. Nava are inclined to withdraw socially as they struggle to express their emotions and concerns. Additionally, individuals with profiles like Ms. Nava may present as eldridge and with anxiety. They tend to view their future as uncertain and unchanging.       Follow-up Session:  Ms. Nava met with this writer for an individual therapy session on 03/09/2020. This writer reviewed the results of her MMPI-2. She validated the interpretation and acknowledged she experiences discomfort in social settings and feel depressed at times. She reported neither a change in depressive or anxiety symptoms, nor life stressors between sessions. Ms. Nava indicated she completed her homework as assigned. While on a 10 day cruise, she was asked to engage in daily activity and make at least one healthy food selection per meal. She highlighted she participated in several physical activities including, swimming, snorkeling, horseback riding,  zip-lining, and walking. She acknowledged that the extra food choices made available on the ship was overwhelming at first but she was able to select healthy items. Ms. Nava indicated that it can be difficult to travel with others because you can be beholden to their  whims  yet she asserted her group of friends is good at compromising and going with the flow. During the session, this writer also reviewed the Bariatric Surgery & Relationship Changes handout. She asked questions as needed and made comments about the worksheet. She had no immediate concerns to address. She reported she has two more nutrition appointments before surgery will be schedules; she anticipated a late spring date.     Summary:  Ms. Nava was referred for an evaluation by the [facility] Weight Loss Surgery Team for a pre-surgical psychological assessment. She indicated that her weight has been a concern all her life. She expressed a desire to lose weight so that when she goes to the doctor, she will no longer be told that if she were to lose weight her medical problems would be  solved.  Ms. Nava stated she attended her first weight loss surgery informational session in November of 2019. She highlighted she researched surgery as an option for weight loss years ago, but did not initiate the process at the time because of fear. Ms. Nava asserted she changed her mind because she was sick of being overweight. She reported she has attempted several different methods to lose weight including receiving weight loss medications, cutting carbohydrates, having portioned meals delivered, drinking Slim Fast shakes, exercising and weight loss programming. She indicated she lost the most weight with Weight Watchers 20 years ago. Ms. Nava asserted that over the course of one year, she lost 40 pounds. She acknowledged she gained all the weight back and more. She added that recently injections of Victoza helped her lose 10 pounds, but the medication  at the highest does caused her heart to beat out of rhythm.     Ms. Nava believed that weight loss surgery would help her be accountable for the amount of food she eats. She denied a problematic pattern of eating. She asserted she has never felt out of control when eating or eating to cope with negative emotions. She acknowledged feeling embarrassed by the amount of food she has eaten or eating beyond the point of being full. She indicated she consumes two to three meals per day including a protein shake or bar for breakfast, cafeteria meal for lunch and a cooked meal with protein for dinner. Ms. Nava added she exercises three to four times per week by walking 15 minutes or climbing the stairs. She demonstrated awareness that the amount of food she puts into her body at each meal will be drastically reduced yet added that this is the purpose of surgery. Ms. Nava acknowledged that one barrier to maintaining the post-surgery lifestyle will be selecting healthy food options in particular when dining out and traveling. She added she has begun implementing healthier eating habit such as portion control and increased water consumption. She highlighted she has the support of her friends to purse surgery. She indicated she has not shared the news with her family but plans to inform them once she has been approved for surgery.     During the clinical interview and testing, Ms. Nava acknowledged a history of depressive and anxiety symptoms. She indicated that some nights she struggles to fall asleep because of racing or ruminating thoughts. She asserted she does not feel rested upon waking. She acknowledged she experienced one panic attack during her lifetime. She also admitted to having passive suicidal thoughts in the past; she denied ever attempting to harm or kill herself. Ms. Nava acknowledged feeling uncomfortable around others. Her MMPI-2 profile was reflective of an individual who lacks self-confidence resulting  in sensitivity to criticism from others and causing internalization of negative experiences. Ms. Nava indicated that her biggest source of stress is money. She acknowledged she has difficulty paying her bills because she sometimes forgets to pay them or has unexpected expenses. She admitted to having significant credit card debt and student loans. Ms. Nava did not meet full criterion for a diagnosis and did not report impairment or distress associated with symptoms. She also did not meet criterion for a substance use disorder or eating disorder. She outlined several healthy coping skills and strong social supports.    Final Recommendation:  From a psychological standpoint, Ms. Nava is cleared to continue in the process for pursuing bariatric surgery. To summarize the 3 primary conditions being evaluated in the psychological assessment:     1. Client is prepared to comply with ongoing aftercare and lifestyle changes after surgery--condition satisfied  Ms. Nava has made good progress toward prioritizing healthy changes to her eating and exercising habits. Important steps include: exercising, preparing meals, selecting nutrient rich foods, reaching out to supports, increasing water intake and eating 3 meals per day.     2. Client is emotionally stable to proceed with surgery--condition satisfied  Ms. Nava reported no history of mental health or substance use diagnosis or treatment. She outlined strong social supports. She reported a few stressors in her life yet felt able to manage theses as they arise.     3. Client is cognitively capable of understanding the risks of the procedure--condition satisfied  Ms. aNva has been able to demonstrate that she understands the risks of surgery (compared to the risks of not having surgery).       PLAN:   Ms. Nava demonstrated good progress with prioritizing behavior changes and self-care habits. She will continue with implementing healthy eating habits while working and  traveling. This write does not recommend further pre-surgical follow- up with mental health. Ms. Nava should return to this writer for a standard post-surgical follow up, at 1 and 3 months, after surgery. She likely would likely benefit from participation in a weight loss surgery support group, starting before surgery and continuing afterwards, for additional accountability and support. She was provided with this writer s contact information and may contact me as needed.                                             Psychological Testing Billing/Services Summary                                         Interview/Assessment Date Time   Interview 02/07/2020 [id number] (1)   Documentation 02/07/2020 [id number] (.25)   Testing Evaluation 43277- 1st 60 mins 76202- each add 60   Record Review & Clarify Referral Question     Clinical Decision Making     Patient Symptom Management     Battery Modification     Integration/ Report Generation 02/13/2020; 02/14/2020; 03/05/2020; 03/06/2020 3090-5946 (1); 7752-9425 (1); 6756-8631 (1); 3507-4069 (1)   Feedback Session 03/09/2020 6367-5932 (.25)   Post-Service Work 03/09/2020 8842-7616 (.25)   Total Time 1 3.5   Total Units 1 3   Test Administration 47042- 1st 30 mins 81972- each add 30    Test Administration (Face) 0 0   Scoring 0 0   Total Time 0 0   Total Units 0 0   Diagnoses         Dr. Deidre Bear, [place], LP

## 2020-03-09 NOTE — PROGRESS NOTES
Progress Note    Patient Name: Kezia Nava  Date: 03/09/2020         Service Type: Individual  Video Visit: No     Session Start Time: 0804 Session End Time: 0825     Session Length: 20    Session #: 2    Attendees: Client attended alone       DATA  Interactive Complexity: No  Crisis: No       Progress Since Last Session (Related to Symptoms / Goals / Homework):   Symptoms: No change -stable    Homework: Achieved / completed to satisfaction      Episode of Care Goals: Achieved / completed to satisfaction - ACTION (Actively working towards change); Intervened by reinforcing change plan / affirming steps taken     Current / Ongoing Stressors and Concerns:   Ms. Nava did not identify any new stressors or concerns.     Treatment Objective(s) Addressed in This Session:   Review of Bariatric Pre-Surgical Psychological Assessment including diagnosis, test results, and recommendations.     Intervention:  Ms. Nava met with this writer for an individual therapy session on 03/09/2020. This writer reviewed the results of her MMPI-2. She validated the interpretation and acknowledged she experiences discomfort in social settings and feel depressed at times. She reported neither a change in depressive or anxiety symptoms, nor life stressors between sessions. Ms. Nava indicated she completed her homework as assigned. While on a 10 day cruise, she was asked to engage in daily activity and make at least one healthy food selection per meal. She highlighted she participated in several physical activities including, swimming, snorkeling, horseback riding, zip-lining, and walking. She acknowledged that the extra food choices made available on the ship was overwhelming at first but she was able to select healthy items. Ms. Nava indicated that it can be difficult to travel with others because you can be beholden to their  whims  yet she asserted her group of friends is good at compromising  and going with the flow. During the session, this writer also reviewed the Bariatric Surgery & Relationship Changes handout. She asked questions as needed and made comments about the worksheet. She had no immediate concerns to address. She reported she has two more nutrition appointments before surgery will be schedules; she anticipated a late spring date.      ASSESSMENT: Current Emotional / Mental Status (status of significant symptoms):   Risk status (Self / Other harm or suicidal ideation)   Patient denies current fears or concerns for personal safety.   Patient denies current or recent suicidal ideation or behaviors.   Patientdenies current or recent homicidal ideation or behaviors.   Patient denies current or recent self injurious behavior or ideation.   Patient denies other safety concerns.   Patient reports there has been no change in risk factors since their last session.     Patientreports there has been no change in protective factors since their last session.     Recommended that patient call 911 or go to the local ED should there be a change in any of these risk factors.     Appearance:   Appropriate    Eye Contact:   Good    Psychomotor Behavior: Normal    Attitude:   Cooperative    Orientation:   All   Speech    Rate / Production: Normal     Volume:  Normal    Mood:    Normal   Affect:    Appropriate    Thought Content:  Clear    Thought Form:  Coherent  Goal Directed  Logical    Insight:    Good      Medication Review:   No current psychiatric medications prescribed     Medication Compliance:   No     Changes in Health Issues:   None reported     Chemical Use Review:   Substance Use: Chemical use reviewed, no active concerns identified      Tobacco Use: No current tobacco use.      Diagnosis:  No diagnosis found.    Collateral Reports Completed:   Routed note to Care Team Member(s)    Final Recommendation:  From a psychological standpoint, Ms. Nava is cleared to continue in the process for pursuing  bariatric surgery. To summarize the 3 primary conditions being evaluated in the psychological assessment:     1. Client is prepared to comply with ongoing aftercare and lifestyle changes after surgery--condition satisfied  Ms. Nava has made good progress toward prioritizing healthy changes to her eating and exercising habits. Important steps include: exercising, preparing meals, selecting nutrient rich foods, reaching out to supports, increasing water intake and eating 3 meals per day.     2. Client is emotionally stable to proceed with surgery--condition satisfied  Ms. Nava reported no history of mental health or substance use diagnosis or treatment. She outlined strong social supports. She reported a few stressors in her life yet felt able to manage theses as they arise.     3. Client is cognitively capable of understanding the risks of the procedure--condition satisfied  Ms. Nava has been able to demonstrate that she understands the risks of surgery (compared to the risks of not having surgery).       PLAN:   Ms. Nava demonstrated good progress with prioritizing behavior changes and self-care habits. She will continue with implementing healthy eating habits while working and traveling. This write does not recommend further pre-surgical follow- up with mental health. Ms. Nava should return to this writer for a standard post-surgical follow up, at 1 and 3 months, after surgery. She likely would likely benefit from participation in a weight loss surgery support group, starting before surgery and continuing afterwards, for additional accountability and support. She was provided with this writer s contact information and may contact me as needed.       Psychological Billing  Interview/Assessment Date Time   Interview 02/07/2020 [id number] (1)   Documentation 02/07/2020 [id number] (.25)   Testing Evaluation 73511- 1st 60 mins 05763- each add 60   Record Review & Clarify Referral Question     Clinical Decision Making      Patient Symptom Management     Battery Modification     Integration/ Report Generation 02/13/2020; 02/14/2020; 03/05/2020; 03/06/2020 0673-1933 (1); 3426-4137 (1); 4350-4983 (1); 9652-6645 (1)   Feedback Session 03/09/2020 6793-7348 (.25)   Post-Service Work 03/09/2020 6322-2249 (.25)   Total Time 1 3.5   Total Units 1 3   Test Administration 07316- 1st 30 mins 17611- each add 30    Test Administration (Face) 0 0   Scoring 0 0   Total Time 0 0   Total Units 0 0   Diagnoses           Deidre Bear, PhD LP

## 2020-03-10 ENCOUNTER — TRANSFERRED RECORDS (OUTPATIENT)
Dept: HEALTH INFORMATION MANAGEMENT | Facility: CLINIC | Age: 45
End: 2020-03-10

## 2020-03-13 ENCOUNTER — OFFICE VISIT (OUTPATIENT)
Dept: SLEEP MEDICINE | Facility: CLINIC | Age: 45
End: 2020-03-13
Payer: COMMERCIAL

## 2020-03-13 VITALS
DIASTOLIC BLOOD PRESSURE: 85 MMHG | HEART RATE: 89 BPM | WEIGHT: 205 LBS | BODY MASS INDEX: 37.73 KG/M2 | HEIGHT: 62 IN | SYSTOLIC BLOOD PRESSURE: 126 MMHG | OXYGEN SATURATION: 97 %

## 2020-03-13 DIAGNOSIS — G47.33 OSA (OBSTRUCTIVE SLEEP APNEA): Primary | Chronic | ICD-10-CM

## 2020-03-13 PROCEDURE — 99213 OFFICE O/P EST LOW 20 MIN: CPT | Performed by: PHYSICIAN ASSISTANT

## 2020-03-13 ASSESSMENT — MIFFLIN-ST. JEOR: SCORE: 1528.12

## 2020-03-13 NOTE — PATIENT INSTRUCTIONS
Your BMI is Body mass index is 37.49 kg/m .  Weight management is a personal decision.  If you are interested in exploring weight loss strategies, the following discussion covers the approaches that may be successful. Body mass index (BMI) is one way to tell whether you are at a healthy weight, overweight, or obese. It measures your weight in relation to your height.  A BMI of 18.5 to 24.9 is in the healthy range. A person with a BMI of 25 to 29.9 is considered overweight, and someone with a BMI of 30 or greater is considered obese. More than two-thirds of American adults are considered overweight or obese.  Being overweight or obese increases the risk for further weight gain. Excess weight may lead to heart disease and diabetes.  Creating and following plans for healthy eating and physical activity may help you improve your health.  Weight control is part of healthy lifestyle and includes exercise, emotional health, and healthy eating habits. Careful eating habits lifelong are the mainstay of weight control. Though there are significant health benefits from weight loss, long-term weight loss with diet alone may be very difficult to achieve- studies show long-term success with dietary management in less than 10% of people. Attaining a healthy weight may be especially difficult to achieve in those with severe obesity. In some cases, medications, devices and surgical management might be considered.  What can you do?  If you are overweight or obese and are interested in methods for weight loss, you should discuss this with your provider.     Consider reducing daily calorie intake by 500 calories.     Keep a food journal.     Avoiding skipping meals, consider cutting portions instead.    Diet combined with exercise helps maintain muscle while optimizing fat loss. Strength training is particularly important for building and maintaining muscle mass. Exercise helps reduce stress, increase energy, and improves fitness.  Increasing exercise without diet control, however, may not burn enough calories to loose weight.       Start walking three days a week 10-20 minutes at a time    Work towards walking thirty minutes five days a week     Eventually, increase the speed of your walking for 1-2 minutes at time    In addition, we recommend that you review healthy lifestyles and methods for weight loss available through the National Institutes of Health patient information sites:  http://win.niddk.nih.gov/publications/index.htm    And look into health and wellness programs that may be available through your health insurance provider, employer, local community center, or zayra club.    Weight management plan: Patient was referred to their PCP to discuss a diet and exercise plan.

## 2020-03-13 NOTE — PROGRESS NOTES
Sleep Follow-Up Visit:    Date on this visit: 3/13/2020       Kezia Nava comes in today for follow-up of her treatment for ANA. 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      AHI was 24.5/hr, with desaturations down to 78%. She spent 1.6 minutes below 90% SpO2. RDI 24.5/hr.  REM RDI 30.8/hr.  Supine RDI 29.9/hr.  Her non-supine RDI was 7.8/hr. Periodic Limb Movement Index 4/hour, 1.5/hr were associated with arousals.       She was on auto CPAP 10-15 cm. She has not been using the CPAP in the last year. She had been using it until she got sick. After the illness, she couldn't get comfortable with it. She was using an AirFit N10 nasal mask and she felt claustrophobic with it. She felt she was suffocating. She can't tell if the pressure is too much or not enough. The ramp was turned off.   She used to have get a dry left eye. That went away with stopping CPAP.         Past medical/surgical history, family history, social history, medications and allergies were reviewed.      Problem List:  Patient Active Problem List    Diagnosis Date Noted     Pre-diabetes 05/13/2019     Priority: Medium     Fibroid uterus 05/13/2019     Priority: Medium     Paresthesias- ? TIA vs complex migraine (preferred per neuro) 10/05/2017     Priority: Medium     Severe obesity (BMI 35.0-35.9 with comorbidity) (H) 10/05/2017     Priority: Medium     Acute bilateral low back pain with right-sided sciatica 09/01/2017     Priority: Medium     ANA (obstructive sleep apnea) 08/15/2017     Priority: Medium     Benign essential hypertension 01/04/2017     Priority: Medium     Cervical radiculopathy 12/12/2015     Priority: Medium     Pain in joint, ankle and foot 06/14/2010     Priority: Medium        Impression/Plan:    (G47.33) ANA (obstructive sleep apnea)  (primary encounter diagnosis)  Comment: Kezia is planning on weight loss surgery and needs to  demonstrate that her apnea is treated. She has not used CPAP in a year. She has felt claustrophobic with it. She does not want to try a full face mask. She is not sure if the pressure was too high or too low. She did not bring her mask or hose today, so I could not experiment with the pressures today.  Plan: Comprehensive DME        Resume auto CPAP 10-15 cm. A prescription was written for new supplies. She was encouraged to bring her machine to the appointment with [facility] to experiment with the pressures there. I showed her how to use the ramp function. I advised her to practice using the machine in the daytime to get used to having it on again without the pressure of trying to fall asleep with it. She was encouraged to contact me over [employer] if she identifies any other barriers to using it.      She will follow up with me in about 2 month(s).     Fifteen minutes spent with patient, all of which were spent face-to-face counseling, consulting, coordinating plan of care.      Bennett Goltz, PA-C    CC: Vidal Aguilar PA-C

## 2020-03-18 NOTE — PROGRESS NOTES
"  Kezia Nava is a 44 year old female who is being evaluated via a billable telephone visit.      The patient has been notified of following:     \"This telephone visit will be conducted via a call between you and your physician/provider. We have found that certain health care needs can be provided without the need for a physical exam.  This service lets us provide the care you need with a short phone conversation.  If a prescription is necessary we can send it directly to your pharmacy.  If lab work is needed we can place an order for that and you can then stop by our lab to have the test done at a later time.    If during the course of the call the physician/provider feels a telephone visit is not appropriate, you will not be charged for this service.\"     I have reviewed the note as documented above.  This accurately captures the substance of my conversation with the patient.      Phone call contact time  Call Started at: 10:10 a.m.   Call Ended at 10:25        PRE SURGICAL WEIGHT LOSS NUTRITION APPOINTMENT    Kezia Nava  1975  female  [id number]  44 year old    ASSESSMENT    Desired Surgical Procedure: undecided    REASON FOR VISIT:  Kezia Nava is a 44 year old year old female presents (over phone) today for a pre-surgical weight loss follow-up appointment. Patient accompanied by self.    DIAGNOSIS:  Weight Status Obesity Grade II BMI 35-39.9    ANTHROPOMETRICS:  Height:  5'2\"  Initial Weight: 205.5 lb     Weight last visit: 200.4 lb    Current weight:  198.6 lb (stated)  BMI: 36.32  kg/(m^2).    VITAMINS AND MINERALS:   1 Multivitamin with Minerals-bed time  500 mg Calcium with Vitamin D TID (7 am /11 am /5 pm)  4000 International units Vitamin D    NUTRITION HISTORY:  Breakfast: cold cereal with milk or protein bar  Lunch: chicken marsla, rice  Supper: cilantro lime burrito  Snacks: rare or protein drink   Fluids Consumed: Water,protein drink  Eating slower: Yes  Chewing foods thoroughly: " Yes  Take 20-30 minutes to consume each meal: Yes  Fluids and meals separate by at least 30 minutes: Yes  Carbonation: denies  Caffeine: denies  Additional Information: Patient did  not get notified about phone consult, but was fine with doing a phone consult. Tried to make good food choices when on cruise X 10 days and was more active. Patient asking if this clinic is scheduling bariatric surgery. Explained that bariatric surgery is not being scheduled at this time, but we will keep her informed at her monthly  Visits.    PHYSICAL ACTIVITY:  Type: walking  Frequency: 3-4 (days per week)  Duration: 15-30 (minutes)     DIAGNOSIS:  Previous Nutrition Diagnosis: Obesity related to long history of self- monitoring deficit and excessive energy intake evidenced by BMI of 36.65 kg/m2  No change, modified below    Previous goals:   Explore bariatric applications-met  Schedule sleep medicine consult-met  Focus on small portions and healthy food choices when on 10 day cruise as able-met    Current Nutrition Diagnosis: Obesity related to long history of self-monitoring deficit and excessive energy intake as evidenced by BMI of 36.32 kg/m2.    INTERVENTION:  Nutrition Prescription: Recommended energy/nutrient modification.    GOALS:  Increase  minutes of walking to 30 minutes  Continue to practice all pre-operative behavior changes      Intervention:  - Discussed progress towards previous goals. Mailed a copy of new goals  - Reinforced importance of making behavior changes in preparation for bariatric surgery.   - Assessed learning needs and learning preferences       NUTRITION MONITORING AND EVALUATION:  Anticipated compliance: good  Patient demonstrated good understanding.       Follow up: Continue to monitor patient closely regarding weight loss and diet.  # of visits needed: 1 (has already been scheduled per patient)  Cleared by RD: No     TIME SPENT WITH PATIENT: 15 minutes    Isra Underwood RD, LD  [facility]  [facility], [place]  [phone]

## 2020-03-23 ENCOUNTER — OFFICE VISIT (OUTPATIENT)
Dept: SURGERY | Facility: CLINIC | Age: 45
End: 2020-03-23
Payer: COMMERCIAL

## 2020-03-23 DIAGNOSIS — E66.01 SEVERE OBESITY (BMI 35.0-35.9 WITH COMORBIDITY) (H): ICD-10-CM

## 2020-03-23 PROCEDURE — 97803 MED NUTRITION INDIV SUBSEQ: CPT | Mod: TEL | Performed by: DIETITIAN, REGISTERED

## 2020-03-24 VITALS — BODY MASS INDEX: 36.32 KG/M2 | WEIGHT: 198.6 LBS

## 2020-03-25 DIAGNOSIS — Z13.21 SCREENING FOR ENDOCRINE, NUTRITIONAL, METABOLIC AND IMMUNITY DISORDER: ICD-10-CM

## 2020-03-25 DIAGNOSIS — Z13.228 SCREENING FOR ENDOCRINE, NUTRITIONAL, METABOLIC AND IMMUNITY DISORDER: ICD-10-CM

## 2020-03-25 DIAGNOSIS — Z13.29 SCREENING FOR ENDOCRINE, NUTRITIONAL, METABOLIC AND IMMUNITY DISORDER: ICD-10-CM

## 2020-03-25 DIAGNOSIS — E66.812 CLASS 2 OBESITY DUE TO EXCESS CALORIES WITHOUT SERIOUS COMORBIDITY WITH BODY MASS INDEX (BMI) OF 36.0 TO 36.9 IN ADULT: Primary | ICD-10-CM

## 2020-03-25 DIAGNOSIS — E66.09 CLASS 2 OBESITY DUE TO EXCESS CALORIES WITHOUT SERIOUS COMORBIDITY WITH BODY MASS INDEX (BMI) OF 36.0 TO 36.9 IN ADULT: Primary | ICD-10-CM

## 2020-03-25 DIAGNOSIS — Z13.0 SCREENING FOR ENDOCRINE, NUTRITIONAL, METABOLIC AND IMMUNITY DISORDER: ICD-10-CM

## 2020-03-25 NOTE — PROGRESS NOTES
Patient informed that labs were ordered and available to be done at any  clinic or hospital when safe to do so.  Michelle Arrington, MS, RD, RN

## 2020-04-19 NOTE — PROGRESS NOTES
"  Kezia Nava is a 44 year old female who is being evaluated via a billable telephone visit.      The patient has been notified of following:     \"This telephone visit will be conducted via a call between you and your physician/provider. We have found that certain health care needs can be provided without the need for a physical exam.  This service lets us provide the care you need with a short phone conversation.  If a prescription is necessary we can send it directly to your pharmacy.  If lab work is needed we can place an order for that and you can then stop by our lab to have the test done at a later time.    Telephone visits are billed at different rates depending on your insurance coverage. During this emergency period, for some insurers they may be billed the same as an in-person visit.  Please reach out to your insurance provider with any questions.    If during the course of the call the physician/provider feels a telephone visit is not appropriate, you will not be charged for this service.\"    Patient has given verbal consent for Telephone visit?  Yes        PRE SURGICAL WEIGHT LOSS NUTRITION APPOINTMENT    Kezia Nava  1975  female  [id number]  44 year old     ASSESSMENT    Desired Surgical Procedure: undecided    REASON FOR VISIT:  Kezia Nava is a 44 year old year old female presents today for a pre-surgical weight loss follow-up appointment. Patient accompanied by self.    DIAGNOSIS:  Weight Status Obesity Grade II BMI 35-39.9    ANTHROPOMETRICS:  Height:  62\"  Initial Weight: 205.5 lb     Weight last visit: 198.6 lb (stated)    Current weight:  205 (stated)  BMI: 37.49 kg/(m^2).    VITAMINS AND MINERALS:   1 Multivitamin with Minerals-bed time  500 mg Calcium with Vitamin D TID (7 am /11 am /5 pm)  4000 International units Vitamin D      NUTRITION HISTORY:  Breakfast: Slimfast drink or just crack an egg meal  Lunch: chicken casserole (low-fat ingredients)  Supper: chicken sandwich or " soo from [employer]  Snacks:  snack more on Easter candy (not longer in the house)  Fluids Consumed: Water and Protein Drink  Eating slower: Yes  Chewing foods thoroughly: Yes  Take 20-30 minutes to consume each meal: No  Fluids and meals separate by at least 30 minutes: Yes  Carbonation: denies  Caffeine: denies  Additional Information: Pateint was not surprised her weight was up form her March visit. Got off track with exercise due to cold weather.  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 She has not been shopping much for groceries due to COVID-19.     PHYSICAL ACTIVITY:  Type: walking  Frequency: 2 (days per week)  Duration: 30 (minutes)     DIAGNOSIS:  Previous Nutrition Diagnosis: Obesity related to long history of self- monitoring deficit and excessive energy intake evidenced by BMI of 36.32 kg/m2  No change, modified below    Previous goals:   Increase  minutes of walking to 30 minutes-not met  Continue to practice all pre-operative behavior changes-partially met    Current Nutrition Diagnosis: Obesity related to long history of self-monitoring deficit and excessive energy intake as evidenced by BMI of 37.49 kg/m2.    INTERVENTION:  Nutrition Prescription: Recommended energy/nutrient modification.    GOALS:  Do indoor exercise (DVD or U-tube video) 2X per week  Restart walking as wether permits  Get labs final checked    Intervention:  - Discussed progress towards previous goals.  - Reinforced importance of making behavior changes in preparation for bariatric surgery.   - Assessed learning needs and learning preferences       NUTRITION MONITORING AND EVALUATION:  Anticipated compliance: fair-good  Patient demonstrated good understanding.       Follow up: Continue to monitor patient closely regarding weight loss and diet.  # of visits needed: 1  Cleared by RD: No     TIME SPENT WITH PATIENT: 20 minutes  Isra Underwood RD, [facility],  [place]  [phone]

## 2020-04-20 ENCOUNTER — OFFICE VISIT (OUTPATIENT)
Dept: SURGERY | Facility: CLINIC | Age: 45
End: 2020-04-20
Payer: COMMERCIAL

## 2020-04-20 VITALS — WEIGHT: 205 LBS | BODY MASS INDEX: 37.49 KG/M2

## 2020-04-20 DIAGNOSIS — E66.01 SEVERE OBESITY (BMI 35.0-35.9 WITH COMORBIDITY) (H): ICD-10-CM

## 2020-04-20 PROCEDURE — 97803 MED NUTRITION INDIV SUBSEQ: CPT | Performed by: DIETITIAN, REGISTERED

## 2020-04-24 ENCOUNTER — TELEPHONE (OUTPATIENT)
Dept: FAMILY MEDICINE | Facility: CLINIC | Age: 45
End: 2020-04-24

## 2020-04-24 NOTE — LETTER
[place] [address] [facility] [address]  [phone]        April 24, 2020  Kezia Nava  [address] [place] [address]    Dear Kezia,    I care about your health and have reviewed your health plan. I have reviewed your medical conditions, medication list, and lab results and am making recommendations based on this review, to better manage your health.    You are in particular need of attention regarding:  -Diabetic Eye Exam    I am recommending that you:  -Please schedule an eye appointment.     Here is a list of Health Maintenance topics that are due now or due soon:  Health Maintenance Due   Topic Date Due     PREVENTIVE CARE VISIT  01/03/2018     DIABETIC FOOT EXAM  06/18/2019     EYE EXAM  01/31/2020     MICROALBUMIN  03/01/2020     BMP  05/13/2020       Please call us at [phone] (or use [phone]) to address the above recommendations.     Thank you for trusting [facility] and we appreciate the opportunity to serve you.  We look forward to supporting your healthcare needs in the future.    Healthy Regards,    Shamir Angeles MD/tami

## 2020-04-24 NOTE — TELEPHONE ENCOUNTER
Panel Management Review    Summary:    Patient is due/failing the following:   EYE EXAM    Action needed:   EYE EXAM APPT NEEDED    Type of outreach:    Sent letter.    Questions for provider review:    None                                                                                                                                    Gogo Ross CMA

## 2020-05-18 NOTE — PROGRESS NOTES
"Kezia Nava is a 44 year old female who is being evaluated via a billable telephone visit.      The patient has been notified of following:     \"This telephone visit will be conducted via a call between you and your physician/provider. We have found that certain health care needs can be provided without the need for a physical exam.  This service lets us provide the care you need with a short phone conversation.  If a prescription is necessary we can send it directly to your pharmacy.  If lab work is needed we can place an order for that and you can then stop by our lab to have the test done at a later time.    Telephone visits are billed at different rates depending on your insurance coverage. During this emergency period, for some insurers they may be billed the same as an in-person visit.  Please reach out to your insurance provider with any questions.    If during the course of the call the physician/provider feels a telephone visit is not appropriate, you will not be charged for this service.\"    Patient has given verbal consent for Telephone visit?  Yes    What phone number would you like to be contacted at? [phone]    How would you like to obtain your AVS? Fernanda    Phone call duration: 20 minutes        PRE SURGICAL WEIGHT LOSS NUTRITION APPOINTMENT    Kezia Nava  1975  female  [id number]  44 year old    ASSESSMENT    Desired Surgical Procedure: gastric bypass    REASON FOR VISIT:  Kezia Nava is a 44 year old year old female presents today for a pre-surgical weight loss follow-up appointment. Patient accompanied by self.    DIAGNOSIS:  Weight Status Obesity Grade II BMI 35-39.9    ANTHROPOMETRICS:  Height:  62\"  Initial Weight: 205.5 lb     Weight last visit: 205 lb (stated)    Current weight: 205 lb (stated)  BMI: 37.49 kg/(m^2).    VITAMINS AND MINERALS:   1 Multivitamin with Minerals-bed time  500 mg Calcium with Vitamin D TID (7 am /11 am /5 pm)  4000 International units Vitamin " D    NUTRITION HISTORY:  Breakfast: Slimfast protein drink  Lunch: garden salad with chicken/ ranch dressing  Supper: chicken  Snacks: rare   Fluids Consumed: Water and Protein Drink  Eating slower: Yes  Chewing foods thoroughly: Yes  Take 20-30 minutes to consume each meal: Yes  Fluids and meals separate by at least 30 minutes: Yes  Carbonation: denies  Caffeine: denies  Additional Information: Patient feels like it has been hard to get into a good pattern for eating and exercise since the start of the pandemic. 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 She admits she has not been focused on exercise or weight loss over the past month. Discussed the fact that pt may not be eating enough calories to support weight loss. She is fine with starting to track her intake on an angel luis.    PHYSICAL ACTIVITY:  Type: none      DIAGNOSIS:  Previous Nutrition Diagnosis: Obesity related to long history of self- monitoring deficit and excessive energy intake evidenced by BMI of 37.49 kg/m2  No change, modified below    Previous goals:   Do indoor exercise (DVD or U-tube video) 2X per week-not met  Restart walking as weather permits-not met  Get labs final checked-not met    Current Nutrition Diagnosis: Obesity related to long history of self-monitoring deficit and excessive energy intake as evidenced by BMI of 37.49 kg/m2.    INTERVENTION:  Nutrition Prescription: Recommended energy/nutrient modification.    GOALS:  Establish exercise routine on days off from work (RN job) for 15 minutes  Track intake and activity on angel luis ([employer] Pal or [employer]) and aim for 1200 kcal/day (14 kcal/kg)    Intervention:  - Discussed progress towards previous goals.  - Reinforced importance of making behavior changes in preparation for bariatric surgery.   - Assessed learning needs and learning preferences       NUTRITION MONITORING AND EVALUATION:  Anticipated compliance: fair  Patient demonstrated fair-good  understanding.       Follow up: Continue to monitor patient closely regarding weight loss and diet.  # of visits needed: 1-2 (will depend on progress with weight loss)  Cleared by RD: No     TIME SPENT WITH PATIENT: 20 minutes    Isra Underwood RD, LD  [facility], [place]  [phone]

## 2020-05-19 ENCOUNTER — VIRTUAL VISIT (OUTPATIENT)
Dept: SURGERY | Facility: CLINIC | Age: 45
End: 2020-05-19
Payer: COMMERCIAL

## 2020-05-19 VITALS — WEIGHT: 205 LBS | BODY MASS INDEX: 37.49 KG/M2

## 2020-05-19 PROCEDURE — 97803 MED NUTRITION INDIV SUBSEQ: CPT | Mod: TEL | Performed by: DIETITIAN, REGISTERED

## 2020-05-26 VITALS — HEIGHT: 62 IN | BODY MASS INDEX: 37.73 KG/M2 | WEIGHT: 205 LBS

## 2020-05-26 ASSESSMENT — MIFFLIN-ST. JEOR: SCORE: 1528.12

## 2020-05-26 NOTE — PROGRESS NOTES
"Kezia Nava is a 44 year old female who is being evaluated via a billable telephone visit.      The patient has been notified of following:     \"This telephone visit will be conducted via a call between you and your physician/provider. We have found that certain health care needs can be provided without the need for a physical exam.  This service lets us provide the care you need with a short phone conversation.  If a prescription is necessary we can send it directly to your pharmacy.  If lab work is needed we can place an order for that and you can then stop by our lab to have the test done at a later time.    Telephone visits are billed at different rates depending on your insurance coverage. During this emergency period, for some insurers they may be billed the same as an in-person visit.  Please reach out to your insurance provider with any questions.    If during the course of the call the physician/provider feels a telephone visit is not appropriate, you will not be charged for this service.\"    Patient has given verbal consent for Telephone visit?  Yes    What phone number would you like to be contacted at? [phone]    How would you like to obtain your AVS? MyChart    CPAP Follow-Up Visit:    Date on this visit: 5/27/2020    Kezia Nava has a follow-up of her treatment for ANA. She was initially seen at the [facility] for trouble falling and staying asleep in the setting of shift work.      AHI was 24.5/hr, with desaturations down to 78%. She spent 1.6 minutes below 90% SpO2. RDI 24.5/hr.  REM RDI 30.8/hr.  Supine RDI 29.9/hr.  Her non-supine RDI was 7.8/hr. Periodic Limb Movement Index 4/hour, 1.5/hr were associated with arousals.       She feels things are going well with CPAP. It is pretty comfortable. She usually does not wake as much in the night now that she is back on CPAP.     PAP machine: ResMed/Respironics. Pressure settings: 10-15 cm.    The compliance data shows that " the patient used the CPAP for 30/30 nights, 100% of nights for >4 hours.  The 95th% pressure is 12.3 cm.  The 95th% leak is 0.23 lpm.  The average nightly usage is 475 min.  The average AHI is 2/hr.       Interface:  Mask: N30i nasal mask  Chin strap: No  Leak: No  Using Humidifier: Yes, set on auto  Condensation in hose or mask: Yes maybe a couple of times at the nose.      Difficulties with therapy:    [-] Snoring with CPAP: unsure, not observed  [-] Difficulty tolerating the pressure: no longer feeling claustrophobic with the current mask.  [-] Epistaxis/dry nose:   [-] Nasal congestion:  [-] Dry mouth:  [-] Mouth breathing:   [-] Pain/skin breakdown: a little on the inside of the nose, possible from nose ring. Went away when she switched nose ring.     Improvements noted with CPAP:   [+] waking up more refreshed  [+] sleeping better with less arousals  [+] nocturia improved   [+] improved energy level during the day, for the most part. Has felt a little more tired in the last couple of days.    Bedtime is 9 PM to 5:30 AM when working. MN- 1 AM to 8-9 AM when off of work. Naps 15-30 minutes when not working. Her energy and sleep quality seem to depend on her work stress. She is a nurse in the ED.     Weight change since sleep study:     Past medical/surgical history, family history, social history, medications and allergies were reviewed.      Problem List:  Patient Active Problem List    Diagnosis Date Noted     Pre-diabetes 05/13/2019     Priority: Medium     Fibroid uterus 05/13/2019     Priority: Medium     Paresthesias- ? TIA vs complex migraine (preferred per neuro) 10/05/2017     Priority: Medium     Severe obesity (BMI 35.0-35.9 with comorbidity) (H) 10/05/2017     Priority: Medium     Acute bilateral low back pain with right-sided sciatica 09/01/2017     Priority: Medium     ANA (obstructive sleep apnea) 08/15/2017     Priority: Medium     Benign essential hypertension 01/04/2017     Priority: Medium      Cervical radiculopathy 12/12/2015     Priority: Medium     Pain in joint, ankle and foot 06/14/2010     Priority: Medium        Impression/Plan:    (G47.33) ANA (obstructive sleep apnea)  (primary encounter diagnosis)  Comment: Kezia seems to be tolerating CPAP well now. She uses it regularly and is sleeping better. She may still occasionally feel a little tired and rarely wake early in the morning, but she associates that with work stress. AHI is in the target range.  Plan: Continue auto CPAP 10-15 cm. We reviewed recommendations for replacing supplies. She is cleared for weight loss surgery from a sleep standpoint. She was advised to bring the CPAP with her to the surgery so that it can be available post-operatively. She was advised to let me know if she starts having intolerance to the pressure or has a hard time controlling the mask leak as she loses weight as we may need to reduce the pressures.     SLEEP APNEA  AIRWAY MANAGEMENT        Patients should be considered for difficult airway management strategy during sedation and will need airway support whenever sleeping without intubation. Structured approach:    1. ANESTHESIAàConsider regional block or short-acting agent such as propofol or desflurane.  2.  ANALGESIAàConsider NSAID, titratable dosing of opiod without sedative to minimize sedation, dexmedetomidine for agitation.  3. OXYGENATIONàUse titratable oxygen to maintain SpO2 90-94%.  4. POSITIONàElevate head of bed 45 degrees or place in lateral position.  5.  MONITORINGàMonitor in supervised area with continuous oximetry and ECG with SpO2 alarm set at 85%. Obtain blood gases to exclude hypercapnia if suspected [SpO2<94% on room air, BMI >45, lethargy].  6. AIRWAYà    Assess for snoring, airway obstruction, or episodic desaturation.    Apply previous effective CPAP or auto-CPAP 5-15 and adjust to prevent snoring and desaturation during continuous oximetry.     Intubate if there continues to be evidence  of airway obstruction, uncontrolled hypercapnia or risk of vomiting with loss of consciousness.       She will follow up with me in about 6 month(s) after her bariatric surgery.     12 minutes (3:35 PM-3:37 PM) spent with patient, all of which were spent face-to-face counseling, consulting, coordinating plan of care.      Bennett Goltz, PA-C    CC: LUCRETIA Martinez MD

## 2020-05-27 ENCOUNTER — VIRTUAL VISIT (OUTPATIENT)
Dept: SLEEP MEDICINE | Facility: CLINIC | Age: 45
End: 2020-05-27
Payer: COMMERCIAL

## 2020-05-27 DIAGNOSIS — G47.33 OSA (OBSTRUCTIVE SLEEP APNEA): Primary | Chronic | ICD-10-CM

## 2020-05-27 PROCEDURE — 99213 OFFICE O/P EST LOW 20 MIN: CPT | Mod: TEL | Performed by: PHYSICIAN ASSISTANT

## 2020-06-02 DIAGNOSIS — Z78.0 MENOPAUSE: ICD-10-CM

## 2020-06-03 ENCOUNTER — TELEPHONE (OUTPATIENT)
Dept: FAMILY MEDICINE | Facility: CLINIC | Age: 45
End: 2020-06-03

## 2020-06-03 RX ORDER — DESVENLAFAXINE 25 MG/1
TABLET, EXTENDED RELEASE ORAL
Qty: 90 TABLET | Refills: 1 | Status: SHIPPED | OUTPATIENT
Start: 2020-06-03 | End: 2020-07-03

## 2020-06-03 NOTE — TELEPHONE ENCOUNTER
Routing refill request to provider for review/approval because:  Drug not on the [facility] refill protocol for dx of menopause  Delia RIVERS RN

## 2020-06-17 DIAGNOSIS — R73.03 PRE-DIABETES: ICD-10-CM

## 2020-06-18 NOTE — TELEPHONE ENCOUNTER
Left non detailed VM for pt asking that they callback and schedule virtual visit  Delia RIVERS RN

## 2020-06-22 DIAGNOSIS — Z13.29 SCREENING FOR ENDOCRINE, NUTRITIONAL, METABOLIC AND IMMUNITY DISORDER: ICD-10-CM

## 2020-06-22 DIAGNOSIS — Z13.0 SCREENING FOR ENDOCRINE, NUTRITIONAL, METABOLIC AND IMMUNITY DISORDER: ICD-10-CM

## 2020-06-22 DIAGNOSIS — E66.09 CLASS 2 OBESITY DUE TO EXCESS CALORIES WITHOUT SERIOUS COMORBIDITY WITH BODY MASS INDEX (BMI) OF 36.0 TO 36.9 IN ADULT: ICD-10-CM

## 2020-06-22 DIAGNOSIS — E66.812 CLASS 2 OBESITY DUE TO EXCESS CALORIES WITHOUT SERIOUS COMORBIDITY WITH BODY MASS INDEX (BMI) OF 36.0 TO 36.9 IN ADULT: ICD-10-CM

## 2020-06-22 DIAGNOSIS — Z13.21 SCREENING FOR ENDOCRINE, NUTRITIONAL, METABOLIC AND IMMUNITY DISORDER: ICD-10-CM

## 2020-06-22 DIAGNOSIS — Z13.228 SCREENING FOR ENDOCRINE, NUTRITIONAL, METABOLIC AND IMMUNITY DISORDER: ICD-10-CM

## 2020-06-22 PROCEDURE — 80053 COMPREHEN METABOLIC PANEL: CPT | Performed by: PHYSICIAN ASSISTANT

## 2020-06-22 PROCEDURE — 36415 COLL VENOUS BLD VENIPUNCTURE: CPT | Performed by: PHYSICIAN ASSISTANT

## 2020-06-22 PROCEDURE — 82306 VITAMIN D 25 HYDROXY: CPT | Performed by: PHYSICIAN ASSISTANT

## 2020-06-22 RX ORDER — LIRAGLUTIDE 6 MG/ML
INJECTION SUBCUTANEOUS
Qty: 6 ML | Refills: 5 | Status: SHIPPED | OUTPATIENT
Start: 2020-06-22 | End: 2021-01-20

## 2020-06-23 LAB
ALBUMIN SERPL-MCNC: 3.9 G/DL (ref 3.4–5)
ALP SERPL-CCNC: 45 U/L (ref 40–150)
ALT SERPL W P-5'-P-CCNC: 63 U/L (ref 0–50)
ANION GAP SERPL CALCULATED.3IONS-SCNC: 9 MMOL/L (ref 3–14)
AST SERPL W P-5'-P-CCNC: 61 U/L (ref 0–45)
BILIRUB SERPL-MCNC: 0.4 MG/DL (ref 0.2–1.3)
BUN SERPL-MCNC: 10 MG/DL (ref 7–30)
CALCIUM SERPL-MCNC: 9.3 MG/DL (ref 8.5–10.1)
CHLORIDE SERPL-SCNC: 104 MMOL/L (ref 94–109)
CO2 SERPL-SCNC: 25 MMOL/L (ref 20–32)
CREAT SERPL-MCNC: 0.71 MG/DL (ref 0.52–1.04)
DEPRECATED CALCIDIOL+CALCIFEROL SERPL-MC: 68 UG/L (ref 20–75)
GFR SERPL CREATININE-BSD FRML MDRD: >90 ML/MIN/{1.73_M2}
GLUCOSE SERPL-MCNC: 98 MG/DL (ref 70–99)
POTASSIUM SERPL-SCNC: 3.6 MMOL/L (ref 3.4–5.3)
PROT SERPL-MCNC: 8.1 G/DL (ref 6.8–8.8)
SODIUM SERPL-SCNC: 138 MMOL/L (ref 133–144)

## 2020-06-28 NOTE — PROGRESS NOTES
"Kezia Nava is a 44 year old female who is being evaluated via a billable telephone visit.      The patient has been notified of following:     \"This telephone visit will be conducted via a call between you and your physician/provider. We have found that certain health care needs can be provided without the need for a physical exam.  This service lets us provide the care you need with a short phone conversation.  If a prescription is necessary we can send it directly to your pharmacy.  If lab work is needed we can place an order for that and you can then stop by our lab to have the test done at a later time.    Telephone visits are billed at different rates depending on your insurance coverage. During this emergency period, for some insurers they may be billed the same as an in-person visit.  Please reach out to your insurance provider with any questions.    If during the course of the call the physician/provider feels a telephone visit is not appropriate, you will not be charged for this service.\"    Patient has given verbal consent for Telephone visit?  Yes    What phone number would you like to be contacted at? [phone]    How would you like to obtain your AVS? Fernanda    Phone call duration: 19 minutes          PRE SURGICAL WEIGHT LOSS NUTRITION APPOINTMENT    Kezia Nava  1975  female  [id number]  44 year old    ASSESSMENT    Desired Surgical Procedure: gastric bypass    REASON FOR VISIT:  Kezia Nava is a 44 year old year old female presents today for a pre-surgical weight loss follow-up appointment. Patient accompanied by self.    DIAGNOSIS:  Weight Status Obesity Grade II BMI 35-39.9    ANTHROPOMETRICS:  Height:  62\"  Initial Weight: 205 lb    Weight last visit: 205 lb (pr reported)    Current weight:  205 lb (pt reported)  BMI: 37.49 kg/(m^2).    VITAMINS AND MINERALS:  1 Multivitamin with Minerals-bed time  500 mg Calcium with Vitamin D TID (7 am /11 am /5 pm)  4000 International " units Vitamin D     NUTRITION HISTORY:  Breakfast: protein drink, fresh cherries   Lunch: skips some days  Supper: Freshly meal-chicken pealaff  Snacks: santo Fruit loops pack (50 calories) or 2 cups popcorn chips  Fluids Consumed: Water, protein drink  Eating slower: Yes  Chewing foods thoroughly: Yes  Take 20-30 minutes to consume each meal: Yes  Fluids and meals separate by at least 30 minutes: Yes  Carbonation: denies  Caffeine: denies  Additional Information: Patient continues to be frustrated with weight maintenance. Suggested she change her exercise routine by adding in strength training or yoga. Also discussed increasing the frequency of walking.  She is willing to try a yoga video/u-tube. Patient want to get very focused on weight loss over the next month.  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    PHYSICAL ACTIVITY:  Type: walking  Frequency: 2 (days per week)  Duration: 15 (minutes)     DIAGNOSIS:  Previous Nutrition Diagnosis: Obesity related to long history of self- monitoring deficit and excessive energy intake evidenced by BMI of 37.49 kg/m2  No change, modified below    Previous goals:   Establish exercise routine on days off from work (RN job) for 15 minutes-met  Track intake and activity on angel luis ([employer] Pal or [employer]) and aim for 1200 kcal/day (14 kcal/kg)-not met        Current Nutrition Diagnosis: Obesity related to long history of self-monitoring deficit and excessive energy intake as evidenced by BMI of 37.49 kg/m2.    INTERVENTION:  Nutrition Prescription: Recommended energy/nutrient modification.    GOALS:  Follow modified liquid diet  Try a yoga video or U tube-try doing at least 1X per week    Intervention:  - Discussed progress towards previous goals.  - Reinforced importance of making behavior changes in preparation for bariatric surgery.   - Assessed learning needs and learning preferences       NUTRITION MONITORING AND  EVALUATION:  Anticipated compliance: fair  Patient demonstrated fair-good understanding.     Follow up: Continue to monitor patient closely regarding weight loss and diet.  # of visits needed: 1-2 (will depend on progress with weight loss)  Cleared by RD: No     TIME SPENT WITH PATIENT: 19 minutes  Isra Underwood RD, LD  [facility], [place]  [phone]

## 2020-06-30 ENCOUNTER — VIRTUAL VISIT (OUTPATIENT)
Dept: SURGERY | Facility: CLINIC | Age: 45
End: 2020-06-30
Payer: COMMERCIAL

## 2020-06-30 VITALS — BODY MASS INDEX: 37.49 KG/M2 | WEIGHT: 205 LBS

## 2020-06-30 DIAGNOSIS — E66.01 SEVERE OBESITY (BMI 35.0-35.9 WITH COMORBIDITY) (H): ICD-10-CM

## 2020-06-30 PROCEDURE — 97803 MED NUTRITION INDIV SUBSEQ: CPT | Mod: TEL | Performed by: DIETITIAN, REGISTERED

## 2020-07-03 ENCOUNTER — VIRTUAL VISIT (OUTPATIENT)
Dept: FAMILY MEDICINE | Facility: CLINIC | Age: 45
End: 2020-07-03
Payer: COMMERCIAL

## 2020-07-03 DIAGNOSIS — E11.65 TYPE 2 DIABETES MELLITUS WITH HYPERGLYCEMIA, WITHOUT LONG-TERM CURRENT USE OF INSULIN (H): Primary | ICD-10-CM

## 2020-07-03 DIAGNOSIS — E66.01 SEVERE OBESITY (BMI 35.0-35.9 WITH COMORBIDITY) (H): ICD-10-CM

## 2020-07-03 DIAGNOSIS — G47.33 OSA (OBSTRUCTIVE SLEEP APNEA): ICD-10-CM

## 2020-07-03 DIAGNOSIS — F43.21 SITUATIONAL DEPRESSION: ICD-10-CM

## 2020-07-03 DIAGNOSIS — E78.5 HYPERLIPIDEMIA LDL GOAL <100: ICD-10-CM

## 2020-07-03 DIAGNOSIS — Z78.0 MENOPAUSE: ICD-10-CM

## 2020-07-03 DIAGNOSIS — R73.03 PRE-DIABETES: Primary | ICD-10-CM

## 2020-07-03 DIAGNOSIS — G56.01 CARPAL TUNNEL SYNDROME OF RIGHT WRIST: ICD-10-CM

## 2020-07-03 DIAGNOSIS — I10 BENIGN ESSENTIAL HYPERTENSION: ICD-10-CM

## 2020-07-03 PROCEDURE — 99214 OFFICE O/P EST MOD 30 MIN: CPT | Mod: 95 | Performed by: INTERNAL MEDICINE

## 2020-07-03 RX ORDER — DESVENLAFAXINE 50 MG/1
50 TABLET, FILM COATED, EXTENDED RELEASE ORAL DAILY
Qty: 90 TABLET | Refills: 3 | Status: SHIPPED | OUTPATIENT
Start: 2020-07-03 | End: 2021-03-11

## 2020-07-03 NOTE — PROGRESS NOTES
"Coy Nava is a 44 year old female who is being evaluated via a billable video visit.      The patient has been notified of following:     \"This video visit will be conducted via a call between you and your physician/provider. We have found that certain health care needs can be provided without the need for an in-person physical exam.  This service lets us provide the care you need with a video conversation.  If a prescription is necessary we can send it directly to your pharmacy.  If lab work is needed we can place an order for that and you can then stop by our lab to have the test done at a later time.    Video visits are billed at different rates depending on your insurance coverage.  Please reach out to your insurance provider with any questions.    If during the course of the call the physician/provider feels a video visit is not appropriate, you will not be charged for this service.\"    Patient has given verbal consent for Video visit? Yes  How would you like to obtain your AVS? Fernanda  Patient would like the video invitation sent by: Fernanda  Will anyone else be joining your video visit? No      Subjective     Kezia Nava is a 44 year old female who presents today via video visit for the following health issues:    HPI    Follow up on medications (Victoza)    Video Start Time: 10.35    Patient is not checking blood glucose   No other symptoms   She is working in ER  Blood pressure is normal  She has not checked blood glucose   She is seeing bariatric Medicine  Work is stressful due to COVID   This causes situational depression because of isolation when she comes home    The other issue last 2 months has been right thumb, index finger and middle finger numbness    Patient Active Problem List   Diagnosis     Pain in joint, ankle and foot     Cervical radiculopathy     Benign essential hypertension     Type 2 diabetes mellitus with hyperglycemia, without long-term current use of insulin (H)     " Date of First Session: 12/10/2019  Date of Last Session: 12/27/2019     Chief Complaint: BZO/Sedative use     Discharge Diagnoses:  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, hypnotic, or anxiolytic use disorder (CMS/[facility])  F15.90 Stimulant use disorder  F11.21 Severe opioid use disorder, in sustained remission (CMS/[facility])  F10.10 Mild alcohol use disorder  F12.10 Mild cannabis use disorder  F17.200 Nicotine use disorder  F41.9 Anxiety  F32.9 Depression, unspecified depression type  G47.00 Insomnia, unspecified type  G44.229 Chronic tension-type headache, not intractable  F41.9, F32.9 Anxiety and depression     Discharge Reason: Patient Has Reached Maximum Benefit from Treatment at this Time     Treatment Provided: Medication (See Medication List), Group, Psychiatric Evaluation     Summary of Patient Progress Toward Goals in the Treatment Plan: Patient was [facility] for BZO/Sedative use [facility] Comprehensive medical and psychiatric evaluation was performed and treatment diagnosis were updated. Patient was encouraged to attend group therapy and community support meetings. Patient was quite argumentative and demanding in terms of her medication management. Initially, she felt that she needed to be on gabapentin as \"harm reduction approach\" despite her history of abusing gabapentin. She was started on gabapentin taper and patient was eventually able to complete the taper despite multiple complaints regarding it. She also was overusing sumatriptan for headaches and argued and resisted alternative treatments for headache management. Eventually, she agreed to take Topamax and propranolol to help control her chronic headaches and use indomethacin as needed for acute relief. She was not interested in any medication for alcohol cravings including naltrexone, Vivitrol or Campral. She was quite anxious about not having insurance as she lost her job because of her relapse. Her anxiety and sleep improved with the  ANA (obstructive sleep apnea)     Acute bilateral low back pain with right-sided sciatica     Paresthesias- ? TIA vs complex migraine (preferred per neuro)     Severe obesity (BMI 35.0-35.9 with comorbidity) (H)     Pre-diabetes     Fibroid uterus     Past Surgical History:   Procedure Laterality Date     ARTHROSCOPY ANKLE, OPEN REPAIR TENDON, COMBINED  11/8/2012    Procedure: COMBINED ARTHROSCOPY ANKLE, OPEN REPAIR TENDON;  Ankle Arthroscopy Left Ankle, Open Ligament Repair Left Ankle, Peroneal Tendon Repair Left Ankle ;  Surgeon: Otf Ramos DPM;  Location:  OR     C APPENDECTOMY  1984     COMBINED CYSTOSCOPY, INSERT STENT URETER(S)  8/6/2013    Procedure: COMBINED CYSTOSCOPY, INSERT STENT URETER(S);  cystoscopy, right retrograde,RIGHT STENT PLACEMENT.;  Surgeon: Kan Porter MD;  Location:  OR     CYSTOSCOPY, RETROGRADES, INSERT STENT URETER(S), COMBINED  8/6/2013    Procedure: COMBINED CYSTOSCOPY, RETROGRADES, INSERT STENT URETER(S);  cystoscopy, right retrograde, insert stent right ureter;  Surgeon: Kan Porter MD;  Location:  OR     DENTAL SURGERY      TOOTH#7 - DENTAL IMPLANT     DISCECTOMY, FUSION CERVICAL ANTERIOR ONE LEVEL, COMBINED N/A 12/13/2015    Procedure: COMBINED DISCECTOMY, FUSION CERVICAL ANTERIOR ONE LEVEL;  Surgeon: Seven Umaña MD;  Location:  OR     EXTRACORPOREAL SHOCK WAVE LITHOTRIPSY (ESWL)  8/19/2013    Procedure: EXTRACORPOREAL SHOCK WAVE LITHOTRIPSY (ESWL);   RIGHT EXTRACORPOREAL SHOCK WAVE LITHOTRIPSY;  Surgeon: Otf Tobias MD;  Location:  OR     HC REDUCTION OF LARGE BREAST Bilateral 2003     HYSTERECTOMY, PAP NO LONGER INDICATED  2019     LAPAROSCOPIC HYSTERECTOMY TOTAL, SALPINGECTOMY Left 5/29/2019    Procedure: single site total LAPAROSCOPIC HYSTERECTOMY, left oophorectomy and lysis of adhesions;  Surgeon: Pauline Horowitz MD;  Location:  OR     LASIK BILATERAL       SALPINGO OOPHORECTOMY,R/L/DERREK Right 05/17/2006     addition of propranolol and doxepin respectively. Towards the end of treatment, she kept asking for ensure supplement despite dietitian's disagreement for her need of Ensure. Overall, patient appeared to be pre-contemplative, anxious, argumentative and not engaged in recovery attempts. Patient was discharged in stable condition to [facility].     Medications             Current Facility-Administered Medications   Medication Dose Route Frequency Provider Last Rate Last Dose   • ondansetron (ZOFRAN ODT) disintegrating tablet 4 mg  4 mg Oral Q6H PRN Florin Glass MD       • glycerin (adult) suppository 1 suppository  1 suppository Rectal BID PRN Florin Glass MD       • DULoxetine (CYMBALTA) capsule 60 mg  60 mg Oral Daily Florin Glass MD       • topiramate (TOPAMAX) tablet 50 mg  50 mg Oral 2 times per day Florin Glass MD       • [START ON 12/31/2019] topiramate (TOPAMAX) tablet 50 mg  50 mg Oral QAM Florin Glass MD         And   • [START ON 12/31/2019] topiramate (TOPAMAX) tablet 100 mg  100 mg Oral Nightly Florin Glass MD       • [START ON 1/7/2020] topiramate (TOPAMAX) tablet 100 mg  100 mg Oral 2 times per day Florin Glass MD       • indomethacin (INDOCIN) capsule 50 mg  50 mg Oral BID PRN Florin Glass MD       • propranolol (INDERAL LA) 24 hr capsule 160 mg  160 mg Oral Daily Florin Glass MD       • hydrOXYzine (ATARAX) 50 mg  50 mg Oral Q6H PRN Florin Glass MD       • trazodone (DESYREL) 150 mg  150 mg Oral Nightly Florin Glass MD       • acetaminophen (TYLENOL) tablet 650 mg  650 mg Oral Q6H PRN Florin Glass MD       • magnesium hydroxide (MILK OF MAGNESIA) 400 MG/5ML suspension 30 mL  30 mL Oral Daily PRN Florin Glass MD       • aluminum-magnesium hydroxide-simethicone (MAALOX) 200-200-20 MG/5ML suspension 30 mL  30 mL Oral Q4H PRN Florin Glass MD       • loperamide (IMODIUM) capsule 2 mg  2 mg Oral PRN Florin Glass MD       • tiZANidine  (ZANAFLEX) tablet 2 mg  2 mg Oral Q8H PRN Florin Glass MD       • benzocaine (ORAJEL) 10 % oral gel   Mouth/Throat PRN Florin Glass MD       • doxepin (SINEquan) capsule 10 mg  10 mg Oral Nightly PRN Florin Glass MD                AODA Placement Criteria     1. Withdrawal Potential:Level I: Outpatient Treatment - Not experiencing significant withdrawal or is at minimum risk of severe withdrawal.     2. Biomedical Conditions & Complications: Level II.1: Intensive Outpatient - None or not a distraction from treatment.  Manageable at IOP level.     3. Emotional, Behavioral or Cognitive Conditions & Complications: Level II.5: Partial Hospital - Mild/moderate severity, with potential to distract. Patient needs stabilization.     4. Readiness to Change: Level II.5: Partial Hospital - Poor engagement in treatment, significant ambivalence, lack of awareness of problem. Needs near daily structure of intensive engagement in treatment.     5. Relapse, Continued Use, or Continued Problem Potential Recovery Environment: Level II.1: Intensive Outpatient - Intensified symptoms high likelihood of relapse without close monitoring & support several times per week.     6. Recovery Environment:Level II.5: Partial Hospital - Recovery environment is not supportive, but with structure and support, the patient can cope.     Discharge Recommendations: Addiction treatment IOP, Participation in 12 step self-help groups and Follow up with psychiatrist     Time Spent for Discharge: greater than 30 minutes        Florin Glass MD  [address] [place] [address]  Phone: [phone]  Fax:      [phone]        Right       Social History     Tobacco Use     Smoking status: Never Smoker     Smokeless tobacco: Never Used   Substance Use Topics     Alcohol use: Yes     Alcohol/week: 0.0 standard drinks     Comment: 0-1 drinks per week     Family History   Problem Relation Age of Onset     C.A.D. Mother         stents     Hypertension Mother      Kidney Disease Mother         transplant     Hypertension Father      Breast Cancer Maternal Aunt         may have been fibrocystic     Breast Cancer Maternal Aunt         may have been fibrocystic     Cerebrovascular Disease Paternal Grandfather      Cancer Paternal Grandmother         stomach     Breast Cancer Sister 43     Nephrolithiasis Brother      Uterine Cancer Maternal Grandmother 44     Colon Cancer Maternal Grandmother 64     Prostate Cancer Maternal Uncle 50     Breast Cancer Cousin 41        maternal first cousin, triple negative breast cancer     Breast Cancer Paternal Aunt          Current Outpatient Medications   Medication Sig Dispense Refill     desvenlafaxine (PRISTIQ) 50 MG 24 hr tablet Take 1 tablet (50 mg) by mouth daily 90 tablet 3     Acetaminophen (TYLENOL PO) Take 500-1,000 mg by mouth every 6 hours as needed As needed for pain        blood glucose monitoring (ACCU-CHEK ALMA PLUS) test strip Use to test blood sugar 1 times daily or as directed. 100 strip 1     blood glucose monitoring (ACCU-CHEK FASTCLIX) lancets Use to test blood sugar 1 times daily or as directed. 3 each 3     cetirizine (ZYRTEC) 10 MG tablet Take 10 mg by mouth daily       Ibuprofen (IBU PO) Take 600 mg by mouth every 6 hours as needed        insulin pen needle (BD VALENTINA U/F) 32G X 4 MM miscellaneous USE ONCE DAILY AS DIRECTED 100 each 96     Krill Oil 1000 MG CAPS Take by mouth daily       liraglutide (VICTOZA PEN) 18 MG/3ML solution Inject 1.2 mg Subcutaneous daily 6 mL 1     losartan-hydrochlorothiazide (HYZAAR) 50-12.5 MG tablet TAKE ONE TABLET BY MOUTH ONCE DAILY 90 tablet 1      metFORMIN (GLUCOPHAGE-XR) 500 MG 24 hr tablet TAKE ONE TABLET BY MOUTH ONCE DAILY WITH DINNER 90 tablet 1     multivitamin, therapeutic with minerals (MULTI-VITAMIN) TABS tablet Take 1 tablet by mouth daily       ondansetron (ZOFRAN ODT) 4 MG PO ODT tab Take 1-2 tablets (4-8 mg) by mouth every 8 hours as needed for nausea 12 tablet 1     STATIN NOT PRESCRIBED, INTENTIONAL, Please choose reason not prescribed, below       tamoxifen (NOLVADEX) 20 MG tablet Take 1 tablet (20 mg) by mouth daily 90 tablet 3     tamoxifen (NOLVADEX) 20 MG tablet TAKE ONE TABLET BY MOUTH ONCE DAILY 90 tablet 1     VICTOZA PEN 18 MG/3ML soln INJECT 1.2 MG UNDER THE SKIN DAILY 6 mL 5     VITAMIN D, CHOLECALCIFEROL, PO Take 10,000 Units by mouth daily        zolpidem (AMBIEN CR) 6.25 MG CR tablet Take 1 tab by mouth at bedtime as needed. 90 tablet 0     Allergies   Allergen Reactions     Lisinopril Cough     Pneumovax [Pneumococcal Polysaccharides] Other (See Comments)     Red streaking and pain       Reviewed and updated as needed this visit by Provider  Tobacco  Allergies  Meds  Problems  Med Hx  Surg Hx  Fam Hx         Review of Systems   10 point ROS of systems including Constitutional, Eyes, Respiratory, Cardiovascular, Gastroenterology, Genitourinary, Integumentary, Muscularskeletal, Psychiatric were all negative except for pertinent positives noted in my HPI.        Objective             Physical Exam     GENERAL: Healthy, alert and no distress  EYES: Eyes grossly normal to inspection.  No discharge or erythema, or obvious scleral/conjunctival abnormalities.  RESP: No audible wheeze, cough, or visible cyanosis.  No visible retractions or increased work of breathing.    SKIN: Visible skin clear. No significant rash, abnormal pigmentation or lesions.  NEURO: Cranial nerves grossly intact.  Mentation and speech appropriate for age.  PSYCH: Mentation appears normal, affect sad, judgement and insight intact, normal speech and  appearance well-groomed.    Self-reported  Weight 205 lb    Blood pressure 120/80s        Assessment & Plan     Kezia was seen today for refill request.    Diagnoses and all orders for this visit:    Type 2 diabetes mellitus with hyperglycemia, without long-term current use of insulin (H)  -     TSH with free T4 reflex; Future    Benign essential hypertension    Severe obesity (BMI 35.0-35.9 with comorbidity) (H)  -     Lipid panel reflex to direct LDL Fasting; Future    ANA (obstructive sleep apnea)    Menopause  -     desvenlafaxine (PRISTIQ) 50 MG 24 hr tablet; Take 1 tablet (50 mg) by mouth daily    Hyperlipidemia LDL goal <100  -     Lipid panel reflex to direct LDL Fasting; Future    Carpal tunnel syndrome of right wrist  -     NEUROLOGY ADULT REFERRAL  -     LISS PT, HAND, AND CHIROPRACTIC REFERRAL; Future  -     TSH with free T4 reflex; Future    Situational depression    Other orders  -     Hemoglobin A1c; Future  -     Albumin Random Urine Quantitative with Creat Ratio    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    Shamir Angeles MD  [facility]      Video-Visit Details    Type of service:  Video Visit    Video End Time:11.00    Originating Location (pt. Location): Home    Distant Location (provider location):  [facility]   TSH   Date Value Ref Range Status   10/06/2017 2.92 0.40 - 4.00 mU/L Final        Platform used for Video Visit: Doximity    Return in about 4 weeks (around 7/31/2020) for diabetes.       Shamir Angeles MD

## 2020-07-03 NOTE — PATIENT INSTRUCTIONS
Continue metformin and Victoza  Arrange fasting labs      Increase Pristiq to 50 mg/day  I have sent a prescription    Try to exercise and eat right because of the situational depression from COVID-19    I would also advise doing EMG nerve conduction test, I have placed a referral and the phone number to call to schedule is [phone]    Take care

## 2020-07-06 ENCOUNTER — TRANSFERRED RECORDS (OUTPATIENT)
Dept: HEALTH INFORMATION MANAGEMENT | Facility: CLINIC | Age: 45
End: 2020-07-06

## 2020-07-13 ENCOUNTER — TELEPHONE (OUTPATIENT)
Dept: FAMILY MEDICINE | Facility: CLINIC | Age: 45
End: 2020-07-13
Payer: COMMERCIAL

## 2020-07-13 NOTE — TELEPHONE ENCOUNTER
Reason for Call:  Same Day Appointment, Requested Provider:  any available provider    PCP: Shamir Angeles    Reason for visit: Pre-op    Duration of symptoms: N/A    Have you been treated for this in the past? No known    Additional comments: Patient is scheduled for surgery 7/23/2020 with Dr Martinez and needs to have a pre-op physical, Her only day available for an appointment is on Friday, July 17.     Can we leave a detailed message on this number? YES    Phone number patient can be reached at: Home number on file [phone] (home)    Best Time: any    Call taken on 7/13/2020 at 1:47 PM by Cristy Mojica

## 2020-07-15 ENCOUNTER — MYC REFILL (OUTPATIENT)
Dept: FAMILY MEDICINE | Facility: CLINIC | Age: 45
End: 2020-07-15

## 2020-07-15 DIAGNOSIS — E11.65 TYPE 2 DIABETES MELLITUS WITH HYPERGLYCEMIA, WITHOUT LONG-TERM CURRENT USE OF INSULIN (H): Chronic | ICD-10-CM

## 2020-07-15 DIAGNOSIS — I10 BENIGN ESSENTIAL HYPERTENSION: ICD-10-CM

## 2020-07-16 RX ORDER — LOSARTAN POTASSIUM AND HYDROCHLOROTHIAZIDE 12.5; 5 MG/1; MG/1
1 TABLET ORAL DAILY
Qty: 30 TABLET | Refills: 0 | Status: SHIPPED | OUTPATIENT
Start: 2020-07-16 | End: 2020-07-17

## 2020-07-16 RX ORDER — METFORMIN HCL 500 MG
500 TABLET, EXTENDED RELEASE 24 HR ORAL
Qty: 30 TABLET | Refills: 0 | Status: SHIPPED | OUTPATIENT
Start: 2020-07-16 | End: 2020-07-17

## 2020-07-16 NOTE — TELEPHONE ENCOUNTER
Medication is being filled for 1 time refill only due to:  will get more at upcoming apt. Wilda Chaudhry RN

## 2020-07-17 ENCOUNTER — OFFICE VISIT (OUTPATIENT)
Dept: FAMILY MEDICINE | Facility: CLINIC | Age: 45
End: 2020-07-17
Payer: COMMERCIAL

## 2020-07-17 VITALS
SYSTOLIC BLOOD PRESSURE: 123 MMHG | TEMPERATURE: 98.7 F | HEIGHT: 62 IN | WEIGHT: 204.6 LBS | HEART RATE: 99 BPM | OXYGEN SATURATION: 98 % | BODY MASS INDEX: 37.65 KG/M2 | DIASTOLIC BLOOD PRESSURE: 82 MMHG

## 2020-07-17 DIAGNOSIS — G56.01 CARPAL TUNNEL SYNDROME OF RIGHT WRIST: ICD-10-CM

## 2020-07-17 DIAGNOSIS — E11.65 TYPE 2 DIABETES MELLITUS WITH HYPERGLYCEMIA, WITHOUT LONG-TERM CURRENT USE OF INSULIN (H): Chronic | ICD-10-CM

## 2020-07-17 DIAGNOSIS — Z01.818 PREOP GENERAL PHYSICAL EXAM: Primary | ICD-10-CM

## 2020-07-17 DIAGNOSIS — I10 BENIGN ESSENTIAL HYPERTENSION: ICD-10-CM

## 2020-07-17 DIAGNOSIS — G47.33 OSA (OBSTRUCTIVE SLEEP APNEA): ICD-10-CM

## 2020-07-17 PROCEDURE — 99213 OFFICE O/P EST LOW 20 MIN: CPT | Performed by: INTERNAL MEDICINE

## 2020-07-17 RX ORDER — LOSARTAN POTASSIUM AND HYDROCHLOROTHIAZIDE 12.5; 5 MG/1; MG/1
1 TABLET ORAL DAILY
Qty: 90 TABLET | Refills: 3 | Status: SHIPPED | OUTPATIENT
Start: 2020-07-17 | End: 2021-03-11

## 2020-07-17 RX ORDER — METFORMIN HCL 500 MG
500 TABLET, EXTENDED RELEASE 24 HR ORAL
Qty: 90 TABLET | Refills: 3 | Status: SHIPPED | OUTPATIENT
Start: 2020-07-17 | End: 2021-08-25

## 2020-07-17 ASSESSMENT — MIFFLIN-ST. JEOR: SCORE: 1526.31

## 2020-07-17 NOTE — PROGRESS NOTES
[facility]  [address] [facility] [address]  [phone]  Dept: [phone]    PRE-OP EVALUATION:  Today's date: 2020    Kezia Nava (: 1975) presents for pre-operative evaluation assessment as requested by Dr. Sanders.  She requires evaluation and anesthesia risk assessment prior to undergoing surgery/procedure for treatment of carpal tunnel.    Proposed Surgery/ Procedure: Carpal tunnel release, right  Date of Surgery/ Procedure: 2020  Time of Surgery/ Procedure: [facility]  Hospital/Surgical Facility: [facility]  Fax number for surgical facility: [phone]  Primary Physician: Shamir Angeles  Type of Anesthesia Anticipated: Local    Patient has a Health Care Directive or Living Will:  NO    1. NO - Do you have a history of heart attack, stroke, stent, bypass or surgery on an artery in the head, neck, heart or legs?  2. NO - Do you ever have any pain or discomfort in your chest?  3. NO - Do you have a history of  Heart Failure?  4. NO - Are you troubled by shortness of breath when: walking on the level, up a slight hill or at night?  5. NO - Do you currently have a cold, bronchitis or other respiratory infection?  6. NO - Do you have a cough, shortness of breath or wheezing?  7. NO - Do you sometimes get pains in the calves of your legs when you walk?  8. NO - Do you or anyone in your family have previous history of blood clots?  9. NO - Do you or does anyone in your family have a serious bleeding problem such as prolonged bleeding following surgeries or cuts?  10. NO - Have you ever had problems with anemia or been told to take iron pills?  11. NO - Have you had any abnormal blood loss such as black, tarry or bloody stools, or abnormal vaginal bleeding?  12. NO - Have you ever had a blood transfusion?  13. NO - Have you or any of your relatives ever had problems with anesthesia?  14. NO - Do you have sleep apnea, excessive snoring or daytime  drowsiness?  15. NO - Do you have any prosthetic heart valves?  16. NO - Do you have prosthetic joints?  17. NO - Is there any chance that you may be pregnant?      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  She describes a several month history of progressive pain in her right wrist that is now severe.   She is scheduled for carpal tunnel release.  She reports being able to perform 4 METS of physical activity without chest pain or dyspnea.  Her diabetes is has been under good control on metformin and Victoza.  She takes tamoxifen for breast cancer prophylaxis.            MEDICAL HISTORY:     Patient Active Problem List    Diagnosis Date Noted     Pre-diabetes 05/13/2019     Priority: Medium     Fibroid uterus 05/13/2019     Priority: Medium     Paresthesias- ? TIA vs complex migraine (preferred per neuro) 10/05/2017     Priority: Medium     Severe obesity (BMI 35.0-35.9 with comorbidity) (H) 10/05/2017     Priority: Medium     Acute bilateral low back pain with right-sided sciatica 09/01/2017     Priority: Medium     ANA (obstructive sleep apnea) 08/15/2017     Priority: Medium     Type 2 diabetes mellitus with hyperglycemia, without long-term current use of insulin (H) 02/06/2017     Priority: Medium     Benign essential hypertension 01/04/2017     Priority: Medium     Cervical radiculopathy 12/12/2015     Priority: Medium     Pain in joint, ankle and foot 06/14/2010     Priority: Medium      Past Medical History:   Diagnosis Date     Diffuse cystic mastopathy      Elevated BP 12/15/2016     Hypertension      Mesenteric adenitis 4/14     Migraines      Nephrolithiasis     s/p lithotripsy     Sleep apnea     no cpap     Thyroiditis, acute 12/2/2014    transient hypothyroidism- resolved     Type 2 diabetes mellitus with hyperglycemia, without long-term current use of insulin (H) 2/6/2017     Past Surgical History:   Procedure Laterality Date      ARTHROSCOPY ANKLE, OPEN REPAIR TENDON, COMBINED  11/8/2012    Procedure: COMBINED ARTHROSCOPY ANKLE, OPEN REPAIR TENDON;  Ankle Arthroscopy Left Ankle, Open Ligament Repair Left Ankle, Peroneal Tendon Repair Left Ankle ;  Surgeon: Otf Ramos DPM;  Location: [facility] OR     C APPENDECTOMY  1984     COMBINED CYSTOSCOPY, INSERT STENT URETER(S)  8/6/2013    Procedure: COMBINED CYSTOSCOPY, INSERT STENT URETER(S);  cystoscopy, right retrograde,RIGHT STENT PLACEMENT.;  Surgeon: Kan Porter MD;  Location:  OR     CYSTOSCOPY, RETROGRADES, INSERT STENT URETER(S), COMBINED  8/6/2013    Procedure: COMBINED CYSTOSCOPY, RETROGRADES, INSERT STENT URETER(S);  cystoscopy, right retrograde, insert stent right ureter;  Surgeon: Kan Porter MD;  Location:  OR     DENTAL SURGERY      TOOTH#7 - DENTAL IMPLANT     DISCECTOMY, FUSION CERVICAL ANTERIOR ONE LEVEL, COMBINED N/A 12/13/2015    Procedure: COMBINED DISCECTOMY, FUSION CERVICAL ANTERIOR ONE LEVEL;  Surgeon: Seven Umaña MD;  Location:  OR     EXTRACORPOREAL SHOCK WAVE LITHOTRIPSY (ESWL)  8/19/2013    Procedure: EXTRACORPOREAL SHOCK WAVE LITHOTRIPSY (ESWL);   RIGHT EXTRACORPOREAL SHOCK WAVE LITHOTRIPSY;  Surgeon: Otf Tobias MD;  Location:  OR     HC REDUCTION OF LARGE BREAST Bilateral 2003     HYSTERECTOMY, PAP NO LONGER INDICATED  2019     LAPAROSCOPIC HYSTERECTOMY TOTAL, SALPINGECTOMY Left 5/29/2019    Procedure: single site total LAPAROSCOPIC HYSTERECTOMY, left oophorectomy and lysis of adhesions;  Surgeon: Pauline Horowitz MD;  Location:  OR     LASIK BILATERAL       SALPINGO OOPHORECTOMY,R/L/DERREK Right 05/17/2006    Right     Current Outpatient Medications   Medication Sig Dispense Refill     Acetaminophen (TYLENOL PO) Take 500-1,000 mg by mouth every 6 hours as needed As needed for pain        cetirizine (ZYRTEC) 10 MG tablet Take 10 mg by mouth daily       desvenlafaxine (PRISTIQ) 50 MG 24 hr tablet Take 1 tablet  (50 mg) by mouth daily 90 tablet 3     insulin pen needle (BD VALENTINA U/F) 32G X 4 MM miscellaneous USE ONCE DAILY AS DIRECTED 100 each 96     Krill Oil 1000 MG CAPS Take by mouth daily       liraglutide (VICTOZA PEN) 18 MG/3ML solution Inject 1.2 mg Subcutaneous daily 6 mL 1     losartan-hydrochlorothiazide (HYZAAR) 50-12.5 MG tablet Take 1 tablet by mouth daily 90 tablet 3     metFORMIN (GLUCOPHAGE-XR) 500 MG 24 hr tablet Take 1 tablet (500 mg) by mouth daily (with dinner) TAKE ONE TABLET BY MOUTH ONCE DAILY WITH DINNER 90 tablet 3     multivitamin, therapeutic with minerals (MULTI-VITAMIN) TABS tablet Take 1 tablet by mouth daily       tamoxifen (NOLVADEX) 20 MG tablet Take 1 tablet (20 mg) by mouth daily 90 tablet 3     tamoxifen (NOLVADEX) 20 MG tablet TAKE ONE TABLET BY MOUTH ONCE DAILY 90 tablet 1     VICTOZA PEN 18 MG/3ML soln INJECT 1.2 MG UNDER THE SKIN DAILY 6 mL 5     VITAMIN D, CHOLECALCIFEROL, PO Take 10,000 Units by mouth daily        zolpidem (AMBIEN CR) 6.25 MG CR tablet Take 1 tab by mouth at bedtime as needed. 90 tablet 0     blood glucose monitoring (ACCU-CHEK ALMA PLUS) test strip Use to test blood sugar 1 times daily or as directed. (Patient not taking: Reported on 7/17/2020) 100 strip 1     blood glucose monitoring (ACCU-CHEK FASTCLIX) lancets Use to test blood sugar 1 times daily or as directed. (Patient not taking: Reported on 7/17/2020) 3 each 3     Ibuprofen (IBU PO) Take 600 mg by mouth every 6 hours as needed        ondansetron (ZOFRAN ODT) 4 MG PO ODT tab Take 1-2 tablets (4-8 mg) by mouth every 8 hours as needed for nausea (Patient not taking: Reported on 7/17/2020) 12 tablet 1     STATIN NOT PRESCRIBED, INTENTIONAL, Please choose reason not prescribed, below (Patient not taking: Reported on 7/17/2020)           Allergies   Allergen Reactions     Lisinopril Cough     Pneumovax [Pneumococcal Polysaccharides] Other (See Comments)     Red streaking and pain        Social History     Tobacco  "Use     Smoking status: Never Smoker     Smokeless tobacco: Never Used   Substance Use Topics     Alcohol use: Yes     Alcohol/week: 0.0 standard drinks     Comment: 0-1 drinks per week     History   Drug Use No       REVIEW OF SYSTEMS:   Constitutional, HEENT, cardiovascular, pulmonary, gi and gu systems are negative, except as otherwise noted.    EXAM:   /82 (BP Location: Right arm, Patient Position: Sitting, Cuff Size: Adult Regular)   Pulse 99   Temp 98.7  F (37.1  C) (Tympanic)   Ht 1.575 m (5' 2\")   Wt 92.8 kg (204 lb 9.6 oz)   LMP 06/20/2018 (Exact Date)   SpO2 98%   BMI 37.42 kg/m      GENERAL APPEARANCE: healthy, alert and no distress     EYES: EOMI, PERRL     HENT: ear canals and TM's normal and nose and mouth without ulcers or lesions     NECK: no adenopathy, no asymmetry, masses, or scars and thyroid normal to palpation     RESP: lungs clear to auscultation - no rales, rhonchi or wheezes     CV: regular rates and rhythm, normal S1 S2, no S3 or S4 and no murmur, click or rub     ABDOMEN:  soft, nontender, no HSM or masses and bowel sounds normal     MS: extremities normal- no gross deformities noted, no evidence of inflammation in joints, FROM in all extremities.     SKIN: no suspicious lesions or rashes     NEURO: Normal strength and tone, sensory exam grossly normal, mentation intact and speech normal     PSYCH: mentation appears normal. and affect normal/bright     LYMPHATICS: No cervical adenopathy     FEET:  Normal sensation to 10g monofilament in bilateral feet without obvious foot ulcers     DIAGNOSTICS:   No labs or EKG required for low risk surgery (cataract, skin procedure, breast biopsy, etc)    Recent Labs   Lab Test 06/22/20  1335 03/02/20  0945 11/18/19  1559 07/30/19  0806  05/13/19  0843  10/05/17  2002  12/12/15  2225   HGB  --   --  13.0 13.4   < > 12.9   < > 13.8   < >  --    PLT  --   --  218 186  --  218   < > 253   < >  --    INR  --   --   --   --   --   --   --  0.91  " --  0.96     --   --   --   --  140   < > 139   < >  --    POTASSIUM 3.6  --   --   --   --  3.8   < > 3.5   < >  --    CR 0.71  --   --   --   --  0.72   < > 0.74   < >  --    A1C  --  6.8* 6.6* 5.9*  --  6.0*   < > 6.1*   < >  --     < > = values in this interval not displayed.        IMPRESSION:   Reason for surgery/procedure: Severe right carpal tunnel syndrome   Diagnosis/reason for consult: Preoperative evaluation     The proposed surgical procedure is considered LOW risk.    REVISED CARDIAC RISK INDEX  The patient has the following serious cardiovascular risks for perioperative complications such as (MI, PE, VFib and 3  AV Block):  No serious cardiac risks  INTERPRETATION: 0 risks: Class I (very low risk - 0.4% complication rate)    The patient has the following additional risks for perioperative complications:  No identified additional risks      ICD-10-CM    1. Preop general physical exam  Z01.818    2. Carpal tunnel syndrome of right wrist  G56.01    3. Type 2 diabetes mellitus with hyperglycemia, without long-term current use of insulin (H)  E11.65 metFORMIN (GLUCOPHAGE-XR) 500 MG 24 hr tablet   4. Benign essential hypertension  I10 losartan-hydrochlorothiazide (HYZAAR) 50-12.5 MG tablet   5. ANA (obstructive sleep apnea)  G47.33        RECOMMENDATIONS:       Cardiovascular Risk  Performs 4 METs exercise without symptoms (Walk on level ground at 15 minutes per mile (4 miles/hour)) .       Obstructive Sleep Apnea (or suspected sleep apnea)  Patient is to bring their home CPAP with them on the day of surgery      --Patient is to take all scheduled medications on the day of surgery EXCEPT for modifications listed below.    Diabetes Medication Use  -----Hold usual oral and non-insulin diabetic meds (e.g. Metformin, Actos, Glipizide) while NPO.       ACE Inhibitor or Angiotensin Receptor Blocker (ARB) Use  Since she does not plan to have any sedation for the procedure, she does not need to hold her  losartan/hctz in the perioperative period for this minor surgery       APPROVAL GIVEN to proceed with proposed procedure, without further diagnostic evaluation       Signed Electronically by: Yuval Watson MD    Copy of this evaluation report is provided to requesting physician.    [place] Preop Guidelines    Revised Cardiac Risk Index

## 2020-07-24 DIAGNOSIS — R73.03 PRE-DIABETES: ICD-10-CM

## 2020-07-24 DIAGNOSIS — E11.65 TYPE 2 DIABETES MELLITUS WITH HYPERGLYCEMIA, WITHOUT LONG-TERM CURRENT USE OF INSULIN (H): ICD-10-CM

## 2020-07-24 DIAGNOSIS — E66.01 SEVERE OBESITY (BMI 35.0-35.9 WITH COMORBIDITY) (H): ICD-10-CM

## 2020-07-24 DIAGNOSIS — E78.5 HYPERLIPIDEMIA LDL GOAL <100: ICD-10-CM

## 2020-07-24 DIAGNOSIS — G56.01 CARPAL TUNNEL SYNDROME OF RIGHT WRIST: ICD-10-CM

## 2020-07-24 LAB
CHOLEST SERPL-MCNC: 182 MG/DL
CREAT UR-MCNC: 151 MG/DL
HBA1C MFR BLD: 6.8 % (ref 0–5.6)
HDLC SERPL-MCNC: 38 MG/DL
LDLC SERPL CALC-MCNC: 88 MG/DL
MICROALBUMIN UR-MCNC: 74 MG/L
MICROALBUMIN/CREAT UR: 49.01 MG/G CR (ref 0–25)
NONHDLC SERPL-MCNC: 144 MG/DL
TRIGL SERPL-MCNC: 279 MG/DL
TSH SERPL DL<=0.005 MIU/L-ACNC: 2.66 MU/L (ref 0.4–4)

## 2020-07-24 PROCEDURE — 36415 COLL VENOUS BLD VENIPUNCTURE: CPT | Performed by: INTERNAL MEDICINE

## 2020-07-24 PROCEDURE — 82043 UR ALBUMIN QUANTITATIVE: CPT | Performed by: INTERNAL MEDICINE

## 2020-07-24 PROCEDURE — 80061 LIPID PANEL: CPT | Performed by: INTERNAL MEDICINE

## 2020-07-24 PROCEDURE — 83036 HEMOGLOBIN GLYCOSYLATED A1C: CPT | Performed by: INTERNAL MEDICINE

## 2020-07-24 PROCEDURE — 84443 ASSAY THYROID STIM HORMONE: CPT | Performed by: INTERNAL MEDICINE

## 2020-07-24 NOTE — PROGRESS NOTES
"Kezia Nava is a 44 year old female who is being evaluated via a billable telephone visit.      The patient has been notified of following:     \"This telephone visit will be conducted via a call between you and your physician/provider. We have found that certain health care needs can be provided without the need for a physical exam.  This service lets us provide the care you need with a short phone conversation.  If a prescription is necessary we can send it directly to your pharmacy.  If lab work is needed we can place an order for that and you can then stop by our lab to have the test done at a later time.    Telephone visits are billed at different rates depending on your insurance coverage. During this emergency period, for some insurers they may be billed the same as an in-person visit.  Please reach out to your insurance provider with any questions.    If during the course of the call the physician/provider feels a telephone visit is not appropriate, you will not be charged for this service.\"    Patient has given verbal consent for Telephone visit?  Yes    What phone number would you like to be contacted at? [phone]    How would you like to obtain your AVS? Fernanda    Phone call duration: 17 minutes        PRE SURGICAL WEIGHT LOSS NUTRITION APPOINTMENT    Kezia Nava  1975  female  [id number]  44 year old    ASSESSMENT    Desired Surgical Procedure: gastric bypass    REASON FOR VISIT:  Kezia Nava is a 44 year old year old female presents today for a pre-surgical weight loss follow-up appointment. Patient accompanied by self.    DIAGNOSIS:  Weight Status Obesity Grade II BMI 35-39.9    ANTHROPOMETRICS:  Height: 62\"    Initial Weight: 205 lb    Weight last visit: 205 lb (pt reported)   Current weight: 202.8 lb (pt reported)   BMI: 37.09  kg/(m^2).    VITAMINS AND MINERALS:  1 Multivitamin with Minerals-bed time  500 mg Calcium with Vitamin D TID (7 am /11 am /5 pm)  4000 [employer] " "units Vitamin D      NUTRITION HISTORY:  Breakfast: [place] Instant breakfast drink (low sugar)  Lunch: Slim fast or [place] Instant breakfast drink (low sugar)  Supper: low calorie frozen entree  Snacks: fresh cherries/V-8 juice/ sugar free gelatin or sugar free  Fluids Consumed: Water and Protein Drink  Eating slower: Yes  Chewing foods thoroughly: Yes  Take 20-30 minutes to consume each meal: Yes  Fluids and meals separate by at least 30 minutes: Yes  Carbonation: denies  Caffeine: denies  Additional Information: Pt. feels like she is doing better this month with food choices and exercise. Had carpel tunnel surgery last week so is not working for 2-4 weeks. She is feeling confident with behavior changes, but is concerned about not working for 2-4 weeks and would like to do one more RD visit (2-4 weeks depending on when she returns to work). She feels like the \"Modified Liquid Diet\" is helping with weight loss.    PHYSICAL ACTIVITY:  Type: walking  Frequency: 3-4 (days per week)  Duration: 20-30 (minutes)     DIAGNOSIS:  Previous Nutrition Diagnosis: Obesity related to long history of self- monitoring deficit and excessive energy intake evidenced by BMI of 37.49 kg/m2  No change, modified below    Previous goals:   Follow modified liquid diet-partially met (not able to follow eery day)  Try a yoga video or U tube-try doing at least 1X per week-not met    Current Nutrition Diagnosis: Obesity related to long history of self-monitoring deficit and excessive energy intake as evidenced by BMI of 37.09 kg/m2.    INTERVENTION:  Nutrition Prescription: Recommended energy/nutrient modification.    GOALS:  Increase walking to 5-6X per week for 30 minutes  Follow \"Modified liquid diet\" as much as possible        Intervention:  - Discussed progress towards previous goals.  - Reinforced importance of making behavior changes in preparation for bariatric surgery.   - Assessed learning needs and learning preferences   "     NUTRITION MONITORING AND EVALUATION:  Anticipated compliance: fair-good  Patient demonstrated good understanding.       Follow up: Continue to monitor patient closely regarding weight loss and diet.  # of visits needed: 1 (in 2-4 weeks)  Cleared by RD: Antonietta Underwood RD, HOLDEN  [facility], [place]  [phone]

## 2020-07-27 ENCOUNTER — VIRTUAL VISIT (OUTPATIENT)
Dept: SURGERY | Facility: CLINIC | Age: 45
End: 2020-07-27
Payer: COMMERCIAL

## 2020-07-27 VITALS — BODY MASS INDEX: 37.09 KG/M2 | WEIGHT: 202.8 LBS

## 2020-07-27 PROCEDURE — 97803 MED NUTRITION INDIV SUBSEQ: CPT | Mod: TEL | Performed by: DIETITIAN, REGISTERED

## 2020-08-05 ENCOUNTER — TRANSFERRED RECORDS (OUTPATIENT)
Dept: HEALTH INFORMATION MANAGEMENT | Facility: CLINIC | Age: 45
End: 2020-08-05

## 2020-08-14 DIAGNOSIS — E11.65 TYPE 2 DIABETES MELLITUS WITH HYPERGLYCEMIA, WITHOUT LONG-TERM CURRENT USE OF INSULIN (H): Chronic | ICD-10-CM

## 2020-08-14 DIAGNOSIS — I10 BENIGN ESSENTIAL HYPERTENSION: ICD-10-CM

## 2020-08-18 RX ORDER — LOSARTAN POTASSIUM 50 MG/1
TABLET ORAL
Start: 2020-08-18

## 2020-08-18 RX ORDER — METFORMIN HCL 500 MG
TABLET, EXTENDED RELEASE 24 HR ORAL
Start: 2020-08-18

## 2020-08-18 RX ORDER — HYDROCHLOROTHIAZIDE 12.5 MG/1
CAPSULE ORAL
Start: 2020-08-18

## 2020-08-18 NOTE — TELEPHONE ENCOUNTER
Metformin- denied, too soon to fill     Losartan 50- denied. Spoke with pharmacy staff and confirmed that combination tablet- Hyzaar is available     hydrochlorothiazide 12.5- denied. Spoke with pharmacy staff and confirmed that combination tablet- Hyzaar is available     Anisa FLORES RN

## 2020-09-15 ENCOUNTER — MYC REFILL (OUTPATIENT)
Dept: FAMILY MEDICINE | Facility: CLINIC | Age: 45
End: 2020-09-15

## 2020-09-15 DIAGNOSIS — G47.00 INSOMNIA, UNSPECIFIED TYPE: Primary | ICD-10-CM

## 2020-09-15 RX ORDER — ZOLPIDEM TARTRATE 6.25 MG/1
TABLET, FILM COATED, EXTENDED RELEASE ORAL
Qty: 90 TABLET | Refills: 0 | Status: SHIPPED | OUTPATIENT
Start: 2020-09-15 | End: 2021-01-20

## 2020-09-15 NOTE — TELEPHONE ENCOUNTER
Routing refill request to provider for review/approval because:  Drug not on the [facility] refill protocol   A break in medication    USHA NesbittN, RN  [employer] [facility]

## 2020-10-09 ENCOUNTER — HOSPITAL ENCOUNTER (OUTPATIENT)
Dept: MAMMOGRAPHY | Facility: CLINIC | Age: 45
Discharge: HOME | End: 2020-10-09
Attending: CLINICAL NURSE SPECIALIST | Admitting: CLINICAL NURSE SPECIALIST
Payer: COMMERCIAL

## 2020-10-09 ENCOUNTER — VIRTUAL VISIT (OUTPATIENT)
Dept: ONCOLOGY | Facility: CLINIC | Age: 45
End: 2020-10-09
Attending: CLINICAL NURSE SPECIALIST
Payer: COMMERCIAL

## 2020-10-09 VITALS — WEIGHT: 206 LBS | BODY MASS INDEX: 37.68 KG/M2

## 2020-10-09 DIAGNOSIS — R92.30 DENSE BREASTS: ICD-10-CM

## 2020-10-09 DIAGNOSIS — Z12.39 BREAST CANCER SCREENING, HIGH RISK PATIENT: ICD-10-CM

## 2020-10-09 DIAGNOSIS — Z12.39 BREAST CANCER SCREENING, HIGH RISK PATIENT: Primary | ICD-10-CM

## 2020-10-09 PROCEDURE — 77063 BREAST TOMOSYNTHESIS BI: CPT

## 2020-10-09 PROCEDURE — 999N001193 HC VIDEO/TELEPHONE VISIT; NO CHARGE

## 2020-10-09 PROCEDURE — 99213 OFFICE O/P EST LOW 20 MIN: CPT | Mod: 95 | Performed by: CLINICAL NURSE SPECIALIST

## 2020-10-09 ASSESSMENT — PAIN SCALES - GENERAL: PAINLEVEL: MODERATE PAIN (4)

## 2020-10-09 NOTE — PROGRESS NOTES
"Kezia Nava is a 44 year old female who is being evaluated via a billable video visit.      The patient has been notified of following:     \"This video visit will be conducted via a call between you and your physician/provider. We have found that certain health care needs can be provided without the need for an in-person physical exam.  This service lets us provide the care you need with a video conversation.  If a prescription is necessary we can send it directly to your pharmacy.  If lab work is needed we can place an order for that and you can then stop by our lab to have the test done at a later time.    Video visits are billed at different rates depending on your insurance coverage.  Please reach out to your insurance provider with any questions.    If during the course of the call the physician/provider feels a video visit is not appropriate, you will not be charged for this service.\"    Patient has given verbal consent for Video visit? Yes  How would you like to obtain your AVS? MyChart  If you are dropped from the video visit, the video invite should be resent to: Text to cell phone: [phone]  Will anyone else be joining your video visit? No       CATALINA Blake    Video-Visit Details    Type of service:  Video Visit    Video Start Time: 1515  Video End Time: 1535    Originating Location (pt. Location): Home    Distant Location (provider location):  [facility] CANCER [facility]     Platform used for Video Visit: Scribe Software    Oncology Risk Management Consultation:  Date on this visit: 10/9/2020    Kezia Nava is being seen via a video visit today for a six month follow up.  She requires heightened screening and surveillance for her higher risk of breast cancer, secondary to a family history of breast cancer in her sister at age 43 and maternal first cousin with triple negative breast cancer at age 41.  She is considered to have a 23% lifetime risk for breast cancer by the KAROLINA " model.    She has been taking full dose tamoxifen for breast cancer prevention and to address her ovarian cysts since November 2018.    Primary Physician: Shamir Angeles MD    History Of Present Illness:  Ms. Nava is a very pleasant  44 year old female who presents with a  family history of breast cancer.     Pertinent history:  Menarche at age 12.  Nulliparous.  Postmenopausal at 42.  5/29/2019 - Surgical menopause due to THSO for leiomyoma, history of ovarian cysts and pain. Pathology negative for endometrial polyp, hyperplasia and malignancy.  No  history of OCPs.  HRT: 1-2 small doses of progesterone   S/P breast reduction surgery in 2003.  Heterogeneously dense breasts  Hx of one breast biopsy (12/9/2019). Right breast: stromal fibrosis  No history of atypia, or malignancy.  No history of tobacco use.  Taking full dose tamoxifen since November 2018 as chemoprevention for breast cancer.     Genetic testing:  None to date.  Kezia was planning to be tested with a Cancer Next panel through [facility] but decided not to proceed with the test due to cost.     Pertinent screening history:  3/28/2005: Screening mammogram, category 1, normal.  7/23/2008, Screening mammogram, category 1, normal  3/2/2015:  right diagnostic mammogram and right breast ultrasound for palpable lump in right breast, both BI-RADS 2 tiny cysts.  2/13/2017: Screening mammogram, BI-RADS 2.  6/18/2018: Screening Tomosynthesis mammogram, BI-RADS 1.  12/7/2018 - Breast MRI, BiRads1.  6/20/2019- Screening tomosynthesis mammogram, BiRads1  12/9/2019- Breast MRI, BiRads4  12/18/2019-Right breast US, BiRads4:    Hypoechoic area with a central hyperechogenic focus is seen at the  6:00 position, 2 cm from the nipple on the RIGHT. Anatomic landmarks demonstrate this to be the correlate of the MRI finding.  12/9/2019- MRI guided biopsy of the right breast. Pathology showed: stromas fibrosis  10/9/2020- Screening Tomosynthesis mammogram,  BiRads1       At this visit, she denies asymmetry, lumps, masses, thickening, pain, nipple discharge and skin changes in her breasts.    Past Medical/Surgical History:  Past Medical History:   Diagnosis Date     Diffuse cystic mastopathy      Elevated BP 12/15/2016     Hypertension      Mesenteric adenitis 4/14     Migraines      Nephrolithiasis     s/p lithotripsy     Sleep apnea     no cpap     Thyroiditis, acute 12/2/2014    transient hypothyroidism- resolved     Type 2 diabetes mellitus with hyperglycemia, without long-term current use of insulin (H) 2/6/2017     Past Surgical History:   Procedure Laterality Date     ARTHROSCOPY ANKLE, OPEN REPAIR TENDON, COMBINED  11/8/2012    Procedure: COMBINED ARTHROSCOPY ANKLE, OPEN REPAIR TENDON;  Ankle Arthroscopy Left Ankle, Open Ligament Repair Left Ankle, Peroneal Tendon Repair Left Ankle ;  Surgeon: Otf Ramos DPM;  Location: [facility] OR     C APPENDECTOMY  1984     COMBINED CYSTOSCOPY, INSERT STENT URETER(S)  8/6/2013    Procedure: COMBINED CYSTOSCOPY, INSERT STENT URETER(S);  cystoscopy, right retrograde,RIGHT STENT PLACEMENT.;  Surgeon: Kan Porter MD;  Location:  OR     CYSTOSCOPY, RETROGRADES, INSERT STENT URETER(S), COMBINED  8/6/2013    Procedure: COMBINED CYSTOSCOPY, RETROGRADES, INSERT STENT URETER(S);  cystoscopy, right retrograde, insert stent right ureter;  Surgeon: Kan Porter MD;  Location:  OR     DENTAL SURGERY      TOOTH#7 - DENTAL IMPLANT     DISCECTOMY, FUSION CERVICAL ANTERIOR ONE LEVEL, COMBINED N/A 12/13/2015    Procedure: COMBINED DISCECTOMY, FUSION CERVICAL ANTERIOR ONE LEVEL;  Surgeon: Seven Umaña MD;  Location:  OR     EXTRACORPOREAL SHOCK WAVE LITHOTRIPSY (ESWL)  8/19/2013    Procedure: EXTRACORPOREAL SHOCK WAVE LITHOTRIPSY (ESWL);   RIGHT EXTRACORPOREAL SHOCK WAVE LITHOTRIPSY;  Surgeon: Otf Tobias MD;  Location:  OR     HC REDUCTION OF LARGE BREAST Bilateral 2003      HYSTERECTOMY, PAP NO LONGER INDICATED  2019     LAPAROSCOPIC HYSTERECTOMY TOTAL, SALPINGECTOMY Left 5/29/2019    Procedure: single site total LAPAROSCOPIC HYSTERECTOMY, left oophorectomy and lysis of adhesions;  Surgeon: Pauline Horowitz MD;  Location: [facility] OR     LASIK BILATERAL       SALPINGO OOPHORECTOMY,R/L/DERREK Right 05/17/2006    Right       Allergies:  Allergies as of 10/09/2020 - Reviewed 10/09/2020   Allergen Reaction Noted     Lisinopril Cough 08/01/2017     Pneumovax [pneumococcal polysaccharides] Other (See Comments) 07/13/2018       Current Medications:  Current Outpatient Medications   Medication Sig Dispense Refill     Acetaminophen (TYLENOL PO) Take 500-1,000 mg by mouth every 6 hours as needed As needed for pain        cetirizine (ZYRTEC) 10 MG tablet Take 10 mg by mouth daily       desvenlafaxine (PRISTIQ) 50 MG 24 hr tablet Take 1 tablet (50 mg) by mouth daily 90 tablet 3     Ibuprofen (IBU PO) Take 600 mg by mouth every 6 hours as needed        insulin pen needle (BD VALENTINA U/F) 32G X 4 MM miscellaneous USE ONCE DAILY AS DIRECTED 100 each 96     Krill Oil 1000 MG CAPS Take by mouth daily       liraglutide (VICTOZA PEN) 18 MG/3ML solution Inject 1.2 mg Subcutaneous daily 6 mL 1     losartan-hydrochlorothiazide (HYZAAR) 50-12.5 MG tablet Take 1 tablet by mouth daily 90 tablet 3     metFORMIN (GLUCOPHAGE-XR) 500 MG 24 hr tablet Take 1 tablet (500 mg) by mouth daily (with dinner) TAKE ONE TABLET BY MOUTH ONCE DAILY WITH DINNER 90 tablet 3     multivitamin, therapeutic with minerals (MULTI-VITAMIN) TABS tablet Take 1 tablet by mouth daily       tamoxifen (NOLVADEX) 20 MG tablet Take 1 tablet (20 mg) by mouth daily 90 tablet 3     tamoxifen (NOLVADEX) 20 MG tablet TAKE ONE TABLET BY MOUTH ONCE DAILY 90 tablet 1     VICTOZA PEN 18 MG/3ML soln INJECT 1.2 MG UNDER THE SKIN DAILY 6 mL 5     VITAMIN D, CHOLECALCIFEROL, PO Take 10,000 Units by mouth daily        zolpidem ER (AMBIEN CR) 6.25 MG CR tablet Take 1 tab  by mouth at bedtime as needed. 90 tablet 0     blood glucose monitoring (ACCU-CHEK ALMA PLUS) test strip Use to test blood sugar 1 times daily or as directed. (Patient not taking: Reported on 7/17/2020) 100 strip 1     blood glucose monitoring (ACCU-CHEK FASTCLIX) lancets Use to test blood sugar 1 times daily or as directed. (Patient not taking: Reported on 7/17/2020) 3 each 3     ondansetron (ZOFRAN ODT) 4 MG PO ODT tab Take 1-2 tablets (4-8 mg) by mouth every 8 hours as needed for nausea (Patient not taking: Reported on 7/17/2020) 12 tablet 1     STATIN NOT PRESCRIBED, INTENTIONAL, Please choose reason not prescribed, below (Patient not taking: Reported on 7/17/2020)          Family History:  Family History   Problem Relation Age of Onset     C.A.D. Mother         stents     Hypertension Mother      Kidney Disease Mother         transplant     Hypertension Father      Breast Cancer Maternal Aunt         may have been fibrocystic     Breast Cancer Maternal Aunt         may have been fibrocystic     Cerebrovascular Disease Paternal Grandfather      Cancer Paternal Grandmother         stomach     Breast Cancer Sister 43     Nephrolithiasis Brother      Uterine Cancer Maternal Grandmother 44     Colon Cancer Maternal Grandmother 64     Prostate Cancer Maternal Uncle 50     Breast Cancer Cousin 41        maternal first cousin, triple negative breast cancer     Breast Cancer Paternal Aunt        Social History:  Social History     Socioeconomic History     Marital status: Single     Spouse name: Not on file     Number of children: 0     Years of education: Not on file     Highest education level: Not on file   Occupational History     Occupation: RN- ER     Employer: [employer],[address]   Social Needs     Financial resource strain: Not on file     Food insecurity     Worry: Not on file     Inability: Not on file     Transportation needs     Medical: Not on file     Non-medical: Not on file    Tobacco Use     Smoking status: Never Smoker     Smokeless tobacco: Never Used   Substance and Sexual Activity     Alcohol use: Yes     Alcohol/week: 0.0 standard drinks     Comment: 0-1 drinks per week     Drug use: No     Sexual activity: Not Currently     Partners: Male   Lifestyle     Physical activity     Days per week: Not on file     Minutes per session: Not on file     Stress: Not on file   Relationships     Social connections     Talks on phone: Not on file     Gets together: Not on file     Attends [employer]ology service: Not on file     Active member of club or organization: Not on file     Attends meetings of clubs or organizations: Not on file     Relationship status: Not on file     Intimate partner violence     Fear of current or ex partner: Not on file     Emotionally abused: Not on file     Physically abused: Not on file     Forced sexual activity: Not on file   Other Topics Concern     Parent/sibling w/ CABG, MI or angioplasty before 65F 55M? Yes   Social History Narrative     Not on file       Physical Exam:  Wt 93.4 kg (206 lb)   LMP 06/20/2018 (Exact Date)   BMI 37.68 kg/m     GENERAL: Tired looking, closed demeanor, dark circles under eyes  EYES: Eyes grossly normal to inspection.  No discharge or erythema, or obvious scleral/conjunctival abnormalities.  RESP: No audible wheeze, cough, or visible cyanosis.  No visible retractions or increased work of breathing.    SKIN: Visible skin clear. No significant rash, abnormal pigmentation or lesions.  NEURO: Cranial nerves grossly intact.  Mentation and speech appropriate for age.  PSYCH: Mentation appears normal but very depressed, normal speech and appearance tired      Laboratory/Imaging Studies  Results for orders placed or performed during the hospital encounter of 10/09/20   MA Screen Bilateral w/Cullen     Status: None    Narrative    Examination: Bilateral digital screening mammography with computer  aided detection including digital breast  "tomosynthesis, 10/9/2020 2:11  PM.    Comparison: 6/20/2019, 6/18/2018, 2/13/2017, 3/2/2015    History: No current breast concerns. Sister and cousin with breast  cancer. Patient reports prior benign biopsy.    BREAST DENSITY: Heterogeneously dense.    COMMENTS:  No suspicious finding.      Impression    IMPRESSION: BI-RADS CATEGORY: 1 -  NEGATIVE.    RECOMMENDED FOLLOW-UP: Annual Mammography.      The patient will be notified of the results.     ANKUR ALCANTAR MD       ASSESSMENT  We discussed her last screening tests and reviewed results.  She is glad that there is no call back on her mammogram and she will proceed with the screening plan below.    We discussed her appearance today, as she appears very tired and down. We discussed her living situation. She lives alone and does not have any pets.  We discussed the challenges of coping during the COVID pandemic.      Hazel has been very tired  working full-time 12-hour shifts 3 days a week at least.  She says she feels exhausted all the time and depressed.  She is being followed by her PCP for depression and is considering therapy. We talked about self care, and what she would be doing if there were to pandemic and she explained that she would be seeing her friends at least every week, and has tried to get together with them for different outings, meeting for a socially distance picnic now and then.      She states her medication for her depression was increased and she is hoping she can get some sleep soon. She states she has considered taking up knitting, but she does not have very much energy, we talked about walking and getting outdoors more to help her increase her energy.      We talked about guided imagery and meditation to help her relax so that she could sleep at night.  She has the Calm angel luis and he uses that sometimes but she finds the verbiage to be distracting.  She says \"it is what it is\" and is coping. I encourage her to try therapy  to connect more " online or by phone with at least one friend per week. We discussed sleep as her first goal and getting outdoors in nature at least on her days off. She declines a referral for therapy, as she feels she is already addressing this, and will look into formal therapy soon.       INDIVIDUALIZED CANCER RISK MANAGEMENT PLAN:  Individualized Surveillance Plan for women  With 20% or greater lifetime risk of breast cancer   Per NCCN Breast Cancer Screening and Diagnosis Guidelines Version 2.2018    Recommended screening Test or procedure Last done Next Scheduled    Clinical encounter Ongoing risk assessment, risk reduction counseling and clinical breast exam, every 6-12 months. Refer to genetic counseling if not already done.   10/9/2020   October 2021   However, some family histories with breast cancers at a very young age, may warrant screening starting earlier.    *May begin at age 40 if breast cancers in the family occur at later ages.    Annual mammogram beginning 10 years younger than the earliest breast cancer in the family but not prior to age 30.    Recommend annual breast MRI to begin 10 years younger than the earliest breast cancer in the family but not prior to age 25.    Breast MRIs are preferably done on day 7-15 of the menstrual cycle in premenopausal women. 12/9/2019- MRI guided biopsy of the right breast. Pathology showed: stromas fibrosis    10/9/2020- Screening Tomosynthesis mammogram, BiRads1 Breast MRI in April    Next exam: October 2021 followed by tomosynthesis mammogram   Breast screening for patients at high risk due to thoracic radiation before 30 years old    Begin 10 years post radiation treatment or age 25.   Annual mammogram beginning 10 years after radiation but not prior to age 30    Annual breast MRI, preferably on day 7-15 of menstrual cycle for premenopausal women.       NA     NA   Women who have a lifetime risk of >20% based on history of LCIS or ADH/ALH Annual screening mammogram  beginning at age of LCIS or ADH/ALH but not prior to age 30.    Consider annual MRI to begin at age of diagnosis of LCIS or ADH/ALH but not prior to age 25.    Consider risk reducing strategies.     NA     NA    Recommend risk reducing strategies for women with 1.7% 5 year risk of breast cancer. Taking tamoxifen since Nov. 2018 (20 mg. Daily) Continue through November 2023       I spent 20 minutes with the patient with greater that 50% of it in counseling and coordinating care as documented above.    SALLIE Fuller-CNS, OCN, AGN-BC  Clinical Nurse Specialist  Cancer Risk Management Program  [facility] [address]  [place] [address]    phone:  [phone]  Pager: [phone]  fax: [phone]    CC:  Shamir Angeles MD

## 2020-10-09 NOTE — LETTER
"    Cancer Risk Management  Program Locations    [facility]  [facility]  [facility]  Mailing Address  Cancer Risk Management Program  [facility]  [address]  [place] [address]    New patient appointments  [phone]  October 13, 2020    Kezia Nava  [address] [place] [address]      Dear Hazel,    It was a pleasure meeting with you today.  Below is a copy of my note from our visit, outlining your surveillance plan.      I look forward to seeing you in the future to coordinate your care and reduce your health risks. Please feel free to contact me if you have any questions or concerns.      Kezia Nava is a 44 year old female who is being evaluated via a billable video visit.      The patient has been notified of following:     \"This video visit will be conducted via a call between you and your physician/provider. We have found that certain health care needs can be provided without the need for an in-person physical exam.  This service lets us provide the care you need with a video conversation.  If a prescription is necessary we can send it directly to your pharmacy.  If lab work is needed we can place an order for that and you can then stop by our lab to have the test done at a later time.    Video visits are billed at different rates depending on your insurance coverage.  Please reach out to your insurance provider with any questions.    If during the course of the call the physician/provider feels a video visit is not appropriate, you will not be charged for this service.\"    Patient has given verbal consent for Video visit? Yes  How would you like to obtain your AVS? MyChart  If you are dropped from the video visit, the video invite should be resent to: Text to cell phone: [phone]  Will anyone else be " joining your video visit? No       CATALINA Blake    Video-Visit Details    Type of service:  Video Visit    Video Start Time: 1515  Video End Time: 1535    Originating Location (pt. Location): Home    Distant Location (provider location):  [facility]ER [facility]     Platform used for Video Visit: [email or web address]    Oncology Risk Management Consultation:  Date on this visit: 10/9/2020    Kezia Nava is being seen via a video visit today for a six month follow up.  She requires heightened screening and surveillance for her higher risk of breast cancer, secondary to a family history of breast cancer in her sister at age 43 and maternal first cousin with triple negative breast cancer at age 41.  She is considered to have a 23% lifetime risk for breast cancer by the KAROLINA model.    She has been taking full dose tamoxifen for breast cancer prevention and to address her ovarian cysts since November 2018.    Primary Physician: Shamir Angeles MD    History Of Present Illness:  Ms. Nava is a very pleasant  44 year old female who presents with a  family history of breast cancer.     Pertinent history:  Menarche at age 12.  Nulliparous.  Postmenopausal at 42.  5/29/2019 - Surgical menopause due to THSO for leiomyoma, history of ovarian cysts and pain. Pathology negative for endometrial polyp, hyperplasia and malignancy.  No  history of OCPs.  HRT: 1-2 small doses of progesterone   S/P breast reduction surgery in 2003.  Heterogeneously dense breasts  Hx of one breast biopsy (12/9/2019). Right breast: stromal fibrosis  No history of atypia, or malignancy.  No history of tobacco use.  Taking full dose tamoxifen since November 2018 as chemoprevention for breast cancer.     Genetic testing:  None to date.  Kezia was planning to be tested with a Cancer Next panel through [employer] but decided not to proceed with the test due to cost.     Pertinent screening history:  3/28/2005: Screening mammogram, category 1,  normal.  7/23/2008, Screening mammogram, category 1, normal  3/2/2015:  right diagnostic mammogram and right breast ultrasound for palpable lump in right breast, both BI-RADS 2 tiny cysts.  2/13/2017: Screening mammogram, BI-RADS 2.  6/18/2018: Screening Tomosynthesis mammogram, BI-RADS 1.  12/7/2018 - Breast MRI, BiRads1.  6/20/2019- Screening tomosynthesis mammogram, BiRads1  12/9/2019- Breast MRI, BiRads4  12/18/2019-Right breast US, BiRads4:    Hypoechoic area with a central hyperechogenic focus is seen at the  6:00 position, 2 cm from the nipple on the RIGHT. Anatomic landmarks demonstrate this to be the correlate of the MRI finding.  12/9/2019- MRI guided biopsy of the right breast. Pathology showed: stromas fibrosis  10/9/2020- Screening Tomosynthesis mammogram, BiRads1       At this visit, she denies asymmetry, lumps, masses, thickening, pain, nipple discharge and skin changes in her breasts.    Past Medical/Surgical History:  Past Medical History:   Diagnosis Date     Diffuse cystic mastopathy      Elevated BP 12/15/2016     Hypertension      Mesenteric adenitis 4/14     Migraines      Nephrolithiasis     s/p lithotripsy     Sleep apnea     no cpap     Thyroiditis, acute 12/2/2014    transient hypothyroidism- resolved     Type 2 diabetes mellitus with hyperglycemia, without long-term current use of insulin (H) 2/6/2017     Past Surgical History:   Procedure Laterality Date     ARTHROSCOPY ANKLE, OPEN REPAIR TENDON, COMBINED  11/8/2012    Procedure: COMBINED ARTHROSCOPY ANKLE, OPEN REPAIR TENDON;  Ankle Arthroscopy Left Ankle, Open Ligament Repair Left Ankle, Peroneal Tendon Repair Left Ankle ;  Surgeon: Otf Ramos DPM;  Location: [facility] OR     C APPENDECTOMY  1984     COMBINED CYSTOSCOPY, INSERT STENT URETER(S)  8/6/2013    Procedure: COMBINED CYSTOSCOPY, INSERT STENT URETER(S);  cystoscopy, right retrograde,RIGHT STENT PLACEMENT.;  Surgeon: Kan Porter MD;  Location: [facility] OR     CYSTOSCOPY,  RETROGRADES, INSERT STENT URETER(S), COMBINED  8/6/2013    Procedure: COMBINED CYSTOSCOPY, RETROGRADES, INSERT STENT URETER(S);  cystoscopy, right retrograde, insert stent right ureter;  Surgeon: Kan Porter MD;  Location:  OR     DENTAL SURGERY      TOOTH#7 - DENTAL IMPLANT     DISCECTOMY, FUSION CERVICAL ANTERIOR ONE LEVEL, COMBINED N/A 12/13/2015    Procedure: COMBINED DISCECTOMY, FUSION CERVICAL ANTERIOR ONE LEVEL;  Surgeon: Seven Umaña MD;  Location:  OR     EXTRACORPOREAL SHOCK WAVE LITHOTRIPSY (ESWL)  8/19/2013    Procedure: EXTRACORPOREAL SHOCK WAVE LITHOTRIPSY (ESWL);   RIGHT EXTRACORPOREAL SHOCK WAVE LITHOTRIPSY;  Surgeon: Otf Tobias MD;  Location:  OR     HC REDUCTION OF LARGE BREAST Bilateral 2003     HYSTERECTOMY, PAP NO LONGER INDICATED  2019     LAPAROSCOPIC HYSTERECTOMY TOTAL, SALPINGECTOMY Left 5/29/2019    Procedure: single site total LAPAROSCOPIC HYSTERECTOMY, left oophorectomy and lysis of adhesions;  Surgeon: Pauline Horowitz MD;  Location: [facility] OR     LASIK BILATERAL       SALPINGO OOPHORECTOMY,R/L/DERREK Right 05/17/2006    Right       Allergies:  Allergies as of 10/09/2020 - Reviewed 10/09/2020   Allergen Reaction Noted     Lisinopril Cough 08/01/2017     Pneumovax [pneumococcal polysaccharides] Other (See Comments) 07/13/2018       Current Medications:  Current Outpatient Medications   Medication Sig Dispense Refill     Acetaminophen (TYLENOL PO) Take 500-1,000 mg by mouth every 6 hours as needed As needed for pain        cetirizine (ZYRTEC) 10 MG tablet Take 10 mg by mouth daily       desvenlafaxine (PRISTIQ) 50 MG 24 hr tablet Take 1 tablet (50 mg) by mouth daily 90 tablet 3     Ibuprofen (IBU PO) Take 600 mg by mouth every 6 hours as needed        insulin pen needle (BD VALENTINA U/F) 32G X 4 MM miscellaneous USE ONCE DAILY AS DIRECTED 100 each 96     Krill Oil 1000 MG CAPS Take by mouth daily       liraglutide (VICTOZA PEN) 18 MG/3ML solution Inject 1.2  mg Subcutaneous daily 6 mL 1     losartan-hydrochlorothiazide (HYZAAR) 50-12.5 MG tablet Take 1 tablet by mouth daily 90 tablet 3     metFORMIN (GLUCOPHAGE-XR) 500 MG 24 hr tablet Take 1 tablet (500 mg) by mouth daily (with dinner) TAKE ONE TABLET BY MOUTH ONCE DAILY WITH DINNER 90 tablet 3     multivitamin, therapeutic with minerals (MULTI-VITAMIN) TABS tablet Take 1 tablet by mouth daily       tamoxifen (NOLVADEX) 20 MG tablet Take 1 tablet (20 mg) by mouth daily 90 tablet 3     tamoxifen (NOLVADEX) 20 MG tablet TAKE ONE TABLET BY MOUTH ONCE DAILY 90 tablet 1     VICTOZA PEN 18 MG/3ML soln INJECT 1.2 MG UNDER THE SKIN DAILY 6 mL 5     VITAMIN D, CHOLECALCIFEROL, PO Take 10,000 Units by mouth daily        zolpidem ER (AMBIEN CR) 6.25 MG CR tablet Take 1 tab by mouth at bedtime as needed. 90 tablet 0     blood glucose monitoring (ACCU-CHEK ALMA PLUS) test strip Use to test blood sugar 1 times daily or as directed. (Patient not taking: Reported on 7/17/2020) 100 strip 1     blood glucose monitoring (ACCU-CHEK FASTCLIX) lancets Use to test blood sugar 1 times daily or as directed. (Patient not taking: Reported on 7/17/2020) 3 each 3     ondansetron (ZOFRAN ODT) 4 MG PO ODT tab Take 1-2 tablets (4-8 mg) by mouth every 8 hours as needed for nausea (Patient not taking: Reported on 7/17/2020) 12 tablet 1     STATIN NOT PRESCRIBED, INTENTIONAL, Please choose reason not prescribed, below (Patient not taking: Reported on 7/17/2020)          Family History:  Family History   Problem Relation Age of Onset     C.A.D. Mother         stents     Hypertension Mother      Kidney Disease Mother         transplant     Hypertension Father      Breast Cancer Maternal Aunt         may have been fibrocystic     Breast Cancer Maternal Aunt         may have been fibrocystic     Cerebrovascular Disease Paternal Grandfather      Cancer Paternal Grandmother         stomach     Breast Cancer Sister 43     Nephrolithiasis Brother      Uterine  Cancer Maternal Grandmother 44     Colon Cancer Maternal Grandmother 64     Prostate Cancer Maternal Uncle 50     Breast Cancer Cousin 41        maternal first cousin, triple negative breast cancer     Breast Cancer Paternal Aunt        Social History:  Social History     Socioeconomic History     Marital status: Single     Spouse name: Not on file     Number of children: 0     Years of education: Not on file     Highest education level: Not on file   Occupational History     Occupation: RN- ER     Employer: [employer]   Social Needs     Financial resource strain: Not on file     Food insecurity     Worry: Not on file     Inability: Not on file     Transportation needs     Medical: Not on file     Non-medical: Not on file   Tobacco Use     Smoking status: Never Smoker     Smokeless tobacco: Never Used   Substance and Sexual Activity     Alcohol use: Yes     Alcohol/week: 0.0 standard drinks     Comment: 0-1 drinks per week     Drug use: No     Sexual activity: Not Currently     Partners: Male   Lifestyle     Physical activity     Days per week: Not on file     Minutes per session: Not on file     Stress: Not on file   Relationships     Social connections     Talks on phone: Not on file     Gets together: Not on file     Attends [employer] service: Not on file     Active member of club or organization: Not on file     Attends meetings of clubs or organizations: Not on file     Relationship status: Not on file     Intimate partner violence     Fear of current or ex partner: Not on file     Emotionally abused: Not on file     Physically abused: Not on file     Forced sexual activity: Not on file   Other Topics Concern     Parent/sibling w/ CABG, MI or angioplasty before 65F 55M? Yes   Social History Narrative     Not on file       Physical Exam:  Wt 93.4 kg (206 lb)   LMP 06/20/2018 (Exact Date)   BMI 37.68 kg/m     GENERAL: Tired looking, closed demeanor, dark circles under  eyes  EYES: Eyes grossly normal to inspection.  No discharge or erythema, or obvious scleral/conjunctival abnormalities.  RESP: No audible wheeze, cough, or visible cyanosis.  No visible retractions or increased work of breathing.    SKIN: Visible skin clear. No significant rash, abnormal pigmentation or lesions.  NEURO: Cranial nerves grossly intact.  Mentation and speech appropriate for age.  PSYCH: Mentation appears normal but very depressed, normal speech and appearance tired      Laboratory/Imaging Studies  Results for orders placed or performed during the hospital encounter of 10/09/20   MA Screen Bilateral w/Cullen     Status: None    Narrative    Examination: Bilateral digital screening mammography with computer  aided detection including digital breast tomosynthesis, 10/9/2020 2:11  PM.    Comparison: 6/20/2019, 6/18/2018, 2/13/2017, 3/2/2015    History: No current breast concerns. Sister and cousin with breast  cancer. Patient reports prior benign biopsy.    BREAST DENSITY: Heterogeneously dense.    COMMENTS:  No suspicious finding.      Impression    IMPRESSION: BI-RADS CATEGORY: 1 -  NEGATIVE.    RECOMMENDED FOLLOW-UP: Annual Mammography.      The patient will be notified of the results.     ANKUR ALCANTAR MD       ASSESSMENT  We discussed her last screening tests and reviewed results.  She is glad that there is no call back on her mammogram and she will proceed with the screening plan below.    We discussed her appearance today, as she appears very tired and down. We discussed her living situation. She lives alone and does not have any pets.  We discussed the challenges of coping during the COVID pandemic.      Hazel has been very tired  working full-time 12-hour shifts 3 days a week at least.  She says she feels exhausted all the time and depressed.  She is being followed by her PCP for depression and is considering therapy. We talked about self care, and what she would be doing if there were to pandemic  "and she explained that she would be seeing her friends at least every week, and has tried to get together with them for different outings, meeting for a socially distance picnic now and then.      She states her medication for her depression was increased and she is hoping she can get some sleep soon. She states she has considered taking up knitting, but she does not have very much energy, we talked about walking and getting outdoors more to help her increase her energy.      We talked about guided imagery and meditation to help her relax so that she could sleep at night.  She has the Calm angel luis and he uses that sometimes but she finds the verbiage to be distracting.  She says \"it is what it is\" and is coping. I encourage her to try therapy  to connect more online or by phone with at least one friend per week. We discussed sleep as her first goal and getting outdoors in nature at least on her days off. She declines a referral for therapy, as she feels she is already addressing this, and will look into formal therapy soon.       INDIVIDUALIZED CANCER RISK MANAGEMENT PLAN:  Individualized Surveillance Plan for women  With 20% or greater lifetime risk of breast cancer   Per NCCN Breast Cancer Screening and Diagnosis Guidelines Version 2.2018    Recommended screening Test or procedure Last done Next Scheduled    Clinical encounter Ongoing risk assessment, risk reduction counseling and clinical breast exam, every 6-12 months. Refer to genetic counseling if not already done.   10/9/2020   October 2021   However, some family histories with breast cancers at a very young age, may warrant screening starting earlier.    *May begin at age 40 if breast cancers in the family occur at later ages.    Annual mammogram beginning 10 years younger than the earliest breast cancer in the family but not prior to age 30.    Recommend annual breast MRI to begin 10 years younger than the earliest breast cancer in the family but not prior to " age 25.    Breast MRIs are preferably done on day 7-15 of the menstrual cycle in premenopausal women. 12/9/2019- MRI guided biopsy of the right breast. Pathology showed: stromas fibrosis    10/9/2020- Screening Tomosynthesis mammogram, BiRads1 Breast MRI in April    Next exam: October 2021 followed by tomosynthesis mammogram   Breast screening for patients at high risk due to thoracic radiation before 30 years old    Begin 10 years post radiation treatment or age 25.   Annual mammogram beginning 10 years after radiation but not prior to age 30    Annual breast MRI, preferably on day 7-15 of menstrual cycle for premenopausal women.       NA     NA   Women who have a lifetime risk of >20% based on history of LCIS or ADH/ALH Annual screening mammogram beginning at age of LCIS or ADH/ALH but not prior to age 30.    Consider annual MRI to begin at age of diagnosis of LCIS or ADH/ALH but not prior to age 25.    Consider risk reducing strategies.     NA     NA    Recommend risk reducing strategies for women with 1.7% 5 year risk of breast cancer. Taking tamoxifen since Nov. 2018 (20 mg. Daily) Continue through November 2023       I spent 20 minutes with the patient with greater that 50% of it in counseling and coordinating care as documented above.    SALLIE Fuller-CNS, OCN, AGN-BC  Clinical Nurse Specialist  Cancer Risk Management Program  [employer] [address]  [place] [address]    phone:  [phone]  Pager: [phone]  fax: [phone]    CC:  Shamir Angeles MD

## 2020-10-13 ENCOUNTER — TELEPHONE (OUTPATIENT)
Dept: ONCOLOGY | Facility: CLINIC | Age: 45
End: 2020-10-13

## 2020-10-27 ENCOUNTER — APPOINTMENT (OUTPATIENT)
Dept: CT IMAGING | Facility: CLINIC | Age: 45
End: 2020-10-27
Attending: EMERGENCY MEDICINE
Payer: COMMERCIAL

## 2020-10-27 ENCOUNTER — HOSPITAL ENCOUNTER (EMERGENCY)
Facility: CLINIC | Age: 45
Discharge: HOME | End: 2020-10-27
Attending: EMERGENCY MEDICINE | Admitting: EMERGENCY MEDICINE
Payer: COMMERCIAL

## 2020-10-27 VITALS
SYSTOLIC BLOOD PRESSURE: 137 MMHG | HEIGHT: 62 IN | RESPIRATION RATE: 26 BRPM | BODY MASS INDEX: 37.91 KG/M2 | OXYGEN SATURATION: 95 % | HEART RATE: 92 BPM | WEIGHT: 206 LBS | TEMPERATURE: 99.4 F | DIASTOLIC BLOOD PRESSURE: 88 MMHG

## 2020-10-27 DIAGNOSIS — R91.8 PULMONARY NODULES: ICD-10-CM

## 2020-10-27 DIAGNOSIS — R00.0 SINUS TACHYCARDIA: ICD-10-CM

## 2020-10-27 DIAGNOSIS — R06.02 SHORTNESS OF BREATH: ICD-10-CM

## 2020-10-27 LAB
ANION GAP SERPL CALCULATED.3IONS-SCNC: 10 MMOL/L (ref 3–14)
BASOPHILS # BLD AUTO: 0 10E9/L (ref 0–0.2)
BASOPHILS NFR BLD AUTO: 0.5 %
BUN SERPL-MCNC: 13 MG/DL (ref 7–30)
CALCIUM SERPL-MCNC: 8.9 MG/DL (ref 8.5–10.1)
CHLORIDE SERPL-SCNC: 107 MMOL/L (ref 94–109)
CO2 SERPL-SCNC: 23 MMOL/L (ref 20–32)
CREAT BLD-MCNC: 0.7 MG/DL (ref 0.52–1.04)
CREAT SERPL-MCNC: 0.69 MG/DL (ref 0.52–1.04)
DIFFERENTIAL METHOD BLD: NORMAL
EOSINOPHIL # BLD AUTO: 0.2 10E9/L (ref 0–0.7)
EOSINOPHIL NFR BLD AUTO: 2.4 %
ERYTHROCYTE [DISTWIDTH] IN BLOOD BY AUTOMATED COUNT: 13 % (ref 10–15)
GFR SERPL CREATININE-BSD FRML MDRD: >90 ML/MIN/{1.73_M2}
GFR SERPL CREATININE-BSD FRML MDRD: >90 ML/MIN/{1.73_M2}
GLUCOSE SERPL-MCNC: 163 MG/DL (ref 70–99)
HCT VFR BLD AUTO: 38.1 % (ref 35–47)
HGB BLD-MCNC: 13.2 G/DL (ref 11.7–15.7)
IMM GRANULOCYTES # BLD: 0 10E9/L (ref 0–0.4)
IMM GRANULOCYTES NFR BLD: 0.2 %
INTERPRETATION ECG - MUSE: NORMAL
LYMPHOCYTES # BLD AUTO: 1.7 10E9/L (ref 0.8–5.3)
LYMPHOCYTES NFR BLD AUTO: 26 %
MAGNESIUM SERPL-MCNC: 1.7 MG/DL (ref 1.6–2.3)
MCH RBC QN AUTO: 30.3 PG (ref 26.5–33)
MCHC RBC AUTO-ENTMCNC: 34.6 G/DL (ref 31.5–36.5)
MCV RBC AUTO: 87 FL (ref 78–100)
MONOCYTES # BLD AUTO: 0.5 10E9/L (ref 0–1.3)
MONOCYTES NFR BLD AUTO: 7.9 %
NEUTROPHILS # BLD AUTO: 4 10E9/L (ref 1.6–8.3)
NEUTROPHILS NFR BLD AUTO: 63 %
NRBC # BLD AUTO: 0 10*3/UL
NRBC BLD AUTO-RTO: 0 /100
PLATELET # BLD AUTO: 224 10E9/L (ref 150–450)
POTASSIUM SERPL-SCNC: 3.4 MMOL/L (ref 3.4–5.3)
RBC # BLD AUTO: 4.36 10E12/L (ref 3.8–5.2)
SODIUM SERPL-SCNC: 140 MMOL/L (ref 133–144)
TROPONIN I SERPL-MCNC: <0.015 UG/L (ref 0–0.04)
TSH SERPL DL<=0.005 MIU/L-ACNC: 2.3 MU/L (ref 0.4–4)
WBC # BLD AUTO: 6.4 10E9/L (ref 4–11)

## 2020-10-27 PROCEDURE — 84443 ASSAY THYROID STIM HORMONE: CPT | Performed by: EMERGENCY MEDICINE

## 2020-10-27 PROCEDURE — 250N000009 HC RX 250: Performed by: EMERGENCY MEDICINE

## 2020-10-27 PROCEDURE — 84484 ASSAY OF TROPONIN QUANT: CPT | Performed by: EMERGENCY MEDICINE

## 2020-10-27 PROCEDURE — 258N000003 HC RX IP 258 OP 636: Performed by: EMERGENCY MEDICINE

## 2020-10-27 PROCEDURE — 82565 ASSAY OF CREATININE: CPT

## 2020-10-27 PROCEDURE — 71275 CT ANGIOGRAPHY CHEST: CPT

## 2020-10-27 PROCEDURE — 99285 EMERGENCY DEPT VISIT HI MDM: CPT | Mod: 25

## 2020-10-27 PROCEDURE — 80048 BASIC METABOLIC PNL TOTAL CA: CPT | Performed by: EMERGENCY MEDICINE

## 2020-10-27 PROCEDURE — 96360 HYDRATION IV INFUSION INIT: CPT | Mod: 59

## 2020-10-27 PROCEDURE — 85025 COMPLETE CBC W/AUTO DIFF WBC: CPT | Performed by: EMERGENCY MEDICINE

## 2020-10-27 PROCEDURE — 250N000011 HC RX IP 250 OP 636: Performed by: EMERGENCY MEDICINE

## 2020-10-27 PROCEDURE — 93005 ELECTROCARDIOGRAM TRACING: CPT

## 2020-10-27 PROCEDURE — 83735 ASSAY OF MAGNESIUM: CPT | Performed by: EMERGENCY MEDICINE

## 2020-10-27 RX ORDER — IOPAMIDOL 755 MG/ML
97 INJECTION, SOLUTION INTRAVASCULAR ONCE
Status: COMPLETED | OUTPATIENT
Start: 2020-10-27 | End: 2020-10-27

## 2020-10-27 RX ORDER — SODIUM CHLORIDE 9 MG/ML
INJECTION, SOLUTION INTRAVENOUS CONTINUOUS
Status: DISCONTINUED | OUTPATIENT
Start: 2020-10-27 | End: 2020-10-27 | Stop reason: HOSPADM

## 2020-10-27 RX ADMIN — SODIUM CHLORIDE 74 ML: 9 INJECTION, SOLUTION INTRAVENOUS at 14:30

## 2020-10-27 RX ADMIN — IOPAMIDOL 97 ML: 755 INJECTION, SOLUTION INTRAVENOUS at 14:30

## 2020-10-27 RX ADMIN — SODIUM CHLORIDE 1000 ML: 9 INJECTION, SOLUTION INTRAVENOUS at 13:44

## 2020-10-27 ASSESSMENT — ENCOUNTER SYMPTOMS
FEVER: 0
HEADACHES: 1
MYALGIAS: 0
PALPITATIONS: 1
VOMITING: 0
NERVOUS/ANXIOUS: 0
SHORTNESS OF BREATH: 1

## 2020-10-27 ASSESSMENT — MIFFLIN-ST. JEOR: SCORE: 1532.66

## 2020-10-27 NOTE — ED AVS SNAPSHOT
[facility] Emergency Dept  [address] [address]  Phone: [phone]  Fax: [phone]                                    Kezia Nava   MRN: [id number]    Department: [facility] Emergency Dept   Date of Visit: 10/27/2020           After Visit Summary Signature Page    I have received my discharge instructions, and my questions have been answered. I have discussed any challenges I see with this plan with the nurse or doctor.    ..........................................................................................................................................  Patient/Patient Representative Signature      ..........................................................................................................................................  Patient Representative Print Name and Relationship to Patient    ..................................................               ................................................  Date                                   Time    ..........................................................................................................................................  Reviewed by Signature/Title    ...................................................              ..............................................  Date                                               Time          22EPIC Rev 08/18

## 2020-10-27 NOTE — ED PROVIDER NOTES
History     Chief Complaint:  Palpitations    HPI   Kezia Nava is a 44 year old female with a history of type II diabetes, hypertension and migraines who presents to the emergency department for evaluation of palpitations and shortness of breath that began 30 minutes ago. The patient reports that 30 minutes ago she began having some palpitations and associated shortness of breath. She also mentions having a headache and diarrhea for the past 3-7 days. She reports that she has had similar episodes in the past, most recently a few weeks ago when she had palpitations, shortness of breath and a heart rate in the 130s. She did do a Holter monitor study 2 years ago where she wore the monitor for 1 month and it revealed PACs, but no other significant finding.  Additionally, the patient reports that she is a type II diabetic, but has taken her blood sugar level recently as she lost her monitor. She also mentions that she has had a residual cough since her trip to [place] in early September when she had some mild altitude sickness. She otherwise denies any pain, anxiety, fever, or vomiting.    Allergies:  Lisinopril  Pneumovax [Pneumococcal Polysaccharides]    Medications:    Zyrtec  Pristiq  Victoza  Hyzaar  Metformin  Zofran  Nolvadex  Ambien    Past Medical History:    Diffuse cystic mastopathy  Hypertension  Mesenteric adenitis  Migraines  Nephrolithiasis  Sleep apnea  Thyroiditis  Type II diabetes  Fibroid uterus  ANA  Obesity    Past Surgical History:    Ankle arthroscopy  Appendectomy  Cystoscopy with stent placement x2  Dental surgery  Discectomy and cervical fusion  Lithotripsy  Large breast reduction  Hysterectomy  Lasik surgery  Salpingo-oophorectomy    Family History:    Mother: coronary artery disease, hypertension, kidney transplant  Father: hypertension  Sister: breast cancer  Brother: nephrolithiasis     Social History:  The patient presents alone.  Smoking Status: Never  Smokeless Tobacco:  "Never  Alcohol Use: Yes  Drug Use: No  Marital Status:  Single      Review of Systems   Constitutional: Negative for fever.   Respiratory: Positive for shortness of breath.    Cardiovascular: Positive for palpitations.   Gastrointestinal: Negative for vomiting.   Musculoskeletal: Negative for myalgias.   Neurological: Positive for headaches.   Psychiatric/Behavioral: The patient is not nervous/anxious.    All other systems reviewed and are negative.      Physical Exam   Vitals:  Patient Vitals for the past 24 hrs:   BP Temp Temp src Pulse Resp SpO2 Height Weight   10/27/20 1600 137/88 -- -- 92 26 95 % -- --   10/27/20 1530 (!) 124/96 -- -- 100 10 99 % -- --   10/27/20 1515 -- -- -- 98 17 -- -- --   10/27/20 1430 -- -- -- 100 21 -- -- --   10/27/20 1415 -- -- -- 104 19 -- -- --   10/27/20 1400 -- -- -- 102 25 -- -- --   10/27/20 1322 137/81 99.4  F (37.4  C) Oral 113 16 98 % 1.575 m (5' 2\") 93.4 kg (206 lb)       Physical Exam  VS: Reviewed per above  HENT: Normal speech, tolerating secretions  EYES: sclera anicteric  CV: Rate as noted, regular rhythm.   RESP: Effort normal. Breath sounds are normal bilaterally.  NEURO: Alert, moving all extremities  MSK: No deformity of the extremities  SKIN: Warm and dry    Emergency Department Course   ECG:  Completed at 1305.  Read at 1305.   Rate 115 bpm. ID interval 160. QRS duration 64. QT/QTc 316/437. P-R-T axes 53 41 43.  Sinus tachycardia  Possible anterior infarct, age undetermined  No significant change when compared to EKG dated 05/29/2019    Imaging:  Radiographic findings were communicated with the patient who voiced understanding of the findings.    CT Chest Pulmonary Embolism W Contrast  1.  No pulmonary emboli.  2.  No pulmonary infiltrates or pleural effusions.  3.  Small pulmonary nodules measuring up to 6 mm in the right upper  lobe. See follow-up guidelines.  Reading per radiology.     Laboratory:  CBC: WNL. (WBC 6.4, HGB 13.2, )   BMP: Glucose 163 (H) " o/w AWNL (Creatinine 0.69)    Troponin (Collected 1343): <0.015  Creatinine POCT: 0.7, GFR >90    TSH with free T4 reflex: 2.30  Magnesium: 1.7     Interventions:  1344 NS, 1 L, IV bolus    Emergency Department Course:  Past medical records, nursing notes, and vitals reviewed.    1340 I performed an exam of the patient as documented above.     EKG obtained in the ED, see results above.   IV was inserted and blood was drawn for laboratory testing, results above.    1419 I rechecked and updated the patient.     The patient was sent for a chest CT while here in the emergency department, findings above.     1605 I rechecked the patient and discussed the results of her workup thus far.     Findings and plan explained to the Patient. Patient discharged home with instructions regarding supportive care, medications, and reasons to return. The importance of close follow-up was reviewed.     I personally reviewed the laboratory results with the Patient and answered all related questions prior to discharge.      Impression & Plan      Medical Decision Making:  Kezia Nava presents to the ER for evaluation of palpitations and shortness of breath.  Symptoms onset just 30 minutes prior to assessment in the ED.  On arrival patient has low-grade regular tachycardia.  This tachycardia seemed to improve while in the ER and with IV fluids. EKG reveals a sinus rhythm without acute ischemic change.  Other vital signs are reassuring.  Lungs are clear to auscultation.  Initial troponin is negative.  I have low suspicion for occult ACS based on history and exam.  No evidence of electrolyte derangement or hyperthyroidism or other acute concern on basic labs. Unfortunately D-dimer could not be run in the lab, as the plasma was too lipemic.  At this juncture, a CT pulmonary angiogram was ordered.  Fortunately there was no evidence of PE or other acute intrathoracic process.  There was evidence of incidental right upper lobe pulmonary  nodules.  This was relayed to patient with plans for primary care follow-up and ongoing monitoring in the future.  No fevers or new cough or other symptoms to suggest COVID-19 infection.  I encouraged close primary care follow-up.  Return precautions were discussed prior to discharge      Diagnosis:    ICD-10-CM    1. Pulmonary nodules  R91.8    2. Sinus tachycardia  R00.0    3. Shortness of breath  R06.02        Disposition:  discharged to home    Discharge Medications:  None      I, Hadley Riley, am serving as a scribe on 10/27/2020 at 1:45 PM to personally document services performed by Baldemar Santo MD based on my observations and the provider's statements to me.    Hadley Riley  10/27/2020   [facility] EMERGENCY DEPT       Baldemar Santo MD  10/27/20 [id number]

## 2020-11-06 ENCOUNTER — VIRTUAL VISIT (OUTPATIENT)
Dept: FAMILY MEDICINE | Facility: OTHER | Age: 45
End: 2020-11-06
Payer: COMMERCIAL

## 2020-11-06 PROCEDURE — 99421 OL DIG E/M SVC 5-10 MIN: CPT | Performed by: PHYSICIAN ASSISTANT

## 2020-11-06 NOTE — PROGRESS NOTES
"Date: 2020 16:48:51  Clinician: Obdulio Vasques  Clinician NPI: [id number]  Patient: Kezia Nava  Patient : 1975  Patient Address: [address] [place] [id number]  Patient Phone: [phone]  Visit Protocol: URI  Patient Summary:  Kezia is a 44 year old ( : 1975 ) female who initiated a [facility] Visit for COVID-19 (Coronavirus) evaluation and screening. When asked the question \"Please sign me up to receive news, health information and promotions from [facility].\", Kezia responded \"No\".    Kezia states her symptoms started gradually 7-9 days ago.   Her symptoms consist of vomiting, a cough, and nausea. Kezia also feels feverish.   Symptom details     Cough: Kezia coughs a few times an hour and her cough is not more bothersome at night. Phlegm does not come into her throat when she coughs. She does not believe her cough is caused by post-nasal drip.     Temperature: Her current temperature is 100.1 degrees Fahrenheit. Kezia has had a temperature over 100 degrees Fahrenheit for 1-2 days.      Kezia denies having rhinitis, facial pain or pressure, myalgias, chills, malaise, sore throat, teeth pain, ageusia, diarrhea, ear pain, headache, wheezing, nasal congestion, and anosmia. She also denies taking antibiotic medication in the past month, having recent facial or sinus surgery in the past 60 days, and double sickening (worsening symptoms after initial improvement).   Precipitating events  She has not recently been exposed to someone with influenza. Kezia has been in close contact with the following high risk individuals: immunocompromised people, adults 65 or older, pregnant women, children under the age of 5, and people with asthma, heart disease or diabetes.   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 She provides direct patient care. In the past 14 days, Kezia has worked or volunteered at a " hospital or a clinic. 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   Kezia has had a close contact with a laboratory-confirmed COVID-19 patient within 14 days of symptom onset. She was exposed at her work. She does not know when she was exposed to the laboratory-confirmed COVID-19 patient.   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      Result of COVID-19 test: Negative     Date of her COVID-19 test: 07/21/2020      Triage Point(s) temporarily suspended for COVID-19 (Coronavirus) screening  Kezia reported the following symptoms which were previously protocol referral points. These protocol referral points have temporarily been removed for purposes of COVID-19 (Coronavirus) screening.   Difficulty breathing even when resting and can only speak in phrase(s)   Pertinent medical history  Kezia does not get yeast infections when she takes antibiotics.   Kezia does not need a return to work/school note.   Weight: 210 lbs   Kezia does not smoke or use smokeless tobacco.   She denies pregnancy and denies breastfeeding. She does not menstruate.   Weight: 210 lbs    MEDICATIONS: Zyrtec oral, Flintstones Sour Gummies Complete oral, cholecalciferol (vitamin D3)-vitamin K2 oral, Pristiq oral, Victoza 2-Olaf subcutaneous, metformin oral, losartan-hydrochlorothiazide oral, tamoxifen oral, ALLERGIES: lisinopril, pneumococcal vaccine  Clinician Response:  Dear Kezia,   Your symptoms show that you may have coronavirus (COVID-19). This illness can cause fever, cough and trouble breathing. Many people get a mild case and get  "better on their own. Some people can get very sick.  What should I do?  We would like to test you for this virus.   1. Please call [phone] to schedule your visit. Explain that you were referred by [facility] to have a COVID-19 test. Be ready to share [facility].  * If you need to schedule in [facility] please call [phone] or for [employer] employees please call [phone].  * If you need to schedule in the [place] area please call [phone]. [place] employees call [phone].  The following will serve as your written order for this COVID Test, ordered by me, for the indication of suspected COVID [Z20.828]: The test will be ordered in [employer], our electronic health record, after you are scheduled. It will show as ordered and authorized by Gabriel Soto MD.  Order: COVID-19 (Coronavirus) PCR for SYMPTOMATIC testing from [facility].   2. When it's time for your COVID test:  Stay at least 6 feet away from others. (If someone will drive you to your test, stay in the backseat, as far away from the  as you can.)   Cover your mouth and nose with a mask, tissue or washcloth.  Go straight to the testing site. Don't make any stops on the way there or back.      3.Starting now: Stay home and away from others (self-isolate) until:   You've had no fever---and no medicine that reduces fever---for one full day (24 hours). And...   Your other symptoms have gotten better. For example, your cough or breathing has improved. And...   At least 10 days have passed since your symptoms started.       During this time, don't leave the house except for testing or medical care.   Stay in your own room, even for meals. Use your own bathroom if you can.   Stay away from others in your home. No hugging, kissing or shaking hands. No visitors.  Don't go to work, school or anywhere else.    Clean \"high touch\" surfaces often (doorknobs, counters, handles, etc.). Use a household cleaning spray or wipes. You'll find a full " list of  on the EPA website: www.epa.gov/pesticide-registration/list-n-disinfectants-use-against-sars-cov-2.   Cover your mouth and nose with a mask, tissue or washcloth to avoid spreading germs.  Wash your hands and face often. Use soap and water.  Caregivers in these groups are at risk for severe illness due to COVID-19:  o People 65 years and older  o People who live in a nursing home or long-term care facility  o People with chronic disease (lung, heart, cancer, diabetes, kidney, liver, immunologic)  o People who have a weakened immune system, including those who:   Are in cancer treatment  Take medicine that weakens the immune system, such as corticosteroids  Had a bone marrow or organ transplant  Have an immune deficiency  Have poorly controlled HIV or AIDS  Are obese (body mass index of 40 or higher)  Smoke regularly   o Caregivers should wear gloves while washing dishes, handling laundry and cleaning bedrooms and bathrooms.  o Use caution when washing and drying laundry: Don't shake dirty laundry, and use the warmest water setting that you can.  o For more tips, go to www.cdc.gov/coronavirus/2019-ncov/downloads/10Things.pdf.    4.Sign up for [employer]. We know it's scary to hear that you might have COVID-19. We want to track your symptoms to make sure you're okay over the next 2 weeks. Please look for an email from [employer]---this is a free, online program that we'll use to keep in touch. To sign up, follow the link in the email. Learn more at [email or web address]  How can I take care of myself?   Get lots of rest. Drink extra fluids (unless a doctor has told you not to).   Take Tylenol (acetaminophen) for fever or pain. If you have liver or kidney problems, ask your family doctor if it's okay to take Tylenol.   Adults can take either:    650 mg (two 325 mg pills) every 4 to 6 hours, or...   1,000 mg (two 500 mg pills) every 8 hours as needed.    Note: Don't take more than 3,000  mg in one day. Acetaminophen is found in many medicines (both prescribed and over-the-counter medicines). Read all labels to be sure you don't take too much.   For children, check the Tylenol bottle for the right dose. The dose is based on the child's age or weight.    If you have other health problems (like cancer, heart failure, an organ transplant or severe kidney disease): Call your specialty clinic if you don't feel better in the next 2 days.       Know when to call 911. Emergency warning signs include:    Trouble breathing or shortness of breath Pain or pressure in the chest that doesn't go away Feeling confused like you haven't felt before, or not being able to wake up Bluish-colored lips or face.  Where can I get more information?   [facility] -- About COVID-19: [email or web address]   CDC -- What to Do If You're Sick: www.cdc.gov/coronavirus/2019-ncov/about/steps-when-sick.html   CDC -- Ending Home Isolation: www.cdc.gov/coronavirus/2019-ncov/hcp/disposition-in-home-patients.html   CDC -- Caring for Someone: www.cdc.gov/coronavirus/2019-ncov/if-you-are-sick/care-for-someone.html   [facility] -- Interim Guidance for Hospital Discharge to Home: www.health.[facility].[place].us/diseases/coronavirus/hcp/hospdischarge.pdf   [facility] clinical trials (COVID-19 research studies): clinicalaffairs.[facility]-clinical-trials    Below are the COVID-19 hotlines at the [facility] ([facility]). Interpreters are available.    For health questions: Call [phone] or 1-938.264.9228 (7 a.m. to 7 p.m.) For questions about schools and childcare: Call [phone] or 1-317.928.4502 (7 a.m. to 7 p.m.)    Diagnosis: Contact with and (suspected) exposure to other viral communicable diseases  Diagnosis ICD: Z20.828  Additional Clinician Notes:   If your symptoms are not improving or worsen, please go to one of our urgent care locations for evaluation and possible lab work.

## 2020-11-10 DIAGNOSIS — Z20.822 SUSPECTED COVID-19 VIRUS INFECTION: Primary | ICD-10-CM

## 2020-11-11 DIAGNOSIS — Z20.822 SUSPECTED COVID-19 VIRUS INFECTION: ICD-10-CM

## 2020-11-11 PROCEDURE — U0003 INFECTIOUS AGENT DETECTION BY NUCLEIC ACID (DNA OR RNA); SEVERE ACUTE RESPIRATORY SYNDROME CORONAVIRUS 2 (SARS-COV-2) (CORONAVIRUS DISEASE [COVID-19]), AMPLIFIED PROBE TECHNIQUE, MAKING USE OF HIGH THROUGHPUT TECHNOLOGIES AS DESCRIBED BY CMS-2020-01-R: HCPCS | Performed by: PHYSICIAN ASSISTANT

## 2020-11-12 LAB
SARS-COV-2 RNA SPEC QL NAA+PROBE: NOT DETECTED
SPECIMEN SOURCE: NORMAL

## 2020-11-14 ENCOUNTER — RESULTS ONLY (OUTPATIENT)
Dept: LAB | Age: 45
End: 2020-11-14

## 2020-11-14 ENCOUNTER — APPOINTMENT (OUTPATIENT)
Dept: URGENT CARE | Facility: URGENT CARE | Age: 45
End: 2020-11-14
Payer: COMMERCIAL

## 2020-11-15 LAB
SARS-COV-2 RNA SPEC QL NAA+PROBE: NORMAL
SPECIMEN SOURCE: NORMAL

## 2020-11-16 ENCOUNTER — MYC MEDICAL ADVICE (OUTPATIENT)
Dept: FAMILY MEDICINE | Facility: CLINIC | Age: 45
End: 2020-11-16

## 2020-11-16 ENCOUNTER — HEALTH MAINTENANCE LETTER (OUTPATIENT)
Age: 45
End: 2020-11-16

## 2020-11-16 LAB
LABORATORY COMMENT REPORT: NORMAL
SARS-COV-2 RNA SPEC QL NAA+PROBE: NEGATIVE
SPECIMEN SOURCE: NORMAL

## 2020-11-19 ENCOUNTER — ANCILLARY PROCEDURE (OUTPATIENT)
Dept: GENERAL RADIOLOGY | Facility: CLINIC | Age: 45
End: 2020-11-19
Attending: NURSE PRACTITIONER
Payer: COMMERCIAL

## 2020-11-19 ENCOUNTER — OFFICE VISIT (OUTPATIENT)
Dept: URGENT CARE | Facility: URGENT CARE | Age: 45
End: 2020-11-19
Payer: COMMERCIAL

## 2020-11-19 VITALS
TEMPERATURE: 100.2 F | BODY MASS INDEX: 37.68 KG/M2 | DIASTOLIC BLOOD PRESSURE: 86 MMHG | OXYGEN SATURATION: 99 % | WEIGHT: 206 LBS | HEART RATE: 96 BPM | SYSTOLIC BLOOD PRESSURE: 129 MMHG

## 2020-11-19 DIAGNOSIS — R05.9 COUGH: Primary | ICD-10-CM

## 2020-11-19 DIAGNOSIS — J22 LOWER RESPIRATORY INFECTION: ICD-10-CM

## 2020-11-19 LAB
BASOPHILS # BLD AUTO: 0 10E9/L (ref 0–0.2)
BASOPHILS NFR BLD AUTO: 0.7 %
DIFFERENTIAL METHOD BLD: NORMAL
EOSINOPHIL # BLD AUTO: 0.1 10E9/L (ref 0–0.7)
EOSINOPHIL NFR BLD AUTO: 2.3 %
ERYTHROCYTE [DISTWIDTH] IN BLOOD BY AUTOMATED COUNT: 13.4 % (ref 10–15)
HCT VFR BLD AUTO: 36.9 % (ref 35–47)
HGB BLD-MCNC: 12.4 G/DL (ref 11.7–15.7)
LYMPHOCYTES # BLD AUTO: 1.9 10E9/L (ref 0.8–5.3)
LYMPHOCYTES NFR BLD AUTO: 31.6 %
MCH RBC QN AUTO: 29.5 PG (ref 26.5–33)
MCHC RBC AUTO-ENTMCNC: 33.6 G/DL (ref 31.5–36.5)
MCV RBC AUTO: 88 FL (ref 78–100)
MONOCYTES # BLD AUTO: 0.7 10E9/L (ref 0–1.3)
MONOCYTES NFR BLD AUTO: 11.1 %
NEUTROPHILS # BLD AUTO: 3.3 10E9/L (ref 1.6–8.3)
NEUTROPHILS NFR BLD AUTO: 54.3 %
PLATELET # BLD AUTO: 223 10E9/L (ref 150–450)
RBC # BLD AUTO: 4.2 10E12/L (ref 3.8–5.2)
WBC # BLD AUTO: 6 10E9/L (ref 4–11)

## 2020-11-19 PROCEDURE — 85025 COMPLETE CBC W/AUTO DIFF WBC: CPT | Performed by: NURSE PRACTITIONER

## 2020-11-19 PROCEDURE — 99214 OFFICE O/P EST MOD 30 MIN: CPT | Performed by: NURSE PRACTITIONER

## 2020-11-19 PROCEDURE — 71046 X-RAY EXAM CHEST 2 VIEWS: CPT | Performed by: INTERNAL MEDICINE

## 2020-11-19 PROCEDURE — 36415 COLL VENOUS BLD VENIPUNCTURE: CPT | Performed by: NURSE PRACTITIONER

## 2020-11-19 RX ORDER — DOXYCYCLINE HYCLATE 100 MG
100 TABLET ORAL 2 TIMES DAILY
Qty: 14 TABLET | Refills: 0 | Status: SHIPPED | OUTPATIENT
Start: 2020-11-19 | End: 2020-11-26

## 2020-11-19 NOTE — TELEPHONE ENCOUNTER
Tried to call, no answer, left message to please see [employer] message or call back for a message from PCP. Scheduled Pt for 5pm appt in [place], can be canceled if this does not work for her, and did provide UC ([place]) hours.     [employer] response sent to patient.     Anisa FLORES, RN    Addendum: Pt responded to [employer]- she works until 7, so cannot attend [place] appt at 5pm- she will visit UC after work    Sending as FYI to PCP

## 2020-11-19 NOTE — TELEPHONE ENCOUNTER
Dr Angeles,  Please see [facility] and advise.  Thanks,  Delia RIVERS RN

## 2020-11-19 NOTE — LETTER
November 19, 2020      Kezia Nava  [address] [place] [address]        To Whom It May Concern:    Kezia SEBASTIAN chayo  was seen on 11/19/2020.  Please excuse her  until 11/25/2020 due to illness.        Sincerely,        Gogo Johnson, NP

## 2020-11-19 NOTE — TELEPHONE ENCOUNTER
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

## 2020-11-20 NOTE — PROGRESS NOTES
SUBJECTIVE:   Kezia Nava is a 44 year old female presenting with a chief complaint of fever, cough - non-productive, shortness of breath and pain in chest.  Onset of symptoms was 2 week(s) ago.  Course of illness is worsening.    Severity moderate  Current and Associated symptoms: sweats, cough - non-productive and shortness of breath  Treatment measures tried include Tylenol/Ibuprofen, Fluids and Rest.  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 Fevers will not go away and has a pain with deep breath. Feels SOB. Had CT angio 10/27/2020 that was negative for PE and pneumonia.     Past Medical History:   Diagnosis Date     Diffuse cystic mastopathy      Elevated BP 12/15/2016     Hypertension      Mesenteric adenitis 4/14     Migraines      Nephrolithiasis     s/p lithotripsy     Sleep apnea     no cpap     Thyroiditis, acute 12/2/2014    transient hypothyroidism- resolved     Type 2 diabetes mellitus with hyperglycemia, without long-term current use of insulin (H) 2/6/2017     Current Outpatient Medications   Medication Sig Dispense Refill     Acetaminophen (TYLENOL PO) Take 500-1,000 mg by mouth every 6 hours as needed As needed for pain        blood glucose monitoring (ACCU-CHEK ALMA PLUS) test strip Use to test blood sugar 1 times daily or as directed. 100 strip 1     blood glucose monitoring (ACCU-CHEK FASTCLIX) lancets Use to test blood sugar 1 times daily or as directed. 3 each 3     cetirizine (ZYRTEC) 10 MG tablet Take 10 mg by mouth daily       desvenlafaxine (PRISTIQ) 50 MG 24 hr tablet Take 1 tablet (50 mg) by mouth daily 90 tablet 3     Ibuprofen (IBU PO) Take 600 mg by mouth every 6 hours as needed        insulin pen needle (BD VALENTINA U/F) 32G X 4 MM miscellaneous USE ONCE DAILY AS DIRECTED 100 each 96     Krill Oil 1000 MG CAPS Take by mouth daily       liraglutide (VICTOZA PEN) 18 MG/3ML solution Inject 1.2 mg Subcutaneous daily 6  mL 1     losartan-hydrochlorothiazide (HYZAAR) 50-12.5 MG tablet Take 1 tablet by mouth daily 90 tablet 3     metFORMIN (GLUCOPHAGE-XR) 500 MG 24 hr tablet Take 1 tablet (500 mg) by mouth daily (with dinner) TAKE ONE TABLET BY MOUTH ONCE DAILY WITH DINNER 90 tablet 3     multivitamin, therapeutic with minerals (MULTI-VITAMIN) TABS tablet Take 1 tablet by mouth daily       ondansetron (ZOFRAN ODT) 4 MG PO ODT tab Take 1-2 tablets (4-8 mg) by mouth every 8 hours as needed for nausea 12 tablet 1     STATIN NOT PRESCRIBED, INTENTIONAL, Please choose reason not prescribed, below       tamoxifen (NOLVADEX) 20 MG tablet Take 1 tablet (20 mg) by mouth daily 90 tablet 3     tamoxifen (NOLVADEX) 20 MG tablet TAKE ONE TABLET BY MOUTH ONCE DAILY 90 tablet 1     VICTOZA PEN 18 MG/3ML soln INJECT 1.2 MG UNDER THE SKIN DAILY 6 mL 5     VITAMIN D, CHOLECALCIFEROL, PO Take 10,000 Units by mouth daily        zolpidem ER (AMBIEN CR) 6.25 MG CR tablet Take 1 tab by mouth at bedtime as needed. 90 tablet 0     Social History     Tobacco Use     Smoking status: Never Smoker     Smokeless tobacco: Never Used   Substance Use Topics     Alcohol use: Yes     Alcohol/week: 0.0 standard drinks     Comment: 0-1 drinks per week       ROS:  Review of systems negative except as stated above.    OBJECTIVE:  /86   Pulse 96   Temp 100.2  F (37.9  C) (Oral)   Wt 93.4 kg (206 lb)   LMP 06/20/2018 (Exact Date)   SpO2 99%   BMI 37.68 kg/m    GENERAL APPEARANCE: healthy, alert and no distress  EYES: EOMI,  PERRL, conjunctiva clear  HENT: ear canals and TM's normal.  Nose and mouth without ulcers, erythema or lesions  NECK: supple, nontender, no lymphadenopathy  RESP: lungs clear to auscultation - no rales, rhonchi or wheezes  CV: regular rates and rhythm, normal S1 S2, no murmur noted  NEURO: Normal strength and tone, sensory exam grossly normal,  normal speech and mentation  SKIN: no suspicious lesions or rashes, but warm and sweaty    Results  for orders placed or performed in visit on 11/19/20   XR Chest 2 Views     Status: None    Narrative    XR CHEST 2 VW 11/19/2020 8:06 PM    HISTORY: SOB and fever with 2 negative COVID test. r/o pneumonia;  Cough    COMPARISON: 10/27/2020      Impression    IMPRESSION: Negative chest.    ALFONSO BOND MD   CBC with platelets and differential     Status: None   Result Value Ref Range    WBC 6.0 4.0 - 11.0 10e9/L    RBC Count 4.20 3.8 - 5.2 10e12/L    Hemoglobin 12.4 11.7 - 15.7 g/dL    Hematocrit 36.9 35.0 - 47.0 %    MCV 88 78 - 100 fl    MCH 29.5 26.5 - 33.0 pg    MCHC 33.6 31.5 - 36.5 g/dL    RDW 13.4 10.0 - 15.0 %    Platelet Count 223 150 - 450 10e9/L    % Neutrophils 54.3 %    % Lymphocytes 31.6 %    % Monocytes 11.1 %    % Eosinophils 2.3 %    % Basophils 0.7 %    Absolute Neutrophil 3.3 1.6 - 8.3 10e9/L    Absolute Lymphocytes 1.9 0.8 - 5.3 10e9/L    Absolute Monocytes 0.7 0.0 - 1.3 10e9/L    Absolute Eosinophils 0.1 0.0 - 0.7 10e9/L    Absolute Basophils 0.0 0.0 - 0.2 10e9/L    Diff Method Automated Method      Kezia was seen today for urgent care and breathing problem.    Diagnoses and all orders for this visit:    Cough  -     XR Chest 2 Views  -     CBC with platelets and differential    Lower respiratory infection  -     doxycycline hyclate (VIBRA-TABS) 100 MG tablet; Take 1 tablet (100 mg) by mouth 2 times daily for 7 days  -     fluticasone-vilanterol (BREO ELLIPTA) 100-25 MCG/INH inhaler; Inhale 1 puff into the lungs daily for 14 days    WBC and CXR WNL with recent history of 2 negative COVID tests. Source of fever unclear. Will treat for possible bacterial bronchitis with doxycyline 100 mg BID for 7 days and Breo ellipta inhaler once daily.  Will have her follow up with her PCP next week for recheck. Note to stay out of work till 11/25/2020 provided. 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

## 2021-01-01 ENCOUNTER — TRANSFERRED RECORDS (OUTPATIENT)
Dept: MULTI SPECIALTY CLINIC | Facility: CLINIC | Age: 46
End: 2021-01-01

## 2021-01-01 LAB — RETINOPATHY: NEGATIVE

## 2021-01-20 ENCOUNTER — APPOINTMENT (OUTPATIENT)
Dept: ULTRASOUND IMAGING | Facility: CLINIC | Age: 46
End: 2021-01-20
Attending: PHYSICIAN ASSISTANT
Payer: COMMERCIAL

## 2021-01-20 ENCOUNTER — APPOINTMENT (OUTPATIENT)
Dept: CT IMAGING | Facility: CLINIC | Age: 46
End: 2021-01-20
Attending: PHYSICIAN ASSISTANT
Payer: COMMERCIAL

## 2021-01-20 ENCOUNTER — HOSPITAL ENCOUNTER (OUTPATIENT)
Facility: CLINIC | Age: 46
Setting detail: OBSERVATION
Discharge: HOME | End: 2021-01-22
Attending: PHYSICIAN ASSISTANT | Admitting: INTERNAL MEDICINE
Payer: COMMERCIAL

## 2021-01-20 ENCOUNTER — APPOINTMENT (OUTPATIENT)
Dept: GENERAL RADIOLOGY | Facility: CLINIC | Age: 46
End: 2021-01-20
Attending: PHYSICIAN ASSISTANT
Payer: COMMERCIAL

## 2021-01-20 DIAGNOSIS — M54.6 ACUTE LEFT-SIDED THORACIC BACK PAIN: ICD-10-CM

## 2021-01-20 DIAGNOSIS — R74.8 ELEVATED LIPASE: ICD-10-CM

## 2021-01-20 DIAGNOSIS — R79.89 ELEVATED LFTS: ICD-10-CM

## 2021-01-20 DIAGNOSIS — R10.13 EPIGASTRIC PAIN: ICD-10-CM

## 2021-01-20 LAB
ALBUMIN SERPL-MCNC: 4 G/DL (ref 3.4–5)
ALBUMIN UR-MCNC: NEGATIVE MG/DL
ALP SERPL-CCNC: 61 U/L (ref 40–150)
ALT SERPL W P-5'-P-CCNC: 108 U/L (ref 0–50)
ANION GAP SERPL CALCULATED.3IONS-SCNC: 7 MMOL/L (ref 3–14)
APPEARANCE UR: CLEAR
AST SERPL W P-5'-P-CCNC: 81 U/L (ref 0–45)
BASOPHILS # BLD AUTO: 0 10E9/L (ref 0–0.2)
BASOPHILS NFR BLD AUTO: 0.7 %
BILIRUB DIRECT SERPL-MCNC: <0.1 MG/DL (ref 0–0.2)
BILIRUB SERPL-MCNC: 0.3 MG/DL (ref 0.2–1.3)
BILIRUB UR QL STRIP: NEGATIVE
BUN SERPL-MCNC: 11 MG/DL (ref 7–30)
CALCIUM SERPL-MCNC: 9.7 MG/DL (ref 8.5–10.1)
CHLORIDE SERPL-SCNC: 105 MMOL/L (ref 94–109)
CO2 SERPL-SCNC: 27 MMOL/L (ref 20–32)
COLOR UR AUTO: YELLOW
CREAT SERPL-MCNC: 0.8 MG/DL (ref 0.52–1.04)
D DIMER PPP FEU-MCNC: 0.4 UG/ML FEU (ref 0–0.5)
DIFFERENTIAL METHOD BLD: NORMAL
EOSINOPHIL # BLD AUTO: 0.1 10E9/L (ref 0–0.7)
EOSINOPHIL NFR BLD AUTO: 1.8 %
ERYTHROCYTE [DISTWIDTH] IN BLOOD BY AUTOMATED COUNT: 13.2 % (ref 10–15)
GFR SERPL CREATININE-BSD FRML MDRD: 88 ML/MIN/{1.73_M2}
GLUCOSE SERPL-MCNC: 103 MG/DL (ref 70–99)
GLUCOSE UR STRIP-MCNC: NEGATIVE MG/DL
HCT VFR BLD AUTO: 36.5 % (ref 35–47)
HGB BLD-MCNC: 12 G/DL (ref 11.7–15.7)
HGB UR QL STRIP: NEGATIVE
IMM GRANULOCYTES # BLD: 0 10E9/L (ref 0–0.4)
IMM GRANULOCYTES NFR BLD: 0.2 %
KETONES UR STRIP-MCNC: NEGATIVE MG/DL
LABORATORY COMMENT REPORT: NORMAL
LEUKOCYTE ESTERASE UR QL STRIP: NEGATIVE
LIPASE SERPL-CCNC: 574 U/L (ref 73–393)
LYMPHOCYTES # BLD AUTO: 2 10E9/L (ref 0.8–5.3)
LYMPHOCYTES NFR BLD AUTO: 37 %
MCH RBC QN AUTO: 28.2 PG (ref 26.5–33)
MCHC RBC AUTO-ENTMCNC: 32.9 G/DL (ref 31.5–36.5)
MCV RBC AUTO: 86 FL (ref 78–100)
MONOCYTES # BLD AUTO: 0.6 10E9/L (ref 0–1.3)
MONOCYTES NFR BLD AUTO: 10.1 %
MUCOUS THREADS #/AREA URNS LPF: PRESENT /LPF
NEUTROPHILS # BLD AUTO: 2.7 10E9/L (ref 1.6–8.3)
NEUTROPHILS NFR BLD AUTO: 50.2 %
NITRATE UR QL: NEGATIVE
NRBC # BLD AUTO: 0 10*3/UL
NRBC BLD AUTO-RTO: 0 /100
PH UR STRIP: 5 PH (ref 5–7)
PLATELET # BLD AUTO: 227 10E9/L (ref 150–450)
POTASSIUM SERPL-SCNC: 3.3 MMOL/L (ref 3.4–5.3)
PROT SERPL-MCNC: 7.9 G/DL (ref 6.8–8.8)
RBC # BLD AUTO: 4.25 10E12/L (ref 3.8–5.2)
RBC #/AREA URNS AUTO: 1 /HPF (ref 0–2)
SARS-COV-2 RNA RESP QL NAA+PROBE: NEGATIVE
SODIUM SERPL-SCNC: 139 MMOL/L (ref 133–144)
SOURCE: ABNORMAL
SP GR UR STRIP: 1.02 (ref 1–1.03)
SPECIMEN SOURCE: NORMAL
UROBILINOGEN UR STRIP-MCNC: 0 MG/DL (ref 0–2)
WBC # BLD AUTO: 5.5 10E9/L (ref 4–11)
WBC #/AREA URNS AUTO: 1 /HPF (ref 0–5)

## 2021-01-20 PROCEDURE — 83690 ASSAY OF LIPASE: CPT | Performed by: PHYSICIAN ASSISTANT

## 2021-01-20 PROCEDURE — 81001 URINALYSIS AUTO W/SCOPE: CPT | Performed by: PHYSICIAN ASSISTANT

## 2021-01-20 PROCEDURE — 76705 ECHO EXAM OF ABDOMEN: CPT

## 2021-01-20 PROCEDURE — C9803 HOPD COVID-19 SPEC COLLECT: HCPCS

## 2021-01-20 PROCEDURE — 258N000003 HC RX IP 258 OP 636: Performed by: PHYSICIAN ASSISTANT

## 2021-01-20 PROCEDURE — 96375 TX/PRO/DX INJ NEW DRUG ADDON: CPT

## 2021-01-20 PROCEDURE — 99220 PR INITIAL OBSERVATION CARE,LEVEL III: CPT | Performed by: INTERNAL MEDICINE

## 2021-01-20 PROCEDURE — 85379 FIBRIN DEGRADATION QUANT: CPT | Performed by: PHYSICIAN ASSISTANT

## 2021-01-20 PROCEDURE — 96361 HYDRATE IV INFUSION ADD-ON: CPT

## 2021-01-20 PROCEDURE — 99285 EMERGENCY DEPT VISIT HI MDM: CPT | Mod: 25

## 2021-01-20 PROCEDURE — G0378 HOSPITAL OBSERVATION PER HR: HCPCS

## 2021-01-20 PROCEDURE — 85025 COMPLETE CBC W/AUTO DIFF WBC: CPT | Performed by: PHYSICIAN ASSISTANT

## 2021-01-20 PROCEDURE — 87635 SARS-COV-2 COVID-19 AMP PRB: CPT | Performed by: PHYSICIAN ASSISTANT

## 2021-01-20 PROCEDURE — 96374 THER/PROPH/DIAG INJ IV PUSH: CPT

## 2021-01-20 PROCEDURE — 250N000011 HC RX IP 250 OP 636: Performed by: PHYSICIAN ASSISTANT

## 2021-01-20 PROCEDURE — 80048 BASIC METABOLIC PNL TOTAL CA: CPT | Performed by: PHYSICIAN ASSISTANT

## 2021-01-20 PROCEDURE — 74176 CT ABD & PELVIS W/O CONTRAST: CPT

## 2021-01-20 PROCEDURE — 80076 HEPATIC FUNCTION PANEL: CPT | Performed by: PHYSICIAN ASSISTANT

## 2021-01-20 PROCEDURE — 71046 X-RAY EXAM CHEST 2 VIEWS: CPT

## 2021-01-20 PROCEDURE — 96376 TX/PRO/DX INJ SAME DRUG ADON: CPT

## 2021-01-20 RX ORDER — ONDANSETRON 2 MG/ML
4 INJECTION INTRAMUSCULAR; INTRAVENOUS ONCE
Status: COMPLETED | OUTPATIENT
Start: 2021-01-20 | End: 2021-01-20

## 2021-01-20 RX ORDER — HYDROMORPHONE HYDROCHLORIDE 1 MG/ML
0.5 INJECTION, SOLUTION INTRAMUSCULAR; INTRAVENOUS; SUBCUTANEOUS ONCE
Status: COMPLETED | OUTPATIENT
Start: 2021-01-20 | End: 2021-01-20

## 2021-01-20 RX ORDER — KETOROLAC TROMETHAMINE 15 MG/ML
15 INJECTION, SOLUTION INTRAMUSCULAR; INTRAVENOUS ONCE
Status: COMPLETED | OUTPATIENT
Start: 2021-01-20 | End: 2021-01-20

## 2021-01-20 RX ADMIN — SODIUM CHLORIDE 1000 ML: 9 INJECTION, SOLUTION INTRAVENOUS at 19:46

## 2021-01-20 RX ADMIN — ONDANSETRON 4 MG: 2 INJECTION INTRAMUSCULAR; INTRAVENOUS at 22:52

## 2021-01-20 RX ADMIN — HYDROMORPHONE HYDROCHLORIDE 0.5 MG: 1 INJECTION, SOLUTION INTRAMUSCULAR; INTRAVENOUS; SUBCUTANEOUS at 20:03

## 2021-01-20 RX ADMIN — HYDROMORPHONE HYDROCHLORIDE 0.5 MG: 1 INJECTION, SOLUTION INTRAMUSCULAR; INTRAVENOUS; SUBCUTANEOUS at 20:45

## 2021-01-20 RX ADMIN — ONDANSETRON 4 MG: 2 INJECTION INTRAMUSCULAR; INTRAVENOUS at 19:46

## 2021-01-20 RX ADMIN — KETOROLAC TROMETHAMINE 15 MG: 15 INJECTION, SOLUTION INTRAMUSCULAR; INTRAVENOUS at 19:46

## 2021-01-20 ASSESSMENT — MIFFLIN-ST. JEOR: SCORE: 1545.8

## 2021-01-20 ASSESSMENT — ENCOUNTER SYMPTOMS
FLANK PAIN: 1
CHILLS: 0
SHORTNESS OF BREATH: 0
DYSURIA: 0
HEMATURIA: 0
ABDOMINAL PAIN: 1
FEVER: 0

## 2021-01-21 ENCOUNTER — APPOINTMENT (OUTPATIENT)
Dept: NUCLEAR MEDICINE | Facility: CLINIC | Age: 46
End: 2021-01-21
Attending: PHYSICIAN ASSISTANT
Payer: COMMERCIAL

## 2021-01-21 ENCOUNTER — ANESTHESIA EVENT (OUTPATIENT)
Dept: GASTROENTEROLOGY | Facility: CLINIC | Age: 46
End: 2021-01-21
Payer: COMMERCIAL

## 2021-01-21 ENCOUNTER — ANESTHESIA (OUTPATIENT)
Dept: GASTROENTEROLOGY | Facility: CLINIC | Age: 46
End: 2021-01-21
Payer: COMMERCIAL

## 2021-01-21 LAB
ALBUMIN SERPL-MCNC: 3.2 G/DL (ref 3.4–5)
ALP SERPL-CCNC: 50 U/L (ref 40–150)
ALT SERPL W P-5'-P-CCNC: 82 U/L (ref 0–50)
ANION GAP SERPL CALCULATED.3IONS-SCNC: 4 MMOL/L (ref 3–14)
AST SERPL W P-5'-P-CCNC: 57 U/L (ref 0–45)
BILIRUB SERPL-MCNC: 0.2 MG/DL (ref 0.2–1.3)
BUN SERPL-MCNC: 8 MG/DL (ref 7–30)
CALCIUM SERPL-MCNC: 8.6 MG/DL (ref 8.5–10.1)
CHLORIDE SERPL-SCNC: 112 MMOL/L (ref 94–109)
CHOLEST SERPL-MCNC: 162 MG/DL
CO2 SERPL-SCNC: 27 MMOL/L (ref 20–32)
CREAT SERPL-MCNC: 0.64 MG/DL (ref 0.52–1.04)
GFR SERPL CREATININE-BSD FRML MDRD: >90 ML/MIN/{1.73_M2}
GLUCOSE BLDC GLUCOMTR-MCNC: 102 MG/DL (ref 70–99)
GLUCOSE BLDC GLUCOMTR-MCNC: 115 MG/DL (ref 70–99)
GLUCOSE BLDC GLUCOMTR-MCNC: 116 MG/DL (ref 70–99)
GLUCOSE BLDC GLUCOMTR-MCNC: 137 MG/DL (ref 70–99)
GLUCOSE BLDC GLUCOMTR-MCNC: 141 MG/DL (ref 70–99)
GLUCOSE BLDC GLUCOMTR-MCNC: 99 MG/DL (ref 70–99)
GLUCOSE SERPL-MCNC: 111 MG/DL (ref 70–99)
HBA1C MFR BLD: 7.5 % (ref 0–5.6)
HDLC SERPL-MCNC: 36 MG/DL
LDLC SERPL CALC-MCNC: 60 MG/DL
LIPASE SERPL-CCNC: 310 U/L (ref 73–393)
NONHDLC SERPL-MCNC: 126 MG/DL
POTASSIUM SERPL-SCNC: 3.8 MMOL/L (ref 3.4–5.3)
PROT SERPL-MCNC: 6.3 G/DL (ref 6.8–8.8)
SODIUM SERPL-SCNC: 143 MMOL/L (ref 133–144)
TRIGL SERPL-MCNC: 331 MG/DL
UPPER GI ENDOSCOPY: NORMAL

## 2021-01-21 PROCEDURE — 258N000003 HC RX IP 258 OP 636: Performed by: INTERNAL MEDICINE

## 2021-01-21 PROCEDURE — 250N000013 HC RX MED GY IP 250 OP 250 PS 637: Performed by: INTERNAL MEDICINE

## 2021-01-21 PROCEDURE — 99225 PR SUBSEQUENT OBSERVATION CARE,LEVEL II: CPT | Performed by: INTERNAL MEDICINE

## 2021-01-21 PROCEDURE — 83690 ASSAY OF LIPASE: CPT | Performed by: INTERNAL MEDICINE

## 2021-01-21 PROCEDURE — 99207 PR CDG-CODE CATEGORY CHANGED: CPT | Performed by: INTERNAL MEDICINE

## 2021-01-21 PROCEDURE — 999N000010 HC STATISTIC ANES STAT CODE-CRNA PER MINUTE: Performed by: INTERNAL MEDICINE

## 2021-01-21 PROCEDURE — 250N000011 HC RX IP 250 OP 636: Performed by: INTERNAL MEDICINE

## 2021-01-21 PROCEDURE — 80053 COMPREHEN METABOLIC PANEL: CPT | Performed by: INTERNAL MEDICINE

## 2021-01-21 PROCEDURE — 96375 TX/PRO/DX INJ NEW DRUG ADDON: CPT

## 2021-01-21 PROCEDURE — G0378 HOSPITAL OBSERVATION PER HR: HCPCS

## 2021-01-21 PROCEDURE — 343N000001 HC RX 343: Performed by: INTERNAL MEDICINE

## 2021-01-21 PROCEDURE — 88342 IMHCHEM/IMCYTCHM 1ST ANTB: CPT | Mod: TC | Performed by: INTERNAL MEDICINE

## 2021-01-21 PROCEDURE — 80061 LIPID PANEL: CPT | Performed by: INTERNAL MEDICINE

## 2021-01-21 PROCEDURE — 88305 TISSUE EXAM BY PATHOLOGIST: CPT | Mod: 26 | Performed by: PATHOLOGY

## 2021-01-21 PROCEDURE — 250N000012 HC RX MED GY IP 250 OP 636 PS 637: Performed by: INTERNAL MEDICINE

## 2021-01-21 PROCEDURE — 250N000011 HC RX IP 250 OP 636: Performed by: NURSE ANESTHETIST, CERTIFIED REGISTERED

## 2021-01-21 PROCEDURE — C9113 INJ PANTOPRAZOLE SODIUM, VIA: HCPCS | Performed by: INTERNAL MEDICINE

## 2021-01-21 PROCEDURE — 258N000003 HC RX IP 258 OP 636: Performed by: NURSE ANESTHETIST, CERTIFIED REGISTERED

## 2021-01-21 PROCEDURE — 43239 EGD BIOPSY SINGLE/MULTIPLE: CPT | Performed by: INTERNAL MEDICINE

## 2021-01-21 PROCEDURE — 999N001017 HC STATISTIC GLUCOSE BY METER IP

## 2021-01-21 PROCEDURE — 83036 HEMOGLOBIN GLYCOSYLATED A1C: CPT | Performed by: INTERNAL MEDICINE

## 2021-01-21 PROCEDURE — 88342 IMHCHEM/IMCYTCHM 1ST ANTB: CPT | Mod: 26 | Performed by: PATHOLOGY

## 2021-01-21 PROCEDURE — 250N000009 HC RX 250: Performed by: NURSE ANESTHETIST, CERTIFIED REGISTERED

## 2021-01-21 PROCEDURE — 370N000017 HC ANESTHESIA TECHNICAL FEE, PER MIN: Performed by: INTERNAL MEDICINE

## 2021-01-21 PROCEDURE — 96372 THER/PROPH/DIAG INJ SC/IM: CPT | Mod: 59 | Performed by: INTERNAL MEDICINE

## 2021-01-21 PROCEDURE — 78227 HEPATOBIL SYST IMAGE W/DRUG: CPT

## 2021-01-21 PROCEDURE — 36415 COLL VENOUS BLD VENIPUNCTURE: CPT | Performed by: INTERNAL MEDICINE

## 2021-01-21 PROCEDURE — 88305 TISSUE EXAM BY PATHOLOGIST: CPT | Mod: TC | Performed by: INTERNAL MEDICINE

## 2021-01-21 PROCEDURE — 99214 OFFICE O/P EST MOD 30 MIN: CPT | Performed by: SURGERY

## 2021-01-21 PROCEDURE — A9537 TC99M MEBROFENIN: HCPCS | Performed by: INTERNAL MEDICINE

## 2021-01-21 RX ORDER — PROCHLORPERAZINE 25 MG
25 SUPPOSITORY, RECTAL RECTAL EVERY 12 HOURS PRN
Status: DISCONTINUED | OUTPATIENT
Start: 2021-01-21 | End: 2021-01-22 | Stop reason: HOSPADM

## 2021-01-21 RX ORDER — LIDOCAINE 40 MG/G
CREAM TOPICAL
Status: DISCONTINUED | OUTPATIENT
Start: 2021-01-21 | End: 2021-01-21 | Stop reason: HOSPADM

## 2021-01-21 RX ORDER — BISACODYL 10 MG
10 SUPPOSITORY, RECTAL RECTAL DAILY PRN
Status: DISCONTINUED | OUTPATIENT
Start: 2021-01-21 | End: 2021-01-22 | Stop reason: HOSPADM

## 2021-01-21 RX ORDER — OXYCODONE HYDROCHLORIDE 5 MG/1
5-10 TABLET ORAL
Status: DISCONTINUED | OUTPATIENT
Start: 2021-01-21 | End: 2021-01-22 | Stop reason: HOSPADM

## 2021-01-21 RX ORDER — KIT FOR THE PREPARATION OF TECHNETIUM TC 99M MEBROFENIN 45 MG/10ML
5 INJECTION, POWDER, LYOPHILIZED, FOR SOLUTION INTRAVENOUS ONCE
Status: COMPLETED | OUTPATIENT
Start: 2021-01-21 | End: 2021-01-21

## 2021-01-21 RX ORDER — NALOXONE HYDROCHLORIDE 0.4 MG/ML
0.4 INJECTION, SOLUTION INTRAMUSCULAR; INTRAVENOUS; SUBCUTANEOUS
Status: DISCONTINUED | OUTPATIENT
Start: 2021-01-21 | End: 2021-01-22 | Stop reason: HOSPADM

## 2021-01-21 RX ORDER — ONDANSETRON 4 MG/1
4 TABLET, ORALLY DISINTEGRATING ORAL EVERY 6 HOURS PRN
Status: DISCONTINUED | OUTPATIENT
Start: 2021-01-21 | End: 2021-01-22 | Stop reason: HOSPADM

## 2021-01-21 RX ORDER — NALOXONE HYDROCHLORIDE 0.4 MG/ML
0.2 INJECTION, SOLUTION INTRAMUSCULAR; INTRAVENOUS; SUBCUTANEOUS
Status: DISCONTINUED | OUTPATIENT
Start: 2021-01-21 | End: 2021-01-22 | Stop reason: HOSPADM

## 2021-01-21 RX ORDER — DEXTROSE MONOHYDRATE 25 G/50ML
25-50 INJECTION, SOLUTION INTRAVENOUS
Status: DISCONTINUED | OUTPATIENT
Start: 2021-01-21 | End: 2021-01-22 | Stop reason: HOSPADM

## 2021-01-21 RX ORDER — DESVENLAFAXINE 50 MG/1
50 TABLET, FILM COATED, EXTENDED RELEASE ORAL DAILY
Status: DISCONTINUED | OUTPATIENT
Start: 2021-01-21 | End: 2021-01-22 | Stop reason: HOSPADM

## 2021-01-21 RX ORDER — ACETAMINOPHEN 650 MG/1
650 SUPPOSITORY RECTAL EVERY 4 HOURS PRN
Status: DISCONTINUED | OUTPATIENT
Start: 2021-01-21 | End: 2021-01-22 | Stop reason: HOSPADM

## 2021-01-21 RX ORDER — POLYETHYLENE GLYCOL 3350 17 G/17G
17 POWDER, FOR SOLUTION ORAL DAILY PRN
Status: DISCONTINUED | OUTPATIENT
Start: 2021-01-21 | End: 2021-01-22 | Stop reason: HOSPADM

## 2021-01-21 RX ORDER — LIDOCAINE HYDROCHLORIDE 20 MG/ML
INJECTION, SOLUTION INFILTRATION; PERINEURAL PRN
Status: DISCONTINUED | OUTPATIENT
Start: 2021-01-21 | End: 2021-01-21

## 2021-01-21 RX ORDER — ACETAMINOPHEN 325 MG/1
650 TABLET ORAL EVERY 4 HOURS PRN
Status: DISCONTINUED | OUTPATIENT
Start: 2021-01-21 | End: 2021-01-22 | Stop reason: HOSPADM

## 2021-01-21 RX ORDER — PROPOFOL 10 MG/ML
INJECTION, EMULSION INTRAVENOUS CONTINUOUS PRN
Status: DISCONTINUED | OUTPATIENT
Start: 2021-01-21 | End: 2021-01-21

## 2021-01-21 RX ORDER — LIDOCAINE 40 MG/G
CREAM TOPICAL
Status: DISCONTINUED | OUTPATIENT
Start: 2021-01-21 | End: 2021-01-22 | Stop reason: HOSPADM

## 2021-01-21 RX ORDER — TAMOXIFEN CITRATE 10 MG/1
20 TABLET ORAL DAILY
Status: DISCONTINUED | OUTPATIENT
Start: 2021-01-21 | End: 2021-01-22 | Stop reason: HOSPADM

## 2021-01-21 RX ORDER — PROCHLORPERAZINE MALEATE 10 MG
10 TABLET ORAL EVERY 6 HOURS PRN
Status: DISCONTINUED | OUTPATIENT
Start: 2021-01-21 | End: 2021-01-22 | Stop reason: HOSPADM

## 2021-01-21 RX ORDER — SODIUM CHLORIDE, SODIUM LACTATE, POTASSIUM CHLORIDE, CALCIUM CHLORIDE 600; 310; 30; 20 MG/100ML; MG/100ML; MG/100ML; MG/100ML
INJECTION, SOLUTION INTRAVENOUS CONTINUOUS PRN
Status: DISCONTINUED | OUTPATIENT
Start: 2021-01-21 | End: 2021-01-21

## 2021-01-21 RX ORDER — FLUMAZENIL 0.1 MG/ML
0.2 INJECTION, SOLUTION INTRAVENOUS
Status: ACTIVE | OUTPATIENT
Start: 2021-01-21 | End: 2021-01-22

## 2021-01-21 RX ORDER — POTASSIUM CHLORIDE 1500 MG/1
40 TABLET, EXTENDED RELEASE ORAL ONCE
Status: COMPLETED | OUTPATIENT
Start: 2021-01-21 | End: 2021-01-21

## 2021-01-21 RX ORDER — AMOXICILLIN 250 MG
2 CAPSULE ORAL 2 TIMES DAILY PRN
Status: DISCONTINUED | OUTPATIENT
Start: 2021-01-21 | End: 2021-01-22 | Stop reason: HOSPADM

## 2021-01-21 RX ORDER — ONDANSETRON 2 MG/ML
4 INJECTION INTRAMUSCULAR; INTRAVENOUS EVERY 6 HOURS PRN
Status: DISCONTINUED | OUTPATIENT
Start: 2021-01-21 | End: 2021-01-22 | Stop reason: HOSPADM

## 2021-01-21 RX ORDER — AMOXICILLIN 250 MG
1 CAPSULE ORAL 2 TIMES DAILY PRN
Status: DISCONTINUED | OUTPATIENT
Start: 2021-01-21 | End: 2021-01-22 | Stop reason: HOSPADM

## 2021-01-21 RX ORDER — PROPOFOL 10 MG/ML
INJECTION, EMULSION INTRAVENOUS PRN
Status: DISCONTINUED | OUTPATIENT
Start: 2021-01-21 | End: 2021-01-21

## 2021-01-21 RX ORDER — CETIRIZINE HYDROCHLORIDE 10 MG/1
10 TABLET ORAL DAILY
Status: DISCONTINUED | OUTPATIENT
Start: 2021-01-21 | End: 2021-01-22 | Stop reason: HOSPADM

## 2021-01-21 RX ORDER — LANOLIN ALCOHOL/MO/W.PET/CERES
3 CREAM (GRAM) TOPICAL
Status: DISCONTINUED | OUTPATIENT
Start: 2021-01-21 | End: 2021-01-22 | Stop reason: HOSPADM

## 2021-01-21 RX ORDER — HYDROMORPHONE HYDROCHLORIDE 1 MG/ML
0.3 INJECTION, SOLUTION INTRAMUSCULAR; INTRAVENOUS; SUBCUTANEOUS
Status: DISCONTINUED | OUTPATIENT
Start: 2021-01-21 | End: 2021-01-22 | Stop reason: HOSPADM

## 2021-01-21 RX ORDER — NICOTINE POLACRILEX 4 MG
15-30 LOZENGE BUCCAL
Status: DISCONTINUED | OUTPATIENT
Start: 2021-01-21 | End: 2021-01-22 | Stop reason: HOSPADM

## 2021-01-21 RX ADMIN — SODIUM CHLORIDE, POTASSIUM CHLORIDE, SODIUM LACTATE AND CALCIUM CHLORIDE: 600; 310; 30; 20 INJECTION, SOLUTION INTRAVENOUS at 14:05

## 2021-01-21 RX ADMIN — PROPOFOL 150 MCG/KG/MIN: 10 INJECTION, EMULSION INTRAVENOUS at 14:08

## 2021-01-21 RX ADMIN — PANTOPRAZOLE SODIUM 40 MG: 40 INJECTION, POWDER, FOR SOLUTION INTRAVENOUS at 08:26

## 2021-01-21 RX ADMIN — SINCALIDE 1.9 MCG: 5 INJECTION, POWDER, LYOPHILIZED, FOR SOLUTION INTRAVENOUS at 12:05

## 2021-01-21 RX ADMIN — PROPOFOL 50 MG: 10 INJECTION, EMULSION INTRAVENOUS at 14:09

## 2021-01-21 RX ADMIN — ACETAMINOPHEN 650 MG: 325 TABLET, FILM COATED ORAL at 08:25

## 2021-01-21 RX ADMIN — MIDAZOLAM 1 MG: 1 INJECTION INTRAMUSCULAR; INTRAVENOUS at 14:08

## 2021-01-21 RX ADMIN — CETIRIZINE HYDROCHLORIDE 10 MG: 10 TABLET, FILM COATED ORAL at 08:26

## 2021-01-21 RX ADMIN — LIDOCAINE HYDROCHLORIDE 100 MG: 20 INJECTION, SOLUTION INFILTRATION; PERINEURAL at 14:08

## 2021-01-21 RX ADMIN — DESVENLAFAXINE SUCCINATE 50 MG: 50 TABLET, EXTENDED RELEASE ORAL at 08:26

## 2021-01-21 RX ADMIN — ACETAMINOPHEN 650 MG: 325 TABLET, FILM COATED ORAL at 19:43

## 2021-01-21 RX ADMIN — ONDANSETRON 4 MG: 2 INJECTION INTRAMUSCULAR; INTRAVENOUS at 14:08

## 2021-01-21 RX ADMIN — POTASSIUM CHLORIDE 40 MEQ: 1500 TABLET, EXTENDED RELEASE ORAL at 01:18

## 2021-01-21 RX ADMIN — MIDAZOLAM 1 MG: 1 INJECTION INTRAMUSCULAR; INTRAVENOUS at 14:04

## 2021-01-21 RX ADMIN — MEBROFENIN 7 MILLICURIE: 45 INJECTION, POWDER, LYOPHILIZED, FOR SOLUTION INTRAVENOUS at 11:16

## 2021-01-21 RX ADMIN — SODIUM CHLORIDE, POTASSIUM CHLORIDE, SODIUM LACTATE AND CALCIUM CHLORIDE 1000 ML: 600; 310; 30; 20 INJECTION, SOLUTION INTRAVENOUS at 00:56

## 2021-01-21 RX ADMIN — INSULIN ASPART 1 UNITS: 100 INJECTION, SOLUTION INTRAVENOUS; SUBCUTANEOUS at 19:44

## 2021-01-21 RX ADMIN — TAMOXIFEN CITRATE 20 MG: 10 TABLET, FILM COATED ORAL at 08:26

## 2021-01-21 RX ADMIN — SODIUM CHLORIDE, POTASSIUM CHLORIDE, SODIUM LACTATE AND CALCIUM CHLORIDE 1000 ML: 600; 310; 30; 20 INJECTION, SOLUTION INTRAVENOUS at 02:59

## 2021-01-21 ASSESSMENT — MIFFLIN-ST. JEOR: SCORE: 1565.76

## 2021-01-21 NOTE — ANESTHESIA PREPROCEDURE EVALUATION
Anesthesia Pre-Procedure Evaluation    Patient: Kezia Nava   MRN: [id number] : 1975        Preoperative Diagnosis: Abdominal pain [R10.9]   Procedure : Procedure(s):  ESOPHAGOGASTRODUODENOSCOPY (EGD)     Past Medical History:   Diagnosis Date     Bronchitis      Diffuse cystic mastopathy      Elevated BP 12/15/2016     Hypertension      Mesenteric adenitis      Migraines      Nephrolithiasis     s/p lithotripsy     Sleep apnea     no cpap     Thyroiditis, acute 2014    transient hypothyroidism- resolved     Type 2 diabetes mellitus with hyperglycemia, without long-term current use of insulin (H) 2017      Past Surgical History:   Procedure Laterality Date     ARTHROSCOPY ANKLE, OPEN REPAIR TENDON, COMBINED  2012    Procedure: COMBINED ARTHROSCOPY ANKLE, OPEN REPAIR TENDON;  Ankle Arthroscopy Left Ankle, Open Ligament Repair Left Ankle, Peroneal Tendon Repair Left Ankle ;  Surgeon: Otf Ramos DPM;  Location: [facility] OR     C APPENDECTOMY  1984     CARPAL TUNNEL RELEASE RT/LT       COMBINED CYSTOSCOPY, INSERT STENT URETER(S)  2013    Procedure: COMBINED CYSTOSCOPY, INSERT STENT URETER(S);  cystoscopy, right retrograde,RIGHT STENT PLACEMENT.;  Surgeon: Kan Porter MD;  Location:  OR     CYSTOSCOPY, RETROGRADES, INSERT STENT URETER(S), COMBINED  2013    Procedure: COMBINED CYSTOSCOPY, RETROGRADES, INSERT STENT URETER(S);  cystoscopy, right retrograde, insert stent right ureter;  Surgeon: Kan Porter MD;  Location:  OR     DENTAL SURGERY      TOOTH#7 - DENTAL IMPLANT     DISCECTOMY, FUSION CERVICAL ANTERIOR ONE LEVEL, COMBINED N/A 2015    Procedure: COMBINED DISCECTOMY, FUSION CERVICAL ANTERIOR ONE LEVEL;  Surgeon: Seven Umaña MD;  Location:  OR     EXTRACORPOREAL SHOCK WAVE LITHOTRIPSY (ESWL)  2013    Procedure: EXTRACORPOREAL SHOCK WAVE LITHOTRIPSY (ESWL);   RIGHT EXTRACORPOREAL SHOCK WAVE LITHOTRIPSY;  Surgeon:  Otf Tobias MD;  Location: [facility] OR     HC REDUCTION OF LARGE BREAST Bilateral 2003     HYSTERECTOMY, PAP NO LONGER INDICATED  2019     LAPAROSCOPIC HYSTERECTOMY TOTAL, SALPINGECTOMY Left 5/29/2019    Procedure: single site total LAPAROSCOPIC HYSTERECTOMY, left oophorectomy and lysis of adhesions;  Surgeon: Pauline Horowitz MD;  Location: [facility] OR     LASIK BILATERAL       SALPINGO OOPHORECTOMY,R/L/DERREK Right 05/17/2006    Right      Allergies   Allergen Reactions     Lisinopril Cough     Pneumovax [Pneumococcal Polysaccharides] Other (See Comments)     Red streaking and pain      Social History     Tobacco Use     Smoking status: Never Smoker     Smokeless tobacco: Never Used   Substance Use Topics     Alcohol use: Yes     Alcohol/week: 0.0 standard drinks     Comment: 0-1 drinks per week      Wt Readings from Last 1 Encounters:   01/21/21 97.3 kg (214 lb 6.4 oz)        Anesthesia Evaluation   Pt has had prior anesthetic. Type of anesthetic: H/o wild wakeup with GA.        ROS/MED HX  ENT/Pulmonary:     (+) sleep apnea,     Neurologic:  - neg neurologic ROS     Cardiovascular:     (+) hypertension-----    METS/Exercise Tolerance:     Hematologic:  - neg hematologic  ROS     Musculoskeletal:  - neg musculoskeletal ROS     GI/Hepatic:  - neg GI/hepatic ROS     Renal/Genitourinary: Comment: H/o nephrolithiasis.      Endo:     (+) type II DM, thyroid problem, Obesity,     Psychiatric/Substance Use:  - neg psychiatric ROS     Infectious Disease:  - neg infectious disease ROS     Malignancy:       Other:            Physical Exam    Airway  airway exam normal           Respiratory Devices and Support         Dental  no notable dental history         Cardiovascular   cardiovascular exam normal          Pulmonary   pulmonary exam normal                OUTSIDE LABS:  CBC:   Lab Results   Component Value Date    WBC 5.5 01/20/2021    WBC 6.0 11/19/2020    HGB 12.0 01/20/2021    HGB 12.4 11/19/2020    HCT 36.5 01/20/2021     HCT 36.9 11/19/2020     01/20/2021     11/19/2020     BMP:   Lab Results   Component Value Date     01/21/2021     01/20/2021    POTASSIUM 3.8 01/21/2021    POTASSIUM 3.3 (L) 01/20/2021    CHLORIDE 112 (H) 01/21/2021    CHLORIDE 105 01/20/2021    CO2 27 01/21/2021    CO2 27 01/20/2021    BUN 8 01/21/2021    BUN 11 01/20/2021    CR 0.64 01/21/2021    CR 0.80 01/20/2021     (H) 01/21/2021     (H) 01/20/2021     COAGS:   Lab Results   Component Value Date    PTT 33 10/05/2017    INR 0.91 10/05/2017     POC:   Lab Results   Component Value Date    BGM 99 01/21/2021    HCG Negative 12/13/2015    HCGS Negative 12/08/2010     HEPATIC:   Lab Results   Component Value Date    ALBUMIN 3.2 (L) 01/21/2021    PROTTOTAL 6.3 (L) 01/21/2021    ALT 82 (H) 01/21/2021    AST 57 (H) 01/21/2021    ALKPHOS 50 01/21/2021    BILITOTAL 0.2 01/21/2021     OTHER:   Lab Results   Component Value Date    LACT 1.8 03/01/2019    A1C 7.5 (H) 01/21/2021    JAMIE 8.6 01/21/2021    PHOS 2.6 10/07/2017    MAG 1.7 10/27/2020    LIPASE 310 01/21/2021    TSH 2.30 10/27/2020    T4 1.36 09/09/2015    CRP <2.9 11/18/2019    SED 24 (H) 10/06/2017       Anesthesia Plan     History & Physical Review      ASA Status:  2.   Plan for MAC Reason for MAC:  Deep or markedly invasive procedure (G8).         PONV prophylaxis:  Ondansetron (or other 5HT-3).       Consents  Anesthesia Plan(s) and associated risks, benefits, and realistic alternatives discussed.    Questions answered and patient/representative(s) expressed understanding.    Discussed with:  Patient.             Postoperative Care  Postoperative pain management: IV analgesics and Multi-modal analgesia.           John Fisher MD

## 2021-01-21 NOTE — CONSULTS
General Surgery Consultation    Kezia Nava MRN#: [id number]   Age: 45 year old YOB: 1975     Date of Admission:  1/20/2021  Reason for consult: RUQ and epigastric abdominal pain       Requesting physician: Pako Elliott MD       Surgeon:        Tera Bond MD        Chief Complaint:   Abdominal pain, epigastric     History is obtained from the patient and chart review         History of Present Illness:   General Surgery was asked to evaluate this patient at the request of Dr. Elliott for possible gallbladder disease.    This patient is a 45 year old  female with a significant past medical history of Type 2 DM, Depression, Obesity, Hypokalemia, Nonalcoholic steatohepatitis who presents to the ED after her shift there, with left flank pain, nausea, and generally feeling unwell.  She felt she was having a kidney stone.    She denies recent travel.  She has diarrhea but this is her normal.  No other changes noted to bowel movements.  She has some food intolerances to ranch and oatmeal among other things that cause her pain and more diarrhea but no recent intake of these.    In addition to the Left flank/back pain, she has noted tenderness to palpation of epigastric and RUQ areas that she was not aware of prior to exam on admission.  She offers that she routinely gets reflux symptoms.  She has not had an upper endoscopy to evaluate this further.  She denies fevers or chills and reports she usually runs low grade fever temp range at baseline.  She admits that she is under more stress at work due to the pandemic and an uncle is actively dying that she is close to.    In the ED, CT of abdomen and pelvis showed no evidence of nephrolithiasis, gallbladder wall thickening, or pancreatic inflammation.  Her liver function test showed mild elevation of her liver transaminases and normal bilirubin.  Her lipase elevated to 574.  On follow up labs this morning, LFTs trending down and nearly normal, as well  as a normal lipase.  CBC on admission was wnl.    Review of surgical history shows appendectomy and hysterectomy among abdominal surgeries.  She has also had ureter stents placed and ESWL in 2013.          Past Medical History:   Kezia Nava  has a past medical history of Diffuse cystic mastopathy, Elevated BP (12/15/2016), Hypertension, Mesenteric adenitis (4/14), Migraines, Nephrolithiasis, Sleep apnea, Thyroiditis, acute (12/2/2014), and Type 2 diabetes mellitus with hyperglycemia, without long-term current use of insulin (H) (2/6/2017).          Past Surgical History:     Past Surgical History:   Procedure Laterality Date     ARTHROSCOPY ANKLE, OPEN REPAIR TENDON, COMBINED  11/8/2012    Procedure: COMBINED ARTHROSCOPY ANKLE, OPEN REPAIR TENDON;  Ankle Arthroscopy Left Ankle, Open Ligament Repair Left Ankle, Peroneal Tendon Repair Left Ankle ;  Surgeon: Otf Ramos DPM;  Location: [facility] OR     C APPENDECTOMY  1984     COMBINED CYSTOSCOPY, INSERT STENT URETER(S)  8/6/2013    Procedure: COMBINED CYSTOSCOPY, INSERT STENT URETER(S);  cystoscopy, right retrograde,RIGHT STENT PLACEMENT.;  Surgeon: Kan Porter MD;  Location:  OR     CYSTOSCOPY, RETROGRADES, INSERT STENT URETER(S), COMBINED  8/6/2013    Procedure: COMBINED CYSTOSCOPY, RETROGRADES, INSERT STENT URETER(S);  cystoscopy, right retrograde, insert stent right ureter;  Surgeon: Kan Porter MD;  Location:  OR     DENTAL SURGERY      TOOTH#7 - DENTAL IMPLANT     DISCECTOMY, FUSION CERVICAL ANTERIOR ONE LEVEL, COMBINED N/A 12/13/2015    Procedure: COMBINED DISCECTOMY, FUSION CERVICAL ANTERIOR ONE LEVEL;  Surgeon: Seven Umaña MD;  Location:  OR     EXTRACORPOREAL SHOCK WAVE LITHOTRIPSY (ESWL)  8/19/2013    Procedure: EXTRACORPOREAL SHOCK WAVE LITHOTRIPSY (ESWL);   RIGHT EXTRACORPOREAL SHOCK WAVE LITHOTRIPSY;  Surgeon: Otf Tobias MD;  Location:  OR     HC REDUCTION OF LARGE BREAST Bilateral 2003      HYSTERECTOMY, PAP NO LONGER INDICATED  2019     LAPAROSCOPIC HYSTERECTOMY TOTAL, SALPINGECTOMY Left 5/29/2019    Procedure: single site total LAPAROSCOPIC HYSTERECTOMY, left oophorectomy and lysis of adhesions;  Surgeon: Pauline Horowitz MD;  Location: [facility] OR     LASIK BILATERAL       SALPINGO OOPHORECTOMY,R/L/DERREK Right 05/17/2006    Right             Social History:     Social History     Tobacco Use     Smoking status: Never Smoker     Smokeless tobacco: Never Used   Substance Use Topics     Alcohol use: Yes     Alcohol/week: 0.0 standard drinks     Comment: 0-1 drinks per week             Family History:   This patient has no significant family history          Allergies:     Allergies   Allergen Reactions     Lisinopril Cough     Pneumovax [Pneumococcal Polysaccharides] Other (See Comments)     Red streaking and pain             Medications:     Prior to Admission medications    Medication Sig Start Date End Date Taking? Authorizing Provider   Acetaminophen (TYLENOL PO) Take 500-1,000 mg by mouth every 6 hours as needed As needed for pain    Yes Reported, Patient   cetirizine (ZYRTEC) 10 MG tablet Take 10 mg by mouth daily   Yes Reported, Patient   desvenlafaxine (PRISTIQ) 50 MG 24 hr tablet Take 1 tablet (50 mg) by mouth daily 7/3/20  Yes Shamir Angeles MD   Ibuprofen (IBU PO) Take 600 mg by mouth every 6 hours as needed    Yes Reported, Patient   liraglutide (VICTOZA PEN) 18 MG/3ML solution Inject 1.2 mg Subcutaneous daily 10/29/19  Yes Shamir Angeles MD   losartan-hydrochlorothiazide (HYZAAR) 50-12.5 MG tablet Take 1 tablet by mouth daily 7/17/20  Yes Yuval Watson MD   metFORMIN (GLUCOPHAGE-XR) 500 MG 24 hr tablet Take 1 tablet (500 mg) by mouth daily (with dinner) TAKE ONE TABLET BY MOUTH ONCE DAILY WITH DINNER 7/17/20  Yes Yuval Watson MD   multivitamin, therapeutic with minerals (MULTI-VITAMIN) TABS tablet Take 1 tablet by mouth daily   Yes Reported, Patient   ondansetron (ZOFRAN ODT) 4 MG PO ODT  "tab Take 1-2 tablets (4-8 mg) by mouth every 8 hours as needed for nausea 2/19/20  Yes Shamir Angeles MD   tamoxifen (NOLVADEX) 20 MG tablet Take 1 tablet (20 mg) by mouth daily 6/10/20  Yes Shamir Angeles MD   VITAMIN D, CHOLECALCIFEROL, PO Take 10,000 Units by mouth daily    Yes Reported, Patient   blood glucose monitoring (ACCU-CHEK ALMA PLUS) test strip Use to test blood sugar 1 times daily or as directed. 6/18/18   Shamir Angeles MD   blood glucose monitoring (ACCU-CHEK FASTCLIX) lancets Use to test blood sugar 1 times daily or as directed. 6/18/18   Shamir Angeles MD   insulin pen needle (BD VALENTINA U/F) 32G X 4 MM miscellaneous USE ONCE DAILY AS DIRECTED 1/2/20   Shamir Angeles MD   STATIN NOT PRESCRIBED, INTENTIONAL, Please choose reason not prescribed, below 10/17/17   Kan Serna MD             Review of Systems:   The Review of Systems is negative other than noted in the HPI          Physical Exam:   Blood pressure 132/68, pulse 79, temperature 95.9  F (35.5  C), temperature source Oral, resp. rate 16, height 1.575 m (5' 2\"), weight 97.3 kg (214 lb 6.4 oz), last menstrual period 06/20/2018, SpO2 94 %, not currently breastfeeding.    General - This is a well developed, well nourished female in no apparent distress.  She is sleeping comfortably on Rt side, but wakes when I enter room.  HEENT - Normocephalic. Atraumatic. Moist mucous membranes. Pupils equal.  No scleral icterus.  Neck - Supple without masses.  Lungs - Clear to ascultation bilaterally.    Heart - Regular rate & rhythm without murmur.  Abdomen - Soft, nondistended with +bowel sounds.   + Gallegos sign.  RUQ and epigastric tenderness.   Extremities - Moves all extremities. Warm without edema.  Neurologic - Nonfocal.  Appropriate conversation.  Pleasant.  Normal affect.          Data:   Labs:  WBC -   WBC   Date Value Ref Range Status   01/20/2021 5.5 4.0 - 11.0 10e9/L Final     Hgb -   Hemoglobin   Date Value Ref Range Status   01/20/2021 " 12.0 11.7 - 15.7 g/dL Final       Liver Function Studies -   Recent Labs   Lab Test 01/21/21  0649   PROTTOTAL 6.3*   ALBUMIN 3.2*   BILITOTAL 0.2   ALKPHOS 50   AST 57*   ALT 82*       CT scan of the abdomen:   FINDINGS:   LOWER CHEST: The visualized lung bases are clear.     HEPATOBILIARY: Diffuse fatty infiltration of the liver.     PANCREAS: Normal.     SPLEEN: Normal.     ADRENAL GLANDS: Normal.     KIDNEYS/BLADDER: Unremarkable. No urinary calculi. No hydronephrosis.     BOWEL: No bowel obstruction. No convincing evidence for colitis or  diverticulitis. The appendix is surgically absent.     PELVIC ORGANS: Uterus is not seen, and is surgically absent by  history.     LYMPH NODES: No enlarged lymph nodes are identified in the abdomen or  pelvis.     VASCULATURE: Retroaortic left renal vein is an anatomic variant.     ADDITIONAL FINDINGS: None.     MUSCULOSKELETAL: Unremarkable.                                                                      IMPRESSION:   1.  No acute abnormality in the abdomen or pelvis. No cause for left  flank pain is identified.  2.  Diffuse fatty infiltration of the liver    As read by Radiology       Ultrasound of the abdomen:  FINDINGS:     GALLBLADDER: Normal. No gallstones, wall thickening, or pericholecystic fluid. Negative sonographic Gallegos's sign.     BILE DUCTS: No biliary dilatation. The common duct measures 4 mm.     LIVER: Diffuse fatty infiltration. No focal mass.     RIGHT KIDNEY: No hydronephrosis.     PANCREAS: The visualized portions are normal.     No ascites.                                                                      IMPRESSION:  1.  No gallstone or biliary dilatation.  2.  Fatty infiltration of the liver.    As read by Radiology        EKG:   None this admission.  Last completed 10/27/20         Assessment:     Left flank pain and RUQ/epigastric pain         Plan:     -HIDA scan to rule out biliary source definitively.  Her imaging suggests no concerns  with her gallbladder, however, she had tenderness in RUQ/epigastric area.  -If wnl, would start on diet to see if pain stays at bay.  Consider GI for upper endoscopy.  -If abnormal HIDA, we can discuss further option of cholecystectomy.  -Patient in agreement with plan.  -Thank you for this consult.  Dr. Bond in agreement with plan and will evaluate at different time.        Paresh Bill PA-C, physician assistant for Forest Bond MD  [facility], [phone]  Pager [phone]

## 2021-01-21 NOTE — PLAN OF CARE
Currently in ENDO. A&Ox4 and VSS on RA. Up independent and IV SL. Tolerating regular diet. BG 11 and 116. No correction given. PRN tylenol given x1 for headache. GI consult. Continue to monitor.

## 2021-01-21 NOTE — PROGRESS NOTES
[facility]    Hospitalist Progress Note    Date of Service (when I saw the patient): 01/21/2021    Assessment & Plan   Kezia Nava is a 45 year old female who was admitted on 1/20/2021.  Assessment & Plan     Kezia Nava is a 45 year old female admitted on 1/20/2021. She presents with nausea, epigastric discomfort, left flank discomfort.     Nausea with epigastric pain: Possible symptomatic cholelithiasis or a calculus cholecystitis, possible early mild pancreatitis.  Patient with a history of what she describes as heartburn intermittently in the past which consist of epigastric discomfort radiating to her back, relieved with several days of Prilosec.  Similar epigastric discomfort now as well as some right upper quadrant discomfort.  Right upper quadrant ultrasound was negative for any gallstones or cholecystitis  CT scan of the abdomen pelvis without contrast on admission was negative for pancreatitis of kidney stones  Epigastric pain still could be biliary colic versus gastritis or peptic ulcer disease in the differential  Evaluated by general surgery and planning on HIDA scan today  If HIDA scan is negative recommending EGD by GI  [facility] [facility] consultation requested    -Nonalcoholic steatohepatitis: Patient with fatty infiltration of the liver on CT imaging.  Historically mild elevation of LFTs consistent with fatty infiltration of the liver, slightly worsened today.  Could potentially be resulting in patient's right upper quadrant discomfort, though exam seems out of proportion to ALT/AST  -Weight loss, alcohol abstinence,  -Up titration of Metformin as tolerated in the outpatient setting  Discussed with the patient the importance of low-carb diet especially with the hypertriglyceridemia     Left flank pain: Patient with some combination of both superficial/muscular reproducible discomfort at left CVA as well as a described more deep pain.  Urinalysis unremarkable, CT negative  for nephrolithiasis.  No clear etiology for this discomfort.  Could be referred from pain from gastritis or peptic ulcer disease or biliary colic possible    Type 2 diabetes:  -Prior to admission metformin on hold given observation admission  -Every 4 hour blood glucose checks with sliding scale insulin available as needed  -Resume prior to admission Victoza at discharge; not on formulary and not ordered this admission     Depression:  -Continue prior to admission Pristiq     Severe obesity: BMI of 39+, comorbidities including type 2 diabetes, steatohepatitis, sleep apnea  -Continue to encourage weight loss.  Patient recently rejoined weight watchers and has lost 4 pounds in the last week.  Has followed with bariatric clinic in the past.     High risk for breast cancer based on family history:  -Continue prior to admission tamoxifen for primary breast cancer prevention.  This is to continue through November 2023     Hypokalemia: Potassium of 3.3.    -potassium and magnesium replacement protocols in place  -Lactated Ringer bolus as above                 -Diet:  N.p.o. at 3 AM while HIDA scan and GI eval pending  DVT Prophylaxis: Ambulate every shift  Patrick Catheter: not present  Code Status:  Full code             Disposition Plan     Expected discharge:  In the next 1 to 2 days if abdominal pain improves and after further evaluation and tolerating diet    Greater than 30 minutes were spent in taking care of her today with reviewing the chart and collaboration of care. Reviewing the results of CT and ultrasound of abdomen       Rasheeda Cervantes MD  [phone] (P)      Interval History   Patient is comfortably resting in bed.  Complains of epigastric tenderness.  Rated current pain at 2-3 out of 10.  Was evaluated by general surgery and HIDA scan ordered.  No new issues since admission    -Data reviewed today: I reviewed all new labs and imaging results over the last 24 hours. I personally reviewed no images or EKG's  today.    Physical Exam   Temp: 96.4  F (35.8  C) Temp src: Oral BP: 108/72 Pulse: 72   Resp: 16 SpO2: 93 % O2 Device: None (Room air)    Vitals:    01/20/21 1933 01/21/21 0029   Weight: 95.3 kg (210 lb) 97.3 kg (214 lb 6.4 oz)     Vital Signs with Ranges  Temp:  [95.9  F (35.5  C)-98.6  F (37  C)] 96.4  F (35.8  C)  Pulse:  [72-93] 72  Resp:  [16] 16  BP: (108-142)/() 108/72  SpO2:  [91 %-99 %] 93 %  I/O last 3 completed shifts:  In: 1000 [IV Piggyback:1000]  Out: 1100 [Urine:1100]    Constitutional: Awake, alert, cooperative, no apparent distress  Respiratory: Clear to auscultation bilaterally, no crackles or wheezing  Cardiovascular: Regular rate and rhythm, normal S1 and S2, and no murmur noted  GI: Normal bowel sounds, soft, non-distended, Deep tendrness in epigastrium   Skin/Integumen: No rashes, no cyanosis, no edema  Other:     Medications       cetirizine  10 mg Oral Daily     desvenlafaxine  50 mg Oral Daily     insulin aspart  1-6 Units Subcutaneous Q4H     insulin aspart   Subcutaneous TID w/meals     pantoprazole (PROTONIX) IV  40 mg Intravenous Daily with breakfast     sodium chloride (PF)  3 mL Intracatheter Q8H     tamoxifen  20 mg Oral Daily       Data   Recent Labs   Lab 01/21/21  0649 01/20/21  1950   WBC  --  5.5   HGB  --  12.0   MCV  --  86   PLT  --  227    139   POTASSIUM 3.8 3.3*   CHLORIDE 112* 105   CO2 27 27   BUN 8 11   CR 0.64 0.80   ANIONGAP 4 7   JAMIE 8.6 9.7   * 103*   ALBUMIN 3.2* 4.0   PROTTOTAL 6.3* 7.9   BILITOTAL 0.2 0.3   ALKPHOS 50 61   ALT 82* 108*   AST 57* 81*   LIPASE 310 574*       Recent Results (from the past 24 hour(s))   Abd/pelvis CT no contrast - Stone Protocol    Narrative    CT ABDOMEN AND PELVIS WITHOUT CONTRAST 1/20/2021 8:28 PM    CLINICAL HISTORY: Left flank pain.    TECHNIQUE: CT scan of the abdomen and pelvis was performed without IV  contrast. Multiplanar reformats were obtained. Dose reduction  techniques were used.  CONTRAST:  None.    COMPARISON: None.    FINDINGS:   LOWER CHEST: The visualized lung bases are clear.    HEPATOBILIARY: Diffuse fatty infiltration of the liver.    PANCREAS: Normal.    SPLEEN: Normal.    ADRENAL GLANDS: Normal.    KIDNEYS/BLADDER: Unremarkable. No urinary calculi. No hydronephrosis.    BOWEL: No bowel obstruction. No convincing evidence for colitis or  diverticulitis. The appendix is surgically absent.    PELVIC ORGANS: Uterus is not seen, and is surgically absent by  history.    LYMPH NODES: No enlarged lymph nodes are identified in the abdomen or  pelvis.    VASCULATURE: Retroaortic left renal vein is an anatomic variant.    ADDITIONAL FINDINGS: None.    MUSCULOSKELETAL: Unremarkable.      Impression    IMPRESSION:   1.  No acute abnormality in the abdomen or pelvis. No cause for left  flank pain is identified.  2.  Diffuse fatty infiltration of the liver.    MANDA PIMENTEL MD   Chest XR,  PA & LAT    Narrative    CHEST TWO VIEWS  1/20/2021 9:50 PM     HISTORY:  Left lower chest and back pain.    COMPARISON: 11/19/2020.      Impression    IMPRESSION: Chest is negative and unchanged. Lungs clear.    MANDA PIMENTEL MD   Abdomen US, limited (RUQ only)    Narrative    EXAM: US ABDOMEN LIMITED  LOCATION: [facility]  DATE/TIME: 1/20/2021 11:23 PM    INDICATION: Right upper quadrant pain. Elevated LFTs.  COMPARISON: None.  TECHNIQUE: Limited abdominal ultrasound.    FINDINGS:    GALLBLADDER: Normal. No gallstones, wall thickening, or pericholecystic fluid. Negative sonographic Gallegos's sign.    BILE DUCTS: No biliary dilatation. The common duct measures 4 mm.    LIVER: Diffuse fatty infiltration. No focal mass.    RIGHT KIDNEY: No hydronephrosis.    PANCREAS: The visualized portions are normal.    No ascites.      Impression    IMPRESSION:  1.  No gallstone or biliary dilatation.  2.  Fatty infiltration of the liver.

## 2021-01-21 NOTE — PLAN OF CARE
AVSS; pt with 1-2/10 left flank pain; declined pain interventions; pt received 2 liters LR over 4 hours; voiding in good amounts; NPO after 0300; surgery consult ordered; up with SBA; blood glucoses 115 and 102;  continue to monitor.

## 2021-01-21 NOTE — PROGRESS NOTES
Observation Goals:    -diagnostic tests and consults completed and resulted - not met  -vital signs normal or at patient baseline -met  -tolerating oral intake to maintain hydration - met   -adequate pain control on oral analgesics - met

## 2021-01-21 NOTE — PROGRESS NOTES
Observation Goals:    -diagnostic tests and consults completed and resulted - not met  -vital signs normal or at patient baseline -met  -tolerating oral intake to maintain hydration - not met   -adequate pain control on oral analgesics - met

## 2021-01-21 NOTE — CONSULTS
Consult Date:  01/21/2021      GASTROENTEROLOGY CONSULTATION      CHIEF COMPLAINT:  Abdominal pain.      REASON FOR CONSULTATION:  We were asked to see the patient by her admitting physician, Dr. Pako Elliott, for further evaluation of abdominal pain.      HISTORY OF PRESENT ILLNESS:  The patient reports she was in her usual state of health until about 3 p.m. yesterday when she developed acute left upper quadrant pain.  She describes it as a sharp pain in the left upper quadrant radiating to her back.  The pain progressively got worse over several hours and she presented to the emergency room for further evaluation, thinking she had a kidney stone.  In the ER, she was also found to have some mid epigastric pain with palpation and her left back pain has slowly improved.      At this time, when the patient lays still, she says she has no pain but if you press on her abdomen, she reports extreme discomfort in the mid epigastric area.  She reports that she has not had any nausea or vomiting.  She has not had fever or chills.  She has not had constipation or diarrhea.  She has been moving her bowels regularly, started on a [employer] diet and feels like her diet has been more regular and controlled because of it.      REVIEW OF SYSTEMS:   CONSTITUTIONAL:  Again, no fever or chills.   NEUROLOGIC:  No dizziness, lightheadedness.   CARDIAC:  No chest pain or palpitations.   PULMONARY:  No shortness of breath.   GASTROINTESTINAL:  As above.   GENITOURINARY:  She is urinating normally.   All other systems were negative.      PAST MEDICAL HISTORY:  Significant for hypertension, mesenteric adenitis, history of nephrolithiasis, status post lithotripsy in the past, sleep apnea, history of thyroiditis and history of type 2 diabetes.      PAST SURGERY HISTORY:  Surgeries have included an appendectomy, arthroscopy of the ankle, cystoscopy, cervical fusion, hysterectomy total.      SOCIAL HISTORY:  The patient does drink socially,  1-2 drinks per week.  Denies tobacco.      FAMILY HISTORY:  Significant for kidney disease, hypertension, coronary artery disease in her mother and hypertension in her father.      PHYSICAL EXAMINATION:   GENERAL:  She is a well-nourished woman, alert and oriented, in no apparent distress.   VITAL SIGNS:  Blood pressure is 108/72, temperature is 96.4, pulse is 72.   HEENT:  Head is atraumatic, normocephalic.   SKIN:  Without evidence of jaundice or rash.  Sclerae anicteric.   HEART:  S1, S2.   LUNGS:  Clear to auscultation.   ABDOMEN:  Nondistended with good bowel sounds.  She was tender in the mid epigastric area, really localized and not tender in the right upper quadrant nor the left upper quadrant.  No hepatosplenomegaly appreciated.   EXTREMITIES:  Right and left lower extremity without edema.   NEUROLOGIC:  She is alert and oriented, grossly is nonfocal.   PSYCHIATRIC:  No evidence of pressured speech or flattened affect.      LABORATORY DATA:  Electrolytes within normal limits.  Kidney function is normal.  Bilirubin and alkaline phosphatase are normal.  ALT is 82, AST is 57.  Hemoglobin A1c is 7.5.  She has a normal white count and hemoglobin is 12.      IMAGING:  An ultrasound of the abdomen revealed no gallstones, no gallbladder wall thickening, fatty infiltration of the liver.  A CT of the abdomen showed no acute abnormality and diffuse fatty infiltration of the liver.  HIDA scan results are pending.      ASSESSMENT AND PLAN:   1.  Abdominal pain.  At this time, the patient has mostly mid epigastric pain.  She no longer has the left back pain or it has significantly improved.  With her epigastric tenderness and recent presentation, the differential includes acid peptic disease, hiatal hernia or even gastroesophageal reflux.  We suggest upper endoscopy for further evaluation and we went over the procedure with the patient that, at the time of endoscopy, we would do gastric and small bowel biopsies.   2.   Dysphagia.  This patient does have dysphagia for solids.  Upper endoscopy is suggested for further diagnostic and possible therapeutic purposes.  At upper endoscopy, we would do mid and distal esophageal biopsies to rule out eosinophilic esophagitis.      Thank you for asking us to participate in her care.         TOM LOWE MD             D: 2021   T: 2021   MT: LAZARO      Name:     JOSE FENG   MRN:      [id number]        Account:       [id number]   :      1975           Consult Date:  2021      Document: [id number]

## 2021-01-21 NOTE — ED PROVIDER NOTES
Emergency Department Attending Supervision Note  1/20/2021  10:50 PM      I evaluated this patient in conjunction with Hedy Orellana PA-C.      Briefly, the patient presented with left-sided flank and left-sided abdominal pain.  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  Pain is very reminiscent of prior kidney stones.  She otherwise denies vomiting, fever, or other concerns.      On my exam, she is uncomfortable, but improved after pain medication.  She is lying supine providing appropriate history.  Abd: She has generalized upper abdominal discomfort.  Pain seems to be worse in the epigastrium and right upper quadrant.  There is no rebound or guarding.  Musculoskeletal: Full range of motion of all extremities without difficulty.  Skin: Warm and dry without rashes  Neuro: Nonfocal  Psych: Normal affect    Results: I reviewed her laboratory results which are essentially unremarkable.  CT of the abdomen does not show source of kidney stone or other pain.  Right upper quadrant ultrasound was obtained which shows no evidence of gallstones.  There is a mild elevation of her lipase.        My impression is abdominal pain.  The patient is required several doses of pain medication.  Exact source of the pain is unclear, though we are still early in her course.  She does have a mild elevation of her lipase which could represent early pancreatitis.  She will be admitted to the hospitalist service for serial exams, recheck of labs, and further work-up.  The patient's questions were answered she is comfortable with this plan for admission.        Diagnosis    ICD-10-CM    1. Acute left-sided thoracic back pain  M54.6 Asymptomatic SARS-CoV-2 COVID-19 Virus (Coronavirus) by PCR     Hemoglobin A1c     Comprehensive metabolic panel     Lipase     Glucose by meter     Glucose by meter     Lipid panel reflex to direct LDL     Glucose by meter     Glucose by meter   2. Epigastric pain  R10.13    3. Elevated lipase   R74.8    4. Elevated LFTs  R79.89             Trierweiler, Chad A, MD  01/21/21 1047

## 2021-01-21 NOTE — PROGRESS NOTES
GI NOTE  See procedure report    EGD  Small sliding hiatal hernia   Gastritis   Biopsies    Plan/   Regular diet  Daily PPI   Follow up biopsies    Crystal Chacon MD  [facility]  Cell  [phone] 8 AM-5 PM  Office

## 2021-01-21 NOTE — ANESTHESIA POSTPROCEDURE EVALUATION
Patient: Kezia Nava    Procedure(s):  ESOPHAGOGASTRODUODENOSCOPY, WITH BIOPSY    Diagnosis:Abdominal pain [R10.9]  Diagnosis Additional Information: No value filed.    Anesthesia Type:  No value filed.    Note:  Disposition: Inpatient   Postop Pain Control: Uneventful            Sign Out: Well controlled pain   PONV: No   Neuro/Psych: Uneventful            Sign Out: Acceptable/Baseline neuro status   Airway/Respiratory: Uneventful            Sign Out: Acceptable/Baseline resp. status   CV/Hemodynamics: Uneventful            Sign Out: Acceptable CV status   Other NRE: NONE   DID A NON-ROUTINE EVENT OCCUR? No         Last vitals:  Vitals:    01/21/21 0807 01/21/21 0825 01/21/21 1344   BP: 108/72  (!) 142/95   Pulse: 72  70   Resp: 16 16 16   Temp: 35.8  C (96.4  F)     SpO2: 93%  94%       Electronically Signed By: John Fisher MD  January 21, 2021  2:39 PM

## 2021-01-21 NOTE — PHARMACY-ADMISSION MEDICATION HISTORY
Pharmacy Medication History  Admission medication history interview status for the 1/20/2021  admission is complete. See EPIC admission navigator for prior to admission medications     Location of Interview: Patient room  Medication history sources: Patient  Medication history source reliability: Good  Adherence assessment: Good    Significant changes made to the medication list:  Delete: Krill oil, Zolpidem    In the past week, patient estimated taking medication this percent of the time: greater than 90%        Medication reconciliation completed by provider prior to medication history? No    Time spent in this activity: 10 minutes       Prior to Admission medications    Medication Sig Last Dose Taking? Auth Provider   Acetaminophen (TYLENOL PO) Take 500-1,000 mg by mouth every 6 hours as needed As needed for pain  1/20/2021 at 1800 Yes Reported, Patient   cetirizine (ZYRTEC) 10 MG tablet Take 10 mg by mouth daily 1/19/2021 at PM Yes Reported, Patient   desvenlafaxine (PRISTIQ) 50 MG 24 hr tablet Take 1 tablet (50 mg) by mouth daily 1/19/2021 at PM Yes Shamir Angeles MD   Ibuprofen (IBU PO) Take 600 mg by mouth every 6 hours as needed  1/20/2021 at 1500 Yes Reported, Patient   liraglutide (VICTOZA PEN) 18 MG/3ML solution Inject 1.2 mg Subcutaneous daily 1/19/2021 at PM Yes Shamir Angeles MD   losartan-hydrochlorothiazide (HYZAAR) 50-12.5 MG tablet Take 1 tablet by mouth daily 1/19/2021 at PM Yes Yuval Watson MD   metFORMIN (GLUCOPHAGE-XR) 500 MG 24 hr tablet Take 1 tablet (500 mg) by mouth daily (with dinner) TAKE ONE TABLET BY MOUTH ONCE DAILY WITH DINNER 1/19/2021 at PM Yes Yuval Watson MD   multivitamin, therapeutic with minerals (MULTI-VITAMIN) TABS tablet Take 1 tablet by mouth daily 1/19/2021 at PM Yes Reported, Patient   ondansetron (ZOFRAN ODT) 4 MG PO ODT tab Take 1-2 tablets (4-8 mg) by mouth every 8 hours as needed for nausea 1/20/2021 at PRN Yes Shamir Angeles MD   tamoxifen (NOLVADEX) 20 MG  tablet Take 1 tablet (20 mg) by mouth daily 1/19/2021 at PM Yes Shamir Angeles MD   VITAMIN D, CHOLECALCIFEROL, PO Take 10,000 Units by mouth daily  1/19/2021 at PM Yes Reported, Patient   blood glucose monitoring (ACCU-CHEK ALMA PLUS) test strip Use to test blood sugar 1 times daily or as directed.   Shamir Angeles MD   blood glucose monitoring (ACCU-CHEK FASTCLIX) lancets Use to test blood sugar 1 times daily or as directed.   Shamir Angeles MD   insulin pen needle (BD VALENTINA U/F) 32G X 4 MM miscellaneous USE ONCE DAILY AS DIRECTED   Shamir Angeles MD   STATIN NOT PRESCRIBED, INTENTIONAL, Please choose reason not prescribed, below   Kan Serna MD

## 2021-01-21 NOTE — PROGRESS NOTES
RECEIVING UNIT ED HANDOFF REVIEW    ED Nurse Handoff Report was reviewed by: Berenice Ibarra RN on January 21, 2021 at 12:00 AM

## 2021-01-21 NOTE — H&P
[facility]    History and Physical - Hospitalist Service       Date of Admission:  1/20/2021    Assessment & Plan   Kezia Nava is a 45 year old female admitted on 1/20/2021. She presents with nausea, epigastric discomfort, left flank discomfort.    Nausea with epigastric pain: Possible symptomatic cholelithiasis or a calculus cholecystitis, possible early mild pancreatitis.  Patient with a history of what she describes as heartburn intermittently in the past which consist of epigastric discomfort radiating to her back, relieved with several days of Prilosec.  Similar epigastric discomfort now as well as some right upper quadrant discomfort.  -Right upper quadrant ultrasound to be performed to rule out gallbladder wall thickening, stone disease  -General surgery consulted given concern for gallbladder colic  -Repeat CMP, lipase in a.m.; exam is somewhat suggestive for mild pancreatitis, though tolerating oral intake, no evidence of pancreatic inflammation on imaging, lipase not 3 times upper limit of normal.  -Holding prior to admission Hyzaar combination  -For now, pain control with acetaminophen, oxycodone, IV Dilaudid if needed  -Protonix IV 40 mg daily  -Lipid panel in a.m.; note triglycerides last in the 200-300 range in July, though also note an emergency department visit in October where patient apparently had a lipemic blood draw limiting laboratory evaluations suggesting more substantial hypertriglyceridemia.  Anticipate recommendation for initiation of statin; note that patient has historically had a hysterectomy.  -2 L lactated ringer bolus as part of treatment for possible early pancreatitis    Nonalcoholic steatohepatitis: Patient with fatty infiltration of the liver on CT imaging.  Historically mild elevation of LFTs consistent with fatty infiltration of the liver, slightly worsened today.  Could potentially be resulting in patient's right upper quadrant discomfort, though  exam seems out of proportion to ALT/AST  -Weight loss, alcohol abstinence,  -Up titration of Metformin as tolerated in the outpatient setting    Left flank pain: Patient with some combination of both superficial/muscular reproducible discomfort at left CVA as well as a described more deep pain.  Urinalysis unremarkable, CT negative for nephrolithiasis.  No clear etiology for this discomfort.  Suspect referred pain with possible pancreatitis versus cholelithiasis.    Type 2 diabetes:  -Prior to admission metformin on hold given observation admission  -Every 4 hour blood glucose checks with sliding scale insulin available as needed  -Resume prior to admission Victoza at discharge; not on formulary and not ordered this admission    Depression:  -Continue prior to admission Pristiq    Severe obesity: BMI of 39+, comorbidities including type 2 diabetes, steatohepatitis, sleep apnea  -Continue to encourage weight loss.  Patient recently rejoined weight watchers and has lost 4 pounds in the last week.  Has followed with bariatric clinic in the past.    High risk for breast cancer based on family history:  -Continue prior to admission tamoxifen for primary breast cancer prevention.  This is to continue through November 2023    Hypokalemia: Potassium of 3.3.    -potassium and magnesium replacement protocols in place  -Lactated Ringer bolus as above           -Diet:  N.p.o. at 3 AM while awaiting surgical consultation  DVT Prophylaxis: Ambulate every shift  Patrick Catheter: not present  Code Status:  Full code         Disposition Plan   Expected discharge: Tomorrow, recommended to prior living arrangement once Pain controlled, tolerating oral intake with clinical resolution of presentation.  At this juncture, uncertain if patient might have symptomatic cholelithiasis or early cholecystitis, awaiting ultrasound and surgical consultation..  Entered: Pako Elliott MD 01/20/2021, 10:40 PM     The patient's care was discussed  "with the Patient and ER PA.    Pako Elliott MD  [facility]  Contact information available via [facility] Paging/Directory      ______________________________________________________________________    Chief Complaint   Epigastric discomfort, nausea, left flank pain    History is obtained from the patient, chart review, discussion with ER provider    History of Present Illness   Kezia Nava is a 45 year old female who presents after her emergency department shift with left flank pain, had been having nausea as well and generally looked and felt unwell.    On exam, patient with some left-sided flank discomfort which is both reproducible, though also described by patient as more \"deep.\"  In addition to this, she also had some epigastric discomfort on palpation which radiates to her back and some right upper quadrant discomfort on palpation which she generally was not aware of prior to palpation.  She has not had fevers or chills associated with this, though tells me that she has had low-grade temperatures and fevers this past November or so when she was diagnosed with a possible pneumonia versus bronchitis, treated with anti-infectives and improved.  She tells me that she is somewhat sweaty, though tells me that this has also been her baseline and is not new tonight in association with her flank and epigastric discomfort.    On evaluation, which included a noncontrast CT of the abdomen and pelvis to rule out stone disease, patient was found to have a normal urinalysis, no evidence of nephrolithiasis on CT imaging, distended gallbladder, though no evidence of gallbladder wall thickening.  No apparent pancreatic inflammation.  Generally, no clear etiology for her discomfort, though did have some fatty infiltration of the liver.  Laboratory studies were notable for ALT and AST elevated with ALT of 108, AST of 81.  Patient historically with some low-level hepatic inflammation likely related to " "nonalcoholic steatohepatitis, though this is slightly increased from baseline.  A lipase was also obtained which was elevated at 574.    Admission was requested for pain control for possible pancreatitis.    In meeting with patient, she describes a history of epigastric discomfort which she has attributed to GERD.  She tells me these episodes will occur when she eats oatmeal, associated with central epigastric discomfort which will radiate to her back, not necessarily dissimilar to her current discomfort on palpation, though this left flank pain she has is not typical of her historical \"GERD\" description.  She has a family history of gallbladder disease, tells me she has an uncle that may have pancreatic cancer.  Discussed current findings including CT image and laboratory abnormalities.  She has not noted a significant correlation with her discomfort associated with oral intake, though also note that the onset of her pain was at 3:00 PM, last oral intake approximately 2:30 PM.  When she has previously had indigestion she will treat this with several days of Prilosec and avoid oatmeal, and her symptoms will resolve.  In the setting of current presentation and somewhat similar epigastric discomfort, question if patient might actually have symptomatic cholelithiasis or gallbladder colic as this seems more likely than recurrent episodes of mild pancreatitis.  Alternatively, her symptoms now could be secondary to GERD exacerbation, though I cannot explain her left flank pain.    Patient has nausea currently.  She is on certain if this is related to the pain medication she has received, though also tells me that she had nausea even prior to being roomed.  Nausea is not typical of her prior episodes of acid reflux.    Discussed likely conservative management if this is pancreatitis as she is tolerating oral intake.  Also discussed likely surgical consultation for possible gallbladder colic.  Ultrasound of the right upper " quadrant has been ordered in the emergency department, and patient has not yet gone for this at time of my evaluation.      Review of Systems    The 10 point Review of Systems is negative other than noted in the HPI or here.  No rigors  No recent measured fevers  Patient with a scratchy voice which she attributes to being sleepy as well as narcotic medication; has tolerated narcotics in the past, though tells me that they all give her a scratchy voice.  No urticaria or rash, no stridor, no swelling with pain medications in the past.    Past Medical History    I have reviewed this patient's medical history and updated it with pertinent information if needed.   Past Medical History:   Diagnosis Date     Diffuse cystic mastopathy      Elevated BP 12/15/2016     Hypertension      Mesenteric adenitis 4/14     Migraines      Nephrolithiasis     s/p lithotripsy     Sleep apnea     no cpap     Thyroiditis, acute 12/2/2014    transient hypothyroidism- resolved     Type 2 diabetes mellitus with hyperglycemia, without long-term current use of insulin (H) 2/6/2017       Past Surgical History   I have reviewed this patient's surgical history and updated it with pertinent information if needed.  Past Surgical History:   Procedure Laterality Date     ARTHROSCOPY ANKLE, OPEN REPAIR TENDON, COMBINED  11/8/2012    Procedure: COMBINED ARTHROSCOPY ANKLE, OPEN REPAIR TENDON;  Ankle Arthroscopy Left Ankle, Open Ligament Repair Left Ankle, Peroneal Tendon Repair Left Ankle ;  Surgeon: Otf Ramos DPM;  Location: [facility] OR     C APPENDECTOMY  1984     COMBINED CYSTOSCOPY, INSERT STENT URETER(S)  8/6/2013    Procedure: COMBINED CYSTOSCOPY, INSERT STENT URETER(S);  cystoscopy, right retrograde,RIGHT STENT PLACEMENT.;  Surgeon: Kan Porter MD;  Location:  OR     CYSTOSCOPY, RETROGRADES, INSERT STENT URETER(S), COMBINED  8/6/2013    Procedure: COMBINED CYSTOSCOPY, RETROGRADES, INSERT STENT URETER(S);  cystoscopy, right  retrograde, insert stent right ureter;  Surgeon: Kan Porter MD;  Location:  OR     DENTAL SURGERY      TOOTH#7 - DENTAL IMPLANT     DISCECTOMY, FUSION CERVICAL ANTERIOR ONE LEVEL, COMBINED N/A 12/13/2015    Procedure: COMBINED DISCECTOMY, FUSION CERVICAL ANTERIOR ONE LEVEL;  Surgeon: Seven Umaña MD;  Location:  OR     EXTRACORPOREAL SHOCK WAVE LITHOTRIPSY (ESWL)  8/19/2013    Procedure: EXTRACORPOREAL SHOCK WAVE LITHOTRIPSY (ESWL);   RIGHT EXTRACORPOREAL SHOCK WAVE LITHOTRIPSY;  Surgeon: Otf Tobias MD;  Location:  OR     HC REDUCTION OF LARGE BREAST Bilateral 2003     HYSTERECTOMY, PAP NO LONGER INDICATED  2019     LAPAROSCOPIC HYSTERECTOMY TOTAL, SALPINGECTOMY Left 5/29/2019    Procedure: single site total LAPAROSCOPIC HYSTERECTOMY, left oophorectomy and lysis of adhesions;  Surgeon: Pauline Horowitz MD;  Location: [facility] OR     LASIK BILATERAL       SALPINGO OOPHORECTOMY,R/L/DERREK Right 05/17/2006    Right       Social History   I have reviewed this patient's social history and updated it with pertinent information if needed.  Social History     Tobacco Use     Smoking status: Never Smoker; tells me that she did grow up in a house with a wood-burning stove, however.     Smokeless tobacco: Never Used   Substance Use Topics     Alcohol use: Yes     Alcohol/week: 0.0 standard drinks     Comment: 0-1 drinks per week     Drug use: No       Family History   I have reviewed this patient's family history and updated it with pertinent information if needed.  Family History   Problem Relation Age of Onset     C.A.D. Mother         stents     Hypertension Mother      Kidney Disease Mother         transplant     Hypertension Father      Breast Cancer Maternal Aunt         may have been fibrocystic     Breast Cancer Maternal Aunt         may have been fibrocystic     Cerebrovascular Disease Paternal Grandfather      Cancer Paternal Grandmother         stomach     Breast Cancer Sister 43      Nephrolithiasis Brother      Uterine Cancer Maternal Grandmother 44     Colon Cancer Maternal Grandmother 64     Prostate Cancer Maternal Uncle 50     Breast Cancer Cousin 41        maternal first cousin, triple negative breast cancer     Breast Cancer Paternal Aunt        Prior to Admission Medications   Prior to Admission Medications   Prescriptions Last Dose Informant Patient Reported? Taking?   Acetaminophen (TYLENOL PO) 1/20/2021 at 1800 Self Yes Yes   Sig: Take 500-1,000 mg by mouth every 6 hours as needed As needed for pain    Ibuprofen (IBU PO) 1/20/2021 at 1500 Self Yes Yes   Sig: Take 600 mg by mouth every 6 hours as needed    STATIN NOT PRESCRIBED, INTENTIONAL,  Self No No   Sig: Please choose reason not prescribed, below   VITAMIN D, CHOLECALCIFEROL, PO 1/19/2021 at PM Self Yes Yes   Sig: Take 10,000 Units by mouth daily    blood glucose monitoring (ACCU-CHEK ALMA PLUS) test strip  Self No No   Sig: Use to test blood sugar 1 times daily or as directed.   blood glucose monitoring (ACCU-CHEK FASTCLIX) lancets  Self No No   Sig: Use to test blood sugar 1 times daily or as directed.   cetirizine (ZYRTEC) 10 MG tablet 1/19/2021 at PM Self Yes Yes   Sig: Take 10 mg by mouth daily   desvenlafaxine (PRISTIQ) 50 MG 24 hr tablet 1/19/2021 at PM Self No Yes   Sig: Take 1 tablet (50 mg) by mouth daily   insulin pen needle (BD VALENTINA U/F) 32G X 4 MM miscellaneous  Self No No   Sig: USE ONCE DAILY AS DIRECTED   liraglutide (VICTOZA PEN) 18 MG/3ML solution 1/19/2021 at PM Self No Yes   Sig: Inject 1.2 mg Subcutaneous daily   losartan-hydrochlorothiazide (HYZAAR) 50-12.5 MG tablet 1/19/2021 at PM Self No Yes   Sig: Take 1 tablet by mouth daily   metFORMIN (GLUCOPHAGE-XR) 500 MG 24 hr tablet 1/19/2021 at PM Self No Yes   Sig: Take 1 tablet (500 mg) by mouth daily (with dinner) TAKE ONE TABLET BY MOUTH ONCE DAILY WITH DINNER   multivitamin, therapeutic with minerals (MULTI-VITAMIN) TABS tablet 1/19/2021 at PM Self Yes  "Yes   Sig: Take 1 tablet by mouth daily   ondansetron (ZOFRAN ODT) 4 MG PO ODT tab 1/20/2021 at PRN Self No Yes   Sig: Take 1-2 tablets (4-8 mg) by mouth every 8 hours as needed for nausea   tamoxifen (NOLVADEX) 20 MG tablet 1/19/2021 at PM Self No Yes   Sig: Take 1 tablet (20 mg) by mouth daily      Facility-Administered Medications: None     Allergies   Allergies   Allergen Reactions     Lisinopril Cough     Pneumovax [Pneumococcal Polysaccharides] Other (See Comments)     Red streaking and pain       Physical Exam   Vital Signs: Temp: 98.6  F (37  C) Temp src: Oral BP: 131/79 Pulse: 77   Resp: 16 SpO2: 98 % O2 Device: None (Room air)    Weight: 210 lbs 0 oz    General Appearance: Fatigued appearing 45-year-old female, no acute distress or discomfort  Eyes: No scleral icterus or injection   HEENT: normocephalic, atraumatic.  Somewhat raspy voice  Respiratory: Breath sounds are pristine bilaterally to auscultation with no wheezes or crackles  Cardiovascular: Regular rate and rhythm with heart rate in the mid 60-70s range.  I do not appreciate any murmur  GI: Abdomen soft, obese, tender to palpation in the epigastrium as well as the right upper quadrant with no palpable mass.  Lower and left side of abdomen spared  Lymph/Hematologic: Bilateral \"sock line\" edema present.  Unchanged from baseline per patient  Genitourinary: Patient with mild CVA tenderness with percussion on left, not present on right.  Skin: No rashes, no petechiae  Musculoskeletal: Some musculoskeletal tenderness is appreciated of the left flank at CVA.  This is very minimal, and patient actually feels her pain is deeper.  Neurologic: Alert, conversant, appropriate conversation.  Mental status grossly intact.  Psychiatric: Very pleasant, normal affect    Data   Data reviewed today: I reviewed all medications, new labs and imaging results over the last 24 hours. I personally reviewed the abdominal CT image(s) showing distended GB w/o wall thickining. " no hydronephrosis or stone noted..    Recent Labs   Lab 01/20/21 1950   WBC 5.5   HGB 12.0   MCV 86         POTASSIUM 3.3*   CHLORIDE 105   CO2 27   BUN 11   CR 0.80   ANIONGAP 7   JAMIE 9.7   *   ALBUMIN 4.0   PROTTOTAL 7.9   BILITOTAL 0.3   ALKPHOS 61   *   AST 81*   LIPASE 574*

## 2021-01-21 NOTE — ED TRIAGE NOTES
L flank pain since 1500 today, 1000mg of tylenol and 800mg ibuprofen taken at 1530. Hx of kidney stones.

## 2021-01-21 NOTE — ED NOTES
"[facility]  ED Nurse Handoff Report    ED Chief complaint: Flank Pain      ED Diagnosis:   Final diagnoses:   None       Code Status: To be addressed by admitting provider    Allergies:   Allergies   Allergen Reactions     Lisinopril Cough     Pneumovax [Pneumococcal Polysaccharides] Other (See Comments)     Red streaking and pain       Patient Story: Sudden L sided flank pain starting at 1500 today with nausea. Took 1g tylenol and 800mg ibuprofen with no relief.    Focused Assessment:  A&O4. Denies sob, cough, fevers, cp, v/d. Reports nausea and flank pain \"8/10\". Denies urinary s/s. Vitally stable, pain now \"3/10\". Independent with cares and ambulation. Calm, cooperative and resting on cart.    Chest XR,  PA & LAT   Final Result   IMPRESSION: Chest is negative and unchanged. Lungs clear.      MANDA PIMENTEL MD      Abd/pelvis CT no contrast - Stone Protocol   Final Result   IMPRESSION:    1.  No acute abnormality in the abdomen or pelvis. No cause for left   flank pain is identified.   2.  Diffuse fatty infiltration of the liver.      MANDA PIMENTEL MD          Labs Ordered and Resulted from Time of ED Arrival Up to the Time of Departure from the ED   BASIC METABOLIC PANEL - Abnormal; Notable for the following components:       Result Value    Potassium 3.3 (*)     Glucose 103 (*)     All other components within normal limits   ROUTINE UA WITH MICROSCOPIC - Abnormal; Notable for the following components:    Mucous Urine Present (*)     All other components within normal limits   HEPATIC PANEL - Abnormal; Notable for the following components:     (*)     AST 81 (*)     All other components within normal limits   LIPASE - Abnormal; Notable for the following components:    Lipase 574 (*)     All other components within normal limits   CBC WITH PLATELETS DIFFERENTIAL   D DIMER QUANTITATIVE   SARS-COV-2 (COVID-19) VIRUS RT-PCR       Treatments and/or interventions provided:     Medications "   0.9% sodium chloride BOLUS (0 mLs Intravenous Stopped 1/20/21 2110)   ketorolac (TORADOL) injection 15 mg (15 mg Intravenous Given 1/20/21 1946)   ondansetron (ZOFRAN) injection 4 mg (4 mg Intravenous Given 1/20/21 1946)   HYDROmorphone (PF) (DILAUDID) injection 0.5 mg (0.5 mg Intravenous Given 1/20/21 2003)   HYDROmorphone (PF) (DILAUDID) injection 0.5 mg (0.5 mg Intravenous Given 1/20/21 2045)     Patient's response to treatments and/or interventions: pain decreased from 8 to 3. Nausea resolved.    To be done/followed up on inpatient unit:  Continue with plan of care per admitting MD.    Does this patient have any cognitive concerns?: None    Activity level - Baseline/Home:  Independent  Activity Level - Current:   Independent    Patient's Preferred language: English   Needed?: No    Isolation: None  Infection: Not Applicable  Patient tested for COVID 19 prior to admission: YES  Bariatric?: No    Vital Signs:   Vitals:    01/20/21 2030 01/20/21 2045 01/20/21 2100 01/20/21 2130   BP:  131/79     Pulse:  77     Resp:       Temp:       TempSrc:       SpO2: 94% 91% 98% 98%   Weight:       Height:           Cardiac Rhythm:     Was the PSS-3 completed:   Yes  What interventions are required if any?  NA             Family Comments: Pt notified by self.   OBS brochure/video discussed/provided to patient/family: Yes             For the majority of the shift this patient's behavior was Green.   Behavioral interventions performed were NA.    ED NURSE PHONE NUMBER: [phone]

## 2021-01-21 NOTE — ED PROVIDER NOTES
"  History   Chief Complaint:  Flank Pain       HPI   Kezia Nava is a 45 year old female with history of Type II diabetes, hypertension, and kidney stones who presents with left sided flank pain. The patient has left flank that started about 4 hours ago. The patient radiates to her abdomen. For pain she took 800 mg ibuprofen and 1000 mg Tylenol last dose around 1530. She has a history of kidney stones, with her previous stone she primarily had groin pain.  Denies dysuria, hematuria.  No chest pain or shortness of breath.  No recent trauma to the back.    Review of Systems   Constitutional: Negative for chills and fever.   Respiratory: Negative for shortness of breath.    Cardiovascular: Negative for chest pain.   Gastrointestinal: Positive for abdominal pain.   Genitourinary: Positive for flank pain (left). Negative for dysuria and hematuria.   All other systems reviewed and are negative.      Allergies:  Lisinopril  Pneumovax     Medications:  Zyrtec  Pristiq  Liraglutide  Hyzaar  Nolvadex    Past Medical History:    Hypertension   Migraines  Kidney stones  Sleep apnea  Type II diabetes    ANA     Past Surgical History:    Left ankle tendon repair  Appendectomy  Insert ureter stent  Dental implant  Discectomy  Lithotripsy  Breast reduction  Hysterectomy  Lasik  Salpingo oophorectomy     Family History:    CAD, mother  Hypertension   Breast cancer  Kidney disease    Social History:  The patient presents alone. Employed as a nurse.    Physical Exam     Patient Vitals for the past 24 hrs:   BP Temp Temp src Pulse Resp SpO2 Height Weight   01/20/21 2130 -- -- -- -- -- 98 % -- --   01/20/21 2100 -- -- -- -- -- 98 % -- --   01/20/21 2045 131/79 -- -- 77 -- 91 % -- --   01/20/21 2030 -- -- -- -- -- 94 % -- --   01/20/21 1933 -- 98.6  F (37  C) Oral -- -- -- 1.575 m (5' 2\") 95.3 kg (210 lb)   01/20/21 1931 -- -- -- -- 16 -- -- --   01/20/21 1927 -- -- -- -- -- 99 % -- --   01/20/21 1926 -- -- -- -- -- 99 % -- -- "   01/20/21 1925 (!) 142/105 -- -- 93 -- -- -- --       Physical Exam  General: Appears to be in discomfort due to left flank pain.   Head:  Scalp is atraumatic.  Eyes:  The pupils are equal, round, and reactive to light. Normal conjunctiva.   ENT:                                      Ears:  The external ears are normal.   Nose:  The external nose is normal.  Throat:  The oropharynx is normal. Mucus membranes are moist.                 Neck:  Normal range of motion.   CV:  Normal rate. No murmur. 2+ radial pulses  Resp:  Breath sounds are clear bilaterally. Non-labored, no retractions or accessory muscle use.  GI:  Abdomen is soft, no distension, mild epigastric tenderness. Left CVA tenderness. No rebound or guarding.   MS:  Normal range of motion. No acute deformities.   Skin:  Warm and dry. No rash.   Neuro:  Alert. Strength and sensation grossly intact.   Psych:  Awake. Alert.  Appropriate interactions.     Emergency Department Course     Imaging:  CT Abdomen Pelvis w/o Contrast - Stone Protocol  IMPRESSION:   1.  No acute abnormality in the abdomen or pelvis. No cause for left  flank pain is identified.  2.  Diffuse fatty infiltration of the liver.  Reading per radiology.      CXR:   MPRESSION: Chest is negative and unchanged. Lungs clear.  MANDA PIMENTEL MD      Laboratory:  CBC: WBC 5.5, HGB 12.0,      BMP: Glucose 103 (H), Potassium: 3.3 (L), o/w WNL (Creatinine: 0.80)    Lipase: 574 (H)    Hepatic panel: Bili Direct <0.1, Bili Total 0.3, Albumin 4.0, Protein Total 7.9, Alkphos 61,  (H), AST 81 (H).    D-dimer: negative      UA: Clear yellow urine, Mucous: present, otherwise WNL      Emergency Department Course:    Reviewed:  I reviewed nursing notes, vitals and past medical history    Assessments:  1925 I obtained history and examined the patient as noted above.   1946 I rechecked the patient and explained findings.   2548 I rechecked and updated the patient.   I consulted Dr. Elliott of the  hospitalist service.     Interventions:  1946 0.9% Sodium Chloride BOLUS 1000 mLs IV    1946 Toradol 15 mg IV  1946 Zofran 4 mg IV  2003 Dilaudid 0.5 mg IV  2045 Dilaudid 0.5 mg IV     Disposition:  The patient was admitted under the care of Dr. Elliott.       Impression & Plan     Medical Decision Making:  Kezia Nava is a 45 year old female with a past medical history including hypertension, kidney stones, type 2 diabetes mellitus, presents to the emergency department with left-sided flank pain radiating to her abdomen.  Initially, suspected kidney stone based on her presentation and area of pain.  Urine without sign of infection and no red blood cells to suggest stone.  CT abdomen unremarkable with no evidence of hydronephrosis, nephrolithiasis, or other acute findings.  With the unremarkable work-up, I repeated abdominal exam and she did have mild epigastric tenderness and I expanded workup to include hepatic panel and lipase. Also added on d dimer as she has some lower O2 sats (she notes she uses a CPap machine at night).  D-dimer negative.  Doubt pulmonary embolism.  Chest x-ray without evidence of pneumonia, pneumothorax, or other acute findings. LFTs returned mildly elevated with , AST 81.  Lipase 574- unclear etiology at this time. Low suspicion for pancreatitis, though cannot be completely ruled out at this time.     Given unclear cause of the patient's pain, plan for admission to observation for serial abdominal examinations and pain control.  Discussed patient with Dr. Elliott of the hospitalist service who recommended RUQ US prior to admission. Patient agrees with this plan and all questions and concerns addressed prior to admission.      Covid-19  Kezia Nava was evaluated during a global COVID-19 pandemic, which necessitated consideration that the patient might be at risk for infection with the SARS-CoV-2 virus that causes COVID-19.   Applicable protocols for evaluation were followed during  the patient's care.     Diagnosis:    ICD-10-CM    1. Acute left-sided thoracic back pain  M54.6    2. Epigastric pain  R10.13    3. Elevated lipase  R74.8    4. Elevated LFTs  R79.89        Disposition:   Admitted to observation unit under care of Dr. Earl Arias Disclosure:  I, Pauline Wilhelm, am serving as a scribe at 7:40 PM on 1/20/2021 to document services personally performed by Hedy Orellana PA-C based on my observations and the provider's statements to me.           Hedy Orellana PA-C  01/20/21 8923

## 2021-01-21 NOTE — PROGRESS NOTES
General Surgery PM Chart Check    HIDA scan results nearly normal, with a borderline ejection fraction of 38%.  Not likely this pain is coming from a biliary source.  We will follow along peripherally.    Note that GI involved and planning EGD.  Agree with this.

## 2021-01-21 NOTE — ANESTHESIA CARE TRANSFER NOTE
Patient: Kezia Nava    Procedure(s):  ESOPHAGOGASTRODUODENOSCOPY, WITH BIOPSY    Diagnosis: Abdominal pain [R10.9]  Diagnosis Additional Information: No value filed.    Anesthesia Type:   No value filed.     Note:    Oropharynx: spontaneously breathing  Level of Consciousness: drowsy  Oxygen Supplementation: nasal cannula  Level of Supplemental Oxygen: 3  Independent Airway: airway patency satisfactory and stable  Dentition: dentition unchanged  Vital Signs Stable: post-procedure vital signs reviewed and stable  Report to RN Given: handoff report given  Patient transferred to: Phase II  Comments: After procedure in [facility] [place] under monitored anesthesia care (MAC), patient exhibited spontaneous respirations, oxygen via nasal cannula with oxygen saturation maintained greater than 98%, patient brought to [facility] recovery bay for postprocedure recovery, oxygen tubing connected to wall O2 in recovery room.    Handoff Report: Identifed the Patient, Identified the Reponsible Provider, Reviewed the pertinent medical history, Discussed the surgical course, Reviewed Intra-OP anesthesia mangement and issues during anesthesia, Set expectations for post-procedure period and Allowed opportunity for questions and acknowledgement of understanding      Vitals: (Last set prior to Anesthesia Care Transfer)  CRNA VITALS  1/21/2021 1354 - 1/21/2021 1424      1/21/2021             Resp Rate (set):  10        Electronically Signed By: SALLIE Ahn CRNA  January 21, 2021  2:24 PM

## 2021-01-22 ENCOUNTER — MYC MEDICAL ADVICE (OUTPATIENT)
Dept: FAMILY MEDICINE | Facility: CLINIC | Age: 46
End: 2021-01-22

## 2021-01-22 VITALS
HEIGHT: 62 IN | HEART RATE: 72 BPM | RESPIRATION RATE: 18 BRPM | BODY MASS INDEX: 39.45 KG/M2 | DIASTOLIC BLOOD PRESSURE: 74 MMHG | SYSTOLIC BLOOD PRESSURE: 122 MMHG | WEIGHT: 214.4 LBS | OXYGEN SATURATION: 94 % | TEMPERATURE: 97 F

## 2021-01-22 LAB
GLUCOSE BLDC GLUCOMTR-MCNC: 119 MG/DL (ref 70–99)
GLUCOSE BLDC GLUCOMTR-MCNC: 125 MG/DL (ref 70–99)
GLUCOSE BLDC GLUCOMTR-MCNC: 146 MG/DL (ref 70–99)

## 2021-01-22 PROCEDURE — 250N000013 HC RX MED GY IP 250 OP 250 PS 637: Performed by: INTERNAL MEDICINE

## 2021-01-22 PROCEDURE — C9113 INJ PANTOPRAZOLE SODIUM, VIA: HCPCS | Performed by: INTERNAL MEDICINE

## 2021-01-22 PROCEDURE — 96376 TX/PRO/DX INJ SAME DRUG ADON: CPT

## 2021-01-22 PROCEDURE — 96372 THER/PROPH/DIAG INJ SC/IM: CPT

## 2021-01-22 PROCEDURE — 99217 PR OBSERVATION CARE DISCHARGE: CPT | Performed by: INTERNAL MEDICINE

## 2021-01-22 PROCEDURE — G0378 HOSPITAL OBSERVATION PER HR: HCPCS

## 2021-01-22 PROCEDURE — 250N000011 HC RX IP 250 OP 636: Performed by: INTERNAL MEDICINE

## 2021-01-22 PROCEDURE — 999N001017 HC STATISTIC GLUCOSE BY METER IP

## 2021-01-22 RX ORDER — OMEPRAZOLE 20 MG/1
20 TABLET, DELAYED RELEASE ORAL DAILY
Qty: 60 TABLET | Refills: 0 | Status: SHIPPED | OUTPATIENT
Start: 2021-01-22 | End: 2022-09-12 | Stop reason: ALTCHOICE

## 2021-01-22 RX ADMIN — CETIRIZINE HYDROCHLORIDE 10 MG: 10 TABLET, FILM COATED ORAL at 08:59

## 2021-01-22 RX ADMIN — INSULIN ASPART 1 UNITS: 100 INJECTION, SOLUTION INTRAVENOUS; SUBCUTANEOUS at 09:20

## 2021-01-22 RX ADMIN — TAMOXIFEN CITRATE 20 MG: 10 TABLET, FILM COATED ORAL at 09:00

## 2021-01-22 RX ADMIN — PANTOPRAZOLE SODIUM 40 MG: 40 INJECTION, POWDER, FOR SOLUTION INTRAVENOUS at 09:00

## 2021-01-22 RX ADMIN — DESVENLAFAXINE SUCCINATE 50 MG: 50 TABLET, EXTENDED RELEASE ORAL at 08:59

## 2021-01-22 NOTE — PROGRESS NOTES
Obervation goals:    -diagnostic tests and consults completed and resulted- Met  -vital signs normal or at patient baseline- Met  -tolerating oral intake to maintain hydration- Met  -adequate pain control on oral analgesics- Partially met    Nurse to notify provider when observation goals have been met and patient is ready for discharge.

## 2021-01-22 NOTE — PROGRESS NOTES
Covid negative. AxOx4. VSS stable on RA. Independent. IV saline locked. Tolerating regular diet, good appetite.  and 137, correction given. Insulin q4hrs. PRN tylenol available for HA. Tylenol given for L flank and mid back pain. Endoscopy done today, see note. Continue to monitor.

## 2021-01-22 NOTE — PLAN OF CARE
Observation goals PRIOR TO DISCHARGE    Comments:   -diagnostic tests and consults completed and resulted -MET  -vital signs normal or at patient baseline -MET  -tolerating oral intake to maintain hydration -MET  -adequate pain control on oral analgesics-MET   Nurse to notify provider when observation goals have been met and patient is ready for discharge    A&Ox4. VSS. Denies pain. Independent in room. Regular diet. Plan to discharge today.

## 2021-01-22 NOTE — PROGRESS NOTES
Observation goals PRIOR TO DISCHARGE    Comments:   -diagnostic tests and consults completed and resulted -MET  -vital signs normal or at patient baseline -MET  -tolerating oral intake to maintain hydration -MET  -adequate pain control on oral analgesics-MET   Nurse to notify provider when observation goals have been met and patient is ready for discharge.

## 2021-01-22 NOTE — DISCHARGE SUMMARY
[facility]  Discharge Summary        Kezia Nava MRN# [id number]   YOB: 1975 Age: 45 year old     Date of Admission:  1/20/2021  Date of Discharge:  1/22/2021  Admitting Physician:  Pako Elliott MD  Discharge Physician: Rasheeda Cervantes MD  Discharging Service: Hospitalist     Primary Provider: Shamir Angeles  Primary Care Physician Phone Number: [phone]         Discharge Diagnoses/Problem Oriented Hospital Course (Providers):    Kezia Nava was admitted on 1/20/2021 by Pako Elliott MD and I would refer you to their history and physical.  The following problems were addressed during her hospitalization:  Assessment & Plan     Kezia Nava is a 45 year old female who was admitted on 1/20/2021.  Assessment & Plan     Kezia Nava is a 45 year old female admitted on 1/20/2021. She presents with nausea, epigastric discomfort, left flank discomfort.     Nausea with epigastric pain secondary to gastritis and hiatal hernia : Possible symptomatic cholelithiasis or a calculus cholecystitis, possible early mild pancreatitis.  Patient with a history of what she describes as heartburn intermittently in the past which consist of epigastric discomfort radiating to her back, relieved with several days of Prilosec.  Similar epigastric discomfort now as well as some right upper quadrant discomfort.  Right upper quadrant ultrasound was negative for any gallstones or cholecystitis  CT scan of the abdomen pelvis without contrast on admission was negative for pancreatitis of kidney stones  Epigastric pain still could be biliary colic versus gastritis or peptic ulcer disease in the differential  Evaluated by general surgery and HIDA scan was done and returned normal    Seen by [facility] EGD was done showed gastritis and hiatal hernia   Needs to be on chornic PPI   Biopsies pending currently     -Nonalcoholic steatohepatitis: Patient with fatty infiltration of the liver on CT imaging.   Historically mild elevation of LFTs consistent with fatty infiltration of the liver, slightly worsened today.  Could potentially be resulting in patient's right upper quadrant discomfort, though exam seems out of proportion to ALT/AST  -Weight loss, alcohol abstinence,  -Up titration of Metformin as tolerated in the outpatient setting  Discussed with the patient the importance of low-carb diet especially with the hypertriglyceridemia     Left flank pain: Patient with some combination of both superficial/muscular reproducible discomfort at left CVA as well as a described more deep pain.  Urinalysis unremarkable, CT negative for nephrolithiasis.  No clear etiology for this discomfort.  Could be referred from pain from gastritis or peptic ulcer disease or biliary colic possible  Resolved now      Type 2 diabetes:  -Prior to admission metformin on hold given observation admission  -Every 4 hour blood glucose checks with sliding scale insulin available as needed  -Resume prior to admission Victoza at discharge; not on formulary and not ordered this admission     Depression:  -Continue prior to admission Pristiq     Severe obesity: BMI of 39+, comorbidities including type 2 diabetes, steatohepatitis, sleep apnea  -Continue to encourage weight loss.  Patient recently rejoined weight watchers and has lost 4 pounds in the last week.  Has followed with bariatric clinic in the past.     High risk for breast cancer based on family history:  -Continue prior to admission tamoxifen for primary breast cancer prevention.  This is to continue through November 2023     Hypokalemia: Potassium of 3.3.    -potassium and magnesium replacement protocols in place  -Lactated Ringer bolus as above                 -Diet:  regular diet   DVT Prophylaxis: Ambulate every shift  Patrick Catheter: not present  Code Status:  Full code             Disposition Plan     Expected discharge: home today     Rasheeda Cervantes MD   Page  [phone](7AM-6PM)          Code Status:      Full Code        Brief Hospital Stay Summary Sent Home With Patient in AVS:        Reason for your hospital stay      Epigastric pain from Gastritis and hiatal hernia                 Important Results:      See below         Pending Results:        Unresulted Labs Ordered in the Past 30 Days of this Admission     Date and Time Order Name Status Description    1/21/2021 1422 Surgical pathology exam In process             Discharge Instructions and Follow-Up:      Follow-up Appointments     Follow-up and recommended labs and tests       Follow up with primary care provider, Shamir Angeles, within 7 days for   hospital follow- up.  No follow up labs or test are needed.               Discharge Disposition:      Discharged to home        Discharge Medications:        Discharge Medication List as of 1/22/2021 11:08 AM      START taking these medications    Details   omeprazole (PRILOSEC OTC) 20 MG EC tablet Take 1 tablet (20 mg) by mouth daily, Disp-60 tablet, R-0, E-Prescribe         CONTINUE these medications which have NOT CHANGED    Details   Acetaminophen (TYLENOL PO) Take 500-1,000 mg by mouth every 6 hours as needed As needed for pain , Historical      blood glucose monitoring (ACCU-CHEK ALMA PLUS) test strip Use to test blood sugar 1 times daily or as directed., Disp-100 strip, R-1, E-Prescribe      blood glucose monitoring (ACCU-CHEK FASTCLIX) lancets Use to test blood sugar 1 times daily or as directed., Disp-3 each, R-3, E-Prescribe      cetirizine (ZYRTEC) 10 MG tablet Take 10 mg by mouth daily, Historical      desvenlafaxine (PRISTIQ) 50 MG 24 hr tablet Take 1 tablet (50 mg) by mouth daily, Disp-90 tablet, R-3, E-Prescribe      insulin pen needle (BD VALENTINA U/F) 32G X 4 MM miscellaneous USE ONCE DAILY AS DIRECTEDDisp-100 each, [id number]      liraglutide (VICTOZA PEN) 18 MG/3ML solution Inject 1.2 mg Subcutaneous daily, Disp-6 mL, R-1, E-Prescribe     "  losartan-hydrochlorothiazide (HYZAAR) 50-12.5 MG tablet Take 1 tablet by mouth daily, Disp-90 tablet, R-3, E-Prescribe      metFORMIN (GLUCOPHAGE-XR) 500 MG 24 hr tablet Take 1 tablet (500 mg) by mouth daily (with dinner) TAKE ONE TABLET BY MOUTH ONCE DAILY WITH DINNER, Disp-90 tablet, R-3, E-Prescribe      multivitamin, therapeutic with minerals (MULTI-VITAMIN) TABS tablet Take 1 tablet by mouth daily, Historical      ondansetron (ZOFRAN ODT) 4 MG PO ODT tab Take 1-2 tablets (4-8 mg) by mouth every 8 hours as needed for nausea, Disp-12 tablet, R-1, E-Prescribe      STATIN NOT PRESCRIBED, INTENTIONAL, Reason Statin was Not Prescribed: Woman of childbearing age not actively taking birth controlNo Print Out      tamoxifen (NOLVADEX) 20 MG tablet Take 1 tablet (20 mg) by mouth daily, Disp-90 tablet, R-3, E-Prescribe      VITAMIN D, CHOLECALCIFEROL, PO Take 10,000 Units by mouth daily , Historical         STOP taking these medications       Ibuprofen (IBU PO) Comments:   Reason for Stopping:                 Allergies:         Allergies   Allergen Reactions     Lisinopril Cough     Pneumovax [Pneumococcal Polysaccharides] Other (See Comments)     Red streaking and pain           Consultations This Hospital Stay:      Consultation during this admission received from gastroenterology and surgery        Condition and Physical on Discharge:      Discharge condition: Stable   Vitals: Blood pressure 122/74, pulse 72, temperature 97  F (36.1  C), temperature source Oral, resp. rate 18, height 1.575 m (5' 2\"), weight 97.3 kg (214 lb 6.4 oz), last menstrual period 06/20/2018, SpO2 94 %, not currently breastfeeding.     Constitutional: Alert, awake, NAD    Lungs: CTA   Cardiovascular: RRR   Abdomen: Soft, NT, ND, BS+   Skin: Warm and dry    Other:          Discharge Time:      Greater than 30 minutes.        Image Results From This Hospital Stay (For Non-EPIC Providers):        Results for orders placed or performed during the " hospital encounter of 01/20/21   Abd/pelvis CT no contrast - Stone Protocol    Narrative    CT ABDOMEN AND PELVIS WITHOUT CONTRAST 1/20/2021 8:28 PM    CLINICAL HISTORY: Left flank pain.    TECHNIQUE: CT scan of the abdomen and pelvis was performed without IV  contrast. Multiplanar reformats were obtained. Dose reduction  techniques were used.  CONTRAST: None.    COMPARISON: None.    FINDINGS:   LOWER CHEST: The visualized lung bases are clear.    HEPATOBILIARY: Diffuse fatty infiltration of the liver.    PANCREAS: Normal.    SPLEEN: Normal.    ADRENAL GLANDS: Normal.    KIDNEYS/BLADDER: Unremarkable. No urinary calculi. No hydronephrosis.    BOWEL: No bowel obstruction. No convincing evidence for colitis or  diverticulitis. The appendix is surgically absent.    PELVIC ORGANS: Uterus is not seen, and is surgically absent by  history.    LYMPH NODES: No enlarged lymph nodes are identified in the abdomen or  pelvis.    VASCULATURE: Retroaortic left renal vein is an anatomic variant.    ADDITIONAL FINDINGS: None.    MUSCULOSKELETAL: Unremarkable.      Impression    IMPRESSION:   1.  No acute abnormality in the abdomen or pelvis. No cause for left  flank pain is identified.  2.  Diffuse fatty infiltration of the liver.    MANDA PIMENTEL MD   Chest XR,  PA & LAT    Narrative    CHEST TWO VIEWS  1/20/2021 9:50 PM     HISTORY:  Left lower chest and back pain.    COMPARISON: 11/19/2020.      Impression    IMPRESSION: Chest is negative and unchanged. Lungs clear.    MANDA PIMENTEL MD   Abdomen US, limited (RUQ only)    Narrative    EXAM: US ABDOMEN LIMITED  LOCATION: [facility]  DATE/TIME: 1/20/2021 11:23 PM    INDICATION: Right upper quadrant pain. Elevated LFTs.  COMPARISON: None.  TECHNIQUE: Limited abdominal ultrasound.    FINDINGS:    GALLBLADDER: Normal. No gallstones, wall thickening, or pericholecystic fluid. Negative sonographic Gallegos's sign.    BILE DUCTS: No biliary dilatation. The common duct  measures 4 mm.    LIVER: Diffuse fatty infiltration. No focal mass.    RIGHT KIDNEY: No hydronephrosis.    PANCREAS: The visualized portions are normal.    No ascites.      Impression    IMPRESSION:  1.  No gallstone or biliary dilatation.  2.  Fatty infiltration of the liver.   NM Hepatobiliary Scan w GB EF    Narrative    NUCLEAR MEDICINE HEPATOBILIARY SCAN WITH GALLBLADDER EJECTION FRACTION  1/21/2021 12:49 PM     HISTORY: Right upper quadrant abdominal pain, ultrasound  nondiagnostic. Right upper quadrant/epigastric pain.    COMPARISON: None.    TECHNIQUE: The patient received Tc-99m mebrofenin intravenously.  Cholecystokinin injected to evaluate the gallbladder ejection  fraction.    MEDICATIONS AND/OR DOSE: 7.0 mCi TC Mebrofenin and 1.9mcg CCK IV by LB    FINDINGS: There is prompt clearance of the radionuclide from the blood  pool into the liver. Promptly there is clear visualization of the  intrahepatic ducts as well as the upper common bile duct. Within the  normal time frame there is visualization of the gallbladder, distal  common bile duct, and the small bowel.     The gallbladder ejection fraction is 38%, which is within normal  limits.      Impression    IMPRESSION: Normal hepatobiliary scan without evidence for acute  cholecystitis.    WALESKA MEDELLIN MD             Most Recent Lab Results In EPIC (For Non-EPIC Providers):    Most Recent 3 CBC's:  Recent Labs   Lab Test 01/20/21  1950 11/19/20  1956 10/27/20  1343   WBC 5.5 6.0 6.4   HGB 12.0 12.4 13.2   MCV 86 88 87    223 224      Most Recent 3 BMP's:  Recent Labs   Lab Test 01/21/21  0649 01/20/21  1950 10/27/20  1343    139 140   POTASSIUM 3.8 3.3* 3.4   CHLORIDE 112* 105 107   CO2 27 27 23   BUN 8 11 13   CR 0.64 0.80 0.69   ANIONGAP 4 7 10   JAMIE 8.6 9.7 8.9   * 103* 163*     Most Recent 3 Troponin's:  Recent Labs   Lab Test 10/27/20  1343 10/06/17  1408 10/06/17  0555   TROPI <0.015 <0.015 <0.015     Most Recent 3 INR's:  Recent  Labs   Lab Test 10/05/17  2002 12/12/15  2225   INR 0.91 0.96     Most Recent 2 LFT's:  Recent Labs   Lab Test 01/21/21  0649 01/20/21  1950   AST 57* 81*   ALT 82* 108*   ALKPHOS 50 61   BILITOTAL 0.2 0.3     Most Recent Cholesterol Panel:  Recent Labs   Lab Test 01/21/21  0649   CHOL 162   LDL 60   HDL 36*   TRIG 331*     Most Recent 6 Bacteria Isolates From Any Culture (See EPIC Reports for Culture Details):  Recent Labs   Lab Test 12/12/15  2225 12/12/15  2219   CULT No growth No growth     Most Recent TSH, T4 and HgbA1c:   Recent Labs   Lab Test 01/21/21  0649 10/27/20  1343 09/09/15  1003 09/09/15  1003   TSH  --  2.30   < > 1.60   T4  --   --   --  1.36   A1C 7.5*  --    < >  --     < > = values in this interval not displayed.

## 2021-01-22 NOTE — PROGRESS NOTES
Obervation goals:     -diagnostic tests and consults completed and resulted- Met  -vital signs normal or at patient baseline- Met  -tolerating oral intake to maintain hydration- Met  -adequate pain control on oral analgesics- Met     Nurse to notify provider when observation goals have been met and patient is ready for discharge.

## 2021-01-22 NOTE — PROGRESS NOTES
"General Surgery Note    Discussed HIDA results.    Noted gastritis and small sliding hiatal hernia.  Pain improved.    Starting breakfast; regular diet  If tolerates and pain stay sat bay, ok to discharge to home    We discussed if symptoms return or reflux becoming intolerable with diet modification and medications, could be candidate for Nissen.  She will follow up with surgeon of her choice in clinic.  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  Remaining plan per GI/Med.  General Surgery will sign off.      NAD, pleasant, A&O  Up in bed enjoying breakfast  /78 (BP Location: Right arm)   Pulse 68   Temp 96.3  F (35.7  C) (Oral)   Resp 18   Ht 1.575 m (5' 2\")   Wt 97.3 kg (214 lb 6.4 oz)   LMP 06/20/2018 (Exact Date)   SpO2 94%   BMI 39.21 kg/m      Intake/Output Summary (Last 24 hours) at 1/22/2021 0917  Last data filed at 1/21/2021 1935  Gross per 24 hour   Intake 640 ml   Output --   Net 640 ml     EGD results:  Impression:         - Normal mid esophagus. Biopsied.                             - Normal distal esophagus. Biopsied.                             - Z-line regular, 40 cm from the incisors.                             - Small sliding hiatal hernia.                             - Gastritis. Biopsied.                             - Normal examined duodenum. Biopsied.     Biopsies pending  "

## 2021-01-22 NOTE — PLAN OF CARE
Patient has been discharged January 22, 2021 11:33 AM. Discharge instructions and education reviewed, medication schedule and follow up appts discussed. Pt had no questions. All belongings sent with pt. Discharge medications were filled and sent with pt. Pt educated on medications.

## 2021-01-22 NOTE — PLAN OF CARE
Covid negative. A&O x4. VSS on RA. Independent. PIV SL. Regular diet. BG @0000 was 125 and @0430 was 119. Endoscopy done yesterday. Denied pain/SOB/Nausea. Continue to monitor.     Observation goals PRIOR TO DISCHARGE    Comments:   -diagnostic tests and consults completed and resulted -MET  -vital signs normal or at patient baseline -MET  -tolerating oral intake to maintain hydration -MET  -adequate pain control on oral analgesics-MET   Nurse to notify provider when observation goals have been met and patient is ready for discharge.

## 2021-01-22 NOTE — DISCHARGE INSTRUCTIONS
[facility] Note: Please call us at [phone] if you are interested in surgical repair of your small hiatal hernia, or if any new gallbladder symptoms.  We would be happy to assist in your care.  We are located at [address] [place] [address].

## 2021-01-25 ENCOUNTER — PATIENT OUTREACH (OUTPATIENT)
Dept: CARE COORDINATION | Facility: CLINIC | Age: 46
End: 2021-01-25

## 2021-01-25 DIAGNOSIS — Z71.89 OTHER SPECIFIED COUNSELING: ICD-10-CM

## 2021-01-25 LAB — COPATH REPORT: NORMAL

## 2021-01-25 NOTE — TELEPHONE ENCOUNTER
Patient has appointment tomorrow for a follow-up for hospital discharge with one of my colleagues and I am wondering if I should do it virtually  Can do it Thursday virtually during my lunchtime during the meeting

## 2021-01-25 NOTE — TELEPHONE ENCOUNTER
"Left message for patient to return call.     Central scheduler---please see message below from Dr Angeles.      Patient currently scheduled with Dr Rhodes 1/26 at 11am for Hospital follow up appointment in clinic.     Dr Angeles, PCP, is offering Video Visit for the hospital follow up this Thur 1/28 during her lunch time---if patient prefers to be seen by Dr Angeles.     Please offer and reschedule if needed.     Please forward to Carolin LEWIS Triage \"green calls\" line (Uriel MOFFETT RN) at [phone] if pt has further questions that need to be addressed by an RN.      Thank you,  Marsha HILL, RN    "

## 2021-01-26 ENCOUNTER — PATIENT OUTREACH (OUTPATIENT)
Dept: NURSING | Facility: CLINIC | Age: 46
End: 2021-01-26
Payer: COMMERCIAL

## 2021-01-26 NOTE — TELEPHONE ENCOUNTER
Dr. Angeles,  Patent can not do a video visit Thursday or Friday.  Can you fit her in on Mon. Feb 1 for a video visit?    Carol

## 2021-01-26 NOTE — LETTER
[facility]  [address]  [facility] [address]      January 26, 2021      Kezia Nava  [address] [place] [address]      Dear Kezia,    I am a clinic community health worker who works with Shamir Angeles MD at [facility]. I wanted to thank you for spending the time to talk with me.  Below is a description of clinic care coordination and how I can further assist you.      The clinic care coordination team is made up of a registered nurse,  and community health worker who understand the health care system. The goal of clinic care coordination is to help you manage your health and improve access to the health care system in the most efficient manner. The team can assist you in meeting your health care goals by providing education, coordinating services, strengthening the communication among your providers and supporting you with any resource needs.    Please feel free to contact me at [phone] with any questions or concerns. We are focused on providing you with the highest-quality healthcare experience possible and that all starts with you.     Sincerely,     OLEGARIO Hood  Clinic Care Coordination  [facility]: [facility], [place], [facility], and [facility]  Phone: [phone]

## 2021-01-26 NOTE — PROGRESS NOTES
Clinic Care Coordination Contact  Community Health Worker Initial Outreach    CHW Initial Information Gathering:  Referral Source: IP Report    Chart Review: Referral from a discharge report.  [facility] 1/20/21- 1/22/21  IP for Epigastric pain from Gastritis and hiatal hernia.  Medication Changes- Yes  Follow up with PCP within 7 days      Patient accepts CC: No, Not at this time. Patient will be sent Care Coordination introduction letter for future reference.     Plan: Care Coordinator will send care coordination introduction letter with care coordinator contact information and explanation of care coordination services via [employer]. Care Coordinator will do no further outreaches at this time.    OLEGARIO Hood  Clinic Care Coordination  [facility]: [facility], [place], [facility], and [facility]  Phone: [phone]

## 2021-01-31 DIAGNOSIS — R73.03 PRE-DIABETES: ICD-10-CM

## 2021-02-01 ENCOUNTER — VIRTUAL VISIT (OUTPATIENT)
Dept: FAMILY MEDICINE | Facility: CLINIC | Age: 46
End: 2021-02-01
Payer: COMMERCIAL

## 2021-02-01 DIAGNOSIS — R10.9 FLANK PAIN: Primary | ICD-10-CM

## 2021-02-01 DIAGNOSIS — F43.21 SITUATIONAL DEPRESSION: ICD-10-CM

## 2021-02-01 DIAGNOSIS — E11.65 TYPE 2 DIABETES MELLITUS WITH HYPERGLYCEMIA, WITHOUT LONG-TERM CURRENT USE OF INSULIN (H): ICD-10-CM

## 2021-02-01 DIAGNOSIS — G47.33 OSA (OBSTRUCTIVE SLEEP APNEA): ICD-10-CM

## 2021-02-01 DIAGNOSIS — E66.01 SEVERE OBESITY (BMI 35.0-35.9 WITH COMORBIDITY) (H): ICD-10-CM

## 2021-02-01 DIAGNOSIS — G47.00 INSOMNIA, UNSPECIFIED TYPE: ICD-10-CM

## 2021-02-01 DIAGNOSIS — K75.81 NASH (NONALCOHOLIC STEATOHEPATITIS): ICD-10-CM

## 2021-02-01 DIAGNOSIS — I10 BENIGN ESSENTIAL HYPERTENSION: ICD-10-CM

## 2021-02-01 DIAGNOSIS — R79.89 ELEVATED LFTS: ICD-10-CM

## 2021-02-01 PROCEDURE — 99214 OFFICE O/P EST MOD 30 MIN: CPT | Mod: 95 | Performed by: INTERNAL MEDICINE

## 2021-02-01 RX ORDER — LIRAGLUTIDE 6 MG/ML
INJECTION SUBCUTANEOUS
Qty: 10 ML | Refills: 5 | Status: SHIPPED | OUTPATIENT
Start: 2021-02-01 | End: 2022-03-29

## 2021-02-01 RX ORDER — ZOLPIDEM TARTRATE 5 MG/1
5 TABLET ORAL
Qty: 14 TABLET | Refills: 1 | Status: SHIPPED | OUTPATIENT
Start: 2021-02-01 | End: 2021-04-01

## 2021-02-01 NOTE — PROGRESS NOTES
"Hazel is a 45 year old who is being evaluated via a billable video visit.      How would you like to obtain your AVS? {AVS Preference:985738}  If the video visit is dropped, the invitation should be resent by: {video visit invitation:680271}  Will anyone else be joining your video visit? {:323656}  {If patient encounters technical issues they should call [phone] :199678}    Video Start Time: {video visit start/end time for provider to select:860010}  {PROVIDER CHARTING PREFERENCE:362307}    Subjective     Hazel is a 45 year old who presents to clinic today for the following health issues {ACCOMPANIED BY STATEMENT (Optional):676671}    [facility]         Hospital Follow-up Visit:    Hospital/Nursing Home/IP Rehab Facility: [facility]  Date of Admission: 1/20/2021  Date of Discharge: 1/22/2021  Reason(s) for Admission:     Nausea with epigastric pain secondary to gastritis and hiatal hernia    Nonalcoholic steatohepatitis   Left flank pain  Type 2 diabetes  Depression  Severe obesity  High risk for breast cancer based on family history  Hypokalemia    Was your hospitalization related to COVID-19? {Yes add questions_No:805485}  Problems taking medications regularly:  {NONE DEFAULTED:353335::\"None\"}  Medication changes since discharge: {NONE DEFAULTED:652837::\"None\"}  Problems adhering to non-medication therapy:  {NONE DEFAULTED:257735::\"None\"}    Summary of hospitalization:  {HOSPITAL DISCHARGE SUMMARY INFO:133473::\"[facility] discharge summary reviewed\"}  Diagnostic Tests/Treatments reviewed.  Follow up needed: {NONE DEFAULTED:544662::\"none\"}  Other Healthcare Providers Involved in Patient s Care:         {those currently involved after discharge:116258::\"None\"}  Update since discharge: {IMPROVED DEFAULT:347541::\"improved.\"} {TIP  Include information from family/caregivers, SNF, Care Coordination :970432}      Post Discharge Medication Reconciliation: {ACO Med Rec (Provider):005226}.  Plan of " "care communicated with {Communicate Plan to:543594::\"patient\"}     {Reference  Coding guidelines- Moderate Complexity F2F/Video within 7 - 14 days of discharge 75695, High Complexity F2F/Video within 7 days 45404 or pitqji16 days 79643 :824266}         {additonal problems for provider to add (Optional):538910}    Review of Systems   {ROS COMP (Optional):898182}      Objective           Vitals:  No vitals were obtained today due to virtual visit.    Physical Exam   {video visit exam brief selected:740247::\"GENERAL: Healthy, alert and no distress\",\"EYES: Eyes grossly normal to inspection.  No discharge or erythema, or obvious scleral/conjunctival abnormalities.\",\"RESP: No audible wheeze, cough, or visible cyanosis.  No visible retractions or increased work of breathing.  \",\"SKIN: Visible skin clear. No significant rash, abnormal pigmentation or lesions.\",\"NEURO: Cranial nerves grossly intact.  Mentation and speech appropriate for age.\",\"PSYCH: Mentation appears normal, affect normal/bright, judgement and insight intact, normal speech and appearance well-groomed.\"}    {Diagnostic Test Results (Optional):426631}    {AMBULATORY ATTESTATION (Optional):167174}        Video-Visit Details    Type of service:  Video Visit    Video End Time:{video visit start/end time for provider to select:827969}    Originating Location (pt. Location): {video visit patient location:271230::\"Home\"}    Distant Location (provider location):  [facility]     Platform used for Video Visit: {Virtual Visit Platforms:954521::\"[employer]\"}  "

## 2021-02-01 NOTE — TELEPHONE ENCOUNTER
Routing to PCP. Patient has appointment today with Dr Angeles.     Please review and refill at that time.      Marsha KERRN, RN

## 2021-02-01 NOTE — PROGRESS NOTES
Hazel is a 45 year old who is being evaluated via a billable video visit.    This is also a follow-up from the emergency room    How would you like to obtain your AVS? MyChart  If the video visit is dropped, the invitation should be resent by:   Will anyone else be joining your video visit?       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 with left-sided flank pain and the kidney stone was ruled out  Work-up shows abnormal labs as below  I will stop tamoxifen and Pristiq for now  And I will reevaluate her  She is pain-free    Elevated LFTs  I think this is from [place] and we might have to start statins  Weight loss will also help which she is losing  - **Hepatic panel FUTURE 2mo  - Lipid panel reflex to direct LDL Fasting    Type 2 diabetes mellitus with hyperglycemia, without long-term current use of insulin (H)  We will start blood sugar checks at home once a week or so and check the labs  She definitely might need more Metformin earlier adjustment of the medications  Lab Results   Component Value Date    A1C 7.5 01/21/2021    A1C 6.8 07/24/2020    A1C 6.8 03/02/2020    A1C 6.6 11/18/2019    A1C 5.9 07/30/2019       - blood glucose monitoring (NO BRAND SPECIFIED) meter device kit  Dispense: 1 kit; Refill: 0  - **A1C FUTURE anytime  - Albumin Random Urine Quantitative with Creat Ratio  - Lipid panel reflex to direct LDL Fasting    Benign essential hypertension  We will continue to monitor at home and also check in my office    Severe obesity (BMI 35.0-35.9 with comorbidity) (H)  As above    ROTHMAN (nonalcoholic steatohepatitis)  Patient's blood sugars are also high and A1c went up despite weight loss and we will check these labs in few weeks again    ANA (obstructive sleep apnea)  Patient is encouraged to use CPAP    Situational depression  This is from COVID-19 but in the emergency room in menopause  We will stop  "tamoxifen and reevaluate      Insomnia, unspecified type  I gave her option between Remeron and Ambien and Ambien was chosen  Side effects of Ambien including driving accidents and sleep walking discussed.  No alcohol with sedatives is advised.    - zolpidem (AMBIEN) 5 MG tablet  Dispense: 14 tablet; Refill: 1                35 minutes spent on the date of the encounter doing chart review, history and exam, documentation and further activities as noted above         BMI:   Estimated body mass index is 39.21 kg/m  as calculated from the following:    Height as of 1/21/21: 1.575 m (5' 2\").    Weight as of 1/21/21: 97.3 kg (214 lb 6.4 oz).           Return in about 6 weeks (around 3/15/2021) for Physical Exam, depression face to face, Fasting Labs.    Shamir Angeles MD  [facility] ERENDIRA Oliva is a 45 year old who presents to clinic today for the following health issues    HPI       Patient had increase in her A1c on the labs done by the emergency room  She is otherwise doing well but on the day of presentation to the emergency room she had left-sided flank pain  This was acute and she was worried about kidney stone because it lasted for hours  The work-up was all negative but some abnormal labs were noticed as below  She is also not taking Pristiq anymore  Her main issue is terrible hot flashes that interferes with her work also    An upper endoscopy was done following her ER visit which was negative other than GERD a small hiatus hernia    Review of Systems   10 point ROS of systems including Constitutional, Eyes, Respiratory, Cardiovascular, Gastroenterology, Genitourinary, Integumentary, Muscularskeletal, Psychiatric were all negative except for pertinent positives noted in my HPI.        Objective           Vitals:  No vitals were obtained today due to virtual visit.    Physical Exam   GENERAL: Healthy, alert and no distress  EYES: Eyes grossly normal to inspection.  No discharge or " erythema, or obvious scleral/conjunctival abnormalities.  RESP: No audible wheeze, cough, or visible cyanosis.  No visible retractions or increased work of breathing.    SKIN: Visible skin clear. No significant rash, abnormal pigmentation or lesions.  NEURO: Cranial nerves grossly intact.  Mentation and speech appropriate for age.  PSYCH: Mentation appears normal, affect normal/bright, but sad compared to before judgement and insight intact, normal speech and appearance well-groomed.        Admission on 01/20/2021, Discharged on 01/22/2021   Component Date Value Ref Range Status     WBC 01/20/2021 5.5  4.0 - 11.0 10e9/L Final     RBC Count 01/20/2021 4.25  3.8 - 5.2 10e12/L Final     Hemoglobin 01/20/2021 12.0  11.7 - 15.7 g/dL Final     Hematocrit 01/20/2021 36.5  35.0 - 47.0 % Final     MCV 01/20/2021 86  78 - 100 fl Final     MCH 01/20/2021 28.2  26.5 - 33.0 pg Final     MCHC 01/20/2021 32.9  31.5 - 36.5 g/dL Final     RDW 01/20/2021 13.2  10.0 - 15.0 % Final     Platelet Count 01/20/2021 227  150 - 450 10e9/L Final     Diff Method 01/20/2021 Automated Method   Final     % Neutrophils 01/20/2021 50.2  % Final     % Lymphocytes 01/20/2021 37.0  % Final     % Monocytes 01/20/2021 10.1  % Final     % Eosinophils 01/20/2021 1.8  % Final     % Basophils 01/20/2021 0.7  % Final     % Immature Granulocytes 01/20/2021 0.2  % Final     Nucleated RBCs 01/20/2021 0  0 /100 Final     Absolute Neutrophil 01/20/2021 2.7  1.6 - 8.3 10e9/L Final     Absolute Lymphocytes 01/20/2021 2.0  0.8 - 5.3 10e9/L Final     Absolute Monocytes 01/20/2021 0.6  0.0 - 1.3 10e9/L Final     Absolute Eosinophils 01/20/2021 0.1  0.0 - 0.7 10e9/L Final     Absolute Basophils 01/20/2021 0.0  0.0 - 0.2 10e9/L Final     Abs Immature Granulocytes 01/20/2021 0.0  0 - 0.4 10e9/L Final     Absolute Nucleated RBC 01/20/2021 0.0   Final     Sodium 01/20/2021 139  133 - 144 mmol/L Final     Potassium 01/20/2021 3.3* 3.4 - 5.3 mmol/L Final     Chloride  01/20/2021 105  94 - 109 mmol/L Final     Carbon Dioxide 01/20/2021 27  20 - 32 mmol/L Final     Anion Gap 01/20/2021 7  3 - 14 mmol/L Final     Glucose 01/20/2021 103* 70 - 99 mg/dL Final     Urea Nitrogen 01/20/2021 11  7 - 30 mg/dL Final     Creatinine 01/20/2021 0.80  0.52 - 1.04 mg/dL Final     GFR Estimate 01/20/2021 88  >60 mL/min/[1.73_m2] Final    Comment: Non  GFR Calc  Starting 12/18/2018, serum creatinine based estimated GFR (eGFR) will be   calculated using the Chronic Kidney Disease Epidemiology Collaboration   (CKD-EPI) equation.       GFR Estimate If Black 01/20/2021 >90  >60 mL/min/[1.73_m2] Final    Comment:  GFR Calc  Starting 12/18/2018, serum creatinine based estimated GFR (eGFR) will be   calculated using the Chronic Kidney Disease Epidemiology Collaboration   (CKD-EPI) equation.       Calcium 01/20/2021 9.7  8.5 - 10.1 mg/dL Final     Color Urine 01/20/2021 Yellow   Final     Appearance Urine 01/20/2021 Clear   Final     Glucose Urine 01/20/2021 Negative  NEG^Negative mg/dL Final     Bilirubin Urine 01/20/2021 Negative  NEG^Negative Final     Ketones Urine 01/20/2021 Negative  NEG^Negative mg/dL Final     Specific Gravity Urine 01/20/2021 1.018  1.003 - 1.035 Final     Blood Urine 01/20/2021 Negative  NEG^Negative Final     pH Urine 01/20/2021 5.0  5.0 - 7.0 pH Final     Protein Albumin Urine 01/20/2021 Negative  NEG^Negative mg/dL Final     Urobilinogen mg/dL 01/20/2021 0.0  0.0 - 2.0 mg/dL Final     Nitrite Urine 01/20/2021 Negative  NEG^Negative Final     Leukocyte Esterase Urine 01/20/2021 Negative  NEG^Negative Final     Source 01/20/2021 Midstream Urine   Final     WBC Urine 01/20/2021 1  0 - 5 /HPF Final     RBC Urine 01/20/2021 1  0 - 2 /HPF Final     Mucous Urine 01/20/2021 Present* NEG^Negative /LPF Final     Bilirubin Direct 01/20/2021 <0.1  0.0 - 0.2 mg/dL Final     Bilirubin Total 01/20/2021 0.3  0.2 - 1.3 mg/dL Final     Albumin 01/20/2021 4.0  3.4  - 5.0 g/dL Final     Protein Total 01/20/2021 7.9  6.8 - 8.8 g/dL Final     Alkaline Phosphatase 01/20/2021 61  40 - 150 U/L Final     ALT 01/20/2021 108* 0 - 50 U/L Final     AST 01/20/2021 81* 0 - 45 U/L Final     Lipase 01/20/2021 574* 73 - 393 U/L Final     D Dimer 01/20/2021 0.4  0.0 - 0.50 ug/ml FEU Final    Comment: This D-dimer assay is intended for use in conjunction with a clinical pretest   probability assessment model to exclude pulmonary embolism (PE) and deep   venous thrombosis (DVT) in outpatients suspected of PE or DVT. The cut-off   value is 0.5 ug/mL FEU.       SARS-CoV-2 Virus Specimen Source 01/20/2021 Nasopharyngeal   Final     SARS-CoV-2 PCR Result 01/20/2021 NEGATIVE   Final    SARS-CoV2 (COVID-19) RNA not detected, presumed negative.     SARS-CoV-2 PCR Comment 01/20/2021 (Note)   Final    Comment: Testing was performed using the charly SARS-CoV-2 & Influenza A/B Assay on the   charly [employer] System.  This test should be ordered for the detection of SARS-COV-2 in individuals who   meet SARS-CoV-2 clinical and/or epidemiological criteria. Test performance is   unknown in asymptomatic patients.  This test is for in vitro diagnostic use under the FDA EUA for laboratories   certified under CLIA to perform moderate and/or high complexity testing. This   test has not been FDA cleared or approved.  A negative test does not rule out the presence of PCR inhibitors in the   specimen or target RNA in concentration below the limit of detection for the   assay. The possibility of a false negative should be considered if the   patient's recent exposure or clinical presentation suggests COVID-19.  [facility] are certified under the Clinical Laboratory   Improvement Amendments of 1988 (CLIA-88) as qualified to perform moderate   and/or high complexity laboratory testing.       Hemoglobin A1C 01/21/2021 7.5* 0 - 5.6 % Final    Comment: Normal <5.7% Prediabetes 5.7-6.4%  Diabetes 6.5% or higher  - adopted from ADA   consensus guidelines.       Sodium 01/21/2021 143  133 - 144 mmol/L Final     Potassium 01/21/2021 3.8  3.4 - 5.3 mmol/L Final     Chloride 01/21/2021 112* 94 - 109 mmol/L Final     Carbon Dioxide 01/21/2021 27  20 - 32 mmol/L Final     Anion Gap 01/21/2021 4  3 - 14 mmol/L Final     Glucose 01/21/2021 111* 70 - 99 mg/dL Final     Urea Nitrogen 01/21/2021 8  7 - 30 mg/dL Final     Creatinine 01/21/2021 0.64  0.52 - 1.04 mg/dL Final     GFR Estimate 01/21/2021 >90  >60 mL/min/[1.73_m2] Final    Comment: Non  GFR Calc  Starting 12/18/2018, serum creatinine based estimated GFR (eGFR) will be   calculated using the Chronic Kidney Disease Epidemiology Collaboration   (CKD-EPI) equation.       GFR Estimate If Black 01/21/2021 >90  >60 mL/min/[1.73_m2] Final    Comment:  GFR Calc  Starting 12/18/2018, serum creatinine based estimated GFR (eGFR) will be   calculated using the Chronic Kidney Disease Epidemiology Collaboration   (CKD-EPI) equation.       Calcium 01/21/2021 8.6  8.5 - 10.1 mg/dL Final     Bilirubin Total 01/21/2021 0.2  0.2 - 1.3 mg/dL Final     Albumin 01/21/2021 3.2* 3.4 - 5.0 g/dL Final     Protein Total 01/21/2021 6.3* 6.8 - 8.8 g/dL Final     Alkaline Phosphatase 01/21/2021 50  40 - 150 U/L Final     ALT 01/21/2021 82* 0 - 50 U/L Final     AST 01/21/2021 57* 0 - 45 U/L Final     Lipase 01/21/2021 310  73 - 393 U/L Final     Glucose 01/21/2021 115* 70 - 99 mg/dL Final     Cholesterol 01/21/2021 162  <200 mg/dL Final     Triglycerides 01/21/2021 331* <150 mg/dL Final    Comment: Borderline high:  150-199 mg/dl  High:             200-499 mg/dl  Very high:       >499 mg/dl       HDL Cholesterol 01/21/2021 36* >49 mg/dL Final     LDL Cholesterol Calculated 01/21/2021 60  <100 mg/dL Final    Desirable:       <100 mg/dl     Non HDL Cholesterol 01/21/2021 126  <130 mg/dL Final     Glucose 01/21/2021 102* 70 - 99 mg/dL Final     Glucose 01/21/2021 116* 70 - 99  mg/dL Final    /RN Notified     Glucose 01/21/2021 99  70 - 99 mg/dL Final     Copath Report 01/21/2021    Final                    Value:Patient Name: JOSE FENG  MR#: [id number]  Specimen #:   Collected: 1/21/2021  Received: 1/21/2021  Reported: 1/22/2021 13:53  Ordering Phy(s): TOM LOWE    For improved result formatting, select 'View Enhanced Report Format' under   Linked Documents section.    +++ORIGINAL REPORT FOLLOWS ADDENDUM+++    ADDENDUM    TO ORIGINAL REPORT  Status: Signed Out  Date Ordered:1/25/2021  Date Complete:1/25/2021  Date Reported:1/25/2021 14:40  Signed Out By: Chacho Bucio M.D.    INTERPRETATION:  ADDENDUM REPORT:  Immunohistochemical antibody assay for Helicobacter is performed on   specimen -W, stomach biopsy, and is  NEGATIVE.  The immunohistochemical control reacts appropriately.    Chacho Bucio M.D.  1/25/2021    __________________________________________    ORIGINAL REPORT:    SPECIMEN(S):  A: Duodenal biopsy  B: Random gastric biopsies  C: Esophageal biopsy, distal  D: Esophageal biopsy, middle    FINAL DIAGNOSIS:  A.  Duodenum, mucosal biopsy:  - Normal smal                          l bowel mucosa.    B.  Stomach, mucosal biopsy:  - Normal oxyntic gastric mucosa and benign antral gastric mucosa with mild   reactive change in glandular  epithelium with minimal inflammation.  - Immunohistochemical antibody assay for Helicobacter pending.    C.  Distal esophagus, mucosal biopsy:  - Normal mature nonkeratinizing squamous epithelium.  - Benign gastric type mucosa, negative for goblet cell metaplasia,   dysplasia and malignancy.    D.  Middle esophagus, mucosal biopsy:  - Normal mature nonkeratinizing squamous epithelium.    Electronically signed out by:    Chacho Bucio M.D.    CLINICAL HISTORY:  45 year old female.  Acute left sided thoracic back pain, epigastric   abdominal; pain, dysphagia, nausea, ROTHMAN.   On 1/21/21 EGD reported as normal esophagus,  "small sliding hiatal hernia,   gastritis and normal examined  duodenum.    GROSS:  A. The specimen is received in formalin, labeled with the patient's name   and date of birth, and designated  \"duodenum biopsy\". It co                          nsists of 4 tan tissue fragments ranging from   0.1-0.3 cm in greatest dimension.  Entirely submitted in one cassette.    B. The specimen is received in formalin, labeled with the patient's name   and date of birth, and designated  \"random gastric biopsies\". It consists of 5 tan tissue fragments ranging   from 0.1-0.3 cm in greatest  dimension.  Entirely submitted in one cassette.    C. The specimen is received in formalin, labeled with the patient's name   and date of birth, and designated  \"distal esophagus\". It consists of 4 tan tissue fragments ranging from   0.1-0.3 cm in greatest dimension.  Entirely submitted in one cassette.    D. The specimen is received in formalin, labeled with the patient's name   and date of birth, and designated  \"middle esophagus\".  It consists of two tan tissue fragments measuring 0.1   cm and 0.4 cm in greatest  dimension.  Entirely submitted in one cassette. (Dictated by: SHEKHAR Mueller([facility]) 1/21/2021 03:29 PM)    MICROSCOPIC:  Microscopic ex                          amination is performed.    The technical component of this testing was completed at the [facility], with the professional component performed   at the [facility], [address] [place] [address]  [id number] ([phone])    CPT Codes:  A: 29144-DT4  B: 02554-QP2, 58779-QTD  C: 10747-TT4  D: 65854-QA1    COLLECTION SITE:  [facility]  [facility]       [facility] Endoscopy 01/21/2021    Final                    Value:[facility] " Department  _______________________________________________________________________________  Patient Name: Kezia Nava           Procedure Date: 1/21/2021 1:57 PM  MRN: [id number]                       Account Number: [id number]  YOB: 1975             Admit Type: Inpatient  Age: 45                               Room: special procedure room #1  Note Status: Finalized                Attending MD: Crystal Chacon MD  Total Sedation Time:                  Instrument Name: 402 GIF- Gastroscope  _______________________________________________________________________________     Procedure:                Upper GI endoscopy  Indications:              Epigastric abdominal pain, Dysphagia  Providers:                Crystal Chacon MD, Hodan Vasques RN  Referring MD:               Medicines:                Monitored Anesthesia Care  Complications:            No immediate complications.  _______________________________________                          ________________________________________  Procedure:                Pre-Anesthesia Assessment:                            - Prior to the procedure, a History and Physical                             was performed, and patient medications and                             allergies were reviewed. The patient is competent.                             The risks and benefits of the procedure and the                             sedation options and risks were discussed with the                             patient. All questions were answered and informed                             consent was obtained. Patient identification and                             proposed procedure were verified by the physician                             in the pre-procedure area. Mental Status                             Examination: alert and oriented. Airway                             Examination: normal oropharyngeal airway and neck                             mobility.  Respiratory Examination: clear to                                                       auscultation. CV Examination: normal. Prophylactic                             Antibiotics: The patient does not require                             prophylactic antibiotics. Prior Anticoagulants: The                             patient has taken no previous anticoagulant or                             antiplatelet agents. ASA Grade Assessment: II - A                             patient with mild systemic disease. After reviewing                             the risks and benefits, the patient was deemed in                             satisfactory condition to undergo the procedure.                             The anesthesia plan was to use monitored anesthesia                             care (MAC). Immediately prior to administration of                             medications, the patient was re-assessed for                             adequacy to receive sedatives. The heart rate,                             respiratory rate, oxygen saturations, blood                                                       pressure, adequacy of pulmonary ventilation, and                             response to care were monitored throughout the                             procedure. The physical status of the patient was                             re-assessed after the procedure.                            After obtaining informed consent, the endoscope was                             passed under direct vision. Throughout the                             procedure, the patient's blood pressure, pulse, and                             oxygen saturations were monitored continuously. The                             Endoscope was introduced through the mouth, and                             advanced to the second part of duodenum. The upper                             GI endoscopy was accomplished without difficulty.                             The  patient tolerated the procedure well.                                                                                   Findings:       The mi                          d esophagus was normal. Biopsies were taken with a cold forceps        for histology. Estimated blood loss was minimal.       The distal esophagus was normal. Biopsies were taken with a cold forceps        for histology. Estimated blood loss was minimal.       The Z-line was regular and was found 40 cm from the incisors.       A small sliding hiatal hernia was present.       Localized moderate inflammation characterized by erythema, friability        and linear erosions was found in the gastric antrum. Biopsies were taken        with a cold forceps for histology of entire stomach. Estimated blood        loss was minimal.       The examined duodenum was normal. Biopsies for histology were taken with        a cold forceps for evaluation of celiac disease. Estimated blood loss        was minimal.                                                                                   Impression:               - Normal mid esophagus. Biopsied.                            - Normal distal esophagus.                           Biopsied.                            - Z-line regular, 40 cm from the incisors.                            - Small sliding hiatal hernia.                            - Gastritis. Biopsied.                            - Normal examined duodenum. Biopsied.  Recommendation:           - Return patient to hospital [facility] for ongoing care.                            - Resume regular diet.                            - Continue present medications.                            - Await pathology results.                            - Use Prilosec (omeprazole) 40 mg PO daily taken in                             the morning 30 min before eating                                                                                   Procedure Code(s):       ---  Professional ---       40011, Esophagogastroduodenoscopy, flexible, transoral; with biopsy,        single or multiple    CPT copyright 2019 American Medical Association. All rights reserved.    The codes documented in this report are preliminary and upo                          n  review may   be revised to meet current compliance requirements.    Signed electronically by Crystal Glover MD  ____________________  Crystal Chacon MD  1/21/2021 2:36:59 PM  I was physically present for the entire viewing portion of the exam.  Crystal Chacon MD  Number of Addenda: 0    Note Initiated On: 1/21/2021 1:57 PM  MRN:                      [id number]  Procedure Date:           1/21/2021 1:57:13 PM  Total Procedure Duration: 0 hours 7 minutes 59 seconds   Estimated Blood Loss:       Scope In: 2:11:29 PM  Scope Out: 2:19:28 PM       Glucose 01/21/2021 141* 70 - 99 mg/dL Final     Glucose 01/21/2021 137* 70 - 99 mg/dL Final     Glucose 01/22/2021 125* 70 - 99 mg/dL Final     Glucose 01/22/2021 119* 70 - 99 mg/dL Final     Glucose 01/22/2021 146* 70 - 99 mg/dL Final             Video-Visit Details  ype of service:  Video Visit    Video End Time:2. 54  Originating Location (pt. Location): Home    Distant Location (provider location):  [facility]     Platform used for Video Visit:[employer]

## 2021-02-04 DIAGNOSIS — E11.65 TYPE 2 DIABETES MELLITUS WITH HYPERGLYCEMIA, WITHOUT LONG-TERM CURRENT USE OF INSULIN (H): ICD-10-CM

## 2021-02-04 RX ORDER — LANCETS
EACH MISCELLANEOUS
Qty: 3 EACH | Refills: 3 | Status: CANCELLED | OUTPATIENT
Start: 2021-02-04

## 2021-02-04 RX ORDER — BLOOD SUGAR DIAGNOSTIC
STRIP MISCELLANEOUS
Qty: 100 STRIP | Refills: 1 | Status: CANCELLED | OUTPATIENT
Start: 2021-02-04

## 2021-02-04 NOTE — TELEPHONE ENCOUNTER
Reason for Call:  Medication or medication refill:    Do you use a [facility]?  Name of the pharmacy and phone number for the current request:       [facility] - [place], [place] - [address]    Name of the medication requested: Contour Next Test strips and [place]let Lancets   The pharmacy only received an order for the Glucometer        Other request: They need to know how many times a day the patient is testing    Can we leave a detailed message on this number? NO    Phone number patient can be reached at: [facility] [phone]    Best Time: anytime    Call taken on 2/4/2021 at 12:54 PM by Drea Romero

## 2021-02-08 NOTE — TELEPHONE ENCOUNTER
TO PCP:     Pharmacy called   Received a contour Next EZ meter  And needs Rx for lancets and strips to go with it    They have no record of her filling diabetes supplies     Needing new strips and lancets to go along with it - unsure how many times she is to test daily -pended for 1 time daily testing    Sarah Beth LÓPEZ RN

## 2021-03-11 ENCOUNTER — OFFICE VISIT (OUTPATIENT)
Dept: FAMILY MEDICINE | Facility: CLINIC | Age: 46
End: 2021-03-11
Payer: COMMERCIAL

## 2021-03-11 VITALS
OXYGEN SATURATION: 99 % | SYSTOLIC BLOOD PRESSURE: 138 MMHG | DIASTOLIC BLOOD PRESSURE: 88 MMHG | WEIGHT: 219 LBS | TEMPERATURE: 97.2 F | HEART RATE: 91 BPM | HEIGHT: 62 IN | BODY MASS INDEX: 40.3 KG/M2

## 2021-03-11 DIAGNOSIS — E11.65 TYPE 2 DIABETES MELLITUS WITH HYPERGLYCEMIA, WITHOUT LONG-TERM CURRENT USE OF INSULIN (H): ICD-10-CM

## 2021-03-11 DIAGNOSIS — G47.00 INSOMNIA, UNSPECIFIED TYPE: ICD-10-CM

## 2021-03-11 DIAGNOSIS — I10 BENIGN ESSENTIAL HYPERTENSION: ICD-10-CM

## 2021-03-11 DIAGNOSIS — Z80.0 FAMILY HISTORY OF COLON CANCER: ICD-10-CM

## 2021-03-11 DIAGNOSIS — G47.33 OSA (OBSTRUCTIVE SLEEP APNEA): ICD-10-CM

## 2021-03-11 DIAGNOSIS — Z00.00 ROUTINE GENERAL MEDICAL EXAMINATION AT A HEALTH CARE FACILITY: Primary | ICD-10-CM

## 2021-03-11 DIAGNOSIS — K75.81 NASH (NONALCOHOLIC STEATOHEPATITIS): ICD-10-CM

## 2021-03-11 DIAGNOSIS — F43.21 SITUATIONAL DEPRESSION: ICD-10-CM

## 2021-03-11 DIAGNOSIS — E66.01 SEVERE OBESITY (BMI 35.0-35.9 WITH COMORBIDITY) (H): ICD-10-CM

## 2021-03-11 LAB — HBA1C MFR BLD: 8.5 % (ref 0–5.6)

## 2021-03-11 PROCEDURE — 80076 HEPATIC FUNCTION PANEL: CPT | Performed by: INTERNAL MEDICINE

## 2021-03-11 PROCEDURE — 99396 PREV VISIT EST AGE 40-64: CPT | Performed by: INTERNAL MEDICINE

## 2021-03-11 PROCEDURE — 83036 HEMOGLOBIN GLYCOSYLATED A1C: CPT | Performed by: INTERNAL MEDICINE

## 2021-03-11 PROCEDURE — 82043 UR ALBUMIN QUANTITATIVE: CPT | Performed by: INTERNAL MEDICINE

## 2021-03-11 PROCEDURE — 36415 COLL VENOUS BLD VENIPUNCTURE: CPT | Performed by: INTERNAL MEDICINE

## 2021-03-11 RX ORDER — ESCITALOPRAM OXALATE 10 MG/1
10 TABLET ORAL DAILY
Qty: 90 TABLET | Refills: 3 | Status: SHIPPED | OUTPATIENT
Start: 2021-03-11 | End: 2022-03-01

## 2021-03-11 RX ORDER — LOSARTAN POTASSIUM AND HYDROCHLOROTHIAZIDE 12.5; 1 MG/1; MG/1
1 TABLET ORAL DAILY
Qty: 90 TABLET | Refills: 3 | Status: SHIPPED | OUTPATIENT
Start: 2021-03-11 | End: 2022-03-01

## 2021-03-11 ASSESSMENT — MIFFLIN-ST. JEOR: SCORE: 1586.63

## 2021-03-11 NOTE — PROGRESS NOTES
SUBJECTIVE:   CC: Kezia Nava is an 45 year old woman who presents for preventive health visit.   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 work  Her sleep is still disturbed and Ambien partially works only  She has been checking her blood sugar which ranges about 160 average  She is trying to control her diet but is not exercising    She does feel tired but not depressed now  Patient has been advised of split billing requirements and indicates understanding: Yes  Healthy Habits:    Getting at least 3 servings of Calcium per day:  NO    Bi-annual eye exam:  Yes    Dental care twice a year:  Yes    Sleep apnea or symptoms of sleep apnea:  Sleep apnea (uses CPAP machine)    Diet:  Regular (no restrictions)    Frequency of exercise:  None    Duration of exercise:  N/A    Taking medications regularly:  Yes    Barriers to taking medications:  None    Medication side effects:  None    PHQ-2 Total Score:    Additional concerns today:  No              Today's PHQ-2 Score:   PHQ-2 ( 1999 Pfizer) 3/11/2021   Q1: Little interest or pleasure in doing things 0   Q2: Feeling down, depressed or hopeless 0   PHQ-2 Score 0   Q1: Little interest or pleasure in doing things -   Q2: Feeling down, depressed or hopeless -   PHQ-2 Score -       Abuse: Current or Past (Physical, Sexual or Emotional) - No  Do you feel safe in your environment? Yes    Have you ever done Advance Care Planning? (For example, a Health Directive, POLST, or a discussion with a medical provider or your loved ones about your wishes): No, advance care planning information given to patient to review.  Patient declined advance care planning discussion at this time.    Social History     Tobacco Use     Smoking status: Never Smoker     Smokeless tobacco: Never Used   Substance Use Topics     Alcohol use: Yes     Alcohol/week: 0.0 standard drinks      Comment: 0-1 drinks per week     If you drink alcohol do you typically have >3 drinks per day or >7 drinks per week? No    Alcohol Use 3/11/2021   Prescreen: >3 drinks/day or >7 drinks/week? No       Any new diagnosis of family breast, ovarian, or bowel cancer? No     Reviewed orders with patient.  Reviewed health maintenance and updated orders accordingly - Yes  Patient Active Problem List   Diagnosis     Pain in joint, ankle and foot     Cervical radiculopathy     Benign essential hypertension     Type 2 diabetes mellitus with hyperglycemia, without long-term current use of insulin (H)     ANA (obstructive sleep apnea)     Acute bilateral low back pain with right-sided sciatica     Paresthesias- ? TIA vs complex migraine (preferred per neuro)     Severe obesity (BMI 35.0-35.9 with comorbidity) (H)     Pre-diabetes     Fibroid uterus     Epigastric pain     Elevated LFTs     Elevated lipase     Acute left-sided thoracic back pain     Past Surgical History:   Procedure Laterality Date     ARTHROSCOPY ANKLE, OPEN REPAIR TENDON, COMBINED  11/8/2012    Procedure: COMBINED ARTHROSCOPY ANKLE, OPEN REPAIR TENDON;  Ankle Arthroscopy Left Ankle, Open Ligament Repair Left Ankle, Peroneal Tendon Repair Left Ankle ;  Surgeon: Otf Ramos DPM;  Location: [facility] OR     C APPENDECTOMY  1984     CARPAL TUNNEL RELEASE RT/LT       COMBINED CYSTOSCOPY, INSERT STENT URETER(S)  8/6/2013    Procedure: COMBINED CYSTOSCOPY, INSERT STENT URETER(S);  cystoscopy, right retrograde,RIGHT STENT PLACEMENT.;  Surgeon: Kan Porter MD;  Location:  OR     CYSTOSCOPY, RETROGRADES, INSERT STENT URETER(S), COMBINED  8/6/2013    Procedure: COMBINED CYSTOSCOPY, RETROGRADES, INSERT STENT URETER(S);  cystoscopy, right retrograde, insert stent right ureter;  Surgeon: Kan Porter MD;  Location:  OR     DENTAL SURGERY      TOOTH#7 - DENTAL IMPLANT     DISCECTOMY, FUSION CERVICAL ANTERIOR ONE LEVEL, COMBINED N/A 12/13/2015     Procedure: COMBINED DISCECTOMY, FUSION CERVICAL ANTERIOR ONE LEVEL;  Surgeon: Seven Umaña MD;  Location: [facility] OR     ESOPHAGOSCOPY, GASTROSCOPY, DUODENOSCOPY (EGD), COMBINED N/A 1/21/2021    Procedure: ESOPHAGOGASTRODUODENOSCOPY, WITH BIOPSY;  Surgeon: Crystal Chacon MD;  Location:  GI     EXTRACORPOREAL SHOCK WAVE LITHOTRIPSY (ESWL)  8/19/2013    Procedure: EXTRACORPOREAL SHOCK WAVE LITHOTRIPSY (ESWL);   RIGHT EXTRACORPOREAL SHOCK WAVE LITHOTRIPSY;  Surgeon: Otf Tobias MD;  Location: [facility] OR     HC REDUCTION OF LARGE BREAST Bilateral 2003     HYSTERECTOMY, PAP NO LONGER INDICATED  2019     LAPAROSCOPIC HYSTERECTOMY TOTAL, SALPINGECTOMY Left 5/29/2019    Procedure: single site total LAPAROSCOPIC HYSTERECTOMY, left oophorectomy and lysis of adhesions;  Surgeon: Pauline Horowitz MD;  Location: [facility] OR     LASIK BILATERAL       SALPINGO OOPHORECTOMY,R/L/DERREK Right 05/17/2006    Right       Social History     Tobacco Use     Smoking status: Never Smoker     Smokeless tobacco: Never Used   Substance Use Topics     Alcohol use: Yes     Alcohol/week: 0.0 standard drinks     Comment: 0-1 drinks per week     Family History   Problem Relation Age of Onset     C.A.D. Mother         stents     Hypertension Mother      Kidney Disease Mother         transplant     Cerebrovascular Disease Mother 77     Hypertension Father      Breast Cancer Maternal Aunt         may have been fibrocystic     Breast Cancer Maternal Aunt         may have been fibrocystic     Cerebrovascular Disease Paternal Grandfather      Cancer Paternal Grandmother         stomach     Breast Cancer Sister 43     Nephrolithiasis Brother      Uterine Cancer Maternal Grandmother 44     Colon Cancer Maternal Grandmother 64     Prostate Cancer Maternal Uncle 50     Breast Cancer Cousin 41        maternal first cousin, triple negative breast cancer     Breast Cancer Paternal Aunt          Current Outpatient Medications   Medication Sig  Dispense Refill     Acetaminophen (TYLENOL PO) Take 500-1,000 mg by mouth every 6 hours as needed As needed for pain        blood glucose (NO BRAND SPECIFIED) lancets standard Use to test blood sugar 1 times daily or as directed. 100 each 3     blood glucose (NO BRAND SPECIFIED) test strip Use to test blood sugar 1 times daily or as directed. 100 strip 3     blood glucose monitoring (NO BRAND SPECIFIED) meter device kit Use to test blood sugar as directed. 1 kit 0     cetirizine (ZYRTEC) 10 MG tablet Take 10 mg by mouth daily       escitalopram (LEXAPRO) 10 MG tablet Take 1 tablet (10 mg) by mouth daily 90 tablet 3     insulin pen needle (BD VALENTINA U/F) 32G X 4 MM miscellaneous USE ONCE DAILY AS DIRECTED 100 each 96     losartan-hydrochlorothiazide (HYZAAR) 100-12.5 MG tablet Take 1 tablet by mouth daily 90 tablet 3     metFORMIN (GLUCOPHAGE-XR) 500 MG 24 hr tablet Take 1 tablet (500 mg) by mouth daily (with dinner) TAKE ONE TABLET BY MOUTH ONCE DAILY WITH DINNER 90 tablet 3     multivitamin, therapeutic with minerals (MULTI-VITAMIN) TABS tablet Take 1 tablet by mouth daily       omeprazole (PRILOSEC OTC) 20 MG EC tablet Take 1 tablet (20 mg) by mouth daily 60 tablet 0     ondansetron (ZOFRAN ODT) 4 MG PO ODT tab Take 1-2 tablets (4-8 mg) by mouth every 8 hours as needed for nausea 12 tablet 1     STATIN NOT PRESCRIBED, INTENTIONAL, Please choose reason not prescribed, below       VICTOZA PEN 18 MG/3ML soln INJECT 1.2 MG UNDER THE SKIN DAILY 10 mL 5     VITAMIN D, CHOLECALCIFEROL, PO Take 10,000 Units by mouth daily        zolpidem (AMBIEN) 5 MG tablet Take 1 tablet (5 mg) by mouth nightly as needed for sleep 14 tablet 1       Breast CA Risk Screening:  No flowsheet data found.        Pertinent mammograms are reviewed under the imaging tab.    History of abnormal Pap smear: Status post benign hysterectomy. Health Maintenance and Surgical History updated.  PAP / HPV 8/29/2014 1/19/2011 5/28/2008   PAP NIL NIL NIL  "    Reviewed and updated as needed this visit by clinical staff  Tobacco  Allergies  Meds     Fam Hx          Reviewed and updated as needed this visit by Provider  Tobacco       Fam Hx             Review of Systems  10 point ROS of systems including Constitutional, Eyes, Respiratory, Cardiovascular, Gastroenterology, Genitourinary, Integumentary, Muscularskeletal, Psychiatric were all negative except for pertinent positives noted in my HPI.       OBJECTIVE:   /88   Pulse 91   Temp 97.2  F (36.2  C) (Temporal)   Ht 1.575 m (5' 2\")   Wt 99.3 kg (219 lb)   LMP 06/20/2018 (Exact Date)   SpO2 99%   BMI 40.06 kg/m    Physical Exam  GENERAL APPEARANCE: healthy, alert and no distress  EYES: Eyes grossly normal to inspection, PERRL and conjunctivae and sclerae normal  HENT: ear canals and TM's normal, nose and mouth without ulcers or lesions, oropharynx clear and oral mucous membranes moist  NECK: no adenopathy, no asymmetry, masses, or scars and thyroid normal to palpation  RESP: lungs clear to auscultation - no rales, rhonchi or wheezes  BREAST: normal without masses, tenderness or nipple discharge and no palpable axillary masses or adenopathy  CV: regular rate and rhythm, normal S1 S2, no S3 or S4, no murmur, click or rub, no peripheral edema and peripheral pulses strong  ABDOMEN: soft, nontender, no hepatosplenomegaly, no masses and bowel sounds normal  MS: no musculoskeletal defects are noted and gait is age appropriate without ataxia  SKIN: no suspicious lesions or rashes  NEURO: Normal strength and tone, sensory exam grossly normal, mentation intact and speech normal  PSYCH: mentation appears normal and affect normal/bright    Admission on 01/20/2021, Discharged on 01/22/2021   Component Date Value Ref Range Status     WBC 01/20/2021 5.5  4.0 - 11.0 10e9/L Final     RBC Count 01/20/2021 4.25  3.8 - 5.2 10e12/L Final     Hemoglobin 01/20/2021 12.0  11.7 - 15.7 g/dL Final     Hematocrit 01/20/2021 " 36.5  35.0 - 47.0 % Final     MCV 01/20/2021 86  78 - 100 fl Final     MCH 01/20/2021 28.2  26.5 - 33.0 pg Final     MCHC 01/20/2021 32.9  31.5 - 36.5 g/dL Final     RDW 01/20/2021 13.2  10.0 - 15.0 % Final     Platelet Count 01/20/2021 227  150 - 450 10e9/L Final     Diff Method 01/20/2021 Automated Method   Final     % Neutrophils 01/20/2021 50.2  % Final     % Lymphocytes 01/20/2021 37.0  % Final     % Monocytes 01/20/2021 10.1  % Final     % Eosinophils 01/20/2021 1.8  % Final     % Basophils 01/20/2021 0.7  % Final     % Immature Granulocytes 01/20/2021 0.2  % Final     Nucleated RBCs 01/20/2021 0  0 /100 Final     Absolute Neutrophil 01/20/2021 2.7  1.6 - 8.3 10e9/L Final     Absolute Lymphocytes 01/20/2021 2.0  0.8 - 5.3 10e9/L Final     Absolute Monocytes 01/20/2021 0.6  0.0 - 1.3 10e9/L Final     Absolute Eosinophils 01/20/2021 0.1  0.0 - 0.7 10e9/L Final     Absolute Basophils 01/20/2021 0.0  0.0 - 0.2 10e9/L Final     Abs Immature Granulocytes 01/20/2021 0.0  0 - 0.4 10e9/L Final     Absolute Nucleated RBC 01/20/2021 0.0   Final     Sodium 01/20/2021 139  133 - 144 mmol/L Final     Potassium 01/20/2021 3.3* 3.4 - 5.3 mmol/L Final     Chloride 01/20/2021 105  94 - 109 mmol/L Final     Carbon Dioxide 01/20/2021 27  20 - 32 mmol/L Final     Anion Gap 01/20/2021 7  3 - 14 mmol/L Final     Glucose 01/20/2021 103* 70 - 99 mg/dL Final     Urea Nitrogen 01/20/2021 11  7 - 30 mg/dL Final     Creatinine 01/20/2021 0.80  0.52 - 1.04 mg/dL Final     GFR Estimate 01/20/2021 88  >60 mL/min/[1.73_m2] Final    Comment: Non  GFR Calc  Starting 12/18/2018, serum creatinine based estimated GFR (eGFR) will be   calculated using the Chronic Kidney Disease Epidemiology Collaboration   (CKD-EPI) equation.       GFR Estimate If Black 01/20/2021 >90  >60 mL/min/[1.73_m2] Final    Comment:  GFR Calc  Starting 12/18/2018, serum creatinine based estimated GFR (eGFR) will be   calculated using the  Chronic Kidney Disease Epidemiology Collaboration   (CKD-EPI) equation.       Calcium 01/20/2021 9.7  8.5 - 10.1 mg/dL Final     Color Urine 01/20/2021 Yellow   Final     Appearance Urine 01/20/2021 Clear   Final     Glucose Urine 01/20/2021 Negative  NEG^Negative mg/dL Final     Bilirubin Urine 01/20/2021 Negative  NEG^Negative Final     Ketones Urine 01/20/2021 Negative  NEG^Negative mg/dL Final     Specific Gravity Urine 01/20/2021 1.018  1.003 - 1.035 Final     Blood Urine 01/20/2021 Negative  NEG^Negative Final     pH Urine 01/20/2021 5.0  5.0 - 7.0 pH Final     Protein Albumin Urine 01/20/2021 Negative  NEG^Negative mg/dL Final     Urobilinogen mg/dL 01/20/2021 0.0  0.0 - 2.0 mg/dL Final     Nitrite Urine 01/20/2021 Negative  NEG^Negative Final     Leukocyte Esterase Urine 01/20/2021 Negative  NEG^Negative Final     Source 01/20/2021 Midstream Urine   Final     WBC Urine 01/20/2021 1  0 - 5 /HPF Final     RBC Urine 01/20/2021 1  0 - 2 /HPF Final     Mucous Urine 01/20/2021 Present* NEG^Negative /LPF Final     Bilirubin Direct 01/20/2021 <0.1  0.0 - 0.2 mg/dL Final     Bilirubin Total 01/20/2021 0.3  0.2 - 1.3 mg/dL Final     Albumin 01/20/2021 4.0  3.4 - 5.0 g/dL Final     Protein Total 01/20/2021 7.9  6.8 - 8.8 g/dL Final     Alkaline Phosphatase 01/20/2021 61  40 - 150 U/L Final     ALT 01/20/2021 108* 0 - 50 U/L Final     AST 01/20/2021 81* 0 - 45 U/L Final     Lipase 01/20/2021 574* 73 - 393 U/L Final     D Dimer 01/20/2021 0.4  0.0 - 0.50 ug/ml FEU Final    Comment: This D-dimer assay is intended for use in conjunction with a clinical pretest   probability assessment model to exclude pulmonary embolism (PE) and deep   venous thrombosis (DVT) in outpatients suspected of PE or DVT. The cut-off   value is 0.5 ug/mL FEU.       SARS-CoV-2 Virus Specimen Source 01/20/2021 Nasopharyngeal   Final     SARS-CoV-2 PCR Result 01/20/2021 NEGATIVE   Final    SARS-CoV2 (COVID-19) RNA not detected, presumed negative.      SARS-CoV-2 PCR Comment 01/20/2021 (Note)   Final    Comment: Testing was performed using the charly SARS-CoV-2 & Influenza A/B Assay on the   charly [employer] System.  This test should be ordered for the detection of SARS-COV-2 in individuals who   meet SARS-CoV-2 clinical and/or epidemiological criteria. Test performance is   unknown in asymptomatic patients.  This test is for in vitro diagnostic use under the FDA EUA for laboratories   certified under CLIA to perform moderate and/or high complexity testing. This   test has not been FDA cleared or approved.  A negative test does not rule out the presence of PCR inhibitors in the   specimen or target RNA in concentration below the limit of detection for the   assay. The possibility of a false negative should be considered if the   patient's recent exposure or clinical presentation suggests COVID-19.  [facility] are certified under the Clinical Laboratory   Improvement Amendments of 1988 (CLIA-88) as qualified to perform moderate   and/or high complexity laboratory testing.       Hemoglobin A1C 01/21/2021 7.5* 0 - 5.6 % Final    Comment: Normal <5.7% Prediabetes 5.7-6.4%  Diabetes 6.5% or higher - adopted from ADA   consensus guidelines.       Sodium 01/21/2021 143  133 - 144 mmol/L Final     Potassium 01/21/2021 3.8  3.4 - 5.3 mmol/L Final     Chloride 01/21/2021 112* 94 - 109 mmol/L Final     Carbon Dioxide 01/21/2021 27  20 - 32 mmol/L Final     Anion Gap 01/21/2021 4  3 - 14 mmol/L Final     Glucose 01/21/2021 111* 70 - 99 mg/dL Final     Urea Nitrogen 01/21/2021 8  7 - 30 mg/dL Final     Creatinine 01/21/2021 0.64  0.52 - 1.04 mg/dL Final     GFR Estimate 01/21/2021 >90  >60 mL/min/[1.73_m2] Final    Comment: Non  GFR Calc  Starting 12/18/2018, serum creatinine based estimated GFR (eGFR) will be   calculated using the Chronic Kidney Disease Epidemiology Collaboration   (CKD-EPI) equation.       GFR Estimate If Black 01/21/2021 >90   >60 mL/min/[1.73_m2] Final    Comment:  GFR Calc  Starting 12/18/2018, serum creatinine based estimated GFR (eGFR) will be   calculated using the Chronic Kidney Disease Epidemiology Collaboration   (CKD-EPI) equation.       Calcium 01/21/2021 8.6  8.5 - 10.1 mg/dL Final     Bilirubin Total 01/21/2021 0.2  0.2 - 1.3 mg/dL Final     Albumin 01/21/2021 3.2* 3.4 - 5.0 g/dL Final     Protein Total 01/21/2021 6.3* 6.8 - 8.8 g/dL Final     Alkaline Phosphatase 01/21/2021 50  40 - 150 U/L Final     ALT 01/21/2021 82* 0 - 50 U/L Final     AST 01/21/2021 57* 0 - 45 U/L Final     Lipase 01/21/2021 310  73 - 393 U/L Final     Glucose 01/21/2021 115* 70 - 99 mg/dL Final     Cholesterol 01/21/2021 162  <200 mg/dL Final     Triglycerides 01/21/2021 331* <150 mg/dL Final    Comment: Borderline high:  150-199 mg/dl  High:             200-499 mg/dl  Very high:       >499 mg/dl       HDL Cholesterol 01/21/2021 36* >49 mg/dL Final     LDL Cholesterol Calculated 01/21/2021 60  <100 mg/dL Final    Desirable:       <100 mg/dl     Non HDL Cholesterol 01/21/2021 126  <130 mg/dL Final     Glucose 01/21/2021 102* 70 - 99 mg/dL Final     Glucose 01/21/2021 116* 70 - 99 mg/dL Final    Dr/RN Notified     Glucose 01/21/2021 99  70 - 99 mg/dL Final     Copath Report 01/21/2021    Final                    Value:Patient Name: JOSE FENG  MR#: [id number]  Specimen #:   Collected: 1/21/2021  Received: 1/21/2021  Reported: 1/22/2021 13:53  Ordering Phy(s): TOM LOWE    For improved result formatting, select 'View Enhanced Report Format' under   Linked Documents section.    +++ORIGINAL REPORT FOLLOWS ADDENDUM+++    ADDENDUM    TO ORIGINAL REPORT  Status: Signed Out  Date Ordered:1/25/2021  Date Complete:1/25/2021  Date Reported:1/25/2021 14:40  Signed Out By: Chacho Bucio M.D.    INTERPRETATION:  ADDENDUM REPORT:  Immunohistochemical antibody assay for Helicobacter is performed on   specimen -V, stomach  "biopsy, and is  NEGATIVE.  The immunohistochemical control reacts appropriately.    Chacho Bucio M.D.  1/25/2021    __________________________________________    ORIGINAL REPORT:    SPECIMEN(S):  A: Duodenal biopsy  B: Random gastric biopsies  C: Esophageal biopsy, distal  D: Esophageal biopsy, middle    FINAL DIAGNOSIS:  A.  Duodenum, mucosal biopsy:  - Normal smal                          l bowel mucosa.    B.  Stomach, mucosal biopsy:  - Normal oxyntic gastric mucosa and benign antral gastric mucosa with mild   reactive change in glandular  epithelium with minimal inflammation.  - Immunohistochemical antibody assay for Helicobacter pending.    C.  Distal esophagus, mucosal biopsy:  - Normal mature nonkeratinizing squamous epithelium.  - Benign gastric type mucosa, negative for goblet cell metaplasia,   dysplasia and malignancy.    D.  Middle esophagus, mucosal biopsy:  - Normal mature nonkeratinizing squamous epithelium.    Electronically signed out by:    Chacho Bucio M.D.    CLINICAL HISTORY:  45 year old female.  Acute left sided thoracic back pain, epigastric   abdominal; pain, dysphagia, nausea, ROTHMAN.   On 1/21/21 EGD reported as normal esophagus, small sliding hiatal hernia,   gastritis and normal examined  duodenum.    GROSS:  A. The specimen is received in formalin, labeled with the patient's name   and date of birth, and designated  \"duodenum biopsy\". It co                          nsists of 4 tan tissue fragments ranging from   0.1-0.3 cm in greatest dimension.  Entirely submitted in one cassette.    B. The specimen is received in formalin, labeled with the patient's name   and date of birth, and designated  \"random gastric biopsies\". It consists of 5 tan tissue fragments ranging   from 0.1-0.3 cm in greatest  dimension.  Entirely submitted in one cassette.    C. The specimen is received in formalin, labeled with the patient's name   and date of birth, and designated  \"distal esophagus\". It consists " "of 4 tan tissue fragments ranging from   0.1-0.3 cm in greatest dimension.  Entirely submitted in one cassette.    D. The specimen is received in formalin, labeled with the patient's name   and date of birth, and designated  \"middle esophagus\".  It consists of two tan tissue fragments measuring 0.1   cm and 0.4 cm in greatest  dimension.  Entirely submitted in one cassette. (Dictated by: SHEKHAR Mueller([facility]) 1/21/2021 03:29 PM)    MICROSCOPIC:  Microscopic ex                          amination is performed.    The technical component of this testing was completed at the [facility], with the professional component performed   at the [facility], [address] [place] [address]  [phone] ([phone])    CPT Codes:  A: 92724-GP0  B: 67621-SE4, 34751-LWB  C: 32284-MK9  D: 27120-WX0    COLLECTION SITE:  Client: [facility]  Location: [place] (S)       Upper GI Endoscopy 01/21/2021    Final                    Value:[facility]  _______________________________________________________________________________  Patient Name: Kezia Nava           Procedure Date: 1/21/2021 1:57 PM  MRN: [id number]                       Account Number: [id number]  YOB: 1975             Admit Type: Inpatient  Age: 45                               Room: special procedure room #1  Note Status: Finalized                Attending MD: Crystal Chaocn MD  Total Sedation Time:                  Instrument Name: 402 GIF- Gastroscope  _______________________________________________________________________________     Procedure:                Upper GI endoscopy  Indications:              Epigastric abdominal pain, Dysphagia  Providers:                Crystal Chacon MD, Hodan Vasques RN  Referring MD:               Medicines:                Monitored Anesthesia " Care  Complications:            No immediate complications.  _______________________________________                          ________________________________________  Procedure:                Pre-Anesthesia Assessment:                            - Prior to the procedure, a History and Physical                             was performed, and patient medications and                             allergies were reviewed. The patient is competent.                             The risks and benefits of the procedure and the                             sedation options and risks were discussed with the                             patient. All questions were answered and informed                             consent was obtained. Patient identification and                             proposed procedure were verified by the physician                             in the pre-procedure area. Mental Status                             Examination: alert and oriented. Airway                             Examination: normal oropharyngeal airway and neck                             mobility. Respiratory Examination: clear to                                                       auscultation. CV Examination: normal. Prophylactic                             Antibiotics: The patient does not require                             prophylactic antibiotics. Prior Anticoagulants: The                             patient has taken no previous anticoagulant or                             antiplatelet agents. ASA Grade Assessment: II - A                             patient with mild systemic disease. After reviewing                             the risks and benefits, the patient was deemed in                             satisfactory condition to undergo the procedure.                             The anesthesia plan was to use monitored anesthesia                             care (MAC). Immediately prior to administration of                              medications, the patient was re-assessed for                             adequacy to receive sedatives. The heart rate,                             respiratory rate, oxygen saturations, blood                                                       pressure, adequacy of pulmonary ventilation, and                             response to care were monitored throughout the                             procedure. The physical status of the patient was                             re-assessed after the procedure.                            After obtaining informed consent, the endoscope was                             passed under direct vision. Throughout the                             procedure, the patient's blood pressure, pulse, and                             oxygen saturations were monitored continuously. The                             Endoscope was introduced through the mouth, and                             advanced to the second part of duodenum. The upper                             GI endoscopy was accomplished without difficulty.                             The patient tolerated the procedure well.                                                                                   Findings:       The mi                          d esophagus was normal. Biopsies were taken with a cold forceps        for histology. Estimated blood loss was minimal.       The distal esophagus was normal. Biopsies were taken with a cold forceps        for histology. Estimated blood loss was minimal.       The Z-line was regular and was found 40 cm from the incisors.       A small sliding hiatal hernia was present.       Localized moderate inflammation characterized by erythema, friability        and linear erosions was found in the gastric antrum. Biopsies were taken        with a cold forceps for histology of entire stomach. Estimated blood        loss was minimal.       The examined duodenum was normal. Biopsies for histology  were taken with        a cold forceps for evaluation of celiac disease. Estimated blood loss        was minimal.                                                                                   Impression:               - Normal mid esophagus. Biopsied.                            - Normal distal esophagus.                           Biopsied.                            - Z-line regular, 40 cm from the incisors.                            - Small sliding hiatal hernia.                            - Gastritis. Biopsied.                            - Normal examined duodenum. Biopsied.  Recommendation:           - Return patient to hospital [facility] for ongoing care.                            - Resume regular diet.                            - Continue present medications.                            - Await pathology results.                            - Use Prilosec (omeprazole) 40 mg PO daily taken in                             the morning 30 min before eating                                                                                   Procedure Code(s):       --- Professional ---       70515, Esophagogastroduodenoscopy, flexible, transoral; with biopsy,        single or multiple    CPT copyright 2019 American Medical Association. All rights reserved.    The codes documented in this report are preliminary and upo                          n  review may   be revised to meet current compliance requirements.    Signed electronically by Crystal Glover MD  ____________________  Crystal Chacon MD  1/21/2021 2:36:59 PM  I was physically present for the entire viewing portion of the exam.  Crystal Chacon MD  Number of Addenda: 0    Note Initiated On: 1/21/2021 1:57 PM  MRN:                      [id number]  Procedure Date:           1/21/2021 1:57:13 PM  Total Procedure Duration: 0 hours 7 minutes 59 seconds   Estimated Blood Loss:       Scope In: 2:11:29 PM  Scope Out: 2:19:28 PM       Glucose 01/21/2021 141* 70 - 99  mg/dL Final     Glucose 01/21/2021 137* 70 - 99 mg/dL Final     Glucose 01/22/2021 125* 70 - 99 mg/dL Final     Glucose 01/22/2021 119* 70 - 99 mg/dL Final     Glucose 01/22/2021 146* 70 - 99 mg/dL Final       ASSESSMENT/PLAN:   Kezia was seen today for physical.    Diagnoses and all orders for this visit:    Routine general medical examination at a health care facility  -     **Hepatic panel FUTURE 2mo  Patient is up-to-date on immunization and will take MRI of the breast because of family history of breast cancer  She does not need a Pap smear  Type 2 diabetes mellitus with hyperglycemia, without long-term current use of insulin (H)  -     Albumin Random Urine Quantitative with Creat Ratio  -     **A1C FUTURE anytime  -     MED THERAPY MANAGE REFERRAL  Patient is on Metformin with some diarrhea but her blood sugars are higher and we might have to start sulfonylureas or Victoza or other GLP agonist  Severe obesity (BMI 35.0-35.9 with comorbidity) (H)  Patient had spoken to bariatric surgeon but for now we will hold this  ANA (obstructive sleep apnea)  She has sleep apnea and uses CPAP  Insomnia, unspecified type  -     escitalopram (LEXAPRO) 10 MG tablet; Take 1 tablet (10 mg) by mouth daily  Ambien can be stopped and we will try SSRI drug as below  Benign essential hypertension  -     losartan-hydrochlorothiazide (HYZAAR) 100-12.5 MG tablet; Take 1 tablet by mouth daily  We will increase the dose of losartan and repeat blood pressure  ROTHMAN (nonalcoholic steatohepatitis)  We will check the liver profile and if normal we can consider tamoxifen again  Situational depression  -     escitalopram (LEXAPRO) 10 MG tablet; Take 1 tablet (10 mg) by mouth daily  Although there is not much of depression, she still feels sad and is menopausal I think SSRI drug will help her with sleep issues also  Family history of colon cancer    Patient had colonoscopy last year which we will obtain and she is also planning to see  "colorectal surgery for fissure treatment    Patient has been advised of split billing requirements and indicates understanding: Yes  COUNSELING:  Reviewed preventive health counseling, as reflected in patient instructions       Regular exercise       Healthy diet/nutrition    Estimated body mass index is 40.06 kg/m  as calculated from the following:    Height as of this encounter: 1.575 m (5' 2\").    Weight as of this encounter: 99.3 kg (219 lb).    Weight management plan: Discussed healthy diet and exercise guidelines    She reports that she has never smoked. She has never used smokeless tobacco.      Counseling Resources:  ATP IV Guidelines  Pooled Cohorts Equation Calculator  Breast Cancer Risk Calculator  BRCA-Related Cancer Risk Assessment: FHS-7 Tool  FRAX Risk Assessment  ICSI Preventive Guidelines  Dietary Guidelines for Americans, 2010  USDA's MyPlate  ASA Prophylaxis  Lung CA Screening    Shamir Angeles MD  [facility]  "

## 2021-03-12 ENCOUNTER — TELEPHONE (OUTPATIENT)
Dept: FAMILY MEDICINE | Facility: CLINIC | Age: 46
End: 2021-03-12

## 2021-03-12 LAB
ALBUMIN SERPL-MCNC: 3.9 G/DL (ref 3.4–5)
ALP SERPL-CCNC: 67 U/L (ref 40–150)
ALT SERPL W P-5'-P-CCNC: 65 U/L (ref 0–50)
AST SERPL W P-5'-P-CCNC: 45 U/L (ref 0–45)
BILIRUB DIRECT SERPL-MCNC: <0.1 MG/DL (ref 0–0.2)
BILIRUB SERPL-MCNC: 0.4 MG/DL (ref 0.2–1.3)
PROT SERPL-MCNC: 7.7 G/DL (ref 6.8–8.8)

## 2021-03-12 NOTE — TELEPHONE ENCOUNTER
MTM referral from: [facility] visit (referral by provider)    MTM referral outreach attempt #2 on March 12, 2021 at 1:29 PM      Outcome: Patient not reachable after several attempts, will route to MTM Pharmacist/Provider as an FYI. Thank you for the referral.    Sj Coley, MTM coordinator

## 2021-03-13 LAB
CREAT UR-MCNC: 95 MG/DL
MICROALBUMIN UR-MCNC: 96 MG/L
MICROALBUMIN/CREAT UR: 101.37 MG/G CR (ref 0–25)

## 2021-03-15 ENCOUNTER — TRANSFERRED RECORDS (OUTPATIENT)
Dept: HEALTH INFORMATION MANAGEMENT | Facility: CLINIC | Age: 46
End: 2021-03-15

## 2021-03-15 NOTE — TELEPHONE ENCOUNTER
Sent Ester dorseyg    Irene Ramsey, PharmD, [facility]  Medication Therapy Management Provider  Pager: [phone]

## 2021-04-01 DIAGNOSIS — G47.00 INSOMNIA, UNSPECIFIED TYPE: ICD-10-CM

## 2021-04-01 RX ORDER — ZOLPIDEM TARTRATE 5 MG/1
5 TABLET ORAL
Qty: 14 TABLET | Refills: 1 | Status: SHIPPED | OUTPATIENT
Start: 2021-04-01 | End: 2022-05-16

## 2021-04-01 NOTE — TELEPHONE ENCOUNTER
Controlled Substance Refill Request for   zolpidem (AMBIEN) 5 MG tablet 14 tablet 1 2/1/2021     Problem List Complete:  Yes    Last Written Prescription Date:  2/1/2021  Last Fill Quantity: 14,   # refills: 1    THE MOST RECENT OFFICE VISIT MUST BE WITHIN THE PAST 3 MONTHS. AT LEAST ONE FACE TO FACE VISIT MUST OCCUR EVERY 6 MONTHS. ADDITIONAL VISITS CAN BE VIRTUAL.  (THIS STATEMENT SHOULD BE DELETED.)    Last Office Visit with [facility] primary care provider: 3/11/2021    Future Office visit: Unknown     Controlled substance agreement:   Encounter-Level CSA:    There are no encounter-level csa.     Patient-Level CSA:    There are no patient-level csa.         Last Urine Drug Screen: No results found for: CDAUT, No results found for: COMDAT, No results found for: THC13, PCP13, COC13, MAMP13, OPI13, AMP13, BZO13, TCA13, MTD13, BAR13, OXY13, PPX13, BUP13     Processing:  Rx to be electronically transmitted to pharmacy by provider     [email or web address]   checked in past 3 months?  No, route to RN

## 2021-04-12 ENCOUNTER — TRANSFERRED RECORDS (OUTPATIENT)
Dept: HEALTH INFORMATION MANAGEMENT | Facility: CLINIC | Age: 46
End: 2021-04-12

## 2021-04-12 LAB — RETINOPATHY: NORMAL

## 2021-06-16 ENCOUNTER — HOSPITAL ENCOUNTER (EMERGENCY)
Facility: CLINIC | Age: 46
Discharge: HOME | End: 2021-06-16
Attending: NURSE PRACTITIONER | Admitting: NURSE PRACTITIONER
Payer: COMMERCIAL

## 2021-06-16 VITALS
RESPIRATION RATE: 16 BRPM | DIASTOLIC BLOOD PRESSURE: 72 MMHG | TEMPERATURE: 98.3 F | OXYGEN SATURATION: 99 % | HEART RATE: 82 BPM | SYSTOLIC BLOOD PRESSURE: 123 MMHG

## 2021-06-16 DIAGNOSIS — H53.149 PHOTOPHOBIA: ICD-10-CM

## 2021-06-16 DIAGNOSIS — R51.9 HEADACHE: ICD-10-CM

## 2021-06-16 DIAGNOSIS — R11.0 NAUSEA: ICD-10-CM

## 2021-06-16 LAB
ANION GAP SERPL CALCULATED.3IONS-SCNC: 10 MMOL/L (ref 3–14)
BASOPHILS # BLD AUTO: 0.1 10E9/L (ref 0–0.2)
BASOPHILS NFR BLD AUTO: 1 %
BUN SERPL-MCNC: 10 MG/DL (ref 7–30)
CALCIUM SERPL-MCNC: 9.5 MG/DL (ref 8.5–10.1)
CHLORIDE SERPL-SCNC: 103 MMOL/L (ref 94–109)
CO2 SERPL-SCNC: 23 MMOL/L (ref 20–32)
CREAT SERPL-MCNC: 0.54 MG/DL (ref 0.52–1.04)
DIFFERENTIAL METHOD BLD: NORMAL
EOSINOPHIL # BLD AUTO: 0.3 10E9/L (ref 0–0.7)
EOSINOPHIL NFR BLD AUTO: 5 %
ERYTHROCYTE [DISTWIDTH] IN BLOOD BY AUTOMATED COUNT: 12.9 % (ref 10–15)
GFR SERPL CREATININE-BSD FRML MDRD: >90 ML/MIN/{1.73_M2}
GLUCOSE SERPL-MCNC: 204 MG/DL (ref 70–99)
HCT VFR BLD AUTO: 35.3 % (ref 35–47)
HGB BLD-MCNC: 12.1 G/DL (ref 11.7–15.7)
LYMPHOCYTES # BLD AUTO: 2 10E9/L (ref 0.8–5.3)
LYMPHOCYTES NFR BLD AUTO: 33 %
MCH RBC QN AUTO: 28.3 PG (ref 26.5–33)
MCHC RBC AUTO-ENTMCNC: 34.3 G/DL (ref 31.5–36.5)
MCV RBC AUTO: 83 FL (ref 78–100)
MONOCYTES # BLD AUTO: 0.2 10E9/L (ref 0–1.3)
MONOCYTES NFR BLD AUTO: 3 %
NEUTROPHILS # BLD AUTO: 3.5 10E9/L (ref 1.6–8.3)
NEUTROPHILS NFR BLD AUTO: 58 %
PLATELET # BLD AUTO: 251 10E9/L (ref 150–450)
PLATELET # BLD EST: NORMAL 10*3/UL
POTASSIUM SERPL-SCNC: 3.3 MMOL/L (ref 3.4–5.3)
RBC # BLD AUTO: 4.27 10E12/L (ref 3.8–5.2)
RBC MORPH BLD: NORMAL
SMUDGE CELLS BLD QL SMEAR: PRESENT
SODIUM SERPL-SCNC: 136 MMOL/L (ref 133–144)
WBC # BLD AUTO: 6 10E9/L (ref 4–11)

## 2021-06-16 PROCEDURE — 85025 COMPLETE CBC W/AUTO DIFF WBC: CPT | Performed by: NURSE PRACTITIONER

## 2021-06-16 PROCEDURE — 250N000011 HC RX IP 250 OP 636: Performed by: NURSE PRACTITIONER

## 2021-06-16 PROCEDURE — 99284 EMERGENCY DEPT VISIT MOD MDM: CPT | Mod: 25

## 2021-06-16 PROCEDURE — 80048 BASIC METABOLIC PNL TOTAL CA: CPT | Performed by: NURSE PRACTITIONER

## 2021-06-16 RX ORDER — DIPHENHYDRAMINE HYDROCHLORIDE 50 MG/ML
25 INJECTION INTRAMUSCULAR; INTRAVENOUS ONCE
Status: COMPLETED | OUTPATIENT
Start: 2021-06-16 | End: 2021-06-16

## 2021-06-16 RX ADMIN — DIPHENHYDRAMINE HYDROCHLORIDE 25 MG: 50 INJECTION, SOLUTION INTRAMUSCULAR; INTRAVENOUS at 19:39

## 2021-06-16 RX ADMIN — PROCHLORPERAZINE EDISYLATE 10 MG: 5 INJECTION INTRAMUSCULAR; INTRAVENOUS at 19:40

## 2021-06-16 ASSESSMENT — ENCOUNTER SYMPTOMS
WEAKNESS: 0
PHOTOPHOBIA: 1
VOMITING: 0
NUMBNESS: 0
HEADACHES: 1
NAUSEA: 1
NECK STIFFNESS: 0
FEVER: 0

## 2021-06-17 NOTE — ED TRIAGE NOTES
"Pt c/o severe left sided Ha that started about 2 hs ago. Hx of Migraines but \"it has neve been this severe.\"  "

## 2021-06-17 NOTE — ED PROVIDER NOTES
History   Chief Complaint:  Headache       HPI   Kezia Nava is a 45 year old female with history of migraine headaches who presents for evaluation of a headache.  The patient reports developed a left-sided headache about 2 hours ago that started gradually but then worsened significantly.  She is sensitive to light and sound and is nauseous although has not vomited.  This headache is similar in character to her migraines however she has never had a headache this severe and has never needed to come to the ED for a headache.  She denies neck stiffness, fever, vision changes, weakness, or numbness.    Review of Systems   Constitutional: Negative for fever.   Eyes: Positive for photophobia. Negative for visual disturbance.   Gastrointestinal: Positive for nausea. Negative for vomiting.   Musculoskeletal: Negative for neck stiffness.   Neurological: Positive for headaches. Negative for weakness and numbness.   All other systems reviewed and are negative.    Allergies:  Lisinopril  Pneumovax     Medications:  Lexapro  Losartan-Hydrochlorothiazide  Metformin   Victoza  Zofran   Ambien  Tylenol  Zyrtec  Vitamin D    Past Medical History:    Hypertension  Type 2 diabetes mellitus   Migraine headaches   Nephrolithiasis   Sleep apnea   Thyroiditis   Mesenteric adenitis    Fibroid uterus      Past Surgical History:    Ankle arthroscopy, tendon repair  Appendectomy  Carpal tunnel release  Cystoscopy, ureter stent placement   Extracorporeal shock wave lithotripsy   Dental implant  Cervical fusion, discectomy   Hysterectomy, total abdominal, left salpingo-oophorectomy    Right salpingo-oophorectomy    Breast reduction  Lasik    Family History:    Coronary artery disease - mother  Hypertension - mother, father  Kidney disease - mother  Stroke - mother  Breast cancer - sister  Nephrolithiasis - brother     Social History:  Presents to the ED alone  Occupation: ER RN  PCP: Shamir Angeles     Physical Exam     Patient Vitals for  the past 24 hrs:   BP Temp Temp src Pulse Resp SpO2   06/16/21 2015 -- -- -- -- -- 97 %   06/16/21 1918 133/72 98.3  F (36.8  C) Oral 84 20 98 %       Physical Exam  Physical Exam   Constitutional:  Tearful, obviously uncomfortable sitting in a dark room. Non-toxic appearing.   Head: Head moves freely with normal range of motion.   ENT: Oropharynx is clear and moist.   Eyes: Conjunctivae pink. EOMs intact. PERRL. No horizontal or vertical nystagmus.   Neck: Normal range of motion. No nuchal rigidity.   Cardiovascular: Regular rate and rhythm. Normal heart sounds. No concerning murmur. Intact distal pulses: radial pulses 2+ on the right, 2+ on the left.   Pulmonary/Chest: No respiratory distress. No decreased breath sounds. Lungs clear throughout.   Abdominal: Soft. Non-tender. No rebound, no guarding.   Musculoskeletal: No peripheral edema. Distal capillary refill and sensation intact.   Neurological: Oriented to person, place, and time. No focal deficits. CN II-XII intact. No facial assymetry. No dysarthria. No trunchal instability. Upper and lower extremity strength is 5/5 and equal bilaterally. Ambulation is normal.  Skin: Skin is warm and normal in color. No diaphoresis. No rash noted.       Emergency Department Course     Laboratory:   CBC: WBC 6, HGB 12.1,    BMP: K 3.3 (L), Glucose 204 (H), otherwise WNL (Creatinine 0.54)     Emergency Department Course:  Reviewed:  I reviewed nursing notes, vitals and past medical history    Assessments:  (1925) I obtained history and examined the patient as noted above.   (1955) I rechecked the patient; medications given about 15 minutes prior and her headache is starting to improve.  (2047) I reevaluated the patient.  She feels significantly better; pain is now down to 2/10.     Interventions:  (1939) Benadryl, 25 mg, IV injection   (1940) Compazine, 10 mg, IV injection     Disposition:  The patient was discharged to home.     Impression & Plan     Medical Decision  Making:  Kezia Nava is a 45 year old female who presents with a headache consistent with her typical migraine, although increased in severity.  Evaluation in the emergency department has been negative. The patient has not had any fever, weakness, numbness, paresthesias, neck stiffness, thunderclap, worst at onset character, seizures, vision changes or confusion. Meningitis, pseudotumor, subarachnoid hemorrhage, CNS tumor, venous thrombosis and stroke are considered as part of the differential, and considered unlikely. There has been no trauma to suggest subdural hemorrhage. I do not believe imaging is indicated at this time.  Her pain has significantly improved from 10/10 to 2/10 with IV fluids, compazine and benadryl. The patient should follow-up with her primary physician in 3 days. If the headache continues or the frequency increases, outpatient consultation with neurology may be indicated.  I recommended she return for worse pain, fever, vomiting, weakness, or any other concerns.  She is amenable to plan.     Diagnosis:    ICD-10-CM    1. Headache  R51.9    2. Photophobia  H53.149    3. Nausea  R11.0        Scribe Disclosure:  I, Hafsa Hickey, am serving as a scribe at 7:23 PM on 6/16/2021 to document services personally performed by Sarah Beth Humphrey APRN, CNP based on my observations and the provider's statements to me.         Sarah Beth Humphrey APRN CNP  06/16/21 [id number]

## 2021-08-19 ENCOUNTER — TRANSFERRED RECORDS (OUTPATIENT)
Dept: HEALTH INFORMATION MANAGEMENT | Facility: CLINIC | Age: 46
End: 2021-08-19

## 2021-08-23 DIAGNOSIS — E11.65 TYPE 2 DIABETES MELLITUS WITH HYPERGLYCEMIA, WITHOUT LONG-TERM CURRENT USE OF INSULIN (H): Chronic | ICD-10-CM

## 2021-08-25 RX ORDER — METFORMIN HCL 500 MG
TABLET, EXTENDED RELEASE 24 HR ORAL
Qty: 90 TABLET | Refills: 0 | Status: SHIPPED | OUTPATIENT
Start: 2021-08-25 | End: 2021-12-01

## 2021-08-25 NOTE — TELEPHONE ENCOUNTER
Routing refill request to provider for review/approval because:  Drug not on the [facility] refill protocol ,patient has documented A1c within the specified period of time.    Felipa Zavala RN  [facility] Triage

## 2021-09-18 ENCOUNTER — HEALTH MAINTENANCE LETTER (OUTPATIENT)
Age: 46
End: 2021-09-18

## 2021-09-20 DIAGNOSIS — G47.33 OBSTRUCTIVE SLEEP APNEA (ADULT) (PEDIATRIC): Primary | ICD-10-CM

## 2021-11-01 ENCOUNTER — TRANSFERRED RECORDS (OUTPATIENT)
Dept: HEALTH INFORMATION MANAGEMENT | Facility: CLINIC | Age: 46
End: 2021-11-01
Payer: COMMERCIAL

## 2021-11-29 DIAGNOSIS — E11.65 TYPE 2 DIABETES MELLITUS WITH HYPERGLYCEMIA, WITHOUT LONG-TERM CURRENT USE OF INSULIN (H): Chronic | ICD-10-CM

## 2021-12-01 RX ORDER — METFORMIN HCL 500 MG
TABLET, EXTENDED RELEASE 24 HR ORAL
Qty: 90 TABLET | Refills: 0 | Status: SHIPPED | OUTPATIENT
Start: 2021-12-01 | End: 2022-03-01

## 2021-12-01 NOTE — TELEPHONE ENCOUNTER
Pt due for appointment to follow up on diabetes     Sent pt Clarisonict message to schedule     Routing refill request to provider for review/approval because:  Overdue for 6 month diabetes follow up visit     Sarah Beth LÓPEZ, Triage RN  [facility]

## 2022-01-08 ENCOUNTER — HEALTH MAINTENANCE LETTER (OUTPATIENT)
Age: 47
End: 2022-01-08

## 2022-01-28 ENCOUNTER — TELEPHONE (OUTPATIENT)
Dept: FAMILY MEDICINE | Facility: CLINIC | Age: 47
End: 2022-01-28
Payer: COMMERCIAL

## 2022-02-03 NOTE — TELEPHONE ENCOUNTER
PA Initiation    Medication: VICTOZA   Insurance Company:    Pharmacy Filling the Rx: [facility] [place], [place] - [address] [facility]  Filling Pharmacy Phone: [phone]  Filling Pharmacy Fax:    Start Date: 2/3/2022

## 2022-02-07 NOTE — TELEPHONE ENCOUNTER
PRIOR AUTHORIZATION DENIED    Medication: VICTOZA     Denial Date: 2/5/2022    Denial Rational:          Appeal Information: This medication was denied. If the provider would like to appeal, please provide a letter of medical necessity and route back to the team. Otherwise you can close the encounter. Thank you, [facility] Team

## 2022-02-28 DIAGNOSIS — E11.65 TYPE 2 DIABETES MELLITUS WITH HYPERGLYCEMIA, WITHOUT LONG-TERM CURRENT USE OF INSULIN (H): Chronic | ICD-10-CM

## 2022-02-28 DIAGNOSIS — F43.21 SITUATIONAL DEPRESSION: ICD-10-CM

## 2022-02-28 DIAGNOSIS — I10 BENIGN ESSENTIAL HYPERTENSION: ICD-10-CM

## 2022-02-28 DIAGNOSIS — G47.00 INSOMNIA, UNSPECIFIED TYPE: ICD-10-CM

## 2022-03-01 RX ORDER — METFORMIN HCL 500 MG
TABLET, EXTENDED RELEASE 24 HR ORAL
Qty: 90 TABLET | Refills: 0 | Status: SHIPPED | OUTPATIENT
Start: 2022-03-01 | End: 2022-05-16

## 2022-03-01 RX ORDER — ESCITALOPRAM OXALATE 10 MG/1
TABLET ORAL
Qty: 90 TABLET | Refills: 0 | Status: SHIPPED | OUTPATIENT
Start: 2022-03-01 | End: 2022-05-16

## 2022-03-01 RX ORDER — LOSARTAN POTASSIUM AND HYDROCHLOROTHIAZIDE 12.5; 1 MG/1; MG/1
TABLET ORAL
Qty: 90 TABLET | Refills: 0 | Status: SHIPPED | OUTPATIENT
Start: 2022-03-01 | End: 2022-05-16

## 2022-03-01 NOTE — TELEPHONE ENCOUNTER
"Appointments in Next Year    May 16, 2022  9:30 AM  (Arrive by 9:10 AM)  Provider Visit with Shamir Angeles MD  [facility] ([facility] ) [phone]        Sent PHQ9 to patient to complete via [employer]     PHQ 2/7/2020   PHQ-9 Total Score 7   Q9: Thoughts of better off dead/self-harm past 2 weeks Not at all     Routing refill request to provider for review/approval because:  Losartan - last potassium low   Lexapro - last PHQ9 score >2 years ago     Sarah Beth LÓPEZ Triage RN  [facility]       Requested Prescriptions   Pending Prescriptions Disp Refills     escitalopram (LEXAPRO) 10 MG tablet [Pharmacy Med Name: ESCITALOPRAM OXALATE 10MG TABS] 90 tablet 3     Sig: TAKE ONE TABLET BY MOUTH ONCE DAILY       SSRIs Protocol Failed - 2/28/2022  9:32 PM        Failed - PHQ-9 score less than 5 in past 6 months     Please review last PHQ-9 score.           Failed - Recent (6 mo) or future (30 days) visit within the authorizing provider's specialty     Patient had office visit in the last 6 months or has a visit in the next 30 days with authorizing provider or within the authorizing provider's specialty.  See \"Patient Info\" tab in inbasket, or \"Choose Columns\" in Meds & Orders section of the refill encounter.            Passed - Medication is active on med list        Passed - Patient is age 18 or older        Passed - No active pregnancy on record        Passed - No positive pregnancy test in last 12 months           losartan-hydrochlorothiazide (HYZAAR) 100-12.5 MG tablet [Pharmacy Med Name: LOSARTAN POTASSIUM--12.5 TABS] 90 tablet 3     Sig: TAKE ONE TABLET BY MOUTH ONCE DAILY       Angiotensin-II Receptors Failed - 2/28/2022  9:32 PM        Failed - Normal serum potassium on file in past 12 months     Recent Labs   Lab Test 06/16/21 1923   POTASSIUM 3.3*                    Passed - Last blood pressure under 140/90 in past 12 months     BP " "Readings from Last 3 Encounters:   06/16/21 123/72   03/11/21 138/88   01/22/21 122/74                 Passed - Recent (12 mo) or future (30 days) visit within the authorizing provider's specialty     Patient has had an office visit with the authorizing provider or a provider within the authorizing providers department within the previous 12 mos or has a future within next 30 days. See \"Patient Info\" tab in inbasket, or \"Choose Columns\" in Meds & Orders section of the refill encounter.              Passed - Medication is active on med list        Passed - Patient is age 18 or older        Passed - No active pregnancy on record        Passed - Normal serum creatinine on file in past 12 months     Recent Labs   Lab Test 06/16/21  1923 01/20/21  1950 10/27/20  1459   CR 0.54   < >  --    CREAT  --   --  0.7    < > = values in this interval not displayed.       Ok to refill medication if creatinine is low          Passed - No positive pregnancy test in past 12 months       Diuretics (Including Combos) Protocol Failed - 2/28/2022  9:32 PM        Failed - Normal serum potassium on file in past 12 months     Recent Labs   Lab Test 06/16/21 1923   POTASSIUM 3.3*                    Passed - Blood pressure under 140/90 in past 12 months     BP Readings from Last 3 Encounters:   06/16/21 123/72   03/11/21 138/88   01/22/21 122/74                 Passed - Recent (12 mo) or future (30 days) visit within the authorizing provider's specialty     Patient has had an office visit with the authorizing provider or a provider within the authorizing providers department within the previous 12 mos or has a future within next 30 days. See \"Patient Info\" tab in inbasket, or \"Choose Columns\" in Meds & Orders section of the refill encounter.              Passed - Medication is active on med list        Passed - Patient is age 18 or older        Passed - No active pregancy on record        Passed - Normal serum creatinine on file in past 12 " months     Recent Labs   Lab Test 06/16/21 1923   CR 0.54              Passed - Normal serum sodium on file in past 12 months     Recent Labs   Lab Test 06/16/21 1923                 Passed - No positive pregnancy test in past 12 months

## 2022-03-01 NOTE — TELEPHONE ENCOUNTER
Appointments in Next Year    May 16, 2022  9:30 AM  (Arrive by 9:10 AM)  Provider Visit with Shamir Angeles MD  [facility] ([facility] ) [phone]        Routing refill request to provider for review/approval because:  Mago given x1 already, pt has visit scheduled     Angelica Sykes RN  [facility]

## 2022-03-29 ENCOUNTER — E-VISIT (OUTPATIENT)
Dept: FAMILY MEDICINE | Facility: CLINIC | Age: 47
End: 2022-03-29
Payer: COMMERCIAL

## 2022-03-29 DIAGNOSIS — H01.002 BLEPHARITIS OF RIGHT LOWER EYELID, UNSPECIFIED TYPE: Primary | ICD-10-CM

## 2022-03-29 DIAGNOSIS — N18.2 CHRONIC KIDNEY DISEASE, STAGE 2 (MILD): ICD-10-CM

## 2022-03-29 PROCEDURE — 99421 OL DIG E/M SVC 5-10 MIN: CPT | Performed by: INTERNAL MEDICINE

## 2022-03-29 RX ORDER — ERYTHROMYCIN 5 MG/G
0.5 OINTMENT OPHTHALMIC AT BEDTIME
Qty: 3.5 G | Refills: 0 | Status: SHIPPED | OUTPATIENT
Start: 2022-03-29 | End: 2022-06-07

## 2022-03-29 NOTE — PATIENT INSTRUCTIONS
Thank you for choosing us for your care. I have placed an order for a prescription so that you can start treatment. View your full visit summary for details by clicking on the link below. Your pharmacist will able to address any questions you may have about the medication.     If you're not feeling better within 5-7 days, please schedule an appointment.  You can schedule an appointment right here in [facility], or call [phone]  If the visit is for the same symptoms as your eVisit, we'll refund the cost of your eVisit if seen within seven days.

## 2022-04-05 ENCOUNTER — HOSPITAL ENCOUNTER (EMERGENCY)
Facility: CLINIC | Age: 47
Discharge: HOME | End: 2022-04-05
Attending: EMERGENCY MEDICINE | Admitting: EMERGENCY MEDICINE
Payer: OTHER MISCELLANEOUS

## 2022-04-05 ENCOUNTER — APPOINTMENT (OUTPATIENT)
Dept: GENERAL RADIOLOGY | Facility: CLINIC | Age: 47
End: 2022-04-05
Attending: EMERGENCY MEDICINE
Payer: OTHER MISCELLANEOUS

## 2022-04-05 VITALS
RESPIRATION RATE: 19 BRPM | SYSTOLIC BLOOD PRESSURE: 149 MMHG | OXYGEN SATURATION: 98 % | BODY MASS INDEX: 36.44 KG/M2 | HEIGHT: 62 IN | HEART RATE: 89 BPM | WEIGHT: 198 LBS | DIASTOLIC BLOOD PRESSURE: 84 MMHG

## 2022-04-05 DIAGNOSIS — S69.92XA INJURY OF FINGER OF LEFT HAND, INITIAL ENCOUNTER: ICD-10-CM

## 2022-04-05 PROCEDURE — 99284 EMERGENCY DEPT VISIT MOD MDM: CPT | Mod: 25

## 2022-04-05 PROCEDURE — 73140 X-RAY EXAM OF FINGER(S): CPT | Mod: LT

## 2022-04-05 PROCEDURE — 29130 APPL FINGER SPLINT STATIC: CPT | Mod: F4

## 2022-04-05 ASSESSMENT — ENCOUNTER SYMPTOMS
WOUND: 1
ARTHRALGIAS: 1

## 2022-04-05 NOTE — Clinical Note
Kezia Nava was seen and treated in our emergency department on 4/5/2022.  She may return to work on 04/09/2022.       If you have any questions or concerns, please don't hesitate to call.      Gogo Miller MD
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
## 2022-04-06 ENCOUNTER — PATIENT OUTREACH (OUTPATIENT)
Dept: FAMILY MEDICINE | Facility: CLINIC | Age: 47
End: 2022-04-06
Payer: COMMERCIAL

## 2022-04-06 NOTE — TELEPHONE ENCOUNTER
What type of discharge? Emergency Department  Risk of Hospital admission or ED visit: 45%  Is a TCM episode required? No  When should the patient follow up with PCP? within 30 days of discharge.    Ifeanyi Thompson RN  [facility]

## 2022-04-07 ENCOUNTER — TRANSFERRED RECORDS (OUTPATIENT)
Dept: HEALTH INFORMATION MANAGEMENT | Facility: CLINIC | Age: 47
End: 2022-04-07
Payer: COMMERCIAL

## 2022-04-07 NOTE — TELEPHONE ENCOUNTER
"Appointments in Next Year    May 16, 2022  9:30 AM  (Arrive by 9:10 AM)  Provider Visit with Shamir Angeles MD  [facility] ([facility] ) [phone]        Patient Contact    Attempt # 1    Was call answered?  Yes     ED/Discharge Protocol    \"Hi, my name is Sarah Beth Hess RN, a registered nurse, and I am calling on behalf of Dr. Angeles's office at [place].  I am calling to follow up and see how things are going for you after your recent visit.\"    \"I see that you were in the (ER/UC/IP) on 4/5/22.    How are you doing now that you are home?\" sore, saw ortho today     Is patient experiencing symptoms that may require a hospital visit?  No     Discharge Instructions    \"Let's review your discharge instructions.  What is/are the follow-up recommendations?  Pt. Response: follow up with Ortho, limited weight     \"Were you instructed to make a follow-up appointment?\"  Pt. Response: No.       \"When you see the provider, I would recommend that you bring your discharge instructions with you.    Medications    \"How many new medications are you on since your hospitalization/ED visit?\"    0-1  \"How many of your current medicines changed (dose, timing, name, etc.) while you were in the hospital/ED visit?\"   0-1  \"Do you have questions about your medications?\"   No  \"Were you newly diagnosed with heart failure, COPD, diabetes or did you have a heart attack?\"   No  For patients on insulin: \"Did you start on insulin in the hospital or did you have your insulin dose changed?\"   No  Post Discharge Medication Reconciliation Status: discharge medications reconciled, continue medications without change.    Was MTM referral placed (*Make sure to put transitions as reason for referral)?   No    Call Summary    \"Do you have any questions or concerns about your condition or care plan at the moment?\"    No    Patient was in ER 3x in the past year (assess appropriateness of ER visits.)      \"If you " "have questions or things don't continue to improve, we encourage you contact us through the main clinic number,  ([phone]).  Even if the clinic is not open, triage nurses are available 24/7 to help you.     We would like you to know that our clinic has extended hours (provide information).  We also have urgent care (provide details on closest location and hours/contact info)\"      \"Thank you for your time and take care!\"    Sarah Beth LÓPEZ Triage RN  [facility]       "

## 2022-04-19 ENCOUNTER — TRANSFERRED RECORDS (OUTPATIENT)
Dept: HEALTH INFORMATION MANAGEMENT | Facility: CLINIC | Age: 47
End: 2022-04-19
Payer: COMMERCIAL

## 2022-04-19 LAB
RETINOPATHY: NEGATIVE
RETINOPATHY: NORMAL

## 2022-04-21 ENCOUNTER — TELEPHONE (OUTPATIENT)
Dept: FAMILY MEDICINE | Facility: CLINIC | Age: 47
End: 2022-04-21
Payer: COMMERCIAL

## 2022-04-21 NOTE — TELEPHONE ENCOUNTER
Please abstract the following data from this visit with this patient into the appropriate field in Epic:    Tests that can be patient reported without a hard copy:    Eye exam with ophthalmology on this date: 04/19/2022 at [facility]

## 2022-04-30 ENCOUNTER — HEALTH MAINTENANCE LETTER (OUTPATIENT)
Age: 47
End: 2022-04-30

## 2022-04-30 NOTE — PROGRESS NOTES
2010 - Bedside shift report received from [facility]     9396 - Vital signs assessed and WDL     2100 - patient refused snack      2142 - Scheduled medications administered. 3 units SSI administered for POC glucose 196    0100 - Patient resting quietly with no signs or symptoms of distress    0430 - Perineal care provided for incontinence of stool and urine. Patient resting quietly with hips and sacrum offloaded with pillows. Heels elevated. Wound care completed. Clinic Care Coordination Contact  [facility]/Voicemail    Referral Source: IP Report  Clinical Data: Care Coordinator Outreach    Chart Review: Referral from a discharge report.  [facility] 1/20/21- 1/22/21  IP for Epigastric pain from Gastritis and hiatal hernia.  Medication Changes- Yes  Follow up with PCP within 7 days    Outreach attempted x 1.  Left message on patient's voicemail with call back information and requested return call.  Plan:  Care Coordinator will try to reach patient again in 1-2 business days.    OLEGARIO Hood  Clinic Care Coordination  [facility] Clin[facility]: Carolin Dominguez, [place] [place], Women's, and [facility]  Phone: [phone]

## 2022-05-16 ENCOUNTER — OFFICE VISIT (OUTPATIENT)
Dept: FAMILY MEDICINE | Facility: CLINIC | Age: 47
End: 2022-05-16
Payer: COMMERCIAL

## 2022-05-16 ENCOUNTER — TELEPHONE (OUTPATIENT)
Dept: FAMILY MEDICINE | Facility: CLINIC | Age: 47
End: 2022-05-16

## 2022-05-16 VITALS
BODY MASS INDEX: 36.6 KG/M2 | DIASTOLIC BLOOD PRESSURE: 82 MMHG | OXYGEN SATURATION: 98 % | RESPIRATION RATE: 16 BRPM | SYSTOLIC BLOOD PRESSURE: 130 MMHG | TEMPERATURE: 96.8 F | WEIGHT: 200.1 LBS | HEART RATE: 63 BPM

## 2022-05-16 DIAGNOSIS — E11.65 TYPE 2 DIABETES MELLITUS WITH HYPERGLYCEMIA, WITHOUT LONG-TERM CURRENT USE OF INSULIN (H): Chronic | ICD-10-CM

## 2022-05-16 DIAGNOSIS — F43.21 SITUATIONAL DEPRESSION: ICD-10-CM

## 2022-05-16 DIAGNOSIS — I10 BENIGN ESSENTIAL HYPERTENSION: ICD-10-CM

## 2022-05-16 DIAGNOSIS — Z12.31 VISIT FOR SCREENING MAMMOGRAM: ICD-10-CM

## 2022-05-16 DIAGNOSIS — G47.00 INSOMNIA, UNSPECIFIED TYPE: ICD-10-CM

## 2022-05-16 DIAGNOSIS — E78.5 HYPERLIPIDEMIA LDL GOAL <100: Primary | ICD-10-CM

## 2022-05-16 LAB — HBA1C MFR BLD: 8.7 % (ref 0–5.6)

## 2022-05-16 PROCEDURE — 83036 HEMOGLOBIN GLYCOSYLATED A1C: CPT | Performed by: INTERNAL MEDICINE

## 2022-05-16 PROCEDURE — 90746 HEPB VACCINE 3 DOSE ADULT IM: CPT | Performed by: INTERNAL MEDICINE

## 2022-05-16 PROCEDURE — 99214 OFFICE O/P EST MOD 30 MIN: CPT | Mod: 25 | Performed by: INTERNAL MEDICINE

## 2022-05-16 PROCEDURE — 80048 BASIC METABOLIC PNL TOTAL CA: CPT | Performed by: INTERNAL MEDICINE

## 2022-05-16 PROCEDURE — 82043 UR ALBUMIN QUANTITATIVE: CPT | Performed by: INTERNAL MEDICINE

## 2022-05-16 PROCEDURE — 36415 COLL VENOUS BLD VENIPUNCTURE: CPT | Performed by: INTERNAL MEDICINE

## 2022-05-16 PROCEDURE — 80061 LIPID PANEL: CPT | Performed by: INTERNAL MEDICINE

## 2022-05-16 PROCEDURE — 84460 ALANINE AMINO (ALT) (SGPT): CPT | Performed by: INTERNAL MEDICINE

## 2022-05-16 PROCEDURE — 90471 IMMUNIZATION ADMIN: CPT | Performed by: INTERNAL MEDICINE

## 2022-05-16 RX ORDER — PEN NEEDLE, DIABETIC 32GX 5/32"
NEEDLE, DISPOSABLE MISCELLANEOUS
Qty: 100 EACH | Refills: 0 | Status: SHIPPED | OUTPATIENT
Start: 2022-05-16 | End: 2023-01-13

## 2022-05-16 RX ORDER — METFORMIN HCL 500 MG
TABLET, EXTENDED RELEASE 24 HR ORAL
Qty: 90 TABLET | Refills: 3 | Status: SHIPPED | OUTPATIENT
Start: 2022-05-16 | End: 2023-05-18

## 2022-05-16 RX ORDER — LOSARTAN POTASSIUM AND HYDROCHLOROTHIAZIDE 12.5; 1 MG/1; MG/1
1 TABLET ORAL DAILY
Qty: 90 TABLET | Refills: 3 | Status: SHIPPED | OUTPATIENT
Start: 2022-05-16 | End: 2022-09-12

## 2022-05-16 RX ORDER — ESCITALOPRAM OXALATE 10 MG/1
10 TABLET ORAL DAILY
Qty: 90 TABLET | Refills: 0 | Status: CANCELLED | OUTPATIENT
Start: 2022-05-16

## 2022-05-16 RX ORDER — SEMAGLUTIDE 1.34 MG/ML
INJECTION, SOLUTION SUBCUTANEOUS
Qty: 1.5 ML | Refills: 4 | Status: SHIPPED | OUTPATIENT
Start: 2022-05-16 | End: 2022-05-16

## 2022-05-16 RX ORDER — ESCITALOPRAM OXALATE 10 MG/1
10 TABLET ORAL DAILY
Qty: 90 TABLET | Refills: 3 | Status: SHIPPED | OUTPATIENT
Start: 2022-05-16 | End: 2022-09-12

## 2022-05-16 RX ORDER — LOSARTAN POTASSIUM AND HYDROCHLOROTHIAZIDE 12.5; 1 MG/1; MG/1
1 TABLET ORAL DAILY
Qty: 90 TABLET | Refills: 0 | Status: CANCELLED | OUTPATIENT
Start: 2022-05-16

## 2022-05-16 RX ORDER — METFORMIN HCL 500 MG
TABLET, EXTENDED RELEASE 24 HR ORAL
Qty: 90 TABLET | Refills: 0 | Status: CANCELLED | OUTPATIENT
Start: 2022-05-16

## 2022-05-16 ASSESSMENT — PAIN SCALES - GENERAL: PAINLEVEL: MILD PAIN (3)

## 2022-05-16 NOTE — TELEPHONE ENCOUNTER
semaglutide (OZEMPIC, 0.25 OR 0.5 MG/DOSE,) 2 MG/1.5ML SOPN pen 1.5 mL 4 5/16/2022  --   Sig: DOSE WILL BE TITRATED BY DIABETIC ED   Sent to pharmacy as: Ozempic (0.25 or 0.5 MG/DOSE) 2 MG/1.5ML Subcutaneous Solution Pen-injector (semaglutide)   Class: E-Prescribe   Order: [id number]   E-Prescribing Status: Receipt confirmed by pharmacy (5/16/2022  9:44 AM CDT)     Pharmacy    [facility] - [place], [place] - [address]     TO PCP:     Pharmacy called - states they cannot accept as the sig is written, pharmacist suggested you could put     0.25mg subcutaneous once a week, or as directed by diabetes educator     But they do need a dose/frequency listed    They are asking for new Rx     Sarah Beth LÓPEZ Triage RN  [facility]

## 2022-05-16 NOTE — PATIENT INSTRUCTIONS
Ozempic 0.25 mg titrate to 0.5 mg dose    [email or web address]    Assisting you on your journey toward optimal health and happiness!     [address] [facility] [place] [address] [phone]

## 2022-05-16 NOTE — NURSING NOTE
Prior to immunization administration, verified patients identity using patient s name and date of birth. Please see Immunization Activity for additional information.     Screening Questionnaire for Adult Immunization    Are you sick today?   No   Do you have allergies to medications, food, a vaccine component or latex?   Yes   Have you ever had a serious reaction after receiving a vaccination?   No   Do you have a long-term health problem with heart, lung, kidney, or metabolic disease (e.g., diabetes), asthma, a blood disorder, no spleen, complement component deficiency, a cochlear implant, or a spinal fluid leak?  Are you on long-term aspirin therapy?   Yes   Do you have cancer, leukemia, HIV/AIDS, or any other immune system problem?   No   Do you have a parent, brother, or sister with an immune system problem?   No   In the past 3 months, have you taken medications that affect  your immune system, such as prednisone, other steroids, or anticancer drugs; drugs for the treatment of rheumatoid arthritis, Crohn s disease, or psoriasis; or have you had radiation treatments?   No   Have you had a seizure, or a brain or other nervous system problem?   No   During the past year, have you received a transfusion of blood or blood    products, or been given immune (gamma) globulin or antiviral drug?   No   For women: Are you pregnant or is there a chance you could become       pregnant during the next month?   No   Have you received any vaccinations in the past 4 weeks?   No     Immunization questionnaire was positive for at least one answer.  Notified Dr. Angeles.        Per orders of Dr. Angeles, injection of Hep B given by Dorothy Cheung MA. Patient instructed to remain in clinic for 15 minutes afterwards, and to report any adverse reaction to me immediately.       Screening performed by Dorothy Cheung MA on 5/16/2022 at 10:27 AM.

## 2022-05-16 NOTE — PROGRESS NOTES
Assessment & Plan     Type 2 diabetes mellitus with hyperglycemia, without long-term current use of insulin (H)  We discussed about Ozempic orally and also injectable he and we will see what ever is more cost effective  She has lost 13 pounds and will help her more along with metformin to control diabetes better with weight loss  Feet are normal  - REVIEW OF HEALTH MAINTENANCE PROTOCOL ORDERS  - HEPATITIS B VACCINE,ADULT,IM  - HEMOGLOBIN A1C  - Albumin Random Urine Quantitative with Creat Ratio  - insulin pen needle (BD VALENTINA U/F) 32G X 4 MM miscellaneous  Dispense: 100 each; Refill: 0  - metFORMIN (GLUCOPHAGE XR) 500 MG 24 hr tablet  Dispense: 90 tablet; Refill: 3  - semaglutide (OZEMPIC, 0.25 OR 0.5 MG/DOSE,) 2 MG/1.5ML SOPN pen  Dispense: 1.5 mL; Refill: 4  - Semaglutide 3 MG TABS  Dispense: 90 tablet; Refill: 3  - HEMOGLOBIN A1C  - Albumin Random Urine Quantitative with Creat Ratio    Benign essential hypertension  We will check a microalbuminuria every year and continue Hyzaar  - REVIEW OF HEALTH MAINTENANCE PROTOCOL ORDERS  - BASIC METABOLIC PANEL  - losartan-hydrochlorothiazide (HYZAAR) 100-12.5 MG tablet  Dispense: 90 tablet; Refill: 3  - BASIC METABOLIC PANEL    Insomnia, unspecified type  That he is doing better but might need increasing the Lexapro  - REVIEW OF HEALTH MAINTENANCE PROTOCOL ORDERS  - escitalopram (LEXAPRO) 10 MG tablet  Dispense: 90 tablet; Refill: 3    Situational depression  As above  - REVIEW OF HEALTH MAINTENANCE PROTOCOL ORDERS  - escitalopram (LEXAPRO) 10 MG tablet  Dispense: 90 tablet; Refill: 3    Hyperlipidemia LDL goal <100  LDL Cholesterol Calculated   Date Value Ref Range Status   01/21/2021 60 <100 mg/dL Final     Comment:     Desirable:       <100 mg/dl     With 13 pounds weight loss we will check this again  - Lipid panel reflex to direct LDL Fasting  - ALT  - Lipid panel reflex to direct LDL Fasting  - ALT    Visit for screening mammogram    - MA Screening Digital  "Bilateral      956}     BMI:   Estimated body mass index is 36.6 kg/m  as calculated from the following:    Height as of 4/5/22: 1.575 m (5' 2\").    Weight as of this encounter: 90.8 kg (200 lb 1.6 oz).   Weight management plan: ozempic        Return in about 4 months (around 9/16/2022) for Routine Visit, Physical Exam, DM.    Shamir Angeles MD  [facility] ERENDIRA Oliva is a 46 year old who presents for the following health issues     HPI doing very well with 13 pounds weight loss  Fully immunized    Diabetes Follow-up    How often are you checking your blood sugar? A few times a week  What time of day are you checking your blood sugars (select all that apply)?  Before and after meals  Have you had any blood sugars above 200?  Yes   Have you had any blood sugars below 70?  No    What symptoms do you notice when your blood sugar is low?  None    What concerns do you have today about your diabetes? None and Blood sugar is often over 200     Do you have any of these symptoms? (Select all that apply)  No numbness or tingling in feet.  No redness, sores or blisters on feet.  No complaints of excessive thirst.  No reports of blurry vision.  No significant changes to weight.      BP Readings from Last 2 Encounters:   05/16/22 130/82   04/05/22 (!) 149/84     Hemoglobin A1C POCT (%)   Date Value   03/11/2021 8.5 (H)   01/21/2021 7.5 (H)     LDL Cholesterol Calculated (mg/dL)   Date Value   01/21/2021 60   07/24/2020 88                       Review of Systems   10 point ROS of systems including Constitutional, Eyes, Respiratory, Cardiovascular, Gastroenterology, Genitourinary, Integumentary, Muscularskeletal, Psychiatric were all negative except for pertinent positives noted in my HPI.        Objective    /82   Pulse 63   Temp 96.8  F (36  C) (Temporal)   Resp 16   Wt 90.8 kg (200 lb 1.6 oz)   LMP 06/20/2018 (Exact Date)   SpO2 98%   Breastfeeding No   BMI 36.60 kg/m    Body mass " index is 36.6 kg/m .  Physical Exam   GENERAL: healthy, alert and no distress  NEURO: Normal strength and tone, mentation intact and speech normal  PSYCH: mentation appears normal, affect normal/bright    Foot exam shows no ulceration, no neuropathy, microfilament well felt. Skin on the feet not dry.  Pulses in the feet well felt.      Patient Active Problem List   Diagnosis     Pain in joint, ankle and foot     Cervical radiculopathy     Benign essential hypertension     Type 2 diabetes mellitus with hyperglycemia, without long-term current use of insulin (H)     ANA (obstructive sleep apnea)     Acute bilateral low back pain with right-sided sciatica     Paresthesias- ? TIA vs complex migraine (preferred per neuro)     Severe obesity (BMI 35.0-35.9 with comorbidity) (H)     Pre-diabetes     Fibroid uterus     Epigastric pain     Elevated LFTs     Elevated lipase     Acute left-sided thoracic back pain     Chronic kidney disease, stage 2 (mild)     Current Outpatient Medications   Medication     Acetaminophen (TYLENOL PO)     blood glucose (NO BRAND SPECIFIED) lancets standard     blood glucose (NO BRAND SPECIFIED) test strip     blood glucose monitoring (NO BRAND SPECIFIED) meter device kit     cetirizine (ZYRTEC) 10 MG tablet     erythromycin (ROMYCIN) 5 MG/GM ophthalmic ointment     escitalopram (LEXAPRO) 10 MG tablet     insulin pen needle (BD VALENTINA U/F) 32G X 4 MM miscellaneous     losartan-hydrochlorothiazide (HYZAAR) 100-12.5 MG tablet     metFORMIN (GLUCOPHAGE XR) 500 MG 24 hr tablet     multivitamin w/minerals (THERA-VIT-M) tablet     omeprazole (PRILOSEC OTC) 20 MG EC tablet     ondansetron (ZOFRAN ODT) 4 MG PO ODT tab     semaglutide (OZEMPIC, 0.25 OR 0.5 MG/DOSE,) 2 MG/1.5ML SOPN pen     Semaglutide 3 MG TABS     STATIN NOT PRESCRIBED, INTENTIONAL,     VITAMIN D, CHOLECALCIFEROL, PO     No current facility-administered medications for this visit.

## 2022-05-17 LAB
ALT SERPL W P-5'-P-CCNC: 57 U/L (ref 0–50)
ANION GAP SERPL CALCULATED.3IONS-SCNC: 9 MMOL/L (ref 3–14)
BUN SERPL-MCNC: 12 MG/DL (ref 7–30)
CALCIUM SERPL-MCNC: 9.5 MG/DL (ref 8.5–10.1)
CHLORIDE BLD-SCNC: 106 MMOL/L (ref 94–109)
CHOLEST SERPL-MCNC: 201 MG/DL
CO2 SERPL-SCNC: 26 MMOL/L (ref 20–32)
CREAT SERPL-MCNC: 0.6 MG/DL (ref 0.52–1.04)
FASTING STATUS PATIENT QL REPORTED: YES
GFR SERPL CREATININE-BSD FRML MDRD: >90 ML/MIN/1.73M2
GLUCOSE BLD-MCNC: 161 MG/DL (ref 70–99)
HDLC SERPL-MCNC: 40 MG/DL
LDLC SERPL CALC-MCNC: 116 MG/DL
NONHDLC SERPL-MCNC: 161 MG/DL
POTASSIUM BLD-SCNC: 3.8 MMOL/L (ref 3.4–5.3)
SODIUM SERPL-SCNC: 141 MMOL/L (ref 133–144)
TRIGL SERPL-MCNC: 226 MG/DL

## 2022-05-17 NOTE — TELEPHONE ENCOUNTER
Dr. Angeles,    Pended up and just cross checking -  Pended what I've seen as normal titration dose of 0.25 mg once weekly for 4 weeks, and then switch to 0.5mg which fits perfectly with the starting pen (6 doses, 6 weeks).

## 2022-05-18 RX ORDER — SEMAGLUTIDE 1.34 MG/ML
INJECTION, SOLUTION SUBCUTANEOUS
Qty: 1.5 ML | Refills: 0 | Status: SHIPPED | OUTPATIENT
Start: 2022-05-18 | End: 2022-06-08

## 2022-05-18 NOTE — TELEPHONE ENCOUNTER
Received a call back from the patient's pharmacy regarding Ozempic dose..    Do you want 0.25 mg once a week for 4 weeks and then switch to 0.5 mg?    Routing to PCP for review.     Lula Medina RN

## 2022-05-19 LAB
CREAT UR-MCNC: 62 MG/DL
MICROALBUMIN UR-MCNC: 134 MG/L
MICROALBUMIN/CREAT UR: 216.13 MG/G CR (ref 0–25)

## 2022-05-20 NOTE — TELEPHONE ENCOUNTER
"Pharmacy was called with providers message. Tech states it has been \"profiled \"on pt's medications.   "

## 2022-05-31 ENCOUNTER — HOSPITAL ENCOUNTER (OUTPATIENT)
Dept: MAMMOGRAPHY | Facility: CLINIC | Age: 47
Discharge: HOME | End: 2022-05-31
Attending: INTERNAL MEDICINE | Admitting: INTERNAL MEDICINE
Payer: COMMERCIAL

## 2022-05-31 DIAGNOSIS — Z12.31 VISIT FOR SCREENING MAMMOGRAM: ICD-10-CM

## 2022-05-31 PROCEDURE — 77067 SCR MAMMO BI INCL CAD: CPT

## 2022-06-06 ENCOUNTER — LAB REQUISITION (OUTPATIENT)
Dept: LAB | Facility: CLINIC | Age: 47
End: 2022-06-06

## 2022-06-06 PROCEDURE — U0005 INFEC AGEN DETEC AMPLI PROBE: HCPCS | Performed by: INTERNAL MEDICINE

## 2022-06-07 LAB — SARS-COV-2 RNA RESP QL NAA+PROBE: POSITIVE

## 2022-06-07 NOTE — PROGRESS NOTES
Hazel is a 46 year old who is being evaluated via a billable video visit.      How would you like to obtain your AVS? MyChart  If the video visit is dropped, the invitation should be resent by: Text to cell phone: [phone]  Will anyone else be joining your video visit? No    Video Start Time: 1033    Assessment & Plan     Infection due to 2019 novel coronavirus    - nirmatrelvir and ritonavir (PAXLOVID) therapy pack; Take 3 tablets by mouth 2 times daily Monitor blood sugars  - albuterol (PROAIR HFA/PROVENTIL HFA/VENTOLIN HFA) 108 (90 Base) MCG/ACT inhaler; Inhale 2 puffs into the lungs every 6 hours    30 minutes spent on the date of the encounter doing chart review, history and exam, documentation and further activities per the note     See Patient Instructions: take medication as prescribed, monitor blood sugars more often, if feeling low blood sugar, eat something. Follow up for in person evaluation if symptoms worsen. Pt in agreement.     Return in about 1 week (around 6/15/2022), or if symptoms worsen or fail to improve.    EMILY GOULD NP  [facility] DIANE Oliva is a 46 year old who presents for the following health issues     HPI     Sx onset 6/6, positive covid test 6/6.  Medications she takes that interact with paxlovid- discussed diabetes meds can be increased, monitor for low blood sugar.  Discussed potential side effects- diarrhea, altered taste, body aches. Last GFR 5/22 was >90.   COVID-19 Symptom Review  How many days ago did these symptoms start? 6/6/22    Are any of the following symptoms significant for you?    New or worsening difficulty breathing? No    Worsening cough? Yes, it's a dry cough.     Fever or chills? Yes, the highest temperature was 101.3    Headache: no    Sore throat: YES    Chest pain: no    Diarrhea: no    Body aches? YES    What treatments has patient tried? Acetaminophen and ibu   Does patient live in a nursing home, group home, or  shelter? no  Does patient have a way to get food/medications during quarantined? Yes, I have a friend or family member who can help me.    Review of Systems   Constitutional, HEENT, cardiovascular, pulmonary, GI, , musculoskeletal, neuro, skin, endocrine and psych systems are negative, except as otherwise noted.      Objective           Vitals:  No vitals were obtained today due to virtual visit.    Physical Exam   GENERAL: Healthy, alert and no distress  EYES: Eyes grossly normal to inspection.  No discharge or erythema, or obvious scleral/conjunctival abnormalities.  RESP: No audible wheeze, cough, or visible cyanosis.  No visible retractions or increased work of breathing.    SKIN: Visible skin clear. No significant rash, abnormal pigmentation or lesions.  NEURO: Cranial nerves grossly intact.  Mentation and speech appropriate for age.  PSYCH: Mentation appears normal, affect normal/bright, judgement and insight intact, normal speech and appearance well-groomed.    See orders    Video-Visit Details    Type of service:  Video Visit    Video End Time:10:55 AM    Originating Location (pt. Location): Home    Distant Location (provider location):  working remote from home    Platform used for Video Visit: [employer]

## 2022-06-08 ENCOUNTER — VIRTUAL VISIT (OUTPATIENT)
Dept: FAMILY MEDICINE | Facility: CLINIC | Age: 47
End: 2022-06-08
Payer: COMMERCIAL

## 2022-06-08 DIAGNOSIS — U07.1 INFECTION DUE TO 2019 NOVEL CORONAVIRUS: Primary | ICD-10-CM

## 2022-06-08 PROCEDURE — 99214 OFFICE O/P EST MOD 30 MIN: CPT | Mod: GT | Performed by: NURSE PRACTITIONER

## 2022-06-08 RX ORDER — ALBUTEROL SULFATE 90 UG/1
2 AEROSOL, METERED RESPIRATORY (INHALATION) EVERY 6 HOURS
Qty: 18 G | Refills: 0 | Status: SHIPPED | OUTPATIENT
Start: 2022-06-08 | End: 2022-12-12

## 2022-09-12 ENCOUNTER — OFFICE VISIT (OUTPATIENT)
Dept: FAMILY MEDICINE | Facility: CLINIC | Age: 47
End: 2022-09-12
Payer: COMMERCIAL

## 2022-09-12 VITALS
RESPIRATION RATE: 18 BRPM | SYSTOLIC BLOOD PRESSURE: 123 MMHG | WEIGHT: 206.9 LBS | DIASTOLIC BLOOD PRESSURE: 83 MMHG | OXYGEN SATURATION: 98 % | HEART RATE: 87 BPM | BODY MASS INDEX: 38.07 KG/M2 | TEMPERATURE: 96.6 F | HEIGHT: 62 IN

## 2022-09-12 DIAGNOSIS — E11.65 TYPE 2 DIABETES MELLITUS WITH HYPERGLYCEMIA, WITHOUT LONG-TERM CURRENT USE OF INSULIN (H): ICD-10-CM

## 2022-09-12 DIAGNOSIS — N18.2 CHRONIC KIDNEY DISEASE, STAGE 2 (MILD): ICD-10-CM

## 2022-09-12 DIAGNOSIS — F43.21 SITUATIONAL DEPRESSION: ICD-10-CM

## 2022-09-12 DIAGNOSIS — I10 BENIGN ESSENTIAL HYPERTENSION: ICD-10-CM

## 2022-09-12 DIAGNOSIS — G47.00 INSOMNIA, UNSPECIFIED TYPE: ICD-10-CM

## 2022-09-12 DIAGNOSIS — Z00.00 ROUTINE GENERAL MEDICAL EXAMINATION AT A HEALTH CARE FACILITY: Primary | ICD-10-CM

## 2022-09-12 DIAGNOSIS — E66.01 SEVERE OBESITY (BMI 35.0-35.9 WITH COMORBIDITY) (H): ICD-10-CM

## 2022-09-12 DIAGNOSIS — K75.81 NASH (NONALCOHOLIC STEATOHEPATITIS): ICD-10-CM

## 2022-09-12 DIAGNOSIS — G93.31 POSTVIRAL FATIGUE SYNDROME: ICD-10-CM

## 2022-09-12 LAB
ALBUMIN SERPL-MCNC: 4.1 G/DL (ref 3.4–5)
ALP SERPL-CCNC: 63 U/L (ref 40–150)
ALT SERPL W P-5'-P-CCNC: 69 U/L (ref 0–50)
ANION GAP SERPL CALCULATED.3IONS-SCNC: 7 MMOL/L (ref 3–14)
AST SERPL W P-5'-P-CCNC: 46 U/L (ref 0–45)
BILIRUB SERPL-MCNC: 0.3 MG/DL (ref 0.2–1.3)
BUN SERPL-MCNC: 10 MG/DL (ref 7–30)
CALCIUM SERPL-MCNC: 9.8 MG/DL (ref 8.5–10.1)
CHLORIDE BLD-SCNC: 102 MMOL/L (ref 94–109)
CO2 SERPL-SCNC: 26 MMOL/L (ref 20–32)
CREAT SERPL-MCNC: 0.58 MG/DL (ref 0.52–1.04)
CREAT UR-MCNC: 215 MG/DL
GFR SERPL CREATININE-BSD FRML MDRD: >90 ML/MIN/1.73M2
GLUCOSE BLD-MCNC: 215 MG/DL (ref 70–99)
HBA1C MFR BLD: 8.9 % (ref 0–5.6)
HBV SURFACE AB SERPL IA-ACNC: >1000 M[IU]/ML
HBV SURFACE AB SERPL IA-ACNC: REACTIVE M[IU]/ML
MICROALBUMIN UR-MCNC: 2500 MG/L
MICROALBUMIN/CREAT UR: 1162.79 MG/G CR (ref 0–25)
POTASSIUM BLD-SCNC: 3.6 MMOL/L (ref 3.4–5.3)
PROT SERPL-MCNC: 8 G/DL (ref 6.8–8.8)
SODIUM SERPL-SCNC: 135 MMOL/L (ref 133–144)
TSH SERPL DL<=0.005 MIU/L-ACNC: 2.49 MU/L (ref 0.4–4)
VIT B12 SERPL-MCNC: 438 PG/ML (ref 232–1245)

## 2022-09-12 PROCEDURE — 82043 UR ALBUMIN QUANTITATIVE: CPT | Performed by: INTERNAL MEDICINE

## 2022-09-12 PROCEDURE — 99214 OFFICE O/P EST MOD 30 MIN: CPT | Mod: 25 | Performed by: INTERNAL MEDICINE

## 2022-09-12 PROCEDURE — 80053 COMPREHEN METABOLIC PANEL: CPT | Performed by: INTERNAL MEDICINE

## 2022-09-12 PROCEDURE — 36415 COLL VENOUS BLD VENIPUNCTURE: CPT | Performed by: INTERNAL MEDICINE

## 2022-09-12 PROCEDURE — 86706 HEP B SURFACE ANTIBODY: CPT | Performed by: INTERNAL MEDICINE

## 2022-09-12 PROCEDURE — 82607 VITAMIN B-12: CPT | Performed by: INTERNAL MEDICINE

## 2022-09-12 PROCEDURE — 84443 ASSAY THYROID STIM HORMONE: CPT | Performed by: INTERNAL MEDICINE

## 2022-09-12 PROCEDURE — 99396 PREV VISIT EST AGE 40-64: CPT | Performed by: INTERNAL MEDICINE

## 2022-09-12 PROCEDURE — 83036 HEMOGLOBIN GLYCOSYLATED A1C: CPT | Performed by: INTERNAL MEDICINE

## 2022-09-12 RX ORDER — ESCITALOPRAM OXALATE 10 MG/1
5 TABLET ORAL DAILY
Qty: 90 TABLET | Refills: 3 | COMMUNITY
Start: 2022-09-12 | End: 2023-04-06

## 2022-09-12 RX ORDER — TRAZODONE HYDROCHLORIDE 50 MG/1
50-100 TABLET, FILM COATED ORAL AT BEDTIME
Qty: 180 TABLET | Refills: 3 | Status: SHIPPED | OUTPATIENT
Start: 2022-09-12 | End: 2023-10-03

## 2022-09-12 RX ORDER — LOSARTAN POTASSIUM AND HYDROCHLOROTHIAZIDE 12.5; 1 MG/1; MG/1
1 TABLET ORAL DAILY
Qty: 90 TABLET | Refills: 3 | Status: SHIPPED | OUTPATIENT
Start: 2022-09-12 | End: 2023-01-13

## 2022-09-12 ASSESSMENT — ENCOUNTER SYMPTOMS
BREAST MASS: 0
HEMATOCHEZIA: 0
CHILLS: 0
COUGH: 0
CONSTIPATION: 0
DYSURIA: 0
ABDOMINAL PAIN: 0
HEADACHES: 0
DIZZINESS: 0
EYE PAIN: 0
HEMATURIA: 0
ARTHRALGIAS: 1
FEVER: 0
NERVOUS/ANXIOUS: 0
FREQUENCY: 0
PALPITATIONS: 0
WEAKNESS: 0
DIARRHEA: 1
PARESTHESIAS: 0
HEARTBURN: 1
NAUSEA: 0
MYALGIAS: 0
SORE THROAT: 0
SHORTNESS OF BREATH: 0
JOINT SWELLING: 0

## 2022-09-12 ASSESSMENT — PAIN SCALES - GENERAL: PAINLEVEL: MODERATE PAIN (5)

## 2022-09-12 NOTE — PROGRESS NOTES
SUBJECTIVE:   CC: Kezia Nava is an 46 year old woman who presents for preventive health visit.     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  She does not have other complaints but feels extremely fatigue  She has been unable to lose weight  She works on her farm and keeps quite busy  She is not able to sleep very well because of stress but depression is controlled    Patient has been advised of split billing requirements and indicates understanding: Yes  Healthy Habits:     Getting at least 3 servings of Calcium per day:  NO    Bi-annual eye exam:  Yes    Dental care twice a year:  NO    Sleep apnea or symptoms of sleep apnea:  Sleep apnea    Diet:  Diabetic    Frequency of exercise:  None    Taking medications regularly:  No    Barriers to taking medications:  None    Medication side effects:  None    PHQ-2 Total Score: 0    Additional concerns today:  No              Today's PHQ-2 Score:   PHQ-2 ( 1999 Pfizer) 9/12/2022   Q1: Little interest or pleasure in doing things 0   Q2: Feeling down, depressed or hopeless 0   PHQ-2 Score 0   PHQ-2 Total Score (12-17 Years)- Positive if 3 or more points; Administer PHQ-A if positive -   Q1: Little interest or pleasure in doing things Not at all   Q2: Feeling down, depressed or hopeless Not at all   PHQ-2 Score 0       Abuse: Current or Past (Physical, Sexual or Emotional) - No  Do you feel safe in your environment? Yes        Social History     Tobacco Use     Smoking status: Never Smoker     Smokeless tobacco: Never Used   Substance Use Topics     Alcohol use: Yes     Alcohol/week: 0.0 standard drinks     Comment: 0-1 drinks per week     If you drink alcohol do you typically have >3 drinks per day or >7 drinks per week? No    Alcohol Use  9/12/2022   Prescreen: >3 drinks/day or >7 drinks/week? No   Prescreen: >3 drinks/day or >7 drinks/week? -       Reviewed orders with patient.  Reviewed health maintenance and updated orders accordingly - Yes  BP Readings from Last 3 Encounters:   09/12/22 123/83   05/16/22 130/82   04/05/22 (!) 149/84    Wt Readings from Last 3 Encounters:   09/12/22 93.8 kg (206 lb 14.4 oz)   05/16/22 90.8 kg (200 lb 1.6 oz)   04/05/22 89.8 kg (198 lb)                  Patient Active Problem List   Diagnosis     Pain in joint, ankle and foot     Cervical radiculopathy     Benign essential hypertension     Type 2 diabetes mellitus with hyperglycemia, without long-term current use of insulin (H)     ANA (obstructive sleep apnea)     Acute bilateral low back pain with right-sided sciatica     Paresthesias- ? TIA vs complex migraine (preferred per neuro)     Severe obesity (BMI 35.0-35.9 with comorbidity) (H)     Pre-diabetes     Fibroid uterus     Epigastric pain     Elevated LFTs     Elevated lipase     Acute left-sided thoracic back pain     Chronic kidney disease, stage 2 (mild)     Past Surgical History:   Procedure Laterality Date     ARTHROSCOPY ANKLE, OPEN REPAIR TENDON, COMBINED  11/8/2012    Procedure: COMBINED ARTHROSCOPY ANKLE, OPEN REPAIR TENDON;  Ankle Arthroscopy Left Ankle, Open Ligament Repair Left Ankle, Peroneal Tendon Repair Left Ankle ;  Surgeon: Otf Ramos DPM;  Location:  OR     CARPAL TUNNEL RELEASE RT/LT       COMBINED CYSTOSCOPY, INSERT STENT URETER(S)  8/6/2013    Procedure: COMBINED CYSTOSCOPY, INSERT STENT URETER(S);  cystoscopy, right retrograde,RIGHT STENT PLACEMENT.;  Surgeon: Kan Porter MD;  Location:  OR     CYSTOSCOPY, RETROGRADES, INSERT STENT URETER(S), COMBINED  8/6/2013    Procedure: COMBINED CYSTOSCOPY, RETROGRADES, INSERT STENT URETER(S);  cystoscopy, right retrograde, insert stent right ureter;  Surgeon: Kan Porter MD;  Location:  OR     DENTAL  SURGERY      TOOTH#7 - DENTAL IMPLANT     DISCECTOMY, FUSION CERVICAL ANTERIOR ONE LEVEL, COMBINED N/A 12/13/2015    Procedure: COMBINED DISCECTOMY, FUSION CERVICAL ANTERIOR ONE LEVEL;  Surgeon: Seven Umaña MD;  Location:  OR     ESOPHAGOSCOPY, GASTROSCOPY, DUODENOSCOPY (EGD), COMBINED N/A 1/21/2021    Procedure: ESOPHAGOGASTRODUODENOSCOPY, WITH BIOPSY;  Surgeon: Crystal Chacon MD;  Location:  GI     EXTRACORPOREAL SHOCK WAVE LITHOTRIPSY (ESWL)  8/19/2013    Procedure: EXTRACORPOREAL SHOCK WAVE LITHOTRIPSY (ESWL);   RIGHT EXTRACORPOREAL SHOCK WAVE LITHOTRIPSY;  Surgeon: Otf Tobias MD;  Location:  OR     HC REDUCTION OF LARGE BREAST Bilateral 2003     HYSTERECTOMY, PAP NO LONGER INDICATED  2019     LAPAROSCOPIC HYSTERECTOMY TOTAL, SALPINGECTOMY Left 5/29/2019    Procedure: single site total LAPAROSCOPIC HYSTERECTOMY, left oophorectomy and lysis of adhesions;  Surgeon: Pauline Horowitz MD;  Location: [facility] OR     LASIK BILATERAL       SALPINGO OOPHORECTOMY,R/L/DERREK Right 05/17/2006    Right     ZZC APPENDECTOMY  1984       Social History     Tobacco Use     Smoking status: Never Smoker     Smokeless tobacco: Never Used   Substance Use Topics     Alcohol use: Yes     Alcohol/week: 0.0 standard drinks     Comment: 0-1 drinks per week     Family History   Problem Relation Age of Onset     C.A.D. Mother         stents     Hypertension Mother      Kidney Disease Mother         transplant     Cerebrovascular Disease Mother 77     Hypertension Father      Breast Cancer Maternal Aunt         may have been fibrocystic     Breast Cancer Maternal Aunt         may have been fibrocystic     Cerebrovascular Disease Paternal Grandfather      Cancer Paternal Grandmother         stomach     Breast Cancer Sister 43     Nephrolithiasis Brother      Uterine Cancer Maternal Grandmother 44     Colon Cancer Maternal Grandmother 64     Prostate Cancer Maternal Uncle 50     Breast Cancer Cousin 41         maternal first cousin, triple negative breast cancer     Breast Cancer Paternal Aunt          Current Outpatient Medications   Medication Sig Dispense Refill     Acetaminophen (TYLENOL PO) Take 500-1,000 mg by mouth every 6 hours as needed As needed for pain       albuterol (PROAIR HFA/PROVENTIL HFA/VENTOLIN HFA) 108 (90 Base) MCG/ACT inhaler Inhale 2 puffs into the lungs every 6 hours 18 g 0     blood glucose (NO BRAND SPECIFIED) lancets standard Use to test blood sugar 1 times daily or as directed. 100 each 3     blood glucose (NO BRAND SPECIFIED) test strip Use to test blood sugar 1 times daily or as directed. 100 strip 3     blood glucose monitoring (NO BRAND SPECIFIED) meter device kit Use to test blood sugar as directed. 1 kit 0     cetirizine (ZYRTEC) 10 MG tablet Take 10 mg by mouth daily       escitalopram (LEXAPRO) 10 MG tablet Take 0.5 tablets (5 mg) by mouth daily 90 tablet 3     esomeprazole (NEXIUM) 20 MG DR capsule Take 20 mg by mouth every morning (before breakfast) Take 30-60 minutes before eating.       insulin pen needle (BD VALNETINA U/F) 32G X 4 MM miscellaneous USE ONCE DAILY AS DIRECTED 100 each 0     losartan-hydrochlorothiazide (HYZAAR) 100-12.5 MG tablet Take 1 tablet by mouth daily 90 tablet 3     metFORMIN (GLUCOPHAGE XR) 500 MG 24 hr tablet TAKE ONE TABLET BY MOUTH ONCE DAILY WITH DINNER 90 tablet 3     multivitamin w/minerals (THERA-VIT-M) tablet Take 1 tablet by mouth daily       ondansetron (ZOFRAN ODT) 4 MG PO ODT tab Take 1-2 tablets (4-8 mg) by mouth every 8 hours as needed for nausea 12 tablet 1     Semaglutide 3 MG TABS Take 3 mg by mouth daily 90 tablet 3     Semaglutide 7 MG TABS Take 7 mg by mouth daily 90 tablet 3     STATIN NOT PRESCRIBED, INTENTIONAL, Please choose reason not prescribed, below       traZODone (DESYREL) 50 MG tablet Take 1-2 tablets ( mg) by mouth At Bedtime 180 tablet 3     VITAMIN D, CHOLECALCIFEROL, PO Take 10,000 Units by mouth daily        Allergies    Allergen Reactions     Lisinopril Cough     Pneumovax [Pneumococcal Polysaccharides] Other (See Comments)     Red streaking and pain       Breast Cancer Screening:    FHS-7:   Breast CA Risk Assessment (FHS-7) 5/31/2022   Did any of your first-degree relatives have breast or ovarian cancer? Yes   Did any of your relatives have bilateral breast cancer? No   Did any man in your family have breast cancer? No   Did any woman in your family have breast and ovarian cancer? No   Did any woman in your family have breast cancer before age 50 y? Yes   Do you have 2 or more relatives with breast and/or ovarian cancer? Yes   Do you have 2 or more relatives with breast and/or bowel cancer? Yes       Mammogram Screening - Offered annual screening and updated Health Maintenance for [place] plan based on risk factor consideration    Pertinent mammograms are reviewed under the imaging tab.    History of abnormal Pap smear: Status post benign hysterectomy. Health Maintenance and Surgical History updated.  PAP / HPV 8/29/2014 1/19/2011 5/28/2008   PAP (Historical) NIL NIL NIL     Reviewed and updated as needed this visit by clinical staff   Tobacco  Allergies  Meds   Med Hx   Fam Hx            Reviewed and updated as needed this visit by Provider   Tobacco   Meds   Med Hx   Fam Hx               Review of Systems   Constitutional: Negative for chills and fever.   HENT: Negative for congestion, ear pain, hearing loss and sore throat.    Eyes: Negative for pain and visual disturbance.   Respiratory: Negative for cough and shortness of breath.    Cardiovascular: Negative for chest pain, palpitations and peripheral edema.   Gastrointestinal: Positive for diarrhea and heartburn. Negative for abdominal pain, constipation, hematochezia and nausea.   Breasts:  Negative for tenderness, breast mass and discharge.   Genitourinary: Positive for urgency. Negative for dysuria, frequency, genital sores, hematuria, pelvic pain, vaginal  "bleeding and vaginal discharge.   Musculoskeletal: Positive for arthralgias. Negative for joint swelling and myalgias.   Skin: Negative for rash.   Neurological: Negative for dizziness, weakness, headaches and paresthesias.   Psychiatric/Behavioral: Negative for mood changes. The patient is not nervous/anxious.         OBJECTIVE:   /83   Pulse 87   Temp (!) 96.6  F (35.9  C) (Temporal)   Resp 18   Ht 1.567 m (5' 1.69\")   Wt 93.8 kg (206 lb 14.4 oz)   LMP 06/20/2018 (Exact Date)   SpO2 98%   Breastfeeding No   BMI 38.22 kg/m    Physical Exam  GENERAL APPEARANCE: healthy, alert and no distress  EYES: Eyes grossly normal to inspection, PERRL and conjunctivae and sclerae normal  HENT: ear canals and TM's normal, nose and mouth without ulcers or lesions, oropharynx clear and oral mucous membranes moist  NECK: no adenopathy, no asymmetry, masses, or scars and thyroid normal to palpation  RESP: lungs clear to auscultation - no rales, rhonchi or wheezes  BREAST bilateral breast reduction scar: normal without masses, tenderness or nipple discharge and no palpable axillary masses or adenopathy  CV: regular rate and rhythm, normal S1 S2, no S3 or S4, no murmur, click or rub, no peripheral edema and peripheral pulses strong  ABDOMEN: soft, nontender, no hepatosplenomegaly, no masses and bowel sounds normal  MS: no musculoskeletal defects are noted and gait is age appropriate without ataxia  SKIN: no suspicious lesions or rashes  NEURO: Normal strength and tone, sensory exam grossly normal, mentation intact and speech normal  PSYCH: mentation appears normal and affect normal/bright        ASSESSMENT/PLAN:   Kezia was seen today for physical.    Diagnoses and all orders for this visit:    Routine general medical examination at a health care facility  Very pleasant 46 years old who had hysterectomy but needs mammogram  Vaccination updated as needed and exercise and diet is recommended  Type 2 diabetes mellitus " with hyperglycemia, without long-term current use of insulin (H)  We will increase the dose of semaglutide but may need to consider bariatric surgery  -     Hemoglobin A1c; Future  -     Semaglutide 7 MG TABS; Take 7 mg by mouth daily  -     Albumin Random Urine Quantitative with Creat Ratio; Future  -     Cardiac Rehab Referral; Future  -     Hepatitis B Surface Antibody; Future  -     Hemoglobin A1c  -     Albumin Random Urine Quantitative with Creat Ratio  -     Hepatitis B Surface Antibody    ROTHMAN (nonalcoholic steatohepatitis) statins and weight loss is recommended  -     Comprehensive metabolic panel; Future  -     Cardiac Rehab Referral; Future  -     Comprehensive metabolic panel    Chronic kidney disease, stage 2 (mild)  -     Cardiac Rehab Referral; Future  Has microalbumin urea  Benign essential hypertension  -     losartan-hydrochlorothiazide (HYZAAR) 100-12.5 MG tablet; Take 1 tablet by mouth daily  Excellent blood pressure control  Severe obesity (BMI 35.0-35.9 with comorbidity) (H)  -     Cardiac Rehab Referral; Future    Insomnia, unspecified type  -     traZODone (DESYREL) 50 MG tablet; Take 1-2 tablets ( mg) by mouth At Bedtime    Situational depression  -     traZODone (DESYREL) 50 MG tablet; Take 1-2 tablets ( mg) by mouth At Bedtime  We will reduce Lexapro to 5 mg  Postviral fatigue syndrome  Fatigue may be related to recent COVID infection and she concurs and she will start cardiac rehab [facility] program  -     Vitamin B12; Future  -     TSH with free T4 reflex; Future  -     traZODone (DESYREL) 50 MG tablet; Take 1-2 tablets ( mg) by mouth At Bedtime  -     Cardiac Rehab Referral; Future  -     Hepatitis B Surface Antibody; Future  -     Vitamin B12  -     TSH with free T4 reflex  -     Hepatitis B Surface Antibody            COUNSELING:  Influenza and COVID booster recommended    Estimated body mass index is 38.22 kg/m  as calculated from the following:    Height as of this  "encounter: 1.567 m (5' 1.69\").    Weight as of this encounter: 93.8 kg (206 lb 14.4 oz).        She reports that she has never smoked. She has never used smokeless tobacco.      Counseling Resources:  ATP IV Guidelines  Pooled Cohorts Equation Calculator  Breast Cancer Risk Calculator  BRCA-Related Cancer Risk Assessment: FHS-7 Tool  FRAX Risk Assessment  ICSI Preventive Guidelines  Dietary Guidelines for Americans, 2010  USDA's MyPlate  ASA Prophylaxis  Lung CA Screening    Shamir Angeles MD  [facility]  "

## 2022-09-14 ENCOUNTER — TELEPHONE (OUTPATIENT)
Dept: FAMILY MEDICINE | Facility: CLINIC | Age: 47
End: 2022-09-14

## 2022-09-14 NOTE — TELEPHONE ENCOUNTER
Sent Ester dorseyg.  Irene Ramsey, PharmD, [facility]  Medication Therapy Management Provider  Pager: [phone]

## 2022-09-14 NOTE — TELEPHONE ENCOUNTER
MTM referral from: [facility] visit (referral by provider)    MTM referral outreach attempt #2 on September 14, 2022 at 9:59 AM      Outcome: Patient not reachable after several attempts, will route to [facility] Pharmacist/Provider as an FYI.  [facility] scheduling number is [phone].  Thank you for the referral.    Willem Pal [facility]

## 2022-11-02 ENCOUNTER — LAB REQUISITION (OUTPATIENT)
Dept: LAB | Facility: CLINIC | Age: 47
End: 2022-11-02

## 2022-11-02 LAB — SARS-COV-2 RNA RESP QL NAA+PROBE: POSITIVE

## 2022-11-02 PROCEDURE — U0005 INFEC AGEN DETEC AMPLI PROBE: HCPCS | Performed by: INTERNAL MEDICINE

## 2022-11-04 ENCOUNTER — VIRTUAL VISIT (OUTPATIENT)
Dept: FAMILY MEDICINE | Facility: CLINIC | Age: 47
End: 2022-11-04
Payer: COMMERCIAL

## 2022-11-04 DIAGNOSIS — I10 BENIGN ESSENTIAL HYPERTENSION: ICD-10-CM

## 2022-11-04 DIAGNOSIS — U07.1 INFECTION DUE TO 2019 NOVEL CORONAVIRUS: Primary | ICD-10-CM

## 2022-11-04 DIAGNOSIS — E11.65 TYPE 2 DIABETES MELLITUS WITH HYPERGLYCEMIA, WITHOUT LONG-TERM CURRENT USE OF INSULIN (H): ICD-10-CM

## 2022-11-04 PROCEDURE — 99214 OFFICE O/P EST MOD 30 MIN: CPT | Mod: CS | Performed by: INTERNAL MEDICINE

## 2022-11-04 RX ORDER — BENZONATATE 100 MG/1
100 CAPSULE ORAL 3 TIMES DAILY PRN
Qty: 30 CAPSULE | Refills: 0 | Status: SHIPPED | OUTPATIENT
Start: 2022-11-04 | End: 2022-12-12

## 2022-11-04 RX ORDER — GLIMEPIRIDE 2 MG/1
2 TABLET ORAL
Qty: 31 TABLET | Refills: 3 | Status: SHIPPED | OUTPATIENT
Start: 2022-11-04 | End: 2023-04-10

## 2022-11-04 NOTE — PROGRESS NOTES
Hazel is a 46 year old who is being evaluated via a billable video visit.      How would you like to obtain your AVS? MyChart  If the video visit is dropped, the invitation should be resent by: Text to cell phone: [phone]  Will anyone else be joining your video visit? No          Assessment & Plan     Infection due to 2019 novel coronavirus  We discussed the treatment options for the patient and although this is the fifth day she sounds pretty sick and is high risk and this may still help her  She is also encouraged to hydrate and she will have to reassess whether she can go to work on Monday  - nirmatrelvir and ritonavir (PAXLOVID) therapy pack  Dispense: 30 tablet; Refill: 0  - benzonatate (TESSALON) 100 MG capsule  Dispense: 30 capsule; Refill: 0    Type 2 diabetes mellitus with hyperglycemia, without long-term current use of insulin (H)    We discussed the side effects and told her not to go hungry on this medication to avoid hypoglycemia  - glimepiride (AMARYL) 2 MG tablet  Dispense: 31 tablet; Refill: 3    Benign essential hypertension  Patient will continue losartan and hydrochlorothiazide        Shamir Angeles MD  [facility]    Dhara Oliva is a 46 year old, presenting for the following health issues:  Covid Concern      HPI   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 11/02/2022    Are any of the following symptoms significant for you?  New or worsening difficulty breathing? Yes    Please describe what kind of difficulty you are having breathing:Mild dyspnea (able to do ADLs without difficulty,  mild shortness of breath with activities such as climbing one or two flights of stairs or walking briskly)    Worsening cough? Yes, I am coughing up mucus.    Fever or chills? Yes, I felt feverish or had chills.    Headache: YES    Sore throat: YES    Chest pain: No    Diarrhea: No    Body aches? YES    What treatments has patient tried? Acetaminophen, Antihistamines  and Ibuprofen   Does patient live in a nursing home, group home, or shelter? No  Does patient have a way to get food/medications during quarantined? Yes, I have a friend or family member who can help me.                  Review of Systems   10 point ROS of systems including Constitutional, Eyes, Respiratory, Cardiovascular, Gastroenterology, Genitourinary, Integumentary, Muscularskeletal, Psychiatric were all negative except for pertinent positives noted in my HPI.        Objective           Vitals:  No vitals were obtained today due to virtual visit.    Physical Exam   GENERAL: healthy, alert and moderate distress  EYES: Eyes grossly normal to inspection.  No discharge or erythema, or obvious scleral/conjunctival abnormalities.  RESP: No audible wheeze, cough, or visible cyanosis.  No visible retractions or increased work of breathing.    SKIN: Visible skin clear. No significant rash, abnormal pigmentation or lesions.  NEURO: Cranial nerves grossly intact.  Mentation and speech appropriate for age.  PSYCH: Mentation appears normal, affect normal/bright, judgement and insight intact, normal speech and appearance well-groomed.    Patient Active Problem List   Diagnosis     Pain in joint, ankle and foot     Cervical radiculopathy     Benign essential hypertension     Type 2 diabetes mellitus with hyperglycemia, without long-term current use of insulin (H)     ANA (obstructive sleep apnea)     Acute bilateral low back pain with right-sided sciatica     Paresthesias- ? TIA vs complex migraine (preferred per neuro)     Severe obesity (BMI 35.0-35.9 with  comorbidity) (H)     Pre-diabetes     Fibroid uterus     Epigastric pain     Elevated LFTs     Elevated lipase     Acute left-sided thoracic back pain     Chronic kidney disease, stage 2 (mild)     .  Current Outpatient Medications   Medication     Acetaminophen (TYLENOL PO)     albuterol (PROAIR HFA/PROVENTIL HFA/VENTOLIN HFA) 108 (90 Base) MCG/ACT inhaler     benzonatate (TESSALON) 100 MG capsule     blood glucose (NO BRAND SPECIFIED) lancets standard     blood glucose (NO BRAND SPECIFIED) test strip     blood glucose monitoring (NO BRAND SPECIFIED) meter device kit     cetirizine (ZYRTEC) 10 MG tablet     escitalopram (LEXAPRO) 10 MG tablet     esomeprazole (NEXIUM) 20 MG DR capsule     glimepiride (AMARYL) 2 MG tablet     insulin pen needle (BD VALENTINA U/F) 32G X 4 MM miscellaneous     losartan-hydrochlorothiazide (HYZAAR) 100-12.5 MG tablet     metFORMIN (GLUCOPHAGE XR) 500 MG 24 hr tablet     multivitamin w/minerals (THERA-VIT-M) tablet     nirmatrelvir and ritonavir (PAXLOVID) therapy pack     ondansetron (ZOFRAN ODT) 4 MG PO ODT tab     Semaglutide 3 MG TABS     Semaglutide 7 MG TABS     STATIN NOT PRESCRIBED, INTENTIONAL,     traZODone (DESYREL) 50 MG tablet     VITAMIN D, CHOLECALCIFEROL, PO     No current facility-administered medications for this visit.                   Video-Visit Details    Video Start Time: 2.002    Type of service:  Video Visit    Video End Time:2.22    Originating Location (pt. Location): Home        Distant Location (provider location):  Off-site    Platform used for Video Visit: Wojciech

## 2022-11-11 ENCOUNTER — MYC MEDICAL ADVICE (OUTPATIENT)
Dept: FAMILY MEDICINE | Facility: CLINIC | Age: 47
End: 2022-11-11

## 2022-11-11 DIAGNOSIS — U07.1 INFECTION DUE TO 2019 NOVEL CORONAVIRUS: ICD-10-CM

## 2022-11-14 RX ORDER — BENZONATATE 100 MG/1
100 CAPSULE ORAL 3 TIMES DAILY PRN
Qty: 30 CAPSULE | Refills: 0 | Status: SHIPPED | OUTPATIENT
Start: 2022-11-14 | End: 2022-12-12

## 2022-11-14 NOTE — TELEPHONE ENCOUNTER
Routing this health care professionals message to Dr. Bridgette Rivas Covid 11-2-22  Virtual visit 11-4-22

## 2022-11-20 ENCOUNTER — HEALTH MAINTENANCE LETTER (OUTPATIENT)
Age: 47
End: 2022-11-20

## 2022-12-12 ENCOUNTER — ANCILLARY PROCEDURE (OUTPATIENT)
Dept: GENERAL RADIOLOGY | Facility: CLINIC | Age: 47
End: 2022-12-12
Payer: COMMERCIAL

## 2022-12-12 ENCOUNTER — OFFICE VISIT (OUTPATIENT)
Dept: INTERNAL MEDICINE | Facility: CLINIC | Age: 47
End: 2022-12-12
Payer: COMMERCIAL

## 2022-12-12 VITALS
SYSTOLIC BLOOD PRESSURE: 126 MMHG | BODY MASS INDEX: 36.99 KG/M2 | HEIGHT: 62 IN | TEMPERATURE: 98.5 F | DIASTOLIC BLOOD PRESSURE: 84 MMHG | HEART RATE: 78 BPM | RESPIRATION RATE: 20 BRPM | OXYGEN SATURATION: 97 % | WEIGHT: 201 LBS

## 2022-12-12 DIAGNOSIS — U07.1 INFECTION DUE TO 2019 NOVEL CORONAVIRUS: ICD-10-CM

## 2022-12-12 DIAGNOSIS — R05.3 CHRONIC COUGH: Primary | ICD-10-CM

## 2022-12-12 LAB
ERYTHROCYTE [DISTWIDTH] IN BLOOD BY AUTOMATED COUNT: 13.2 % (ref 10–15)
HCT VFR BLD AUTO: 37.2 % (ref 35–47)
HGB BLD-MCNC: 12.8 G/DL (ref 11.7–15.7)
MCH RBC QN AUTO: 28.6 PG (ref 26.5–33)
MCHC RBC AUTO-ENTMCNC: 34.4 G/DL (ref 31.5–36.5)
MCV RBC AUTO: 83 FL (ref 78–100)
PLATELET # BLD AUTO: 228 10E3/UL (ref 150–450)
RBC # BLD AUTO: 4.48 10E6/UL (ref 3.8–5.2)
WBC # BLD AUTO: 5.7 10E3/UL (ref 4–11)

## 2022-12-12 PROCEDURE — 71046 X-RAY EXAM CHEST 2 VIEWS: CPT | Mod: TC

## 2022-12-12 PROCEDURE — 85027 COMPLETE CBC AUTOMATED: CPT

## 2022-12-12 PROCEDURE — 99213 OFFICE O/P EST LOW 20 MIN: CPT

## 2022-12-12 PROCEDURE — 36415 COLL VENOUS BLD VENIPUNCTURE: CPT

## 2022-12-12 RX ORDER — ALBUTEROL SULFATE 90 UG/1
2 AEROSOL, METERED RESPIRATORY (INHALATION) EVERY 6 HOURS
Qty: 18 G | Refills: 0 | Status: SHIPPED | OUTPATIENT
Start: 2022-12-12 | End: 2024-04-02

## 2022-12-12 NOTE — PROGRESS NOTES
"  Assessment & Plan     (R05.3) Chronic cough  (primary encounter diagnosis)  Comment: Had Covid in June of 2022 and again recently in November of 2022. She c/o dry cough since June which worsened after getting Covid a second time last month. She was prescribed tessalon pearls but has not had much symptom relief. She was prescribed albuterol back in June of 2022 with first bout of Covid but has not used it at all recently. Cough is worse at night, often waking her up at night. Denies any fevers, chills, productive coughing, postnasal drainage, sore throat, rhinorrhea. Throughout visit today, patient had multiple coughing fits, likely being exacerbated by conversing. Lung sounds clear upon exam.     Upon chart review, I see she was given Paxlovid about 4-5 weeks ago. Thus, she could also be suffering from rebound-Covid.     Plan: XR Chest 2 Views, General PFT Lab (Please         always keep checked), albuterol (PROAIR         HFA/PROVENTIL HFA/VENTOLIN HFA) 108 (90 Base)         MCG/ACT inhaler, CBC with platelets          Plan:  -Chest X-ray  -PFTs  -Start using albuterol q6hrs PRN for coughing   -Recommend she uses VICKS OTC  -Will think about potential referral to Pulmonary Medicine       (U07.1) Infection due to 2019 novel coronavirus  Comment: See details above.            BMI:   Estimated body mass index is 37.06 kg/m  as calculated from the following:    Height as of this encounter: 1.568 m (5' 1.75\").    Weight as of this encounter: 91.2 kg (201 lb).   Weight management plan: Discussed healthy diet and exercise guidelines      No follow-ups on file.    Isabelle Stevens, SALLIE CNP  [facility] JOHN Oliva is a 46 year old, presenting for the following health issues:  No chief complaint on file.      History of Present Illness       Reason for visit:  Continued cough    She eats 2-3 servings of fruits and vegetables daily.She consumes 0 sweetened beverage(s) daily.She " exercises with enough effort to increase her heart rate 9 or less minutes per day.  She exercises with enough effort to increase her heart rate 3 or less days per week.   She is taking medications regularly.             Objective    LMP 06/20/2018 (Exact Date)   There is no height or weight on file to calculate BMI.  Physical Exam  Constitutional:       Appearance: Normal appearance. She is not ill-appearing or diaphoretic.   HENT:      Head: Normocephalic and atraumatic.      Nose: No congestion or rhinorrhea.      Mouth/Throat:      Pharynx: No oropharyngeal exudate or posterior oropharyngeal erythema.   Eyes:      Conjunctiva/sclera: Conjunctivae normal.   Cardiovascular:      Rate and Rhythm: Regular rhythm.      Heart sounds: Normal heart sounds.   Pulmonary:      Effort: Pulmonary effort is normal. No respiratory distress.      Breath sounds: Normal breath sounds. No wheezing.   Neurological:      Mental Status: She is alert and oriented to person, place, and time.   Psychiatric:         Mood and Affect: Mood normal.         Behavior: Behavior normal.         Thought Content: Thought content normal.         Judgment: Judgment normal.

## 2022-12-14 RX ORDER — CODEINE PHOSPHATE AND GUAIFENESIN 10; 100 MG/5ML; MG/5ML
1-2 SOLUTION ORAL 2 TIMES DAILY PRN
Qty: 118 ML | Refills: 0 | Status: SHIPPED | OUTPATIENT
Start: 2022-12-14 | End: 2023-01-13

## 2022-12-14 RX ORDER — PREDNISONE 10 MG/1
TABLET ORAL
Qty: 30 TABLET | Refills: 0 | Status: SHIPPED | OUTPATIENT
Start: 2022-12-14 | End: 2022-12-24

## 2023-01-10 ENCOUNTER — TRANSFERRED RECORDS (OUTPATIENT)
Dept: HEALTH INFORMATION MANAGEMENT | Facility: CLINIC | Age: 48
End: 2023-01-10

## 2023-01-13 ENCOUNTER — OFFICE VISIT (OUTPATIENT)
Dept: FAMILY MEDICINE | Facility: CLINIC | Age: 48
End: 2023-01-13
Payer: COMMERCIAL

## 2023-01-13 VITALS
TEMPERATURE: 96.8 F | RESPIRATION RATE: 20 BRPM | HEIGHT: 62 IN | HEART RATE: 75 BPM | DIASTOLIC BLOOD PRESSURE: 85 MMHG | OXYGEN SATURATION: 98 % | WEIGHT: 208 LBS | BODY MASS INDEX: 38.28 KG/M2 | SYSTOLIC BLOOD PRESSURE: 138 MMHG

## 2023-01-13 DIAGNOSIS — Z23 HIGH PRIORITY FOR 2019-NCOV VACCINE: ICD-10-CM

## 2023-01-13 DIAGNOSIS — N18.2 CHRONIC KIDNEY DISEASE, STAGE 2 (MILD): ICD-10-CM

## 2023-01-13 DIAGNOSIS — G93.31 POSTVIRAL FATIGUE SYNDROME: ICD-10-CM

## 2023-01-13 DIAGNOSIS — R05.3 CHRONIC COUGH: ICD-10-CM

## 2023-01-13 DIAGNOSIS — G47.33 OSA (OBSTRUCTIVE SLEEP APNEA): Chronic | ICD-10-CM

## 2023-01-13 DIAGNOSIS — E11.21 TYPE 2 DIABETES MELLITUS WITH DIABETIC NEPHROPATHY, WITHOUT LONG-TERM CURRENT USE OF INSULIN (H): Primary | ICD-10-CM

## 2023-01-13 DIAGNOSIS — K21.00 GASTROESOPHAGEAL REFLUX DISEASE WITH ESOPHAGITIS WITHOUT HEMORRHAGE: ICD-10-CM

## 2023-01-13 DIAGNOSIS — E78.5 HYPERLIPIDEMIA LDL GOAL <100: ICD-10-CM

## 2023-01-13 DIAGNOSIS — R79.89 ELEVATED LFTS: ICD-10-CM

## 2023-01-13 DIAGNOSIS — I10 BENIGN ESSENTIAL HYPERTENSION: Chronic | ICD-10-CM

## 2023-01-13 LAB
ALBUMIN SERPL BCG-MCNC: 4.6 G/DL (ref 3.5–5.2)
ALP SERPL-CCNC: 43 U/L (ref 35–104)
ALT SERPL W P-5'-P-CCNC: 51 U/L (ref 10–35)
ANION GAP SERPL CALCULATED.3IONS-SCNC: 17 MMOL/L (ref 7–15)
AST SERPL W P-5'-P-CCNC: 42 U/L (ref 10–35)
BILIRUB SERPL-MCNC: 0.4 MG/DL
BUN SERPL-MCNC: 6.6 MG/DL (ref 6–20)
CALCIUM SERPL-MCNC: 10.2 MG/DL (ref 8.6–10)
CHLORIDE SERPL-SCNC: 104 MMOL/L (ref 98–107)
CHOLEST SERPL-MCNC: 195 MG/DL
CREAT SERPL-MCNC: 0.64 MG/DL (ref 0.51–0.95)
CREAT UR-MCNC: 139 MG/DL
DEPRECATED HCO3 PLAS-SCNC: 22 MMOL/L (ref 22–29)
ERYTHROCYTE [DISTWIDTH] IN BLOOD BY AUTOMATED COUNT: 13.7 % (ref 10–15)
GFR SERPL CREATININE-BSD FRML MDRD: >90 ML/MIN/1.73M2
GLUCOSE SERPL-MCNC: 152 MG/DL (ref 70–99)
HBA1C MFR BLD: 7.3 % (ref 0–5.6)
HCT VFR BLD AUTO: 39.2 % (ref 35–47)
HDLC SERPL-MCNC: 39 MG/DL
HGB BLD-MCNC: 12.9 G/DL (ref 11.7–15.7)
LDLC SERPL CALC-MCNC: 97 MG/DL
MCH RBC QN AUTO: 27.7 PG (ref 26.5–33)
MCHC RBC AUTO-ENTMCNC: 32.9 G/DL (ref 31.5–36.5)
MCV RBC AUTO: 84 FL (ref 78–100)
MICROALBUMIN UR-MCNC: 264 MG/L
MICROALBUMIN/CREAT UR: 189.93 MG/G CR (ref 0–25)
NONHDLC SERPL-MCNC: 156 MG/DL
PLATELET # BLD AUTO: 221 10E3/UL (ref 150–450)
POTASSIUM SERPL-SCNC: 4 MMOL/L (ref 3.4–5.3)
PROT SERPL-MCNC: 7.5 G/DL (ref 6.4–8.3)
RBC # BLD AUTO: 4.65 10E6/UL (ref 3.8–5.2)
SODIUM SERPL-SCNC: 143 MMOL/L (ref 136–145)
TRIGL SERPL-MCNC: 294 MG/DL
WBC # BLD AUTO: 5 10E3/UL (ref 4–11)

## 2023-01-13 PROCEDURE — 83036 HEMOGLOBIN GLYCOSYLATED A1C: CPT | Performed by: INTERNAL MEDICINE

## 2023-01-13 PROCEDURE — 0124A COVID-19 VACCINE BIVALENT BOOSTER 12+ (PFIZER): CPT | Performed by: INTERNAL MEDICINE

## 2023-01-13 PROCEDURE — 85027 COMPLETE CBC AUTOMATED: CPT | Performed by: INTERNAL MEDICINE

## 2023-01-13 PROCEDURE — 36415 COLL VENOUS BLD VENIPUNCTURE: CPT | Performed by: INTERNAL MEDICINE

## 2023-01-13 PROCEDURE — 80061 LIPID PANEL: CPT | Performed by: INTERNAL MEDICINE

## 2023-01-13 PROCEDURE — 91312 COVID-19 VACCINE BIVALENT BOOSTER 12+ (PFIZER): CPT | Performed by: INTERNAL MEDICINE

## 2023-01-13 PROCEDURE — 80053 COMPREHEN METABOLIC PANEL: CPT | Performed by: INTERNAL MEDICINE

## 2023-01-13 PROCEDURE — 99214 OFFICE O/P EST MOD 30 MIN: CPT | Mod: 25 | Performed by: INTERNAL MEDICINE

## 2023-01-13 PROCEDURE — 82570 ASSAY OF URINE CREATININE: CPT | Performed by: INTERNAL MEDICINE

## 2023-01-13 PROCEDURE — 82043 UR ALBUMIN QUANTITATIVE: CPT | Performed by: INTERNAL MEDICINE

## 2023-01-13 RX ORDER — LOSARTAN POTASSIUM AND HYDROCHLOROTHIAZIDE 12.5; 1 MG/1; MG/1
1 TABLET ORAL DAILY
Qty: 90 TABLET | Refills: 3 | Status: SHIPPED | OUTPATIENT
Start: 2023-01-13 | End: 2024-03-18

## 2023-01-13 RX ORDER — SPIRONOLACTONE 25 MG/1
25 TABLET ORAL DAILY
Qty: 90 TABLET | Refills: 3 | Status: SHIPPED | OUTPATIENT
Start: 2023-01-13 | End: 2023-12-26

## 2023-01-13 RX ORDER — FLUTICASONE PROPIONATE AND SALMETEROL XINAFOATE 115; 21 UG/1; UG/1
2 AEROSOL, METERED RESPIRATORY (INHALATION) 2 TIMES DAILY
Qty: 12 G | Refills: 11 | Status: SHIPPED | OUTPATIENT
Start: 2023-01-13 | End: 2024-01-22

## 2023-01-13 RX ORDER — LANSOPRAZOLE 30 MG/1
30 CAPSULE, DELAYED RELEASE ORAL DAILY
Qty: 90 CAPSULE | Refills: 3 | Status: SHIPPED | OUTPATIENT
Start: 2023-01-13 | End: 2023-12-26

## 2023-01-13 ASSESSMENT — PAIN SCALES - GENERAL: PAINLEVEL: NO PAIN (0)

## 2023-01-13 NOTE — PROGRESS NOTES
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  She has heavy microalbuminuria and we need to absolutely control her diabetic values*  - Hemoglobin A1c  - Semaglutide 14 MG TABS  Dispense: 90 tablet; Refill: 3  - Albumin Random Urine Quantitative with Creat Ratio  - blood glucose (NO BRAND SPECIFIED) test strip  Dispense: 100 strip; Refill: 3    Benign essential hypertension  We will add spironolactone and check kidney functions again in 2 weeks  - losartan-hydrochlorothiazide (HYZAAR) 100-12.5 MG tablet  Dispense: 90 tablet; Refill: 3    Chronic cough  This is since COVID in June and she may need CT chest  She has GERD and we will add Prevacid and Advair and she will update me  - LANsoprazole (PREVACID) 30 MG DR capsule  Dispense: 90 capsule; Refill: 3  - fluticasone-salmeterol (ADVAIR HFA) 115-21 MCG/ACT inhaler  Dispense: 12 g; Refill: 11  - CBC with platelets    Chronic kidney disease, stage 2 (mild)  Over 1000 mcg albuminuria and needs to be addressed as above  - spironolactone (ALDACTONE) 25 MG tablet  Dispense: 90 tablet; Refill: 3  - **Comprehensive metabolic panel FUTURE 2mo    Elevated LFTs  Due to ROTHMAN and working on weight loss  - **Comprehensive metabolic panel FUTURE 2mo    ANA (obstructive sleep apnea)  Uses CPAP    Postviral fatigue syndrome  After 2 COVID infections and improving    Gastroesophageal reflux disease with esophagitis without hemorrhage    - LANsoprazole (PREVACID) 30 MG DR capsule  Dispense: 90 capsule; Refill: 3  - CBC with platelets    Hyperlipidemia LDL goal <100  Cannot tolerate statins but we will do CT calcium score at some point  - Lipid panel reflex to direct LDL Fasting  - COMPREHENSIVE METABOLIC  "PANEL    High priority for 2019-nCoV vaccine    - COVID-19,PF,PFIZER BOOSTER BIVALENT 12+Yrs                   Return in about 3 months (around 4/13/2023) for DM, virtual visit.    Shamir Angeles MD  [facility] ERENDIRA Oliva is a 47 year old, presenting for the following health issues:  Diabetes (Follow up) and Imm/Inj (COVID-19 VACCINE)      HPI     Diabetes Follow-up    How often are you checking your blood sugar? A few times a month  What time of day are you checking your blood sugars (select all that apply)?  Before meals, After meals and At bedtime  Have you had any blood sugars above 200?  No  Have you had any blood sugars below 70?  No    What symptoms do you notice when your blood sugar is low?  Shaky    What concerns do you have today about your diabetes? None     Do you have any of these symptoms? (Select all that apply)  No numbness or tingling in feet.  No redness, sores or blisters on feet.  No complaints of excessive thirst.  No reports of blurry vision.  No significant changes to weight.      BP Readings from Last 2 Encounters:   01/13/23 138/85   12/12/22 126/84     Hemoglobin A1C (%)   Date Value   09/12/2022 8.9 (H)   05/16/2022 8.7 (H)   03/11/2021 8.5 (H)   01/21/2021 7.5 (H)     LDL Cholesterol Calculated (mg/dL)   Date Value   05/16/2022 116 (H)   01/21/2021 60   07/24/2020 88                 Patient continues to cough she continues to remain tired  She is not short of breath  There is no fever chills or sinus congestion  She does have heartburn  Mental health is stable  Review of Systems   10 point ROS of systems including Constitutional, Eyes, Respiratory, Cardiovascular, Gastroenterology, Genitourinary, Integumentary, Muscularskeletal, Psychiatric were all negative except for pertinent positives noted in my HPI.        Objective    /85   Pulse 75   Temp 96.8  F (36  C) (Temporal)   Resp 20   Ht 1.568 m (5' 1.75\")   Wt 94.3 kg (208 lb)   LMP 06/20/2018 " (Exact Date)   SpO2 98%   BMI 38.35 kg/m    Body mass index is 38.35 kg/m .  Physical Exam   GENERAL: healthy, alert and no distress  HENT: ear canals and TM's normal, nose and mouth without ulcers or lesions  NECK: no adenopathy, no asymmetry, masses, or scars and thyroid normal to palpation  RESP: lungs clear to auscultation - no rales, rhonchi or wheezes  CV: regular rate and rhythm, normal S1 S2, no S3 or S4, no murmur, click or rub, no peripheral edema and peripheral pulses strong  NEURO: Normal strength and tone, mentation intact and speech normal  PSYCH: mentation appears normal, affect normal/bright                Current Outpatient Medications   Medication     albuterol (PROAIR HFA/PROVENTIL HFA/VENTOLIN HFA) 108 (90 Base) MCG/ACT inhaler     blood glucose (NO BRAND SPECIFIED) lancets standard     blood glucose (NO BRAND SPECIFIED) test strip     blood glucose monitoring (NO BRAND SPECIFIED) meter device kit     cetirizine (ZYRTEC) 10 MG tablet     escitalopram (LEXAPRO) 10 MG tablet     fluticasone-salmeterol (ADVAIR HFA) 115-21 MCG/ACT inhaler     glimepiride (AMARYL) 2 MG tablet     LANsoprazole (PREVACID) 30 MG DR capsule     losartan-hydrochlorothiazide (HYZAAR) 100-12.5 MG tablet     metFORMIN (GLUCOPHAGE XR) 500 MG 24 hr tablet     multivitamin w/minerals (THERA-VIT-M) tablet     Semaglutide 14 MG TABS     spironolactone (ALDACTONE) 25 MG tablet     STATIN NOT PRESCRIBED, INTENTIONAL,     traZODone (DESYREL) 50 MG tablet     VITAMIN D, CHOLECALCIFEROL, PO     No current facility-administered medications for this visit.     Patient Active Problem List   Diagnosis     Pain in joint, ankle and foot     Cervical radiculopathy     Benign essential hypertension     Type 2 diabetes mellitus with hyperglycemia, without long-term current use of insulin (H)     ANA (obstructive sleep apnea)     Acute bilateral low back pain with right-sided sciatica     Paresthesias- ? TIA vs complex migraine (preferred per  neuro)     Severe obesity (BMI 35.0-35.9 with comorbidity) (H)     Pre-diabetes     Fibroid uterus     Epigastric pain     Elevated LFTs     Elevated lipase     Acute left-sided thoracic back pain     Chronic kidney disease, stage 2 (mild)     Current Outpatient Medications   Medication     albuterol (PROAIR HFA/PROVENTIL HFA/VENTOLIN HFA) 108 (90 Base) MCG/ACT inhaler     blood glucose (NO BRAND SPECIFIED) lancets standard     blood glucose (NO BRAND SPECIFIED) test strip     blood glucose monitoring (NO BRAND SPECIFIED) meter device kit     cetirizine (ZYRTEC) 10 MG tablet     escitalopram (LEXAPRO) 10 MG tablet     fluticasone-salmeterol (ADVAIR HFA) 115-21 MCG/ACT inhaler     glimepiride (AMARYL) 2 MG tablet     LANsoprazole (PREVACID) 30 MG DR capsule     losartan-hydrochlorothiazide (HYZAAR) 100-12.5 MG tablet     metFORMIN (GLUCOPHAGE XR) 500 MG 24 hr tablet     multivitamin w/minerals (THERA-VIT-M) tablet     Semaglutide 14 MG TABS     spironolactone (ALDACTONE) 25 MG tablet     STATIN NOT PRESCRIBED, INTENTIONAL,     traZODone (DESYREL) 50 MG tablet     VITAMIN D, CHOLECALCIFEROL, PO     No current facility-administered medications for this visit.

## 2023-01-13 NOTE — NURSING NOTE
"    Initial BP:  BP (!) 146/93   Pulse 75   Temp 96.8  F (36  C) (Temporal)   Resp 20   Ht 1.568 m (5' 1.75\")   Wt 94.3 kg (208 lb)   LMP 06/20/2018 (Exact Date)   SpO2 98%   BMI 38.35 kg/m       75  Disposition: provider notified while patient in the clinic      Dari Calderon CMA    "

## 2023-01-18 DIAGNOSIS — E11.65 TYPE 2 DIABETES MELLITUS WITH HYPERGLYCEMIA, WITHOUT LONG-TERM CURRENT USE OF INSULIN (H): ICD-10-CM

## 2023-01-18 DIAGNOSIS — E11.21 TYPE 2 DIABETES MELLITUS WITH DIABETIC NEPHROPATHY, WITHOUT LONG-TERM CURRENT USE OF INSULIN (H): ICD-10-CM

## 2023-02-14 ENCOUNTER — LAB (OUTPATIENT)
Dept: LAB | Facility: CLINIC | Age: 48
End: 2023-02-14
Payer: COMMERCIAL

## 2023-02-14 DIAGNOSIS — N18.2 CHRONIC KIDNEY DISEASE, STAGE 2 (MILD): ICD-10-CM

## 2023-02-14 DIAGNOSIS — R79.89 ELEVATED LFTS: ICD-10-CM

## 2023-02-14 LAB
ALBUMIN SERPL BCG-MCNC: 4.7 G/DL (ref 3.5–5.2)
ALP SERPL-CCNC: 41 U/L (ref 35–104)
ALT SERPL W P-5'-P-CCNC: 45 U/L (ref 10–35)
ANION GAP SERPL CALCULATED.3IONS-SCNC: 16 MMOL/L (ref 7–15)
AST SERPL W P-5'-P-CCNC: 42 U/L (ref 10–35)
BILIRUB SERPL-MCNC: 0.3 MG/DL
BUN SERPL-MCNC: 9.4 MG/DL (ref 6–20)
CALCIUM SERPL-MCNC: 10.1 MG/DL (ref 8.6–10)
CHLORIDE SERPL-SCNC: 103 MMOL/L (ref 98–107)
CREAT SERPL-MCNC: 0.71 MG/DL (ref 0.51–0.95)
DEPRECATED HCO3 PLAS-SCNC: 22 MMOL/L (ref 22–29)
GFR SERPL CREATININE-BSD FRML MDRD: >90 ML/MIN/1.73M2
GLUCOSE SERPL-MCNC: 100 MG/DL (ref 70–99)
POTASSIUM SERPL-SCNC: 3.9 MMOL/L (ref 3.4–5.3)
PROT SERPL-MCNC: 7.6 G/DL (ref 6.4–8.3)
SODIUM SERPL-SCNC: 141 MMOL/L (ref 136–145)

## 2023-02-14 PROCEDURE — 80053 COMPREHEN METABOLIC PANEL: CPT

## 2023-02-14 PROCEDURE — 36415 COLL VENOUS BLD VENIPUNCTURE: CPT

## 2023-02-19 ENCOUNTER — TRANSFERRED RECORDS (OUTPATIENT)
Dept: HEALTH INFORMATION MANAGEMENT | Facility: CLINIC | Age: 48
End: 2023-02-19

## 2023-02-19 ENCOUNTER — HOSPITAL ENCOUNTER (EMERGENCY)
Facility: CLINIC | Age: 48
Discharge: HOME | End: 2023-02-19
Attending: EMERGENCY MEDICINE | Admitting: EMERGENCY MEDICINE
Payer: COMMERCIAL

## 2023-02-19 VITALS
OXYGEN SATURATION: 99 % | HEIGHT: 62 IN | RESPIRATION RATE: 18 BRPM | SYSTOLIC BLOOD PRESSURE: 139 MMHG | WEIGHT: 200 LBS | DIASTOLIC BLOOD PRESSURE: 84 MMHG | TEMPERATURE: 98.2 F | HEART RATE: 80 BPM | BODY MASS INDEX: 36.8 KG/M2

## 2023-02-19 DIAGNOSIS — M54.12 CERVICAL RADICULOPATHY: ICD-10-CM

## 2023-02-19 PROCEDURE — 99283 EMERGENCY DEPT VISIT LOW MDM: CPT

## 2023-02-19 RX ORDER — OXYCODONE HYDROCHLORIDE 5 MG/1
5 TABLET ORAL EVERY 6 HOURS PRN
Qty: 12 TABLET | Refills: 0 | Status: SHIPPED | OUTPATIENT
Start: 2023-02-19 | End: 2023-02-22

## 2023-02-19 ASSESSMENT — ENCOUNTER SYMPTOMS
BACK PAIN: 1
NUMBNESS: 1
COUGH: 0
FEVER: 0

## 2023-02-19 NOTE — ED TRIAGE NOTES
Upper back pain, noticed left arm starting to go numb. Pt reports this is the same way it felt to the right arm when her disc was herniated.

## 2023-02-19 NOTE — ED PROVIDER NOTES
History     Chief Complaint:  Back Pain and Numbness       HPI   Kezia Nava is a 47 year old female with a history of spinal pain who presents with back pain and numbness. Patient reports there being pain in between her shoulder blades that radiate to her neck and arms causing numbness. She notes the pain has been lasting for the past 3 days, and has had similar symptoms in the past. She reports having spine surgery 5 years ago in which she recovered fairly well. She denies any recent trauma, fall, or illness. Patient took Ibuprofen and Tylenol around 10:30 AM and reports it did nothing for her pain. She reports it hurts to take a deep breath. Denies chest pain, cough, fever, and is not on any blood thinners. Patient notes having a bad reaction to Gabapentin.     Independent Historian:   Patient     ROS:  Review of Systems   Constitutional: Negative for fever.   Respiratory: Negative for cough.    Cardiovascular: Negative for chest pain.   Musculoskeletal: Positive for back pain.   Neurological: Positive for numbness.   All other systems reviewed and are negative.    Allergies:  Lisinopril  Pneumovax    Medications:    Proair  Lexapro  Advair HFA  Amaryl  Prevacid  Hyzaar  Glucophage XR  Semaglutide  Aldactone  Desyrel    Past Medical History:    Bronchitis  Diffuse cystic mastopathy  Hypertension   Mesenteric adenitis  Migraines  Nephrolithiasis  Sleep apnea  Thyroiditis   Diabetes, type II    Past Surgical History:    Appendectomy  Reduction of large breast  Salpingo oophorectomy   Arthroscopy ankle, open repair tendon, combined   Combined Cystoscopy, Insert Stent Ureter(s)  Dental surgery  ESWL  Discectomy, fusion cervical anterior one level, combined  Lasik bilateral   Hysterectomy  Carpal tunnel release  EGD     Family History:    Father: hypertension   Mother: CAD, hypertension, Kidney disease, cerebrovascular disease  Sister(s): Breast cancer  Brother(s): Nephrolithiasis    Social History:  Patient  "arrived via private vehicle to the ED.   PCP: Shamir Angeles     Physical Exam     Patient Vitals for the past 24 hrs:   BP Temp Temp src Pulse Resp SpO2 Height Weight   02/19/23 1403 139/84 98.2  F (36.8  C) Temporal 80 18 99 % 1.58 m (5' 2.21\") 90.7 kg (200 lb)      Physical Exam   SKIN:  Warm, dry.  HEMATOLOGIC/IMMUNOLOGIC/LYMPHATIC:  No pallor.  HENT: Active range of motion of head and neck exacerbates the neck pain and pain.  Cervical spine palpably nontender.  EYES:  Conjunctivae normal.  CARDIOVASCULAR:  Regular rate and rhythm.  RESPIRATORY:  No respiratory distress.  MUSCULOSKELETAL: Palpation of the right upper back between the spine and scapula exacerbates the pain.  Active range of motion of the upper extremities also exacerbates the neck pain.  Forced resistance of right upper limb also exacerbates neck and arm discomfort.  NEUROLOGIC:  Alert, conversant.  No gross motor or tactile sensory deficit of the upper limbs.  Difficult to elicit right brachial deep tendon reflex.  PSYCHIATRIC:  Normal mood.  Appears to be in pain.    Emergency Department Course     Emergency Department Course & Assessments:    Interventions:  Medications - No data to display     Independent Interpretation (X-rays, CTs, rhythm strip):    Consultations/Discussion of Management or Tests:     Social Determinants of Health affecting care:   None    Assessments:  1406 I obtained history and examined the patient as noted above.     Disposition:  The patient was discharged to home.     Impression & Plan    Medical Decision Making:  This patient presents with classic symptoms of cervical radiculopathy.  There are no red flag features on exam or history to prompt emergency testing.  I prescribed a course of oxycodone, 12 tablets, and advised spine clinic follow-up for further care.      Diagnosis:    ICD-10-CM    1. Cervical radiculopathy  M54.12            Discharge Medications:  Discharge Medication List as of 2/19/2023  2:31 PM    "   START taking these medications    Details   oxyCODONE (ROXICODONE) 5 MG tablet Take 1 tablet (5 mg) by mouth every 6 hours as needed for pain, Disp-12 tablet, R-0, Local Print              Scribe Disclosure:  I, ABNER OSPINA, am serving as a scribe at 3:44 PM on 2/19/2023 to document services personally performed by No att. providers found based on my observations and the provider's statements to me.     2/19/2023   Robby Bentley MD Moe, James Thomas, MD  02/19/23 1522

## 2023-03-22 ENCOUNTER — TRANSFERRED RECORDS (OUTPATIENT)
Dept: HEALTH INFORMATION MANAGEMENT | Facility: CLINIC | Age: 48
End: 2023-03-22
Payer: COMMERCIAL

## 2023-03-22 LAB — RETINOPATHY: NEGATIVE

## 2023-04-06 ENCOUNTER — VIRTUAL VISIT (OUTPATIENT)
Dept: FAMILY MEDICINE | Facility: CLINIC | Age: 48
End: 2023-04-06
Payer: COMMERCIAL

## 2023-04-06 DIAGNOSIS — G47.00 INSOMNIA, UNSPECIFIED TYPE: ICD-10-CM

## 2023-04-06 DIAGNOSIS — E66.01 SEVERE OBESITY (BMI 35.0-35.9 WITH COMORBIDITY) (H): ICD-10-CM

## 2023-04-06 DIAGNOSIS — E83.52 HYPERCALCEMIA: ICD-10-CM

## 2023-04-06 DIAGNOSIS — E11.21 TYPE 2 DIABETES MELLITUS WITH DIABETIC NEPHROPATHY, WITHOUT LONG-TERM CURRENT USE OF INSULIN (H): ICD-10-CM

## 2023-04-06 DIAGNOSIS — G47.33 OSA (OBSTRUCTIVE SLEEP APNEA): Chronic | ICD-10-CM

## 2023-04-06 DIAGNOSIS — F43.21 SITUATIONAL DEPRESSION: ICD-10-CM

## 2023-04-06 DIAGNOSIS — E11.65 TYPE 2 DIABETES MELLITUS WITH HYPERGLYCEMIA, WITHOUT LONG-TERM CURRENT USE OF INSULIN (H): Chronic | ICD-10-CM

## 2023-04-06 DIAGNOSIS — R79.89 ELEVATED LFTS: Primary | ICD-10-CM

## 2023-04-06 DIAGNOSIS — N18.2 CHRONIC KIDNEY DISEASE, STAGE 2 (MILD): ICD-10-CM

## 2023-04-06 DIAGNOSIS — I10 BENIGN ESSENTIAL HYPERTENSION: Chronic | ICD-10-CM

## 2023-04-06 PROCEDURE — 99214 OFFICE O/P EST MOD 30 MIN: CPT | Mod: VID | Performed by: INTERNAL MEDICINE

## 2023-04-06 RX ORDER — ESCITALOPRAM OXALATE 10 MG/1
5 TABLET ORAL DAILY
Qty: 90 TABLET | Refills: 3 | Status: SHIPPED | OUTPATIENT
Start: 2023-04-06 | End: 2023-05-04

## 2023-04-06 NOTE — PROGRESS NOTES
Hazel is a 47 year old who is being evaluated via a billable video visit.      How would you like to obtain your AVS? MyChart  If the video visit is dropped, the invitation should be resent by: Text to cell phone: [phone]  Will anyone else be joining your video visit? No          Assessment & Plan     Type 2 diabetes mellitus with hyperglycemia, without long-term current use of insulin (H)  Patient has nausea on 14 mg off semaglutide which she will reduce to 7 mg  We will also change it to Mounjaro and arrange MT visit for her to learn the injection    Continue glimepiride  Patient will weigh herself  - Hemoglobin A1c  - tirzepatide (MOUNJARO) 5 MG/0.5ML pen  Dispense: 2 mL; Refill: 3  - Med Therapy Management Referral  - Semaglutide (RYBELSUS) 14 MG tablet  Dispense: 90 tablet; Refill: 3  - PRIMARY CARE FOLLOW-UP SCHEDULING    Severe obesity  Weight is complicated by diabetes, sleep apnea hypertension and Fuller  We will do a surgical consult for bariatric surgery    Elevated LFTs  We will repeat in control of diabetes and weight loss will help  - Comprehensive metabolic panel (BMP + Alb, Alk Phos, ALT, AST, Total. Bili, TP)  - PRIMARY CARE FOLLOW-UP SCHEDULING    Situational depression  Patient will increase Lexapro to 10 mg  - escitalopram (LEXAPRO) 10 MG tablet  Dispense: 90 tablet; Refill: 3    Benign essential hypertension  Continue home blood pressure checks are in the emergency room.  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    Chronic kidney disease, stage 2 (mild)  We will check a microalbumin each year    NAA (obstructive sleep apnea)  Not on CPAP    Insomnia, unspecified type  Increase the dose of Lexapro and trazodone will be continued  - escitalopram (LEXAPRO) 10 MG tablet  Dispense: 90 tablet; Refill: 3    Hypercalcemia  Will Repeat           Shamir Angeles MD  [facility] ERENDIRA    Dhara Oliva is a 47 year old, presenting for the following [facility]  issues:  Diabetes  Patient has not weighed herself because her scale was broken  She felt nauseous with semaglutide and actually threw up 1 day and had to go home from work  She would like to change the medication to Mounjaro  She is also now considering bariatric surgery based on our discussion  She feels more dyspneic when going up the stairs  Her blood sugars are excellent when she checks them       View : No data to display.              HPI     Follow up DM  Diabetes Follow-up    How often are you checking your blood sugar? A few times a month  What time of day are you checking your blood sugars (select all that apply)?  Before meals  Have you had any blood sugars above 200?  No  Have you had any blood sugars below 70?  No    What symptoms do you notice when your blood sugar is low?  None    What concerns do you have today about your diabetes? None     Do you have any of these symptoms? (Select all that apply)  No numbness or tingling in feet.  No redness, sores or blisters on feet.  No complaints of excessive thirst.  No reports of blurry vision.  No significant changes to weight.      BP Readings from Last 2 Encounters:   02/19/23 139/84   01/13/23 138/85     Hemoglobin A1C (%)   Date Value   01/13/2023 7.3 (H)   09/12/2022 8.9 (H)   03/11/2021 8.5 (H)   01/21/2021 7.5 (H)     LDL Cholesterol Calculated (mg/dL)   Date Value   01/13/2023 97   05/16/2022 116 (H)   01/21/2021 60   07/24/2020 88               Hypertension Follow-up      Do you check your blood pressure regularly outside of the clinic? No     Are you following a low salt diet? Yes    Are your blood pressures ever more than 140 on the top number (systolic) OR more   than 90 on the bottom number (diastolic), for example 140/90? No              Review of Systems   10 point ROS of systems including Constitutional, Eyes, Respiratory, Cardiovascular, Gastroenterology, Genitourinary, Integumentary, Muscularskeletal, Psychiatric were all negative except  for pertinent positives noted in my HPI.        Objective           Vitals:  No vitals were obtained today due to virtual visit.    Physical Exam   GENERAL: Healthy, alert and no distress  EYES: Eyes grossly normal to inspection.  No discharge or erythema, or obvious scleral/conjunctival abnormalities.  RESP: No audible wheeze, cough, or visible cyanosis.  No visible retractions or increased work of breathing.    SKIN: Visible skin clear. No significant rash, abnormal pigmentation or lesions.  NEURO: Cranial nerves grossly intact.  Mentation and speech appropriate for age.  PSYCH: Mentation appears normal, affect normal/bright, judgement and insight intact, normal speech and appearance well-groomed.    Patient Active Problem List   Diagnosis     Pain in joint, ankle and foot     Cervical radiculopathy     Benign essential hypertension     Type 2 diabetes mellitus with hyperglycemia, without long-term current use of insulin (H)     ANA (obstructive sleep apnea)     Acute bilateral low back pain with right-sided sciatica     Paresthesias- ? TIA vs complex migraine (preferred per neuro)     Severe obesity (BMI 35.0-35.9 with comorbidity) (H)     Pre-diabetes     Fibroid uterus     Epigastric pain     Elevated LFTs     Elevated lipase     Acute left-sided thoracic back pain     Chronic kidney disease, stage 2 (mild)     Current Outpatient Medications   Medication     albuterol (PROAIR HFA/PROVENTIL HFA/VENTOLIN HFA) 108 (90 Base) MCG/ACT inhaler     blood glucose (NO BRAND SPECIFIED) lancets standard     blood glucose (NO BRAND SPECIFIED) test strip     blood glucose monitoring (NO BRAND SPECIFIED) meter device kit     cetirizine (ZYRTEC) 10 MG tablet     escitalopram (LEXAPRO) 10 MG tablet     fluticasone-salmeterol (ADVAIR HFA) 115-21 MCG/ACT inhaler     glimepiride (AMARYL) 2 MG tablet     LANsoprazole (PREVACID) 30 MG DR capsule     losartan-hydrochlorothiazide (HYZAAR) 100-12.5 MG tablet     metFORMIN (GLUCOPHAGE  XR) 500 MG 24 hr tablet     multivitamin w/minerals (THERA-VIT-M) tablet     Semaglutide (RYBELSUS) 14 MG tablet     spironolactone (ALDACTONE) 25 MG tablet     STATIN NOT PRESCRIBED, INTENTIONAL,     tirzepatide (MOUNJARO) 5 MG/0.5ML pen     traZODone (DESYREL) 50 MG tablet     VITAMIN D, CHOLECALCIFEROL, PO     No current facility-administered medications for this visit.                   Video-Visit Details    Type of service:  Video Visit     Originating Location (pt. Location): Home    Distant Location (provider location):  On-site  Platform used for Video Visit: Wojciech

## 2023-04-07 ENCOUNTER — NURSE TRIAGE (OUTPATIENT)
Dept: FAMILY MEDICINE | Facility: CLINIC | Age: 48
End: 2023-04-07
Payer: COMMERCIAL

## 2023-04-07 DIAGNOSIS — E11.65 TYPE 2 DIABETES MELLITUS WITH HYPERGLYCEMIA, WITHOUT LONG-TERM CURRENT USE OF INSULIN (H): Chronic | ICD-10-CM

## 2023-04-07 RX ORDER — TIRZEPATIDE 2.5 MG/.5ML
2.5 INJECTION, SOLUTION SUBCUTANEOUS
Qty: 0.5 ML | Refills: 0 | Status: CANCELLED
Start: 2023-04-07

## 2023-04-07 NOTE — TELEPHONE ENCOUNTER
Shamir Angeles MD Miller, Kendra, RN; Cs Triage Im 1 hour ago (2:50 PM)     BK  She has nausea and therefore we need to start at 2.5 mg weekly and then advance it to 5 mg been tolerated after 1 month      Triage called pharmacy with providers message. They need a new Rx with 2.5 mg dose. Rx pended. Pharmacist is also requesting clarification if pt is to continue taking Semaglutide (RYBELSUS) 14 MG. Pharmacist states pt's are usually one of the two medications Rybelsus or Mounjaro.     Per chart review 04/06 OV notes read-       Continue glimepiride  Patient will weigh herself  - Hemoglobin A1c  - tirzepatide (MOUNJARO) 5 MG/0.5ML pen  Dispense: 2 mL; Refill: 3  - Med Therapy Management Referral  - Semaglutide (RYBELSUS) 14 MG tablet  Dispense: 90 tablet; Refill: 3  - PRIMARY CARE FOLLOW-UP SCHEDULING     Please advice if pt is to continue both tirzepatide (MOUNJARO) and Rybelsus.

## 2023-04-07 NOTE — TELEPHONE ENCOUNTER
"    Nurse Triage SBAR    Is this a 2nd Level Triage? YES, LICENSED PRACTITIONER REVIEW IS REQUIRED    Situation:   Nic SARAH BETH Pharmacist, [phone].   Requesting the following med change: tirzepatide (MOUNJARO) 5 MG/0.5ML pen starting dose to 2.5 mg weekly for one month?    Background:   pt's current prescription has pt's starting dose at 5mg    Assessment:   pharmacy requesting med change. Provider update script if approved.     Protocol Recommended Disposition:   Discuss With PCP And Callback By Nurse Within 1 Hour    Recommendation:   contact clinic if needed      Routed to provider    Does the patient meet one of the following criteria for ADS visit consideration? 16+ years old, with an [facility] PCP     TIP  Providers, please consider if this condition is appropriate for management at one of our Acute and Diagnostic Services sites.     If patient is a good candidate, please use dotphrase <dot>triageresponse and select Refer to ADS to document.      1. NAME of MEDICATION: \"What medicine are you calling about?\"      tirzepatide (MOUNJARO) 5 MG/0.5ML   2. QUESTION: \"What is your question?\" (e.g., double dose of medicine, side effect)      Should pt start taking 2.5mg weekly for the first month?  Okay to discontinue the current order of starting new med with 5mg?  Start new medication 2.5mg weekly?  3. PRESCRIBING HCP: \"Who prescribed it?\" Reason: if prescribed by specialist, call should be referred to that group.      Dr. Angeles    Reason for Disposition    Pharmacy calling with prescription question and triager unable to answer question    Additional Information    Negative: Intentional drug overdose and suicidal thoughts or ideas    Negative: Drug overdose and triager unable to answer question    Negative: Caller requesting a renewal or refill of a medicine patient is currently taking    Negative: Caller requesting information unrelated to medicine    Negative: Caller requesting information about COVID-19 Vaccine    " Negative: Caller requesting information about Emergency Contraception    Negative: Caller requesting information about Combined Birth Control Pills    Negative: Caller requesting information about Progestin Birth Control Pills    Negative: Caller requesting information about Post-Op pain or medicines    Negative: Caller requesting a prescription antibiotic (such as penicillin) for Strep throat and has a positive culture result    Negative: Caller requesting a prescription anti-viral med (such as Tamiflu) and has influenza (flu) symptoms    Negative: Immunization reaction suspected    Negative: Rash while taking a medicine or within 3 days of stopping it    Negative: Asthma and having symptoms of asthma (cough, wheezing, etc.)    Negative: Symptom of illness (e.g., headache, abdominal pain, earache, vomiting) that are more than mild    Negative: Breastfeeding questions about mother's medicines and diet    Negative: MORE THAN A DOUBLE DOSE of a prescription or over-the-counter (OTC) drug    Negative: DOUBLE DOSE (an extra dose or lesser amount) of prescription drug and any symptoms (e.g., dizziness, nausea, pain, sleepiness)    Negative: DOUBLE DOSE (an extra dose or lesser amount) of over-the-counter (OTC) drug and any symptoms (e.g., dizziness, nausea, pain, sleepiness)    Negative: Took another person's prescription drug    Negative: DOUBLE DOSE (an extra dose or lesser amount) of prescription drug and NO symptoms  (Exception: A double dose of antibiotics.)    Negative: Diabetes drug error or overdose (e.g., took wrong type of insulin or took extra dose)    Negative: Caller has medication question about med NOT prescribed by PCP and triager unable to answer question (e.g., compatibility with other med, storage)    Negative: Prescription not at pharmacy and was prescribed by PCP recently  (Exception: triager has access to EMR and prescription is recorded there. Go to Home Care and confirm for pharmacy.)    Protocols  used: MEDICATION QUESTION CALL-A-OH

## 2023-04-10 DIAGNOSIS — E11.65 TYPE 2 DIABETES MELLITUS WITH HYPERGLYCEMIA, WITHOUT LONG-TERM CURRENT USE OF INSULIN (H): ICD-10-CM

## 2023-04-10 RX ORDER — GLIMEPIRIDE 2 MG/1
2 TABLET ORAL
Qty: 31 TABLET | Refills: 1 | Status: SHIPPED | OUTPATIENT
Start: 2023-04-10 | End: 2023-06-01

## 2023-04-10 NOTE — TELEPHONE ENCOUNTER
Shamir Angeles MD Rodriguez, Eliud, RN; Cs Triage Im 3 days ago     BK  Stop semaglutide      Removed from medlist. Pharmacy was called with this message. Pharmacist will call and inform patient.     Also informed pharmacy of previous message:      Shamir Angeles MD Miller, Kendra, RN; Cs Triage Im 1 hour ago (2:50 PM)     BK  She has nausea and therefore we need to start at 2.5 mg weekly and then advance it to 5 mg been tolerated after 1 month

## 2023-05-03 ENCOUNTER — LAB (OUTPATIENT)
Dept: LAB | Facility: CLINIC | Age: 48
End: 2023-05-03
Payer: COMMERCIAL

## 2023-05-03 DIAGNOSIS — E11.65 TYPE 2 DIABETES MELLITUS WITH HYPERGLYCEMIA, WITHOUT LONG-TERM CURRENT USE OF INSULIN (H): Chronic | ICD-10-CM

## 2023-05-03 DIAGNOSIS — R79.89 ELEVATED LFTS: ICD-10-CM

## 2023-05-03 LAB
ALBUMIN SERPL BCG-MCNC: 4.7 G/DL (ref 3.5–5.2)
ALP SERPL-CCNC: 39 U/L (ref 35–104)
ALT SERPL W P-5'-P-CCNC: 44 U/L (ref 10–35)
ANION GAP SERPL CALCULATED.3IONS-SCNC: 13 MMOL/L (ref 7–15)
AST SERPL W P-5'-P-CCNC: 37 U/L (ref 10–35)
BILIRUB SERPL-MCNC: 0.4 MG/DL
BUN SERPL-MCNC: 14.9 MG/DL (ref 6–20)
CALCIUM SERPL-MCNC: 10 MG/DL (ref 8.6–10)
CHLORIDE SERPL-SCNC: 100 MMOL/L (ref 98–107)
CREAT SERPL-MCNC: 0.81 MG/DL (ref 0.51–0.95)
DEPRECATED HCO3 PLAS-SCNC: 22 MMOL/L (ref 22–29)
GFR SERPL CREATININE-BSD FRML MDRD: 89 ML/MIN/1.73M2
GLUCOSE SERPL-MCNC: 113 MG/DL (ref 70–99)
HBA1C MFR BLD: 6.3 % (ref 0–5.6)
POTASSIUM SERPL-SCNC: 4 MMOL/L (ref 3.4–5.3)
PROT SERPL-MCNC: 7.8 G/DL (ref 6.4–8.3)
SODIUM SERPL-SCNC: 135 MMOL/L (ref 136–145)

## 2023-05-03 PROCEDURE — 83036 HEMOGLOBIN GLYCOSYLATED A1C: CPT

## 2023-05-03 PROCEDURE — 36415 COLL VENOUS BLD VENIPUNCTURE: CPT

## 2023-05-03 PROCEDURE — 80053 COMPREHEN METABOLIC PANEL: CPT

## 2023-05-04 ENCOUNTER — VIRTUAL VISIT (OUTPATIENT)
Dept: PHARMACY | Facility: CLINIC | Age: 48
End: 2023-05-04
Payer: COMMERCIAL

## 2023-05-04 DIAGNOSIS — R79.89 ELEVATED LFTS: ICD-10-CM

## 2023-05-04 DIAGNOSIS — R05.3 CHRONIC COUGH: ICD-10-CM

## 2023-05-04 DIAGNOSIS — E66.01 SEVERE OBESITY (BMI 35.0-35.9 WITH COMORBIDITY) (H): ICD-10-CM

## 2023-05-04 DIAGNOSIS — Z78.9 TAKES DIETARY SUPPLEMENTS: ICD-10-CM

## 2023-05-04 DIAGNOSIS — F43.21 SITUATIONAL DEPRESSION: ICD-10-CM

## 2023-05-04 DIAGNOSIS — E11.65 TYPE 2 DIABETES MELLITUS WITH HYPERGLYCEMIA, WITHOUT LONG-TERM CURRENT USE OF INSULIN (H): Primary | Chronic | ICD-10-CM

## 2023-05-04 DIAGNOSIS — I10 BENIGN ESSENTIAL HYPERTENSION: Chronic | ICD-10-CM

## 2023-05-04 DIAGNOSIS — J30.2 SEASONAL ALLERGIC RHINITIS, UNSPECIFIED TRIGGER: ICD-10-CM

## 2023-05-04 DIAGNOSIS — K21.00 GASTROESOPHAGEAL REFLUX DISEASE WITH ESOPHAGITIS, UNSPECIFIED WHETHER HEMORRHAGE: ICD-10-CM

## 2023-05-04 DIAGNOSIS — G47.00 INSOMNIA, UNSPECIFIED TYPE: ICD-10-CM

## 2023-05-04 PROCEDURE — 99607 MTMS BY PHARM ADDL 15 MIN: CPT | Performed by: PHARMACIST

## 2023-05-04 PROCEDURE — 99605 MTMS BY PHARM NP 15 MIN: CPT | Performed by: PHARMACIST

## 2023-05-04 RX ORDER — ESCITALOPRAM OXALATE 10 MG/1
10 TABLET ORAL DAILY
Qty: 90 TABLET | Refills: 3 | Status: SHIPPED | OUTPATIENT
Start: 2023-05-04 | End: 2024-04-02

## 2023-05-04 RX ORDER — TIRZEPATIDE 7.5 MG/.5ML
7.5 INJECTION, SOLUTION SUBCUTANEOUS
Qty: 2 ML | Refills: 0 | Status: SHIPPED | OUTPATIENT
Start: 2023-05-04 | End: 2023-06-12 | Stop reason: DRUGHIGH

## 2023-05-04 NOTE — PATIENT INSTRUCTIONS
"Recommendations from today's MTM visit:                                                    MTM (medication therapy management) is a service provided by a clinical pharmacist designed to help you get the most of out of your medicines.   Today we reviewed what your medicines are for, how to know if they are working, that your medicines are safe and how to make your medicine regimen as easy as possible.      Finish your last 2 doses of Mounjaro 5mg, then increase to 7.5mg weekly.  I sent a prescription to your pharmacy.    Follow-up: Return in about 5 weeks (around 6/8/2023) for check in via [employer].    It was great speaking with you today.  I value your experience and would be very thankful for your time in providing feedback in our clinic survey. In the next few days, you may receive an email or text message from [employer] with a link to a survey related to your  clinical pharmacist.\"     To schedule another MTM appointment, please call the clinic directly or you may call the MTM scheduling line at [phone] or toll-free at [phone].     My Clinical Pharmacist's contact information:                                                      Please feel free to contact me with any questions or concerns you have.      Irene Ramsey, Lina, [facility]  Medication Therapy Management Provider  Pager: [phone]    "

## 2023-05-04 NOTE — PROGRESS NOTES
"Medication Therapy Management (MTM) Encounter    ASSESSMENT:                            Medication Adherence/Access: No issues identified    Type 2 Diabetes/Obesity/ROTHMAN: Patient is meeting A1c goal of < 7%.  She's tolerating change from Rybelsus to Mounjaro well thus far, will continue to escalate Mounjaro dose as tolerated.  May be able to discontinue glimepiride in the future.  Statin therapy would be recommended per ADA guidelines given LDL >70mg/dL.  Could consider trial of statin therapy with close monitoring of LFTs, not discussed today.  I see Dr. Angeles also suggested possibly doing coronary CT scan.    Hypertension: Stable. Patient is meeting blood pressure goal of < 130/80mmHg with home monitoring.    Depression: Stable.     Chronic Cough:  Stable.    Allergies:  Stable.    Insomnia:  Stable.    GERD: Stable.  Current treatment is effective.     Supplements:  May benefit from re-checking Vitamin D level, not discussed today.    PLAN:                            1.  Hazel will finish last 2 doses of Mounjaro 5mg, will then increase to 7.5mg weekly.  Updated Rx sent to her pharmacy.  2.  Updated Rx sent to her pharmacy to reflect escitalopram 10mg dose that she's now taking.    Future considerations - re-check Vitamin D level, trial of statin with close monitoring of LFTs    Follow-up: Return in about 5 weeks (around 6/8/2023) for check in via [employer].    SUBJECTIVE/OBJECTIVE:                          Kezia Nava is a 47 year old female called for an initial visit. She was referred to me from Dr. Angeles.  She has seen MTM in the past, but it's been several years since her last visit.      Reason for visit: Mounjaro    Allergies/ADRs: Reviewed in chart  Past Medical History: Reviewed in chart  Tobacco: She reports that she has never smoked. She has never used smokeless tobacco.  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 Patient is not experiencing side effects, was having a lot GI upset with Rybelsus.  Patient is not on oral contraception  Blood sugar monitoring: None since starting Mounjaro  Symptoms of low blood sugar? Weak, lightheaded when working out in the yard recently.  Didn't check blood sugar when feeling this way, has had this occur before prior to starting Mounjaro  Symptoms of high blood sugar? none  Eye exam: up to date  Foot exam: due  Diet/Exercise: Has been focusing on healthy eating.  Does meal delivery for PM meals.  Generally eats breakfast at work.  Aspirin: Not taking due to age  Statin: No due to elevated LFTs  ACEi/ARB: Yes: losartan 100mg.   Urine Albumin:   Lab Results   Component Value Date    UMALCR 189.93 (H) 01/13/2023      Lab Results   Component Value Date    A1C 6.3 05/03/2023    A1C 7.3 01/13/2023    A1C 8.9 09/12/2022    A1C 8.7 05/16/2022    A1C 8.5 03/11/2021    A1C 7.5 01/21/2021    A1C 6.8 07/24/2020    A1C 6.8 03/02/2020    A1C 6.6 11/18/2019     Wt Readings from Last 4 Encounters:   02/19/23 200 lb (90.7 kg)   01/13/23 208 lb (94.3 kg)   12/12/22 201 lb (91.2 kg)   09/12/22 206 lb 14.4 oz (93.8 kg)      Recent Labs   Lab Test 01/13/23  1039 05/16/22  1015   CHOL 195 201*   HDL 39* 40*   LDL 97 116*   TRIG 294* 226*     Liver Function Studies - Recent Labs   Lab Test 05/03/23  1306   PROTTOTAL 7.8   ALBUMIN 4.7   BILITOTAL 0.4   ALKPHOS 39   AST 37*   ALT 44*     Hypertension: Currently taking losartan/HCTZ 100/12.5mg daily and spironolactone 25mg daily.  Patient does self-monitor blood pressure occasionally.  Home BP monitoring in range of 120's systolic over 70's diastolic.  Patient reports no current " medication side effects.  BP Readings from Last 3 Encounters:   02/19/23 139/84   01/13/23 138/85   12/12/22 126/84     Pulse Readings from Last 3 Encounters:   02/19/23 80   01/13/23 75   12/12/22 78      Depression:  Current medications include: Escitalopram 10mg once daily, dose increased at last primary care physician visit. Patient reports that depression symptoms are improved.  No suicidal ideation reported.      2/7/2020    12:43 PM 3/1/2022     9:49 PM   PHQ-9 SCORE   PHQ-9 Total Score MyChart  6 (Mild depression)   PHQ-9 Total Score 7 6      Chronic Cough: Current medications: ICS/LABA- Advair HFA 2 puff(s) twice daily and albuterol MDI as needed (no recent use).  Patient reports the following symptoms: none.    Allergies:  Hazel is taking Zyrtec 10mg daily.  She reports this is effective, no side effects reported.    Insomnia:  Hazel generally takes trazodone 50mg at bedtime, occasionally will take 100mg but reports this is rare.  She finds this effective, denies side effects.    GERD: Current medications include: Prevacid (lansoprazole) 30mg once daily. Patient reports no current symptoms.  Patient feels that current regimen is effective.     Supplements:  Current supplements include biotin daily (dose unknown), a daily multivitamin, and Vitamin D 10,000 international unit(s) daily.  Per Epic she's been on current Vitamin D dosing since 2017.  Vitamin D Deficiency Screening Results:  Lab Results   Component Value Date    VITDT 68 06/22/2020    VITDT 39 10/06/2017    VITDT 25 10/05/2017    VITDT 25 01/03/2017      Today's Vitals: LMP 06/01/2018 (Approximate)   ----------------    I spent 14 minutes with this patient today. All changes were made via collaborative practice agreement with Dr. Angeles. A copy of the visit note was provided to the patient's provider(s).    A summary of these recommendations was given to the patient.    Irene Ramsey, PharmD, BCACP  Medication Therapy Management Provider  Pager:  [phone]     Telemedicine Visit Details  Type of service:  Telephone visit  Start Time: 1:00 PM  End Time: 1:14 PM     Medication Therapy Recommendations  Type 2 diabetes mellitus with hyperglycemia, without long-term current use of insulin (H)    Current Medication: tirzepatide (MOUNJARO) 5 MG/0.5ML pen (Discontinued)   Rationale: Dose too low - Dosage too low - Effectiveness   Recommendation: Increase Dose - Mounjaro 7.5 MG/0.5ML Sopn   Status: Accepted per CPA

## 2023-05-09 ENCOUNTER — TRANSFERRED RECORDS (OUTPATIENT)
Dept: FAMILY MEDICINE | Facility: CLINIC | Age: 48
End: 2023-05-09
Payer: COMMERCIAL

## 2023-05-18 DIAGNOSIS — E11.65 TYPE 2 DIABETES MELLITUS WITH HYPERGLYCEMIA, WITHOUT LONG-TERM CURRENT USE OF INSULIN (H): Chronic | ICD-10-CM

## 2023-05-18 RX ORDER — METFORMIN HCL 500 MG
TABLET, EXTENDED RELEASE 24 HR ORAL
Qty: 90 TABLET | Refills: 1 | Status: SHIPPED | OUTPATIENT
Start: 2023-05-18 | End: 2023-11-06

## 2023-05-18 NOTE — TELEPHONE ENCOUNTER
Prescription approved per [facility] Refill Protocol.  Lula Medina, RN  [facility] Triage Nurse

## 2023-05-31 ENCOUNTER — TRANSFERRED RECORDS (OUTPATIENT)
Dept: HEALTH INFORMATION MANAGEMENT | Facility: CLINIC | Age: 48
End: 2023-05-31
Payer: COMMERCIAL

## 2023-06-06 ENCOUNTER — HOSPITAL ENCOUNTER (EMERGENCY)
Facility: CLINIC | Age: 48
Discharge: HOME | End: 2023-06-06
Attending: EMERGENCY MEDICINE | Admitting: EMERGENCY MEDICINE
Payer: COMMERCIAL

## 2023-06-06 ENCOUNTER — APPOINTMENT (OUTPATIENT)
Dept: CT IMAGING | Facility: CLINIC | Age: 48
End: 2023-06-06
Attending: EMERGENCY MEDICINE
Payer: COMMERCIAL

## 2023-06-06 VITALS
HEART RATE: 92 BPM | OXYGEN SATURATION: 94 % | TEMPERATURE: 98.8 F | SYSTOLIC BLOOD PRESSURE: 126 MMHG | DIASTOLIC BLOOD PRESSURE: 85 MMHG | RESPIRATION RATE: 16 BRPM

## 2023-06-06 DIAGNOSIS — R10.9 LEFT SIDED ABDOMINAL PAIN: ICD-10-CM

## 2023-06-06 LAB
ALBUMIN SERPL BCG-MCNC: 4.8 G/DL (ref 3.5–5.2)
ALBUMIN UR-MCNC: NEGATIVE MG/DL
ALP SERPL-CCNC: 44 U/L (ref 35–104)
ALT SERPL W P-5'-P-CCNC: 36 U/L (ref 10–35)
ANION GAP SERPL CALCULATED.3IONS-SCNC: 14 MMOL/L (ref 7–15)
APPEARANCE UR: CLEAR
AST SERPL W P-5'-P-CCNC: 28 U/L (ref 10–35)
ATRIAL RATE - MUSE: 85 BPM
BACTERIA #/AREA URNS HPF: ABNORMAL /HPF
BASOPHILS # BLD AUTO: 0.1 10E3/UL (ref 0–0.2)
BASOPHILS NFR BLD AUTO: 1 %
BILIRUB SERPL-MCNC: 0.4 MG/DL
BILIRUB UR QL STRIP: NEGATIVE
BUN SERPL-MCNC: 16.7 MG/DL (ref 6–20)
CALCIUM SERPL-MCNC: 10.1 MG/DL (ref 8.6–10)
CHLORIDE SERPL-SCNC: 99 MMOL/L (ref 98–107)
COLOR UR AUTO: ABNORMAL
CREAT SERPL-MCNC: 0.82 MG/DL (ref 0.51–0.95)
DEPRECATED HCO3 PLAS-SCNC: 23 MMOL/L (ref 22–29)
DIASTOLIC BLOOD PRESSURE - MUSE: NORMAL MMHG
EOSINOPHIL # BLD AUTO: 0.1 10E3/UL (ref 0–0.7)
EOSINOPHIL NFR BLD AUTO: 1 %
ERYTHROCYTE [DISTWIDTH] IN BLOOD BY AUTOMATED COUNT: 12.8 % (ref 10–15)
GFR SERPL CREATININE-BSD FRML MDRD: 88 ML/MIN/1.73M2
GLUCOSE SERPL-MCNC: 89 MG/DL (ref 70–99)
GLUCOSE UR STRIP-MCNC: NEGATIVE MG/DL
HCT VFR BLD AUTO: 40 % (ref 35–47)
HGB BLD-MCNC: 13.6 G/DL (ref 11.7–15.7)
HGB UR QL STRIP: NEGATIVE
IMM GRANULOCYTES # BLD: 0 10E3/UL
IMM GRANULOCYTES NFR BLD: 0 %
INTERPRETATION ECG - MUSE: NORMAL
KETONES UR STRIP-MCNC: NEGATIVE MG/DL
LEUKOCYTE ESTERASE UR QL STRIP: NEGATIVE
LIPASE SERPL-CCNC: 82 U/L (ref 13–60)
LYMPHOCYTES # BLD AUTO: 1.6 10E3/UL (ref 0.8–5.3)
LYMPHOCYTES NFR BLD AUTO: 22 %
MCH RBC QN AUTO: 29.3 PG (ref 26.5–33)
MCHC RBC AUTO-ENTMCNC: 34 G/DL (ref 31.5–36.5)
MCV RBC AUTO: 86 FL (ref 78–100)
MONOCYTES # BLD AUTO: 0.5 10E3/UL (ref 0–1.3)
MONOCYTES NFR BLD AUTO: 7 %
MUCOUS THREADS #/AREA URNS LPF: PRESENT /LPF
NEUTROPHILS # BLD AUTO: 5.2 10E3/UL (ref 1.6–8.3)
NEUTROPHILS NFR BLD AUTO: 69 %
NITRATE UR QL: NEGATIVE
NRBC # BLD AUTO: 0 10E3/UL
NRBC BLD AUTO-RTO: 0 /100
P AXIS - MUSE: 5 DEGREES
PH UR STRIP: 5 [PH] (ref 5–7)
PLATELET # BLD AUTO: 264 10E3/UL (ref 150–450)
POTASSIUM SERPL-SCNC: 3.9 MMOL/L (ref 3.4–5.3)
PR INTERVAL - MUSE: 176 MS
PROT SERPL-MCNC: 8 G/DL (ref 6.4–8.3)
QRS DURATION - MUSE: 74 MS
QT - MUSE: 362 MS
QTC - MUSE: 430 MS
R AXIS - MUSE: -3 DEGREES
RBC # BLD AUTO: 4.64 10E6/UL (ref 3.8–5.2)
RBC URINE: <1 /HPF
SODIUM SERPL-SCNC: 136 MMOL/L (ref 136–145)
SP GR UR STRIP: 1.01 (ref 1–1.03)
SYSTOLIC BLOOD PRESSURE - MUSE: NORMAL MMHG
T AXIS - MUSE: 28 DEGREES
TROPONIN T SERPL HS-MCNC: 8 NG/L
UROBILINOGEN UR STRIP-MCNC: NORMAL MG/DL
VENTRICULAR RATE- MUSE: 85 BPM
WBC # BLD AUTO: 7.5 10E3/UL (ref 4–11)
WBC URINE: 0 /HPF

## 2023-06-06 PROCEDURE — 99285 EMERGENCY DEPT VISIT HI MDM: CPT | Mod: 25

## 2023-06-06 PROCEDURE — 80053 COMPREHEN METABOLIC PANEL: CPT | Performed by: EMERGENCY MEDICINE

## 2023-06-06 PROCEDURE — 250N000011 HC RX IP 250 OP 636: Performed by: EMERGENCY MEDICINE

## 2023-06-06 PROCEDURE — 96375 TX/PRO/DX INJ NEW DRUG ADDON: CPT

## 2023-06-06 PROCEDURE — 83690 ASSAY OF LIPASE: CPT | Performed by: EMERGENCY MEDICINE

## 2023-06-06 PROCEDURE — 81001 URINALYSIS AUTO W/SCOPE: CPT | Performed by: EMERGENCY MEDICINE

## 2023-06-06 PROCEDURE — 250N000009 HC RX 250: Performed by: EMERGENCY MEDICINE

## 2023-06-06 PROCEDURE — 36415 COLL VENOUS BLD VENIPUNCTURE: CPT | Performed by: EMERGENCY MEDICINE

## 2023-06-06 PROCEDURE — 85025 COMPLETE CBC W/AUTO DIFF WBC: CPT | Performed by: EMERGENCY MEDICINE

## 2023-06-06 PROCEDURE — 258N000003 HC RX IP 258 OP 636: Performed by: EMERGENCY MEDICINE

## 2023-06-06 PROCEDURE — 74177 CT ABD & PELVIS W/CONTRAST: CPT

## 2023-06-06 PROCEDURE — 84484 ASSAY OF TROPONIN QUANT: CPT | Performed by: EMERGENCY MEDICINE

## 2023-06-06 PROCEDURE — 96361 HYDRATE IV INFUSION ADD-ON: CPT

## 2023-06-06 PROCEDURE — 96374 THER/PROPH/DIAG INJ IV PUSH: CPT | Mod: 59

## 2023-06-06 PROCEDURE — 93005 ELECTROCARDIOGRAM TRACING: CPT

## 2023-06-06 RX ORDER — IOPAMIDOL 755 MG/ML
101 INJECTION, SOLUTION INTRAVASCULAR ONCE
Status: COMPLETED | OUTPATIENT
Start: 2023-06-06 | End: 2023-06-06

## 2023-06-06 RX ORDER — ONDANSETRON 4 MG/1
4 TABLET, ORALLY DISINTEGRATING ORAL EVERY 8 HOURS PRN
Qty: 10 TABLET | Refills: 0 | Status: SHIPPED | OUTPATIENT
Start: 2023-06-06 | End: 2023-06-09

## 2023-06-06 RX ORDER — KETOROLAC TROMETHAMINE 15 MG/ML
15 INJECTION, SOLUTION INTRAMUSCULAR; INTRAVENOUS ONCE
Status: COMPLETED | OUTPATIENT
Start: 2023-06-06 | End: 2023-06-06

## 2023-06-06 RX ORDER — HYDROCODONE BITARTRATE AND ACETAMINOPHEN 5; 325 MG/1; MG/1
1 TABLET ORAL EVERY 6 HOURS PRN
Qty: 10 TABLET | Refills: 0 | Status: SHIPPED | OUTPATIENT
Start: 2023-06-06 | End: 2023-06-09

## 2023-06-06 RX ORDER — ONDANSETRON 2 MG/ML
4 INJECTION INTRAMUSCULAR; INTRAVENOUS ONCE
Status: COMPLETED | OUTPATIENT
Start: 2023-06-06 | End: 2023-06-06

## 2023-06-06 RX ADMIN — ONDANSETRON 4 MG: 2 INJECTION INTRAMUSCULAR; INTRAVENOUS at 13:28

## 2023-06-06 RX ADMIN — SODIUM CHLORIDE 69 ML: 9 INJECTION, SOLUTION INTRAVENOUS at 14:50

## 2023-06-06 RX ADMIN — SODIUM CHLORIDE 1000 ML: 9 INJECTION, SOLUTION INTRAVENOUS at 13:28

## 2023-06-06 RX ADMIN — KETOROLAC TROMETHAMINE 15 MG: 15 INJECTION, SOLUTION INTRAMUSCULAR; INTRAVENOUS at 13:28

## 2023-06-06 RX ADMIN — IOPAMIDOL 101 ML: 755 INJECTION, SOLUTION INTRAVENOUS at 14:50

## 2023-06-06 ASSESSMENT — ACTIVITIES OF DAILY LIVING (ADL): ADLS_ACUITY_SCORE: 35

## 2023-06-06 NOTE — ED PROVIDER NOTES
History     Chief Complaint:  Abdominal Pain       HPI   Kezia Nava is a 47 year old female with a history of hypertension and type II diabetes mellitus who presents with abdominal pain. 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 She describes the pain as a cramping pain that radiates into her left flank. She did not have any nausea at the time the pain began, but she has had episodes of vomiting here. Notes 4-5 episodes of diarrhea yesterday. Denies urinary symptoms, no cough, shortness of breath blood in stool, or hematemesis. Zofran and Toradol here have provided minimal relief. Personal history of hysterectomy, oophorectomy, and appendectomy. She has had kidney stones in the past, but notes having more groin pain with these, and was able to pass these on her own. No personal history of bowel obstruction or diverticulitis.     Independent Historian:   None - Patient Only    Review of External Notes:   None    Medications:    Lexapro  Glimepiride   Lansoprazole  Losartan-hydrochlorothiazide  Metformin  Spironolactone  Trazodone  Cetirizine    Past Medical History:   Type II diabetes mellitus  Hypertension  Diffuse cystic mastopathy  Mesenteric adenitis  Migraines  Nephrolithiasis  Obstructive sleep apnea  Fibroid uterus  Stage 2 chronic kidney disease  Cervical radiculopathy    Past Surgical History:    Appendectomy  Open repair tendon, left ankle  Bilateral carpal tunnel release  Bilateral insert ureteral stents  Cervical discectomy and fusion anterior one level  Extracorporeal shock wave lithotripsy  Hysterectomy  Bilateral breast reduction  Bilateral LASIK  Right salpingo oophorectomy     Physical Exam     Patient Vitals for the past 24 hrs:   BP Temp Temp src Pulse Resp SpO2   06/06/23 1430 126/85 -- -- 92 16 94 %   06/06/23 1312 -- 98.8  F (37.1  C) Oral -- -- --   06/06/23 1311 125/80 -- -- -- -- --   06/06/23 1309  -- -- -- 98 16 92 %        Physical Exam    Constitutional:  Patient is oriented to person, place, and time. They appear well-developed and well-nourished. Mild distress secondary to abdominal and flank pain.    HENT:   Mouth/Throat:   Oropharynx is clear and moist.   Eyes:    Conjunctivae normal and EOM are normal. Pupils are equal, round, and reactive to light.   Neck:    Normal range of motion.   Cardiovascular: Normal rate, regular rhythm and normal heart sounds.  Exam reveals no gallop and no friction rub.  No murmur heard.  Pulmonary/Chest:  Effort normal and breath sounds normal. Patient has no wheezes. Patient has no rales.   Abdominal:   Soft. Slightly hypoactive bowel sounds. Patient exhibits no mass. Left upper quadrant abdominal and left flank tenderness. There is no rebound and no guarding.   Musculoskeletal:  Normal range of motion. Patient exhibits no edema.   Neurological:   Patient is alert and oriented to person, place, and time. Patient has normal strength. No cranial nerve deficit or sensory deficit. GCS 15  Skin:   Skin is warm and dry. No rash noted. No erythema.   Psychiatric:   Patient has a normal mood and affect. Patient's behavior is normal. Judgment and thought content normal.     Emergency Department Course   ECG  ECG results from 06/06/23   EKG 12-lead, tracing only     Value    Systolic Blood Pressure     Diastolic Blood Pressure     Ventricular Rate 85    Atrial Rate 85    WA Interval 176    QRS Duration 74        QTc 430    P Axis 5    R AXIS -3    T Axis 28    Interpretation ECG      Sinus rhythm  Low voltage QRS  Septal infarct (cited on or before 05-OCT-2017)  Abnormal ECG  When compared with ECG of 27-OCT-2020 13:05,  Nonspecific T wave abnormality no longer evident in Lateral leads  Confirmed by GENERATED REPORT, COMPUTER ([id number]),  Lalita Holt ([id number]) on 6/6/2023 1:34:09 PM       Imaging:  CT Abdomen Pelvis w Contrast   Preliminary Result   IMPRESSION:    1.  No  acute abnormality identified. No complications of pancreatitis   by imaging.   2.  Fatty liver.         Report per radiology    Laboratory:  Labs Ordered and Resulted from Time of ED Arrival to Time of ED Departure   COMPREHENSIVE METABOLIC PANEL - Abnormal       Result Value    Sodium 136      Potassium 3.9      Chloride 99      Carbon Dioxide (CO2) 23      Anion Gap 14      Urea Nitrogen 16.7      Creatinine 0.82      Calcium 10.1 (*)     Glucose 89      Alkaline Phosphatase 44      AST 28      ALT 36 (*)     Protein Total 8.0      Albumin 4.8      Bilirubin Total 0.4      GFR Estimate 88     LIPASE - Abnormal    Lipase 82 (*)    ROUTINE UA WITH MICROSCOPIC REFLEX TO CULTURE - Abnormal    Color Urine Light Yellow      Appearance Urine Clear      Glucose Urine Negative      Bilirubin Urine Negative      Ketones Urine Negative      Specific Gravity Urine 1.013      Blood Urine Negative      pH Urine 5.0      Protein Albumin Urine Negative      Urobilinogen Urine Normal      Nitrite Urine Negative      Leukocyte Esterase Urine Negative      Bacteria Urine Few (*)     Mucus Urine Present (*)     RBC Urine <1      WBC Urine 0     TROPONIN T, HIGH SENSITIVITY - Normal    Troponin T, High Sensitivity 8     CBC WITH PLATELETS AND DIFFERENTIAL    WBC Count 7.5      RBC Count 4.64      Hemoglobin 13.6      Hematocrit 40.0      MCV 86      MCH 29.3      MCHC 34.0      RDW 12.8      Platelet Count 264      % Neutrophils 69      % Lymphocytes 22      % Monocytes 7      % Eosinophils 1      % Basophils 1      % Immature Granulocytes 0      NRBCs per 100 WBC 0      Absolute Neutrophils 5.2      Absolute Lymphocytes 1.6      Absolute Monocytes 0.5      Absolute Eosinophils 0.1      Absolute Basophils 0.1      Absolute Immature Granulocytes 0.0      Absolute NRBCs 0.0          Emergency Department Course & Assessments:     Interventions:  Medications   ondansetron (ZOFRAN) injection 4 mg (4 mg Intravenous $Given 6/6/23 2054)    0.9% sodium chloride BOLUS (0 mLs Intravenous Stopped 6/6/23 1532)   ketorolac (TORADOL) injection 15 mg (15 mg Intravenous $Given 6/6/23 1328)   Saline flush (69 mLs Intravenous $Given 6/6/23 1450)   iopamidol (ISOVUE-370) solution 101 mL (101 mLs Intravenous $Given 6/6/23 1450)      Assessments:  1351 I obtained history and examined the patient as noted above.   1517 Rechecked the patient and explained findings.     Independent Interpretation (X-rays, CTs, rhythm strip):  none    Consultations/Discussion of Management or Tests:  None     Social Determinants of Health affecting care:   None    Disposition:  The patient was discharged to home.     Impression & Plan      Medical Decision Making:  Hazel Nava is a 47 year old year old female who presents to the emergency room with left-sided abdominal pain.  She did have reproducible tenderness on exam.  She has had vomiting.  Diarrhea started yesterday.  No recent antibiotics, change in medication or known sick contacts.  Blood work was performed her white count is slightly elevated.  Her liver function is chronically elevated but improved from her normal.  Lipase is slightly elevated and CT does not show any acute abnormalities to explain her pain.  I do not think this is PE or respiratory as she has had no symptoms concerning for this.  Her urine shows no evidence of infection or hematuria.  I did discuss with her disposition at this time she does not feel like she needs to be admitted for observation.  I will write her for nausea and pain medication.  We discussed providing a stool sample as I do wonder with her diarrhea if she does not have more of it GI bug.  [place].  She does have a primary care doctor that she can follow-up with closely.  At this point she will defer a stool sample until she sees primary care.  She will return to the emergency department if she has increased pain nausea.    Diagnosis:    ICD-10-CM     1. Left sided abdominal pain  R10.9          Discharge Medications:  Discharge Medication List as of 6/6/2023  3:33 PM      START taking these medications    Details   HYDROcodone-acetaminophen (NORCO) 5-325 MG tablet Take 1 tablet by mouth every 6 hours as needed for severe pain, Disp-10 tablet, R-0, E-Prescribe      ondansetron (ZOFRAN ODT) 4 MG ODT tab Take 1 tablet (4 mg) by mouth every 8 hours as needed for nausea, Disp-10 tablet, R-0, E-Prescribe            Scribe Disclosure:  I, Ayla Cherry, am serving as a scribe at 1:56 PM on 6/6/2023 to document services personally performed by Gogo Miller MD based on my observations and the provider's statements to me.      6/6/2023   Gogo Miller MD Bochert, Michelle Ann, MD  06/06/23 1549

## 2023-06-06 NOTE — ED TRIAGE NOTES
Sudden onset left sided abdominal pain with vomiting that began a couple hours ago. Diarrhea yesterday.

## 2023-06-07 ENCOUNTER — TRANSFERRED RECORDS (OUTPATIENT)
Dept: HEALTH INFORMATION MANAGEMENT | Facility: CLINIC | Age: 48
End: 2023-06-07
Payer: COMMERCIAL

## 2023-06-15 ENCOUNTER — HOSPITAL ENCOUNTER (OUTPATIENT)
Dept: MRI IMAGING | Facility: CLINIC | Age: 48
Discharge: HOME | End: 2023-06-15
Attending: ORTHOPAEDIC SURGERY | Admitting: ORTHOPAEDIC SURGERY
Payer: COMMERCIAL

## 2023-06-15 DIAGNOSIS — M25.579 ANKLE PAIN: ICD-10-CM

## 2023-06-15 PROCEDURE — 73721 MRI JNT OF LWR EXTRE W/O DYE: CPT | Mod: RT

## 2023-06-28 ENCOUNTER — HOSPITAL ENCOUNTER (OUTPATIENT)
Dept: MAMMOGRAPHY | Facility: CLINIC | Age: 48
Discharge: HOME | End: 2023-06-28
Attending: INTERNAL MEDICINE | Admitting: INTERNAL MEDICINE
Payer: COMMERCIAL

## 2023-06-28 DIAGNOSIS — Z12.31 VISIT FOR SCREENING MAMMOGRAM: ICD-10-CM

## 2023-06-28 PROCEDURE — 77067 SCR MAMMO BI INCL CAD: CPT

## 2023-06-28 NOTE — RESULT ENCOUNTER NOTE
Hello -    I'm covering for Dr. Angeles today:    Here are your mammogram results which are normal.  Please repeat in one year.     Please let us know if you have any questions or concerns.    Regards,  Radha Diana PA-C

## 2023-06-30 ENCOUNTER — TRANSFERRED RECORDS (OUTPATIENT)
Dept: HEALTH INFORMATION MANAGEMENT | Facility: CLINIC | Age: 48
End: 2023-06-30
Payer: COMMERCIAL

## 2023-07-10 ENCOUNTER — OFFICE VISIT (OUTPATIENT)
Dept: URGENT CARE | Facility: URGENT CARE | Age: 48
End: 2023-07-10
Payer: COMMERCIAL

## 2023-07-10 VITALS
OXYGEN SATURATION: 98 % | HEART RATE: 91 BPM | BODY MASS INDEX: 36.07 KG/M2 | SYSTOLIC BLOOD PRESSURE: 117 MMHG | HEIGHT: 62 IN | RESPIRATION RATE: 18 BRPM | TEMPERATURE: 98.5 F | WEIGHT: 196 LBS | DIASTOLIC BLOOD PRESSURE: 79 MMHG

## 2023-07-10 DIAGNOSIS — L02.91 ABSCESS: ICD-10-CM

## 2023-07-10 DIAGNOSIS — L03.818 CELLULITIS OF OTHER SPECIFIED SITE: Primary | ICD-10-CM

## 2023-07-10 PROCEDURE — 99203 OFFICE O/P NEW LOW 30 MIN: CPT | Performed by: STUDENT IN AN ORGANIZED HEALTH CARE EDUCATION/TRAINING PROGRAM

## 2023-07-10 RX ORDER — CEPHALEXIN 500 MG/1
500 CAPSULE ORAL 3 TIMES DAILY
Qty: 21 CAPSULE | Refills: 0 | Status: SHIPPED | OUTPATIENT
Start: 2023-07-10 | End: 2023-10-06

## 2023-07-10 ASSESSMENT — PAIN SCALES - GENERAL: PAINLEVEL: NO PAIN (0)

## 2023-07-10 NOTE — PROGRESS NOTES
"Assessment & Plan     Cellulitis of other specified site  Abscess  Her history and physical exam are most consistent with abscess with overlying cellulitis.  Discussed with her to treat the cellulitis before consideration of draining the abscess given possibility of poor healing wound/friable skin that may make the healing process difficult.  Recommended Keflex with follow-up in a week for reassessment of this to consider potential I&D.  - cephALEXin (KEFLEX) 500 MG capsule; Take 1 capsule (500 mg) by mouth 3 times daily        Review of prior external note(s) from - previous office visits  20 minutes spent by me on the date of the encounter doing chart review, history and exam, documentation and further activities per the note       BMI:   Estimated body mass index is 35.85 kg/m  as calculated from the following:    Height as of this encounter: 1.575 m (5' 2\").    Weight as of this encounter: 88.9 kg (196 lb).   Patient to follow up with PCP on this      Nikki Wiley MD   [facility]   Kezia Nava is a 47 year old who presents for the following health issues     HPI     Presents for about a month history of lump, and then in the past 2 weeks developed rash.  Patient says that she felt a lump on the outer aspect of her left thigh about a month ago, she manipulated it, thought that she broke something under the skin; however, she has not noted any drainage out of the skin.  She says that within the past 2 weeks she started develop this rash and potential reaccumulation of this lump.  She wonders about abscess and potential necessity for drainage at this time.  Denies any fevers, chills, nausea, vomiting.  Has not applied any creams or ointments to the skin, has not taking any other medications for this.    Review of Systems   Complete ROS normal aside noted in HPI      Objective    /79 (BP Location: Right arm, Patient Position: Sitting, Cuff Size: Adult " "Regular)   Pulse 91   Temp 98.5  F (36.9  C) (Oral)   Resp 18   Ht 1.575 m (5' 2\")   Wt 88.9 kg (196 lb)   LMP 06/01/2018 (Approximate)   SpO2 98%   BMI 35.85 kg/m    Body mass index is 35.85 kg/m .    Physical Exam   GENERAL: healthy, alert and no distress  MS: no gross musculoskeletal defects noted, no edema  SKIN: Left outer mid upper leg with approximately 1 cm well-circumscribed erythematous rash, mildly tender to palpation.  Mass noted under erythematous skin.    "

## 2023-07-10 NOTE — PATIENT INSTRUCTIONS
Please take 1 tablet of the Keflex 3 times a day and follow up in 1 week for reassessment of this area

## 2023-07-11 DIAGNOSIS — G47.00 INSOMNIA, UNSPECIFIED TYPE: ICD-10-CM

## 2023-07-11 DIAGNOSIS — F43.21 SITUATIONAL DEPRESSION: ICD-10-CM

## 2023-07-11 RX ORDER — ESCITALOPRAM OXALATE 10 MG/1
10 TABLET ORAL DAILY
Qty: 90 TABLET | Refills: 3 | OUTPATIENT
Start: 2023-07-11

## 2023-07-14 ENCOUNTER — OFFICE VISIT (OUTPATIENT)
Dept: FAMILY MEDICINE | Facility: CLINIC | Age: 48
End: 2023-07-14
Payer: COMMERCIAL

## 2023-07-14 VITALS
DIASTOLIC BLOOD PRESSURE: 77 MMHG | HEIGHT: 62 IN | TEMPERATURE: 98.6 F | SYSTOLIC BLOOD PRESSURE: 112 MMHG | OXYGEN SATURATION: 95 % | HEART RATE: 88 BPM | WEIGHT: 195.2 LBS | RESPIRATION RATE: 16 BRPM | BODY MASS INDEX: 35.92 KG/M2

## 2023-07-14 DIAGNOSIS — Z01.818 PREOP GENERAL PHYSICAL EXAM: Primary | ICD-10-CM

## 2023-07-14 DIAGNOSIS — L02.419 ABSCESS OF LEG: ICD-10-CM

## 2023-07-14 DIAGNOSIS — K75.81 NASH (NONALCOHOLIC STEATOHEPATITIS): ICD-10-CM

## 2023-07-14 DIAGNOSIS — E11.65 TYPE 2 DIABETES MELLITUS WITH HYPERGLYCEMIA, WITHOUT LONG-TERM CURRENT USE OF INSULIN (H): ICD-10-CM

## 2023-07-14 DIAGNOSIS — M25.571 PAIN IN JOINT INVOLVING ANKLE AND FOOT, RIGHT: ICD-10-CM

## 2023-07-14 DIAGNOSIS — G47.33 OSA (OBSTRUCTIVE SLEEP APNEA): ICD-10-CM

## 2023-07-14 DIAGNOSIS — E66.01 SEVERE OBESITY (BMI 35.0-35.9 WITH COMORBIDITY) (H): ICD-10-CM

## 2023-07-14 LAB
ANION GAP SERPL CALCULATED.3IONS-SCNC: 14 MMOL/L (ref 7–15)
BUN SERPL-MCNC: 10.7 MG/DL (ref 6–20)
CALCIUM SERPL-MCNC: 9.8 MG/DL (ref 8.6–10)
CHLORIDE SERPL-SCNC: 103 MMOL/L (ref 98–107)
CREAT SERPL-MCNC: 0.78 MG/DL (ref 0.51–0.95)
DEPRECATED HCO3 PLAS-SCNC: 24 MMOL/L (ref 22–29)
GFR SERPL CREATININE-BSD FRML MDRD: >90 ML/MIN/1.73M2
GLUCOSE SERPL-MCNC: 106 MG/DL (ref 70–99)
POTASSIUM SERPL-SCNC: 3.6 MMOL/L (ref 3.4–5.3)
SODIUM SERPL-SCNC: 141 MMOL/L (ref 136–145)

## 2023-07-14 PROCEDURE — 10060 I&D ABSCESS SIMPLE/SINGLE: CPT | Performed by: PHYSICIAN ASSISTANT

## 2023-07-14 PROCEDURE — 99214 OFFICE O/P EST MOD 30 MIN: CPT | Mod: 25 | Performed by: PHYSICIAN ASSISTANT

## 2023-07-14 PROCEDURE — 36415 COLL VENOUS BLD VENIPUNCTURE: CPT | Performed by: PHYSICIAN ASSISTANT

## 2023-07-14 PROCEDURE — 80048 BASIC METABOLIC PNL TOTAL CA: CPT | Performed by: PHYSICIAN ASSISTANT

## 2023-07-14 ASSESSMENT — PAIN SCALES - GENERAL: PAINLEVEL: NO PAIN (0)

## 2023-07-14 NOTE — PROGRESS NOTES
[address], [address]  [facility] [address]  Phone: [phone]  Primary Provider: Shamir Angeles  Pre-op Performing Provider: SONG SMITH      PREOPERATIVE EVALUATION:  Today's date: 7/14/2023    Kezia Nava is a 47 year old female who presents for a preoperative evaluation.       No data to display              Surgical Information:  Surgery/Procedure: Right ankle tendon repair  Surgery Location: [facility]  Surgeon: Dr. Bonds  Surgery Date: 7-  Time of Surgery: tbd  Where patient plans to recover: At home with family  Fax number for surgical facility: [phone]    Assessment & Plan     The proposed surgical procedure is considered INTERMEDIATE risk.      (Z01.818) Preop general physical exam  (primary encounter diagnosis)  Comment: chronic issues stable, she is diabetic, not on insulin, not on blood thinners, has ANA, no cardiac history, able to do 4 METS  Plan: Basic metabolic panel  (Ca, Cl, CO2, Creat,         Gluc, K, Na, BUN), CANCELED: Basic metabolic         panel  (Ca, Cl, CO2, Creat, Gluc, K, Na, BUN)  Cleared for procedure   pls monitor 02 sats and cardiopulm status closely in the eulalio-op period due to ANA    (M25.571) Pain in joint involving ankle and foot, right  Comment: pain since rolling last fall  Plan:     (E11.65) Type 2 diabetes mellitus with hyperglycemia, without long-term current use of insulin (H)  Comment:   Plan:     (E66.01,  Z68.35) Severe obesity (BMI 35.0-35.9 with comorbidity) (H)  Comment:   Plan:     (G47.33) ANA (obstructive sleep apnea)  Comment:   Plan:     (K75.81) ROTHMAN (nonalcoholic steatohepatitis)  Comment:   Plan:     (L02.419) Abscess of leg  Comment: small, opened today, on keflex  Plan: warm compresses, finish keflex    Risks and Recommendations:  The patient has the following additional risks and recommendations for perioperative complications:  Diabetes:  - Patient is not  on insulin therapy: regular NPO guidelines can be followed.     Antiplatelet or Anticoagulation Medication Instructions:   - Patient is on no antiplatelet or anticoagulation medications.    Additional Medication Instructions:  Stop all NSAIDs (motrin, ibuprofen, naproxen, aleve, advil, naprosyn, aspirin)  for at least 7 days prior to surgery.    Don't take monjouro the 7 days prior to surgery    Don't take glimepiride the am of surgery, okay to take after surgery    Use your advair inhaler the am of surgery     Take the rest of your medications as usual the night before surgery except spironolactone     RECOMMENDATION:  APPROVAL GIVEN to proceed with proposed procedure, without further diagnostic evaluation.    Radha Diana PA-C  45 minutes on the day of the encounter doing chart review, history and exam, documentation and further activities as noted above.      Subjective       HPI related to upcoming procedure:  Hazel is here for pre-op for ankle surgery.  She rolled her ankle last fall and has been having issues since.    She takes most of her medications at bedtime.    She is a diabetic but is not on insulin.    She has history of sleep apnea.     She generally feels well.  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  She denies any chest pain with activity.  She has had some exertional sob since she had covid.    She denies history of MI, CVA or DVT.        7/14/2023     1:04 PM   Preop Questions   1. Have you ever had a heart attack or stroke? No   2. Have you ever had surgery on your heart or blood vessels, such as a stent placement, a coronary artery bypass, or surgery on an artery in your head, neck, heart, or legs? No   3. Do you have chest pain with activity? No   4. Do you have a history of  heart failure? No   5. Do you currently have a cold, bronchitis or symptoms of other infection? No   6. Do you have a cough, shortness of breath, or wheezing? No   7. Do you or anyone in your  family have previous history of blood clots? No   8. Do you or does anyone in your family have a serious bleeding problem such as prolonged bleeding following surgeries or cuts? No   9. Have you ever had problems with anemia or been told to take iron pills? No   10. Have you had any abnormal blood loss such as black, tarry or bloody stools, or abnormal vaginal bleeding? No   11. Have you ever had a blood transfusion? No   12. Are you willing to have a blood transfusion if it is medically needed before, during, or after your surgery? Yes   13. Have you or any of your relatives ever had problems with anesthesia? No   14. Do you have sleep apnea, excessive snoring or daytime drowsiness? YES -    14a. Do you have a CPAP machine? Yes   15. Do you have any artifical heart valves or other implanted medical devices like a pacemaker, defibrillator, or continuous glucose monitor? No   16. Do you have artificial joints? No   17. Are you allergic to latex? No   18. Is there any chance that you may be pregnant? No       Review of Systems  CONSTITUTIONAL: NEGATIVE for fever, chills, change in weight  INTEGUMENTARY/SKIN: NEGATIVE for worrisome rashes, moles or lesions  EYES: NEGATIVE for vision changes or irritation  ENT/MOUTH: NEGATIVE for ear, mouth and throat problems  RESP: NEGATIVE for significant cough or SOB  CV: NEGATIVE for chest pain, palpitations or peripheral edema  GI: NEGATIVE for nausea, abdominal pain, heartburn, or change in bowel habits  : NEGATIVE for frequency, dysuria, or hematuria  MUSCULOSKELETAL: NEGATIVE for significant arthralgias or myalgia  NEURO: NEGATIVE for weakness, dizziness or paresthesias  HEME: NEGATIVE for bleeding problems    Patient Active Problem List    Diagnosis Date Noted     Chronic kidney disease, stage 2 (mild) 03/29/2022     Priority: Medium     Epigastric pain 01/20/2021     Priority: Medium     Elevated LFTs 01/20/2021     Priority: Medium     Elevated lipase 01/20/2021      Priority: Medium     Acute left-sided thoracic back pain 01/20/2021     Priority: Medium     Pre-diabetes 05/13/2019     Priority: Medium     Fibroid uterus 05/13/2019     Priority: Medium     Paresthesias- ? TIA vs complex migraine (preferred per neuro) 10/05/2017     Priority: Medium     Severe obesity (BMI 35.0-35.9 with comorbidity) (H) 10/05/2017     Priority: Medium     Acute bilateral low back pain with right-sided sciatica 09/01/2017     Priority: Medium     ANA (obstructive sleep apnea) 08/15/2017     Priority: Medium     Type 2 diabetes mellitus with hyperglycemia, without long-term current use of insulin (H) 02/06/2017     Priority: Medium     Benign essential hypertension 01/04/2017     Priority: Medium     Cervical radiculopathy 12/12/2015     Priority: Medium     Pain in joint, ankle and foot 06/14/2010     Priority: Medium      Past Medical History:   Diagnosis Date     Bronchitis      Diffuse cystic mastopathy      Elevated BP 12/15/2016     Hypertension      Mesenteric adenitis 4/14     Migraines      Nephrolithiasis     s/p lithotripsy     Sleep apnea     no cpap     Thyroiditis, acute 12/2/2014    transient hypothyroidism- resolved     Type 2 diabetes mellitus with hyperglycemia, without long-term current use of insulin (H) 2/6/2017     Past Surgical History:   Procedure Laterality Date     ARTHROSCOPY ANKLE, OPEN REPAIR TENDON, COMBINED  11/8/2012    Procedure: COMBINED ARTHROSCOPY ANKLE, OPEN REPAIR TENDON;  Ankle Arthroscopy Left Ankle, Open Ligament Repair Left Ankle, Peroneal Tendon Repair Left Ankle ;  Surgeon: Otf Ramos DPM;  Location: [facility] OR     CARPAL TUNNEL RELEASE RT/LT       COMBINED CYSTOSCOPY, INSERT STENT URETER(S)  8/6/2013    Procedure: COMBINED CYSTOSCOPY, INSERT STENT URETER(S);  cystoscopy, right retrograde,RIGHT STENT PLACEMENT.;  Surgeon: Kan Porter MD;  Location:  OR     CYSTOSCOPY, RETROGRADES, INSERT STENT URETER(S), COMBINED  8/6/2013     Procedure: COMBINED CYSTOSCOPY, RETROGRADES, INSERT STENT URETER(S);  cystoscopy, right retrograde, insert stent right ureter;  Surgeon: aKn Porter MD;  Location:  OR     DENTAL SURGERY      TOOTH#7 - DENTAL IMPLANT     DISCECTOMY, FUSION CERVICAL ANTERIOR ONE LEVEL, COMBINED N/A 12/13/2015    Procedure: COMBINED DISCECTOMY, FUSION CERVICAL ANTERIOR ONE LEVEL;  Surgeon: Seven Umaña MD;  Location:  OR     ESOPHAGOSCOPY, GASTROSCOPY, DUODENOSCOPY (EGD), COMBINED N/A 1/21/2021    Procedure: ESOPHAGOGASTRODUODENOSCOPY, WITH BIOPSY;  Surgeon: Crystal Chacon MD;  Location:  GI     EXTRACORPOREAL SHOCK WAVE LITHOTRIPSY (ESWL)  8/19/2013    Procedure: EXTRACORPOREAL SHOCK WAVE LITHOTRIPSY (ESWL);   RIGHT EXTRACORPOREAL SHOCK WAVE LITHOTRIPSY;  Surgeon: Otf Tobias MD;  Location:  OR      REDUCTION OF LARGE BREAST Bilateral 2003     HYSTERECTOMY, PAP NO LONGER INDICATED  2019     LAPAROSCOPIC HYSTERECTOMY TOTAL, SALPINGECTOMY Left 5/29/2019    Procedure: single site total LAPAROSCOPIC HYSTERECTOMY, left oophorectomy and lysis of adhesions;  Surgeon: Pauline Horowitz MD;  Location: [facility] OR     LASIK BILATERAL       SALPINGO OOPHORECTOMY,R/L/DERREK Right 05/17/2006    Right     ZZC APPENDECTOMY  1984     Current Outpatient Medications   Medication Sig Dispense Refill     albuterol (PROAIR HFA/PROVENTIL HFA/VENTOLIN HFA) 108 (90 Base) MCG/ACT inhaler Inhale 2 puffs into the lungs every 6 hours (Patient not taking: Reported on 5/4/2023) 18 g 0     BIOTIN PO Take 1 tablet by mouth daily Dose unknown       blood glucose (NO BRAND SPECIFIED) lancets standard Use to test blood sugar 1 times daily or as directed. 100 each 3     blood glucose (NO BRAND SPECIFIED) test strip Use to test blood sugar 1 times daily or as directed. 100 strip 3     blood glucose monitoring (NO BRAND SPECIFIED) meter device kit Use to test blood sugar as directed. 1 kit 0     cephALEXin (KEFLEX) 500 MG capsule  Take 1 capsule (500 mg) by mouth 3 times daily 21 capsule 0     cetirizine (ZYRTEC) 10 MG tablet Take 10 mg by mouth daily       escitalopram (LEXAPRO) 10 MG tablet Take 1 tablet (10 mg) by mouth daily 90 tablet 3     fluticasone-salmeterol (ADVAIR HFA) 115-21 MCG/ACT inhaler Inhale 2 puffs into the lungs 2 times daily 12 g 11     glimepiride (AMARYL) 2 MG tablet TAKE ONE TABLET BY MOUTH EVERY MORNING BEFORE BREAKFAST 31 tablet 1     LANsoprazole (PREVACID) 30 MG DR capsule Take 1 capsule (30 mg) by mouth daily 90 capsule 3     losartan-hydrochlorothiazide (HYZAAR) 100-12.5 MG tablet Take 1 tablet by mouth daily 90 tablet 3     metFORMIN (GLUCOPHAGE XR) 500 MG 24 hr tablet TAKE ONE TABLET BY MOUTH ONCE DAILY WITH DINNER 90 tablet 1     multivitamin w/minerals (THERA-VIT-M) tablet Take 1 tablet by mouth daily       spironolactone (ALDACTONE) 25 MG tablet Take 1 tablet (25 mg) by mouth daily 90 tablet 3     STATIN NOT PRESCRIBED, INTENTIONAL, Please choose reason not prescribed, below       tirzepatide (MOUNJARO) 12.5 MG/0.5ML pen Inject 12.5 mg Subcutaneous every 7 days 2 mL 0     traZODone (DESYREL) 50 MG tablet Take 1-2 tablets ( mg) by mouth At Bedtime (Patient taking differently: Take 50 mg by mouth At Bedtime) 180 tablet 3     VITAMIN D, CHOLECALCIFEROL, PO Take 10,000 Units by mouth daily          Allergies   Allergen Reactions     Lisinopril Cough     Pneumovax [Pneumococcal Polysaccharide Vaccine] Other (See Comments)     Red streaking and pain        Social History     Tobacco Use     Smoking status: Never     Smokeless tobacco: Never   Substance Use Topics     Alcohol use: Yes     Alcohol/week: 0.0 standard drinks of alcohol     Comment: 0-1 drinks per week     Family History   Problem Relation Age of Onset     C.A.D. Mother         stents     Hypertension Mother      Kidney Disease Mother         transplant     Cerebrovascular Disease Mother 77     Hypertension Father      Breast Cancer Maternal Aunt  "        may have been fibrocystic     Breast Cancer Maternal Aunt         may have been fibrocystic     Cerebrovascular Disease Paternal Grandfather      Cancer Paternal Grandmother         stomach     Breast Cancer Sister 43     Nephrolithiasis Brother      Uterine Cancer Maternal Grandmother 44     Colon Cancer Maternal Grandmother 64     Prostate Cancer Maternal Uncle 50     Breast Cancer Cousin 41        maternal first cousin, triple negative breast cancer     Breast Cancer Paternal Aunt      History   Drug Use No         Objective     LMP 06/01/2018 (Approximate)     /77 (BP Location: Right arm, Patient Position: Sitting, Cuff Size: Adult Large)   Pulse 88   Temp 98.6  F (37  C) (Oral)   Resp 16   Ht 1.575 m (5' 2\")   Wt 88.5 kg (195 lb 3.2 oz)   LMP 06/01/2018 (Approximate)   SpO2 95%   BMI 35.70 kg/m      Physical Exam       GENERAL APPEARANCE: healthy, alert and no distress     EYES: no scleral icterus     HENT: OP clear mouth without ulcers or lesions     NECK: supple, no bruits     RESP: lungs clear to auscultation - no rales, rhonchi or wheezes     CV: regular rates and rhythm, normal S1 S2, no S3 or S4 and no murmur, click or rub     ABDOMEN:  soft, nontender, no HSM or masses and bowel sounds normal     MS: extremities normal- no gross deformities noted, no edema     SKIN: small abscess left lateral upper thigh, about size of dime, no surrounding induration     I and D note:  Location left lateral thigh  Cleaned w/betadine x 3  Anesthesia: 2.5 ml 1% lido w/epi  1/2cm linear incision made  Wound deloculated and expressed with good return of purulent materal  Non-adhesive bandage and paper tape applied  Patient tolerated well       Recent Labs   Lab Test 06/06/23  1317 05/03/23  1306 02/14/23  1136 01/13/23  1039   HGB 13.6  --   --  12.9     --   --  221    135*   < > 143   POTASSIUM 3.9 4.0   < > 4.0   CR 0.82 0.81   < > 0.64   A1C  --  6.3*  --  7.3*    < > = values in this " interval not displayed.        Diagnostics:  Labs pending at this time.  Results will be reviewed when available.   No EKG this visit, completed in the last 90 days.    Revised Cardiac Risk Index (RCRI):  The patient has the following serious cardiovascular risks for perioperative complications:   - No serious cardiac risks = 0 points     RCRI Interpretation: 0 points: Class I (very low risk - 0.4% complication rate)           Signed Electronically by: Radha Diana PA-C  Copy of this evaluation report is provided to requesting physician.

## 2023-07-14 NOTE — PATIENT INSTRUCTIONS
Stop all NSAIDs (motrin, ibuprofen, naproxen, aleve, advil, naprosyn, aspirin)  for at least 7 days prior to surgery.    Don't take monjouro the 7 days prior to surgery    Don't take glimepiride the am of surgery, okay to take after surgery    Use your advair inhaler the am of surgery     Take the rest of your medications as usual the night before surgery except spironolactone

## 2023-07-27 DIAGNOSIS — E11.65 TYPE 2 DIABETES MELLITUS WITH HYPERGLYCEMIA, WITHOUT LONG-TERM CURRENT USE OF INSULIN (H): ICD-10-CM

## 2023-07-27 RX ORDER — GLIMEPIRIDE 2 MG/1
TABLET ORAL
Qty: 31 TABLET | Refills: 1 | Status: SHIPPED | OUTPATIENT
Start: 2023-07-27 | End: 2023-09-21

## 2023-08-07 ENCOUNTER — TRANSFERRED RECORDS (OUTPATIENT)
Dept: HEALTH INFORMATION MANAGEMENT | Facility: CLINIC | Age: 48
End: 2023-08-07

## 2023-08-15 ENCOUNTER — MYC REFILL (OUTPATIENT)
Dept: PHARMACY | Facility: CLINIC | Age: 48
End: 2023-08-15
Payer: COMMERCIAL

## 2023-08-15 DIAGNOSIS — E11.65 TYPE 2 DIABETES MELLITUS WITH HYPERGLYCEMIA, WITHOUT LONG-TERM CURRENT USE OF INSULIN (H): ICD-10-CM

## 2023-08-15 DIAGNOSIS — E66.01 SEVERE OBESITY (BMI 35.0-35.9 WITH COMORBIDITY) (H): ICD-10-CM

## 2023-08-16 RX ORDER — TIRZEPATIDE 12.5 MG/.5ML
12.5 INJECTION, SOLUTION SUBCUTANEOUS
Qty: 2 ML | Refills: 0 | Status: SHIPPED | OUTPATIENT
Start: 2023-08-16 | End: 2023-09-28 | Stop reason: DRUGHIGH

## 2023-09-06 ENCOUNTER — TRANSFERRED RECORDS (OUTPATIENT)
Dept: HEALTH INFORMATION MANAGEMENT | Facility: CLINIC | Age: 48
End: 2023-09-06
Payer: COMMERCIAL

## 2023-09-21 DIAGNOSIS — E11.65 TYPE 2 DIABETES MELLITUS WITH HYPERGLYCEMIA, WITHOUT LONG-TERM CURRENT USE OF INSULIN (H): ICD-10-CM

## 2023-09-21 RX ORDER — GLIMEPIRIDE 2 MG/1
TABLET ORAL
Qty: 90 TABLET | Refills: 0 | Status: SHIPPED | OUTPATIENT
Start: 2023-09-21 | End: 2023-12-18

## 2023-09-28 ENCOUNTER — VIRTUAL VISIT (OUTPATIENT)
Dept: PHARMACY | Facility: CLINIC | Age: 48
End: 2023-09-28
Payer: COMMERCIAL

## 2023-09-28 DIAGNOSIS — E11.65 TYPE 2 DIABETES MELLITUS WITH HYPERGLYCEMIA, WITHOUT LONG-TERM CURRENT USE OF INSULIN (H): Primary | ICD-10-CM

## 2023-09-28 DIAGNOSIS — E66.01 SEVERE OBESITY (BMI 35.0-35.9 WITH COMORBIDITY) (H): ICD-10-CM

## 2023-09-28 DIAGNOSIS — R79.89 ELEVATED LFTS: ICD-10-CM

## 2023-09-28 PROCEDURE — 99607 MTMS BY PHARM ADDL 15 MIN: CPT | Performed by: PHARMACIST

## 2023-09-28 PROCEDURE — 99606 MTMS BY PHARM EST 15 MIN: CPT | Performed by: PHARMACIST

## 2023-09-28 RX ORDER — TIRZEPATIDE 15 MG/.5ML
15 INJECTION, SOLUTION SUBCUTANEOUS
Qty: 6 ML | Refills: 3 | Status: SHIPPED | OUTPATIENT
Start: 2023-09-28

## 2023-09-28 NOTE — LETTER
Medication List        Prepared on: 09/28/2023     Bring your Medication List when you go to the doctor, hospital, or   emergency room. And, share it with your family or caregivers.     Note any changes to how you take your medications.  Cross out medications when you no longer use them.    Medication How I take it Why I use it Prescriber   albuterol (PROAIR HFA/PROVENTIL HFA/VENTOLIN HFA) 108 (90 Base) MCG/ACT inhaler Inhale 2 puffs into the lungs every 6 hours Chronic Cough SALLIE Martin CNP   BIOTIN PO Take 1 tablet by mouth daily Dose unknown   Patient Reported   blood glucose (NO BRAND SPECIFIED) lancets standard Use to test blood sugar 1 times daily or as directed. Type 2 diabetes mellitus with hyperglycemia, without long-term current use of insulin (H) Shamir Angeles MD   blood glucose (NO BRAND SPECIFIED) test strip Use to test blood sugar 1 times daily or as directed. Type 2 diabetes mellitus with diabetic nephropathy, without long-term current use of insulin (H) Shamir Angeles MD   blood glucose monitoring (NO BRAND SPECIFIED) meter device kit Use to test blood sugar as directed. Type 2 diabetes mellitus with hyperglycemia, without long-term current use of insulin (H) Shamir Angeles MD   cephALEXin (KEFLEX) 500 MG capsule Take 1 capsule (500 mg) by mouth 3 times daily Cellulitis of other specified site Nikki Wiley MD   cetirizine (ZYRTEC) 10 MG tablet Take 10 mg by mouth daily   Patient Reported   escitalopram (LEXAPRO) 10 MG tablet Take 1 tablet (10 mg) by mouth daily Insomnia, unspecified type; Situational depression Shamir Angeles MD   fluticasone-salmeterol (ADVAIR HFA) 115-21 MCG/ACT inhaler Inhale 2 puffs into the lungs 2 times daily Chronic Cough Shamir Angeles MD   glimepiride (AMARYL) 2 MG tablet TAKE ONE TABLET BY MOUTH EVERY MORNING BEFORE BREAKFAST Type 2 diabetes mellitus with hyperglycemia, without long-term current use of insulin (H) Shamir Angeles MD   LANsoprazole (PREVACID) 30  MG DR capsule Take 1 capsule (30 mg) by mouth daily Chronic Cough; Gastroesophageal reflux disease with esophagitis without hemorrhage Shamir Angeles MD   losartan-hydrochlorothiazide (HYZAAR) 100-12.5 MG tablet Take 1 tablet by mouth daily Benign Essential Hypertension Shamir Angeles MD   metFORMIN (GLUCOPHAGE XR) 500 MG 24 hr tablet TAKE ONE TABLET BY MOUTH ONCE DAILY WITH DINNER Type 2 diabetes mellitus with hyperglycemia, without long-term current use of insulin (H) Shamir Angeles MD   multivitamin w/minerals (THERA-VIT-M) tablet Take 1 tablet by mouth daily   Patient Reported   spironolactone (ALDACTONE) 25 MG tablet Take 1 tablet (25 mg) by mouth daily Chronic kidney disease, stage 2 (mild) Shamir Angeles MD   STATIN NOT PRESCRIBED, INTENTIONAL, Please choose reason not prescribed, below Type 2 diabetes mellitus with hyperglycemia, without long-term current use of insulin (H) Kan Serna MD   tirzepatide (MOUNJARO) 15 MG/0.5ML pen Inject 15 mg Subcutaneous every 7 days Type 2 diabetes mellitus with hyperglycemia, without long-term current use of insulin (H); Severe obesity (BMI 35.0-35.9 with comorbidity) (H) Shamir Angeles MD   traZODone (DESYREL) 50 MG tablet Take 1-2 tablets ( mg) by mouth At Bedtime Insomnia, unspecified type; Situational depression; Postviral Fatigue Syndrome Shamir Angeles MD   VITAMIN D, CHOLECALCIFEROL, PO Take 10,000 Units by mouth daily    Patient Reported         Add new medications, over-the-counter drugs, herbals, vitamins, or  minerals in the blank rows below.    Medication How I take it Why I use it Prescriber                                      Allergies:      lisinopril; pneumovax [pneumococcal polysaccharide vaccine]        Side effects I have had:               Other Information:              My notes and questions:

## 2023-09-28 NOTE — LETTER
"Recommended To-Do List      Prepared on: 09/28/2023       You can get the best results from your medications by completing the items on this \"To-Do List.\"      Bring your To-Do List when you go to your doctor. And, share it with your family or caregivers.    My To-Do List:  What we talked about: What I should do:   Your medication dosage being too low    Increase your dosage of Mounjaro          What we talked about: What I should do:                     "

## 2023-09-28 NOTE — PROGRESS NOTES
Medication Therapy Management (MTM) Encounter    ASSESSMENT:                            Medication Adherence/Access: No issues identified    Diabetes/Obesity/ROTHMAN:   Patient is meeting A1c goal of < 7%, due for re-check which can be done with primary care physician next week.  May benefit from increasing Mounjaro to 15mg dose to continue to aid with weight loss.  Depending on A1c result, may be able to discontinue glimepiride.    PLAN:                            Increased Mounjaro to 15mg weekly  A1c can be checked with primary care physician next week; depending on result may discontinue glimepiride    Follow-up: Return in about 8 days (around 10/6/2023) for Lab Work.    SUBJECTIVE/OBJECTIVE:                          Hazel Nava is a 47 year old female called for a follow-up visit from 5/4/2023 and myChart correspondence between now and then.    Reason for visit: Diabetes follow-up.    Tobacco: She reports that she has never smoked. She has never used smokeless tobacco.  Alcohol: Less than 1 beverage / month    Medication Adherence/Access: no issues reported    Diabetes /Obesity/ROTHMAN:  Metformin XR 500mg daily  Glimepiride 2mg daily  Mounjaro 12.5mg weekly  Not taking aspirin due to age  Patient is not experiencing side effects.  Blood sugar monitoring: not currently, hasn't checked in the last month.  Blood sugar was previously around 100mg/dL in the AM (fasting)  Current diabetes symptoms:  only if she skips breakfast  Diet/Exercise: Exercise has been limited due to ankle surgery in July.     Eye exam is up to date  Foot exam: due  Urine Albumin:   Lab Results   Component Value Date    UMALCR 189.93 (H) 01/13/2023      Lab Results   Component Value Date    A1C 6.3 (H) 05/03/2023     Home weight today: 192#  Wt Readings from Last 4 Encounters:   07/14/23 195 lb 3.2 oz (88.5 kg)   07/10/23 196 lb (88.9 kg)   02/19/23 200 lb (90.7 kg)   01/13/23 208 lb (94.3 kg)      Today's Vitals: LMP 06/01/2018 (Approximate)    ----------------    I spent 5 minutes with this patient today. All changes were made via collaborative practice agreement with Dr. Angeles. A copy of the visit note was provided to the patient's provider(s).    A summary of these recommendations was sent via [employer].    Irene Ramsey, PharmD, BCACP  Medication Therapy Management Provider  Pager: [phone]     Telemedicine Visit Details  Type of service:  Telephone visit  Start Time: 10:33 AM  End Time: 10:38 AM     Medication Therapy Recommendations  Type 2 diabetes mellitus with hyperglycemia, without long-term current use of insulin (H)    Current Medication: tirzepatide (MOUNJARO) 12.5 MG/0.5ML pen (Discontinued)   Rationale: Dose too low - Dosage too low - Effectiveness   Recommendation: Increase Dose - Mounjaro 15 MG/0.5ML Sopn   Status: Accepted per CPA

## 2023-09-28 NOTE — PATIENT INSTRUCTIONS
"Recommendations from today's MTM visit:                                                    MTM (medication therapy management) is a service provided by a clinical pharmacist designed to help you get the most of out of your medicines.   Today we reviewed what your medicines are for, how to know if they are working, that your medicines are safe and how to make your medicine regimen as easy as possible.      Increased Mounjaro to 15mg weekly  A1c can be checked with primary care physician next week; depending on result may discontinue glimepiride    Follow-up: Return in about 8 days (around 10/6/2023) for Lab Work.    It was great speaking with you today.  I value your experience and would be very thankful for your time in providing feedback in our clinic survey. In the next few days, you may receive an email or text message from [employer] with a link to a survey related to your  clinical pharmacist.\"     To schedule another MTM appointment, please call the clinic directly or you may call the MTM scheduling line at [phone] or toll-free at [phone].     My Clinical Pharmacist's contact information:                                                      Please feel free to contact me with any questions or concerns you have.      Irene Ramsey, Lina, BCACP  Medication Therapy Management Provider  Pager: [phone]    "

## 2023-09-28 NOTE — LETTER
September 28, 2023  Kezia JAZ Elijah  [address] [address] [address]    Dear Ms. Nava, KAELA [facility]     Thank you for talking with me on Sep 28, 2023 about your health and medications. As a follow-up to our conversation, I have included two documents:      Your Recommended To-Do List has steps you should take to get the best results from your medications.  Your Medication List will help you keep track of your medications and how to take them.    If you want to talk about these documents, please call Irene Ramsey PharmD at phone: [phone], Monday-Friday 8-4:30pm.    I look forward to working with you and your doctors to make sure your medications work well for you.    Sincerely,  Irene Ramsey PharmD  [facility] Pharmacist, [facility]

## 2023-10-02 DIAGNOSIS — G93.31 POSTVIRAL FATIGUE SYNDROME: ICD-10-CM

## 2023-10-02 DIAGNOSIS — F43.21 SITUATIONAL DEPRESSION: ICD-10-CM

## 2023-10-02 DIAGNOSIS — G47.00 INSOMNIA, UNSPECIFIED TYPE: ICD-10-CM

## 2023-10-03 RX ORDER — TRAZODONE HYDROCHLORIDE 50 MG/1
TABLET, FILM COATED ORAL
Qty: 90 TABLET | Refills: 1 | Status: SHIPPED | OUTPATIENT
Start: 2023-10-03 | End: 2023-12-27

## 2023-10-06 ENCOUNTER — OFFICE VISIT (OUTPATIENT)
Dept: FAMILY MEDICINE | Facility: CLINIC | Age: 48
End: 2023-10-06
Payer: COMMERCIAL

## 2023-10-06 VITALS
OXYGEN SATURATION: 99 % | RESPIRATION RATE: 16 BRPM | SYSTOLIC BLOOD PRESSURE: 110 MMHG | BODY MASS INDEX: 36.33 KG/M2 | TEMPERATURE: 98.2 F | DIASTOLIC BLOOD PRESSURE: 77 MMHG | HEIGHT: 62 IN | WEIGHT: 197.4 LBS | HEART RATE: 77 BPM

## 2023-10-06 DIAGNOSIS — E11.65 TYPE 2 DIABETES MELLITUS WITH HYPERGLYCEMIA, WITHOUT LONG-TERM CURRENT USE OF INSULIN (H): Primary | Chronic | ICD-10-CM

## 2023-10-06 DIAGNOSIS — I10 BENIGN ESSENTIAL HYPERTENSION: Chronic | ICD-10-CM

## 2023-10-06 DIAGNOSIS — F43.21 SITUATIONAL DEPRESSION: ICD-10-CM

## 2023-10-06 DIAGNOSIS — R06.02 SHORTNESS OF BREATH: ICD-10-CM

## 2023-10-06 DIAGNOSIS — E66.01 SEVERE OBESITY (BMI 35.0-35.9 WITH COMORBIDITY) (H): ICD-10-CM

## 2023-10-06 DIAGNOSIS — G47.33 OBSTRUCTIVE SLEEP APNEA (ADULT) (PEDIATRIC): Primary | ICD-10-CM

## 2023-10-06 DIAGNOSIS — G47.33 OSA (OBSTRUCTIVE SLEEP APNEA): Chronic | ICD-10-CM

## 2023-10-06 LAB
ANION GAP SERPL CALCULATED.3IONS-SCNC: 13 MMOL/L (ref 7–15)
BUN SERPL-MCNC: 8.9 MG/DL (ref 6–20)
CALCIUM SERPL-MCNC: 9.9 MG/DL (ref 8.6–10)
CHLORIDE SERPL-SCNC: 102 MMOL/L (ref 98–107)
CHOLEST SERPL-MCNC: 206 MG/DL
CREAT SERPL-MCNC: 0.79 MG/DL (ref 0.51–0.95)
CREAT UR-MCNC: 116 MG/DL
DEPRECATED HCO3 PLAS-SCNC: 25 MMOL/L (ref 22–29)
EGFRCR SERPLBLD CKD-EPI 2021: >90 ML/MIN/1.73M2
GLUCOSE SERPL-MCNC: 118 MG/DL (ref 70–99)
HBA1C MFR BLD: 6.1 % (ref 0–5.6)
HDLC SERPL-MCNC: 43 MG/DL
LDLC SERPL CALC-MCNC: 91 MG/DL
MICROALBUMIN UR-MCNC: 45 MG/L
MICROALBUMIN/CREAT UR: 38.79 MG/G CR (ref 0–25)
NONHDLC SERPL-MCNC: 163 MG/DL
POTASSIUM SERPL-SCNC: 3.9 MMOL/L (ref 3.4–5.3)
SODIUM SERPL-SCNC: 140 MMOL/L (ref 135–145)
TRIGL SERPL-MCNC: 359 MG/DL

## 2023-10-06 PROCEDURE — 36415 COLL VENOUS BLD VENIPUNCTURE: CPT | Performed by: INTERNAL MEDICINE

## 2023-10-06 PROCEDURE — 99207 PR FOOT EXAM NO CHARGE: CPT | Performed by: INTERNAL MEDICINE

## 2023-10-06 PROCEDURE — 83036 HEMOGLOBIN GLYCOSYLATED A1C: CPT | Performed by: INTERNAL MEDICINE

## 2023-10-06 PROCEDURE — 80048 BASIC METABOLIC PNL TOTAL CA: CPT | Performed by: INTERNAL MEDICINE

## 2023-10-06 PROCEDURE — 82043 UR ALBUMIN QUANTITATIVE: CPT | Performed by: INTERNAL MEDICINE

## 2023-10-06 PROCEDURE — 82570 ASSAY OF URINE CREATININE: CPT | Performed by: INTERNAL MEDICINE

## 2023-10-06 PROCEDURE — 80061 LIPID PANEL: CPT | Performed by: INTERNAL MEDICINE

## 2023-10-06 PROCEDURE — 99214 OFFICE O/P EST MOD 30 MIN: CPT | Performed by: INTERNAL MEDICINE

## 2023-10-06 ASSESSMENT — PAIN SCALES - GENERAL: PAINLEVEL: MILD PAIN (3)

## 2023-10-06 NOTE — PROGRESS NOTES
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  Benign essential hypertension  Blood pressure is excellent with losartan hydrochlorothiazide and we will check a kidney function test  She is dyspneic and therefore a stress echo will be done  - Echocardiogram Exercise Stress    Severe obesity (BMI 35.0-35.9 with comorbidity) (H)    - Echocardiogram Exercise Stress    ANA (obstructive sleep apnea)  Patient is compliant    Situational depression  On Lexapro and doing well    Shortness of breath  As above patient needs work-up for that  - Echocardiogram Exercise [id number]     BMI:   Estimated body mass index is 36.1 kg/m  as calculated from the following:    Height as of this encounter: 1.575 m (5' 2\").    Weight as of this encounter: 89.5 kg (197 lb 6.4 oz).           Shamir Angeles MD  [facility] ERENDIRA Oliva is a 47 year old, presenting for the following health issues:  RECHECK  Patient is recovering from right ankle surgery  She is doing quite well except that she is short of breath with exertion  She does not have any other symptoms at rest  She is returning to work in end of October  History of Present Illness       Diabetes:   She presents for follow up of diabetes.  She is checking home blood glucose a few times a month.   She checks blood glucose before meals.  Blood glucose is never over 200 and never under 70. She is aware of hypoglycemia symptoms including " "shakiness.    She has no concerns regarding her diabetes at this time.   She is not experiencing numbness or burning in feet, excessive thirst, blurry vision, weight changes or redness, sores or blisters on feet.           She eats 0-1 servings of fruits and vegetables daily.She consumes 0 sweetened beverage(s) daily.She exercises with enough effort to increase her heart rate 20 to 29 minutes per day.  She exercises with enough effort to increase her heart rate 3 or less days per week.   She is taking medications regularly.         Review of Systems   10 point ROS of systems including Constitutional, Eyes, Respiratory, Cardiovascular, Gastroenterology, Genitourinary, Integumentary, Muscularskeletal, Psychiatric were all negative except for pertinent positives noted in my HPI.        Objective    /77 (BP Location: Left arm, Patient Position: Sitting, Cuff Size: Adult Regular)   Pulse 77   Temp 98.2  F (36.8  C)   Resp 16   Ht 1.575 m (5' 2\")   Wt 89.5 kg (197 lb 6.4 oz)   LMP 06/01/2018 (Approximate)   SpO2 99%   BMI 36.10 kg/m    Body mass index is 36.1 kg/m .  Physical Exam   Foot exam shows no ulceration, no neuropathy, microfilament well felt. Skin on the feet not dry.  Pulses in the feet well felt.      Ankle surgery area is healing well  Patient Active Problem List   Diagnosis    Pain in joint, ankle and foot    Cervical radiculopathy    Benign essential hypertension    Type 2 diabetes mellitus with hyperglycemia, without long-term current use of insulin (H)    ANA (obstructive sleep apnea)    Acute bilateral low back pain with right-sided sciatica    Paresthesias- ? TIA vs complex migraine (preferred per neuro)    Severe obesity (BMI 35.0-35.9 with comorbidity) (H)    Pre-diabetes    Fibroid uterus    Epigastric pain    Elevated LFTs    Elevated lipase    Acute left-sided thoracic back pain    Chronic kidney disease, stage 2 (mild)     Current Outpatient Medications   Medication    albuterol " (PROAIR HFA/PROVENTIL HFA/VENTOLIN HFA) 108 (90 Base) MCG/ACT inhaler    BIOTIN PO    blood glucose (NO BRAND SPECIFIED) lancets standard    blood glucose (NO BRAND SPECIFIED) test strip    blood glucose monitoring (NO BRAND SPECIFIED) meter device kit    cetirizine (ZYRTEC) 10 MG tablet    escitalopram (LEXAPRO) 10 MG tablet    fluticasone-salmeterol (ADVAIR HFA) 115-21 MCG/ACT inhaler    glimepiride (AMARYL) 2 MG tablet    LANsoprazole (PREVACID) 30 MG DR capsule    losartan-hydrochlorothiazide (HYZAAR) 100-12.5 MG tablet    metFORMIN (GLUCOPHAGE XR) 500 MG 24 hr tablet    multivitamin w/minerals (THERA-VIT-M) tablet    spironolactone (ALDACTONE) 25 MG tablet    STATIN NOT PRESCRIBED, INTENTIONAL,    tirzepatide (MOUNJARO) 15 MG/0.5ML pen    traZODone (DESYREL) 50 MG tablet    VITAMIN D, CHOLECALCIFEROL, PO     No current facility-administered medications for this visit.

## 2023-10-06 NOTE — PROGRESS NOTES
Last visit 2020. No Synopsis data. DME order signed. MyChart message sent stating she will need a follow-up for future prescriptions.  Bennett Goltz, PA-C

## 2023-11-03 ENCOUNTER — HOSPITAL ENCOUNTER (OUTPATIENT)
Dept: CARDIOLOGY | Facility: CLINIC | Age: 48
Discharge: HOME | End: 2023-11-03
Attending: INTERNAL MEDICINE | Admitting: INTERNAL MEDICINE
Payer: COMMERCIAL

## 2023-11-03 DIAGNOSIS — E11.65 TYPE 2 DIABETES MELLITUS WITH HYPERGLYCEMIA, WITHOUT LONG-TERM CURRENT USE OF INSULIN (H): Chronic | ICD-10-CM

## 2023-11-03 DIAGNOSIS — E66.01 SEVERE OBESITY (BMI 35.0-35.9 WITH COMORBIDITY) (H): ICD-10-CM

## 2023-11-03 DIAGNOSIS — R06.02 SHORTNESS OF BREATH: ICD-10-CM

## 2023-11-03 DIAGNOSIS — I10 BENIGN ESSENTIAL HYPERTENSION: Chronic | ICD-10-CM

## 2023-11-03 PROCEDURE — 93325 DOPPLER ECHO COLOR FLOW MAPG: CPT | Mod: TC

## 2023-11-03 PROCEDURE — 93018 CV STRESS TEST I&R ONLY: CPT | Performed by: INTERNAL MEDICINE

## 2023-11-03 PROCEDURE — C8928 TTE W OR W/O FOL W/CON,STRES: HCPCS

## 2023-11-03 PROCEDURE — 255N000002 HC RX 255 OP 636: Performed by: INTERNAL MEDICINE

## 2023-11-03 PROCEDURE — 93016 CV STRESS TEST SUPVJ ONLY: CPT | Performed by: INTERNAL MEDICINE

## 2023-11-03 PROCEDURE — 93321 DOPPLER ECHO F-UP/LMTD STD: CPT | Mod: 26 | Performed by: INTERNAL MEDICINE

## 2023-11-03 PROCEDURE — 93325 DOPPLER ECHO COLOR FLOW MAPG: CPT | Mod: 26 | Performed by: INTERNAL MEDICINE

## 2023-11-03 PROCEDURE — 93350 STRESS TTE ONLY: CPT | Mod: 26 | Performed by: INTERNAL MEDICINE

## 2023-11-03 RX ADMIN — HUMAN ALBUMIN MICROSPHERES AND PERFLUTREN 9 ML: 10; .22 INJECTION, SOLUTION INTRAVENOUS at 15:48

## 2023-11-05 DIAGNOSIS — E11.65 TYPE 2 DIABETES MELLITUS WITH HYPERGLYCEMIA, WITHOUT LONG-TERM CURRENT USE OF INSULIN (H): Chronic | ICD-10-CM

## 2023-11-06 ENCOUNTER — NURSE TRIAGE (OUTPATIENT)
Dept: FAMILY MEDICINE | Facility: CLINIC | Age: 48
End: 2023-11-06

## 2023-11-06 DIAGNOSIS — R05.3 CHRONIC COUGH: ICD-10-CM

## 2023-11-06 RX ORDER — ALBUTEROL SULFATE 90 UG/1
2 AEROSOL, METERED RESPIRATORY (INHALATION) EVERY 6 HOURS
Qty: 18 G | Refills: 0 | Status: CANCELLED
Start: 2023-11-06

## 2023-11-06 RX ORDER — METFORMIN HCL 500 MG
TABLET, EXTENDED RELEASE 24 HR ORAL
Qty: 90 TABLET | Refills: 0 | Status: SHIPPED | OUTPATIENT
Start: 2023-11-06 | End: 2024-01-31

## 2023-11-06 NOTE — TELEPHONE ENCOUNTER
Nurse Triage SBAR    Is this a 2nd Level Triage? YES, LICENSED PRACTITIONER REVIEW IS REQUIRED    Situation: Pt has ongoing breathing difficulty/SOB, had echo for this. Feels like symptoms exacerbated a bit after echo, and wondering if she should see pulmonology     Assessment: Has been taking Advair since Friday, without improvement. Does not know where albuterol inhaler is, so pended replacement for review.  Sx include increase sob/cough with sob, but hr normal, denies fever, O2 98% on RA.     Protocol Recommended Disposition:   Go To Office Now    Recommendation: Advise on if pt should have pulm referral?  Try albuterol first?  Follow up visit in clinic?      Routed to provider    Does the patient meet one of the following criteria for ADS visit consideration? 16+ years old, with an [facility] PCP     TIP  Providers, please consider if this condition is appropriate for management at one of our Acute and Diagnostic Services sites.     If patient is a good candidate, please use dotphrase <dot>triageresponse and select Refer to ADS to document.    Reason for Disposition   Longstanding difficulty breathing (e.g., CHF, COPD, emphysema) and worse than normal   MILD difficulty breathing (e.g., minimal/no SOB at rest, SOB with walking, pulse < 100) of new-onset or worse than normal    Additional Information   Negative: SEVERE difficulty breathing (e.g., struggling for each breath, speaks in single words, pulse > 120)   Negative: Breathing stopped and hasn't returned   Negative: Choking on something   Negative: Bluish (or gray) lips or face   Negative: Difficult to awaken or acting confused (e.g., disoriented, slurred speech)   Negative: Passed out (i.e., fainted, collapsed and was not responding)   Negative: Wheezing started suddenly after medicine, an allergic food, or bee sting   Negative: Stridor (harsh sound while breathing in)   Negative: Slow, shallow and weak breathing   Negative: Sounds like a life-threatening  "emergency to the triager   Negative: Chest pain   Negative: Wheezing (high pitched whistling sound) and previous asthma attacks or use of asthma medicines   Negative: Difficulty breathing and within 14 days of COVID-19 Exposure   Negative: Difficulty breathing and only present when coughing   Negative: Difficulty breathing and only from stuffy nose   Negative: Difficulty breathing and only from stuffy nose or runny nose from common cold   Negative: MODERATE difficulty breathing (e.g., speaks in phrases, SOB even at rest, pulse 100-120) of new-onset or worse than normal   Negative: Oxygen level (e.g., pulse oximetry) 90% or lower   Negative: Wheezing can be heard across the room   Negative: Drooling or spitting out saliva (because can't swallow)   Negative: Any history of prior \"blood clot\" in leg or lungs   Negative: Illness requiring prolonged bedrest in past month (e.g., immobilization, long hospital stay)   Negative: Hip or leg fracture (broken bone) in past month (or had cast on leg or ankle in past month)   Negative: Major surgery in the past month   Negative: Long-distance travel in past month (e.g., car, bus, train, plane; with trip lasting 6 or more hours)   Negative: Cancer treatment in past six months (or has cancer now)   Negative: Extra heartbeats, irregular heart beating, or heart is beating very fast (i.e., 'palpitations')   Negative: Fever > 103 F (39.4 C)   Negative: Fever > 101 F (38.3 C) and over 60 years of age   Negative: Fever > 100.0 F (37.8 C) and bedridden (e.g., nursing home patient, stroke, chronic illness, recovering from surgery)   Negative: Fever > 100.0 F (37.8 C) and diabetes mellitus or weak immune system (e.g., HIV positive, cancer chemo, splenectomy, organ transplant, chronic steroids)   Negative: Periods where breathing stops and then resumes normally and bedridden (e.g., nursing home patient, CVA)   Negative: Pregnant or postpartum (from 0 to 6 weeks after delivery)   Negative: " Patient sounds very sick or weak to the triager    Protocols used: Breathing Difficulty-A-OH

## 2023-11-06 NOTE — TELEPHONE ENCOUNTER
Shamir Angeles MD Zaffke, Erica, RN; Cs Triage Im14 minutes ago (2:57 PM)     BK  Clinic visit    Team appointment is ok if my schedule does not have opening.,   Called and spoke with pt. Relayed Dr Angeles's message from above. Assisted with scheduling pt with same day provider for tomorrow. Pt agrees to plan.

## 2023-11-07 ENCOUNTER — OFFICE VISIT (OUTPATIENT)
Dept: FAMILY MEDICINE | Facility: CLINIC | Age: 48
End: 2023-11-07
Payer: COMMERCIAL

## 2023-11-07 ENCOUNTER — ANCILLARY PROCEDURE (OUTPATIENT)
Dept: GENERAL RADIOLOGY | Facility: CLINIC | Age: 48
End: 2023-11-07
Attending: PHYSICIAN ASSISTANT
Payer: COMMERCIAL

## 2023-11-07 VITALS
OXYGEN SATURATION: 98 % | DIASTOLIC BLOOD PRESSURE: 70 MMHG | HEART RATE: 91 BPM | WEIGHT: 200.3 LBS | HEIGHT: 62 IN | TEMPERATURE: 98.4 F | SYSTOLIC BLOOD PRESSURE: 110 MMHG | BODY MASS INDEX: 36.86 KG/M2 | RESPIRATION RATE: 18 BRPM

## 2023-11-07 DIAGNOSIS — E66.01 SEVERE OBESITY (BMI 35.0-35.9 WITH COMORBIDITY) (H): ICD-10-CM

## 2023-11-07 DIAGNOSIS — R06.02 SOB (SHORTNESS OF BREATH): Primary | ICD-10-CM

## 2023-11-07 DIAGNOSIS — R05.1 ACUTE COUGH: ICD-10-CM

## 2023-11-07 DIAGNOSIS — E11.65 TYPE 2 DIABETES MELLITUS WITH HYPERGLYCEMIA, WITHOUT LONG-TERM CURRENT USE OF INSULIN (H): ICD-10-CM

## 2023-11-07 LAB
FLUAV RNA SPEC QL NAA+PROBE: NEGATIVE
FLUBV RNA RESP QL NAA+PROBE: NEGATIVE
RSV RNA SPEC NAA+PROBE: NEGATIVE
SARS-COV-2 RNA RESP QL NAA+PROBE: NEGATIVE

## 2023-11-07 PROCEDURE — 87637 SARSCOV2&INF A&B&RSV AMP PRB: CPT | Performed by: PHYSICIAN ASSISTANT

## 2023-11-07 PROCEDURE — 71046 X-RAY EXAM CHEST 2 VIEWS: CPT | Mod: TC | Performed by: RADIOLOGY

## 2023-11-07 PROCEDURE — 99214 OFFICE O/P EST MOD 30 MIN: CPT | Performed by: PHYSICIAN ASSISTANT

## 2023-11-07 RX ORDER — PREDNISONE 20 MG/1
20 TABLET ORAL 2 TIMES DAILY
Qty: 10 TABLET | Refills: 0 | Status: SHIPPED | OUTPATIENT
Start: 2023-11-07 | End: 2024-04-02

## 2023-11-07 RX ORDER — ALBUTEROL SULFATE 90 UG/1
2 AEROSOL, METERED RESPIRATORY (INHALATION) EVERY 6 HOURS PRN
Qty: 18 G | Refills: 3 | Status: SHIPPED | OUTPATIENT
Start: 2023-11-07

## 2023-11-07 ASSESSMENT — PAIN SCALES - GENERAL: PAINLEVEL: MILD PAIN (2)

## 2023-11-07 NOTE — RESULT ENCOUNTER NOTE
Hazel,    Your chest xray was normal.  Your Flu/covid/RSV screen was negative.  Let me know if the prednisone does not help.    Isabelle Black PA-C   [facility]

## 2023-11-07 NOTE — PROGRESS NOTES
HPI: Hazel is a 48 yo female here with sob krzysztof on exertion  She has had sob since this summer   She had ankle surgery this summer and was more sedentary than usual  She did have a recent stress echo which was neg  Now having a cough since 11/3 following the stress test  She has Advair but hadn't been using this so restarted Friday BID  She did find her rescue inhaler which also hasn't helped  No wheezing or prod cough  No assoc fever or body aches  Denies sore throat or runny nose  Last night had a lot of coughing  This is unusual for her as typically with cold she will have other nasal sxs  Had neg home covid test yesterday  She traveled in Sept.  Denies personal of dVT/PE  Her sister had a clot after having port placed.      Past Medical History:   Diagnosis Date    Bronchitis     Diffuse cystic mastopathy     Elevated BP 12/15/2016    Hypertension     Mesenteric adenitis 4/14    Migraines     Nephrolithiasis     s/p lithotripsy    Sleep apnea     no cpap    Thyroiditis, acute 12/2/2014    transient hypothyroidism- resolved    Type 2 diabetes mellitus with hyperglycemia, without long-term current use of insulin (H) 2/6/2017     Past Surgical History:   Procedure Laterality Date    ARTHROSCOPY ANKLE, OPEN REPAIR TENDON, COMBINED  11/8/2012    Procedure: COMBINED ARTHROSCOPY ANKLE, OPEN REPAIR TENDON;  Ankle Arthroscopy Left Ankle, Open Ligament Repair Left Ankle, Peroneal Tendon Repair Left Ankle ;  Surgeon: Otf Ramos DPM;  Location: [facility] OR    CARPAL TUNNEL RELEASE RT/LT      COMBINED CYSTOSCOPY, INSERT STENT URETER(S)  8/6/2013    Procedure: COMBINED CYSTOSCOPY, INSERT STENT URETER(S);  cystoscopy, right retrograde,RIGHT STENT PLACEMENT.;  Surgeon: Kan Porter MD;  Location:  OR    CYSTOSCOPY, RETROGRADES, INSERT STENT URETER(S), COMBINED  8/6/2013    Procedure: COMBINED CYSTOSCOPY, RETROGRADES, INSERT STENT URETER(S);  cystoscopy, right retrograde, insert stent right ureter;  Surgeon:  Kan Porter MD;  Location:  OR    DENTAL SURGERY      TOOTH#7 - DENTAL IMPLANT    DISCECTOMY, FUSION CERVICAL ANTERIOR ONE LEVEL, COMBINED N/A 12/13/2015    Procedure: COMBINED DISCECTOMY, FUSION CERVICAL ANTERIOR ONE LEVEL;  Surgeon: Seven Umaña MD;  Location:  OR    ESOPHAGOSCOPY, GASTROSCOPY, DUODENOSCOPY (EGD), COMBINED N/A 1/21/2021    Procedure: ESOPHAGOGASTRODUODENOSCOPY, WITH BIOPSY;  Surgeon: Crystal Chacon MD;  Location:  GI    EXTRACORPOREAL SHOCK WAVE LITHOTRIPSY (ESWL)  8/19/2013    Procedure: EXTRACORPOREAL SHOCK WAVE LITHOTRIPSY (ESWL);   RIGHT EXTRACORPOREAL SHOCK WAVE LITHOTRIPSY;  Surgeon: Otf Tobias MD;  Location:  OR    HC REDUCTION OF LARGE BREAST Bilateral 2003    HYSTERECTOMY, PAP NO LONGER INDICATED  2019    LAPAROSCOPIC HYSTERECTOMY TOTAL, SALPINGECTOMY Left 5/29/2019    Procedure: single site total LAPAROSCOPIC HYSTERECTOMY, left oophorectomy and lysis of adhesions;  Surgeon: Pauline Horowitz MD;  Location: [facility] OR    LASIK BILATERAL      SALPINGO OOPHORECTOMY,R/L/DERREK Right 05/17/2006    Right    ZZC APPENDECTOMY  1984     Social History     Tobacco Use    Smoking status: Never    Smokeless tobacco: Never   Substance Use Topics    Alcohol use: Yes     Alcohol/week: 0.0 standard drinks of alcohol     Comment: 0-1 drinks per week     Current Outpatient Medications   Medication Sig Dispense Refill    albuterol (PROAIR HFA/PROVENTIL HFA/VENTOLIN HFA) 108 (90 Base) MCG/ACT inhaler Inhale 2 puffs into the lungs every 6 hours 18 g 0    BIOTIN PO Take 1 tablet by mouth daily Dose unknown      blood glucose (NO BRAND SPECIFIED) lancets standard Use to test blood sugar 1 times daily or as directed. 100 each 3    blood glucose (NO BRAND SPECIFIED) test strip Use to test blood sugar 1 times daily or as directed. 100 strip 3    blood glucose monitoring (NO BRAND SPECIFIED) meter device kit Use to test blood sugar as directed. 1 kit 0    cetirizine  "(ZYRTEC) 10 MG tablet Take 10 mg by mouth daily      escitalopram (LEXAPRO) 10 MG tablet Take 1 tablet (10 mg) by mouth daily 90 tablet 3    fluticasone-salmeterol (ADVAIR HFA) 115-21 MCG/ACT inhaler Inhale 2 puffs into the lungs 2 times daily 12 g 11    glimepiride (AMARYL) 2 MG tablet TAKE ONE TABLET BY MOUTH EVERY MORNING BEFORE BREAKFAST 90 tablet 0    LANsoprazole (PREVACID) 30 MG DR capsule Take 1 capsule (30 mg) by mouth daily 90 capsule 3    losartan-hydrochlorothiazide (HYZAAR) 100-12.5 MG tablet Take 1 tablet by mouth daily 90 tablet 3    metFORMIN (GLUCOPHAGE XR) 500 MG 24 hr tablet TAKE ONE TABLET BY MOUTH ONCE DAILY WITH DINNER 90 tablet 0    multivitamin w/minerals (THERA-VIT-M) tablet Take 1 tablet by mouth daily      spironolactone (ALDACTONE) 25 MG tablet Take 1 tablet (25 mg) by mouth daily 90 tablet 3    STATIN NOT PRESCRIBED, INTENTIONAL, Please choose reason not prescribed, below      tirzepatide (MOUNJARO) 15 MG/0.5ML pen Inject 15 mg Subcutaneous every 7 days 6 mL 3    traZODone (DESYREL) 50 MG tablet TAKE ONE OR TWO TABLETS BY MOUTH EVERY NIGHT AT BEDTIME 90 tablet 1    VITAMIN D, CHOLECALCIFEROL, PO Take 10,000 Units by mouth daily        Allergies   Allergen Reactions    Lisinopril Cough    Pneumovax [Pneumococcal Polysaccharide Vaccine] Other (See Comments)     Red streaking and pain     FAMILY HISTORY NOTED AND REVIEWED    PHYSICAL EXAM:    /70 (BP Location: Right arm, Patient Position: Sitting, Cuff Size: Adult Large)   Pulse 91   Temp 98.4  F (36.9  C) (Oral)   Resp 18   Ht 1.575 m (5' 2\")   Wt 90.9 kg (200 lb 4.8 oz)   LMP 06/01/2018 (Approximate)   SpO2 98%   BMI 36.64 kg/m      Patient appears non toxic  Throat: no erythema or exudate  Neck: supple without LA  Lungs: CTA bilat  Heart: RRR  Extr: no edema    Assessment and Plan:     (R06.02) SOB (shortness of breath)  (primary encounter diagnosis)  Comment: pt had a neg stress echo on 11/3/23. Pt wonders if should see " pulm as has never had a formal eval for asthma. She is on Advair but was only using this prn. She did restart bid dosing 4 days ago. Refilled her albuterol in case that may be .  Pt advised to seek care in the ER if her breathing becomes worse.  Her vital signs are normal today.  Plan: albuterol (PROAIR HFA/PROVENTIL HFA/VENTOLIN         HFA) 108 (90 Base) MCG/ACT inhaler, predniSONE         (DELTASONE) 20 MG tablet        Bid x 5d.    (R05.1) Acute cough  Comment:   Plan: XR Chest 2 Views, Symptomatic Influenza A/B,         RSV, & SARS-CoV2 PCR (COVID-19) Nose            (E66.01,  Z68.35) Severe obesity (BMI 35.0-35.9 with comorbidity) (H)  Comment:   Plan:     (E11.65) Type 2 diabetes mellitus with hyperglycemia, without long-term current use of insulin (H)  Comment:   Plan: pt to closely monitor her blood sugars while on prednisone      Isabelle Black PA-C

## 2023-11-19 ENCOUNTER — HEALTH MAINTENANCE LETTER (OUTPATIENT)
Age: 48
End: 2023-11-19

## 2023-12-25 DIAGNOSIS — K21.00 GASTROESOPHAGEAL REFLUX DISEASE WITH ESOPHAGITIS WITHOUT HEMORRHAGE: ICD-10-CM

## 2023-12-25 DIAGNOSIS — N18.2 CHRONIC KIDNEY DISEASE, STAGE 2 (MILD): ICD-10-CM

## 2023-12-25 DIAGNOSIS — R05.3 CHRONIC COUGH: ICD-10-CM

## 2023-12-26 RX ORDER — LANSOPRAZOLE 30 MG/1
30 CAPSULE, DELAYED RELEASE ORAL DAILY
Qty: 90 CAPSULE | Refills: 3 | Status: SHIPPED | OUTPATIENT
Start: 2023-12-26 | End: 2024-04-02

## 2023-12-26 RX ORDER — SPIRONOLACTONE 25 MG/1
25 TABLET ORAL DAILY
Qty: 90 TABLET | Refills: 2 | Status: SHIPPED | OUTPATIENT
Start: 2023-12-26

## 2023-12-27 DIAGNOSIS — F43.21 SITUATIONAL DEPRESSION: ICD-10-CM

## 2023-12-27 DIAGNOSIS — G47.00 INSOMNIA, UNSPECIFIED TYPE: ICD-10-CM

## 2023-12-27 DIAGNOSIS — G93.31 POSTVIRAL FATIGUE SYNDROME: ICD-10-CM

## 2023-12-27 RX ORDER — TRAZODONE HYDROCHLORIDE 50 MG/1
TABLET, FILM COATED ORAL
Qty: 90 TABLET | Refills: 1 | Status: SHIPPED | OUTPATIENT
Start: 2023-12-27 | End: 2024-03-21

## 2024-01-20 DIAGNOSIS — R05.3 CHRONIC COUGH: ICD-10-CM

## 2024-01-22 RX ORDER — FLUTICASONE PROPIONATE AND SALMETEROL XINAFOATE 115; 21 UG/1; UG/1
2 AEROSOL, METERED RESPIRATORY (INHALATION) 2 TIMES DAILY
Qty: 12 G | Refills: 5 | Status: SHIPPED | OUTPATIENT
Start: 2024-01-22 | End: 2024-05-13

## 2024-01-30 DIAGNOSIS — E11.65 TYPE 2 DIABETES MELLITUS WITH HYPERGLYCEMIA, WITHOUT LONG-TERM CURRENT USE OF INSULIN (H): Chronic | ICD-10-CM

## 2024-01-31 RX ORDER — METFORMIN HCL 500 MG
TABLET, EXTENDED RELEASE 24 HR ORAL
Qty: 90 TABLET | Refills: 0 | Status: SHIPPED | OUTPATIENT
Start: 2024-01-31 | End: 2024-04-02

## 2024-01-31 NOTE — TELEPHONE ENCOUNTER
Prescription approved per [facility] Refill Protocol.  Lula Medina, RN  [facility] Triage Nurse

## 2024-02-14 DIAGNOSIS — E11.21 TYPE 2 DIABETES MELLITUS WITH DIABETIC NEPHROPATHY, WITHOUT LONG-TERM CURRENT USE OF INSULIN (H): ICD-10-CM

## 2024-02-14 RX ORDER — BLOOD-GLUCOSE METER
KIT MISCELLANEOUS
Qty: 100 STRIP | Refills: 0 | Status: SHIPPED | OUTPATIENT
Start: 2024-02-14 | End: 2024-05-22

## 2024-02-14 NOTE — TELEPHONE ENCOUNTER
Prescription approved per [facility] Refill Protocol.  Lula Medina, RN  [facility] Triage Nurse

## 2024-03-13 DIAGNOSIS — E11.65 TYPE 2 DIABETES MELLITUS WITH HYPERGLYCEMIA, WITHOUT LONG-TERM CURRENT USE OF INSULIN (H): ICD-10-CM

## 2024-03-13 RX ORDER — GLIMEPIRIDE 2 MG/1
TABLET ORAL
Qty: 90 TABLET | Refills: 0 | Status: SHIPPED | OUTPATIENT
Start: 2024-03-13 | End: 2024-06-10

## 2024-03-18 DIAGNOSIS — I10 BENIGN ESSENTIAL HYPERTENSION: Chronic | ICD-10-CM

## 2024-03-18 RX ORDER — LOSARTAN POTASSIUM AND HYDROCHLOROTHIAZIDE 12.5; 1 MG/1; MG/1
1 TABLET ORAL DAILY
Qty: 90 TABLET | Refills: 3 | Status: SHIPPED | OUTPATIENT
Start: 2024-03-18

## 2024-03-21 DIAGNOSIS — G93.31 POSTVIRAL FATIGUE SYNDROME: ICD-10-CM

## 2024-03-21 DIAGNOSIS — G47.00 INSOMNIA, UNSPECIFIED TYPE: ICD-10-CM

## 2024-03-21 DIAGNOSIS — F43.21 SITUATIONAL DEPRESSION: ICD-10-CM

## 2024-03-21 RX ORDER — TRAZODONE HYDROCHLORIDE 50 MG/1
TABLET, FILM COATED ORAL
Qty: 90 TABLET | Refills: 2 | Status: SHIPPED | OUTPATIENT
Start: 2024-03-21

## 2024-04-02 ENCOUNTER — OFFICE VISIT (OUTPATIENT)
Dept: FAMILY MEDICINE | Facility: CLINIC | Age: 49
End: 2024-04-02
Payer: COMMERCIAL

## 2024-04-02 ENCOUNTER — ANCILLARY PROCEDURE (OUTPATIENT)
Dept: CT IMAGING | Facility: CLINIC | Age: 49
End: 2024-04-02
Attending: INTERNAL MEDICINE
Payer: COMMERCIAL

## 2024-04-02 VITALS
OXYGEN SATURATION: 98 % | BODY MASS INDEX: 35.33 KG/M2 | TEMPERATURE: 96.9 F | HEIGHT: 62 IN | RESPIRATION RATE: 16 BRPM | SYSTOLIC BLOOD PRESSURE: 115 MMHG | WEIGHT: 192 LBS | DIASTOLIC BLOOD PRESSURE: 79 MMHG | HEART RATE: 89 BPM

## 2024-04-02 DIAGNOSIS — K75.81 NASH (NONALCOHOLIC STEATOHEPATITIS): ICD-10-CM

## 2024-04-02 DIAGNOSIS — R05.3 CHRONIC COUGH: ICD-10-CM

## 2024-04-02 DIAGNOSIS — N20.0 NEPHROLITHIASIS: ICD-10-CM

## 2024-04-02 DIAGNOSIS — Z80.3 FAMILY HISTORY OF MALIGNANT NEOPLASM OF BREAST: ICD-10-CM

## 2024-04-02 DIAGNOSIS — Z00.00 ROUTINE GENERAL MEDICAL EXAMINATION AT A HEALTH CARE FACILITY: Primary | ICD-10-CM

## 2024-04-02 DIAGNOSIS — M54.50 ACUTE RIGHT-SIDED LOW BACK PAIN WITHOUT SCIATICA: ICD-10-CM

## 2024-04-02 DIAGNOSIS — K21.00 GASTROESOPHAGEAL REFLUX DISEASE WITH ESOPHAGITIS WITHOUT HEMORRHAGE: ICD-10-CM

## 2024-04-02 DIAGNOSIS — E11.65 TYPE 2 DIABETES MELLITUS WITH HYPERGLYCEMIA, WITHOUT LONG-TERM CURRENT USE OF INSULIN (H): Chronic | ICD-10-CM

## 2024-04-02 DIAGNOSIS — G47.00 INSOMNIA, UNSPECIFIED TYPE: ICD-10-CM

## 2024-04-02 DIAGNOSIS — E66.01 SEVERE OBESITY (BMI 35.0-35.9 WITH COMORBIDITY) (H): ICD-10-CM

## 2024-04-02 DIAGNOSIS — I10 BENIGN ESSENTIAL HYPERTENSION: Chronic | ICD-10-CM

## 2024-04-02 DIAGNOSIS — F43.21 SITUATIONAL DEPRESSION: ICD-10-CM

## 2024-04-02 DIAGNOSIS — G47.33 OSA (OBSTRUCTIVE SLEEP APNEA): Chronic | ICD-10-CM

## 2024-04-02 DIAGNOSIS — E78.5 HYPERLIPIDEMIA LDL GOAL <100: ICD-10-CM

## 2024-04-02 LAB
ALBUMIN SERPL BCG-MCNC: 4.7 G/DL (ref 3.5–5.2)
ALBUMIN UR-MCNC: NEGATIVE MG/DL
ALP SERPL-CCNC: 54 U/L (ref 40–150)
ALT SERPL W P-5'-P-CCNC: 34 U/L (ref 0–50)
ANION GAP SERPL CALCULATED.3IONS-SCNC: 15 MMOL/L (ref 7–15)
APPEARANCE UR: CLEAR
AST SERPL W P-5'-P-CCNC: 30 U/L (ref 0–45)
BACTERIA #/AREA URNS HPF: ABNORMAL /HPF
BILIRUB SERPL-MCNC: 0.3 MG/DL
BILIRUB UR QL STRIP: NEGATIVE
BUN SERPL-MCNC: 11.6 MG/DL (ref 6–20)
CALCIUM SERPL-MCNC: 10.2 MG/DL (ref 8.6–10)
CHLORIDE SERPL-SCNC: 102 MMOL/L (ref 98–107)
CHOLEST SERPL-MCNC: 194 MG/DL
COLOR UR AUTO: YELLOW
CREAT SERPL-MCNC: 0.83 MG/DL (ref 0.51–0.95)
CREAT UR-MCNC: 173 MG/DL
DEPRECATED HCO3 PLAS-SCNC: 24 MMOL/L (ref 22–29)
EGFRCR SERPLBLD CKD-EPI 2021: 86 ML/MIN/1.73M2
ERYTHROCYTE [DISTWIDTH] IN BLOOD BY AUTOMATED COUNT: 12.6 % (ref 10–15)
FASTING STATUS PATIENT QL REPORTED: YES
GLUCOSE SERPL-MCNC: 110 MG/DL (ref 70–99)
GLUCOSE UR STRIP-MCNC: NEGATIVE MG/DL
HCT VFR BLD AUTO: 41.2 % (ref 35–47)
HDLC SERPL-MCNC: 37 MG/DL
HGB BLD-MCNC: 13.8 G/DL (ref 11.7–15.7)
HGB UR QL STRIP: NEGATIVE
KETONES UR STRIP-MCNC: NEGATIVE MG/DL
LDLC SERPL CALC-MCNC: 85 MG/DL
LEUKOCYTE ESTERASE UR QL STRIP: NEGATIVE
MCH RBC QN AUTO: 28.8 PG (ref 26.5–33)
MCHC RBC AUTO-ENTMCNC: 33.5 G/DL (ref 31.5–36.5)
MCV RBC AUTO: 86 FL (ref 78–100)
MICROALBUMIN UR-MCNC: 14.7 MG/L
MICROALBUMIN/CREAT UR: 8.5 MG/G CR (ref 0–25)
NITRATE UR QL: NEGATIVE
NONHDLC SERPL-MCNC: 157 MG/DL
PH UR STRIP: 6 [PH] (ref 5–7)
PLATELET # BLD AUTO: 254 10E3/UL (ref 150–450)
POTASSIUM SERPL-SCNC: 4.2 MMOL/L (ref 3.4–5.3)
PROT SERPL-MCNC: 7.7 G/DL (ref 6.4–8.3)
RBC # BLD AUTO: 4.8 10E6/UL (ref 3.8–5.2)
RBC #/AREA URNS AUTO: ABNORMAL /HPF
SODIUM SERPL-SCNC: 141 MMOL/L (ref 135–145)
SP GR UR STRIP: 1.02 (ref 1–1.03)
SQUAMOUS #/AREA URNS AUTO: ABNORMAL /LPF
TRIGL SERPL-MCNC: 360 MG/DL
TSH SERPL DL<=0.005 MIU/L-ACNC: 2.04 UIU/ML (ref 0.3–4.2)
UROBILINOGEN UR STRIP-ACNC: 0.2 E.U./DL
VIT B12 SERPL-MCNC: 529 PG/ML (ref 232–1245)
WBC # BLD AUTO: 8 10E3/UL (ref 4–11)
WBC #/AREA URNS AUTO: ABNORMAL /HPF

## 2024-04-02 PROCEDURE — 99396 PREV VISIT EST AGE 40-64: CPT | Performed by: INTERNAL MEDICINE

## 2024-04-02 PROCEDURE — 82607 VITAMIN B-12: CPT | Performed by: INTERNAL MEDICINE

## 2024-04-02 PROCEDURE — 84443 ASSAY THYROID STIM HORMONE: CPT | Performed by: INTERNAL MEDICINE

## 2024-04-02 PROCEDURE — 99207 PR FOOT EXAM NO CHARGE: CPT | Performed by: INTERNAL MEDICINE

## 2024-04-02 PROCEDURE — 99214 OFFICE O/P EST MOD 30 MIN: CPT | Mod: 25 | Performed by: INTERNAL MEDICINE

## 2024-04-02 PROCEDURE — 81001 URINALYSIS AUTO W/SCOPE: CPT | Performed by: INTERNAL MEDICINE

## 2024-04-02 PROCEDURE — 82570 ASSAY OF URINE CREATININE: CPT | Performed by: INTERNAL MEDICINE

## 2024-04-02 PROCEDURE — 82043 UR ALBUMIN QUANTITATIVE: CPT | Performed by: INTERNAL MEDICINE

## 2024-04-02 PROCEDURE — 85027 COMPLETE CBC AUTOMATED: CPT | Performed by: INTERNAL MEDICINE

## 2024-04-02 PROCEDURE — 80053 COMPREHEN METABOLIC PANEL: CPT | Performed by: INTERNAL MEDICINE

## 2024-04-02 PROCEDURE — 36415 COLL VENOUS BLD VENIPUNCTURE: CPT | Performed by: INTERNAL MEDICINE

## 2024-04-02 PROCEDURE — 80061 LIPID PANEL: CPT | Performed by: INTERNAL MEDICINE

## 2024-04-02 PROCEDURE — 74176 CT ABD & PELVIS W/O CONTRAST: CPT

## 2024-04-02 RX ORDER — ESCITALOPRAM OXALATE 10 MG/1
10 TABLET ORAL DAILY
Qty: 90 TABLET | Refills: 3 | Status: SHIPPED | OUTPATIENT
Start: 2024-04-02

## 2024-04-02 RX ORDER — METFORMIN HCL 500 MG
TABLET, EXTENDED RELEASE 24 HR ORAL
Qty: 90 TABLET | Refills: 0 | Status: SHIPPED | OUTPATIENT
Start: 2024-04-02 | End: 2024-04-02

## 2024-04-02 RX ORDER — LANSOPRAZOLE 30 MG/1
30 CAPSULE, DELAYED RELEASE ORAL DAILY
Qty: 90 CAPSULE | Refills: 3 | Status: SHIPPED | OUTPATIENT
Start: 2024-04-02

## 2024-04-02 RX ORDER — ATORVASTATIN CALCIUM 20 MG/1
20 TABLET, FILM COATED ORAL DAILY
Qty: 90 TABLET | Refills: 3 | Status: SHIPPED | OUTPATIENT
Start: 2024-04-02

## 2024-04-02 SDOH — HEALTH STABILITY: PHYSICAL HEALTH: ON AVERAGE, HOW MANY DAYS PER WEEK DO YOU ENGAGE IN MODERATE TO STRENUOUS EXERCISE (LIKE A BRISK WALK)?: 2 DAYS

## 2024-04-02 ASSESSMENT — PAIN SCALES - GENERAL: PAINLEVEL: NO PAIN (0)

## 2024-04-02 ASSESSMENT — SOCIAL DETERMINANTS OF HEALTH (SDOH): HOW OFTEN DO YOU GET TOGETHER WITH FRIENDS OR RELATIVES?: MORE THAN THREE TIMES A WEEK

## 2024-04-02 NOTE — PROGRESS NOTES
Preventive Care Visit  [facility] ERENDIRA Angeles MD, Internal Medicine  Apr 2, 2024      Assessment & Plan     Routine general medical examination at a health care facility  48 years old patient who has family history of breast cancer and s/p hysterectomy  She is also in surgical menopause since age 42  She is up-to-date on colonoscopy until next year  She is up-to-date on immunizations  She also needs to see dermatologist every year for skin check  Type 2 diabetes mellitus with hyperglycemia, without long-term current use of insulin (H)  We will stop metformin and continue Mounjaro  If symptoms of bloating and diarrhea persist we might have to cut down  We will check an A1c today    - Albumin Random Urine Quantitative with Creat Ratio  - TSH with free T4 reflex  - FOOT EXAM    Benign essential hypertension  Excellent control with Hyzaar    Acute right-sided low back pain without sciatica  This is a new finding and patient has history of kidney stone the patient is having pain in the right renal angle since yesterday  We will check a CT scan stat  - UA with Microscopic reflex to Culture - lab collect  - CT Abdomen Pelvis w/o Contrast  - UA with Microscopic reflex to Culture - lab collect    Severe obesity (BMI 35.0-35.9 with comorbidity) (H)  BMI 35.14 is complicated by diabetes, hypertension and multiple other situations    Situational depression  Patient is on Lexapro  - escitalopram (LEXAPRO) 10 MG tablet  Dispense: 90 tablet; Refill: 3  - Adult Mental Health  Referral    Gastroesophageal reflux disease with esophagitis without hemorrhage  On chronic PPIs and has been burping  - CBC with Platelets    - LANsoprazole (PREVACID) 30 MG DR capsule  Dispense: 90 capsule; Refill: 3  - Vitamin B12  - Vitamin B12    Hyperlipidemia LDL goal <100  We will start statins and we will check a lipid profile today  - Lipid panel reflex to direct LDL Fasting  - Comprehensive metabolic panel  -  "atorvastatin (LIPITOR) 20 MG tablet  Dispense: 90 tablet; Refill: 3    ROTHMAN (nonalcoholic steatohepatitis)  Due for ultrasound and weight loss is helping  - US Abdomen Limited    Insomnia, unspecified type  Lexapro is helping some and she uses CPAP but noncompliant  - escitalopram (LEXAPRO) 10 MG tablet  Dispense: 90 tablet; Refill: 3    Chronic cough  Improved with Advair at times  - LANsoprazole (PREVACID) 30 MG DR capsule  Dispense: 90 capsule; Refill: 3    Family history of malignant neoplasm of breast  She has done genetic counseling  We will order MRI  She will see a skin doctor also  - MR Breast Bilateral w/o & w Contrast    ANA (obstructive sleep apnea)  She will be more compliant with CPAP    Nephrolithiasis  CT scan as above  - CT Abdomen Pelvis w/o Contrast  - Supersaturation Profile 24 Hour Urine  - Supersaturation Profile 24 Hour Urine                BMI  Estimated body mass index is 35.14 kg/m  as calculated from the following:    Height as of this encounter: 1.574 m (5' 1.98\").    Weight as of this encounter: 87.1 kg (192 lb).       Counseling  Appropriate preventive services were discussed with this patient, including applicable screening as appropriate for fall prevention, nutrition, physical activity, Tobacco-use cessation, weight loss and cognition.  Checklist reviewing preventive services available has been given to the patient.  Reviewed patient's diet, addressing concerns and/or questions.   She is at risk for lack of exercise and has been provided with information to increase physical activity for the benefit of her well-being.   She is at risk for psychosocial distress and has been provided with information to reduce risk.           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 One time daily  What time of day are you checking your blood sugars (select all that apply)?  Before meals  Have you had any blood sugars above 200?  No  Have you had any blood sugars below 70?  No  What symptoms do you notice when your blood sugar is low?  None  What concerns do you have today about your diabetes? None   Do you have any of these symptoms? (Select all that apply)  No numbness or tingling in feet.  No redness, sores or blisters on feet.  No complaints of excessive thirst.  No reports of blurry vision.  No significant changes to weight.  Have you had a diabetic eye exam in the last 12 months? No        BP Readings from Last 2 Encounters:   04/02/24 115/79   11/07/23 110/70     Hemoglobin A1C (%)   Date Value   10/06/2023 6.1 (H)   05/03/2023 6.3 (H)   03/11/2021 8.5 (H)   01/21/2021 7.5 (H)     LDL Cholesterol Calculated (mg/dL)   Date Value   10/06/2023 91   01/13/2023 97   01/21/2021 60   07/24/2020 88               4/2/2024   General Health   How would you rate your overall physical health? Good   Feel stress (tense, anxious, or unable to sleep) Only a little   (!) STRESS CONCERN      4/2/2024   Nutrition   Three or more servings of calcium each day? (!) NO   Diet: Regular (no restrictions)   How many servings of fruit and vegetables per day? (!) 0-1   How many sweetened beverages each day? 0-1         4/2/2024   Exercise   Days per week of moderate/strenous exercise 2 days    (!) EXERCISE CONCERN      4/2/2024   Social Factors   Frequency of gathering with friends or relatives More than three times a week   Worry food won't last until get money to buy more No   Food not last or not have enough money for food? No   Do you have housing?  Yes   Are you worried about losing your housing? No   Lack of transportation? No   Unable to get utilities (heat,electricity)? No         4/2/2024   Dental   Dentist two times every year? Yes         4/2/2024   TB Screening   Were you born outside of the [place]? No         Today's PHQ-2 Score:       4/2/2024     9:48 AM   PHQ-2 ( 1999 [employer])   Q1: Little interest or pleasure in doing things 0   Q2: Feeling down, depressed or hopeless 0   PHQ-2 Score 0   Q1: Little interest or pleasure in doing things Not at all   Q2: Feeling down, depressed or hopeless Not at all   PHQ-2 Score 0           4/2/2024   Substance Use   Alcohol more than 3/day or more than 7/wk No   Do you use any other substances recreationally? No     Social History     Tobacco Use    Smoking status: Never    Smokeless tobacco: Never   Vaping Use    Vaping Use: Never used   Substance Use Topics    Alcohol use: Yes     Alcohol/week: 0.0 standard drinks of alcohol     Comment: 0-1 drinks per week    Drug use: No           6/28/2023   LAST FHS-7 RESULTS   1st degree relative breast or ovarian cancer Yes   Any relative bilateral breast cancer No   Any male have breast cancer No   Any ONE woman have BOTH breast AND ovarian cancer No   Any woman with breast cancer before 50yrs Yes   2 or more relatives with breast AND/OR ovarian cancer Yes   2 or more relatives with breast AND/OR bowel cancer Yes        Mammogram Screening - Mammogram every 1-2 years updated in Health Maintenance based on mutual decision making        4/2/2024   STI Screening   New sexual partner(s) since last STI/HIV test? No     History of abnormal Pap smear: NO - age 30-65 PAP every 5 years with negative HPV co-testing  recommended        8/29/2014    12:00 AM 1/19/2011    12:00 AM 5/28/2008    12:00 AM   PAP / HPV   PAP (Historical) NIL  NIL  NIL      ASCVD Risk   The ASCVD Risk score (Callie HAWTHORNE, et al., 2019) failed to calculate for the following reasons:    The patient has a prior MI or stroke diagnosis       Reviewed and updated as needed this visit by Provider       Med Hx   Fam Hx            BP Readings from Last 3 Encounters:   04/02/24 115/79   11/07/23 110/70   10/06/23 110/77    Wt Readings from Last 3 Encounters:   04/02/24 87.1 kg (192 lb)   11/07/23 90.9 kg (200 lb 4.8 oz)   10/06/23 89.5 kg (197 lb 6.4 oz)                  Patient Active Problem List   Diagnosis    Pain in joint, ankle and foot    Cervical radiculopathy    Benign essential hypertension    Type 2 diabetes mellitus with hyperglycemia, without long-term current use of insulin (H)    ANA (obstructive sleep apnea)    Acute bilateral low back pain with right-sided sciatica    Paresthesias- ? TIA vs complex migraine (preferred per neuro)    Severe obesity (BMI 35.0-35.9 with comorbidity) (H)    Pre-diabetes    Fibroid uterus    Epigastric pain    Elevated LFTs    Elevated lipase    Acute left-sided thoracic back pain    Chronic kidney disease, stage 2 (mild)    Nephrolithiasis     Past Surgical History:   Procedure Laterality Date    ARTHROSCOPY ANKLE, OPEN REPAIR TENDON, COMBINED  11/8/2012    Procedure: COMBINED ARTHROSCOPY ANKLE, OPEN REPAIR TENDON;  Ankle Arthroscopy Left Ankle, Open Ligament Repair Left Ankle, Peroneal Tendon Repair Left Ankle ;  Surgeon: Otf Ramos DPM;  Location: [facility] OR    CARPAL TUNNEL RELEASE RT/LT      COMBINED CYSTOSCOPY, INSERT STENT URETER(S)  8/6/2013    Procedure: COMBINED CYSTOSCOPY, INSERT STENT URETER(S);  cystoscopy, right retrograde,RIGHT STENT PLACEMENT.;  Surgeon: Kan Porter MD;  Location: [facility] OR    CYSTOSCOPY, RETROGRADES, INSERT STENT URETER(S), COMBINED  8/6/2013    Procedure: COMBINED  CYSTOSCOPY, RETROGRADES, INSERT STENT URETER(S);  cystoscopy, right retrograde, insert stent right ureter;  Surgeon: Kan Porter MD;  Location:  OR    DENTAL SURGERY      TOOTH#7 - DENTAL IMPLANT    DISCECTOMY, FUSION CERVICAL ANTERIOR ONE LEVEL, COMBINED N/A 12/13/2015    Procedure: COMBINED DISCECTOMY, FUSION CERVICAL ANTERIOR ONE LEVEL;  Surgeon: Seven Umaña MD;  Location:  OR    ESOPHAGOSCOPY, GASTROSCOPY, DUODENOSCOPY (EGD), COMBINED N/A 1/21/2021    Procedure: ESOPHAGOGASTRODUODENOSCOPY, WITH BIOPSY;  Surgeon: Crystal Chacon MD;  Location:  GI    EXTRACORPOREAL SHOCK WAVE LITHOTRIPSY (ESWL)  8/19/2013    Procedure: EXTRACORPOREAL SHOCK WAVE LITHOTRIPSY (ESWL);   RIGHT EXTRACORPOREAL SHOCK WAVE LITHOTRIPSY;  Surgeon: Otf Tobias MD;  Location:  OR     REDUCTION OF LARGE BREAST Bilateral 2003    HYSTERECTOMY, PAP NO LONGER INDICATED  2019    LAPAROSCOPIC HYSTERECTOMY TOTAL, SALPINGECTOMY Left 5/29/2019    Procedure: single site total LAPAROSCOPIC HYSTERECTOMY, left oophorectomy and lysis of adhesions;  Surgeon: Pauline Horowitz MD;  Location: [facility] OR    LASIK BILATERAL      SALPINGO OOPHORECTOMY,R/L/DERREK Right 05/17/2006    Right    ZZC APPENDECTOMY  1984       Social History     Tobacco Use    Smoking status: Never    Smokeless tobacco: Never   Substance Use Topics    Alcohol use: Yes     Alcohol/week: 0.0 standard drinks of alcohol     Comment: 0-1 drinks per week     Family History   Problem Relation Age of Onset    C.A.D. Mother         stents    Hypertension Mother     Kidney Disease Mother         transplant    Cerebrovascular Disease Mother 77    Hypertension Father     Breast Cancer Sister 43    Nephrolithiasis Brother     Uterine Cancer Maternal Grandmother 44    Colon Cancer Maternal Grandmother 64    Cancer Paternal Grandmother         stomach    Cerebrovascular Disease Paternal Grandfather     Breast Cancer Maternal Aunt         may have been  fibrocystic    Breast Cancer Maternal Aunt         may have been fibrocystic    Prostate Cancer Maternal Uncle 50    Breast Cancer Paternal Aunt     Breast Cancer Cousin 41        maternal first cousin, triple negative breast cancer         Current Outpatient Medications   Medication Sig Dispense Refill    albuterol (PROAIR HFA/PROVENTIL HFA/VENTOLIN HFA) 108 (90 Base) MCG/ACT inhaler Inhale 2 puffs into the lungs every 6 hours as needed for shortness of breath, wheezing or cough 18 g 3    atorvastatin (LIPITOR) 20 MG tablet Take 1 tablet (20 mg) by mouth daily 90 tablet 3    blood glucose (FREESTYLE LITE) test strip USE TO TEST BLOOD SUGAR ONCE A DAY OR AS DIRECTED 100 strip 0    blood glucose (NO BRAND SPECIFIED) lancets standard Use to test blood sugar 1 times daily or as directed. 100 each 3    blood glucose monitoring (NO BRAND SPECIFIED) meter device kit Use to test blood sugar as directed. 1 kit 0    cetirizine (ZYRTEC) 10 MG tablet Take 10 mg by mouth daily      escitalopram (LEXAPRO) 10 MG tablet Take 1 tablet (10 mg) by mouth daily 90 tablet 3    fluticasone-salmeterol (ADVAIR HFA) 115-21 MCG/ACT inhaler INHALE 2 PUFFS INTO THE LUNGS TWO TIMES A DAY 12 g 5    glimepiride (AMARYL) 2 MG tablet TAKE ONE TABLET BY MOUTH EVERY MORNING BEFORE BREAKFAST 90 tablet 0    LANsoprazole (PREVACID) 30 MG DR capsule Take 1 capsule (30 mg) by mouth daily 90 capsule 3    losartan-hydrochlorothiazide (HYZAAR) 100-12.5 MG tablet TAKE 1 TABLET BY MOUTH DAILY 90 tablet 3    multivitamin w/minerals (THERA-VIT-M) tablet Take 1 tablet by mouth daily      spironolactone (ALDACTONE) 25 MG tablet TAKE ONE TABLET BY MOUTH ONCE DAILY 90 tablet 2    STATIN NOT PRESCRIBED, INTENTIONAL, Please choose reason not prescribed, below      tirzepatide (MOUNJARO) 15 MG/0.5ML pen Inject 15 mg Subcutaneous every 7 days 6 mL 3    traZODone (DESYREL) 50 MG tablet TAKE ONE OR TWO TABLETS BY MOUTH EVERY NIGHT AT BEDTIME 90 tablet 2    VITAMIN D,  "CHOLECALCIFEROL, PO Take 10,000 Units by mouth daily        Allergies   Allergen Reactions    Lisinopril Cough    Pneumovax [Pneumococcal Polysaccharide Vaccine] Other (See Comments)     Red streaking and pain     Recent Labs   Lab Test 10/06/23  1027 07/14/23  1412 06/06/23  1317 05/03/23  1306 02/14/23  1136 01/13/23  1039 09/12/22  0938 09/12/22  0938 05/16/22  1015 05/16/22  1015 06/16/21  1923 03/11/21  0905 01/21/21  0649 10/27/20  1459 10/27/20  1343   A1C 6.1*  --   --  6.3*  --  7.3*  --  8.9*   < > 8.7*  --    < > 7.5*  --   --    LDL 91  --   --   --   --  97  --   --   --  116*  --   --  60  --   --    HDL 43*  --   --   --   --  39*  --   --   --  40*  --   --  36*  --   --    TRIG 359*  --   --   --   --  294*  --   --   --  226*  --   --  331*  --   --    ALT  --   --  36* 44* 45* 51*  --  69*   < > 57*  --    < > 82*   < >  --    CR 0.79 0.78 0.82 0.81 0.71 0.64  --  0.58   < > 0.60 0.54  --  0.64   < > 0.69   GFRESTIMATED >90 >90 88 89 >90 >90  --  >90   < > >90 >90  --  >90   < > >90   GFRESTBLACK  --   --   --   --   --   --   --   --   --   --  >90  --  >90   < > >90   POTASSIUM 3.9 3.6 3.9 4.0 3.9 4.0   < > 3.6   < > 3.8 3.3*  --  3.8   < > 3.4   TSH  --   --   --   --   --   --   --  2.49  --   --   --   --   --   --  2.30    < > = values in this interval not displayed.          Review of Systems  Constitutional, HEENT, cardiovascular, pulmonary, GI, , musculoskeletal, neuro, skin, endocrine and psych systems are negative, except as otherwise noted.     Objective    Exam  /79 (BP Location: Left arm, Patient Position: Sitting, Cuff Size: Adult Regular)   Pulse 89   Temp 96.9  F (36.1  C) (Temporal)   Resp 16   Ht 1.574 m (5' 1.98\")   Wt 87.1 kg (192 lb)   LMP 06/01/2018 (Approximate)   SpO2 98%   BMI 35.14 kg/m     Estimated body mass index is 35.14 kg/m  as calculated from the following:    Height as of this encounter: 1.574 m (5' 1.98\").    Weight as of this encounter: 87.1 kg " (192 lb).    Physical Exam  GENERAL: alert and no distress  EYES: Eyes grossly normal to inspection, PERRL and conjunctivae and sclerae normal  HENT: ear canals and TM's normal, nose and mouth without ulcers or lesions  NECK: no adenopathy, no asymmetry, masses, or scars  RESP: lungs clear to auscultation - no rales, rhonchi or wheezes  BREAST: normal without masses, tenderness or nipple discharge and no palpable axillary masses or adenopathy  CV: regular rate and rhythm, normal S1 S2, no S3 or S4, no murmur, click or rub, no peripheral edema  ABDOMEN: soft, nontender, no hepatosplenomegaly, no masses and bowel sounds normal  MS: no gross musculoskeletal defects noted, no edema  SKIN: Multiple benign nevi no suspicious lesions or rashes  NEURO: Normal strength and tone, mentation intact and speech normal  PSYCH: mentation appears normal, affect normal/bright  Foot exam shows no ulceration, no neuropathy, microfilament well felt. Skin on the feet not dry.  Pulses in the feet well felt.        Signed Electronically by: Shamir Angeles MD

## 2024-04-07 ENCOUNTER — HEALTH MAINTENANCE LETTER (OUTPATIENT)
Age: 49
End: 2024-04-07

## 2024-04-19 ENCOUNTER — HOSPITAL ENCOUNTER (OUTPATIENT)
Dept: ULTRASOUND IMAGING | Facility: CLINIC | Age: 49
Discharge: HOME | End: 2024-04-19
Attending: INTERNAL MEDICINE | Admitting: INTERNAL MEDICINE
Payer: COMMERCIAL

## 2024-04-19 PROCEDURE — 76705 ECHO EXAM OF ABDOMEN: CPT

## 2024-04-22 ENCOUNTER — TELEPHONE (OUTPATIENT)
Dept: PHARMACY | Facility: CLINIC | Age: 49
End: 2024-04-22
Payer: COMMERCIAL

## 2024-04-22 NOTE — TELEPHONE ENCOUNTER
We have been unable to reach this patient for MTM follow-up after several attempts. We will stop reaching out to the patient at this time. Please let us know if we can assist in this patient's care in the future.    Routing to PCP as CHARANJIT Ramsey, PharmD, [facility]  Medication Therapy Management Provider  [phone]

## 2024-04-25 ENCOUNTER — HOSPITAL ENCOUNTER (OUTPATIENT)
Dept: MRI IMAGING | Facility: CLINIC | Age: 49
Discharge: HOME | End: 2024-04-25
Attending: INTERNAL MEDICINE | Admitting: INTERNAL MEDICINE
Payer: COMMERCIAL

## 2024-04-25 DIAGNOSIS — Z80.3 FAMILY HISTORY OF MALIGNANT NEOPLASM OF BREAST: ICD-10-CM

## 2024-04-25 PROCEDURE — 77049 MRI BREAST C-+ W/CAD BI: CPT

## 2024-04-25 PROCEDURE — A9585 GADOBUTROL INJECTION: HCPCS | Performed by: INTERNAL MEDICINE

## 2024-04-25 PROCEDURE — 255N000002 HC RX 255 OP 636: Performed by: INTERNAL MEDICINE

## 2024-04-25 RX ORDER — GADOBUTROL 604.72 MG/ML
8.5 INJECTION INTRAVENOUS ONCE
Status: COMPLETED | OUTPATIENT
Start: 2024-04-25 | End: 2024-04-25

## 2024-04-25 RX ADMIN — GADOBUTROL 8.5 ML: 604.72 INJECTION INTRAVENOUS at 11:55

## 2024-05-02 ENCOUNTER — TRANSFERRED RECORDS (OUTPATIENT)
Dept: HEALTH INFORMATION MANAGEMENT | Facility: CLINIC | Age: 49
End: 2024-05-02
Payer: COMMERCIAL

## 2024-05-03 ENCOUNTER — TRANSFERRED RECORDS (OUTPATIENT)
Dept: HEALTH INFORMATION MANAGEMENT | Facility: CLINIC | Age: 49
End: 2024-05-03
Payer: COMMERCIAL

## 2024-05-12 DIAGNOSIS — R05.3 CHRONIC COUGH: ICD-10-CM

## 2024-05-13 RX ORDER — FLUTICASONE PROPIONATE AND SALMETEROL XINAFOATE 115; 21 UG/1; UG/1
2 AEROSOL, METERED RESPIRATORY (INHALATION) 2 TIMES DAILY
Qty: 12 G | Refills: 5 | Status: SHIPPED | OUTPATIENT
Start: 2024-05-13

## 2024-05-16 ENCOUNTER — TRANSFERRED RECORDS (OUTPATIENT)
Dept: HEALTH INFORMATION MANAGEMENT | Facility: CLINIC | Age: 49
End: 2024-05-16
Payer: COMMERCIAL

## 2024-05-22 DIAGNOSIS — E11.21 TYPE 2 DIABETES MELLITUS WITH DIABETIC NEPHROPATHY, WITHOUT LONG-TERM CURRENT USE OF INSULIN (H): ICD-10-CM

## 2024-05-22 RX ORDER — BLOOD-GLUCOSE METER
KIT MISCELLANEOUS
Qty: 100 STRIP | Refills: 0 | Status: SHIPPED | OUTPATIENT
Start: 2024-05-22 | End: 2024-08-29

## 2024-05-22 NOTE — TELEPHONE ENCOUNTER
Prescription approved per [facility] Refill Protocol.  Jelly Peralta RN  [facility]

## 2024-06-07 ENCOUNTER — TRANSFERRED RECORDS (OUTPATIENT)
Dept: HEALTH INFORMATION MANAGEMENT | Facility: CLINIC | Age: 49
End: 2024-06-07
Payer: COMMERCIAL

## 2024-06-09 DIAGNOSIS — E11.65 TYPE 2 DIABETES MELLITUS WITH HYPERGLYCEMIA, WITHOUT LONG-TERM CURRENT USE OF INSULIN (H): ICD-10-CM

## 2024-06-10 RX ORDER — GLIMEPIRIDE 2 MG/1
TABLET ORAL
Qty: 90 TABLET | Refills: 3 | Status: SHIPPED | OUTPATIENT
Start: 2024-06-10

## 2024-06-19 ENCOUNTER — VIRTUAL VISIT (OUTPATIENT)
Dept: PSYCHOLOGY | Facility: CLINIC | Age: 49
End: 2024-06-19
Attending: INTERNAL MEDICINE
Payer: COMMERCIAL

## 2024-06-19 DIAGNOSIS — F43.21 SITUATIONAL DEPRESSION: ICD-10-CM

## 2024-06-19 DIAGNOSIS — F43.22 ADJUSTMENT DISORDER WITH ANXIETY: Primary | ICD-10-CM

## 2024-06-19 PROCEDURE — 90791 PSYCH DIAGNOSTIC EVALUATION: CPT | Mod: 95 | Performed by: MARRIAGE & FAMILY THERAPIST

## 2024-06-19 ASSESSMENT — COLUMBIA-SUICIDE SEVERITY RATING SCALE - C-SSRS
ATTEMPT LIFETIME: NO
TOTAL  NUMBER OF ABORTED OR SELF INTERRUPTED ATTEMPTS LIFETIME: NO
2. HAVE YOU ACTUALLY HAD ANY THOUGHTS OF KILLING YOURSELF?: NO
TOTAL  NUMBER OF INTERRUPTED ATTEMPTS LIFETIME: NO
6. HAVE YOU EVER DONE ANYTHING, STARTED TO DO ANYTHING, OR PREPARED TO DO ANYTHING TO END YOUR LIFE?: NO
1. HAVE YOU WISHED YOU WERE DEAD OR WISHED YOU COULD GO TO SLEEP AND NOT WAKE UP?: NO

## 2024-06-19 ASSESSMENT — PATIENT HEALTH QUESTIONNAIRE - PHQ9
SUM OF ALL RESPONSES TO PHQ QUESTIONS 1-9: 6
10. IF YOU CHECKED OFF ANY PROBLEMS, HOW DIFFICULT HAVE THESE PROBLEMS MADE IT FOR YOU TO DO YOUR WORK, TAKE CARE OF THINGS AT HOME, OR GET ALONG WITH OTHER PEOPLE: SOMEWHAT DIFFICULT
SUM OF ALL RESPONSES TO PHQ QUESTIONS 1-9: 6

## 2024-06-19 NOTE — PROGRESS NOTES
Answers submitted by the patient for this visit:  Patient Health Questionnaire (Submitted on 2024)  If you checked off any problems, how difficult have these problems made it for you to do your work, take care of things at home, or get along with other people?: Somewhat difficult  PHQ9 TOTAL SCORE: 6        [facility]         PATIENT'S NAME: Kezia Nava  PREFERRED NAME: Hazel  PRONOUNS:       MRN: [id number]  : 1975  ADDRESS: [address] [place] [id number]  [facility]  [id number]  DATE OF SERVICE: 24  START TIME: 9:31a  END TIME: 10:30a  PREFERRED PHONE: [phone]  May we leave a program related message:   EMERGENCY CONTACT: was not obtained  .  SERVICE MODALITY:  Video Visit:      Provider verified identity through the following two step process.  Patient provided:  Patient     Telemedicine Visit: The patient's condition can be safely assessed and treated via synchronous audio and visual telemedicine encounter.      Reason for Telemedicine Visit: Services only offered telehealth    Originating Site (Patient Location): Patient's home    Distant Site (Provider Location): Provider Remote Setting- Home Office    Consent:  The patient/guardian has verbally consented to: the potential risks and benefits of telemedicine (video visit) versus in person care; bill my insurance or make self-payment for services provided; and responsibility for payment of non-covered services.     Patient would like the video invitation sent by:  Send to e-mail at: [email or web address]    Mode of Communication:  Video Conference via [employer]well    Distant Location (Provider):  Off-site    As the provider I attest to compliance with applicable laws and regulations related to telemedicine.    UNIVERSAL ADULT Mental Health DIAGNOSTIC ASSESSMENT    Identifying Information:  Patient is a 48 year old,   individual.  Patient was referred for an assessment by primary care clinic.  Patient  attended the session alone.    Chief Complaint:   PT seeking services for anxiety and depressive sx. Pt notes that things have been getting more challenging over the past 4.5 years, increased isolation with her job and being around people. 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 Ongoing family related conflict, work stress after days off, mother needs additional care and pt is the main person to help her father with this.   -PT notes increasing dread with having to return to work    Hobbies and coping: pt enjoys doing crafts like sewing and quilting. Taking care of animals on the farm.     Social/Family History:  Patient reported they grew up in [place].  They were raised by biological parents  .  Parents were always together.  Patient reported that their childhood was ok. PT has 1 brother and 3 sisters. Currently great relationship with brother, poor relationship with 1 sister and ok relationship with other 2 sisters. Mother had a stroke recently and pt is the main care giver along with father and neither of pt's 2 sisters who live closer are unwilling to help with care.     The patient describes their cultural background as .  Cultural influences and impact on patient's life structure, values, norms, and healthcare: [place] ;Raised Gnosticism.      Patient reported had no significant delays in developmental tasks.   Patient's highest education level was college graduate  .  Patient identified the following learning problems: none reported.  Modifications will not be used to assist communication in therapy. Patient reports they are  able to understand written materials.    Patient's current relationship status is single.   Patient identified their sexual orientation as heterosexual.  Patient reported having   child(brittanie). Patient identified friends as part of their support system.  Patient identified the quality of these relationships as poor,   .      Patient's current living/housing situation involves staying in own home/apartment.  The immediate members of family and household and they report that housing is stable. PT lives in [place] with brother.     Patient is currently employed part time.  Patient reports their finances are obtained through employment. Patient does identify finances as a current stressor. 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 Challenges are lack of staffing, increasing MH patients. PT works 3 days per week and every 3rd weekend, working 7a-7p.     Patient reported that they have not been involved with the legal system. Patient does not report being under probation/ parole/ jurisdiction. They are not under any current court jurisdiction. .    Patient's Strengths and Limitations:  Patient identified the following strengths or resources that will help them succeed in treatment: commitment to health and well being and family support. Things that may interfere with the patient's success in treatment include: few friends and lack of family support.     Assessments:  The following assessments were completed by patient for this visit:  PHQ9:       2/7/2020    12:43 PM 3/1/2022     9:49 PM 6/19/2024     9:09 AM   PHQ-9 SCORE   PHQ-9 Total Score MyChart  6 (Mild depression) 6 (Mild depression)   PHQ-9 Total Score 7 6 6     GAD2:       6/12/2024     1:12 PM   ASHA-2   Feeling nervous, anxious, or on edge 1   Not being able to stop or control worrying 0   ASHA-2 Total Score 1     PROMIS 10-Global Health (only subscores and total score):       6/12/2024     1:14 PM   PROMIS-10 Scores Only   Global Mental Health Score 13   Global Physical Health Score 11   PROMIS TOTAL - SUBSCORES 24       Personal and Family Medical History:  Patient does not report a family history of mental health concerns.  Patient reports family history includes Breast Cancer in her  maternal aunt, maternal aunt, and paternal aunt; Breast Cancer (age of onset: 41) in her cousin; Breast Cancer (age of onset: 43) in her sister; C.A.D. in her mother; Cancer in her paternal grandmother; Cerebrovascular Disease in her paternal grandfather; Cerebrovascular Disease (age of onset: 77) in her mother; Colon Cancer (age of onset: 64) in her maternal grandmother; Hypertension in her father and mother; Kidney Disease in her mother; Nephrolithiasis in her brother; Prostate Cancer (age of onset: 50) in her maternal uncle; Uterine Cancer (age of onset: 44) in her maternal grandmother..     Patient does not report Mental Health Diagnosis or Treatment.      Patient has had a physical exam to rule out medical causes for current symptoms.  Date of last physical exam was within the past year. Client was encouraged to follow up with PCP if symptoms were to develop. The patient has a [facility] Provider, who is named Shamir Angeles..  Patient reports the following current medical concerns: back issues with arthritis and nerve issues .  Patient reports pain concerns including back pain.  Patient does not want help addressing pain concerns..   There are not significant appetite / nutritional concerns / weight changes.   Patient does not report a history of head injury / trauma / cognitive impairment.    Sleep: nights leading back to work after being off are very poor quality. Outside of work nights, not able to sleep until around 1a but can sleep late into the morning.     Patient reports current meds as:   Current Outpatient Medications   Medication Sig Dispense Refill    ADVAIR -21 MCG/ACT inhaler INHALE 2 PUFFS INTO THE LUNGS TWO TIMES A DAY 12 g 5    albuterol (PROAIR HFA/PROVENTIL HFA/VENTOLIN HFA) 108 (90 Base) MCG/ACT inhaler Inhale 2 puffs into the lungs every 6 hours as needed for shortness of breath, wheezing or cough 18 g 3    atorvastatin (LIPITOR) 20 MG tablet Take 1 tablet (20 mg) by mouth  daily 90 tablet 3    blood glucose (NO BRAND SPECIFIED) lancets standard Use to test blood sugar 1 times daily or as directed. 100 each 3    blood glucose monitoring (NO BRAND SPECIFIED) meter device kit Use to test blood sugar as directed. 1 kit 0    cetirizine (ZYRTEC) 10 MG tablet Take 10 mg by mouth daily      escitalopram (LEXAPRO) 10 MG tablet Take 1 tablet (10 mg) by mouth daily 90 tablet 3    FREESTYLE LITE test strip USE TO TEST BLOOD SUGAR ONCE A DAY OR AS DIRECTED 100 strip 0    glimepiride (AMARYL) 2 MG tablet TAKE ONE TABLET BY MOUTH EVERY MORNING BEFORE BREAKFAST 90 tablet 3    LANsoprazole (PREVACID) 30 MG DR capsule Take 1 capsule (30 mg) by mouth daily 90 capsule 3    losartan-hydrochlorothiazide (HYZAAR) 100-12.5 MG tablet TAKE 1 TABLET BY MOUTH DAILY 90 tablet 3    multivitamin w/minerals (THERA-VIT-M) tablet Take 1 tablet by mouth daily      spironolactone (ALDACTONE) 25 MG tablet TAKE ONE TABLET BY MOUTH ONCE DAILY 90 tablet 2    STATIN NOT PRESCRIBED, INTENTIONAL, Please choose reason not prescribed, below      tirzepatide (MOUNJARO) 15 MG/0.5ML pen Inject 15 mg Subcutaneous every 7 days 6 mL 3    traZODone (DESYREL) 50 MG tablet TAKE ONE OR TWO TABLETS BY MOUTH EVERY NIGHT AT BEDTIME 90 tablet 2    VITAMIN D, CHOLECALCIFEROL, PO Take 10,000 Units by mouth daily        No current facility-administered medications for this visit.     Pt takes Lexapro and Trazodone for MH issues. Finds the Lexapro working ok, neutral. Does not feel the trazodone works well at all, not helping with sleep.     Medication Adherence:  Patient reports taking.  taking prescribed medications as prescribed.    Patient Allergies:    Allergies   Allergen Reactions    Lisinopril Cough    Pneumovax [Pneumococcal Polysaccharide Vaccine] Other (See Comments)     Red streaking and pain       Medical History:    Past Medical History:   Diagnosis Date    Bronchitis     Diffuse cystic mastopathy     Elevated BP 12/15/2016     Hypertension     Mesenteric adenitis 4/14    Migraines     Nephrolithiasis     s/p lithotripsy    Sleep apnea     no cpap    Thyroiditis, acute 12/2/2014    transient hypothyroidism- resolved    Type 2 diabetes mellitus with hyperglycemia, without long-term current use of insulin (H) 2/6/2017         Current Mental Status Exam:   Appearance:  Appropriate    Eye Contact:  Good   Psychomotor:  Normal       Gait / station:  no problem  Attitude / Demeanor: Cooperative   Speech      Rate / Production: Normal/ Responsive      Volume:  Normal  volume      Language:  intact  Mood:   Normal  Affect:   Flat    Thought Content: Clear   Thought Process: Coherent       Associations: No loosening of associations  Insight:   Good   Judgment:  Intact   Orientation:  Person  Attention/concentration: Good    Substance Use:   Patient did not report a family history of substance use concerns; see medical history section for details.  Patient has not received chemical dependency treatment in the past.  Patient has not ever been to detox.      Patient is not currently receiving any chemical dependency treatment. Patient reported the following problems as a result of their substance use:   none .    Patient denies using alcohol.  Patient denies using tobacco.  Patient denies using cannabis.  Patient will drink 1 soda daily  Patient reports using/abusing the following substance(s). Patient reported no other substance use.     Substance Use: No symptoms    Based on the negative CAGE score and clinical interview there  are not indications of drug or alcohol abuse.    Significant Losses / Trauma / Abuse / Neglect Issues:   Patient did not serve in the .  There are indications or report of significant loss, trauma, abuse or neglect issues related to: are no indications and client denies any losses, trauma, abuse, or neglect concerns. 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     Safety Assessment:    Patient denies current homicidal ideation and behaviors.  Patient denies current self-injurious ideation and behaviors.    Patient denied risk behaviors associated with substance use.   Patient denies any high risk behaviors associated with mental health symptoms.  Patient reports the following current concerns for their personal safety: None.  Patient reports there are firearms in the house.     yes, they are secured. The firearms are secured in a locked space.    History of Safety Concerns:  Patient denied a history of homicidal ideation.     Patient denied a history of personal safety concerns.    Patient denied a history of assaultive behaviors.    Patient denied a history of sexual assault behaviors.     Patient denied a history of risk behaviors associated with substance use.  Patient denies any history of high risk behaviors associated with mental health symptoms.  Patient reports the following protective factors: forward or future oriented thinking; dedication to family or friends; living with other people    Risk Plan:  See Recommendations for Safety and Risk Management Plan    Review of Symptoms per patient report:   Depression: Lack of interest, Change in energy level, Feelings of hopelessness, and Feelings of helplessness  Vivi:  No Symptoms  Psychosis: No Symptoms  Anxiety: Excessive worry, Nervousness, Social anxiety, Ruminations, and Irritability  Diagnostic Criteria:   Adjustment Disorder  A. The development of emotional or behavioral symptoms in response to an identifiable stressor(s) occurring within 3 months of the onset of the stressor(s)  B. These symptoms or behaviors are clinically significant, as evidenced by one or both of the following:  C. The stress-related disturbance does not meet criteria for another disorder & is not not an exacerbation of another mental disorder  D. The symptoms do not represent normal bereavement  E. Once the stressor or its consequences have terminated, the  symptoms do not persist for more than an additional 6 months       * Adjustment Disorder with Anxiety: The predominant manfestations are symptoms such as nervousness, worry, or jitteriness, or, in children separation anxiety from major attachment figures    Functional Status:  Patient reports the following functional impairments:  health maintenance, relationship(s), self-care, and social interactions.         Clinical Summary:  1. Psychosocial, Cultural and Contextual Factors: ongoing challenges  .  2. Principal DSM5 Diagnoses  (Sustained by DSM5 Criteria Listed Above):   Adjustment Disorders  309.24 (F43.22) With anxiety.  5. Prognosis: Expect Improvement.  6. Likely consequences of symptoms if not treated: .  7. Client strengths include:  insightful .     Recommendations:     1. Plan for Safety and Risk Management:   Safety and Risk: Recommended that patient call 911 or go to the local ED should there be a change in any of these risk factors..          Report to child / adult protection services was NA.     8. Records:   These were reviewed at time of assessment.   Information in this assessment was obtained from the medical record and  provided by patient who is a good historian.    Patient will have open access to their mental health medical record.    9.   Interactive Complexity: No    10. Safety Plan:       Provider Name/ Credentials:  ANIYAH Bentley  June 19, 2024

## 2024-06-24 PROBLEM — F43.22 ADJUSTMENT DISORDER WITH ANXIETY: Status: ACTIVE | Noted: 2024-06-24

## 2024-07-03 ENCOUNTER — HOSPITAL ENCOUNTER (EMERGENCY)
Facility: CLINIC | Age: 49
Discharge: HOME | End: 2024-07-03
Attending: EMERGENCY MEDICINE | Admitting: EMERGENCY MEDICINE
Payer: OTHER MISCELLANEOUS

## 2024-07-03 VITALS
OXYGEN SATURATION: 96 % | TEMPERATURE: 97.8 F | RESPIRATION RATE: 15 BRPM | SYSTOLIC BLOOD PRESSURE: 111 MMHG | DIASTOLIC BLOOD PRESSURE: 83 MMHG | HEART RATE: 96 BPM

## 2024-07-03 DIAGNOSIS — M47.816 SPONDYLOSIS OF LUMBAR SPINE: ICD-10-CM

## 2024-07-03 DIAGNOSIS — M54.41 ACUTE RIGHT-SIDED LOW BACK PAIN WITH RIGHT-SIDED SCIATICA: ICD-10-CM

## 2024-07-03 PROCEDURE — 99283 EMERGENCY DEPT VISIT LOW MDM: CPT

## 2024-07-03 RX ORDER — METHYLPREDNISOLONE 4 MG
TABLET, DOSE PACK ORAL
Qty: 21 TABLET | Refills: 0 | Status: SHIPPED | OUTPATIENT
Start: 2024-07-03 | End: 2024-08-17

## 2024-07-03 ASSESSMENT — COLUMBIA-SUICIDE SEVERITY RATING SCALE - C-SSRS
6. HAVE YOU EVER DONE ANYTHING, STARTED TO DO ANYTHING, OR PREPARED TO DO ANYTHING TO END YOUR LIFE?: NO
1. IN THE PAST MONTH, HAVE YOU WISHED YOU WERE DEAD OR WISHED YOU COULD GO TO SLEEP AND NOT WAKE UP?: NO
2. HAVE YOU ACTUALLY HAD ANY THOUGHTS OF KILLING YOURSELF IN THE PAST MONTH?: NO

## 2024-07-03 ASSESSMENT — ACTIVITIES OF DAILY LIVING (ADL): ADLS_ACUITY_SCORE: 35

## 2024-07-03 NOTE — ED PROVIDER NOTES
Emergency Department Note      History of Present Illness     Chief Complaint   Back Pain, Flank Pain, and Work Comp      HPI   Kezia Nava is a 48 year old female who presents for lower back pain that radiates to her right flank and tailbone. Kezia reports this flare-up began approximately 10 minutes ago when helping a patient after making a twisting motion with her back. She explains her current back pain is similar to her chronic back pain. Reports accompanying nausea. Patient takes flexeril daily. She notes upcoming rhizotomy on Tuesday. Denies new numbness or paresthesias. 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     Independent Historian   None    Review of External Notes   I reviewed TCU notes from 6/7/2024.  Patient has facet arthropathy, and impingement on nerve roots at L4, L5 level.  Inflammation seen involving the nerve roots L3-L4, L4-L5 on most recent MRI.    Past Medical History     Medical History and Problem List   Bronchitis  Diffuse cystic mastopathy  Hypertension  Mesenteric adenitis  Migraines  Nephrolithiasis  Sleep apnea  Thyroiditis  Type 2 diabetes    Medications   Lipitor  Lexapro  Amaryl  Prevacid  Hyzaar  Aldactone  Mounjaro  Desyrel    Surgical History   Arthroscopy left ankle, open repair tendon  Carpal tunnel release  Cystoscopy, insert stent, right x 2  Dental implant  Discectomy, fusion cervical anterior one level  EGD  Extracorporeal shock wave lithotripsy, right  Bilateral breast reduction  Lasik bilateral  Right salpingo-oophorectomy  Appendectomy    Physical Exam     Patient Vitals for the past 24 hrs:   BP Temp Temp src Pulse Resp SpO2   07/03/24 1342 111/83 97.8  F (36.6  C) Temporal 96 15 96 %     Physical Exam  Gen:  Pleasant, in pain.  Ambulates stiffly secondary to pain.    Eye:   Sclera non-injected.      Musculoskeletal:     Normal movement of all extremities without evidence for deficit.    Extremities:    No edema.  Atraumatic.      Skin:   Warm and  dry.  No rash.    Neurologic:    5/5 strength in hip flexors, knee extensors, dorsiflexion, plantarflexion, EHL, FHL.  Fine touch sensation intact throughout bilateral lower extremities.  Normal and symmetric reflexes at patella and achilles tendons bilaterally.      Psychiatric:     Normal affect with appropriate interaction with examiner.      Diagnostics     Independent Interpretation   None    ED Course      Medications Administered   Medications - No data to display    Procedures   Procedures     Discussion of Management   None    ED Course   ED Course as of 07/03/24 1334   Wed Jul 03, 2024   1326 I obtained history and examined the patient as noted above. I explained findings to the patient and we discussed plan for discharge. The patient is comfortable with this plan.         Optional/Additional Documentation  None    Medical Decision Making / Diagnosis     CMS Diagnoses: None    MIPS       None    MDM   Kezia Nava is a 48 year old female with history of low back pain, and sciatica, presenting today with exacerbation of symptoms after an injury at the workplace.  On exam, the patient is neurologically intact, but appears acutely uncomfortable.  No fever, no direct trauma.  She does have follow-up with orthopedics scheduled for later this month, to discuss minimally invasive procedure to help with nerve pain.  At this point, I do not think additional imaging is indicated, given diagnosis is known and this appears to be an exacerbation of a known back injury.  No concerns at this point for epidural abscess, epidural hematoma, discitis, osteomyelitis, abdominal aortic aneurysm, kidney stone, [place] nephritis, or other condition.  Will continue with symptom management, with another burst of steroids.  Patient does have pain medication and muscle relaxants at home, and will continue those as needed.  Return to the ED right away for symptoms of urinary retention, new numbness or weakness, unmanageable pain, or  further concerns.    Disposition   The patient was discharged.     Diagnosis     ICD-10-CM    1. Acute right-sided low back pain with right-sided sciatica  M54.41       2. Spondylosis of lumbar spine  M47.816            Discharge Medications   New Prescriptions    METHYLPREDNISOLONE (MEDROL DOSEPAK) 4 MG TABLET THERAPY PACK    Follow Package Directions         Scribe Disclosure:  Becky ROSENBERG, am serving as a scribe at 1:33 PM on 7/3/2024 to document services personally performed by Marnie Curiel MD based on my observations and the provider's statements to me.        Marnie Curiel MD  07/03/24 5500

## 2024-07-03 NOTE — Clinical Note
Problem: Infection ([place])  Goal: Absence of Infection Signs and Symptoms  Outcome: Improving     Problem: Oral Nutrition ([place])  Goal: Effective Oral Intake  Outcome: Improving     Problem: Respiratory Compromise ()  Goal: Effective Oxygenation and Ventilation  Outcome: Improving     Problem: Temperature Instability ()  Goal: Temperature Stability  Outcome: Improving  Intervention: Promote Temperature Stability  Recent Flowsheet Documentation  Taken 2021 by Vick Grey RN  Warming Method:   swaddled   t-shirt  Taken 2021 by Vick Grey RN  Warming Method:   swaddled   t-shirt  Taken 2021 by Vick Grey RN  Warming Method:   swaddled   t-shirt     Problem: Substance-Exposed Infant  Goal: Withdrawal Symptoms Managed  Outcome: Improving  Intervention: Monitor and Manage Withdrawal Symptoms  Recent Flowsheet Documentation  Taken 2021 by Vick Grey RN  Sleep/Rest Enhancement (Infant):   awakenings minimized   nested   swaddling promoted  Taken 2021 by Vick Grey RN  Sleep/Rest Enhancement (Infant):   awakenings minimized   nested   swaddling promoted  Taken 2021 by Vick Grey RN  Sleep/Rest Enhancement (Infant):   awakenings minimized   nested   swaddling promoted  Stable vital signs. Taking 48 ml-100 ml with KAYKAY level 1. No emesis. JORY score of 4-7. Gained 40 grams. No contact with parents. Will continue to assess.    Kezia Nava was seen and treated in our emergency department on 7/3/2024.  She may return to work on 07/09/2024.       If you have any questions or concerns, please don't hesitate to call.      Marnie Curiel MD

## 2024-07-03 NOTE — Clinical Note
Kezia Nava was seen and treated in our emergency department on 7/3/2024.  She may return to work on 07/09/2024.       If you have any questions or concerns, please don't hesitate to call.      Marnie Curiel MD

## 2024-07-03 NOTE — ED TRIAGE NOTES
Patient was assisting a confused and agitated patient when patient grabbed her arm and she twisted her back. She is reporting pain to right lower back and flank area.      Triage Assessment (Adult)       Row Name 07/03/24 1340          Triage Assessment    Airway WDL WDL        Respiratory WDL    Respiratory WDL WDL        Skin Circulation/Temperature WDL    Skin Circulation/Temperature WDL WDL        Cardiac WDL    Cardiac WDL WDL        Peripheral/Neurovascular WDL    Peripheral Neurovascular WDL WDL        Cognitive/Neuro/Behavioral WDL    Cognitive/Neuro/Behavioral WDL WDL

## 2024-07-05 ENCOUNTER — PATIENT OUTREACH (OUTPATIENT)
Dept: FAMILY MEDICINE | Facility: CLINIC | Age: 49
End: 2024-07-05
Payer: COMMERCIAL

## 2024-07-09 NOTE — TELEPHONE ENCOUNTER
Appointments in Next Year      Jul 17, 2024 4:00 PM  (Arrive by 3:45 PM)  Adult Psychotherapy with ANIYAH Medellin  [facility] ([facility] ) [phone]     Apr 10, 2025 10:30 AM  (Arrive by 10:10 AM)  Adult Preventative Visit with Shamir Angeles MD  [facility] ([facility] ) [phone]            Transitions of Care Outreach  Chief Complaint   Patient presents with    Hospital F/U       Most Recent Admission Date: 7/3/2024   Most Recent Admission Diagnosis:      Most Recent Discharge Date: 7/3/2024   Most Recent Discharge Diagnosis: Acute right-sided low back pain with right-sided sciatica - M54.41  Spondylosis of lumbar spine - M47.816     Transitions of Care Assessment    Discharge Assessment  How are you doing now that you are home?: alright, having pain prior to injury at work, that exacerbated. having procedure on back today with [employer]. Did not take Medrol, needs pain for procedure  How are your symptoms? (Red Flag symptoms escalate to triage hotline per guidelines): Unchanged  Do you know how to contact your clinic care team if you have future questions or changes to your health status? : Yes  Does the patient have their discharge instructions? : Yes  Does the patient have questions regarding their discharge instructions? : Yes (see comment)  Were you started on any new medications or were there changes to any of your previous medications? : No  Does the patient have all of their medications?: Yes  Do you have questions regarding any of your medications? : No  Do you have all of your needed medical supplies or equipment (DME)?  (i.e. oxygen tank, CPAP, cane, etc.): Yes    Follow up Plan     Discharge Follow-Up  Discharge follow up appointment scheduled in alignment with recommended follow up timeframe or Transitions of Risk Category? (Low = within 30 days;  Moderate= within 14 days; High= within 7 days): No  Patient's follow up appointment not scheduled: Patient declined scheduling support. Education on the importance of transitions of care follow up. Provided scheduling phone number.    Future Appointments   Date Time Provider Department Center   7/17/2024  4:00 PM Wallace Yo LMFT [facility]   4/10/2025 10:30 AM Shamir Angeles MD [facility]       Outpatient Plan as outlined on AVS reviewed with patient.    For any urgent concerns, please contact our 24 hour nurse triage line: [phone] ([phone])       Sarah Beth Hess RN

## 2024-07-17 ENCOUNTER — VIRTUAL VISIT (OUTPATIENT)
Dept: PSYCHOLOGY | Facility: CLINIC | Age: 49
End: 2024-07-17
Payer: COMMERCIAL

## 2024-07-17 DIAGNOSIS — F43.22 ADJUSTMENT DISORDER WITH ANXIETY: Primary | ICD-10-CM

## 2024-07-17 PROCEDURE — 90834 PSYTX W PT 45 MINUTES: CPT | Mod: 95 | Performed by: MARRIAGE & FAMILY THERAPIST

## 2024-07-17 NOTE — PROGRESS NOTES
[facility]                                     Progress Note    Patient Name: Kezia Nava  Date: 24         Service Type: Individual      Session Start Time: 4:01p  Session End Time: 4:50p     Session Length: 49    Session #: 2    Attendees: Client attended alone    Service Modality:  Video Visit:      Provider verified identity through the following two step process.  Patient provided:  Patient     Telemedicine Visit: The patient's condition can be safely assessed and treated via synchronous audio and visual telemedicine encounter.      Reason for Telemedicine Visit: Services only offered telehealth    Originating Site (Patient Location): Patient's home    Distant Site (Provider Location): Provider Remote Setting- Home Office    Consent:  The patient/guardian has verbally consented to: the potential risks and benefits of telemedicine (video visit) versus in person care; bill my insurance or make self-payment for services provided; and responsibility for payment of non-covered services.     Patient would like the video invitation sent by:  Send to e-mail at: [email or web address]    Mode of Communication:  Video Conference via Amwell    Distant Location (Provider):  Off-site    As the provider I attest to compliance with applicable laws and regulations related to telemedicine.    DATA  Interactive Complexity: No  Crisis: No        Progress Since Last Session (Related to Symptoms / Goals / Homework):   Symptoms: No change      Homework: Completed in session      Episode of Care Goals: Satisfactory progress - ACTION (Actively working towards change); Intervened by reinforcing change plan / affirming steps taken     Current / Ongoing Stressors and Concerns:   Last session , overall things have been stable over the past couple weeks. Some challenges with parents and pt's own health issues but sisters have been less than helpful with parents care.    Goals: Circles of control exercise.  Belly breathing with muscle relaxation     Treatment Objective(s) Addressed in This Session:   participate in 1 activities to improve mood       Intervention:   Motivational Interviewing    MI Intervention: Open-ended questions and Reflections: simple and complex     Change Talk Expressed by the Patient: Need to change Committment to change    Provider Response to Change Talk: E - Evoked more info from patient about behavior change and A - Affirmed patient's thoughts, decisions, or attempts at behavior change      ASSESSMENT: Current Emotional / Mental Status (status of significant symptoms):   Risk status (Self / Other harm or suicidal ideation)   Patient denies current fears or concerns for personal safety.   Patient denies current or recent suicidal ideation or behaviors.   Patient denies current or recent homicidal ideation or behaviors.   Patient denies current or recent self injurious behavior or ideation.   Patient denies other safety concerns.   Patient reports there has been no change in risk factors since their last session.     Patient reports there has been no change in protective factors since their last session.     Recommended that patient call 911 or go to the local ED should there be a change in any of these risk factors.     Appearance:   Appropriate    Eye Contact:   Good    Psychomotor Behavior: Normal    Attitude:   Cooperative    Orientation:   All   Speech    Rate / Production: Normal     Volume:  Normal    Mood:    Normal   Affect:    Appropriate    Thought Content:  Clear    Thought Form:  Coherent  Logical    Insight:    Good      Medication Review:   No current psychiatric medications prescribed     Medication Compliance:   NA     Changes in Health Issues:   None reported       Diagnosis:  Adjustment disorder with anxiety    Collateral Reports Completed:   Not Applicable    PLAN: (Patient Tasks / Therapist Tasks / Other)  Circles of control    Wallace Yo, ANIYAH  7/17/24                                                          ______________________________________________________________________    Individual Treatment Plan    Patient's Name: Kezia Nava  YOB: 1975    Date of Creation: 7/17/24  Date Treatment Plan Last Reviewed/Revised: 10/17/24    DSM5 Diagnoses: Adjustment Disorders  309.24 (F43.22) With anxiety  Psychosocial / Contextual Factors:   PROMIS (reviewed every 90 days):     Referral / Collaboration:  Referral to another professional/service is not indicated at this time..    Anticipated number of session for this episode of care: 3-6 sessions  Anticipation frequency of session: Biweekly  Anticipated Duration of each session: 38-52 minutes  Treatment plan will be reviewed in 90 days or when goals have been changed.       MeasurableTreatment Goal(s) related to diagnosis / functional impairment(s)  Goal 1: Patient will engage in using CBT skills  Objective #A (Patient Action)    Patient will identify 1 stressors which contribute to feelings of anxiety.  Status: New - Date: 7/17/24      Intervention(s)  Therapist will teach emotional regulation skills.   .        Patient has reviewed and agreed to the above plan.      ANIYAH Ameczua  July 17, 2024

## 2024-08-05 ENCOUNTER — VIRTUAL VISIT (OUTPATIENT)
Dept: PSYCHOLOGY | Facility: CLINIC | Age: 49
End: 2024-08-05
Payer: COMMERCIAL

## 2024-08-05 DIAGNOSIS — F43.22 ADJUSTMENT DISORDER WITH ANXIETY: Primary | ICD-10-CM

## 2024-08-05 PROCEDURE — 90834 PSYTX W PT 45 MINUTES: CPT | Mod: 95 | Performed by: MARRIAGE & FAMILY THERAPIST

## 2024-08-05 NOTE — PROGRESS NOTES
[facility]                                     Progress Note    Patient Name: Kezia Nava  Date: 24         Service Type: Individual      Session Start Time: 1:03p Session End Time: 1:45p     Session Length: 42    Session #: 3    Attendees: Client attended alone    Service Modality:  Video Visit:      Provider verified identity through the following two step process.  Patient provided:  Patient     Telemedicine Visit: The patient's condition can be safely assessed and treated via synchronous audio and visual telemedicine encounter.      Reason for Telemedicine Visit: Services only offered telehealth    Originating Site (Patient Location): Patient's home    Distant Site (Provider Location): Provider Remote Setting- Home Office    Consent:  The patient/guardian has verbally consented to: the potential risks and benefits of telemedicine (video visit) versus in person care; bill my insurance or make self-payment for services provided; and responsibility for payment of non-covered services.     Patient would like the video invitation sent by:  Send to e-mail at: [email or web address]    Mode of Communication:  Video Conference via Amwell    Distant Location (Provider):  Off-site    As the provider I attest to compliance with applicable laws and regulations related to telemedicine.    DATA  Interactive Complexity: No  Crisis: No        Progress Since Last Session (Related to Symptoms / Goals / Homework):   Symptoms: No change, mood around 7.5/10    Homework: Completed in session      Episode of Care Goals: Satisfactory progress - ACTION (Actively working towards change); Intervened by reinforcing change plan / affirming steps taken     Current / Ongoing Stressors and Concerns:   Last session , pt reports the past 3 weeks have been stable. Parents health is stable, some upcoming appts.    Goal: CBT log       Treatment Objective(s) Addressed in This Session:   participate in 1 activities to  improve mood       Intervention:   Motivational Interviewing    MI Intervention: Open-ended questions and Reflections: simple and complex     Change Talk Expressed by the Patient: Need to change Committment to change    Provider Response to Change Talk: E - Evoked more info from patient about behavior change and A - Affirmed patient's thoughts, decisions, or attempts at behavior change      ASSESSMENT: Current Emotional / Mental Status (status of significant symptoms):   Risk status (Self / Other harm or suicidal ideation)   Patient denies current fears or concerns for personal safety.   Patient denies current or recent suicidal ideation or behaviors.   Patient denies current or recent homicidal ideation or behaviors.   Patient denies current or recent self injurious behavior or ideation.   Patient denies other safety concerns.   Patient reports there has been no change in risk factors since their last session.     Patient reports there has been no change in protective factors since their last session.     Recommended that patient call 911 or go to the local ED should there be a change in any of these risk factors.     Appearance:   Appropriate    Eye Contact:   Good    Psychomotor Behavior: Normal    Attitude:   Cooperative    Orientation:   All   Speech    Rate / Production: Normal     Volume:  Normal    Mood:    Normal   Affect:    Appropriate    Thought Content:  Clear    Thought Form:  Coherent  Logical    Insight:    Good      Medication Review:   No current psychiatric medications prescribed     Medication Compliance:   NA     Changes in Health Issues:   None reported       Diagnosis:  Adjustment disorder with anxiety    Collateral Reports Completed:   Not Applicable    PLAN: (Patient Tasks / Therapist Tasks / Other)  CBT log    ANIYAH Amezcua  8/5/24                                                         ______________________________________________________________________    Individual  Treatment Plan    Patient's Name: Kezia Nava  YOB: 1975    Date of Creation: 7/17/24  Date Treatment Plan Last Reviewed/Revised: 10/17/24    DSM5 Diagnoses: Adjustment Disorders  309.24 (F43.22) With anxiety  Psychosocial / Contextual Factors:   PROMIS (reviewed every 90 days):     Referral / Collaboration:  Referral to another professional/service is not indicated at this time..    Anticipated number of session for this episode of care: 3-6 sessions  Anticipation frequency of session: Biweekly  Anticipated Duration of each session: 38-52 minutes  Treatment plan will be reviewed in 90 days or when goals have been changed.       MeasurableTreatment Goal(s) related to diagnosis / functional impairment(s)  Goal 1: Patient will engage in using CBT skills  Objective #A (Patient Action)    Patient will identify 1 stressors which contribute to feelings of anxiety.  Status: New - Date: 7/17/24      Intervention(s)  Therapist will teach emotional regulation skills.   .        Patient has reviewed and agreed to the above plan.      ANIYAH Amezcua  July 17, 2024

## 2024-08-16 ENCOUNTER — NURSE TRIAGE (OUTPATIENT)
Dept: FAMILY MEDICINE | Facility: CLINIC | Age: 49
End: 2024-08-16
Payer: COMMERCIAL

## 2024-08-16 NOTE — TELEPHONE ENCOUNTER
COVID Positive/Requesting COVID treatment    Patient is positive for COVID and requesting treatment options.    Date of positive COVID test (PCR or at home)? 8/16/24 at home  Current COVID symptoms: fever or chills, cough, fatigue, muscle or body aches, headache, and congestion or runny nose  Date COVID symptoms began: 8/16/24    Message should be routed to clinic RN pool. Best phone number to use for call back: [phone]

## 2024-08-16 NOTE — TELEPHONE ENCOUNTER
RN COVID TREATMENT VISIT  08/16/24      The patient has been triaged and does not require a higher level of care.    Kezia Nava  48 year old  Current weight? 196 lbs    Has the patient been seen by a primary care provider at an [facility] or [facility] within the past two years? Yes.   Have you been in close proximity to/do you have a known exposure to a person with a confirmed case of influenza? No.     General treatment eligibility:  Date of positive COVID test (PCR or at home)? 08/16/2024    Are you or have you been hospitalized for this COVID-19 infection? No.   Have you received monoclonal antibodies or antiviral treatment for COVID-19 since this positive test? No.   Do you have any of the following conditions that place you at risk of being very sick from COVID-19?   - Chronic lung diseases such as asthma, bronchiectasis, COPD, interstitial lung disease, pulmonary embolism, pulmonary hypertension   Yes, patient has at least one high risk condition as noted above.     Current COVID symptoms:   - fever or chills  - cough  - fatigue  - muscle or body aches  - headache  - sore throat  - congestion or runny nose  Yes. Patient has at least one symptom as selected.     How many days since symptoms started? 5 days or less. Established patient, 12 years or older weighing at least 88.2 lbs, who has symptoms that started in the past 5 days, has not been hospitalized nor received treatment already, and is at risk for being very sick from COVID-19.     Treatment eligibility by RN:  Are you currently pregnant or nursing? No  Do you have a clinically significant hypersensitivity to nirmatrelvir or ritonavir, or toxic epidermal necrolysis (TEN) or Cameron-Reji Syndrome? No  Do you have a history of hepatitis, any hepatic impairment on the Problem List (such as Child-Gonzales Class C, cirrhosis, fatty liver disease, alcoholic liver disease), or was the last liver lab (hepatic panel, ALT, AST,  ALK Phos, bilirubin) elevated in the past 6 months? No  Do you have any history of severe renal impairment (eGFR < 30mL/min)? No    Is patient eligible to continue? Yes, patient meets all eligibility requirements for the RN COVID treatment (as denoted by all no responses above).     Current Outpatient Medications   Medication Sig Dispense Refill    ADVAIR -21 MCG/ACT inhaler INHALE 2 PUFFS INTO THE LUNGS TWO TIMES A DAY 12 g 5    albuterol (PROAIR HFA/PROVENTIL HFA/VENTOLIN HFA) 108 (90 Base) MCG/ACT inhaler Inhale 2 puffs into the lungs every 6 hours as needed for shortness of breath, wheezing or cough 18 g 3    atorvastatin (LIPITOR) 20 MG tablet Take 1 tablet (20 mg) by mouth daily 90 tablet 3    blood glucose (NO BRAND SPECIFIED) lancets standard Use to test blood sugar 1 times daily or as directed. 100 each 3    blood glucose monitoring (NO BRAND SPECIFIED) meter device kit Use to test blood sugar as directed. 1 kit 0    cetirizine (ZYRTEC) 10 MG tablet Take 10 mg by mouth daily      escitalopram (LEXAPRO) 10 MG tablet Take 1 tablet (10 mg) by mouth daily 90 tablet 3    FREESTYLE LITE test strip USE TO TEST BLOOD SUGAR ONCE A DAY OR AS DIRECTED 100 strip 0    glimepiride (AMARYL) 2 MG tablet TAKE ONE TABLET BY MOUTH EVERY MORNING BEFORE BREAKFAST 90 tablet 3    LANsoprazole (PREVACID) 30 MG DR capsule Take 1 capsule (30 mg) by mouth daily 90 capsule 3    losartan-hydrochlorothiazide (HYZAAR) 100-12.5 MG tablet TAKE 1 TABLET BY MOUTH DAILY 90 tablet 3    methylPREDNISolone (MEDROL DOSEPAK) 4 MG tablet therapy pack Follow Package Directions 21 tablet 0    multivitamin w/minerals (THERA-VIT-M) tablet Take 1 tablet by mouth daily      spironolactone (ALDACTONE) 25 MG tablet TAKE ONE TABLET BY MOUTH ONCE DAILY 90 tablet 2    STATIN NOT PRESCRIBED, INTENTIONAL, Please choose reason not prescribed, below      tirzepatide (MOUNJARO) 15 MG/0.5ML pen Inject 15 mg Subcutaneous every 7 days 6 mL 3    traZODone  (DESYREL) 50 MG tablet TAKE ONE OR TWO TABLETS BY MOUTH EVERY NIGHT AT BEDTIME 90 tablet 2    VITAMIN D, CHOLECALCIFEROL, PO Take 10,000 Units by mouth daily          Medications from List 1 of the standing order (on medications that exclude the use of Paxlovid) that patient is taking: NONE. Is patient taking Smoaks's Wort? No  Is patient taking Rachel's Wort or any meds from List 1? No.   Medications from List 2 of the standing order (on meds that provider needs to adjust) that patient is taking: NONE.salmeterol and salmeterol-containing medications (Advair), explained a provider visit is necessary to discuss medication adjustments while taking Paxlovid.Yes. Patient will be scheduled or transferred to a  at the end of this call.       Additional Information   Negative: SEVERE difficulty breathing (e.g., struggling for each breath, speaks in single words)   Negative: Difficult to awaken or acting confused (e.g., disoriented, slurred speech)   Negative: Bluish (or gray) lips or face now   Negative: Shock suspected (e.g., cold/pale/clammy skin, too weak to stand, low BP, rapid pulse)   Negative: Sounds like a life-threatening emergency to the triager   Negative: Diagnosed or suspected COVID-19 and symptoms lasting 3 or more weeks   Negative: COVID-19 exposure and no symptoms   Negative: COVID-19 vaccine reaction suspected (e.g., fever, headache, muscle aches) occurring 1 to 3 days after getting vaccine   Negative: COVID-19 vaccine, questions about   Negative: Lives with someone known to have influenza (flu test positive) and flu-like symptoms (e.g., cough, runny nose, sore throat, SOB; with or without fever)   Negative: Possible COVID-19 symptoms and triager concerned about severity of symptoms or other causes   Negative: COVID-19 and breastfeeding, questions about   Negative: SEVERE or constant chest pain or pressure  (Exception: Mild central chest pain, present only when coughing.)   Negative: MODERATE  difficulty breathing (e.g., speaks in phrases, SOB even at rest, pulse 100-120)   Negative: Headache and stiff neck (can't touch chin to chest)   Negative: Oxygen level (e.g., pulse oximetry) 90% or lower   Negative: Chest pain or pressure  (Exception: MILD central chest pain, present only when coughing.)   Negative: Drinking very little and dehydration suspected (e.g., no urine > 12 hours, very dry mouth, very lightheaded)   Negative: Patient sounds very sick or weak to the triager   Negative: MILD difficulty breathing (e.g., minimal/no SOB at rest, SOB with walking, pulse <100)   Negative: Fever > 103 F (39.4 C)   Negative: Fever > 101 F (38.3 C) and over 60 years of age   Negative: Fever > 100.0 F (37.8 C) and bedridden (e.g., CVA, chronic illness, recovering from surgery)   Negative: HIGH RISK patient (e.g., weak immune system, age > 64 years, obesity with BMI of 30 or higher, pregnant, chronic lung disease or other chronic medical condition) and COVID symptoms (e.g., cough, fever)  (Exceptions: Already seen by doctor or NP/PA and no new or worsening symptoms.)    Protocols used: Coronavirus (COVID-19) Diagnosed or [id number]

## 2024-08-17 ENCOUNTER — VIRTUAL VISIT (OUTPATIENT)
Dept: URGENT CARE | Facility: CLINIC | Age: 49
End: 2024-08-17
Payer: COMMERCIAL

## 2024-08-17 DIAGNOSIS — G47.33 OSA (OBSTRUCTIVE SLEEP APNEA): Chronic | ICD-10-CM

## 2024-08-17 DIAGNOSIS — G47.00 INSOMNIA, UNSPECIFIED TYPE: ICD-10-CM

## 2024-08-17 DIAGNOSIS — J45.909 ASTHMA, UNSPECIFIED ASTHMA SEVERITY, UNSPECIFIED WHETHER COMPLICATED, UNSPECIFIED WHETHER PERSISTENT: ICD-10-CM

## 2024-08-17 DIAGNOSIS — E66.01 SEVERE OBESITY (BMI 35.0-35.9 WITH COMORBIDITY) (H): ICD-10-CM

## 2024-08-17 DIAGNOSIS — I10 BENIGN ESSENTIAL HYPERTENSION: Chronic | ICD-10-CM

## 2024-08-17 DIAGNOSIS — N18.2 CHRONIC KIDNEY DISEASE, STAGE 2 (MILD): ICD-10-CM

## 2024-08-17 DIAGNOSIS — U07.1 INFECTION DUE TO 2019 NOVEL CORONAVIRUS: Primary | ICD-10-CM

## 2024-08-17 DIAGNOSIS — E11.65 TYPE 2 DIABETES MELLITUS WITH HYPERGLYCEMIA, WITHOUT LONG-TERM CURRENT USE OF INSULIN (H): Chronic | ICD-10-CM

## 2024-08-17 PROCEDURE — 99203 OFFICE O/P NEW LOW 30 MIN: CPT | Mod: 95

## 2024-08-17 NOTE — PROGRESS NOTES
"  Kezia Nava is a 48 year old female who is being evaluated via a billable video visit.      The patient has been notified of following at the time of scheduling video visit:     \"This video visit will be conducted via a video call between you and your physician/provider. We have found that certain health care needs can be provided without the need for a physical exam.  This service lets us provide the care you need with a video conversation.  If a prescription is necessary we can send it directly to your pharmacy.  If lab work is needed we can place an order for that and you can then stop by our lab to have the test done at a later time.\"   Patient has given consent for video visit?  YES    SUBJECTIVE:  Kezia Nava is an 48 year old female who presents for covid.  Yesterday started having body aches and took a covid test which was positive.  Has had some headaches, cough, nasal congestion, and diarrhea.  Fever to 101.1 as well.  Tylenol and ibuprofen help.  Has use her albuterol inhaler some also  has had covid before and taken paxlovid and is interested in paxlovid now.  Has been vaccinated for covid.    PMH:   has a past medical history of Bronchitis, Diffuse cystic mastopathy, Elevated BP (12/15/2016), Hypertension, Mesenteric adenitis (4/14), Migraines, Nephrolithiasis, Sleep apnea, Thyroiditis, acute (12/2/2014), and Type 2 diabetes mellitus with hyperglycemia, without long-term current use of insulin (H) (2/6/2017).  Patient Active Problem List   Diagnosis    Pain in joint, ankle and foot    Cervical radiculopathy    Benign essential hypertension    Type 2 diabetes mellitus with hyperglycemia, without long-term current use of insulin (H)    ANA (obstructive sleep apnea)    Acute bilateral low back pain with right-sided sciatica    Paresthesias- ? TIA vs complex migraine (preferred per neuro)    Severe obesity (BMI 35.0-35.9 with comorbidity) (H)    Pre-diabetes    Fibroid uterus    Epigastric pain    " Elevated LFTs    Elevated lipase    Acute left-sided thoracic back pain    Chronic kidney disease, stage 2 (mild)    Nephrolithiasis    Adjustment disorder with anxiety     Social History     Socioeconomic History    Marital status: Single    Number of children: 0   Occupational History    Occupation: RN- ER     Employer: [employer],[address]   Tobacco Use    Smoking status: Never    Smokeless tobacco: Never   Vaping Use    Vaping status: Never Used   Substance and Sexual Activity    Alcohol use: Yes     Alcohol/week: 0.0 standard drinks of alcohol     Comment: 0-1 drinks per week    Drug use: No    Sexual activity: Not Currently     Partners: Male   Other Topics Concern    Parent/sibling w/ CABG, MI or angioplasty before 65F 55M? Yes     Social Determinants of Health     Financial Resource Strain: Low Risk  (4/2/2024)    Financial Resource Strain     Within the past 12 months, have you or your family members you live with been unable to get utilities (heat, electricity) when it was really needed?: No   Food Insecurity: Low Risk  (4/2/2024)    Food Insecurity     Within the past 12 months, did you worry that your food would run out before you got money to buy more?: No     Within the past 12 months, did the food you bought just not last and you didn t have money to get more?: No   Transportation Needs: Low Risk  (4/2/2024)    Transportation Needs     Within the past 12 months, has lack of transportation kept you from medical appointments, getting your medicines, non-medical meetings or appointments, work, or from getting things that you need?: No   Physical Activity: Unknown (4/2/2024)    Exercise Vital Sign     Days of Exercise per Week: 2 days   Stress: No Stress Concern Present (4/2/2024)    [employer] [place] [employer] - Occupational Stress Questionnaire     Feeling of Stress : Only a little   Social Connections: Unknown (4/2/2024)    Social Connection and Isolation Panel  [NHANES]     Frequency of Social Gatherings with Friends and Family: More than three times a week   Housing Stability: Low Risk  (4/2/2024)    Housing Stability     Do you have housing? : Yes     Are you worried about losing your housing?: No     Family History   Problem Relation Age of Onset    C.A.D. Mother         stents    Hypertension Mother     Kidney Disease Mother         transplant    Cerebrovascular Disease Mother 77    Hypertension Father     Breast Cancer Sister 43    Nephrolithiasis Brother     Uterine Cancer Maternal Grandmother 44    Colon Cancer Maternal Grandmother 64    Cancer Paternal Grandmother         stomach    Cerebrovascular Disease Paternal Grandfather     Breast Cancer Maternal Aunt         may have been fibrocystic    Breast Cancer Maternal Aunt         may have been fibrocystic    Prostate Cancer Maternal Uncle 50    Breast Cancer Paternal Aunt     Breast Cancer Cousin 41        maternal first cousin, triple negative breast cancer       ALLERGIES:  Lisinopril and Pneumovax [pneumococcal polysaccharide vaccine]    Current Outpatient Medications   Medication Sig Dispense Refill    nirmatrelvir and ritonavir (PAXLOVID) 300 mg/100 mg therapy pack Take 3 tablets by mouth 2 times daily for 5 days 30 tablet 0    ADVAIR -21 MCG/ACT inhaler INHALE 2 PUFFS INTO THE LUNGS TWO TIMES A DAY 12 g 5    albuterol (PROAIR HFA/PROVENTIL HFA/VENTOLIN HFA) 108 (90 Base) MCG/ACT inhaler Inhale 2 puffs into the lungs every 6 hours as needed for shortness of breath, wheezing or cough 18 g 3    atorvastatin (LIPITOR) 20 MG tablet Take 1 tablet (20 mg) by mouth daily 90 tablet 3    blood glucose (NO BRAND SPECIFIED) lancets standard Use to test blood sugar 1 times daily or as directed. 100 each 3    blood glucose monitoring (NO BRAND SPECIFIED) meter device kit Use to test blood sugar as directed. 1 kit 0    cetirizine (ZYRTEC) 10 MG tablet Take 10 mg by mouth daily      escitalopram (LEXAPRO) 10 MG tablet  Take 1 tablet (10 mg) by mouth daily 90 tablet 3    FREESTYLE LITE test strip USE TO TEST BLOOD SUGAR ONCE A DAY OR AS DIRECTED 100 strip 0    glimepiride (AMARYL) 2 MG tablet TAKE ONE TABLET BY MOUTH EVERY MORNING BEFORE BREAKFAST 90 tablet 3    LANsoprazole (PREVACID) 30 MG DR capsule Take 1 capsule (30 mg) by mouth daily 90 capsule 3    losartan-hydrochlorothiazide (HYZAAR) 100-12.5 MG tablet TAKE 1 TABLET BY MOUTH DAILY 90 tablet 3    multivitamin w/minerals (THERA-VIT-M) tablet Take 1 tablet by mouth daily      spironolactone (ALDACTONE) 25 MG tablet TAKE ONE TABLET BY MOUTH ONCE DAILY 90 tablet 2    STATIN NOT PRESCRIBED, INTENTIONAL, Please choose reason not prescribed, below      tirzepatide (MOUNJARO) 15 MG/0.5ML pen Inject 15 mg Subcutaneous every 7 days 6 mL 3    traZODone (DESYREL) 50 MG tablet TAKE ONE OR TWO TABLETS BY MOUTH EVERY NIGHT AT BEDTIME 90 tablet 2    VITAMIN D, CHOLECALCIFEROL, PO Take 10,000 Units by mouth daily        No current facility-administered medications for this visit.         ROS:  ROS is done and is negative for general/constitutional, eye, ENT, Respiratory, cardiovascular, GI, , Skin, musculoskeletal except as noted elsewhere.  All other review of systems negative except as noted elsewhere.      OBJECTIVE:    No vital signs obtained as is virtual visit    GENERAL: alert and no distress  EYES: Eyes grossly normal to inspection.  No discharge or erythema, or obvious scleral/conjunctival abnormalities.  NOSE: sounds congested  RESP:  occasional cough.  No audible wheeze,or visible cyanosis.    SKIN: Visible skin clear. No significant rash, abnormal pigmentation or lesions.  NEURO: Cranial nerves grossly intact.  Mentation and speech appropriate for age.  PSYCH: Appropriate affect, tone, and pace of words           ASSESSMENT/PLAN:    ASSESSMENT / PLAN:  (U07.1) Infection due to 2019 novel coronavirus  (primary encounter diagnosis)  Comment: has risk factors and would like to  take paxlovid.  Gfr above 60.  Plan: nirmatrelvir and ritonavir (PAXLOVID) 300         mg/100 mg therapy pack        Stop advair and atorvastatin while on paxlovid.  Decrease trazodone dose while on paxlovid.  Reviewed medication instructions and side effects. Follow up if experiences side effects.. I reviewed supportive care, otc meds to use if needed, expected course, and signs of concern.  Follow up as needed or if does not improve within 5 day(s) or if worsens in any way.  Reviewed red flag symptoms and is to go to the ER if experiences any of these.    (I10) Benign essential hypertension  Comment: hx of.  Risk factor for covid complications  Plan: continue meds.  Treat covid as above    (E11.65) Type 2 diabetes mellitus with hyperglycemia, without long-term current use of insulin (H)  Comment: hx of  Plan: Stop atorvastatin while on Paxlovid; restart it 2-3 days after Paxlovid completed.  Monitor sugars while ill and contact pcp if too high or too low.  Treat covid as above    (G47.33) ANA (obstructive sleep apnea)  Comment: increased resp risk with covid  Plan: treat covid as above    (E66.01,  Z68.35) Severe obesity (BMI 35.0-35.9 with comorbidity) (H)  Comment: risk factor for covid complications  Plan: treat covid as above    (N18.2) Chronic kidney disease, stage 2 (mild)  Comment: has hx of  Plan: most recent gfr was 86, so not need to adjust paxlovid doseing    (J45.909) Asthma, unspecified asthma severity, unspecified whether complicated, unspecified whether persistent  Comment: hx of  Plan: stop advair while on paxlovid.  Can restart it 2-3 days after paxlovid completed.  May use albuterol inhaler while on paxlovid.      (G47.00) Insomnia, unspecified type  Comment: hx of  Plan: decrease trazodone to one 50 mg tab at bedtime instead of the two tabs she usually takes while on paxlovid.  Can resume usual dose 2-3 days after completion of paxlovid.        See [facility] for orders, medications, letters,  patient instructions    Hedy Claudio MD  8/17/2024, 9:47 AM    Video-Visit Details    Video Start Time:  9:48    Type of service:  Video Visit    Video End Time:10:12 AM    Originating Location (pt. Location): Home    Distant Location (provider location):  [facility]     Platform used for Video Visit: Well

## 2024-08-17 NOTE — PATIENT INSTRUCTIONS
Do NOT use your Advair inhaler /disc or take atorvastatin while you are on Paxlovid.  You can restart them 2-3 days after you finish the Paxlovid.    Decrease your trazodone at bedtime to one 50 mg tablet instead of two while you are on Paxlovid.  You can go back to your usual dose 2-3 days after you finish Paxlovid.

## 2024-08-23 ENCOUNTER — VIRTUAL VISIT (OUTPATIENT)
Dept: PSYCHOLOGY | Facility: CLINIC | Age: 49
End: 2024-08-23
Payer: COMMERCIAL

## 2024-08-23 DIAGNOSIS — F43.22 ADJUSTMENT DISORDER WITH ANXIETY: Primary | ICD-10-CM

## 2024-08-23 PROCEDURE — 90834 PSYTX W PT 45 MINUTES: CPT | Mod: 95 | Performed by: MARRIAGE & FAMILY THERAPIST

## 2024-08-23 NOTE — PROGRESS NOTES
[facility]                                     Progress Note    Patient Name: Kezia Nava  Date: 24         Service Type: Individual      Session Start Time: 11:32a  Session End Time: 12:22p     Session Length: 50    Session #: 4    Attendees: Client attended alone    Service Modality:  Video Visit:      Provider verified identity through the following two step process.  Patient provided:  Patient     Telemedicine Visit: The patient's condition can be safely assessed and treated via synchronous audio and visual telemedicine encounter.      Reason for Telemedicine Visit: Services only offered telehealth    Originating Site (Patient Location): Patient's home    Distant Site (Provider Location): Provider Remote Setting- Home Office    Consent:  The patient/guardian has verbally consented to: the potential risks and benefits of telemedicine (video visit) versus in person care; bill my insurance or make self-payment for services provided; and responsibility for payment of non-covered services.     Patient would like the video invitation sent by:  Send to e-mail at: [email or web address]    Mode of Communication:  Video Conference via Amwell    Distant Location (Provider):  Off-site    As the provider I attest to compliance with applicable laws and regulations related to telemedicine.    DATA  Interactive Complexity: No  Crisis: No        Progress Since Last Session (Related to Symptoms / Goals / Homework):   Symptoms: No change, mood around 710    Homework: Completed in session      Episode of Care Goals: Satisfactory progress - ACTION (Actively working towards change); Intervened by reinforcing change plan / affirming steps taken     Current / Ongoing Stressors and Concerns:   Last session , overall things are getting better, pt getting over COVID which parents also caught.        Treatment Objective(s) Addressed in This Session:   participate in 1 activities to improve  mood       Intervention:   Motivational Interviewing    MI Intervention: Open-ended questions and Reflections: simple and complex     Change Talk Expressed by the Patient: Need to change Committment to change    Provider Response to Change Talk: E - Evoked more info from patient about behavior change and A - Affirmed patient's thoughts, decisions, or attempts at behavior change      ASSESSMENT: Current Emotional / Mental Status (status of significant symptoms):   Risk status (Self / Other harm or suicidal ideation)   Patient denies current fears or concerns for personal safety.   Patient denies current or recent suicidal ideation or behaviors.   Patient denies current or recent homicidal ideation or behaviors.   Patient denies current or recent self injurious behavior or ideation.   Patient denies other safety concerns.   Patient reports there has been no change in risk factors since their last session.     Patient reports there has been no change in protective factors since their last session.     Recommended that patient call 911 or go to the local ED should there be a change in any of these risk factors.     Appearance:   Appropriate    Eye Contact:   Good    Psychomotor Behavior: Normal    Attitude:   Cooperative    Orientation:   All   Speech    Rate / Production: Normal     Volume:  Normal    Mood:    Normal   Affect:    Appropriate    Thought Content:  Clear    Thought Form:  Coherent  Logical    Insight:    Good      Medication Review:   No current psychiatric medications prescribed     Medication Compliance:   NA     Changes in Health Issues:   None reported       Diagnosis:  Adjustment disorder with anxiety    Collateral Reports Completed:   Not Applicable    PLAN: (Patient Tasks / Therapist Tasks / Other)  Assertive communication skills    ANIYAH Amezcua  8/23/24                                                        ______________________________________________________________________    Individual Treatment Plan    Patient's Name: Kezia Nava  YOB: 1975    Date of Creation: 7/17/24  Date Treatment Plan Last Reviewed/Revised: 10/17/24    DSM5 Diagnoses: Adjustment Disorders  309.24 (F43.22) With anxiety  Psychosocial / Contextual Factors:   PROMIS (reviewed every 90 days):     Referral / Collaboration:  Referral to another professional/service is not indicated at this time..    Anticipated number of session for this episode of care: 3-6 sessions  Anticipation frequency of session: Biweekly  Anticipated Duration of each session: 38-52 minutes  Treatment plan will be reviewed in 90 days or when goals have been changed.       MeasurableTreatment Goal(s) related to diagnosis / functional impairment(s)  Goal 1: Patient will engage in using CBT skills  Objective #A (Patient Action)    Patient will identify 1 stressors which contribute to feelings of anxiety.  Status: New - Date: 7/17/24      Intervention(s)  Therapist will teach emotional regulation skills.   .        Patient has reviewed and agreed to the above plan.      ANIYAH Amezcua  July 17, 2024

## 2024-08-29 DIAGNOSIS — E11.21 TYPE 2 DIABETES MELLITUS WITH DIABETIC NEPHROPATHY, WITHOUT LONG-TERM CURRENT USE OF INSULIN (H): ICD-10-CM

## 2024-08-29 RX ORDER — BLOOD-GLUCOSE METER
KIT MISCELLANEOUS
Qty: 100 STRIP | Refills: 2 | Status: SHIPPED | OUTPATIENT
Start: 2024-08-29

## 2024-09-12 ENCOUNTER — VIRTUAL VISIT (OUTPATIENT)
Dept: PSYCHOLOGY | Facility: CLINIC | Age: 49
End: 2024-09-12
Payer: COMMERCIAL

## 2024-09-12 DIAGNOSIS — F43.22 ADJUSTMENT DISORDER WITH ANXIETY: Primary | ICD-10-CM

## 2024-09-12 PROCEDURE — 90834 PSYTX W PT 45 MINUTES: CPT | Mod: 95 | Performed by: MARRIAGE & FAMILY THERAPIST

## 2024-09-12 NOTE — PROGRESS NOTES
[facility]                                     Progress Note    Patient Name: Kezia Nava  Date: 24         Service Type: Individual      Session Start Time: 1:02p  Session End Time: 1:54p     Session Length: 52    Session #: 5    Attendees: Client attended alone    Service Modality:  Video Visit:      Provider verified identity through the following two step process.  Patient provided:  Patient     Telemedicine Visit: The patient's condition can be safely assessed and treated via synchronous audio and visual telemedicine encounter.      Reason for Telemedicine Visit: Services only offered telehealth    Originating Site (Patient Location): Patient's home    Distant Site (Provider Location): Provider Remote Setting- Home Office    Consent:  The patient/guardian has verbally consented to: the potential risks and benefits of telemedicine (video visit) versus in person care; bill my insurance or make self-payment for services provided; and responsibility for payment of non-covered services.     Patient would like the video invitation sent by:  Send to e-mail at: [email or web address]    Mode of Communication:  Video Conference via Amwell    Distant Location (Provider):  Off-site    As the provider I attest to compliance with applicable laws and regulations related to telemedicine.    DATA  Interactive Complexity: No  Crisis: No        Progress Since Last Session (Related to Symptoms / Goals / Homework):   Symptoms: No change, mood around 6.5/10    Homework: Completed in session      Episode of Care Goals: Satisfactory progress - ACTION (Actively working towards change); Intervened by reinforcing change plan / affirming steps taken     Current / Ongoing Stressors and Concerns:   Last session , overall things have been moving in a ok direction, some conflict with sister. Almost fully recovered from COVID, parents doing well. Work has been busier, higher amount of people coming into  [facility].      Treatment Objective(s) Addressed in This Session:   participate in 1 activities to improve mood       Intervention:   Motivational Interviewing    MI Intervention: Open-ended questions and Reflections: simple and complex     Change Talk Expressed by the Patient: Need to change Committment to change    Provider Response to Change Talk: E - Evoked more info from patient about behavior change and A - Affirmed patient's thoughts, decisions, or attempts at behavior change      ASSESSMENT: Current Emotional / Mental Status (status of significant symptoms):   Risk status (Self / Other harm or suicidal ideation)   Patient denies current fears or concerns for personal safety.   Patient denies current or recent suicidal ideation or behaviors.   Patient denies current or recent homicidal ideation or behaviors.   Patient denies current or recent self injurious behavior or ideation.   Patient denies other safety concerns.   Patient reports there has been no change in risk factors since their last session.     Patient reports there has been no change in protective factors since their last session.     Recommended that patient call 911 or go to the local ED should there be a change in any of these risk factors.     Appearance:   Appropriate    Eye Contact:   Good    Psychomotor Behavior: Normal    Attitude:   Cooperative    Orientation:   All   Speech    Rate / Production: Normal     Volume:  Normal    Mood:    Normal   Affect:    Appropriate    Thought Content:  Clear    Thought Form:  Coherent  Logical    Insight:    Good      Medication Review:   No current psychiatric medications prescribed     Medication Compliance:   NA     Changes in Health Issues:   None reported       Diagnosis:  Adjustment disorder with anxiety    Collateral Reports Completed:   Not Applicable    PLAN: (Patient Tasks / Therapist Tasks / Other)  Healthy socialization skills    ANYIAH Amezcua  9/12/24                                                        ______________________________________________________________________    Individual Treatment Plan    Patient's Name: Kezia Nava  YOB: 1975    Date of Creation: 7/17/24  Date Treatment Plan Last Reviewed/Revised: 10/17/24    DSM5 Diagnoses: Adjustment Disorders  309.24 (F43.22) With anxiety  Psychosocial / Contextual Factors:   PROMIS (reviewed every 90 days):     Referral / Collaboration:  Referral to another professional/service is not indicated at this time..    Anticipated number of session for this episode of care: 3-6 sessions  Anticipation frequency of session: Biweekly  Anticipated Duration of each session: 38-52 minutes  Treatment plan will be reviewed in 90 days or when goals have been changed.       MeasurableTreatment Goal(s) related to diagnosis / functional impairment(s)  Goal 1: Patient will engage in using CBT skills  Objective #A (Patient Action)    Patient will identify 1 stressors which contribute to feelings of anxiety.  Status: New - Date: 7/17/24      Intervention(s)  Therapist will teach emotional regulation skills.   .        Patient has reviewed and agreed to the above plan.      ANIYAH Amezcua  July 17, 2024

## 2024-10-04 ENCOUNTER — VIRTUAL VISIT (OUTPATIENT)
Dept: PSYCHOLOGY | Facility: CLINIC | Age: 49
End: 2024-10-04
Payer: COMMERCIAL

## 2024-10-04 DIAGNOSIS — F43.22 ADJUSTMENT DISORDER WITH ANXIETY: Primary | ICD-10-CM

## 2024-10-04 PROCEDURE — 90834 PSYTX W PT 45 MINUTES: CPT | Mod: 95 | Performed by: MARRIAGE & FAMILY THERAPIST

## 2024-10-04 NOTE — PROGRESS NOTES
[facility]                                     Progress Note    Patient Name: Kezia Nava  Date: 10/4/24         Service Type: Individual      Session Start Time: 9:33a Session End Time: 10:23a     Session Length: 50    Session #: 6    Attendees: Client attended alone    Service Modality:  Video Visit:      Provider verified identity through the following two step process.  Patient provided:  Patient     Telemedicine Visit: The patient's condition can be safely assessed and treated via synchronous audio and visual telemedicine encounter.      Reason for Telemedicine Visit: Services only offered telehealth    Originating Site (Patient Location): Patient's home    Distant Site (Provider Location): Provider Remote Setting- Home Office    Consent:  The patient/guardian has verbally consented to: the potential risks and benefits of telemedicine (video visit) versus in person care; bill my insurance or make self-payment for services provided; and responsibility for payment of non-covered services.     Patient would like the video invitation sent by:  Send to e-mail at: [email or web address]    Mode of Communication:  Video Conference via Amwell    Distant Location (Provider):  Off-site    As the provider I attest to compliance with applicable laws and regulations related to telemedicine.    DATA  Interactive Complexity: No  Crisis: No        Progress Since Last Session (Related to Symptoms / Goals / Homework):   Symptoms: No change    Homework: Completed in session      Episode of Care Goals: Satisfactory progress - ACTION (Actively working towards change); Intervened by reinforcing change plan / affirming steps taken     Current / Ongoing Stressors and Concerns:   Last session , overall things have been stable. Mother was in hospital and now TCU, sister pushing for assisted living and not wanting to help as much.      Treatment Objective(s) Addressed in This Session:   participate in 1  activities to improve mood       Intervention:   Motivational Interviewing    MI Intervention: Open-ended questions and Reflections: simple and complex     Change Talk Expressed by the Patient: Need to change Committment to change    Provider Response to Change Talk: E - Evoked more info from patient about behavior change and A - Affirmed patient's thoughts, decisions, or attempts at behavior change      ASSESSMENT: Current Emotional / Mental Status (status of significant symptoms):   Risk status (Self / Other harm or suicidal ideation)   Patient denies current fears or concerns for personal safety.   Patient denies current or recent suicidal ideation or behaviors.   Patient denies current or recent homicidal ideation or behaviors.   Patient denies current or recent self injurious behavior or ideation.   Patient denies other safety concerns.   Patient reports there has been no change in risk factors since their last session.     Patient reports there has been no change in protective factors since their last session.     Recommended that patient call 911 or go to the local ED should there be a change in any of these risk factors.     Appearance:   Appropriate    Eye Contact:   Good    Psychomotor Behavior: Normal    Attitude:   Cooperative    Orientation:   All   Speech    Rate / Production: Normal     Volume:  Normal    Mood:    Normal   Affect:    Appropriate    Thought Content:  Clear    Thought Form:  Coherent  Logical    Insight:    Good      Medication Review:   No current psychiatric medications prescribed     Medication Compliance:   NA     Changes in Health Issues:   None reported       Diagnosis:  Adjustment disorder with anxiety    Collateral Reports Completed:   Not Applicable    PLAN: (Patient Tasks / Therapist Tasks / Other)  Assertive communication skills    ANIYAH Amezcua    10/4/24                                                        ______________________________________________________________________    Individual Treatment Plan    Patient's Name: Kezia Nava  YOB: 1975    Date of Creation: 7/17/24  Date Treatment Plan Last Reviewed/Revised: 10/17/24    DSM5 Diagnoses: Adjustment Disorders  309.24 (F43.22) With anxiety  Psychosocial / Contextual Factors:   PROMIS (reviewed every 90 days):     Referral / Collaboration:  Referral to another professional/service is not indicated at this time..    Anticipated number of session for this episode of care: 3-6 sessions  Anticipation frequency of session: Biweekly  Anticipated Duration of each session: 38-52 minutes  Treatment plan will be reviewed in 90 days or when goals have been changed.       MeasurableTreatment Goal(s) related to diagnosis / functional impairment(s)  Goal 1: Patient will engage in using CBT skills  Objective #A (Patient Action)    Patient will identify 1 stressors which contribute to feelings of anxiety.  Status: New - Date: 7/17/24      Intervention(s)  Therapist will teach emotional regulation skills.   .        Patient has reviewed and agreed to the above plan.      ANIYAH Amezcua  July 17, 2024

## 2024-10-12 DIAGNOSIS — E11.65 TYPE 2 DIABETES MELLITUS WITH HYPERGLYCEMIA, WITHOUT LONG-TERM CURRENT USE OF INSULIN (H): ICD-10-CM

## 2024-10-12 DIAGNOSIS — E66.01 SEVERE OBESITY (BMI 35.0-35.9 WITH COMORBIDITY) (H): ICD-10-CM

## 2024-10-14 RX ORDER — TIRZEPATIDE 15 MG/.5ML
INJECTION, SOLUTION SUBCUTANEOUS
Qty: 6 ML | Refills: 3 | Status: SHIPPED | OUTPATIENT
Start: 2024-10-14

## 2024-10-24 ENCOUNTER — VIRTUAL VISIT (OUTPATIENT)
Dept: PSYCHOLOGY | Facility: CLINIC | Age: 49
End: 2024-10-24
Payer: COMMERCIAL

## 2024-10-24 DIAGNOSIS — F43.22 ADJUSTMENT DISORDER WITH ANXIETY: Primary | ICD-10-CM

## 2024-10-24 PROCEDURE — 90834 PSYTX W PT 45 MINUTES: CPT | Mod: 95 | Performed by: MARRIAGE & FAMILY THERAPIST

## 2024-10-24 NOTE — PROGRESS NOTES
[facility]                                     Progress Note    Patient Name: Kezia Nava  Date: 10/24/24         Service Type: Individual      Session Start Time: 8:03a Session End Time: 8:53a     Session Length: 50    Session #: 7    Attendees: Client attended alone    Service Modality:  Video Visit:      Provider verified identity through the following two step process.  Patient provided:  Patient     Telemedicine Visit: The patient's condition can be safely assessed and treated via synchronous audio and visual telemedicine encounter.      Reason for Telemedicine Visit: Services only offered telehealth    Originating Site (Patient Location): Patient's home    Distant Site (Provider Location): Provider Remote Setting- Home Office    Consent:  The patient/guardian has verbally consented to: the potential risks and benefits of telemedicine (video visit) versus in person care; bill my insurance or make self-payment for services provided; and responsibility for payment of non-covered services.     Patient would like the video invitation sent by:  Send to e-mail at: [email or web address]    Mode of Communication:  Video Conference via Amwell    Distant Location (Provider):  Off-site    As the provider I attest to compliance with applicable laws and regulations related to telemedicine.    DATA  Interactive Complexity: No  Crisis: No        Progress Since Last Session (Related to Symptoms / Goals / Homework):   Symptoms: Improving with days off from work    Homework: Completed in session      Episode of Care Goals: Satisfactory progress - ACTION (Actively working towards change); Intervened by reinforcing change plan / affirming steps taken     Current / Ongoing Stressors and Concerns:   Last session 10/4, overall things have been going very well. Mother has returned home recently, doing well.      Treatment Objective(s) Addressed in This Session:   participate in 1 activities to improve  mood       Intervention:   Motivational Interviewing    MI Intervention: Open-ended questions and Reflections: simple and complex     Change Talk Expressed by the Patient: Need to change Committment to change    Provider Response to Change Talk: E - Evoked more info from patient about behavior change and A - Affirmed patient's thoughts, decisions, or attempts at behavior change      ASSESSMENT: Current Emotional / Mental Status (status of significant symptoms):   Risk status (Self / Other harm or suicidal ideation)   Patient denies current fears or concerns for personal safety.   Patient denies current or recent suicidal ideation or behaviors.   Patient denies current or recent homicidal ideation or behaviors.   Patient denies current or recent self injurious behavior or ideation.   Patient denies other safety concerns.   Patient reports there has been no change in risk factors since their last session.     Patient reports there has been no change in protective factors since their last session.     Recommended that patient call 911 or go to the local ED should there be a change in any of these risk factors.     Appearance:   Appropriate    Eye Contact:   Good    Psychomotor Behavior: Normal    Attitude:   Cooperative    Orientation:   All   Speech    Rate / Production: Normal     Volume:  Normal    Mood:    Normal   Affect:    Appropriate    Thought Content:  Clear    Thought Form:  Coherent  Logical    Insight:    Good      Medication Review:   No current psychiatric medications prescribed     Medication Compliance:   NA     Changes in Health Issues:   None reported       Diagnosis:  Adjustment disorder with anxiety    Collateral Reports Completed:   Not Applicable    PLAN: (Patient Tasks / Therapist Tasks / Other)  Daily coping skills    ANIYAH Amezcua    10/24/24                                                       ______________________________________________________________________    Individual  Treatment Plan    Patient's Name: Kezia Nava  YOB: 1975    Date of Creation: 10/24/24  Date Treatment Plan Last Reviewed/Revised: 10/24/24    DSM5 Diagnoses: Adjustment Disorders  309.24 (F43.22) With anxiety  Psychosocial / Contextual Factors:   PROMIS (reviewed every 90 days):     Referral / Collaboration:  Referral to another professional/service is not indicated at this time..    Anticipated number of session for this episode of care: 3-6 sessions  Anticipation frequency of session: Biweekly  Anticipated Duration of each session: 38-52 minutes  Treatment plan will be reviewed in 90 days or when goals have been changed.       MeasurableTreatment Goal(s) related to diagnosis / functional impairment(s)  Goal 1: Patient will engage in using CBT skills  Objective #A (Patient Action)    Patient will identify 1 stressors which contribute to feelings of anxiety.  Status: Continued 10/24/24    Intervention(s)  Therapist will teach emotional regulation skills.   .        Patient has reviewed and agreed to the above plan.      Wallace Yo, ANIYAH 10/24/2024

## 2024-11-03 ENCOUNTER — HEALTH MAINTENANCE LETTER (OUTPATIENT)
Age: 49
End: 2024-11-03

## 2024-11-08 ENCOUNTER — VIRTUAL VISIT (OUTPATIENT)
Dept: PSYCHOLOGY | Facility: CLINIC | Age: 49
End: 2024-11-08
Payer: COMMERCIAL

## 2024-11-08 DIAGNOSIS — F43.22 ADJUSTMENT DISORDER WITH ANXIETY: Primary | ICD-10-CM

## 2024-11-08 PROCEDURE — 90834 PSYTX W PT 45 MINUTES: CPT | Mod: 95 | Performed by: MARRIAGE & FAMILY THERAPIST

## 2024-11-08 NOTE — PROGRESS NOTES
[facility]                                     Progress Note    Patient Name: Kezia Nava  Date: 24         Service Type: Individual      Session Start Time: 10:30a Session End Time: 11:20a     Session Length: 50    Session #: 8  Attendees: Client attended alone    Service Modality:  Video Visit:      Provider verified identity through the following two step process.  Patient provided:  Patient     Telemedicine Visit: The patient's condition can be safely assessed and treated via synchronous audio and visual telemedicine encounter.      Reason for Telemedicine Visit: Services only offered telehealth    Originating Site (Patient Location): Patient's home    Distant Site (Provider Location): Provider Remote Setting- Home Office    Consent:  The patient/guardian has verbally consented to: the potential risks and benefits of telemedicine (video visit) versus in person care; bill my insurance or make self-payment for services provided; and responsibility for payment of non-covered services.     Patient would like the video invitation sent by:  Send to e-mail at: [email or web address]    Mode of Communication:  Video Conference via Amwell    Distant Location (Provider):  Off-site    As the provider I attest to compliance with applicable laws and regulations related to telemedicine.    DATA  Interactive Complexity: No  Crisis: No        Progress Since Last Session (Related to Symptoms / Goals / Homework):   Symptoms: no chnage    Homework: Completed in session      Episode of Care Goals: Satisfactory progress - ACTION (Actively working towards change); Intervened by reinforcing change plan / affirming steps taken     Current / Ongoing Stressors and Concerns:   Last session 10/24, overall things continue to be going ok, was able to take some time for self away from family which was beneficial.      Treatment Objective(s) Addressed in This Session:   participate in 1 activities to improve  mood       Intervention:   Motivational Interviewing    MI Intervention: Open-ended questions and Reflections: simple and complex     Change Talk Expressed by the Patient: Need to change Committment to change    Provider Response to Change Talk: E - Evoked more info from patient about behavior change and A - Affirmed patient's thoughts, decisions, or attempts at behavior change      ASSESSMENT: Current Emotional / Mental Status (status of significant symptoms):   Risk status (Self / Other harm or suicidal ideation)   Patient denies current fears or concerns for personal safety.   Patient denies current or recent suicidal ideation or behaviors.   Patient denies current or recent homicidal ideation or behaviors.   Patient denies current or recent self injurious behavior or ideation.   Patient denies other safety concerns.   Patient reports there has been no change in risk factors since their last session.     Patient reports there has been no change in protective factors since their last session.     Recommended that patient call 911 or go to the local ED should there be a change in any of these risk factors.     Appearance:   Appropriate    Eye Contact:   Good    Psychomotor Behavior: Normal    Attitude:   Cooperative    Orientation:   All   Speech    Rate / Production: Normal     Volume:  Normal    Mood:    Normal   Affect:    Appropriate    Thought Content:  Clear    Thought Form:  Coherent  Logical    Insight:    Good      Medication Review:   No current psychiatric medications prescribed     Medication Compliance:   NA     Changes in Health Issues:   None reported       Diagnosis:  Adjustment disorder with anxiety    Collateral Reports Completed:   Not Applicable    PLAN: (Patient Tasks / Therapist Tasks / Other)  [place] setting with personal time    ANIYAH Amezcua    11/8/24                                                        ______________________________________________________________________    Individual Treatment Plan    Patient's Name: Kezia Nava  YOB: 1975    Date of Creation: 10/24/24  Date Treatment Plan Last Reviewed/Revised: 10/24/24    DSM5 Diagnoses: Adjustment Disorders  309.24 (F43.22) With anxiety  Psychosocial / Contextual Factors:   PROMIS (reviewed every 90 days):     Referral / Collaboration:  Referral to another professional/service is not indicated at this time..    Anticipated number of session for this episode of care: 3-6 sessions  Anticipation frequency of session: Biweekly  Anticipated Duration of each session: 38-52 minutes  Treatment plan will be reviewed in 90 days or when goals have been changed.       MeasurableTreatment Goal(s) related to diagnosis / functional impairment(s)  Goal 1: Patient will engage in using CBT skills  Objective #A (Patient Action)    Patient will identify 1 stressors which contribute to feelings of anxiety.  Status: Continued 10/24/24    Intervention(s)  Therapist will teach emotional regulation skills.   .        Patient has reviewed and agreed to the above plan.      Wallace Yo, ANIYAH 10/24/2024

## 2024-11-12 ENCOUNTER — HOSPITAL ENCOUNTER (EMERGENCY)
Facility: CLINIC | Age: 49
Discharge: HOME | End: 2024-11-12
Attending: EMERGENCY MEDICINE | Admitting: EMERGENCY MEDICINE
Payer: COMMERCIAL

## 2024-11-12 ENCOUNTER — APPOINTMENT (OUTPATIENT)
Dept: MRI IMAGING | Facility: CLINIC | Age: 49
End: 2024-11-12
Attending: EMERGENCY MEDICINE
Payer: COMMERCIAL

## 2024-11-12 VITALS
DIASTOLIC BLOOD PRESSURE: 73 MMHG | WEIGHT: 198.41 LBS | OXYGEN SATURATION: 98 % | SYSTOLIC BLOOD PRESSURE: 106 MMHG | RESPIRATION RATE: 18 BRPM | TEMPERATURE: 98.4 F | BODY MASS INDEX: 36.51 KG/M2 | HEART RATE: 78 BPM | HEIGHT: 62 IN

## 2024-11-12 DIAGNOSIS — H81.90 ACUTE VESTIBULAR SYNDROME: ICD-10-CM

## 2024-11-12 LAB
ANION GAP SERPL CALCULATED.3IONS-SCNC: 17 MMOL/L (ref 7–15)
ATRIAL RATE - MUSE: 80 BPM
BASOPHILS # BLD AUTO: 0 10E3/UL (ref 0–0.2)
BASOPHILS NFR BLD AUTO: 1 %
BUN SERPL-MCNC: 10.7 MG/DL (ref 6–20)
CALCIUM SERPL-MCNC: 10.4 MG/DL (ref 8.8–10.4)
CHLORIDE SERPL-SCNC: 102 MMOL/L (ref 98–107)
CREAT SERPL-MCNC: 0.92 MG/DL (ref 0.51–0.95)
DIASTOLIC BLOOD PRESSURE - MUSE: NORMAL MMHG
EGFRCR SERPLBLD CKD-EPI 2021: 76 ML/MIN/1.73M2
EOSINOPHIL # BLD AUTO: 0.1 10E3/UL (ref 0–0.7)
EOSINOPHIL NFR BLD AUTO: 2 %
ERYTHROCYTE [DISTWIDTH] IN BLOOD BY AUTOMATED COUNT: 12.4 % (ref 10–15)
GLUCOSE BLDC GLUCOMTR-MCNC: 100 MG/DL (ref 70–99)
GLUCOSE SERPL-MCNC: 93 MG/DL (ref 70–99)
HCO3 SERPL-SCNC: 21 MMOL/L (ref 22–29)
HCT VFR BLD AUTO: 37.7 % (ref 35–47)
HGB BLD-MCNC: 12.8 G/DL (ref 11.7–15.7)
IMM GRANULOCYTES # BLD: 0 10E3/UL
IMM GRANULOCYTES NFR BLD: 0 %
INTERPRETATION ECG - MUSE: NORMAL
LYMPHOCYTES # BLD AUTO: 1.6 10E3/UL (ref 0.8–5.3)
LYMPHOCYTES NFR BLD AUTO: 27 %
MCH RBC QN AUTO: 29.1 PG (ref 26.5–33)
MCHC RBC AUTO-ENTMCNC: 34 G/DL (ref 31.5–36.5)
MCV RBC AUTO: 86 FL (ref 78–100)
MONOCYTES # BLD AUTO: 0.5 10E3/UL (ref 0–1.3)
MONOCYTES NFR BLD AUTO: 9 %
NEUTROPHILS # BLD AUTO: 3.7 10E3/UL (ref 1.6–8.3)
NEUTROPHILS NFR BLD AUTO: 62 %
NRBC # BLD AUTO: 0 10E3/UL
NRBC BLD AUTO-RTO: 0 /100
P AXIS - MUSE: 2 DEGREES
PLATELET # BLD AUTO: 216 10E3/UL (ref 150–450)
POTASSIUM SERPL-SCNC: 3.8 MMOL/L (ref 3.4–5.3)
PR INTERVAL - MUSE: 188 MS
QRS DURATION - MUSE: 70 MS
QT - MUSE: 360 MS
QTC - MUSE: 415 MS
R AXIS - MUSE: 29 DEGREES
RBC # BLD AUTO: 4.4 10E6/UL (ref 3.8–5.2)
SODIUM SERPL-SCNC: 140 MMOL/L (ref 135–145)
SYSTOLIC BLOOD PRESSURE - MUSE: NORMAL MMHG
T AXIS - MUSE: 28 DEGREES
TROPONIN T SERPL HS-MCNC: 7 NG/L
TROPONIN T SERPL HS-MCNC: 9 NG/L
VENTRICULAR RATE- MUSE: 80 BPM
WBC # BLD AUTO: 6.1 10E3/UL (ref 4–11)

## 2024-11-12 PROCEDURE — 250N000013 HC RX MED GY IP 250 OP 250 PS 637: Performed by: EMERGENCY MEDICINE

## 2024-11-12 PROCEDURE — 84484 ASSAY OF TROPONIN QUANT: CPT | Performed by: EMERGENCY MEDICINE

## 2024-11-12 PROCEDURE — A9585 GADOBUTROL INJECTION: HCPCS | Performed by: EMERGENCY MEDICINE

## 2024-11-12 PROCEDURE — 36415 COLL VENOUS BLD VENIPUNCTURE: CPT | Performed by: EMERGENCY MEDICINE

## 2024-11-12 PROCEDURE — 82947 ASSAY GLUCOSE BLOOD QUANT: CPT | Performed by: EMERGENCY MEDICINE

## 2024-11-12 PROCEDURE — 85004 AUTOMATED DIFF WBC COUNT: CPT | Performed by: EMERGENCY MEDICINE

## 2024-11-12 PROCEDURE — 70553 MRI BRAIN STEM W/O & W/DYE: CPT

## 2024-11-12 PROCEDURE — 255N000002 HC RX 255 OP 636: Performed by: EMERGENCY MEDICINE

## 2024-11-12 PROCEDURE — 80048 BASIC METABOLIC PNL TOTAL CA: CPT | Performed by: EMERGENCY MEDICINE

## 2024-11-12 PROCEDURE — 250N000011 HC RX IP 250 OP 636: Performed by: EMERGENCY MEDICINE

## 2024-11-12 PROCEDURE — 99285 EMERGENCY DEPT VISIT HI MDM: CPT | Mod: 25

## 2024-11-12 PROCEDURE — 85025 COMPLETE CBC W/AUTO DIFF WBC: CPT | Performed by: EMERGENCY MEDICINE

## 2024-11-12 PROCEDURE — 82962 GLUCOSE BLOOD TEST: CPT

## 2024-11-12 PROCEDURE — 93005 ELECTROCARDIOGRAM TRACING: CPT

## 2024-11-12 RX ORDER — DIAZEPAM 2 MG/1
2 TABLET ORAL
Status: COMPLETED | OUTPATIENT
Start: 2024-11-12 | End: 2024-11-12

## 2024-11-12 RX ORDER — ONDANSETRON 4 MG/1
4 TABLET, ORALLY DISINTEGRATING ORAL ONCE
Status: COMPLETED | OUTPATIENT
Start: 2024-11-12 | End: 2024-11-12

## 2024-11-12 RX ORDER — ONDANSETRON 4 MG/1
4 TABLET, ORALLY DISINTEGRATING ORAL EVERY 8 HOURS PRN
Qty: 10 TABLET | Refills: 0 | Status: SHIPPED | OUTPATIENT
Start: 2024-11-12 | End: 2024-11-15

## 2024-11-12 RX ORDER — GADOBUTROL 604.72 MG/ML
9 INJECTION INTRAVENOUS ONCE
Status: COMPLETED | OUTPATIENT
Start: 2024-11-12 | End: 2024-11-12

## 2024-11-12 RX ORDER — MECLIZINE HYDROCHLORIDE 25 MG/1
25 TABLET ORAL ONCE
Status: COMPLETED | OUTPATIENT
Start: 2024-11-12 | End: 2024-11-12

## 2024-11-12 RX ORDER — MECLIZINE HYDROCHLORIDE 25 MG/1
25 TABLET ORAL 3 TIMES DAILY PRN
Qty: 9 TABLET | Refills: 0 | Status: SHIPPED | OUTPATIENT
Start: 2024-11-12 | End: 2024-11-15

## 2024-11-12 RX ORDER — DIAZEPAM 2 MG/1
2 TABLET ORAL EVERY 8 HOURS PRN
Qty: 8 TABLET | Refills: 0 | Status: SHIPPED | OUTPATIENT
Start: 2024-11-12

## 2024-11-12 RX ADMIN — DIAZEPAM 2 MG: 2 TABLET ORAL at 21:14

## 2024-11-12 RX ADMIN — ONDANSETRON 4 MG: 4 TABLET, ORALLY DISINTEGRATING ORAL at 16:36

## 2024-11-12 RX ADMIN — MECLIZINE HYDROCHLORIDE 25 MG: 25 TABLET ORAL at 16:36

## 2024-11-12 RX ADMIN — GADOBUTROL 9 ML: 604.72 INJECTION INTRAVENOUS at 22:04

## 2024-11-12 ASSESSMENT — ACTIVITIES OF DAILY LIVING (ADL)
ADLS_ACUITY_SCORE: 0

## 2024-11-12 NOTE — ED TRIAGE NOTES
"Pt c/o feeling lightheaded and \"wobbly\" this whole day. Denies weakness/vision changes. Took meclizine 1 hr pta w no improvement. Denies headache/recent illness. Type 2 diabetic  in triage     Triage Assessment (Adult)       Row Name 11/12/24 1401          Triage Assessment    Airway WDL WDL        Respiratory WDL    Respiratory WDL WDL        Skin Circulation/Temperature WDL    Skin Circulation/Temperature WDL WDL        Cardiac WDL    Cardiac WDL WDL                     "

## 2024-11-12 NOTE — ED PROVIDER NOTES
"  Emergency Department Note      History of Present Illness     Chief Complaint   Dizziness      HPI   Kezia \"Hazel\" JAZ Nava is a 48 year old female with history of type 2 diabetes mellitus, CKD II, and migraines who presents to the ED for evaluation of dizziness. Hazel reports dizziness onset today which she describes not as room-spinning but like she's going to fall and needs to hold onto something. She has vomited twice today. She left work early but found difficulty driving, prompting her ED visit. She took 25 mg meclozine at 1300 which did not improve symptoms. She mentions having a migraine over the weekend, which has since resolved, but is atypical for her. Patient denies fever, numbness, weakness, vision changes, or speech changes. No recent falls or trauma. Notes ongoing issues with swallowing. No recent sinus issues. She endorses increased stress recently.    Independent Historian   None    Review of External Notes       Past Medical History     Medical History and Problem List   Type 2 diabetes mellitus  Hypertension  ANA  Cervical radiculopathy  Acute bilateral low back pain with right-sided sciatica  Paresthesias  Uterine fibroids  CKD II  Nephrolithiasis  Diffuse cystic mastopathy  Mesenteric adenitis  Migraines  Acute thyroiditis    Medications   Atorvastatin  Escitalopram  Glimepiride  Losartan-hydrochlorothiazide  Lansoprazole  Mounjaro  Spironolactone  Trazodone    Surgical History   Open repair tendonl ankle  Carpal tunnel release, bilateral  Cystoscopy, insert stent ureters x2  Tooth #7 removal and implant  Discectomy, cervical fusion  ESWL  Bilateral breast reduction  Appendectomy  Lasik bilateral  Hysterectomy  BSO    Physical Exam     Patient Vitals for the past 24 hrs:   BP Temp Temp src Pulse Resp SpO2 Height Weight   11/12/24 1400 120/87 97.6  F (36.4  C) Oral 84 18 99 % 1.575 m (5' 2\") 90 kg (198 lb 6.6 oz)     Physical Exam    HENT:  external ears unremarkable, Nares clear bilaterally, " mmm, oropharynx without tonsillar hypertrophy/erythema/exudate    Eyes: PERRL, measuring 4mm bilaterally, EOMI mild horizontal nystagmus with left and right lateral gaze, visual acuity and fields intact, conjunctiva and lids normal.      Neck: supple, painless ROM, no cervical lymphadenopathy    Lungs:  CTAB,  no resp distress    CV: rrr, no m/r/g, ppi    Abd: soft, nontender, nondistended, no rebound/masses/guarding/hsm    Ext: no peripheral edema    Skin: warm, dry, well perused, no rashes/bruising/lesions on exposed skin    Neuro:   alert, follows commands, speech clear, CN 2-12 intact,   strength 5/5 and symmetric in BUE intrinsic hand muscles as well as BLE  SILT in all 4 ext  Negative Pronator drift  cerebellar testing unremarkable  gait stable    Psych: Normal mood, normal affect      Diagnostics     Lab Results   Labs Ordered and Resulted from Time of ED Arrival to Time of ED Departure   BASIC METABOLIC PANEL - Abnormal       Result Value    Sodium 140      Potassium 3.8      Chloride 102      Carbon Dioxide (CO2) 21 (*)     Anion Gap 17 (*)     Urea Nitrogen 10.7      Creatinine 0.92      GFR Estimate 76      Calcium 10.4      Glucose 93     GLUCOSE BY METER - Abnormal    GLUCOSE BY METER POCT 100 (*)    TROPONIN T, HIGH SENSITIVITY - Normal    Troponin T, High Sensitivity 9     TROPONIN T, HIGH SENSITIVITY - Normal    Troponin T, High Sensitivity 7     CBC WITH PLATELETS AND DIFFERENTIAL    WBC Count 6.1      RBC Count 4.40      Hemoglobin 12.8      Hematocrit 37.7      MCV 86      MCH 29.1      MCHC 34.0      RDW 12.4      Platelet Count 216      % Neutrophils 62      % Lymphocytes 27      % Monocytes 9      % Eosinophils 2      % Basophils 1      % Immature Granulocytes 0      NRBCs per 100 WBC 0      Absolute Neutrophils 3.7      Absolute Lymphocytes 1.6      Absolute Monocytes 0.5      Absolute Eosinophils 0.1      Absolute Basophils 0.0      Absolute Immature Granulocytes 0.0      Absolute NRBCs 0.0          Imaging   MR Brain w/o & w Contrast    (Results Pending)       EKG   ECG results from 11/12/24   EKG 12 lead     Value    Systolic Blood Pressure     Diastolic Blood Pressure     Ventricular Rate 80    Atrial Rate 80    AR Interval 188    QRS Duration 70        QTc 415    P Axis 2    R AXIS 29    T Axis 28    Interpretation ECG      Sinus rhythm  Low voltage QRS  Borderline ECG  When compared with ECG of 06-Jun-2023 13:30,  Criteria for Septal infarct are no longer Present  Read by Dr. Weir [id number]          Independent Interpretation   None    ED Course      Medications Administered   Medications   diazepam (VALIUM) tablet 2 mg (has no administration in time range)   meclizine (ANTIVERT) tablet 25 mg (25 mg Oral $Given 11/12/24 1636)   ondansetron (ZOFRAN ODT) ODT tab 4 mg (4 mg Oral $Given 11/12/24 1636)       Procedures   Procedures     Discussion of Management       ED Course   ED Course as of 11/12/24 2100   Tue Nov 12, 2024   1620 I obtained history and examined the patient as noted above.        Additional Documentation      Medical Decision Making / Diagnosis     CMS Diagnoses: None    MIPS       None    MDM   Kezia Nava is a 48 year old female presenting with acute onset of vestibular syndrome this morning.  Constant symptoms.  Some associated nausea.  Mild horizontal nystagmus on examination here.  No vertical or rotary nystagmus.  No other localizing neurologic deficits.  No recent URI symptoms.  Discussed with her will do an MRI to evaluate for central etiology.  Symptomatic control with meclizine and Zofran and Valium if needed.  Plan to be discharged home if MRI negative and stable ambulatory trial with medications and vestibular follow-up.    MRI without significant findings.  Stable ambulation status.  Discussed what is known versus what is unknown at this point with watch out for when return here in the emergency department.  Patient comfortable discharge home    Disposition   The  patient was discharged.     Diagnosis     ICD-10-CM    1. Acute vestibular syndrome  H81.90            Discharge Medications   New Prescriptions    No medications on file         Scribe Disclosure:  I, Archana Foster, am serving as a scribe at 4:46 PM on 11/12/2024 to document services personally performed by Donald Weir MD based on my observations and the provider's statements to me.        Donald Weir MD  11/12/24 [id number]

## 2024-11-13 ENCOUNTER — PATIENT OUTREACH (OUTPATIENT)
Dept: FAMILY MEDICINE | Facility: CLINIC | Age: 49
End: 2024-11-13
Payer: COMMERCIAL

## 2024-11-13 DIAGNOSIS — N18.2 CHRONIC KIDNEY DISEASE, STAGE 2 (MILD): ICD-10-CM

## 2024-11-13 RX ORDER — SPIRONOLACTONE 25 MG/1
25 TABLET ORAL DAILY
Qty: 90 TABLET | Refills: 2 | Status: SHIPPED | OUTPATIENT
Start: 2024-11-13

## 2024-11-13 NOTE — TELEPHONE ENCOUNTER
Pt was contacted for post hospital outreach. No appts available with PCP. Triaged offered to schedule ED follow up with team provider 11/15/2024, but pt declines. She wants appt with PCP only. Does PCP approve overbook appt with in 1 week? If yes, please advice on date and time. Pt need a call back with advice.     Transitions of Care Outreach  Chief Complaint   Patient presents with    Hospital F/U       Transitions of Care Assessment         Follow up Plan          Future Appointments   Date Time Provider Department Center   11/25/2024  1:00 PM Wallace Yo LMFT [facility]   12/27/2024 11:30 AM Wallace Yo LMFT [facility]   4/10/2025 10:30 AM Shamir Angeles MD [facility]        Outpatient Plan as outlined on AVS reviewed with patient.    For any urgent concerns, please contact our 24 hour nurse triage line: [phone] ([phone])       Mickey Adkins RN

## 2024-11-18 ENCOUNTER — THERAPY VISIT (OUTPATIENT)
Dept: PHYSICAL THERAPY | Facility: CLINIC | Age: 49
End: 2024-11-18
Attending: EMERGENCY MEDICINE
Payer: COMMERCIAL

## 2024-11-18 DIAGNOSIS — H81.90 ACUTE VESTIBULAR SYNDROME: ICD-10-CM

## 2024-11-18 PROCEDURE — 97162 PT EVAL MOD COMPLEX 30 MIN: CPT | Mod: GP

## 2024-11-18 PROCEDURE — 97530 THERAPEUTIC ACTIVITIES: CPT | Mod: GP

## 2024-11-18 ASSESSMENT — ASTHMA QUESTIONNAIRES
QUESTION_3 LAST FOUR WEEKS HOW OFTEN DID YOUR ASTHMA SYMPTOMS (WHEEZING, COUGHING, SHORTNESS OF BREATH, CHEST TIGHTNESS OR PAIN) WAKE YOU UP AT NIGHT OR EARLIER THAN USUAL IN THE MORNING: NOT AT ALL
QUESTION_5 LAST FOUR WEEKS HOW WOULD YOU RATE YOUR ASTHMA CONTROL: WELL CONTROLLED
ACT_TOTALSCORE: 20
QUESTION_4 LAST FOUR WEEKS HOW OFTEN HAVE YOU USED YOUR RESCUE INHALER OR NEBULIZER MEDICATION (SUCH AS ALBUTEROL): TWO OR THREE TIMES PER WEEK
ACT_TOTALSCORE: 20
QUESTION_2 LAST FOUR WEEKS HOW OFTEN HAVE YOU HAD SHORTNESS OF BREATH: ONCE OR TWICE A WEEK
QUESTION_1 LAST FOUR WEEKS HOW MUCH OF THE TIME DID YOUR ASTHMA KEEP YOU FROM GETTING AS MUCH DONE AT WORK, SCHOOL OR AT HOME: A LITTLE OF THE TIME

## 2024-11-18 NOTE — PROGRESS NOTES
"PHYSICAL THERAPY EVALUATION  Type of Visit: Evaluation        Fall Risk Screen:  Fall screen completed by: PT  Have you fallen 2 or more times in the past year?: No  Have you fallen and had an injury in the past year?: No  Is patient a fall risk?: No    Subjective         Presenting condition or subjective complaint: (Patient-Rptd) Vertigo  Date of onset: 11/12/24    Relevant medical history: (Patient-Rptd) Depression; Diabetes; Dizziness; High blood pressure; Menopause; Migraines or headaches; Neck injury; Overweight; Sleep disorder like apnea   Dates & types of surgery:      Prior diagnostic imaging/testing results: (Patient-Rptd) MRI     Prior therapy history for the same diagnosis, illness or injury: (Patient-Rptd) No      Living Environment  Social support: (Patient-Rptd) Alone   Type of home: (Patient-Rptd) House   Stairs to enter the home: (Patient-Rptd) Yes (Patient-Rptd) 3 Is there a railing: (Patient-Rptd) Yes     Ramp: (Patient-Rptd) No   Stairs inside the home: (Patient-Rptd) Yes (Patient-Rptd) 13 Is there a railing: (Patient-Rptd) Yes     Help at home: (Patient-Rptd) Home and Yard maintenance tasks  Equipment owned:       Employment: (Patient-Rptd) Yes (Patient-Rptd) Rn  Hobbies/Interests: (Patient-Rptd) Crafts travel    Patient goals for therapy: (Patient-Rptd) Walk without feeling like I am going to run into the wall    Pain assessment: Slight headache on L side of frontal lobe      Objective      Cognitive Status Examination  Level of Consciousness: Alert  Follows Commands and Answers Questions: 100% of the time, Follows multi step instructions  Personal Safety and Judgement: Intact  Memory: Intact    STRENGTH: LE Strength WFL    TRANSFERS: Independent       VESTIBULAR EVALUATION  ADDITIONAL HISTORY:  Per ED visit: \"dizziness onset today which she describes not as room-spinning but like she's going to fall and needs to hold onto something. She has vomited twice today. She left work early but found " "difficulty driving, prompting her ED visit. She took 25 mg meclozine at 1300 which did not improve symptoms. She mentions having a migraine over the weekend, which has since resolved, but is atypical for her. She endorses increased stress recently.   Mild horizontal nystagmus on examination here.  No vertical or rotary nystagmus.\"    Hazel shares that on the day she went to the ED, she was needing the wall to help her walk. Was given meclizine and valium and is not sure which one helped but id feel better. She feels worse in the afternoons, had a headache for a few days before the dizziness came on. Feels like she is spinning. Notes that stress has triggered migraines in the past but not sure of any other triggers for her migraines. Has had a complex migraine in the past where a code stroke was called to facial numbness.   Currently has a slight headache on the L side (where she normally gets migraines) and is having some nausea and dizziness. Does have zofran for symptoms.     Description of symptoms: (Patient-Rptd) Off balance; Nausea or vomiting; I feel like I am spinning; Attacks of dizziness  Dizzy attacks:   Start: (Patient-Rptd) 11/12   Last attack: (Patient-Rptd) 11/17   Frequency of occurrences: (Patient-Rptd) Daily   Length of attack: (Patient-Rptd) Hours  Difficulty hearing:  No changes in hearing   Noise in ears?    Once in a while, maybe something going on in L ear   Alleviates symptoms: (Patient-Rptd) Medications  Worsens symptoms: (Patient-Rptd) Standing upright, walking turning  Activities that bring on symptoms: (Patient-Rptd) Riding in a car; Driving a car; Reaching up; Turning while walking; Walking in the dark       Pertinent visual history: Wears glasses, feel like current glassless are not working, at night R eye doesn't feel strong to see   Pertinent history of current vestibular problem: Anxiety, Depression, Migraines, Motion sickness  DHI: Total Score: (Patient-Rptd) 60    Cervicogenic " Screen    Neck ROM Normal     Oculomotor Screen    Ocular ROM Normal   Smooth Pursuit Abnormal  Horizontal nystagmus, eyes feel off    Saccades Normal not a whole lot of dizziness    VOR Abnormal   VOR Cancellation    Head Impulse Test    Convergence Testing Normal        Infrared Goggle Exam Vestibular Suppressant in Last 24 Hours?   Exam Completed With: Infrared goggles   Spontaneous Nystagmus Horizontal L   Gaze Evoked Nystagmus Horizontal L   Head Shake Horizontal Nystagmus    Positional Testing        Assessment & Plan   CLINICAL IMPRESSIONS  Medical Diagnosis: Acute vestibular syndrome (H81.90)    Treatment Diagnosis: Possible vestibular migraines   Impression/Assessment: Patient reports ongoing dizziness that is worse as the day goes on.  The following significant findings have been identified: Pain, Instability, Dizziness, and Disequilibrium . These impairments interfere with their ability to perform self care tasks, recreational activities, and community mobility as compared to previous level of function. Testing as reported above indicate decreased safety and balance with functional mobility. This patient will benefit from skilled physical therapy services to address these concerns.      Clinical Decision Making (Complexity):  Clinical Presentation: Evolving/Changing  Clinical Presentation Rationale: based on medical and personal factors listed in PT evaluation  Clinical Decision Making (Complexity): Moderate complexity    PLAN OF CARE  Treatment Interventions:  Interventions: Gait Training, Manual Therapy, Neuromuscular Re-education, Therapeutic Activity, Therapeutic Exercise, Self-Care/Home Management, Standardized Testing    Long Term Goals     PT Goal 1  Goal Identifier: HEP  Goal Description: Pt will be able to demonstrate HEP activities without need for cues from provider to demonstrate understanding and independence with HEP.  Rationale: to maximize safety and independence with performance of ADLs  and functional tasks  Target Date: 01/13/25  PT Goal 2  Goal Identifier: Migraine managment  Goal Description: Pt will demonstrate understanding of their vestibular migraine triggers and how to identify warning signs of a vestibular migraine to decrease frequency of migraines to allow them to return to preferred activities.  Rationale: to maximize safety and independence with performance of ADLs and functional tasks  Target Date: 01/13/25  PT Goal 3  Goal Identifier: DHI  Goal Description: Pt will demonstrate an 18 point improvement on the DHI to demonstrate decreased dizziness resulting in ability to perform ADLs independently.  Rationale: to maximize safety and independence with performance of ADLs and functional tasks  Target Date: 01/13/25      Frequency of Treatment: 1-2x/week  Duration of Treatment: 8 weeks    Recommended Referrals to Other Professionals:  None at this time  Education Assessment:   Learner/Method: Patient;Listening;Demonstration;Pictures/Video    Risks and benefits of evaluation/treatment have been explained.   Patient/Family/caregiver agrees with Plan of Care.     Evaluation Time:     PT Eval, Low Complexity Minutes ([id number]): 27       Signing Clinician: Gayatri Peñaloza PT

## 2024-11-19 ENCOUNTER — OFFICE VISIT (OUTPATIENT)
Dept: FAMILY MEDICINE | Facility: CLINIC | Age: 49
End: 2024-11-19
Payer: COMMERCIAL

## 2024-11-19 VITALS
SYSTOLIC BLOOD PRESSURE: 111 MMHG | RESPIRATION RATE: 10 BRPM | OXYGEN SATURATION: 97 % | BODY MASS INDEX: 35.83 KG/M2 | WEIGHT: 194.7 LBS | HEIGHT: 62 IN | DIASTOLIC BLOOD PRESSURE: 68 MMHG | TEMPERATURE: 98.7 F | HEART RATE: 90 BPM

## 2024-11-19 DIAGNOSIS — E11.65 TYPE 2 DIABETES MELLITUS WITH HYPERGLYCEMIA, WITHOUT LONG-TERM CURRENT USE OF INSULIN (H): ICD-10-CM

## 2024-11-19 DIAGNOSIS — G43.809 VESTIBULAR MIGRAINE: Primary | ICD-10-CM

## 2024-11-19 LAB
EST. AVERAGE GLUCOSE BLD GHB EST-MCNC: 143 MG/DL
HBA1C MFR BLD: 6.6 % (ref 0–5.6)

## 2024-11-19 PROCEDURE — 99214 OFFICE O/P EST MOD 30 MIN: CPT | Performed by: PHYSICIAN ASSISTANT

## 2024-11-19 PROCEDURE — 83036 HEMOGLOBIN GLYCOSYLATED A1C: CPT | Performed by: PHYSICIAN ASSISTANT

## 2024-11-19 PROCEDURE — 36415 COLL VENOUS BLD VENIPUNCTURE: CPT | Performed by: PHYSICIAN ASSISTANT

## 2024-11-19 RX ORDER — RIZATRIPTAN BENZOATE 10 MG/1
10 TABLET, ORALLY DISINTEGRATING ORAL
Qty: 12 TABLET | Refills: 0 | Status: SHIPPED | OUTPATIENT
Start: 2024-11-19

## 2024-11-19 ASSESSMENT — PAIN SCALES - GENERAL: PAINLEVEL_OUTOF10: NO PAIN (0)

## 2024-11-19 NOTE — PROGRESS NOTES
Dhara Oliva is a 48 year old, presenting for the following health issues:  ER F/U    MDM from ED on 11/12/24:  Kezia Nava is a 48 year old female presenting with acute onset of vestibular syndrome this morning.  Constant symptoms.  Some associated nausea.  Mild horizontal nystagmus on examination here.  No vertical or rotary nystagmus.  No other localizing neurologic deficits.  No recent URI symptoms.  Discussed with her will do an MRI to evaluate for central etiology.  Symptomatic control with meclizine and Zofran and Valium if needed.  Plan to be discharged home if MRI negative and stable ambulatory trial with medications and vestibular follow-up.     MRI without significant findings.  Stable ambulation status.  Discussed what is known versus what is unknown at this point with watch out for when return here in the emergency department.  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  She did go to vestibular rehab and told her this may be vestibular migraine        ED/ Followup:    Facility:  [facility]   Date of visit: 11/12/20224  Reason for visit: Dizziness  Current Status: improved      Past Medical History:   Diagnosis Date    Bronchitis     Diffuse cystic mastopathy     Elevated BP 12/15/2016    Hypertension     Mesenteric adenitis 4/14    Migraines     Nephrolithiasis     s/p  lithotripsy    Sleep apnea     no cpap    Thyroiditis, acute 12/2/2014    transient hypothyroidism- resolved    Type 2 diabetes mellitus with hyperglycemia, without long-term current use of insulin (H) 2/6/2017     Past Surgical History:   Procedure Laterality Date    ARTHROSCOPY ANKLE, OPEN REPAIR TENDON, COMBINED  11/8/2012    Procedure: COMBINED ARTHROSCOPY ANKLE, OPEN REPAIR TENDON;  Ankle Arthroscopy Left Ankle, Open Ligament Repair Left Ankle, Peroneal Tendon Repair Left Ankle ;  Surgeon: Otf Ramos DPM;  Location:  OR    CARPAL TUNNEL RELEASE RT/LT      COMBINED CYSTOSCOPY, INSERT STENT URETER(S)  8/6/2013    Procedure: COMBINED CYSTOSCOPY, INSERT STENT URETER(S);  cystoscopy, right retrograde,RIGHT STENT PLACEMENT.;  Surgeon: Kan Porter MD;  Location:  OR    CYSTOSCOPY, RETROGRADES, INSERT STENT URETER(S), COMBINED  8/6/2013    Procedure: COMBINED CYSTOSCOPY, RETROGRADES, INSERT STENT URETER(S);  cystoscopy, right retrograde, insert stent right ureter;  Surgeon: Kan Porter MD;  Location:  OR    DENTAL SURGERY      TOOTH#7 - DENTAL IMPLANT    DISCECTOMY, FUSION CERVICAL ANTERIOR ONE LEVEL, COMBINED N/A 12/13/2015    Procedure: COMBINED DISCECTOMY, FUSION CERVICAL ANTERIOR ONE LEVEL;  Surgeon: Seven Umaña MD;  Location:  OR    ESOPHAGOSCOPY, GASTROSCOPY, DUODENOSCOPY (EGD), COMBINED N/A 1/21/2021    Procedure: ESOPHAGOGASTRODUODENOSCOPY, WITH BIOPSY;  Surgeon: Crystal Chacon MD;  Location:  GI    EXTRACORPOREAL SHOCK WAVE LITHOTRIPSY (ESWL)  8/19/2013    Procedure: EXTRACORPOREAL SHOCK WAVE LITHOTRIPSY (ESWL);   RIGHT EXTRACORPOREAL SHOCK WAVE LITHOTRIPSY;  Surgeon: Otf Tobias MD;  Location:  OR    HC REDUCTION OF LARGE BREAST Bilateral 2003    HYSTERECTOMY, PAP NO LONGER INDICATED  2019    LAPAROSCOPIC HYSTERECTOMY TOTAL, SALPINGECTOMY Left 5/29/2019    Procedure: single site total LAPAROSCOPIC HYSTERECTOMY, left oophorectomy  and lysis of adhesions;  Surgeon: Pauline Horowitz MD;  Location: [facility] OR    LASIK BILATERAL      SALPINGO OOPHORECTOMY,R/L/DERREK Right 05/17/2006    Right    ZZC APPENDECTOMY  1984     Social History     Tobacco Use    Smoking status: Never    Smokeless tobacco: Never   Substance Use Topics    Alcohol use: Yes     Alcohol/week: 0.0 standard drinks of alcohol     Comment: 0-1 drinks per week     Current Outpatient Medications   Medication Sig Dispense Refill    ADVAIR -21 MCG/ACT inhaler INHALE 2 PUFFS INTO THE LUNGS TWO TIMES A DAY 12 g 5    albuterol (PROAIR HFA/PROVENTIL HFA/VENTOLIN HFA) 108 (90 Base) MCG/ACT inhaler Inhale 2 puffs into the lungs every 6 hours as needed for shortness of breath, wheezing or cough 18 g 3    atorvastatin (LIPITOR) 20 MG tablet Take 1 tablet (20 mg) by mouth daily 90 tablet 3    blood glucose (FREESTYLE LITE) test strip USE TO TEST BLOOD SUGAR ONCE A DAY OR AS DIRECTED 100 strip 2    blood glucose (NO BRAND SPECIFIED) lancets standard Use to test blood sugar 1 times daily or as directed. 100 each 3    blood glucose monitoring (NO BRAND SPECIFIED) meter device kit Use to test blood sugar as directed. 1 kit 0    cetirizine (ZYRTEC) 10 MG tablet Take 10 mg by mouth daily      diazepam (VALIUM) 2 MG tablet Take 1 tablet (2 mg) by mouth every 8 hours as needed (Vertigo). 8 tablet 0    escitalopram (LEXAPRO) 10 MG tablet Take 1 tablet (10 mg) by mouth daily 90 tablet 3    glimepiride (AMARYL) 2 MG tablet TAKE ONE TABLET BY MOUTH EVERY MORNING BEFORE BREAKFAST 90 tablet 3    LANsoprazole (PREVACID) 30 MG DR capsule Take 1 capsule (30 mg) by mouth daily 90 capsule 3    losartan-hydrochlorothiazide (HYZAAR) 100-12.5 MG tablet TAKE 1 TABLET BY MOUTH DAILY 90 tablet 3    MOUNJARO 15 MG/0.5ML SOAJ INJECT THE CONTENTS OF 1 PEN UNDER THE SKIN ONCE WEEKLY 6 mL 3    rizatriptan (MAXALT-MLT) 10 MG ODT Take 1 tablet (10 mg) by mouth at onset of headache for migraine. May repeat in 2 hours. Max 3  "tablets/24 hours. 12 tablet 0    spironolactone (ALDACTONE) 25 MG tablet TAKE ONE TABLET BY MOUTH ONCE DAILY 90 tablet 2    traZODone (DESYREL) 50 MG tablet TAKE ONE OR TWO TABLETS BY MOUTH EVERY NIGHT AT BEDTIME 90 tablet 2    VITAMIN D, CHOLECALCIFEROL, PO Take 10,000 Units by mouth daily       multivitamin w/minerals (THERA-VIT-M) tablet Take 1 tablet by mouth daily      STATIN NOT PRESCRIBED, INTENTIONAL, Please choose reason not prescribed, below       Allergies   Allergen Reactions    Lisinopril Cough    Pneumovax [Pneumococcal Polysaccharide Vaccine] Other (See Comments)     Red streaking and pain     FAMILY HISTORY NOTED AND REVIEWED    PHYSICAL EXAM:    /68 (BP Location: Right arm, Patient Position: Sitting, Cuff Size: Adult Regular)   Pulse 90   Temp 98.7  F (37.1  C) (Tympanic)   Resp 10   Ht 1.575 m (5' 2\")   Wt 88.3 kg (194 lb 11.2 oz)   LMP 06/01/2018 (Approximate)   SpO2 97%   BMI 35.61 kg/m      Patient appears non toxic  Eyes: mild L nystagmus with L lateral gaze  Neuro: normal speech and gait. Normal facial symmetry  Neck: no carotid bruits  Heart: RRR    Results for orders placed or performed in visit on 11/19/24   Hemoglobin A1c     Status: Abnormal   Result Value Ref Range    Estimated Average Glucose 143 (H) <117 mg/dL    Hemoglobin A1C 6.6 (H) 0.0 - 5.6 %         Assessment and Plan:     (G43.809) Vestibular migraine  (primary encounter diagnosis)  Comment: Pt's HA resolved and dizziness improving. She will see vestibular rehab again tomorrow. She has a trip [place] scheduled and has a few valium she can bring in case this recur.  Plan: rizatriptan (MAXALT-MLT) 10 MG ODT (if HA recurs triptans can be taken at the onset of sxs)            (E11.65) Type 2 diabetes mellitus with hyperglycemia, without long-term current use of insulin (H)  Comment:   Plan: well controlled with an A1c today of 6.6%      Isabelle Black PA-C          "

## 2024-11-20 ENCOUNTER — THERAPY VISIT (OUTPATIENT)
Dept: PHYSICAL THERAPY | Facility: CLINIC | Age: 49
End: 2024-11-20
Attending: EMERGENCY MEDICINE
Payer: COMMERCIAL

## 2024-11-20 ENCOUNTER — MYC MEDICAL ADVICE (OUTPATIENT)
Dept: FAMILY MEDICINE | Facility: CLINIC | Age: 49
End: 2024-11-20

## 2024-11-20 DIAGNOSIS — H81.90 ACUTE VESTIBULAR SYNDROME: Primary | ICD-10-CM

## 2024-11-20 DIAGNOSIS — G43.809 VESTIBULAR MIGRAINE: Primary | ICD-10-CM

## 2024-11-20 PROCEDURE — 97112 NEUROMUSCULAR REEDUCATION: CPT | Mod: GP | Performed by: PHYSICAL THERAPIST

## 2024-11-21 RX ORDER — DIAZEPAM 2 MG/1
2 TABLET ORAL EVERY 8 HOURS PRN
Qty: 8 TABLET | Refills: 0 | Status: SHIPPED | OUTPATIENT
Start: 2024-11-21

## 2024-11-21 NOTE — PATIENT INSTRUCTIONS
Make sure you are drinking 64 oz of water a day  Take meclizine as prescribed for travel  Avoid screens 1 hour prior to bed.    See if you can see your therapist more often leading up to your trip  Shoot for 40 minute of cardiovascular activity 3x/wk  Take to your doctor about 400mg of magnesium/day or CoQ10   Look into FL-41 or yellow tinted blue light blocking lenses  Find your musician ear plugs for the trip  Pack peppermint for helping with nausea.      Try to do active breathing:  Breathe in for 4 seconds  Hold for 4 seconds  Slowly exhale for 6 seconds  Repeat for 4 reps frequently throughout your day      Sit at the edge of your chair with your feet together. Close your eyes and sit there for 60 seconds.  Repeat 1-2 times.  Remember to breathe  Do this 2-3x/day

## 2024-11-21 NOTE — TELEPHONE ENCOUNTER
Pt requesting refill for Valium. Rx pended for your review if appropriate. Sent a [employer] message on what pharmacy pt prefers if approves by provider. Please advise, thank you!    Tobi Resendez RN  [facility]

## 2024-12-27 ENCOUNTER — VIRTUAL VISIT (OUTPATIENT)
Dept: PSYCHOLOGY | Facility: CLINIC | Age: 49
End: 2024-12-27
Payer: COMMERCIAL

## 2024-12-27 DIAGNOSIS — F43.22 ADJUSTMENT DISORDER WITH ANXIETY: Primary | ICD-10-CM

## 2024-12-27 PROCEDURE — 90834 PSYTX W PT 45 MINUTES: CPT | Mod: 95 | Performed by: MARRIAGE & FAMILY THERAPIST

## 2024-12-27 NOTE — PROGRESS NOTES
[facility]                                     Progress Notes  Patient Name: Kezia Nava  Date: 24         Service Type: Individual      Session Start Time: 11:31a  Session End Time: 12:21p     Session Length: 50    Session #: 9  Attendees: Client attended alone    Service Modality:  Video Visit:      Provider verified identity through the following two step process.  Patient provided:  Patient     Telemedicine Visit: The patient's condition can be safely assessed and treated via synchronous audio and visual telemedicine encounter.      Reason for Telemedicine Visit: Services only offered telehealth    Originating Site (Patient Location): Patient's home    Distant Site (Provider Location): Provider Remote Setting- Home Office    Consent:  The patient/guardian has verbally consented to: the potential risks and benefits of telemedicine (video visit) versus in person care; bill my insurance or make self-payment for services provided; and responsibility for payment of non-covered services.     Patient would like the video invitation sent by:  Send to e-mail at: [email or web address]    Mode of Communication:  Video Conference via [employer]    Distant Location (Provider):  Off-site    As the provider I attest to compliance with applicable laws and regulations related to telemedicine.    DATA  Interactive Complexity: No  Crisis: No        Progress Since Last Session (Related to Symptoms / Goals / Homework):   Symptoms: No change    Homework: Completed in session      Episode of Care Goals: Satisfactory progress - ACTION (Actively working towards change); Intervened by reinforcing change plan / affirming steps taken     Current / Ongoing Stressors and Concerns:   Last session , overall things have been going well, enjoyable trip to [place].        Treatment Objective(s) Addressed in This Session:   participate in 1 activities to improve mood       Intervention:   Motivational  Interviewing    MI Intervention: Open-ended questions and Reflections: simple and complex     Change Talk Expressed by the Patient: Need to change Committment to change    Provider Response to Change Talk: E - Evoked more info from patient about behavior change and A - Affirmed patient's thoughts, decisions, or attempts at behavior change      ASSESSMENT: Current Emotional / Mental Status (status of significant symptoms):   Risk status (Self / Other harm or suicidal ideation)   Patient denies current fears or concerns for personal safety.   Patient denies current or recent suicidal ideation or behaviors.   Patient denies current or recent homicidal ideation or behaviors.   Patient denies current or recent self injurious behavior or ideation.   Patient denies other safety concerns.   Patient reports there has been no change in risk factors since their last session.     Patient reports there has been no change in protective factors since their last session.     Recommended that patient call 911 or go to the local ED should there be a change in any of these risk factors.     Appearance:   Appropriate    Eye Contact:   Good    Psychomotor Behavior: Normal    Attitude:   Cooperative    Orientation:   All   Speech    Rate / Production: Normal     Volume:  Normal    Mood:    Normal   Affect:    Appropriate    Thought Content:  Clear    Thought Form:  Coherent  Logical    Insight:    Good      Medication Review:   No current psychiatric medications prescribed     Medication Compliance:   NA     Changes in Health Issues:   None reported       Diagnosis:  Adjustment disorder with anxiety    Collateral Reports Completed:   Not Applicable    PLAN: (Patient Tasks / Therapist Tasks / Other)  Healthy coping skills     ANIYAH Amezcua     12/30/24                                                       ______________________________________________________________________    Individual Treatment Plan    Patient's Name:  Kezia Nava  YOB: 1975    Date of Creation: 10/24/24  Date Treatment Plan Last Reviewed/Revised: 10/24/24    DSM5 Diagnoses: Adjustment Disorders  309.24 (F43.22) With anxiety  Psychosocial / Contextual Factors:   PROMIS (reviewed every 90 days):     Referral / Collaboration:  Referral to another professional/service is not indicated at this time..    Anticipated number of session for this episode of care: 3-6 sessions  Anticipation frequency of session: Biweekly  Anticipated Duration of each session: 38-52 minutes  Treatment plan will be reviewed in 90 days or when goals have been changed.       MeasurableTreatment Goal(s) related to diagnosis / functional impairment(s)  Goal 1: Patient will engage in using CBT skills  Objective #A (Patient Action)    Patient will identify 1 stressors which contribute to feelings of anxiety.  Status: Continued 10/24/24    Intervention(s)  Therapist will teach emotional regulation skills.   .        Patient has reviewed and agreed to the above plan.      ANIYAH Amezcua 10/24/2024

## 2025-02-08 ENCOUNTER — PATIENT OUTREACH (OUTPATIENT)
Dept: CARE COORDINATION | Facility: CLINIC | Age: 50
End: 2025-02-08
Payer: COMMERCIAL

## 2025-02-19 ENCOUNTER — NURSE TRIAGE (OUTPATIENT)
Dept: FAMILY MEDICINE | Facility: CLINIC | Age: 50
End: 2025-02-19
Payer: COMMERCIAL

## 2025-02-19 NOTE — TELEPHONE ENCOUNTER
Outgoing call to Patient.  Assisted Patient in getting scheduled as OK'd by Dr. Angeles.    Appointments in Next Year      Feb 21, 2025 9:00 AM  (Arrive by 8:40 AM)  Provider Visit with Shamir Angeles MD  [facility] ([facility] ) [phone]     Angelica Forte RN  [facility]

## 2025-02-19 NOTE — TELEPHONE ENCOUNTER
Shamir Angeles MD Lantz, Whitney L, RN22 minutes ago (4:02 PM)     Double book 9 AM Friday for me to see please

## 2025-02-28 ENCOUNTER — HOSPITAL ENCOUNTER (OUTPATIENT)
Dept: MAMMOGRAPHY | Facility: CLINIC | Age: 50
Discharge: HOME | End: 2025-02-28
Attending: INTERNAL MEDICINE
Payer: COMMERCIAL

## 2025-02-28 DIAGNOSIS — N63.21 MASS OF UPPER OUTER QUADRANT OF LEFT BREAST: ICD-10-CM

## 2025-02-28 PROCEDURE — 76882 US LMTD JT/FCL EVL NVASC XTR: CPT | Mod: LT

## 2025-02-28 PROCEDURE — 77062 BREAST TOMOSYNTHESIS BI: CPT

## 2025-04-01 DIAGNOSIS — G47.00 INSOMNIA, UNSPECIFIED TYPE: ICD-10-CM

## 2025-04-01 DIAGNOSIS — G93.31 POSTVIRAL FATIGUE SYNDROME: ICD-10-CM

## 2025-04-01 DIAGNOSIS — F43.21 SITUATIONAL DEPRESSION: ICD-10-CM

## 2025-04-02 RX ORDER — TRAZODONE HYDROCHLORIDE 50 MG/1
TABLET ORAL
Qty: 90 TABLET | Refills: 2 | Status: SHIPPED | OUTPATIENT
Start: 2025-04-02

## 2025-05-08 DIAGNOSIS — I10 BENIGN ESSENTIAL HYPERTENSION: Chronic | ICD-10-CM

## 2025-05-08 RX ORDER — LOSARTAN POTASSIUM AND HYDROCHLOROTHIAZIDE 12.5; 1 MG/1; MG/1
1 TABLET ORAL DAILY
Qty: 90 TABLET | Refills: 2 | Status: SHIPPED | OUTPATIENT
Start: 2025-05-08

## 2025-05-11 DIAGNOSIS — R05.3 CHRONIC COUGH: ICD-10-CM

## 2025-05-11 DIAGNOSIS — K21.00 GASTROESOPHAGEAL REFLUX DISEASE WITH ESOPHAGITIS WITHOUT HEMORRHAGE: ICD-10-CM

## 2025-05-12 RX ORDER — LANSOPRAZOLE 30 MG/1
30 CAPSULE, DELAYED RELEASE ORAL DAILY
Qty: 90 CAPSULE | Refills: 2 | Status: SHIPPED | OUTPATIENT
Start: 2025-05-12

## 2025-05-18 DIAGNOSIS — G43.809 VESTIBULAR MIGRAINE: ICD-10-CM

## 2025-05-19 RX ORDER — RIZATRIPTAN BENZOATE 10 MG/1
TABLET, ORALLY DISINTEGRATING ORAL
Qty: 12 TABLET | Refills: 3 | Status: SHIPPED | OUTPATIENT
Start: 2025-05-19

## 2025-06-06 ENCOUNTER — TRANSFERRED RECORDS (OUTPATIENT)
Dept: ADMINISTRATIVE | Facility: CLINIC | Age: 50
End: 2025-06-06
Payer: COMMERCIAL

## 2025-06-09 PROBLEM — D12.6 COLON ADENOMAS: Status: ACTIVE | Noted: 2025-06-09

## 2025-06-09 NOTE — PROCEDURES
[facility]   [address], [address], [place] [address]     Patient Name: Kezia Nava  Gender:  Female  Exam Date: 06/06/2025 Visit Number:  [id number]  Age: 49 Years YOB: 1975  Attending MD: Raymundo De Luna MD Medical Record#:  [id number]    Procedure: Colonoscopy   Indications: Colorectal cancer screening       Family history   of polyps  in patient's father. Age diagnosed:  60 and older.  Referring MD: Referral Self  Primary MD:      Shamir Angeles MD  Medications: Admitting Medications:   0.9% Normal Saline at TKO   Intra Procedure Medications:   Patient received monitored anesthesia care.     Complications: No immediate complications  ______________________________________________________________________________  Procedure:   An examination of the heart and lungs was performed and found to be within acceptable limits.  .  The patient was therefore deemed a reasonable candidate for endoscopy and sedation.   The risks and benefits of the procedure were explained to the patient.After obtaining informed consent, the patient received monitored anesthesia care and I passed the scope   without difficulty via the rectum to the ileum.  The appendiceal orifice and ic valve were identified.  The scope was retroflexed during the examination  The quality of the prep was good  (Miralax/Gatorade/2 tablets Bisacodyl/Magnesium Citrate).    This was a complete examination throughout the entire colon.    Findings:    Polyp location: transverse colon.  Quantity: 1.  Size: 4 mm.  Polyp shape:  sessile.         Maneuver: polypectomy was performed with a cold snare.       Removal:  complete.  Retrieval: complete.  Bleeding: none.    Polyp location: descending colon.  Quantity: 4.  Size:  4-5 mm.  Polyp shape: sessile.         Maneuver: polypectomy was performed with a  cold snare.       Removal: complete.  Retrieval: complete.  Bleeding: none.    Polyp location: rectum-proximal.  Quantity:  1.  Size: 5 mm.   Polyp shape: sessile.         Maneuver: polypectomy was performed with a cold snare.       Removal: complete.  Retrieval: complete.  Bleeding: none.      Diverticulosis.  Location: - sigmoid.    Description:  mild.      No inflammation present.  Remainder of the exam is normal.    Impression:  Colorectal polyps  Diverticulosis of colon without diverticulitis  MD impression comments:  Family history of what seems to be advanced polyps in patients father is in his 60s.    Preliminary Plan:  The patient and their physician will receive a copy of the pathology report as well as pathology-based recommendations for future screening or surveillance.  Recommendation Comments:  Repeat colonoscopy in 3 to 5 years based on biopsies results.    Diverticulosis present: we recommend to increase fiber Intake. Foods high in fiber include fruits, vegetables, whole grains, and legumes. Aim for 25-30 grams of fiber per day. Adding a fiber supplement can help.  Pathology Results:  A: RECTUM, PROXIMAL, POLYP:           1. Inflammatory polyp           2. Negative for dysplasia      B: COLON, TRANSVERSE, POLYP:           1. Tubular adenoma           2. Negative for high grade dysplasia           3. Per the colonoscopy report:               a. Polyp size: 4 mm               b. Resection: Complete               c. Retrieval: Complete      C: COLON, DESCENDING, POLYPS:           1. Tubular adenomas (3) and normal colonic mucosa (endoscopically 4 polyps)           2. Negative for high grade dysplasia           3. Per the colonoscopy report:               a. Polyp sizes: 4 mm - 5 mm               b. Resection: Complete               c. Retrieval: Complete      MICROSCOPIC  A: Performed   B: Performed   C: Performed   SPECIAL STAINING/DEEPER  C: Deeper     Electronically signed by: Glen Rob MD    Interpreted at [facility], [address] [address], [place]  [address]    Orders    Instruction(s)/Education:  Instruction/Education Timeframe Assessment   Colon Cancer Prevention  K63.5   Colon Polyps  K63.5   Diverticulosis/Diverticulitis  K63.5   High Fiber Diet  K63.5       Final Plan:  Repeat colonoscopy in 3 years.    We will attempt to contact you at appropriate intervals via [place] mail.  We may not be able to find you or contact you at that time, therefore you should know that the responsibility for following our recommendation rests with you.  If you don't hear from us at the time your procedure is due, please contact our office to schedule an appointment.  If your contact information should change, please contact our office so that we can update your record.      _Electronically signed by:___________________  Raymundo De Luna MD                 06/06/2025    cc: Shamir Angeles MD

## 2025-06-10 ENCOUNTER — PATIENT OUTREACH (OUTPATIENT)
Dept: GASTROENTEROLOGY | Facility: CLINIC | Age: 50
End: 2025-06-10
Payer: COMMERCIAL

## 2025-06-24 DIAGNOSIS — E11.65 TYPE 2 DIABETES MELLITUS WITH HYPERGLYCEMIA, WITHOUT LONG-TERM CURRENT USE OF INSULIN (H): ICD-10-CM

## 2025-06-24 RX ORDER — GLIMEPIRIDE 2 MG/1
2 TABLET ORAL
Qty: 90 TABLET | Refills: 0 | Status: SHIPPED | OUTPATIENT
Start: 2025-06-24

## 2025-07-31 DIAGNOSIS — N18.2 CHRONIC KIDNEY DISEASE, STAGE 2 (MILD): ICD-10-CM

## 2025-07-31 RX ORDER — SPIRONOLACTONE 25 MG/1
25 TABLET ORAL DAILY
Qty: 90 TABLET | Refills: 2 | Status: SHIPPED | OUTPATIENT
Start: 2025-07-31

## 2025-08-03 DIAGNOSIS — G47.00 INSOMNIA, UNSPECIFIED TYPE: ICD-10-CM

## 2025-08-03 DIAGNOSIS — F43.21 SITUATIONAL DEPRESSION: ICD-10-CM

## 2025-08-03 DIAGNOSIS — G93.31 POSTVIRAL FATIGUE SYNDROME: ICD-10-CM

## 2025-08-04 RX ORDER — TRAZODONE HYDROCHLORIDE 50 MG/1
TABLET ORAL
Qty: 90 TABLET | Refills: 2 | Status: SHIPPED | OUTPATIENT
Start: 2025-08-04

## 2025-08-07 PROBLEM — M54.6 ACUTE LEFT-SIDED THORACIC BACK PAIN: Status: RESOLVED | Noted: 2021-01-20 | Resolved: 2025-08-07

## 2025-08-07 PROBLEM — M54.41 ACUTE BILATERAL LOW BACK PAIN WITH RIGHT-SIDED SCIATICA: Status: RESOLVED | Noted: 2017-09-01 | Resolved: 2025-08-07

## 2025-08-08 PROBLEM — R74.8 ELEVATED LIPASE: Status: RESOLVED | Noted: 2021-01-20 | Resolved: 2025-08-08

## 2025-08-08 PROBLEM — R10.13 EPIGASTRIC PAIN: Status: RESOLVED | Noted: 2021-01-20 | Resolved: 2025-08-08

## 2025-08-11 ENCOUNTER — PATIENT OUTREACH (OUTPATIENT)
Dept: CARE COORDINATION | Facility: CLINIC | Age: 50
End: 2025-08-11
Payer: COMMERCIAL

## 2025-08-13 ENCOUNTER — PATIENT OUTREACH (OUTPATIENT)
Dept: CARE COORDINATION | Facility: CLINIC | Age: 50
End: 2025-08-13
Payer: COMMERCIAL

## (undated) DEVICE — EVAC SYSTEM CLEAR FLOW SC082500

## (undated) DEVICE — LINEN GOWN XLG 5407

## (undated) DEVICE — RETR ELEV / UTERINE MANIPULATOR V-CARE MED CUP 60-6085-201A

## (undated) DEVICE — PREP POVIDONE IODINE SOLUTION 10% 4OZ

## (undated) DEVICE — ENDO APPLICATOR ARISTA FLEX TIP AM0005

## (undated) DEVICE — GLOVE PROTEXIS MICRO 7.0  2D73PM70

## (undated) DEVICE — TROCAR TRIPORT PLUS OLYMPUS SINGLE ACCESS WA58010T

## (undated) DEVICE — GLOVE PROTEXIS W/NEU-THERA 7.5  2D73TE75

## (undated) DEVICE — ESU HANDPIECE BIPOLAR THUNDERBEAT FC 5MMX35CM TB-0535FC

## (undated) DEVICE — SUCTION CANISTER MEDIVAC LINER 3000ML W/LID 65651-530

## (undated) DEVICE — LINEN FULL SHEET 5511

## (undated) DEVICE — SOL NACL 0.9% IRRIG 3000ML BAG 2B7477

## (undated) DEVICE — ESU GROUND PAD ADULT W/CORD E7507

## (undated) DEVICE — GOWN XLG DISP 9545

## (undated) DEVICE — Device

## (undated) DEVICE — SU VICRYL 3-0 SH 27" J316H

## (undated) DEVICE — LINEN TOWEL PACK X5 5464

## (undated) DEVICE — SU ENDO STITCH POLYSORB 0 48" 170052

## (undated) DEVICE — GLOVE BIOGEL SKINSENSE PC SZ 7.0 31470

## (undated) DEVICE — PACK LAP HYST RIDGES

## (undated) DEVICE — ENDO TROCAR FIRST ENTRY KII FIOS Z-THRD 05X100MM CTF03

## (undated) DEVICE — SPONGE LAP 18X18" X8435

## (undated) DEVICE — LINEN POUCH DBL 5427

## (undated) DEVICE — DEVICE ENDO STITCH APPLIER 10MM 173016

## (undated) DEVICE — SYR 10ML LL W/O NDL 302995

## (undated) DEVICE — SU MONOCRYL 4-0 PS-2 27" UND Y426H

## (undated) DEVICE — GLOVE PROTEXIS MICRO 6.5  2D73PM65

## (undated) DEVICE — PAD CHUX UNDERPAD 30X36" P3036C

## (undated) DEVICE — SU VICRYL 0 UR-6 27" J603H

## (undated) DEVICE — LINEN HALF SHEET 5512

## (undated) DEVICE — HEMOSTAT ABSORBABLE ARISTA 5GM SM0007-USA

## (undated) DEVICE — BAG CLEAR TRASH 1.3M 39X33" P4040C

## (undated) DEVICE — LINEN DRAPE 54X72" 5467

## (undated) DEVICE — ESU PENCIL W/HOLSTER E2350H

## (undated) DEVICE — LINEN ORTHO ACL PACK 5447

## (undated) RX ORDER — DEXAMETHASONE SODIUM PHOSPHATE 10 MG/ML
INJECTION, SOLUTION INTRAMUSCULAR; INTRAVENOUS
Status: DISPENSED
Start: 2017-08-25

## (undated) RX ORDER — PROPOFOL 10 MG/ML
INJECTION, EMULSION INTRAVENOUS
Status: DISPENSED
Start: 2019-05-29

## (undated) RX ORDER — CEFAZOLIN SODIUM 1 G/3ML
INJECTION, POWDER, FOR SOLUTION INTRAMUSCULAR; INTRAVENOUS
Status: DISPENSED
Start: 2019-05-29

## (undated) RX ORDER — LIDOCAINE HYDROCHLORIDE 10 MG/ML
INJECTION, SOLUTION EPIDURAL; INFILTRATION; INTRACAUDAL; PERINEURAL
Status: DISPENSED
Start: 2019-05-29

## (undated) RX ORDER — LIDOCAINE HYDROCHLORIDE 10 MG/ML
INJECTION, SOLUTION EPIDURAL; INFILTRATION; INTRACAUDAL; PERINEURAL
Status: DISPENSED
Start: 2017-08-25

## (undated) RX ORDER — ONDANSETRON 2 MG/ML
INJECTION INTRAMUSCULAR; INTRAVENOUS
Status: DISPENSED
Start: 2019-05-29

## (undated) RX ORDER — FENTANYL CITRATE 50 UG/ML
INJECTION, SOLUTION INTRAMUSCULAR; INTRAVENOUS
Status: DISPENSED
Start: 2019-05-29

## (undated) RX ORDER — CEFAZOLIN SODIUM 2 G/100ML
INJECTION, SOLUTION INTRAVENOUS
Status: DISPENSED
Start: 2019-05-29

## (undated) RX ORDER — GLYCOPYRROLATE 0.2 MG/ML
INJECTION INTRAMUSCULAR; INTRAVENOUS
Status: DISPENSED
Start: 2019-05-29

## (undated) RX ORDER — DEXAMETHASONE SODIUM PHOSPHATE 4 MG/ML
INJECTION, SOLUTION INTRA-ARTICULAR; INTRALESIONAL; INTRAMUSCULAR; INTRAVENOUS; SOFT TISSUE
Status: DISPENSED
Start: 2019-05-29

## (undated) RX ORDER — OXYCODONE HYDROCHLORIDE 5 MG/1
TABLET ORAL
Status: DISPENSED
Start: 2019-05-29

## (undated) RX ORDER — BUPIVACAINE HYDROCHLORIDE 5 MG/ML
INJECTION, SOLUTION EPIDURAL; INTRACAUDAL
Status: DISPENSED
Start: 2019-05-29

## (undated) RX ORDER — PHENYLEPHRINE HCL IN 0.9% NACL 1 MG/10 ML
SYRINGE (ML) INTRAVENOUS
Status: DISPENSED
Start: 2019-05-29

## (undated) RX ORDER — KETAMINE HCL IN 0.9 % NACL 50 MG/5 ML
SYRINGE (ML) INTRAVENOUS
Status: DISPENSED
Start: 2019-05-29

## (undated) RX ORDER — MINERAL OIL
OIL (ML) MISCELLANEOUS
Status: DISPENSED
Start: 2019-05-29

## (undated) RX ORDER — ACETAMINOPHEN 325 MG/1
TABLET ORAL
Status: DISPENSED
Start: 2019-05-29